# Patient Record
Sex: MALE | Race: BLACK OR AFRICAN AMERICAN | Employment: OTHER | URBAN - METROPOLITAN AREA
[De-identification: names, ages, dates, MRNs, and addresses within clinical notes are randomized per-mention and may not be internally consistent; named-entity substitution may affect disease eponyms.]

---

## 2017-03-28 ENCOUNTER — HOSPITAL ENCOUNTER (EMERGENCY)
Age: 77
Discharge: HOME | End: 2017-03-28
Attending: EMERGENCY MEDICINE
Payer: MEDICARE

## 2017-03-28 VITALS
BODY MASS INDEX: 25.33 KG/M2 | RESPIRATION RATE: 16 BRPM | TEMPERATURE: 98.6 F | DIASTOLIC BLOOD PRESSURE: 90 MMHG | HEIGHT: 72 IN | WEIGHT: 187 LBS | HEART RATE: 60 BPM | OXYGEN SATURATION: 100 % | SYSTOLIC BLOOD PRESSURE: 186 MMHG

## 2017-03-28 DIAGNOSIS — S81.802A WOUND OF LEFT LEG, INITIAL ENCOUNTER: Primary | ICD-10-CM

## 2017-03-28 LAB
ALBUMIN SERPL BCP-MCNC: 3.3 G/DL (ref 3.2–4.6)
ALBUMIN/GLOB SERPL: 0.8 {RATIO} (ref 1.2–3.5)
ALP SERPL-CCNC: 78 U/L (ref 50–136)
ALT SERPL-CCNC: 30 U/L (ref 12–65)
ANION GAP BLD CALC-SCNC: 4 MMOL/L (ref 7–16)
AST SERPL W P-5'-P-CCNC: 29 U/L (ref 15–37)
BASOPHILS # BLD AUTO: 0 K/UL (ref 0–0.2)
BASOPHILS # BLD: 0 % (ref 0–2)
BILIRUB DIRECT SERPL-MCNC: 0.1 MG/DL
BILIRUB SERPL-MCNC: 0.4 MG/DL (ref 0.2–1.1)
BUN SERPL-MCNC: 30 MG/DL (ref 8–23)
CALCIUM SERPL-MCNC: 8.3 MG/DL (ref 8.3–10.4)
CHLORIDE SERPL-SCNC: 116 MMOL/L (ref 98–107)
CO2 SERPL-SCNC: 28 MMOL/L (ref 21–32)
CREAT SERPL-MCNC: 3.46 MG/DL (ref 0.8–1.5)
DIFFERENTIAL METHOD BLD: ABNORMAL
EOSINOPHIL # BLD: 0.1 K/UL (ref 0–0.8)
EOSINOPHIL NFR BLD: 3 % (ref 0.5–7.8)
ERYTHROCYTE [DISTWIDTH] IN BLOOD BY AUTOMATED COUNT: 15.8 % (ref 11.9–14.6)
GLOBULIN SER CALC-MCNC: 3.9 G/DL (ref 2.3–3.5)
GLUCOSE SERPL-MCNC: 169 MG/DL (ref 65–100)
HCT VFR BLD AUTO: 32.4 % (ref 41.1–50.3)
HGB BLD-MCNC: 10.1 G/DL (ref 13.6–17.2)
IMM GRANULOCYTES # BLD: 0 K/UL (ref 0–0.5)
IMM GRANULOCYTES NFR BLD AUTO: 0.2 % (ref 0–5)
LACTATE BLD-SCNC: 1 MMOL/L (ref 0.5–1.9)
LYMPHOCYTES # BLD AUTO: 44 % (ref 13–44)
LYMPHOCYTES # BLD: 2.1 K/UL (ref 0.5–4.6)
MCH RBC QN AUTO: 26.2 PG (ref 26.1–32.9)
MCHC RBC AUTO-ENTMCNC: 31.2 G/DL (ref 31.4–35)
MCV RBC AUTO: 84.2 FL (ref 79.6–97.8)
MONOCYTES # BLD: 0.4 K/UL (ref 0.1–1.3)
MONOCYTES NFR BLD AUTO: 9 % (ref 4–12)
NEUTS SEG # BLD: 2.1 K/UL (ref 1.7–8.2)
NEUTS SEG NFR BLD AUTO: 44 % (ref 43–78)
PLATELET # BLD AUTO: 141 K/UL (ref 150–450)
PMV BLD AUTO: 11.3 FL (ref 10.8–14.1)
POTASSIUM SERPL-SCNC: 4.2 MMOL/L (ref 3.5–5.1)
PROT SERPL-MCNC: 7.2 G/DL (ref 6.3–8.2)
RBC # BLD AUTO: 3.85 M/UL (ref 4.23–5.67)
SODIUM SERPL-SCNC: 148 MMOL/L (ref 136–145)
WBC # BLD AUTO: 4.7 K/UL (ref 4.3–11.1)

## 2017-03-28 PROCEDURE — 96366 THER/PROPH/DIAG IV INF ADDON: CPT | Performed by: EMERGENCY MEDICINE

## 2017-03-28 PROCEDURE — 80076 HEPATIC FUNCTION PANEL: CPT | Performed by: EMERGENCY MEDICINE

## 2017-03-28 PROCEDURE — 96365 THER/PROPH/DIAG IV INF INIT: CPT | Performed by: EMERGENCY MEDICINE

## 2017-03-28 PROCEDURE — 85025 COMPLETE CBC W/AUTO DIFF WBC: CPT | Performed by: EMERGENCY MEDICINE

## 2017-03-28 PROCEDURE — 99284 EMERGENCY DEPT VISIT MOD MDM: CPT | Performed by: EMERGENCY MEDICINE

## 2017-03-28 PROCEDURE — 80048 BASIC METABOLIC PNL TOTAL CA: CPT | Performed by: EMERGENCY MEDICINE

## 2017-03-28 PROCEDURE — 74011250636 HC RX REV CODE- 250/636: Performed by: EMERGENCY MEDICINE

## 2017-03-28 PROCEDURE — 74011000258 HC RX REV CODE- 258: Performed by: EMERGENCY MEDICINE

## 2017-03-28 PROCEDURE — 83605 ASSAY OF LACTIC ACID: CPT

## 2017-03-28 RX ORDER — SODIUM CHLORIDE 0.9 % (FLUSH) 0.9 %
5-10 SYRINGE (ML) INJECTION EVERY 8 HOURS
Status: DISCONTINUED | OUTPATIENT
Start: 2017-03-28 | End: 2017-03-28 | Stop reason: HOSPADM

## 2017-03-28 RX ORDER — SODIUM CHLORIDE 0.9 % (FLUSH) 0.9 %
5-10 SYRINGE (ML) INJECTION AS NEEDED
Status: DISCONTINUED | OUTPATIENT
Start: 2017-03-28 | End: 2017-03-28 | Stop reason: HOSPADM

## 2017-03-28 RX ORDER — CEPHALEXIN 500 MG/1
500 CAPSULE ORAL 3 TIMES DAILY
Qty: 21 CAP | Refills: 0 | Status: SHIPPED | OUTPATIENT
Start: 2017-03-28 | End: 2017-04-04

## 2017-03-28 RX ORDER — CIPROFLOXACIN 500 MG/1
500 TABLET ORAL 2 TIMES DAILY
Qty: 14 TAB | Refills: 0 | Status: SHIPPED | OUTPATIENT
Start: 2017-03-28 | End: 2017-04-04

## 2017-03-28 RX ADMIN — CEFTRIAXONE SODIUM 1 G: 1 INJECTION, POWDER, FOR SOLUTION INTRAMUSCULAR; INTRAVENOUS at 18:00

## 2017-03-28 NOTE — DISCHARGE INSTRUCTIONS
Skin Tears: Care Instructions  Your Care Instructions  As we get older, our skin gets drier and more fragile. Sometimes this can cause the outer layers of skin to split and tear open. Skin tears are treated in different ways. In some cases, doctors use pieces of tape called Steri-Strips to pull the skin together and help it heal. Other times, it's best to leave the tear open and cover it with a special wound-care bandage. Skin tears are usually not serious. They usually heal in a few weeks. But how long you take to heal depends on your body and the type of tear you have. Sometimes the torn piece of skin is used to protect the wound while it heals. But that piece of skin does not heal. It may fall off on its own. Or the doctor may remove it. As your tear heals, it's important to keep it clean to help prevent infection. The doctor has checked you carefully, but problems can develop later. If you notice any problems or new symptoms, get medical treatment right away. Follow-up care is a key part of your treatment and safety. Be sure to make and go to all appointments, and call your doctor if you are having problems. It's also a good idea to know your test results and keep a list of the medicines you take. How can you care for yourself at home? · If you have pain, ask your doctor if you can take an over-the-counter pain medicine, such as acetaminophen (Tylenol), ibuprofen (Advil, Motrin), or naproxen (Aleve). Be safe with medicines. Read and follow all instructions on the label. · If you have a bandage, follow your doctor's instructions for changing it. · If you have Steri-Strips, leave them on until they fall off. · Follow your doctor's instructions about bathing. · Gently wash the skin tear with plain water 2 times a day. Do not rub the area. · Let the area air dry. Or you can pat it carefully with a soft towel. When should you call for help?   Call your doctor now or seek immediate medical care if:  · You have signs of infection, such as:  ¨ Increased pain, swelling, warmth, or redness around the tear. ¨ Red streaks leading from the tear. ¨ Pus draining from the tear. ¨ A fever. · The tear starts to bleed a lot. Small amounts of blood are normal.  Watch closely for changes in your health, and be sure to contact your doctor if:  · You do not get better as expected. Where can you learn more? Go to http://svetlana-glenis.info/. Enter K421 in the search box to learn more about \"Skin Tears: Care Instructions. \"  Current as of: May 27, 2016  Content Version: 11.2  © 5326-0600 [employer]. Care instructions adapted under license by [employer] (which disclaims liability or warranty for this information). If you have questions about a medical condition or this instruction, always ask your healthcare professional. [address] any warranty or liability for your use of this information.

## 2017-03-28 NOTE — ED TRIAGE NOTES
Patient has large wound to left lower leg. States had large blister over site. Patient states he is diabetic.

## 2017-03-28 NOTE — ED NOTES
Patient awake, A&O x3. VSS. Patient states he woke up today with a large blister on his left shin. Patient states \"i mashed it with my hands and a whole lot of water came out\". Patient has large, red, weeping open wound on left shin. Patient denies chest pain, SOB, N/V. Patient states he is a diabetic. No pain at site. BLE edema noted, patient states his legs appear more swollen to him than normal. States no difficulty ambulating.

## 2017-03-28 NOTE — ED NOTES
I have reviewed medications, follow up provider options, and discharge instructions with the patient. The patient verbalized understanding. Copy of discharge information given to patient upon discharge. Patient discharged in no distress. Patient ambulatory to waiting room.  No questions at this time

## 2017-03-28 NOTE — ED PROVIDER NOTES
HPI Comments: 81-year-old -American male with chronic lower extremity edema and diabetes sent from primary care for evaluation of healing wound to his left leg. States that he awoke Monday morning with a large blister on his right shin. He reportedly broke open spontaneously. He said no fever. No trauma. Glucose has been normal.  He has no other complaints at this time. He reports that the swelling in his legs is at its baseline. Patient is a 77 y.o. male presenting with skin problem. The history is provided by the patient. Skin Problem           Past Medical History:   Diagnosis Date    Acute renal failure superimposed on stage 3 chronic kidney disease ([address] 9/21/2016    Anemia     Chronic kidney disease     Chronic pancreatitis ([address] 9/22/2016    Dermatophytosis of nail 12/6/2016    Diabetic neuropathy ([address] 9/22/2016    Essential hypertension 9/22/2016    GERD (gastroesophageal reflux disease)     Hypercholesterolemia     Iron (Fe) deficiency anemia 9/22/2016    Kidney disease     Septicemia due to Klebsiella pneumoniae ([address] 3/60/2873    Systolic CHF, chronic ([address] 9/23/2016    Type II diabetes mellitus with nephropathy ([address] 9/22/2016    Venous insufficiency 12/6/2016    Xerosis cutis 12/6/2016       Past Surgical History:   Procedure Laterality Date    HX COLONOSCOPY      HX HERNIA REPAIR      HX PROSTATECTOMY      HX TURP  2012    FOR BPH         Family History:   Problem Relation Age of Onset    Diabetes Mother     No Known Problems Father        Social History     Social History    Marital status:      Spouse name: N/A    Number of children: N/A    Years of education: N/A     Occupational History    Not on file.      Social History Main Topics    Smoking status: Former Smoker     Quit date: 1995    Smokeless tobacco: Current User    Alcohol use No    Drug use: Not on file    Sexual activity: Not on file     Other Topics Concern    Not on file Social History Narrative         ALLERGIES: Review of patient's allergies indicates no known allergies. Review of Systems   Constitutional: Negative for fever. HENT: Negative for congestion. Respiratory: Negative for cough and shortness of breath. Cardiovascular: Positive for leg swelling. Gastrointestinal: Negative for abdominal pain and vomiting. Genitourinary: Negative for dysuria. Neurological: Negative for headaches. Vitals:    03/28/17 1622 03/28/17 1907   BP: 150/72    Pulse: 83    Resp: 18    Temp: 98.6 °F (37 °C)    SpO2: 96% 96%   Weight: 84.8 kg (187 lb)    Height: 5' 11.5\" (1.816 m)             Physical Exam   Constitutional: He appears well-developed and well-nourished. No distress. HENT:   Head: Normocephalic and atraumatic. Neck: Normal range of motion. Cardiovascular: Normal rate and regular rhythm. Pulmonary/Chest: Effort normal and breath sounds normal.   Musculoskeletal:   Symmetric 1+ lower extremity edema with thickening of the skin consistent with chronic edema. The left shin has a superficial area of skin loss approximately 10 cm in diameter. There is no purulent drainage. There is no palpable abscess. He has good distal pulses and sensation and movement. Neurological: He is alert. Skin: Skin is warm and dry. Psychiatric: He has a normal mood and affect. Nursing note and vitals reviewed. MDM  Number of Diagnoses or Management Options  Diagnosis management comments: IV access obtained. Patient treated with IV Rocephin. Lab work is unremarkable except for chronic renal insufficiency and anemia. Lactic acid is normal.  No evidence of DKA. At this time patient is nontoxic and does not appear to need inpatient treatment. For now we will treat with oral antibiotics. Will refer to the wound center.        Amount and/or Complexity of Data Reviewed  Clinical lab tests: ordered and reviewed  Tests in the medicine section of CPT®: ordered and reviewed    Risk of Complications, Morbidity, and/or Mortality  Presenting problems: moderate  Diagnostic procedures: low  Management options: low      ED Course       Procedures

## 2017-04-01 ENCOUNTER — HOSPITAL ENCOUNTER (INPATIENT)
Age: 77
LOS: 3 days | Discharge: HOME HEALTH | DRG: 300 | End: 2017-04-04
Attending: EMERGENCY MEDICINE | Admitting: INTERNAL MEDICINE
Payer: MEDICARE

## 2017-04-01 DIAGNOSIS — I87.2 VENOUS INSUFFICIENCY: ICD-10-CM

## 2017-04-01 DIAGNOSIS — E86.9 VOLUME DEPLETION: ICD-10-CM

## 2017-04-01 DIAGNOSIS — L03.116 CELLULITIS OF LEFT LOWER EXTREMITY: Primary | ICD-10-CM

## 2017-04-01 DIAGNOSIS — N28.9 RENAL INSUFFICIENCY: ICD-10-CM

## 2017-04-01 PROBLEM — L03.90 CELLULITIS: Status: ACTIVE | Noted: 2017-04-01

## 2017-04-01 LAB
ALBUMIN SERPL BCP-MCNC: 3 G/DL (ref 3.2–4.6)
ALBUMIN/GLOB SERPL: 0.8 {RATIO} (ref 1.2–3.5)
ALP SERPL-CCNC: 79 U/L (ref 50–136)
ALT SERPL-CCNC: 27 U/L (ref 12–65)
ANION GAP BLD CALC-SCNC: 7 MMOL/L (ref 7–16)
AST SERPL W P-5'-P-CCNC: 38 U/L (ref 15–37)
BASOPHILS # BLD AUTO: 0 K/UL (ref 0–0.2)
BASOPHILS # BLD: 0 % (ref 0–2)
BILIRUB SERPL-MCNC: 0.6 MG/DL (ref 0.2–1.1)
BUN SERPL-MCNC: 28 MG/DL (ref 8–23)
CALCIUM SERPL-MCNC: 7.6 MG/DL (ref 8.3–10.4)
CHLORIDE SERPL-SCNC: 114 MMOL/L (ref 98–107)
CO2 SERPL-SCNC: 28 MMOL/L (ref 21–32)
CREAT SERPL-MCNC: 4.03 MG/DL (ref 0.8–1.5)
DIFFERENTIAL METHOD BLD: ABNORMAL
EOSINOPHIL # BLD: 0.1 K/UL (ref 0–0.8)
EOSINOPHIL NFR BLD: 2 % (ref 0.5–7.8)
ERYTHROCYTE [DISTWIDTH] IN BLOOD BY AUTOMATED COUNT: 15.7 % (ref 11.9–14.6)
GLOBULIN SER CALC-MCNC: 3.7 G/DL (ref 2.3–3.5)
GLUCOSE BLD STRIP.AUTO-MCNC: 116 MG/DL (ref 65–100)
GLUCOSE SERPL-MCNC: 183 MG/DL (ref 65–100)
HCT VFR BLD AUTO: 32.9 % (ref 41.1–50.3)
HGB BLD-MCNC: 10 G/DL (ref 13.6–17.2)
IMM GRANULOCYTES # BLD: 0 K/UL (ref 0–0.5)
IMM GRANULOCYTES NFR BLD AUTO: 0 % (ref 0–5)
LACTATE BLD-SCNC: 0.8 MMOL/L (ref 0.5–1.9)
LYMPHOCYTES # BLD AUTO: 35 % (ref 13–44)
LYMPHOCYTES # BLD: 1.7 K/UL (ref 0.5–4.6)
MAGNESIUM SERPL-MCNC: 1.3 MG/DL (ref 1.8–2.4)
MCH RBC QN AUTO: 26 PG (ref 26.1–32.9)
MCHC RBC AUTO-ENTMCNC: 30.4 G/DL (ref 31.4–35)
MCV RBC AUTO: 85.7 FL (ref 79.6–97.8)
MONOCYTES # BLD: 0.6 K/UL (ref 0.1–1.3)
MONOCYTES NFR BLD AUTO: 12 % (ref 4–12)
NEUTS SEG # BLD: 2.5 K/UL (ref 1.7–8.2)
NEUTS SEG NFR BLD AUTO: 51 % (ref 43–78)
PLATELET # BLD AUTO: 135 K/UL (ref 150–450)
PMV BLD AUTO: 11.1 FL (ref 10.8–14.1)
POTASSIUM SERPL-SCNC: 5.2 MMOL/L (ref 3.5–5.1)
PROT SERPL-MCNC: 6.7 G/DL (ref 6.3–8.2)
RBC # BLD AUTO: 3.84 M/UL (ref 4.23–5.67)
SODIUM SERPL-SCNC: 149 MMOL/L (ref 136–145)
WBC # BLD AUTO: 4.8 K/UL (ref 4.3–11.1)

## 2017-04-01 PROCEDURE — 65270000029 HC RM PRIVATE

## 2017-04-01 PROCEDURE — 74011000258 HC RX REV CODE- 258: Performed by: INTERNAL MEDICINE

## 2017-04-01 PROCEDURE — 82962 GLUCOSE BLOOD TEST: CPT

## 2017-04-01 PROCEDURE — 87040 BLOOD CULTURE FOR BACTERIA: CPT | Performed by: INTERNAL MEDICINE

## 2017-04-01 PROCEDURE — 85025 COMPLETE CBC W/AUTO DIFF WBC: CPT | Performed by: EMERGENCY MEDICINE

## 2017-04-01 PROCEDURE — 74011250636 HC RX REV CODE- 250/636: Performed by: EMERGENCY MEDICINE

## 2017-04-01 PROCEDURE — 83605 ASSAY OF LACTIC ACID: CPT

## 2017-04-01 PROCEDURE — 99285 EMERGENCY DEPT VISIT HI MDM: CPT | Performed by: EMERGENCY MEDICINE

## 2017-04-01 PROCEDURE — 83735 ASSAY OF MAGNESIUM: CPT | Performed by: INTERNAL MEDICINE

## 2017-04-01 PROCEDURE — 96374 THER/PROPH/DIAG INJ IV PUSH: CPT | Performed by: EMERGENCY MEDICINE

## 2017-04-01 PROCEDURE — 36415 COLL VENOUS BLD VENIPUNCTURE: CPT | Performed by: INTERNAL MEDICINE

## 2017-04-01 PROCEDURE — 74011250636 HC RX REV CODE- 250/636: Performed by: INTERNAL MEDICINE

## 2017-04-01 PROCEDURE — 80053 COMPREHEN METABOLIC PANEL: CPT | Performed by: EMERGENCY MEDICINE

## 2017-04-01 RX ORDER — HYDROMORPHONE HYDROCHLORIDE 1 MG/ML
0.5 INJECTION, SOLUTION INTRAMUSCULAR; INTRAVENOUS; SUBCUTANEOUS
Status: DISCONTINUED | OUTPATIENT
Start: 2017-04-01 | End: 2017-04-04 | Stop reason: HOSPADM

## 2017-04-01 RX ORDER — DIPHENHYDRAMINE HYDROCHLORIDE 50 MG/ML
12.5 INJECTION, SOLUTION INTRAMUSCULAR; INTRAVENOUS
Status: DISCONTINUED | OUTPATIENT
Start: 2017-04-01 | End: 2017-04-04 | Stop reason: HOSPADM

## 2017-04-01 RX ORDER — AMOXICILLIN 250 MG
1 CAPSULE ORAL DAILY
Status: DISCONTINUED | OUTPATIENT
Start: 2017-04-02 | End: 2017-04-04 | Stop reason: HOSPADM

## 2017-04-01 RX ORDER — ONDANSETRON 2 MG/ML
4 INJECTION INTRAMUSCULAR; INTRAVENOUS
Status: DISCONTINUED | OUTPATIENT
Start: 2017-04-01 | End: 2017-04-04 | Stop reason: HOSPADM

## 2017-04-01 RX ORDER — VANCOMYCIN/0.9 % SOD CHLORIDE 1.5G/250ML
1500 PLASTIC BAG, INJECTION (ML) INTRAVENOUS ONCE
Status: COMPLETED | OUTPATIENT
Start: 2017-04-01 | End: 2017-04-01

## 2017-04-01 RX ORDER — SODIUM CHLORIDE 0.9 % (FLUSH) 0.9 %
5-10 SYRINGE (ML) INJECTION AS NEEDED
Status: DISCONTINUED | OUTPATIENT
Start: 2017-04-01 | End: 2017-04-04 | Stop reason: HOSPADM

## 2017-04-01 RX ORDER — MORPHINE SULFATE 4 MG/ML
4 INJECTION, SOLUTION INTRAMUSCULAR; INTRAVENOUS
Status: COMPLETED | OUTPATIENT
Start: 2017-04-01 | End: 2017-04-01

## 2017-04-01 RX ORDER — SODIUM CHLORIDE 0.9 % (FLUSH) 0.9 %
5-10 SYRINGE (ML) INJECTION EVERY 8 HOURS
Status: DISCONTINUED | OUTPATIENT
Start: 2017-04-01 | End: 2017-04-04 | Stop reason: HOSPADM

## 2017-04-01 RX ORDER — NALOXONE HYDROCHLORIDE 0.4 MG/ML
0.4 INJECTION, SOLUTION INTRAMUSCULAR; INTRAVENOUS; SUBCUTANEOUS AS NEEDED
Status: DISCONTINUED | OUTPATIENT
Start: 2017-04-01 | End: 2017-04-04 | Stop reason: HOSPADM

## 2017-04-01 RX ORDER — ACETAMINOPHEN 325 MG/1
650 TABLET ORAL
Status: DISCONTINUED | OUTPATIENT
Start: 2017-04-01 | End: 2017-04-03

## 2017-04-01 RX ORDER — HEPARIN SODIUM 5000 [USP'U]/ML
5000 INJECTION, SOLUTION INTRAVENOUS; SUBCUTANEOUS EVERY 8 HOURS
Status: DISCONTINUED | OUTPATIENT
Start: 2017-04-01 | End: 2017-04-04 | Stop reason: HOSPADM

## 2017-04-01 RX ORDER — HYDROCODONE BITARTRATE AND ACETAMINOPHEN 5; 325 MG/1; MG/1
1 TABLET ORAL
Status: DISCONTINUED | OUTPATIENT
Start: 2017-04-01 | End: 2017-04-04 | Stop reason: HOSPADM

## 2017-04-01 RX ORDER — VANCOMYCIN/0.9 % SOD CHLORIDE 1.5G/250ML
1500 PLASTIC BAG, INJECTION (ML) INTRAVENOUS SEE ADMIN INSTRUCTIONS
Status: DISCONTINUED | OUTPATIENT
Start: 2017-04-01 | End: 2017-04-02

## 2017-04-01 RX ADMIN — MORPHINE SULFATE 4 MG: 4 INJECTION, SOLUTION INTRAMUSCULAR; INTRAVENOUS at 17:52

## 2017-04-01 RX ADMIN — PIPERACILLIN SODIUM,TAZOBACTAM SODIUM 3.38 G: 3; .375 INJECTION, POWDER, FOR SOLUTION INTRAVENOUS at 20:33

## 2017-04-01 RX ADMIN — HEPARIN SODIUM 5000 UNITS: 5000 INJECTION, SOLUTION INTRAVENOUS; SUBCUTANEOUS at 20:38

## 2017-04-01 RX ADMIN — VANCOMYCIN HYDROCHLORIDE 1500 MG: 10 INJECTION, POWDER, LYOPHILIZED, FOR SOLUTION INTRAVENOUS at 20:26

## 2017-04-01 RX ADMIN — SODIUM CHLORIDE 1000 ML: 900 INJECTION, SOLUTION INTRAVENOUS at 17:52

## 2017-04-01 RX ADMIN — Medication 10 ML: at 20:38

## 2017-04-01 RX ADMIN — ONDANSETRON HYDROCHLORIDE 4 MG: 2 INJECTION, SOLUTION INTRAMUSCULAR; INTRAVENOUS at 20:46

## 2017-04-01 RX ADMIN — HYDROMORPHONE HYDROCHLORIDE 0.5 MG: 1 INJECTION, SOLUTION INTRAMUSCULAR; INTRAVENOUS; SUBCUTANEOUS at 20:50

## 2017-04-01 NOTE — IP AVS SNAPSHOT
Current Discharge Medication List  
  
START taking these medications Dose & Instructions Dispensing Information Comments Morning Noon Evening Bedtime  
 doxycycline 100 mg tablet Commonly known as:  ADOXA Your next dose is: Today, evening Dose:  100 mg Take 1 Tab by mouth two (2) times a day for 6 days. Quantity:  12 Tab Refills:  0 CONTINUE these medications which have CHANGED Dose & Instructions Dispensing Information Comments Morning Noon Evening Bedtime  
 insulin glargine 100 unit/mL (3 mL) pen Commonly known as:  LANROSE SOLOSTAR What changed:   
- how much to take - when to take this Your last dose was: Today, in the morning Your next dose is:  Tomorrow Dose:  25 Units 25 Units by SubCUTAneous route daily. Quantity:  1 Each Refills:  0  
     
  
   
   
   
  
 pregabalin 50 mg capsule Commonly known as:  Will López What changed:  how much to take Your last dose was: Today, morning Your next dose is:  Tomorrow Dose:  50 mg Take 1 Cap by mouth daily. Max Daily Amount: 50 mg.  
 Quantity:  30 Cap Refills:  0 CONTINUE these medications which have NOT CHANGED Dose & Instructions Dispensing Information Comments Morning Noon Evening Bedtime  
 amLODIPine 10 mg tablet Commonly known as:  Vi Rafter Your last dose was: Today, morning Your next dose is:  Tomorrow Dose:  10 mg Take 10 mg by mouth daily. Refills:  0  
     
  
   
   
   
  
 aspirin delayed-release 81 mg tablet Your last dose was: Today, morning Your next dose is:  Tomorrow Take  by mouth daily. Refills:  0  
     
  
   
   
   
  
 calcitRIOL 0.25 mcg capsule Commonly known as:  ROCALTROL Your last dose was: Today, morning Your next dose is:  Tomorrow Dose:  0.25 mcg Take 0.25 mcg by mouth daily. Refills:  0 carvedilol 3.125 mg tablet Commonly known as:  Tyra Bosworth Your last dose was: Today, morning Your next dose is: Today, EVENING Dose:  3.125 mg Take 1 Tab by mouth two (2) times daily (with meals). Quantity:  60 Tab Refills:  0  
     
  
   
   
  
   
  
 chlorthalidone 50 mg tablet Commonly known as:  Vishnu Siddiqi Your last dose was: Today, morning Your next dose is:  Tomorrow Dose:  50 mg Take 50 mg by mouth daily. Refills:  0  
     
  
   
   
   
  
 DAIRY AID PO Your last dose was:  Before admission Your next dose is:  Tomorrow Dose:  9000 Units Take 9,000 Units by mouth daily. Refills:  0  
     
  
   
   
   
  
 DRISDOL 50,000 unit capsule Generic drug:  ergocalciferol Your next dose is:  Next Tuesday 4/11/2017 Dose:  58343 Units Take 50,000 Units by mouth every Tuesday. Refills:  0  
     
   
   
   
  
 ferrous sulfate 325 mg (65 mg iron) tablet Your last dose was: Today, morning Your next dose is: Today, EVENING Dose:  325 mg Take 325 mg by mouth two (2) times a day. Refills:  0  
     
  
   
   
  
   
  
 insulin lispro 100 unit/mL kwikpen Commonly known as:  HUMALOG Your last dose was: Today with breakfast and lunch Your next dose is: Today with dinner Dose:  8 Units 8 Units by SubCUTAneous route Before breakfast, lunch, and dinner. Quantity:  1 mL Refills:  pen Insulin Needles (Disposable) 31 gauge x 5/16\" Ndle Use with insulin QID Quantity:  1 Package Refills:  0  
     
   
   
   
  
 lipase-protease-amylase capsule Commonly known as:  ZENPEP 20,000 Your last dose was: Today with breakfast and lunch Your next dose is: Today with dinner Dose:  2 Cap Take 2 Caps by mouth three (3) times daily (with meals). Quantity:  180 Cap Refills:  0 LOMOTIL 2.5-0.025 mg per tablet Generic drug:  diphenoxylate-atropine Your next dose is: If needed for diarrhea Dose:  1 Tab Take 1 Tab by mouth four (4) times daily as needed for Diarrhea. Refills:  0  
     
   
   
   
  
 metoprolol succinate 50 mg XL tablet Commonly known as:  TOPROL-XL Your last dose was: Today, morning Your next dose is:  Tomorrow Dose:  50 mg Take 50 mg by mouth daily. Refills:  0  
     
  
   
   
   
  
 omeprazole 20 mg capsule Commonly known as:  PRILOSEC Your last dose was: Today, morning Your next dose is:  Tomorrow Dose:  20 mg Take 20 mg by mouth daily. Refills:  0  
     
  
   
   
   
  
 oxyCODONE-acetaminophen 5-325 mg per tablet Commonly known as:  PERCOCET Your last dose was: Today at 10:51 a.m. Your next dose is:  2:51 p.m. Dose:  2 Tab Take 2 Tabs by mouth every four (4) hours as needed for Pain. Max Daily Amount: 12 Tabs. Quantity:  20 Tab Refills:  0  
     
   
   
   
  
 sodium bicarbonate 650 mg tablet Your last dose was: Today, morning Your next dose is: Today, evening Dose:  650 mg Take 650 mg by mouth two (2) times a day. Refills:  0  
     
  
   
   
  
   
  
 torsemide 20 mg tablet Commonly known as:  DEMADEX Your last dose was: Today, morning Your next dose is:  Tomorrow Dose:  20 mg Take 20 mg by mouth daily. Refills:  0  
     
  
   
   
   
  
 traMADol 200 mg tablet Commonly known as:  ULTRAM-ER Your last dose was: Today, morning Your next dose is:  Tomorrow Dose:  200 mg Take 1 Tab by mouth daily. Max Daily Amount: 200 mg. Quantity:  30 Tab Refills:  0 STOP taking these medications   
 cephALEXin 500 mg capsule Commonly known as:  KEFLEX  
   
  
 ciprofloxacin HCl 500 mg tablet Commonly known as:  CIPRO potassium chloride 20 mEq tablet Commonly known as:  K-DUR, KLOR-CON  
   
  
  
  
 Where to Get Your Medications These medications were sent to [facility], [address] [place] [address], [facility] [place] [address] Phone:  [phone]  
  doxycycline 100 mg tablet Information on where to get these meds will be given to you by the nurse or doctor. ! Ask your nurse or doctor about these medications  
  traMADol 200 mg tablet

## 2017-04-01 NOTE — IP AVS SNAPSHOT
[address] [id number] [facility] [address] [facility] 
[phone] Patient: Judy Ryder. MRN: [id number] Walker Everts You are allergic to the following No active allergies Recent Documentation Height Weight BMI Smoking Status 1.816 m 85.5 kg 25.94 kg/m2 Former Smoker Unresulted Labs Order Current Status CULTURE, BLOOD Preliminary result CULTURE, BLOOD Preliminary result Emergency Contacts Name Discharge Info Relation Home Work Mobile Homar Kelley  Other Relative [6] [phone] Nishant Mantilla  Son [22] [phone] About your hospitalization You were admitted on:  April 1, 2017 You last received care in the:  [facility] 7 MED SURG You were discharged on:  April 4, 2017 Unit phone number:  [phone] Why you were hospitalized Your primary diagnosis was:  Venous Stasis Ulcer Of Left Lower Extremity (Hcc) Your diagnoses also included:  Type Ii Diabetes Mellitus With Nephropathy (Hcc), Systolic Chf, Chronic (Hcc), Stage 4 Chronic Kidney Disease (Hcc), Stasis Edema Of Both Lower Extremities, Cellulitis Of Left Lower Leg Providers Seen During Your Hospitalizations Provider Role Specialty Primary office phone Elaine Booker MD Attending Provider Emergency Medicine [phone] Tracy Higgins MD Attending Provider Internal Medicine [phone] Your Primary Care Physician (PCP) Primary Care Physician Office Phone Office Fax MOLLY MCCARTHY ** None ** ** None ** Follow-up Information Follow up With Details Comments Contact Info Dong Dalton NP On 4/6/2017 follow up cellulitis and repeat labwork @ 1:30 PM   
 [address] [place] [address] representative will call you and set up an appointment [address] [address] 
[phone] Current Discharge Medication List  
 START taking these medications Dose & Instructions Dispensing Information Comments Morning Noon Evening Bedtime  
 doxycycline 100 mg tablet Commonly known as:  ADOXA Your next dose is: Today, evening Dose:  100 mg Take 1 Tab by mouth two (2) times a day for 6 days. Quantity:  12 Tab Refills:  0 CONTINUE these medications which have CHANGED Dose & Instructions Dispensing Information Comments Morning Noon Evening Bedtime  
 insulin glargine 100 unit/mL (3 mL) pen Commonly known as:  LANROSE SOLOSTAR What changed:   
- how much to take - when to take this Your last dose was: Today, in the morning Your next dose is:  Tomorrow Dose:  25 Units 25 Units by SubCUTAneous route daily. Quantity:  1 Each Refills:  0  
     
  
   
   
   
  
 pregabalin 50 mg capsule Commonly known as:  Kristofer Sanchez What changed:  how much to take Your last dose was: Today, morning Your next dose is:  Tomorrow Dose:  50 mg Take 1 Cap by mouth daily. Max Daily Amount: 50 mg.  
 Quantity:  30 Cap Refills:  0 CONTINUE these medications which have NOT CHANGED Dose & Instructions Dispensing Information Comments Morning Noon Evening Bedtime  
 amLODIPine 10 mg tablet Commonly known as:  Wendy Salter Your last dose was: Today, morning Your next dose is:  Tomorrow Dose:  10 mg Take 10 mg by mouth daily. Refills:  0  
     
  
   
   
   
  
 aspirin delayed-release 81 mg tablet Your last dose was: Today, morning Your next dose is:  Tomorrow Take  by mouth daily. Refills:  0  
     
  
   
   
   
  
 calcitRIOL 0.25 mcg capsule Commonly known as:  ROCALTROL Your last dose was: Today, morning Your next dose is:  Tomorrow Dose:  0.25 mcg Take 0.25 mcg by mouth daily. Refills:  0  
     
  
   
   
   
  
 carvedilol 3.125 mg tablet Commonly known as:  Willis Tracy Your last dose was: Today, morning Your next dose is: Today, EVENING Dose:  3.125 mg Take 1 Tab by mouth two (2) times daily (with meals). Quantity:  60 Tab Refills:  0  
     
  
   
   
  
   
  
 chlorthalidone 50 mg tablet Commonly known as:  Ruddy Enrique Your last dose was: Today, morning Your next dose is:  Tomorrow Dose:  50 mg Take 50 mg by mouth daily. Refills:  0  
     
  
   
   
   
  
 DAIRY AID PO Your last dose was:  Before admission Your next dose is:  Tomorrow Dose:  9000 Units Take 9,000 Units by mouth daily. Refills:  0  
     
  
   
   
   
  
 DRISDOL 50,000 unit capsule Generic drug:  ergocalciferol Your next dose is:  Next Tuesday 4/11/2017 Dose:  52439 Units Take 50,000 Units by mouth every Tuesday. Refills:  0  
     
   
   
   
  
 ferrous sulfate 325 mg (65 mg iron) tablet Your last dose was: Today, morning Your next dose is: Today, EVENING Dose:  325 mg Take 325 mg by mouth two (2) times a day. Refills:  0  
     
  
   
   
  
   
  
 insulin lispro 100 unit/mL kwikpen Commonly known as:  HUMALOG Your last dose was: Today with breakfast and lunch Your next dose is: Today with dinner Dose:  8 Units 8 Units by SubCUTAneous route Before breakfast, lunch, and dinner. Quantity:  1 mL Refills:  pen Insulin Needles (Disposable) 31 gauge x 5/16\" Ndle Use with insulin QID Quantity:  1 Package Refills:  0  
     
   
   
   
  
 lipase-protease-amylase capsule Commonly known as:  ZENPEP 20,000 Your last dose was: Today with breakfast and lunch Your next dose is: Today with dinner Dose:  2 Cap Take 2 Caps by mouth three (3) times daily (with meals). Quantity:  180 Cap Refills:  0 LOMOTIL 2.5-0.025 mg per tablet Generic drug:  diphenoxylate-atropine Your next dose is: If needed for diarrhea Dose:  1 Tab Take 1 Tab by mouth four (4) times daily as needed for Diarrhea. Refills:  0  
     
   
   
   
  
 metoprolol succinate 50 mg XL tablet Commonly known as:  TOPROL-XL Your last dose was: Today, morning Your next dose is:  Tomorrow Dose:  50 mg Take 50 mg by mouth daily. Refills:  0  
     
  
   
   
   
  
 omeprazole 20 mg capsule Commonly known as:  PRILOSEC Your last dose was: Today, morning Your next dose is:  Tomorrow Dose:  20 mg Take 20 mg by mouth daily. Refills:  0  
     
  
   
   
   
  
 oxyCODONE-acetaminophen 5-325 mg per tablet Commonly known as:  PERCOCET Your last dose was: Today at 10:51 a.m. Your next dose is:  2:51 p.m. Dose:  2 Tab Take 2 Tabs by mouth every four (4) hours as needed for Pain. Max Daily Amount: 12 Tabs. Quantity:  20 Tab Refills:  0  
     
   
   
   
  
 sodium bicarbonate 650 mg tablet Your last dose was: Today, morning Your next dose is: Today, evening Dose:  650 mg Take 650 mg by mouth two (2) times a day. Refills:  0  
     
  
   
   
  
   
  
 torsemide 20 mg tablet Commonly known as:  DEMADEX Your last dose was: Today, morning Your next dose is:  Tomorrow Dose:  20 mg Take 20 mg by mouth daily. Refills:  0  
     
  
   
   
   
  
 traMADol 200 mg tablet Commonly known as:  ULTRAM-ER Your last dose was: Today, morning Your next dose is:  Tomorrow Dose:  200 mg Take 1 Tab by mouth daily. Max Daily Amount: 200 mg. Quantity:  30 Tab Refills:  0 STOP taking these medications   
 cephALEXin 500 mg capsule Commonly known as:  KEFLEX  
   
  
 ciprofloxacin HCl 500 mg tablet Commonly known as:  CIPRO potassium chloride 20 mEq tablet Commonly known as:  K-DUR, KLOR-CON Where to Get Your Medications These medications were sent to [facility], [address] [place] [address], [address] [address] Phone:  [phone]  
  doxycycline 100 mg tablet Information on where to get these meds will be given to you by the nurse or doctor. ! Ask your nurse or doctor about these medications  
  traMADol 200 mg tablet Discharge Instructions Wound Care: After Your Visit Your Care Instructions Taking good care of your wound at home will help it heal quickly and reduce your chance of infection. The doctor has checked you carefully, but problems can develop later. If you notice any problems or new symptoms, get medical treatment right away. Follow-up care is a key part of your treatment and safety. Be sure to make and go to all appointments, and call your doctor if you are having problems. It's also a good idea to know your test results and keep a list of the medicines you take. How can you care for yourself at home? · Clean the area with soap and water 2 times a day unless your doctor gives you different instructions. Don't use hydrogen peroxide or alcohol, which can slow healing. ¨ You may cover the wound with a thin layer of antibiotic ointment, such as bacitracin, and a nonstick bandage. ¨ Apply more ointment and replace the bandage as needed. · Take pain medicines exactly as directed. Some pain is normal with a wound, but do not ignore pain that is getting worse instead of better. You could have an infection. ¨ If the doctor gave you a prescription medicine for pain, take it as prescribed. ¨ If you are not taking a prescription pain medicine, ask your doctor if you can take an over-the-counter medicine. · Your doctor may have closed your wound with stitches (sutures), staples, or skin glue.  
¨ If you have stitches, your doctor may remove them after several days to 2 weeks. Or you may have stitches that dissolve on their own. ¨ If you have staples, your doctor may remove them after 7 to 10 days. ¨ If your wound was closed with skin glue, the glue will wear off in a few days to 2 weeks. When should you call for help? Call your doctor now or seek immediate medical care if: 
· You have signs of infection, such as: 
¨ Increased pain, swelling, warmth, or redness near the wound. ¨ Red streaks leading from the wound. ¨ Pus draining from the wound. ¨ A fever. · You bleed so much from your incision that you soak one or more bandages over 2 to 4 hours. Watch closely for changes in your health, and be sure to contact your doctor if: · The wound is not getting better each day. Where can you learn more? Go to TuneUp[employer]be Enter L292 in the search box to learn more about \"Wound Care: After Your Visit. \"  
© 7638-1346 Healthwise, Incorporated. Care instructions adapted under license by Lorena Varma (which disclaims liability or warranty for this information). This care instruction is for use with your licensed healthcare professional. If you have questions about a medical condition or this instruction, always ask your healthcare professional. [address] [address] any warranty or liability for your use of this information. Content Version: 34.1.137158; Last Revised: April 23, 2012 DISCHARGE SUMMARY from Nurse The following personal items are in your possession at time of discharge: 
 
Dental Appliances: Lowers, Uppers Home Medications: None Jewelry: Watch Clothing: At bedside Other Valuables: Cell Phone PATIENT INSTRUCTIONS: 
 
 
F-face looks uneven A-arms unable to move or move unevenly S-speech slurred or non-existent T-time-call 911 as soon as signs and symptoms begin-DO NOT go Back to bed or wait to see if you get better-TIME IS BRAIN. Warning Signs of HEART ATTACK Call 911 if you have these symptoms: 
? Chest discomfort. Most heart attacks involve discomfort in the center of the chest that lasts more than a few minutes, or that goes away and comes back. It can feel like uncomfortable pressure, squeezing, fullness, or pain. ? Discomfort in other areas of the upper body. Symptoms can include pain or discomfort in one or both arms, the back, neck, jaw, or stomach. ? Shortness of breath with or without chest discomfort. ? Other signs may include breaking out in a cold sweat, nausea, or lightheadedness. Don't wait more than five minutes to call [address]! Fast action can save your life. Calling 911 is almost always the fastest way to get lifesaving treatment. Emergency Medical Services staff can begin treatment when they arrive  up to an hour sooner than if someone gets to the hospital by car. The discharge information has been reviewed with the patient. The patient verbalized understanding. Discharge medications reviewed with the patient and appropriate educational materials and side effects teaching were provided. Discharge Orders None [employer] Announcement We are excited to announce that we are making your provider's discharge notes available to you in [employer]. You will see these notes when they are completed and signed by the physician that discharged you from your recent hospital stay. If you have any questions or concerns about any information you see in [employer], please call the Health Information Department where you were seen or reach out to your Primary Care Provider for more information about your plan of care. Introducing [facility]!    
 [employer] introduces [employer] patient portal. Now you can access parts of your medical record, email your doctor's office, and request medication refills online. 1. In your internet browser, go to [email or web address]. [employer] [email or web address] 2. Click on the First Time User? Click Here link in the Sign In box. You will see the New Member Sign Up page. 3. Enter your [employer] Access Code exactly as it appears below. You will not need to use this code after youve completed the sign-up process. If you do not sign up before the expiration date, you must request a new code. · [employer] Access Code: [email or web address] Expires: 6/26/2017  4:10 PM 
 
4. Enter the last four digits of your Social Security Number (xxxx) and Date of Birth (mm/dd/yyyy) as indicated and click Submit. You will be taken to the next sign-up page. 5. Create a Medstro ID. This will be your [employer] login ID and cannot be changed, so think of one that is secure and easy to remember. 6. Create a [employer] password. You can change your password at any time. 7. Enter your Password Reset Question and Answer. This can be used at a later time if you forget your password. 8. Enter your e-mail address. You will receive e-mail notification when new information is available in [address] 9. Click Sign Up. You can now view and download portions of your medical record. 10. Click the Download Summary menu link to download a portable copy of your medical information. If you have questions, please visit the Frequently Asked Questions section of the [employer] website. Remember, [employer] is NOT to be used for urgent needs. For medical emergencies, dial 911. Now available from your iPhone and Android! General Information Please provide this summary of care documentation to your next provider. Patient Signature:  ____________________________________________________________ Date:  ____________________________________________________________  
  
Sanborn Abelson  Provider Signature: ____________________________________________________________ Date:  ____________________________________________________________

## 2017-04-01 NOTE — ED PROVIDER NOTES
HPI Comments: I'm under the patient awoke with a large blister to his anterior left lower leg. He was seen in our department for this, placed on antibiotics, returns today with increasing discomfort. States it also might be somewhat larger. Does not recall any initial provoking injury. Denies any definite known fever. He has less interest in eating and drinking since onset. Patient is a 77 y.o. male presenting with leg pain and wound check. The history is provided by the patient. Leg Pain    Episode onset: monday. The problem has been gradually worsening. The pain is present in the left lower leg. The pain is moderate. He has tried nothing for the symptoms. Wound Check           Past Medical History:   Diagnosis Date    Acute renal failure superimposed on stage 3 chronic kidney disease ([address] 9/21/2016    Anemia     Chronic kidney disease     Chronic pancreatitis ([address] 9/22/2016    Dermatophytosis of nail 12/6/2016    Diabetic neuropathy ([address] 9/22/2016    Essential hypertension 9/22/2016    GERD (gastroesophageal reflux disease)     Hypercholesterolemia     Iron (Fe) deficiency anemia 9/22/2016    Kidney disease     Septicemia due to Klebsiella pneumoniae ([address] 8/97/7155    Systolic CHF, chronic ([address] 9/23/2016    Type II diabetes mellitus with nephropathy ([address] 9/22/2016    Venous insufficiency 12/6/2016    Xerosis cutis 12/6/2016       Past Surgical History:   Procedure Laterality Date    HX COLONOSCOPY      HX HERNIA REPAIR      HX PROSTATECTOMY      HX TURP  2012    FOR BPH         Family History:   Problem Relation Age of Onset    Diabetes Mother     No Known Problems Father        Social History     Social History    Marital status:      Spouse name: N/A    Number of children: N/A    Years of education: N/A     Occupational History    Not on file.      Social History Main Topics    Smoking status: Former Smoker     Quit date: 1995    Smokeless tobacco: Current User    Alcohol use No    Drug use: Not on file    Sexual activity: Not on file     Other Topics Concern    Not on file     Social History Narrative         ALLERGIES: Review of patient's allergies indicates no known allergies. Review of Systems   Constitutional: Negative for chills and fever. Respiratory: Negative. Genitourinary: Negative. Neurological: Negative. All other systems reviewed and are negative. Vitals:    04/01/17 1341 04/01/17 1528 04/01/17 1531 04/01/17 1601   BP: 145/77 194/88 192/88 161/79   Pulse: 61      Resp: 16      Temp: 98.4 °F (36.9 °C)      SpO2: 98%      Weight: 84.8 kg (187 lb)      Height: 5' 11.5\" (1.816 m)               Physical Exam   Constitutional: He appears well-developed and well-nourished. No distress. HENT:   Head: Atraumatic. Eyes: No scleral icterus. Neck: Neck supple. Cardiovascular: Normal rate and intact distal pulses. Pulmonary/Chest: Effort normal. No respiratory distress. Abdominal: Soft. There is no tenderness. There is no rebound. Musculoskeletal: Normal range of motion. He exhibits tenderness. Neurological: He is alert. Skin: Skin is warm and dry. Psychiatric: Thought content normal.   Nursing note and vitals reviewed. MDM  Number of Diagnoses or Management Options  Cellulitis of left lower extremity:   Renal insufficiency:   Venous insufficiency:   Volume depletion:   Diagnosis management comments: Significant swelling to the left lower extremity that initially he had blistering that sloughed and now with large open area. Removal of dressing shows some purulent coverage of the area. He has normal distal neurovascular.     Renal function is worse and hopefully improves with IV hydration       Amount and/or Complexity of Data Reviewed  Clinical lab tests: ordered and reviewed  Decide to obtain previous medical records or to obtain history from someone other than the patient: yes  Obtain history from someone other than the patient: yes  Discuss the patient with other providers: yes    Risk of Complications, Morbidity, and/or Mortality  Presenting problems: moderate  Diagnostic procedures: low  Management options: moderate      ED Course       Procedures    Recent Results (from the past 12 hour(s))   CBC WITH AUTOMATED DIFF    Collection Time: 04/01/17  1:49 PM   Result Value Ref Range    WBC 4.8 4.3 - 11.1 K/uL    RBC 3.84 (L) 4.23 - 5.67 M/uL    HGB 10.0 (L) 13.6 - 17.2 g/dL    HCT 32.9 (L) 41.1 - 50.3 %    MCV 85.7 79.6 - 97.8 FL    MCH 26.0 (L) 26.1 - 32.9 PG    MCHC 30.4 (L) 31.4 - 35.0 g/dL    RDW 15.7 (H) 11.9 - 14.6 %    PLATELET 942 (L) 707 - 450 K/uL    MPV 11.1 10.8 - 14.1 FL    DF AUTOMATED      NEUTROPHILS 51 43 - 78 %    LYMPHOCYTES 35 13 - 44 %    MONOCYTES 12 4.0 - 12.0 %    EOSINOPHILS 2 0.5 - 7.8 %    BASOPHILS 0 0.0 - 2.0 %    IMMATURE GRANULOCYTES 0.0 0.0 - 5.0 %    ABS. NEUTROPHILS 2.5 1.7 - 8.2 K/UL    ABS. LYMPHOCYTES 1.7 0.5 - 4.6 K/UL    ABS. MONOCYTES 0.6 0.1 - 1.3 K/UL    ABS. EOSINOPHILS 0.1 0.0 - 0.8 K/UL    ABS. BASOPHILS 0.0 0.0 - 0.2 K/UL    ABS. IMM. GRANS. 0.0 0.0 - 0.5 K/UL   METABOLIC PANEL, COMPREHENSIVE    Collection Time: 04/01/17  1:49 PM   Result Value Ref Range    Sodium 149 (H) 136 - 145 mmol/L    Potassium 5.2 (H) 3.5 - 5.1 mmol/L    Chloride 114 (H) 98 - 107 mmol/L    CO2 28 21 - 32 mmol/L    Anion gap 7 7 - 16 mmol/L    Glucose 183 (H) 65 - 100 mg/dL    BUN 28 (H) 8 - 23 MG/DL    Creatinine 4.03 (H) 0.8 - 1.5 MG/DL    GFR est AA 19 (L) >60 ml/min/1.73m2    GFR est non-AA 16 (L) >60 ml/min/1.73m2    Calcium 7.6 (L) 8.3 - 10.4 MG/DL    Bilirubin, total 0.6 0.2 - 1.1 MG/DL    ALT (SGPT) 27 12 - 65 U/L    AST (SGOT) 38 (H) 15 - 37 U/L    Alk.  phosphatase 79 50 - 136 U/L    Protein, total 6.7 6.3 - 8.2 g/dL    Albumin 3.0 (L) 3.2 - 4.6 g/dL    Globulin 3.7 (H) 2.3 - 3.5 g/dL    A-G Ratio 0.8 (L) 1.2 - 3.5     POC LACTIC ACID    Collection Time: 04/01/17  1:54 PM   Result Value Ref Range Lactic Acid (POC) 0.8 0.5 - 1.9 mmol/L

## 2017-04-01 NOTE — ED TRIAGE NOTES
Pt. States he has a diabetic ulcer on his left lower leg, states he was seen here Thursday for this. Pt. States he has been on abx. Pt. States the wound continues to get worse and more painful. Pt. States that the dressings continue to soak through with drainage.

## 2017-04-01 NOTE — PROGRESS NOTES
TRANSFER - IN REPORT:    Verbal report received from Rich RN(name) on A SHERRY Butcher.  being received from ED(unit) for routine progression of care      Report consisted of patients Situation, Background, Assessment and   Recommendations(SBAR). Information from the following report(s) Kardex, ED Summary, MAR and Recent Results was reviewed with the receiving nurse. Opportunity for questions and clarification was provided. Assessment completed upon patients arrival to unit and care assumed. Skin assessment completed by this RN and Deepa Mchugh. Large open blister on LLE. Cellulitis present BLE. No other skin issues noted at this time.

## 2017-04-01 NOTE — ED NOTES
TRANSFER - OUT REPORT:    Verbal report given to Raheel Morton (name) on A SHERRY [place] Deis.  being transferred to 7th Floor(unit) for routine progression of care       Report consisted of patients Situation, Background, Assessment and   Recommendations(SBAR). Information from the following report(s) SBAR was reviewed with the receiving nurse. Lines:       Opportunity for questions and clarification was provided.       Patient transported with:   [employer]

## 2017-04-01 NOTE — H&P
HOSPITALIST HISTORY AND PHYSICAL  NAME:  SILVIO Almanzar. Age:  77 y.o.  :   1940   MRN:   [id number]  PCP: Warden Yarely NP  Consulting MD:  Treatment Team: Attending Provider: Tati Mendez MD; Primary Nurse: Goyo Briscoe; Primary Nurse: Nathen Badillo    REASON FOR ADMISSION: cellulitis left lower leg with failed outpt therapy, acute on chronic renal failure    HPI:   76yr old male with pmhx sig for dm, htn, & CKD who had a blister develop on the left lower leg about 2 weeks ago that burst. Seen by pcp and and in our er on 2017 and started on abx and referred ro wound care. Pt states that he thought er was wound care center and didn't realize it was a diferent location. Leg pain continued so he came back to  ER today- found to have worsening infection and acute on chronic renal failure. Hospitalist asked to admit for failed outpt therapy. Complete ROS done and is as stated in HPI or otherwise negative  Past Medical History:   Diagnosis Date    Acute renal failure superimposed on stage 3 chronic kidney disease ([address] 2016    Anemia     Chronic kidney disease     Chronic pancreatitis ([address] 2016    Dermatophytosis of nail 2016    Diabetic neuropathy ([address] 2016    Essential hypertension 2016    GERD (gastroesophageal reflux disease)     Hypercholesterolemia     Iron (Fe) deficiency anemia 2016    Kidney disease     Septicemia due to Klebsiella pneumoniae ([address]     Systolic CHF, chronic ([address] 2016    Type II diabetes mellitus with nephropathy ([address] 2016    Venous insufficiency 2016    Xerosis cutis 2016      Past Surgical History:   Procedure Laterality Date    HX COLONOSCOPY      HX HERNIA REPAIR      HX PROSTATECTOMY      HX TURP      FOR BPH      Prior to Admission Medications   Prescriptions Last Dose Informant Patient Reported? Taking?    Insulin Needles, Disposable, 31 gauge x \" ndle   No No Sig: Use with insulin QID   LACTASE (DAIRY AID PO)   Yes No   Sig: Take 9,000 Units by mouth daily. amLODIPine (NORVASC) 10 mg tablet   Yes No   Sig: Take 10 mg by mouth daily. aspirin delayed-release 81 mg tablet   Yes No   Sig: Take  by mouth daily. calcitRIOL (ROCALTROL) 0.25 mcg capsule   Yes No   Sig: Take 0.25 mcg by mouth daily. carvedilol (COREG) 3.125 mg tablet   No No   Sig: Take 1 Tab by mouth two (2) times daily (with meals). cephALEXin (KEFLEX) 500 mg capsule   No No   Sig: Take 1 Cap by mouth three (3) times daily for 7 days. chlorthalidone (HYGROTEN) 50 mg tablet   Yes No   Sig: Take 50 mg by mouth daily. ciprofloxacin HCl (CIPRO) 500 mg tablet   No No   Sig: Take 1 Tab by mouth two (2) times a day for 7 days. diphenoxylate-atropine (LOMOTIL) 2.5-0.025 mg per tablet   Yes No   Sig: Take 1 Tab by mouth four (4) times daily as needed for Diarrhea.   ergocalciferol (DRISDOL) 50,000 unit capsule   Yes No   Sig: Take 50,000 Units by mouth every Tuesday. ferrous sulfate 325 mg (65 mg iron) tablet   Yes No   Sig: Take 325 mg by mouth two (2) times a day. insulin glargine (LANTUS SOLOSTAR) 100 unit/mL (3 mL) pen  Self No No   Si Units by SubCUTAneous route daily. Patient taking differently: 10 Units by SubCUTAneous route two (2) times a day. insulin lispro (HUMALOG) 100 unit/mL kwikpen   No No   Si Units by SubCUTAneous route Before breakfast, lunch, and dinner. lipase-protease-amylase (ZENPEP 20,000) capsule   No No   Sig: Take 2 Caps by mouth three (3) times daily (with meals). metoprolol succinate (TOPROL-XL) 50 mg XL tablet   Yes No   Sig: Take 50 mg by mouth daily. omeprazole (PRILOSEC) 20 mg capsule   Yes No   Sig: Take 20 mg by mouth daily. oxyCODONE-acetaminophen (PERCOCET) 5-325 mg per tablet   No No   Sig: Take 2 Tabs by mouth every four (4) hours as needed for Pain. Max Daily Amount: 12 Tabs.    potassium chloride (K-DUR, KLOR-CON) 20 mEq tablet  Self No No Sig: Take 2 Tabs by mouth two (2) times a day. Patient taking differently: Take 1 Tab by mouth daily. pregabalin (LYRICA) 50 mg capsule   No No   Sig: Take 1 Cap by mouth daily. Max Daily Amount: 50 mg. Patient taking differently: Take 150 mg by mouth daily. sodium bicarbonate 650 mg tablet   Yes No   Sig: Take 650 mg by mouth two (2) times a day. torsemide (DEMADEX) 20 mg tablet   Yes No   Sig: Take 20 mg by mouth daily. traMADol (ULTRAM-ER) 200 mg tablet   Yes No   Sig: Take 200 mg by mouth daily. Facility-Administered Medications: None     No Known Allergies   Social History   Substance Use Topics    Smoking status: Former Smoker     Quit date:     Smokeless tobacco: Current User    Alcohol use No      Family History   Problem Relation Age of Onset    Diabetes Mother     No Known Problems Father       Objective:     Visit Vitals    BP (!) 174/105 (BP 1 Location: Left arm, BP Patient Position: At rest)    Pulse 70    Temp 97.9 °F (36.6 °C)    Resp 16    Ht 5' 11.5\" (1.816 m)    Wt 84.8 kg (187 lb)    SpO2 100%    BMI 25.72 kg/m2      Temp (24hrs), Av.2 °F (36.8 °C), Min:97.9 °F (36.6 °C), Max:98.4 °F (36.9 °C)    Oxygen Therapy  O2 Sat (%): 100 % (17 1755)  O2 Device: Room air (17 175)  Physical Exam:  General:    Alert, cooperative, no distress, appears stated age. Head:   Normocephalic, without obvious abnormality, atraumatic. Nose:  Nares normal. No drainage or sinus tenderness. Lungs:   Clear to auscultation bilaterally. No Wheezing or Rhonchi. No rales. Heart:   Regular rate and rhythm,  no murmur, rub or gallop. Abdomen:   Soft, non-tender. Not distended. Bowel sounds normal.   Extremities: No cyanosis. No edema. No clubbing  Skin:     Texture, turgor normal. No rashes or lesions.   Not Jaundiced  Neurologic: Alert and oriented x 3, no focal deficits   Data Review: personally by me   Recent Results (from the past 24 hour(s))   CBC WITH AUTOMATED DIFF    Collection Time: 04/01/17  1:49 PM   Result Value Ref Range    WBC 4.8 4.3 - 11.1 K/uL    RBC 3.84 (L) 4.23 - 5.67 M/uL    HGB 10.0 (L) 13.6 - 17.2 g/dL    HCT 32.9 (L) 41.1 - 50.3 %    MCV 85.7 79.6 - 97.8 FL    MCH 26.0 (L) 26.1 - 32.9 PG    MCHC 30.4 (L) 31.4 - 35.0 g/dL    RDW 15.7 (H) 11.9 - 14.6 %    PLATELET 226 (L) 725 - 450 K/uL    MPV 11.1 10.8 - 14.1 FL    DF AUTOMATED      NEUTROPHILS 51 43 - 78 %    LYMPHOCYTES 35 13 - 44 %    MONOCYTES 12 4.0 - 12.0 %    EOSINOPHILS 2 0.5 - 7.8 %    BASOPHILS 0 0.0 - 2.0 %    IMMATURE GRANULOCYTES 0.0 0.0 - 5.0 %    ABS. NEUTROPHILS 2.5 1.7 - 8.2 K/UL    ABS. LYMPHOCYTES 1.7 0.5 - 4.6 K/UL    ABS. MONOCYTES 0.6 0.1 - 1.3 K/UL    ABS. EOSINOPHILS 0.1 0.0 - 0.8 K/UL    ABS. BASOPHILS 0.0 0.0 - 0.2 K/UL    ABS. IMM. GRANS. 0.0 0.0 - 0.5 K/UL   METABOLIC PANEL, COMPREHENSIVE    Collection Time: 04/01/17  1:49 PM   Result Value Ref Range    Sodium 149 (H) 136 - 145 mmol/L    Potassium 5.2 (H) 3.5 - 5.1 mmol/L    Chloride 114 (H) 98 - 107 mmol/L    CO2 28 21 - 32 mmol/L    Anion gap 7 7 - 16 mmol/L    Glucose 183 (H) 65 - 100 mg/dL    BUN 28 (H) 8 - 23 MG/DL    Creatinine 4.03 (H) 0.8 - 1.5 MG/DL    GFR est AA 19 (L) >60 ml/min/1.73m2    GFR est non-AA 16 (L) >60 ml/min/1.73m2    Calcium 7.6 (L) 8.3 - 10.4 MG/DL    Bilirubin, total 0.6 0.2 - 1.1 MG/DL    ALT (SGPT) 27 12 - 65 U/L    AST (SGOT) 38 (H) 15 - 37 U/L    Alk. phosphatase 79 50 - 136 U/L    Protein, total 6.7 6.3 - 8.2 g/dL    Albumin 3.0 (L) 3.2 - 4.6 g/dL    Globulin 3.7 (H) 2.3 - 3.5 g/dL    A-G Ratio 0.8 (L) 1.2 - 3.5     POC LACTIC ACID    Collection Time: 04/01/17  1:54 PM   Result Value Ref Range    Lactic Acid (POC) 0.8 0.5 - 1.9 mmol/L     Imaging /Procedures /Studies reviewed personally by me  XR Results (most recent):    Results from Hospital Encounter encounter on 09/21/16   XR CHEST PORT   Narrative PORTABLE CHEST, September 21, 2016 at 1726 hours    CLINICAL HISTORY:  Sepsis.     COMPARISON: None.    FINDINGS:  AP erect image demonstrates no confluent infiltrate or significant  pleural fluid. There is mild bibasilar atelectasis. The heart size is within  normal limits without evidence of congestive heart failure or pneumothorax. The  bony thorax appears intact on this view. There are overlying radiopaque support  devices. Impression IMPRESSION:  MILD BIBASILAR ATELECTASIS WITHOUT CONSOLIDATIVE PNEUMONIA. CT Scan    Results from Hospital Encounter encounter on 09/19/16   CT ABD PELV W CONT   Narrative CT OF THE ABDOMEN AND PELVIS with contrast     9/19/2016 4:27 PM      CLINICAL INFORMATION: Upper abdominal pain radiating to the back, onset  yesterday. History of pancreatitis. TECHNIQUE: Emergent CT of the abdomen and pelvis was performed following  uneventful administration of 100 cc Isovue 370. Oral contrast not administered  per ER physician request.    Radiation dose reduction techniques were used for this study. Our CT scanners  used one or all of the following: Automated exposure control, adjustment of the  MA and/or kV according to patient size, iterative reconstruction. Comparison: None available. Abdomen: Lung bases are unremarkable. No pleural or pericardial fluid. Incidental coronary artery calcifications. The pancreas is somewhat atrophic, and there are numerous calcifications within  and along the pancreatic duct, to the level of the ampulla. The duct in the  pancreatic neck is dilated, measuring up to 2 cm in caliber. The SMV, extra  hepatic portal vein and splenic veins remain patent. No focal peripancreatic  fluid collection. The remaining solid abdominal viscera are unremarkable. The gallbladder is  hydropic. The extra hepatic common duct is grossly normal in caliber. No bulky  upper abdominal no retroperitoneal lymph node enlargement. The stomach is markedly distended with fluid and/or particulate material  containing an air-fluid level. Nonopacified small bowel loops are normal in  caliber. Pelvis: The bladder is moderately distended and otherwise unremarkable. There  may be a TUR defect in the prostate. The sigmoid colon is redundant, and  incidental colonic diverticular changes are noted without active inflammation. Scattered gas and stool are otherwise present throughout the colon. No active  inflammation. No pelvic lymph node enlargement and/or dependent free pelvic fluid. No  aggressive osseous lesions. Impression IMPRESSION:    1. Pancreatic atrophy, ductal dilatation and calcifications are in keeping with  chronic pancreatitis. No acute complication. 2. Hydropic gallbladder. VAS/US Results (most recent):    Results from Office Visit encounter on 02/02/15   DUPLEX LOW EXT ARTERIES WITH TONY   Narrative SEE SCANNED FINAL VASCULAR ULTRASOUND REPORT        Assessment and Plan: Active Hospital Problems    Diagnosis Date Noted    Cellulitis 04/01/2017       PLAN  ·  Cellulitis- pt does not appear to be septic. Will send bc x2  And start bs abx- consult wound care  · Leg pain - prn pain meds  ·  Acute renal failure- judicious ivfls and dose meds for renal fxn  · Dm- monitor bg and cover with insulin  · HTN- uncontrolled with goal to be determined. Pt states compliant with meds- may be pain induced - will control pain- prn bp meds- re-instate home meds.    · Hypernatremia- mild- should hopefully correct with ivfls  · Hyperkalemia- mild will give ivfls and repeat  · Check mag  · dvt ppx heparin    Code Status:   full  Anticipated discharge:   3-4 days  Signed By: Modesto Guillory MD     April 1, 2017

## 2017-04-02 LAB
ALBUMIN SERPL BCP-MCNC: 2.7 G/DL (ref 3.2–4.6)
ALBUMIN/GLOB SERPL: 0.8 {RATIO} (ref 1.2–3.5)
ALP SERPL-CCNC: 69 U/L (ref 50–136)
ALT SERPL-CCNC: 21 U/L (ref 12–65)
ANION GAP BLD CALC-SCNC: 8 MMOL/L (ref 7–16)
ANION GAP BLD CALC-SCNC: 9 MMOL/L (ref 7–16)
AST SERPL W P-5'-P-CCNC: 21 U/L (ref 15–37)
BASOPHILS # BLD AUTO: 0 K/UL (ref 0–0.2)
BASOPHILS # BLD: 1 % (ref 0–2)
BILIRUB SERPL-MCNC: 0.7 MG/DL (ref 0.2–1.1)
BUN SERPL-MCNC: 21 MG/DL (ref 8–23)
BUN SERPL-MCNC: 22 MG/DL (ref 8–23)
CALCIUM SERPL-MCNC: 7.6 MG/DL (ref 8.3–10.4)
CALCIUM SERPL-MCNC: 7.8 MG/DL (ref 8.3–10.4)
CHLORIDE SERPL-SCNC: 115 MMOL/L (ref 98–107)
CHLORIDE SERPL-SCNC: 116 MMOL/L (ref 98–107)
CO2 SERPL-SCNC: 25 MMOL/L (ref 21–32)
CO2 SERPL-SCNC: 26 MMOL/L (ref 21–32)
CREAT SERPL-MCNC: 3.23 MG/DL (ref 0.8–1.5)
CREAT SERPL-MCNC: 3.35 MG/DL (ref 0.8–1.5)
DIFFERENTIAL METHOD BLD: ABNORMAL
EOSINOPHIL # BLD: 0.1 K/UL (ref 0–0.8)
EOSINOPHIL NFR BLD: 3 % (ref 0.5–7.8)
ERYTHROCYTE [DISTWIDTH] IN BLOOD BY AUTOMATED COUNT: 15.8 % (ref 11.9–14.6)
GLOBULIN SER CALC-MCNC: 3.4 G/DL (ref 2.3–3.5)
GLUCOSE BLD STRIP.AUTO-MCNC: 103 MG/DL (ref 65–100)
GLUCOSE BLD STRIP.AUTO-MCNC: 293 MG/DL (ref 65–100)
GLUCOSE BLD STRIP.AUTO-MCNC: 332 MG/DL (ref 65–100)
GLUCOSE BLD STRIP.AUTO-MCNC: 52 MG/DL (ref 65–100)
GLUCOSE BLD STRIP.AUTO-MCNC: 62 MG/DL (ref 65–100)
GLUCOSE SERPL-MCNC: 73 MG/DL (ref 65–100)
GLUCOSE SERPL-MCNC: 84 MG/DL (ref 65–100)
HCT VFR BLD AUTO: 31.5 % (ref 41.1–50.3)
HGB BLD-MCNC: 9.7 G/DL (ref 13.6–17.2)
IMM GRANULOCYTES # BLD: 0 K/UL (ref 0–0.5)
IMM GRANULOCYTES NFR BLD AUTO: 0.3 % (ref 0–5)
LYMPHOCYTES # BLD AUTO: 43 % (ref 13–44)
LYMPHOCYTES # BLD: 1.7 K/UL (ref 0.5–4.6)
MAGNESIUM SERPL-MCNC: 1.2 MG/DL (ref 1.8–2.4)
MCH RBC QN AUTO: 26 PG (ref 26.1–32.9)
MCHC RBC AUTO-ENTMCNC: 30.8 G/DL (ref 31.4–35)
MCV RBC AUTO: 84.5 FL (ref 79.6–97.8)
MONOCYTES # BLD: 0.4 K/UL (ref 0.1–1.3)
MONOCYTES NFR BLD AUTO: 11 % (ref 4–12)
NEUTS SEG # BLD: 1.7 K/UL (ref 1.7–8.2)
NEUTS SEG NFR BLD AUTO: 42 % (ref 43–78)
PLATELET # BLD AUTO: 141 K/UL (ref 150–450)
PMV BLD AUTO: 12 FL (ref 10.8–14.1)
POTASSIUM SERPL-SCNC: 4.3 MMOL/L (ref 3.5–5.1)
POTASSIUM SERPL-SCNC: 4.5 MMOL/L (ref 3.5–5.1)
PROT SERPL-MCNC: 6.1 G/DL (ref 6.3–8.2)
RBC # BLD AUTO: 3.73 M/UL (ref 4.23–5.67)
SODIUM SERPL-SCNC: 149 MMOL/L (ref 136–145)
SODIUM SERPL-SCNC: 150 MMOL/L (ref 136–145)
WBC # BLD AUTO: 3.9 K/UL (ref 4.3–11.1)

## 2017-04-02 PROCEDURE — 85025 COMPLETE CBC W/AUTO DIFF WBC: CPT | Performed by: INTERNAL MEDICINE

## 2017-04-02 PROCEDURE — 36415 COLL VENOUS BLD VENIPUNCTURE: CPT | Performed by: INTERNAL MEDICINE

## 2017-04-02 PROCEDURE — 80053 COMPREHEN METABOLIC PANEL: CPT | Performed by: INTERNAL MEDICINE

## 2017-04-02 PROCEDURE — 74011250636 HC RX REV CODE- 250/636: Performed by: FAMILY MEDICINE

## 2017-04-02 PROCEDURE — 82962 GLUCOSE BLOOD TEST: CPT

## 2017-04-02 PROCEDURE — 83735 ASSAY OF MAGNESIUM: CPT | Performed by: INTERNAL MEDICINE

## 2017-04-02 PROCEDURE — 74011250636 HC RX REV CODE- 250/636: Performed by: INTERNAL MEDICINE

## 2017-04-02 PROCEDURE — 74011636637 HC RX REV CODE- 636/637: Performed by: INTERNAL MEDICINE

## 2017-04-02 PROCEDURE — 74011000258 HC RX REV CODE- 258: Performed by: INTERNAL MEDICINE

## 2017-04-02 PROCEDURE — 80048 BASIC METABOLIC PNL TOTAL CA: CPT | Performed by: INTERNAL MEDICINE

## 2017-04-02 PROCEDURE — 74011250637 HC RX REV CODE- 250/637: Performed by: INTERNAL MEDICINE

## 2017-04-02 PROCEDURE — 65270000029 HC RM PRIVATE

## 2017-04-02 RX ORDER — DEXTROSE MONOHYDRATE 50 MG/ML
125 INJECTION, SOLUTION INTRAVENOUS CONTINUOUS
Status: DISCONTINUED | OUTPATIENT
Start: 2017-04-02 | End: 2017-04-04

## 2017-04-02 RX ORDER — VANCOMYCIN HYDROCHLORIDE
1250 EVERY 24 HOURS
Status: DISCONTINUED | OUTPATIENT
Start: 2017-04-02 | End: 2017-04-03

## 2017-04-02 RX ORDER — INSULIN LISPRO 100 [IU]/ML
INJECTION, SOLUTION INTRAVENOUS; SUBCUTANEOUS
Status: DISCONTINUED | OUTPATIENT
Start: 2017-04-02 | End: 2017-04-04 | Stop reason: HOSPADM

## 2017-04-02 RX ORDER — MAGNESIUM SULFATE HEPTAHYDRATE 40 MG/ML
4 INJECTION, SOLUTION INTRAVENOUS ONCE
Status: COMPLETED | OUTPATIENT
Start: 2017-04-02 | End: 2017-04-02

## 2017-04-02 RX ORDER — VANCOMYCIN/0.9 % SOD CHLORIDE 1.5G/250ML
1500 PLASTIC BAG, INJECTION (ML) INTRAVENOUS EVERY 24 HOURS
Status: DISCONTINUED | OUTPATIENT
Start: 2017-04-02 | End: 2017-04-02

## 2017-04-02 RX ADMIN — Medication 10 ML: at 21:54

## 2017-04-02 RX ADMIN — Medication 10 ML: at 05:49

## 2017-04-02 RX ADMIN — HYDROCODONE BITARTRATE AND ACETAMINOPHEN 1 TABLET: 5; 325 TABLET ORAL at 21:54

## 2017-04-02 RX ADMIN — HEPARIN SODIUM 5000 UNITS: 5000 INJECTION, SOLUTION INTRAVENOUS; SUBCUTANEOUS at 03:30

## 2017-04-02 RX ADMIN — PIPERACILLIN SODIUM,TAZOBACTAM SODIUM 3.38 G: 3; .375 INJECTION, POWDER, FOR SOLUTION INTRAVENOUS at 09:01

## 2017-04-02 RX ADMIN — Medication 10 ML: at 13:44

## 2017-04-02 RX ADMIN — HEPARIN SODIUM 5000 UNITS: 5000 INJECTION, SOLUTION INTRAVENOUS; SUBCUTANEOUS at 19:01

## 2017-04-02 RX ADMIN — VANCOMYCIN HYDROCHLORIDE 1250 MG: 10 INJECTION, POWDER, LYOPHILIZED, FOR SOLUTION INTRAVENOUS at 22:40

## 2017-04-02 RX ADMIN — HYDROMORPHONE HYDROCHLORIDE 0.5 MG: 1 INJECTION, SOLUTION INTRAMUSCULAR; INTRAVENOUS; SUBCUTANEOUS at 15:48

## 2017-04-02 RX ADMIN — DEXTROSE MONOHYDRATE 125 ML/HR: 5 INJECTION, SOLUTION INTRAVENOUS at 16:57

## 2017-04-02 RX ADMIN — HYDROCODONE BITARTRATE AND ACETAMINOPHEN 1 TABLET: 5; 325 TABLET ORAL at 08:59

## 2017-04-02 RX ADMIN — HEPARIN SODIUM 5000 UNITS: 5000 INJECTION, SOLUTION INTRAVENOUS; SUBCUTANEOUS at 11:35

## 2017-04-02 RX ADMIN — INSULIN LISPRO 9 UNITS: 100 INJECTION, SOLUTION INTRAVENOUS; SUBCUTANEOUS at 16:55

## 2017-04-02 RX ADMIN — PIPERACILLIN SODIUM,TAZOBACTAM SODIUM 3.38 G: 3; .375 INJECTION, POWDER, FOR SOLUTION INTRAVENOUS at 16:54

## 2017-04-02 RX ADMIN — MAGNESIUM SULFATE HEPTAHYDRATE 4 G: 40 INJECTION, SOLUTION INTRAVENOUS at 21:53

## 2017-04-02 RX ADMIN — HYDROCODONE BITARTRATE AND ACETAMINOPHEN 1 TABLET: 5; 325 TABLET ORAL at 13:47

## 2017-04-02 NOTE — PROGRESS NOTES
Verbal orders received to remove patients dressing due to patients discomfort, until Wound care sees him tomorrow. Repeated and verified with Dr. Romi Nur.

## 2017-04-02 NOTE — PROGRESS NOTES
Pharmacokinetic Consult to Pharmacist    Serena Whipple. is a 77 y.o. male being treated for Sepsis, Cellulitis with Vancomycin  And Zosyn. Diabetic who had a blister develop on left lower leg 2 weeks ago that ruptured,  Was started on antibiotic and referred to wound care center. Now condition has worsened  With increased leg pain. Height: 5' 11.5\" (181.6 cm)  Weight: 84.8 kg (187 lb)  Lab Results   Component Value Date/Time    BUN 28 04/01/2017 01:49 PM    Creatinine 4.03 04/01/2017 01:49 PM    WBC 4.8 04/01/2017 01:49 PM    Procalcitonin 33.6 09/21/2016 01:20 PM      Estimated Creatinine Clearance: 17.1 mL/min (based on Cr of 4.03). CULTURES:  Blood - in process    Day 1 of vancomycin. Goal trough is 15 -20. Vancomycin dose initiated at 1500 mg IVPB  X one dose. Further dosing will depend upon random levels. Clinical staff will continue to follow  Patient .     Thank you,  Verenice Edwards, PHARMD

## 2017-04-02 NOTE — PROGRESS NOTES
Hospitalist Progress Note    2017  Admit Date: 2017  3:23 PM   NAME: SILVIO Dennison. :  1940   MRN:  [id number]   Attending: Gracia López MD  PCP:  Alyx Saenz NP      Admitted for: cellulitis- failed outpt therapy- acute renal failure    SUBJECTIVE:   Patient seen - complains that leg pain is still bad. But feeling better. Hospital Course:  76yr old male with pmhx sig for dm, htn, & CKD who had a blister develop on the left lower leg about 2 weeks ago that burst. Seen by pcp and and in our er on 2017 and started on abx and referred to wound care. Pt states that he thought er was wound care center and didn't realize it was a diferent location. Leg pain continued so he came back to  ER today- found to have worsening infection and acute on chronic renal failure. Hospitalist asked to admit for failed outpt therapy. Pt admitted and started on abx. & IVFLS.     2017- Pt seen complains of persistent leg pain      Review of Systems negative with exception of pertinent positives noted above  Past medical history unchanged from H&P    PHYSICAL EXAM     Visit Vitals    /72 (BP 1 Location: Left arm, BP Patient Position: At rest)    Pulse (!) 50    Temp 98.6 °F (37 °C)    Resp 16    Ht 5' 11.5\" (1.816 m)    Wt 84.8 kg (187 lb)    SpO2 98%    BMI 25.72 kg/m2      Temp (24hrs), Av.1 °F (36.7 °C), Min:97.4 °F (36.3 °C), Max:98.8 °F (37.1 °C)    Oxygen Therapy  O2 Sat (%): 98 % (17 1252)  Pulse via Oximetry: 64 beats per minute (17 1901)  O2 Device: Room air (17 1755)    Intake/Output Summary (Last 24 hours) at 17 1627  Last data filed at 17 0357   Gross per 24 hour   Intake                0 ml   Output             1600 ml   Net            -1600 ml        General: No acute distress  mucous membranes pink and moist acyanotic anicteric  Neck:  Supple full range of motion  Lungs:  Air entry equal bilaterally, no crepitations rales rhonchi   Heart:  Regular rate and rhythm,  No murmur, rub, or gallop  Abdomen: Soft, Non distended, Non tender, no rebound guarding Positive bowel sounds  Extremities: No cyanosis, clubbing or edema  Neurologic:  No focal deficits  Musculoskeletal: no   Joint swelling tenderness erythema  Skin:  No erythema, rashes noted          LAB  Recent Labs      04/02/17   1606  04/02/17   1139  04/02/17   0713  04/01/17   2136   GLUCPOC  293*  332*  103*  116*      Recent Labs      04/02/17   0607   WBC  3.9*   HGB  9.7*   HCT  31.5*   PLT  141*     Recent Labs      04/02/17   0607  04/01/17   1349   NA  150*  149*   K  4.3  5.2*   CL  115*  114*   CO2  26  28   GLU  84  183*   BUN  22  28*   CREA  3.23*  4.03*   MG   --   1.3*   CA  7.8*  7.6*   ALB  2.7*  3.0*   TBILI  0.7  0.6   ALT  21  27   SGOT  21  38*       EKG and imaging reviewed personally by me  No results found. Results for orders placed or performed during the hospital encounter of 09/21/16   EKG, 12 LEAD, INITIAL   Result Value Ref Range    Systolic BP  mmHg    Diastolic BP  mmHg    Ventricular Rate 92 BPM    Atrial Rate 92 BPM    P-R Interval 210 ms    QRS Duration 92 ms    Q-T Interval 362 ms    QTC Calculation (Bezet) 447 ms    Calculated P Axis 100 degrees    Calculated R Axis 1 degrees    Calculated T Axis 28 degrees    Diagnosis       Sinus rhythm with 1st degree A-V block  Otherwise normal ECG  Confirmed by Robyn Garcia ([id number]) on 9/21/2016 9:54:52 PM       XR Results (most recent):    Results from [place] encounter on 09/21/16   XR CHEST PORT   Narrative PORTABLE CHEST, September 21, 2016 at 1726 hours    CLINICAL HISTORY:  Sepsis. COMPARISON:  None. FINDINGS:  AP erect image demonstrates no confluent infiltrate or significant  pleural fluid. There is mild bibasilar atelectasis. The heart size is within  normal limits without evidence of congestive heart failure or pneumothorax. The  bony thorax appears intact on this view. There are overlying radiopaque support  devices. Impression IMPRESSION:  MILD BIBASILAR ATELECTASIS WITHOUT CONSOLIDATIVE PNEUMONIA. Active problems  Active Hospital Problems    Diagnosis Date Noted    Cellulitis 04/01/2017       ASSESSMENT  AND PLAN      · Cellulitis- pt does not appear to be septic. Follow up bc x2. continue bs abx until stu- if negative can de-escalate- follow up wound care consult  · Leg pain - prn pain meds  · Acute renal failure- judicious ivfls and dose meds for renal fxn  · Dm- monitor bg and cover with insulin  · HTN- uncontrolled with goal to be determined. Pt states compliant with meds- may be pain induced - will control pain- prn bp meds- re-instate home meds.    · Hypernatremia- worsening - will start d5w  · Hyperkalemia- resolved  · F/up replete mag  · dvt ppx heparin  ·     Signed By: Mary Saucedo MD     April 2, 2017

## 2017-04-02 NOTE — PROGRESS NOTES
Pharmacokinetic Consult to Pharmacist    Vaishali Corral. is a 77 y.o. male being treated for SSTI with vancomycin and zosyn. Height: 5' 11.5\" (181.6 cm)  Weight: 84.8 kg (187 lb)  Lab Results   Component Value Date/Time    BUN 22 04/02/2017 06:07 AM    Creatinine 3.23 04/02/2017 06:07 AM    WBC 3.9 04/02/2017 06:07 AM    Procalcitonin 33.6 09/21/2016 01:20 PM      Estimated Creatinine Clearance: 21.4 mL/min (based on Cr of 3.23). Day 2 of vancomycin. Goal trough is 12-18. Dosing started with 1.5g last night. Will continue with 1.25g q24h with Scr trending down. Will continue to follow patient. Thank you,  Salma Blanchard, Pharm. D.   Clinical Pharmacist  [phone]

## 2017-04-03 LAB
ANION GAP BLD CALC-SCNC: 9 MMOL/L (ref 7–16)
ATRIAL RATE: 63 BPM
BUN SERPL-MCNC: 19 MG/DL (ref 8–23)
CALCIUM SERPL-MCNC: 7.8 MG/DL (ref 8.3–10.4)
CALCULATED P AXIS, ECG09: 32 DEGREES
CALCULATED R AXIS, ECG10: 20 DEGREES
CALCULATED T AXIS, ECG11: 7 DEGREES
CHLORIDE SERPL-SCNC: 111 MMOL/L (ref 98–107)
CO2 SERPL-SCNC: 24 MMOL/L (ref 21–32)
CREAT SERPL-MCNC: 3.22 MG/DL (ref 0.8–1.5)
DIAGNOSIS, 93000: NORMAL
GLUCOSE BLD STRIP.AUTO-MCNC: 132 MG/DL (ref 65–100)
GLUCOSE BLD STRIP.AUTO-MCNC: 177 MG/DL (ref 65–100)
GLUCOSE BLD STRIP.AUTO-MCNC: 225 MG/DL (ref 65–100)
GLUCOSE BLD STRIP.AUTO-MCNC: 313 MG/DL (ref 65–100)
GLUCOSE BLD STRIP.AUTO-MCNC: 317 MG/DL (ref 65–100)
GLUCOSE BLD STRIP.AUTO-MCNC: 327 MG/DL (ref 65–100)
GLUCOSE SERPL-MCNC: 322 MG/DL (ref 65–100)
MAGNESIUM SERPL-MCNC: 2.6 MG/DL (ref 1.8–2.4)
P-R INTERVAL, ECG05: 238 MS
POTASSIUM SERPL-SCNC: 5.4 MMOL/L (ref 3.5–5.1)
Q-T INTERVAL, ECG07: 400 MS
QRS DURATION, ECG06: 86 MS
QTC CALCULATION (BEZET), ECG08: 409 MS
SODIUM SERPL-SCNC: 144 MMOL/L (ref 136–145)
VANCOMYCIN TROUGH SERPL-MCNC: 21.7 UG/ML (ref 5–20)
VENTRICULAR RATE, ECG03: 63 BPM

## 2017-04-03 PROCEDURE — 74011250637 HC RX REV CODE- 250/637: Performed by: INTERNAL MEDICINE

## 2017-04-03 PROCEDURE — 74011636637 HC RX REV CODE- 636/637: Performed by: INTERNAL MEDICINE

## 2017-04-03 PROCEDURE — 65270000029 HC RM PRIVATE

## 2017-04-03 PROCEDURE — 74011250636 HC RX REV CODE- 250/636: Performed by: INTERNAL MEDICINE

## 2017-04-03 PROCEDURE — 93005 ELECTROCARDIOGRAM TRACING: CPT | Performed by: INTERNAL MEDICINE

## 2017-04-03 PROCEDURE — 2W1RX6Z COMPRESSION OF LEFT LOWER LEG USING PRESSURE DRESSING: ICD-10-PCS

## 2017-04-03 PROCEDURE — 80048 BASIC METABOLIC PNL TOTAL CA: CPT | Performed by: INTERNAL MEDICINE

## 2017-04-03 PROCEDURE — 82962 GLUCOSE BLOOD TEST: CPT

## 2017-04-03 PROCEDURE — 74011000258 HC RX REV CODE- 258: Performed by: INTERNAL MEDICINE

## 2017-04-03 PROCEDURE — 83735 ASSAY OF MAGNESIUM: CPT | Performed by: INTERNAL MEDICINE

## 2017-04-03 PROCEDURE — 80202 ASSAY OF VANCOMYCIN: CPT | Performed by: INTERNAL MEDICINE

## 2017-04-03 PROCEDURE — 36415 COLL VENOUS BLD VENIPUNCTURE: CPT | Performed by: INTERNAL MEDICINE

## 2017-04-03 RX ORDER — INSULIN GLARGINE 100 [IU]/ML
10 INJECTION, SOLUTION SUBCUTANEOUS EVERY 12 HOURS
Status: DISCONTINUED | OUTPATIENT
Start: 2017-04-03 | End: 2017-04-04 | Stop reason: HOSPADM

## 2017-04-03 RX ORDER — PETROLATUM 42 G/100G
OINTMENT TOPICAL DAILY
Status: DISCONTINUED | OUTPATIENT
Start: 2017-04-04 | End: 2017-04-04 | Stop reason: HOSPADM

## 2017-04-03 RX ORDER — PANTOPRAZOLE SODIUM 40 MG/1
40 TABLET, DELAYED RELEASE ORAL
Status: DISCONTINUED | OUTPATIENT
Start: 2017-04-04 | End: 2017-04-04 | Stop reason: HOSPADM

## 2017-04-03 RX ORDER — CARVEDILOL 3.12 MG/1
3.12 TABLET ORAL 2 TIMES DAILY WITH MEALS
Status: DISCONTINUED | OUTPATIENT
Start: 2017-04-03 | End: 2017-04-04 | Stop reason: HOSPADM

## 2017-04-03 RX ORDER — TRAMADOL HYDROCHLORIDE 50 MG/1
50 TABLET ORAL
Status: DISCONTINUED | OUTPATIENT
Start: 2017-04-03 | End: 2017-04-04 | Stop reason: HOSPADM

## 2017-04-03 RX ORDER — PREGABALIN 150 MG/1
150 CAPSULE ORAL DAILY
Status: DISCONTINUED | OUTPATIENT
Start: 2017-04-03 | End: 2017-04-04 | Stop reason: HOSPADM

## 2017-04-03 RX ORDER — VANCOMYCIN HYDROCHLORIDE
1250 SEE ADMIN INSTRUCTIONS
Status: DISCONTINUED | OUTPATIENT
Start: 2017-04-03 | End: 2017-04-04

## 2017-04-03 RX ORDER — AMLODIPINE BESYLATE 10 MG/1
10 TABLET ORAL DAILY
Status: DISCONTINUED | OUTPATIENT
Start: 2017-04-03 | End: 2017-04-04 | Stop reason: HOSPADM

## 2017-04-03 RX ORDER — HYDROCODONE BITARTRATE AND ACETAMINOPHEN 10; 325 MG/1; MG/1
1 TABLET ORAL
Status: DISCONTINUED | OUTPATIENT
Start: 2017-04-03 | End: 2017-04-04 | Stop reason: HOSPADM

## 2017-04-03 RX ORDER — SODIUM POLYSTYRENE SULFONATE 15 G/60ML
30 SUSPENSION ORAL; RECTAL
Status: COMPLETED | OUTPATIENT
Start: 2017-04-03 | End: 2017-04-03

## 2017-04-03 RX ADMIN — CARVEDILOL 3.12 MG: 3.12 TABLET, FILM COATED ORAL at 16:35

## 2017-04-03 RX ADMIN — INSULIN GLARGINE 10 UNITS: 100 INJECTION, SOLUTION SUBCUTANEOUS at 11:57

## 2017-04-03 RX ADMIN — HYDROMORPHONE HYDROCHLORIDE 0.5 MG: 1 INJECTION, SOLUTION INTRAMUSCULAR; INTRAVENOUS; SUBCUTANEOUS at 01:38

## 2017-04-03 RX ADMIN — HYDROCODONE BITARTRATE AND ACETAMINOPHEN 1 TABLET: 10; 325 TABLET ORAL at 16:34

## 2017-04-03 RX ADMIN — HYDROCODONE BITARTRATE AND ACETAMINOPHEN 1 TABLET: 10; 325 TABLET ORAL at 23:20

## 2017-04-03 RX ADMIN — DEXTROSE MONOHYDRATE 125 ML/HR: 5 INJECTION, SOLUTION INTRAVENOUS at 22:17

## 2017-04-03 RX ADMIN — HEPARIN SODIUM 5000 UNITS: 5000 INJECTION, SOLUTION INTRAVENOUS; SUBCUTANEOUS at 03:12

## 2017-04-03 RX ADMIN — HYDROCODONE BITARTRATE AND ACETAMINOPHEN 1 TABLET: 5; 325 TABLET ORAL at 08:41

## 2017-04-03 RX ADMIN — HYDROMORPHONE HYDROCHLORIDE 0.5 MG: 1 INJECTION, SOLUTION INTRAMUSCULAR; INTRAVENOUS; SUBCUTANEOUS at 22:16

## 2017-04-03 RX ADMIN — SODIUM POLYSTYRENE SULFONATE 30 G: 15 SUSPENSION ORAL; RECTAL at 10:47

## 2017-04-03 RX ADMIN — INSULIN LISPRO 12 UNITS: 100 INJECTION, SOLUTION INTRAVENOUS; SUBCUTANEOUS at 08:42

## 2017-04-03 RX ADMIN — TRAMADOL HYDROCHLORIDE 50 MG: 50 TABLET, FILM COATED ORAL at 18:36

## 2017-04-03 RX ADMIN — PIPERACILLIN SODIUM,TAZOBACTAM SODIUM 3.38 G: 3; .375 INJECTION, POWDER, FOR SOLUTION INTRAVENOUS at 08:43

## 2017-04-03 RX ADMIN — PIPERACILLIN SODIUM,TAZOBACTAM SODIUM 3.38 G: 3; .375 INJECTION, POWDER, FOR SOLUTION INTRAVENOUS at 01:34

## 2017-04-03 RX ADMIN — PIPERACILLIN SODIUM,TAZOBACTAM SODIUM 3.38 G: 3; .375 INJECTION, POWDER, FOR SOLUTION INTRAVENOUS at 16:35

## 2017-04-03 RX ADMIN — INSULIN GLARGINE 10 UNITS: 100 INJECTION, SOLUTION SUBCUTANEOUS at 22:18

## 2017-04-03 RX ADMIN — Medication 5 ML: at 22:18

## 2017-04-03 RX ADMIN — PREGABALIN 150 MG: 150 CAPSULE ORAL at 11:58

## 2017-04-03 RX ADMIN — HEPARIN SODIUM 5000 UNITS: 5000 INJECTION, SOLUTION INTRAVENOUS; SUBCUTANEOUS at 18:23

## 2017-04-03 RX ADMIN — CALCIUM GLUCONATE 1 G: 94 INJECTION, SOLUTION INTRAVENOUS at 10:54

## 2017-04-03 RX ADMIN — HYDROMORPHONE HYDROCHLORIDE 0.5 MG: 1 INJECTION, SOLUTION INTRAMUSCULAR; INTRAVENOUS; SUBCUTANEOUS at 10:42

## 2017-04-03 RX ADMIN — Medication 5 ML: at 22:17

## 2017-04-03 RX ADMIN — Medication 10 ML: at 06:08

## 2017-04-03 RX ADMIN — AMLODIPINE BESYLATE 10 MG: 10 TABLET ORAL at 11:56

## 2017-04-03 RX ADMIN — INSULIN LISPRO 3 UNITS: 100 INJECTION, SOLUTION INTRAVENOUS; SUBCUTANEOUS at 16:48

## 2017-04-03 RX ADMIN — TRAMADOL HYDROCHLORIDE 50 MG: 50 TABLET, FILM COATED ORAL at 12:04

## 2017-04-03 RX ADMIN — HEPARIN SODIUM 5000 UNITS: 5000 INJECTION, SOLUTION INTRAVENOUS; SUBCUTANEOUS at 10:44

## 2017-04-03 RX ADMIN — INSULIN LISPRO 12 UNITS: 100 INJECTION, SOLUTION INTRAVENOUS; SUBCUTANEOUS at 22:18

## 2017-04-03 NOTE — WOUND CARE
Bilateral lower legs equally warm, bilateral DP pulses weak, palpable, bilateral PT pulses obtained per doppler. Bilateral lower legs with venous stasis staining (hemosiderosis). Varicose veins are present bilateral and right leg has scales of dry skin over the venous stasis, will add aquaphor. Left lower leg with venous stasis ulcer, surrounding skin is reddened, blanchable, 7x9x0.2cm open area with thick yellowbiofilm over wound, cleansed with dermal wound , wound base after cleaning is pink granulation xeroform, ABD and ACE bandage applied. Patient does have low grade compression stockings at home, however states he does not always wear them. Patient also has tried full compression stockings and was unable to wear secondary to \"pain and feeling too tight\". Patient does have type 2 diabetes diabetes and chronic kidney disease, managed medically, no recent A1 C available. Will use light compression with ace over xeroform, abd and Kerlex, daily. Will need wound care follow up/ home health to continue compression wraps for this wound to heal fully.

## 2017-04-03 NOTE — PROGRESS NOTES
Hospitalist Progress Note    4/3/2017  Admit Date: 2017  3:23 PM   NAME: SILVIO Faulkner. :  1940   MRN:  [id number]   Attending: Kristen Yang MD  PCP:  Srinivas Garcia NP      Admitted for: cellulitis- failed outpt therapy- acute renal failure    SUBJECTIVE:   Patient seen - complains that leg pain is still bad. But feeling better. Hospital Course:  76yr old male with pmhx sig for dm, htn, & CKD who had a blister develop on the left lower leg about 2 weeks ago that burst. Seen by pcp and and in our er on 2017 and started on abx and referred to wound care. Pt states that he thought er was wound care center and didn't realize it was a diferent location. Leg pain continued so he came back to  ER today- found to have worsening infection and acute on chronic renal failure. Hospitalist asked to admit for failed outpt therapy. Pt admitted and started on abx. & IVFLS. BC NGTD so will de-escalate abx. Baseline cr- appears to be around 2.9 currently 3.22. Potassium elevated with no ekg changes. Will follow up with wound care - give kayexalate. Possibly dc tomorrow or Wednesday if pain controlled and labs stable with outpt follow up at the wound care clinic.     2017- Pt seen complains of persistent leg pain.  States he is worried about getting gangrene in his leg- the pain is specifically in the sore itself and not the rest of the leg- pain meds work but wear off      Review of Systems negative with exception of pertinent positives noted above  Past medical history unchanged from H&P    PHYSICAL EXAM     Visit Vitals    /75 (BP 1 Location: Left arm, BP Patient Position: At rest)    Pulse (!) 56    Temp 98.1 °F (36.7 °C)    Resp 17    Ht 5' 11.5\" (1.816 m)    Wt 86.7 kg (191 lb 1.6 oz)    SpO2 96%    BMI 26.28 kg/m2      Temp (24hrs), Av.1 °F (36.7 °C), Min:97.8 °F (36.6 °C), Max:98.6 °F (37 °C)    Oxygen Therapy  O2 Sat (%): 96 % (17 8420)  Pulse via Oximetry: 64 beats per minute (04/01/17 1901)  O2 Device: Room air (04/01/17 5512)  No intake or output data in the 24 hours ending 04/03/17 1029     General: No acute distress  mucous membranes pink and moist acyanotic anicteric  Neck:  Supple full range of motion  Lungs:  Air entry equal bilaterally, no crepitations rales rhonchi   Heart:  Regular rate and rhythm,  No murmur, rub, or gallop  Abdomen: Soft, Non distended, Non tender, no rebound guarding Positive bowel sounds  Extremities: No cyanosis, clubbing or edema  Neurologic:  No focal deficits  Musculoskeletal: no   Joint swelling tenderness erythema  Skin:  No erythema, rashes noted          LAB  Recent Labs      04/03/17   0656  04/03/17   0530  04/03/17   0013  04/02/17   2228  04/02/17   2157   GLUCPOC  317*  313*  225*  62*  52*      Recent Labs      04/02/17   0607   WBC  3.9*   HGB  9.7*   HCT  31.5*   PLT  141*     Recent Labs      04/03/17   0613  04/02/17   1912  04/02/17   0607   NA  144  149*  150*   K  5.4*  4.5  4.3   CL  111*  116*  115*   CO2  24  25  26   GLU  322*  73  84   BUN  19  21  22   CREA  3.22*  3.35*  3.23*   MG   --   1.2*   --    CA  7.8*  7.6*  7.8*   ALB   --    --   2.7*   TBILI   --    --   0.7   ALT   --    --   21   SGOT   --    --   21       EKG and imaging reviewed personally by me  No results found.   Results for orders placed or performed during the hospital encounter of 09/21/16   EKG, 12 LEAD, INITIAL   Result Value Ref Range    Systolic BP  mmHg    Diastolic BP  mmHg    Ventricular Rate 92 BPM    Atrial Rate 92 BPM    P-R Interval 210 ms    QRS Duration 92 ms    Q-T Interval 362 ms    QTC Calculation (Bezet) 447 ms    Calculated P Axis 100 degrees    Calculated R Axis 1 degrees    Calculated T Axis 28 degrees    Diagnosis       Sinus rhythm with 1st degree A-V block  Otherwise normal ECG  Confirmed by Pete Aguiar ([id number]) on 9/21/2016 9:54:52 PM       XR Results (most recent):    Results from [facility] encounter on 09/21/16   XR CHEST PORT   Narrative PORTABLE CHEST, September 21, 2016 at 1726 hours    CLINICAL HISTORY:  Sepsis. COMPARISON:  None. FINDINGS:  AP erect image demonstrates no confluent infiltrate or significant  pleural fluid. There is mild bibasilar atelectasis. The heart size is within  normal limits without evidence of congestive heart failure or pneumothorax. The  bony thorax appears intact on this view. There are overlying radiopaque support  devices. Impression IMPRESSION:  MILD BIBASILAR ATELECTASIS WITHOUT CONSOLIDATIVE PNEUMONIA. Active problems  Active Hospital Problems    Diagnosis Date Noted    Cellulitis 04/01/2017       ASSESSMENT  AND PLAN      · Cellulitis- pt does not appear to be septic. BC x 2 NGTD. will de-escalate abx- follow up wound care consult  · Leg pain - prn pain meds- re-start his gabapentin and tramadol  · Acute renal failure- judicious ivfls and dose meds for renal fxn  · Dm- monitor bg and cover with insulin  · HTN- uncontrolled with goal to be determined. Pt states compliant with meds- may be pain induced - will control pain-  Reinstate his norvasc and coreg- pt on toprol and coreg as outp should only be on 1- will dc the toprol  · Hypernatremia- resolved  · Hyperkalemia- give kayexalate- pt states he is on a medication to keep this low but does not know the name - not on med rec- pharmacy not able to pull up. Pt has no familyto bring it in  · Possible dc Tues/ Wednesday once pain controlled wound care plan arranged.    · dvt ppx heparin    Signed By: Clyde Thompson MD     April 3, 2017

## 2017-04-04 ENCOUNTER — HOME HEALTH ADMISSION (OUTPATIENT)
Dept: HOME HEALTH SERVICES | Facility: HOME HEALTH | Age: 77
End: 2017-04-04
Payer: MEDICARE

## 2017-04-04 VITALS
OXYGEN SATURATION: 99 % | SYSTOLIC BLOOD PRESSURE: 144 MMHG | BODY MASS INDEX: 25.55 KG/M2 | DIASTOLIC BLOOD PRESSURE: 79 MMHG | WEIGHT: 188.6 LBS | RESPIRATION RATE: 12 BRPM | TEMPERATURE: 97.3 F | HEART RATE: 57 BPM | HEIGHT: 72 IN

## 2017-04-04 PROBLEM — L97.929 VENOUS STASIS ULCER OF LEFT LOWER EXTREMITY (HCC): Status: ACTIVE | Noted: 2017-04-01

## 2017-04-04 PROBLEM — N18.4 STAGE 4 CHRONIC KIDNEY DISEASE (HCC): Chronic | Status: ACTIVE | Noted: 2017-04-04

## 2017-04-04 PROBLEM — I83.029 VENOUS STASIS ULCER OF LEFT LOWER EXTREMITY (HCC): Status: ACTIVE | Noted: 2017-04-01

## 2017-04-04 PROBLEM — L03.116 CELLULITIS OF LEFT LOWER LEG: Status: ACTIVE | Noted: 2017-04-04

## 2017-04-04 PROBLEM — N18.4 STAGE 4 CHRONIC KIDNEY DISEASE (HCC): Status: ACTIVE | Noted: 2017-04-04

## 2017-04-04 PROBLEM — I87.303 STASIS EDEMA OF BOTH LOWER EXTREMITIES: Chronic | Status: ACTIVE | Noted: 2017-04-04

## 2017-04-04 LAB
ANION GAP BLD CALC-SCNC: 7 MMOL/L (ref 7–16)
BUN SERPL-MCNC: 19 MG/DL (ref 8–23)
CALCIUM SERPL-MCNC: 7.7 MG/DL (ref 8.3–10.4)
CHLORIDE SERPL-SCNC: 112 MMOL/L (ref 98–107)
CO2 SERPL-SCNC: 26 MMOL/L (ref 21–32)
CREAT SERPL-MCNC: 3.45 MG/DL (ref 0.8–1.5)
EST. AVERAGE GLUCOSE BLD GHB EST-MCNC: 157 MG/DL
GLUCOSE BLD STRIP.AUTO-MCNC: 139 MG/DL (ref 65–100)
GLUCOSE BLD STRIP.AUTO-MCNC: 171 MG/DL (ref 65–100)
GLUCOSE SERPL-MCNC: 165 MG/DL (ref 65–100)
HBA1C MFR BLD: 7.1 % (ref 4.8–6)
POTASSIUM SERPL-SCNC: 4.3 MMOL/L (ref 3.5–5.1)
SODIUM SERPL-SCNC: 145 MMOL/L (ref 136–145)

## 2017-04-04 PROCEDURE — 74011250636 HC RX REV CODE- 250/636: Performed by: INTERNAL MEDICINE

## 2017-04-04 PROCEDURE — 80048 BASIC METABOLIC PNL TOTAL CA: CPT | Performed by: INTERNAL MEDICINE

## 2017-04-04 PROCEDURE — 36415 COLL VENOUS BLD VENIPUNCTURE: CPT | Performed by: INTERNAL MEDICINE

## 2017-04-04 PROCEDURE — 74011000258 HC RX REV CODE- 258: Performed by: INTERNAL MEDICINE

## 2017-04-04 PROCEDURE — 74011636637 HC RX REV CODE- 636/637: Performed by: INTERNAL MEDICINE

## 2017-04-04 PROCEDURE — 74011250637 HC RX REV CODE- 250/637: Performed by: INTERNAL MEDICINE

## 2017-04-04 PROCEDURE — 82962 GLUCOSE BLOOD TEST: CPT

## 2017-04-04 PROCEDURE — 83036 HEMOGLOBIN GLYCOSYLATED A1C: CPT | Performed by: INTERNAL MEDICINE

## 2017-04-04 RX ORDER — TRAMADOL HYDROCHLORIDE 200 MG/1
200 TABLET, EXTENDED RELEASE ORAL DAILY
Qty: 30 TAB | Refills: 0 | Status: SHIPPED | OUTPATIENT
Start: 2017-04-04 | End: 2018-01-04 | Stop reason: CLARIF

## 2017-04-04 RX ORDER — DOXYCYCLINE 100 MG/1
100 TABLET ORAL 2 TIMES DAILY
Qty: 12 TAB | Refills: 0 | Status: SHIPPED | OUTPATIENT
Start: 2017-04-04 | End: 2017-04-10

## 2017-04-04 RX ADMIN — PANTOPRAZOLE SODIUM 40 MG: 40 TABLET, DELAYED RELEASE ORAL at 05:30

## 2017-04-04 RX ADMIN — HYDROCODONE BITARTRATE AND ACETAMINOPHEN 1 TABLET: 10; 325 TABLET ORAL at 10:51

## 2017-04-04 RX ADMIN — INSULIN LISPRO 3 UNITS: 100 INJECTION, SOLUTION INTRAVENOUS; SUBCUTANEOUS at 08:04

## 2017-04-04 RX ADMIN — INSULIN GLARGINE 10 UNITS: 100 INJECTION, SOLUTION SUBCUTANEOUS at 09:05

## 2017-04-04 RX ADMIN — PREGABALIN 150 MG: 150 CAPSULE ORAL at 09:04

## 2017-04-04 RX ADMIN — CARVEDILOL 3.12 MG: 3.12 TABLET, FILM COATED ORAL at 09:03

## 2017-04-04 RX ADMIN — HYDROCODONE BITARTRATE AND ACETAMINOPHEN 1 TABLET: 10; 325 TABLET ORAL at 05:30

## 2017-04-04 RX ADMIN — PIPERACILLIN SODIUM,TAZOBACTAM SODIUM 3.38 G: 3; .375 INJECTION, POWDER, FOR SOLUTION INTRAVENOUS at 01:09

## 2017-04-04 RX ADMIN — PETROLATUM: 42 OINTMENT TOPICAL at 09:08

## 2017-04-04 RX ADMIN — AMLODIPINE BESYLATE 10 MG: 10 TABLET ORAL at 09:03

## 2017-04-04 RX ADMIN — PIPERACILLIN SODIUM,TAZOBACTAM SODIUM 3.38 G: 3; .375 INJECTION, POWDER, FOR SOLUTION INTRAVENOUS at 09:04

## 2017-04-04 RX ADMIN — Medication 10 ML: at 12:41

## 2017-04-04 RX ADMIN — DEXTROSE MONOHYDRATE 125 ML/HR: 5 INJECTION, SOLUTION INTRAVENOUS at 01:10

## 2017-04-04 RX ADMIN — HEPARIN SODIUM 5000 UNITS: 5000 INJECTION, SOLUTION INTRAVENOUS; SUBCUTANEOUS at 02:24

## 2017-04-04 RX ADMIN — TRAMADOL HYDROCHLORIDE 50 MG: 50 TABLET, FILM COATED ORAL at 02:24

## 2017-04-04 RX ADMIN — HEPARIN SODIUM 5000 UNITS: 5000 INJECTION, SOLUTION INTRAVENOUS; SUBCUTANEOUS at 10:51

## 2017-04-04 RX ADMIN — ONDANSETRON HYDROCHLORIDE 4 MG: 2 INJECTION, SOLUTION INTRAMUSCULAR; INTRAVENOUS at 12:41

## 2017-04-04 RX ADMIN — STANDARDIZED SENNA CONCENTRATE AND DOCUSATE SODIUM 1 TABLET: 8.6; 5 TABLET, FILM COATED ORAL at 09:03

## 2017-04-04 NOTE — WOUND CARE
Right shin wound much improved, patient states he cannot tolerate any tighter compression refused to try coban at this time. Recommend daily dressings for now of xeroform, ABD, Kerlex and ace. Could consider Tubigrip instead of ace if available outpatient, not on formulary in this facility. If tubigrip could be used could be 3 time per week dressing change.  Will monitor while in acute care

## 2017-04-04 NOTE — DISCHARGE INSTRUCTIONS
Wound Care: After Your Visit  Your Care Instructions  Taking good care of your wound at home will help it heal quickly and reduce your chance of infection. The doctor has checked you carefully, but problems can develop later. If you notice any problems or new symptoms, get medical treatment right away. Follow-up care is a key part of your treatment and safety. Be sure to make and go to all appointments, and call your doctor if you are having problems. It's also a good idea to know your test results and keep a list of the medicines you take. How can you care for yourself at home? · Clean the area with soap and water 2 times a day unless your doctor gives you different instructions. Don't use hydrogen peroxide or alcohol, which can slow healing. ¨ You may cover the wound with a thin layer of antibiotic ointment, such as bacitracin, and a nonstick bandage. ¨ Apply more ointment and replace the bandage as needed. · Take pain medicines exactly as directed. Some pain is normal with a wound, but do not ignore pain that is getting worse instead of better. You could have an infection. ¨ If the doctor gave you a prescription medicine for pain, take it as prescribed. ¨ If you are not taking a prescription pain medicine, ask your doctor if you can take an over-the-counter medicine. · Your doctor may have closed your wound with stitches (sutures), staples, or skin glue. ¨ If you have stitches, your doctor may remove them after several days to 2 weeks. Or you may have stitches that dissolve on their own. ¨ If you have staples, your doctor may remove them after 7 to 10 days. ¨ If your wound was closed with skin glue, the glue will wear off in a few days to 2 weeks. When should you call for help? Call your doctor now or seek immediate medical care if:  · You have signs of infection, such as:  ¨ Increased pain, swelling, warmth, or redness near the wound. ¨ Red streaks leading from the wound.   ¨ Pus draining from the wound. ¨ A fever. · You bleed so much from your incision that you soak one or more bandages over 2 to 4 hours. Watch closely for changes in your health, and be sure to contact your doctor if:  · The wound is not getting better each day. Where can you learn more? Go to [employer]  Enter M973 in the search box to learn more about \"Wound Care: After Your Visit. \"   © 8201-7367 [employer]. Care instructions adapted under license by [employer] (which disclaims liability or warranty for this information). This care instruction is for use with your licensed healthcare professional. If you have questions about a medical condition or this instruction, always ask your healthcare professional. [place] [address] any warranty or liability for your use of this information. Content Version: 39.6.335589; Last Revised: April 23, 2012    DISCHARGE SUMMARY from Nurse    The following personal items are in your possession at time of discharge:    Dental Appliances: Lowers, Uppers        Home Medications: None  Jewelry: Watch  Clothing: At bedside  Other Valuables: Cell Phone             PATIENT INSTRUCTIONS:    After general anesthesia or intravenous sedation, for 24 hours or while taking prescription Narcotics:  · Limit your activities  · Do not drive and operate hazardous machinery  · Do not make important personal or business decisions  · Do  not drink alcoholic beverages  · If you have not urinated within 8 hours after discharge, please contact your surgeon on call.     Report the following to your surgeon:  · Excessive pain, swelling, redness or odor of or around the surgical area  · Temperature over 100.5  · Nausea and vomiting lasting longer than 4 hours or if unable to take medications  · Any signs of decreased circulation or nerve impairment to extremity: change in color, persistent  numbness, tingling, coldness or increase pain  · Any questions        What to do at Home:  Recommended activity: Activity as tolerated, Diabetic diet as tolerated. Home health will change dressings. *  Please give a list of your current medications to your Primary Care Provider. *  Please update this list whenever your medications are discontinued, doses are      changed, or new medications (including over-the-counter products) are added. *  Please carry medication information at all times in case of emergency situations. These are general instructions for a healthy lifestyle:    No smoking/ No tobacco products/ Avoid exposure to second hand smoke    Surgeon General's Warning:  Quitting smoking now greatly reduces serious risk to your health. Obesity, smoking, and sedentary lifestyle greatly increases your risk for illness    A healthy diet, regular physical exercise & weight monitoring are important for maintaining a healthy lifestyle    You may be retaining fluid if you have a history of heart failure or if you experience any of the following symptoms:  Weight gain of 3 pounds or more overnight or 5 pounds in a week, increased swelling in our hands or feet or shortness of breath while lying flat in bed. Please call your doctor as soon as you notice any of these symptoms; do not wait until your next office visit. Recognize signs and symptoms of STROKE:    F-face looks uneven    A-arms unable to move or move unevenly    S-speech slurred or non-existent    T-time-call 911 as soon as signs and symptoms begin-DO NOT go       Back to bed or wait to see if you get better-TIME IS BRAIN. Warning Signs of HEART ATTACK     Call 911 if you have these symptoms:   Chest discomfort. Most heart attacks involve discomfort in the center of the chest that lasts more than a few minutes, or that goes away and comes back. It can feel like uncomfortable pressure, squeezing, fullness, or pain.  Discomfort in other areas of the upper body.  Symptoms can include pain or discomfort in one or both arms, the back, neck, jaw, or stomach.  Shortness of breath with or without chest discomfort.  Other signs may include breaking out in a cold sweat, nausea, or lightheadedness. Don't wait more than five minutes to call 911 - MINUTES MATTER! Fast action can save your life. Calling 911 is almost always the fastest way to get lifesaving treatment. Emergency Medical Services staff can begin treatment when they arrive -- up to an hour sooner than if someone gets to the hospital by car. The discharge information has been reviewed with the patient. The patient verbalized understanding. Discharge medications reviewed with the patient and appropriate educational materials and side effects teaching were provided.

## 2017-04-04 NOTE — PROGRESS NOTES
Discharge instructions and prescriptions given and explained to pt. Pt verbalized understanding. Medication side effects sheet reviewed with pt. Pt to be discharged home, waiting on a ride.

## 2017-04-04 NOTE — PROGRESS NOTES
Pt to be DC home with [facility] with NSG for wound care and PT to evaluate. Pt agreeable to services.  Referral complete

## 2017-04-04 NOTE — DISCHARGE SUMMARY
Hospitalist Discharge Summary     Admit Date:  2017  3:23 PM   Name:  Michelle Colon. Age:  77 y.o.  :  1940   MRN:  [id number]   PCP:  Crystal Hodgson NP  Treatment Team: Attending Provider: Britney Saez MD; Utilization Review: Hitesh Walters; Charge Nurse: Sebastian Cruz RN; Care Manager: Kulwinder Aburto RN    Problem List for this Hospitalization:  Hospital Problems as of 2017  Never Reviewed          Codes Class Noted - Resolved POA    Stage 4 chronic kidney disease ([facility] (Chronic) ICD-10-CM: N18.4  ICD-9-CM: 585.4  2017 - Present Yes        Stasis edema of both lower extremities (Chronic) ICD-10-CM: I87.303  ICD-9-CM: 459.30  2017 - Present Yes        Cellulitis of left lower leg ICD-10-CM: L03.116  ICD-9-CM: 682.6  2017 - Present Yes        * (Principal)Venous stasis ulcer of left lower extremity ([facility] ICD-10-CM: I83.029  ICD-9-CM: 454.0  2017 - Present Yes        Systolic CHF, chronic (HCC) (Chronic) ICD-10-CM: I50.22  ICD-9-CM: 428.22, 428.0  2016 - Present Yes        Type II diabetes mellitus with nephropathy ([facility] (Chronic) ICD-10-CM: E11.21  ICD-9-CM: 250.40, 583.81  2016 - Present Yes                Admission HPI from 2017:    \" 76yr old male with pmhx sig for dm, htn, & CKD who had a blister develop on the left lower leg about 2 weeks ago that burst. Seen by pcp and and in our er on 2017 and started on abx and referred ro wound care. Pt states that he thought er was wound care center and didn't realize it was a diferent location. Leg pain continued so he came back to  ER today- found to have worsening infection and acute on chronic renal failure. Hospitalist asked to admit for failed outpt therapy. \"    Hospital Course:  Pt was initially started on broad spectrum abx and had some improvement. He was de-escalated to doxycyline after blood cultures had no growth. AoCKD improved with IVF.    His K supplement will be stopped due to him having hyperK on several lab checks; labwork can be repeated at PCP. Wound care saw and recommended [facility] wound care or wound care clinic follow up; see her full recs at bottom of summary. He also had some Mg replacement but got too much (4g) given his degree of kidney dysfunction; this is why his Mg went from 1.2 to 2.6 and should normalize with time. Can be repeated at PCP at follow up. HyperNa got better with D5w. He is stable enough for follow up with PCP at this point. Wound Care Instructions per wound care nurse:  \"Bilateral lower legs equally warm, bilateral DP pulses weak, palpable, bilateral PT pulses obtained per doppler. Bilateral lower legs with venous stasis staining (hemosiderosis). Varicose veins are present bilateral and right leg has scales of dry skin over the venous stasis, will add aquaphor. Left lower leg with venous stasis ulcer, surrounding skin is reddened, blanchable, 7x9x0.2cm open area with thick yellowbiofilm over wound, cleansed with dermal wound , wound base after cleaning is pink granulation xeroform, ABD and ACE bandage applied. Patient does have low grade compression stockings at home, however states he does not always wear them. Patient also has tried full compression stockings and was unable to wear secondary to \"pain and feeling too tight\". Patient does have type 2 diabetes diabetes and chronic kidney disease, managed medically, no recent A1 C available. Will use light compression with ace over xeroform, abd and Kerlex, daily. Will need wound care follow up/ home health to continue compression wraps for this wound to heal fully. \"    Follow up instructions below. Plan was discussed with pt. All questions answered. Patient was stable at time of discharge and was instructed to call or return if there are any concerns or recurrence of symptoms. Diagnostic Imaging/Tests:        All Micro Results     Procedure Component Value Units Date/Time    CULTURE, BLOOD [[id number]] Collected:  04/01/17 2038    Order Status:  Completed Specimen:  Blood from Blood Updated:  04/03/17 0631     Special Requests: LEFT HAND        Culture result: NO GROWTH 2 DAYS       CULTURE, BLOOD [[id number]] Collected:  04/01/17 2030    Order Status:  Completed Specimen:  Blood from Blood Updated:  04/03/17 0631     Special Requests: LEFT ANTECUBITAL        Culture result: NO GROWTH 2 DAYS             Labs: Results:       BMP, Mg, Phos Recent Labs      04/04/17   0554  04/03/17   0613  04/02/17   1912 04/01/17   1349   NA  145  144  149*   < >  149*   K  4.3  5.4*  4.5   < >  5.2*   CL  112*  111*  116*   < >  114*   CO2  26  24  25   < >  28   AGAP  7  9  8   < >  7   BUN  19  19  21   < >  28*   CREA  3.45*  3.22*  3.35*   < >  4.03*   CA  7.7*  7.8*  7.6*   < >  7.6*   GLU  165*  322*  73   < >  183*   MG   --   2.6*  1.2*   --   1.3*    < > = values in this interval not displayed.       CBC Recent Labs      04/02/17   0607  04/01/17   1349   WBC  3.9*  4.8   RBC  3.73*  3.84*   HGB  9.7*  10.0*   HCT  31.5*  32.9*   PLT  141*  135*   GRANS  42*  51   LYMPH  43  35   EOS  3  2   MONOS  11  12   BASOS  1  0   IG  0.3  0.0   ANEU  1.7  2.5   ABL  1.7  1.7   SERAFIN  0.1  0.1   ABM  0.4  0.6   ABB  0.0  0.0   AIG  0.0  0.0      LFT Recent Labs      04/02/17   0607  04/01/17   1349   SGOT  21  38*   ALT  21  27   AP  69  79   TP  6.1*  6.7   ALB  2.7*  3.0*   GLOB  3.4  3.7*   AGRAT  0.8*  0.8*      Cardiac Testing Lab Results   Component Value Date/Time    CK 2964 09/26/2016 06:08 AM    CK 1151 09/25/2016 05:51 AM     09/24/2016 08:52 AM    CK - MB 10.1 09/23/2016 03:05 AM    CK - MB 11.8 09/22/2016 09:37 PM    CK - MB 9.9 09/22/2016 03:10 PM    CK-MB Index 1.0 09/23/2016 03:05 AM    CK-MB Index CANNOT BE CALCULATED 09/22/2016 09:37 PM    CK-MB Index 0.9 09/22/2016 03:10 PM    Troponin-I, Qt. 1.30 09/24/2016 08:52 AM    Troponin-I, Qt. 1.45 09/24/2016 05:40 AM    Troponin-I, Qt. 2.03 09/23/2016 09:53 PM      Coagulation Tests Lab Results   Component Value Date/Time    Prothrombin time 12.6 09/22/2016 03:10 PM    INR 1.2 09/22/2016 03:10 PM      A1c Lab Results   Component Value Date/Time    Hemoglobin A1c 8.1 09/22/2016 02:31 AM      Lipid Panel No results found for: CHOL, CHOLPOCT, CHOLX, CHLST, CHOLV, 211135, HDL, LDL, NLDLCT, DLDL, LDLC, DLDLP, [id number], VLDLC, VLDL, TGL, TGLX, TRIGL, [id number], TRIGP, TGLPOCT, 246659, CHHD, CHHDX   Thyroid Panel No results found for: T4, T3U, TSH, TSHEXT     Most Recent UA Lab Results   Component Value Date/Time    Color ORANGE 09/22/2016 12:00 PM    Appearance TURBID 09/22/2016 12:00 PM    Specific gravity 1.022 09/22/2016 12:00 PM    pH (UA) 5.0 09/22/2016 12:00 PM    Protein 100 09/22/2016 12:00 PM    Glucose NEGATIVE  09/22/2016 12:00 PM    Ketone TRACE 09/22/2016 12:00 PM    Bilirubin MODERATE 09/22/2016 12:00 PM    Blood LARGE 09/22/2016 12:00 PM    Urobilinogen 0.2 09/22/2016 12:00 PM    Nitrites NEGATIVE  09/22/2016 12:00 PM    Leukocyte Esterase TRACE 09/22/2016 12:00 PM        No Known Allergies    There is no immunization history on file for this patient.     All Labs from Last 24 Hrs:  Recent Results (from the past 24 hour(s))   GLUCOSE, POC    Collection Time: 04/03/17  4:41 PM   Result Value Ref Range    Glucose (POC) 177 (H) 65 - 100 mg/dL   GLUCOSE, POC    Collection Time: 04/03/17  9:13 PM   Result Value Ref Range    Glucose (POC) 327 (H) 65 - 100 mg/dL   Vanessa Moss    Collection Time: 04/03/17 11:15 PM   Result Value Ref Range    Vancomycin,trough 21.7 (HH) 5 - 20 ug/mL   METABOLIC PANEL, BASIC    Collection Time: 04/04/17  5:54 AM   Result Value Ref Range    Sodium 145 136 - 145 mmol/L    Potassium 4.3 3.5 - 5.1 mmol/L    Chloride 112 (H) 98 - 107 mmol/L    CO2 26 21 - 32 mmol/L    Anion gap 7 7 - 16 mmol/L    Glucose 165 (H) 65 - 100 mg/dL    BUN 19 8 - 23 MG/DL    Creatinine 3.45 (H) 0.8 - 1.5 MG/DL    GFR est AA 22 (L) >60 ml/min/1.73m2 GFR est non-AA 19 (L) >60 ml/min/1.73m2    Calcium 7.7 (L) 8.3 - 10.4 MG/DL   GLUCOSE, POC    Collection Time: 04/04/17  7:03 AM   Result Value Ref Range    Glucose (POC) 171 (H) 65 - 100 mg/dL   GLUCOSE, POC    Collection Time: 04/04/17 11:05 AM   Result Value Ref Range    Glucose (POC) 139 (H) 65 - 100 mg/dL       Discharge Exam:  Patient Vitals for the past 24 hrs:   Temp Pulse Resp BP SpO2   04/04/17 1126 97.3 °F (36.3 °C) (!) 57 12 144/79 99 %   04/04/17 0812 97.5 °F (36.4 °C) 60 14 159/81 97 %   04/04/17 0801 97.8 °F (36.6 °C) 64 20 154/78 99 %   04/04/17 0449 97.5 °F (36.4 °C) (!) 59 18 152/76 100 %   04/03/17 2329 98.1 °F (36.7 °C) 82 18 144/78 97 %   04/03/17 2023 98.4 °F (36.9 °C) 74 18 172/75 96 %   04/03/17 1645 98.7 °F (37.1 °C) 74 18 162/81 99 %     Oxygen Therapy  O2 Sat (%): 99 % (04/04/17 1126)  Pulse via Oximetry: 64 beats per minute (04/01/17 1901)  O2 Device: Room air (04/01/17 1755)  No intake or output data in the 24 hours ending 04/04/17 1213    General:    Well nourished. Alert. No distress. Eyes:   Normal sclera. Extraocular movements intact. ENT:  Normocephalic, atraumatic. Moist mucous membranes  CV:   Regular rate and rhythm. No murmur, rub, or gallop. Lungs:  Clear to auscultation bilaterally. No wheezing, rhonchi, or rales. Abdomen: Soft, nontender, nondistended. Bowel sounds normal.   Extremities: Warm and dry. No cyanosis. Venous stasis edema with stasis derm changes bilaterally. Bandage c/d/i. Neurologic: CN II-XII grossly intact. Sensation intact. Skin:     No rashes or jaundice. No wounds. Psych:  Normal mood and affect. Discharge Info:   Current Discharge Medication List      START taking these medications    Details   doxycycline (ADOXA) 100 mg tablet Take 1 Tab by mouth two (2) times a day for 6 days.   Qty: 12 Tab, Refills: 0         CONTINUE these medications which have NOT CHANGED    Details   sodium bicarbonate 650 mg tablet Take 650 mg by mouth two (2) times a day. ferrous sulfate 325 mg (65 mg iron) tablet Take 325 mg by mouth two (2) times a day. traMADol (ULTRAM-ER) 200 mg tablet Take 200 mg by mouth daily. metoprolol succinate (TOPROL-XL) 50 mg XL tablet Take 50 mg by mouth daily. chlorthalidone (HYGROTEN) 50 mg tablet Take 50 mg by mouth daily. torsemide (DEMADEX) 20 mg tablet Take 20 mg by mouth daily. diphenoxylate-atropine (LOMOTIL) 2.5-0.025 mg per tablet Take 1 Tab by mouth four (4) times daily as needed for Diarrhea. LACTASE (DAIRY AID PO) Take 9,000 Units by mouth daily. lipase-protease-amylase (ZENPEP 20,000) capsule Take 2 Caps by mouth three (3) times daily (with meals). Qty: 180 Cap, Refills: 0      pregabalin (LYRICA) 50 mg capsule Take 1 Cap by mouth daily. Max Daily Amount: 50 mg.  Qty: 30 Cap, Refills: 0      carvedilol (COREG) 3.125 mg tablet Take 1 Tab by mouth two (2) times daily (with meals). Qty: 60 Tab, Refills: 0      insulin glargine (LANTUS SOLOSTAR) 100 unit/mL (3 mL) pen 25 Units by SubCUTAneous route daily. Qty: 1 Each, Refills: 0      insulin lispro (HUMALOG) 100 unit/mL kwikpen 8 Units by SubCUTAneous route Before breakfast, lunch, and dinner. Qty: 1 mL, Refills: pen      Insulin Needles, Disposable, 31 gauge x 5/16\" ndle Use with insulin QID  Qty: 1 Package, Refills: 0      omeprazole (PRILOSEC) 20 mg capsule Take 20 mg by mouth daily. aspirin delayed-release 81 mg tablet Take  by mouth daily. calcitRIOL (ROCALTROL) 0.25 mcg capsule Take 0.25 mcg by mouth daily. amLODIPine (NORVASC) 10 mg tablet Take 10 mg by mouth daily. ergocalciferol (DRISDOL) 50,000 unit capsule Take 50,000 Units by mouth every Tuesday. oxyCODONE-acetaminophen (PERCOCET) 5-325 mg per tablet Take 2 Tabs by mouth every four (4) hours as needed for Pain. Max Daily Amount: 12 Tabs.   Qty: 20 Tab, Refills: 0         STOP taking these medications       ciprofloxacin HCl (CIPRO) 500 mg tablet Comments:   Reason for Stopping:         cephALEXin (KEFLEX) 500 mg capsule Comments:   Reason for Stopping:         potassium chloride (K-DUR, KLOR-CON) 20 mEq tablet Comments:   Reason for Stopping:                 Disposition: home  Activity: Activity as tolerated  Diet: Diabetic Diet      Follow-up Information     Follow up With Details Comments [address] Cary Harman NP In 1 week follow up cellulitis and repeat labwork             Time spent in patient discharge planning and coordination 35 minutes.     Signed:  Anjali Lira MD

## 2017-04-04 NOTE — PROGRESS NOTES
Problem: Interdisciplinary Rounds  Goal: Interdisciplinary Rounds  Outcome: Resolved/Met Date Met:  04/04/17  Interdisciplinary team rounds were held 4/4/2017 with the following team members:Care Management, Occupational Therapy, Pastoral Care, Patient Relations, Physician and . Anticipate discharge home today. Plan of care discussed. See clinical pathway and/or care plan for interventions and desired outcomes.

## 2017-04-04 NOTE — PROGRESS NOTES
Met with pt at bedside pt is alert and oriented x3, states lives alone in mobile home with 4 steps to enter home, has cane for use at home, states is independent with ADLs at home request to go home when DC, agreeable to [facility] with [facility] if needed at DC.  has [facility] aid that comes to his home on Tuesday, Wednesday and Thursday for 3 hours a day, states they assist with shopping, cleaning and transport for appointments. Insurance confrmed CIP and Medicaid. PCP has been changed from NP to Dr Mathew Posadas with [facility] in [place] CM will cont to follow for DC needs, [facility] to [facility]. Care Management Interventions  PCP Verified by CM: Yes (Dr. Mathew Posadas)  Mode of Transport at Discharge:  (family)  Current Support Network: Own Home, Lives Alone  Confirm Follow Up Transport: Family  Plan discussed with Pt/Family/Caregiver: Yes  Freedom of Choice Offered:  Yes

## 2017-04-04 NOTE — PROGRESS NOTES
[facility] - INPATIENT  Face to Face Encounter    Patients Name: Aroldo Corado. YOB: 1940    Primary Diagnosis: Cellulitis of left lower leg    ORDERING MD:  Dr. Charisse Carrel    Date of Face to Face:   4/4/2017                                  Face to Face Encounter findings are related to primary reason for home care:   yes. 1. I certify that the patient needs intermittent care as follows: skilled nursing care:  skilled observation/assessment, patient education and wound care  physical therapy: strengthening, stretching/ROM, transfer training, gait/stair training, balance training and pt/caregiver education    2. I certify that this patient is homebound, that is: 1) patient requires the use of a cane device, special transportation, or assistance of another to leave the home; or 2) patient's condition makes leaving the home medically contraindicated; and 3) patient has a normal inability to leave the home and leaving the home requires considerable and taxing effort. Patient may leave the home for infrequent and short duration for medical reasons, and occasional absences for non-medical reasons. Homebound status is due to the following functional limitations: Patient with strength deficits limiting the performance of all ADL's without caregiver assistance or the use of an assistive device. Patient with poor safety awareness and is at risk for falls without assistance of another person and the use of an assistive device. Patient with poor ambulation endurance limiting their safe ability to ascend/descend the required number of steps to leave the home. 3. I certify that this patient is under my care and that I, or a nurse practitioner or  [id number], or clinical nurse specialist, or certified nurse midwife, working with me, had a Face-to-Face Encounter that meets the physician Face-to-Face Encounter requirements.   The following are the clinical findings from the [address] [place] [address] encounter that support the need for skilled services and is a summary of the encounter:       See hospital chart      Dartha Mortimer, RN  4/4/2017      THE FOLLOWING TO BE COMPLETED BY THE COMMUNITY PHYSICIAN:    I concur with the findings described above from the F2F encounter that this patient is homebound and in need of a skilled service.     Certifying Physician: _____________________________________      Printed Certifying Physician Name: _____________________________________    Date: _________________

## 2017-04-05 ENCOUNTER — HOME CARE VISIT (OUTPATIENT)
Dept: SCHEDULING | Facility: HOME HEALTH | Age: 77
End: 2017-04-05
Payer: MEDICARE

## 2017-04-05 VITALS
OXYGEN SATURATION: 98 % | HEART RATE: 64 BPM | SYSTOLIC BLOOD PRESSURE: 160 MMHG | TEMPERATURE: 97.3 F | DIASTOLIC BLOOD PRESSURE: 80 MMHG | RESPIRATION RATE: 16 BRPM

## 2017-04-05 PROCEDURE — G0299 HHS/HOSPICE OF RN EA 15 MIN: HCPCS

## 2017-04-05 PROCEDURE — 400013 HH SOC

## 2017-04-06 ENCOUNTER — HOME CARE VISIT (OUTPATIENT)
Dept: SCHEDULING | Facility: HOME HEALTH | Age: 77
End: 2017-04-06
Payer: MEDICARE

## 2017-04-06 LAB
BACTERIA SPEC CULT: NORMAL
BACTERIA SPEC CULT: NORMAL
SERVICE CMNT-IMP: NORMAL
SERVICE CMNT-IMP: NORMAL

## 2017-04-06 PROCEDURE — G0299 HHS/HOSPICE OF RN EA 15 MIN: HCPCS

## 2017-04-07 ENCOUNTER — HOME CARE VISIT (OUTPATIENT)
Dept: SCHEDULING | Facility: HOME HEALTH | Age: 77
End: 2017-04-07
Payer: MEDICARE

## 2017-04-07 PROCEDURE — G0299 HHS/HOSPICE OF RN EA 15 MIN: HCPCS

## 2017-04-08 ENCOUNTER — HOME CARE VISIT (OUTPATIENT)
Dept: SCHEDULING | Facility: HOME HEALTH | Age: 77
End: 2017-04-08
Payer: MEDICARE

## 2017-04-08 VITALS
HEART RATE: 58 BPM | TEMPERATURE: 96.2 F | SYSTOLIC BLOOD PRESSURE: 138 MMHG | OXYGEN SATURATION: 99 % | DIASTOLIC BLOOD PRESSURE: 72 MMHG | RESPIRATION RATE: 18 BRPM

## 2017-04-08 PROCEDURE — G0299 HHS/HOSPICE OF RN EA 15 MIN: HCPCS

## 2017-04-09 ENCOUNTER — HOME CARE VISIT (OUTPATIENT)
Dept: SCHEDULING | Facility: HOME HEALTH | Age: 77
End: 2017-04-09
Payer: MEDICARE

## 2017-04-09 VITALS
HEART RATE: 62 BPM | OXYGEN SATURATION: 99 % | DIASTOLIC BLOOD PRESSURE: 70 MMHG | TEMPERATURE: 96.8 F | SYSTOLIC BLOOD PRESSURE: 122 MMHG

## 2017-04-09 PROCEDURE — G0299 HHS/HOSPICE OF RN EA 15 MIN: HCPCS

## 2017-04-10 ENCOUNTER — HOME CARE VISIT (OUTPATIENT)
Dept: SCHEDULING | Facility: HOME HEALTH | Age: 77
End: 2017-04-10
Payer: MEDICARE

## 2017-04-10 VITALS
OXYGEN SATURATION: 99 % | RESPIRATION RATE: 16 BRPM | SYSTOLIC BLOOD PRESSURE: 138 MMHG | HEART RATE: 65 BPM | TEMPERATURE: 96.9 F | DIASTOLIC BLOOD PRESSURE: 70 MMHG

## 2017-04-10 PROCEDURE — A6266 IMPREG GAUZE NO H20/SAL/YARD: HCPCS

## 2017-04-10 PROCEDURE — G0299 HHS/HOSPICE OF RN EA 15 MIN: HCPCS

## 2017-04-10 PROCEDURE — A4452 WATERPROOF TAPE: HCPCS

## 2017-04-10 PROCEDURE — A6252 ABSORPT DRG >16 <=48 W/O BDR: HCPCS

## 2017-04-11 ENCOUNTER — HOME CARE VISIT (OUTPATIENT)
Dept: SCHEDULING | Facility: HOME HEALTH | Age: 77
End: 2017-04-11
Payer: MEDICARE

## 2017-04-11 VITALS
SYSTOLIC BLOOD PRESSURE: 120 MMHG | HEART RATE: 50 BPM | RESPIRATION RATE: 20 BRPM | OXYGEN SATURATION: 99 % | DIASTOLIC BLOOD PRESSURE: 64 MMHG | TEMPERATURE: 97.6 F

## 2017-04-11 VITALS
SYSTOLIC BLOOD PRESSURE: 148 MMHG | OXYGEN SATURATION: 98 % | HEART RATE: 58 BPM | RESPIRATION RATE: 18 BRPM | TEMPERATURE: 96.1 F | DIASTOLIC BLOOD PRESSURE: 62 MMHG

## 2017-04-11 PROCEDURE — G0151 HHCP-SERV OF PT,EA 15 MIN: HCPCS

## 2017-04-12 ENCOUNTER — HOME CARE VISIT (OUTPATIENT)
Dept: SCHEDULING | Facility: HOME HEALTH | Age: 77
End: 2017-04-12
Payer: MEDICARE

## 2017-04-12 VITALS
RESPIRATION RATE: 16 BRPM | DIASTOLIC BLOOD PRESSURE: 68 MMHG | HEART RATE: 64 BPM | SYSTOLIC BLOOD PRESSURE: 140 MMHG | OXYGEN SATURATION: 94 % | TEMPERATURE: 97 F

## 2017-04-12 PROCEDURE — G0299 HHS/HOSPICE OF RN EA 15 MIN: HCPCS

## 2017-04-13 VITALS
DIASTOLIC BLOOD PRESSURE: 72 MMHG | HEART RATE: 52 BPM | OXYGEN SATURATION: 99 % | SYSTOLIC BLOOD PRESSURE: 122 MMHG | TEMPERATURE: 98.3 F

## 2017-04-14 ENCOUNTER — HOME CARE VISIT (OUTPATIENT)
Dept: SCHEDULING | Facility: HOME HEALTH | Age: 77
End: 2017-04-14
Payer: MEDICARE

## 2017-04-14 PROCEDURE — G0299 HHS/HOSPICE OF RN EA 15 MIN: HCPCS

## 2017-04-17 ENCOUNTER — HOME CARE VISIT (OUTPATIENT)
Dept: SCHEDULING | Facility: HOME HEALTH | Age: 77
End: 2017-04-17
Payer: MEDICARE

## 2017-04-17 VITALS
RESPIRATION RATE: 14 BRPM | TEMPERATURE: 97.6 F | HEART RATE: 52 BPM | OXYGEN SATURATION: 98 % | DIASTOLIC BLOOD PRESSURE: 64 MMHG | SYSTOLIC BLOOD PRESSURE: 130 MMHG

## 2017-04-17 PROCEDURE — G0299 HHS/HOSPICE OF RN EA 15 MIN: HCPCS

## 2017-04-19 ENCOUNTER — HOME CARE VISIT (OUTPATIENT)
Dept: SCHEDULING | Facility: HOME HEALTH | Age: 77
End: 2017-04-19
Payer: MEDICARE

## 2017-04-19 VITALS
RESPIRATION RATE: 18 BRPM | TEMPERATURE: 97.1 F | DIASTOLIC BLOOD PRESSURE: 58 MMHG | SYSTOLIC BLOOD PRESSURE: 136 MMHG | OXYGEN SATURATION: 99 % | HEART RATE: 52 BPM

## 2017-04-19 PROCEDURE — G0299 HHS/HOSPICE OF RN EA 15 MIN: HCPCS

## 2017-04-21 ENCOUNTER — HOME CARE VISIT (OUTPATIENT)
Dept: SCHEDULING | Facility: HOME HEALTH | Age: 77
End: 2017-04-21
Payer: MEDICARE

## 2017-04-21 VITALS
TEMPERATURE: 97 F | RESPIRATION RATE: 14 BRPM | HEART RATE: 50 BPM | OXYGEN SATURATION: 99 % | SYSTOLIC BLOOD PRESSURE: 130 MMHG | DIASTOLIC BLOOD PRESSURE: 58 MMHG

## 2017-04-21 PROCEDURE — G0299 HHS/HOSPICE OF RN EA 15 MIN: HCPCS

## 2017-04-22 VITALS
SYSTOLIC BLOOD PRESSURE: 122 MMHG | OXYGEN SATURATION: 99 % | DIASTOLIC BLOOD PRESSURE: 58 MMHG | HEART RATE: 61 BPM | RESPIRATION RATE: 18 BRPM | TEMPERATURE: 98.4 F

## 2017-04-24 ENCOUNTER — HOME CARE VISIT (OUTPATIENT)
Dept: SCHEDULING | Facility: HOME HEALTH | Age: 77
End: 2017-04-24
Payer: MEDICARE

## 2017-04-24 PROCEDURE — G0299 HHS/HOSPICE OF RN EA 15 MIN: HCPCS

## 2017-04-25 VITALS
SYSTOLIC BLOOD PRESSURE: 128 MMHG | RESPIRATION RATE: 18 BRPM | TEMPERATURE: 98 F | HEART RATE: 78 BPM | OXYGEN SATURATION: 93 % | DIASTOLIC BLOOD PRESSURE: 78 MMHG

## 2017-04-27 ENCOUNTER — HOME CARE VISIT (OUTPATIENT)
Dept: SCHEDULING | Facility: HOME HEALTH | Age: 77
End: 2017-04-27
Payer: MEDICARE

## 2017-04-27 PROCEDURE — G0299 HHS/HOSPICE OF RN EA 15 MIN: HCPCS

## 2017-05-02 VITALS
OXYGEN SATURATION: 92 % | TEMPERATURE: 97.6 F | SYSTOLIC BLOOD PRESSURE: 122 MMHG | DIASTOLIC BLOOD PRESSURE: 68 MMHG | RESPIRATION RATE: 16 BRPM | HEART RATE: 64 BPM

## 2017-05-08 ENCOUNTER — HOME HEALTH ADMISSION (OUTPATIENT)
Dept: HOME HEALTH SERVICES | Facility: HOME HEALTH | Age: 77
End: 2017-05-08

## 2017-09-13 PROBLEM — Z79.4 CONTROLLED TYPE 2 DIABETES MELLITUS WITH COMPLICATION, WITH LONG-TERM CURRENT USE OF INSULIN (HCC): Status: ACTIVE | Noted: 2017-09-13

## 2017-09-13 PROBLEM — E11.8 CONTROLLED TYPE 2 DIABETES MELLITUS WITH COMPLICATION, WITH LONG-TERM CURRENT USE OF INSULIN (HCC): Status: ACTIVE | Noted: 2017-09-13

## 2017-09-13 PROBLEM — B35.1 ONYCHOMYCOSIS: Status: ACTIVE | Noted: 2017-09-13

## 2017-10-12 ENCOUNTER — HOSPITAL ENCOUNTER (OUTPATIENT)
Dept: SURGERY | Age: 77
Discharge: HOME | End: 2017-10-12
Payer: MEDICARE

## 2017-10-12 VITALS
RESPIRATION RATE: 16 BRPM | OXYGEN SATURATION: 99 % | HEIGHT: 71 IN | DIASTOLIC BLOOD PRESSURE: 78 MMHG | TEMPERATURE: 98.3 F | WEIGHT: 178 LBS | HEART RATE: 62 BPM | BODY MASS INDEX: 24.92 KG/M2 | SYSTOLIC BLOOD PRESSURE: 162 MMHG

## 2017-10-12 LAB
ALBUMIN SERPL-MCNC: 3.4 G/DL (ref 3.2–4.6)
ALBUMIN/GLOB SERPL: 1 {RATIO} (ref 1.2–3.5)
ALP SERPL-CCNC: 60 U/L (ref 50–136)
ALT SERPL-CCNC: 33 U/L (ref 12–65)
ANION GAP SERPL CALC-SCNC: 9 MMOL/L (ref 7–16)
AST SERPL-CCNC: 35 U/L (ref 15–37)
BASOPHILS # BLD: 0 K/UL (ref 0–0.2)
BASOPHILS NFR BLD: 1 % (ref 0–2)
BILIRUB SERPL-MCNC: 0.5 MG/DL (ref 0.2–1.1)
BUN SERPL-MCNC: 41 MG/DL (ref 8–23)
CALCIUM SERPL-MCNC: 8.4 MG/DL (ref 8.3–10.4)
CHLORIDE SERPL-SCNC: 113 MMOL/L (ref 98–107)
CO2 SERPL-SCNC: 25 MMOL/L (ref 21–32)
CREAT SERPL-MCNC: 3.62 MG/DL (ref 0.8–1.5)
DIFFERENTIAL METHOD BLD: ABNORMAL
EOSINOPHIL # BLD: 0.1 K/UL (ref 0–0.8)
EOSINOPHIL NFR BLD: 2 % (ref 0.5–7.8)
ERYTHROCYTE [DISTWIDTH] IN BLOOD BY AUTOMATED COUNT: 16 % (ref 11.9–14.6)
GLOBULIN SER CALC-MCNC: 3.3 G/DL (ref 2.3–3.5)
GLUCOSE BLD STRIP.AUTO-MCNC: 134 MG/DL (ref 65–100)
GLUCOSE SERPL-MCNC: 134 MG/DL (ref 65–100)
HCT VFR BLD AUTO: 33.7 % (ref 41.1–50.3)
HGB BLD-MCNC: 10.9 G/DL (ref 13.6–17.2)
IMM GRANULOCYTES # BLD: 0 K/UL (ref 0–0.5)
IMM GRANULOCYTES NFR BLD: 0.2 % (ref 0–5)
LYMPHOCYTES # BLD: 2.1 K/UL (ref 0.5–4.6)
LYMPHOCYTES NFR BLD: 48 % (ref 13–44)
MCH RBC QN AUTO: 26.7 PG (ref 26.1–32.9)
MCHC RBC AUTO-ENTMCNC: 32.3 G/DL (ref 31.4–35)
MCV RBC AUTO: 82.4 FL (ref 79.6–97.8)
MONOCYTES # BLD: 0.4 K/UL (ref 0.1–1.3)
MONOCYTES NFR BLD: 9 % (ref 4–12)
NEUTS SEG # BLD: 1.7 K/UL (ref 1.7–8.2)
NEUTS SEG NFR BLD: 40 % (ref 43–78)
PLATELET # BLD AUTO: 115 K/UL (ref 150–450)
PMV BLD AUTO: 12.2 FL (ref 10.8–14.1)
POTASSIUM SERPL-SCNC: 4.2 MMOL/L (ref 3.5–5.1)
PROT SERPL-MCNC: 6.7 G/DL (ref 6.3–8.2)
RBC # BLD AUTO: 4.09 M/UL (ref 4.23–5.67)
SODIUM SERPL-SCNC: 147 MMOL/L (ref 136–145)
WBC # BLD AUTO: 4.2 K/UL (ref 4.3–11.1)

## 2017-10-12 PROCEDURE — 85025 COMPLETE CBC W/AUTO DIFF WBC: CPT | Performed by: SURGERY

## 2017-10-12 PROCEDURE — 82962 GLUCOSE BLOOD TEST: CPT

## 2017-10-12 PROCEDURE — 80053 COMPREHEN METABOLIC PANEL: CPT | Performed by: ANESTHESIOLOGY

## 2017-10-12 RX ORDER — MELATONIN
DAILY
COMMUNITY
End: 2018-01-04 | Stop reason: CLARIF

## 2017-10-12 NOTE — PERIOP NOTES
Message left for meera on lab order clarification- labs ordered in  [facility] care, not on order sheet.

## 2017-10-12 NOTE — PERIOP NOTES
Patient verified name, , and surgery as listed in [facility]. Patient provided medical/health information and PTA medications to the best of their ability. TYPE  CASE:2  Orders per surgeon: yes Received  Labs per surgeon:cbc,bmp. Results: -  Labs per anesthesia protocol: poc glucose. Results -  EKG  :  yes    Patient provided with and instructed on education handouts including Guide to Surgery, blood transfusions, pain management, and hand hygiene for the family and community, and [facility] brochure. Lay mist and instructions given per hospital policy. Instructed patient to continue previous medications as prescribed prior to surgery unless otherwise directed and to take the following medications the day of surgery according to anesthesia guidelines : amlodipine,metoprolol,omeprazole,lyrica,tramadol as needed . Instructed patient to hold  the following medications: vit d,calcitrol. Original medication prescription bottles none visualized during patient appointment. Patient teach back successful and patient demonstrates knowledge of instruction.

## 2017-10-26 ENCOUNTER — APPOINTMENT (OUTPATIENT)
Dept: ULTRASOUND IMAGING | Age: 77
End: 2017-10-26
Attending: SURGERY
Payer: MEDICARE

## 2017-10-26 ENCOUNTER — ANESTHESIA EVENT (OUTPATIENT)
Dept: SURGERY | Age: 77
End: 2017-10-26
Payer: MEDICARE

## 2017-10-26 ENCOUNTER — HOSPITAL ENCOUNTER (OUTPATIENT)
Age: 77
Setting detail: OUTPATIENT SURGERY
Discharge: HOME | End: 2017-10-26
Attending: SURGERY | Admitting: SURGERY
Payer: MEDICARE

## 2017-10-26 ENCOUNTER — ANESTHESIA (OUTPATIENT)
Dept: SURGERY | Age: 77
End: 2017-10-26
Payer: MEDICARE

## 2017-10-26 VITALS
SYSTOLIC BLOOD PRESSURE: 156 MMHG | DIASTOLIC BLOOD PRESSURE: 72 MMHG | HEART RATE: 55 BPM | TEMPERATURE: 97.5 F | OXYGEN SATURATION: 100 % | RESPIRATION RATE: 14 BRPM | WEIGHT: 181.5 LBS | HEIGHT: 72 IN | BODY MASS INDEX: 24.58 KG/M2

## 2017-10-26 DIAGNOSIS — I77.0 AVF (ARTERIOVENOUS FISTULA) (HCC): ICD-10-CM

## 2017-10-26 LAB
GLUCOSE BLD STRIP.AUTO-MCNC: 105 MG/DL (ref 65–100)
GLUCOSE BLD STRIP.AUTO-MCNC: 133 MG/DL (ref 65–100)
GLUCOSE BLD STRIP.AUTO-MCNC: 133 MG/DL (ref 65–100)
GLUCOSE BLD STRIP.AUTO-MCNC: 71 MG/DL (ref 65–100)
GLUCOSE BLD STRIP.AUTO-MCNC: 82 MG/DL (ref 65–100)
GLUCOSE BLD STRIP.AUTO-MCNC: 99 MG/DL (ref 65–100)
POTASSIUM BLD-SCNC: 3.9 MMOL/L (ref 3.5–5.1)

## 2017-10-26 PROCEDURE — 77030016570 HC BLNKT BAIR HGGR 3M -B: Performed by: REGISTERED NURSE

## 2017-10-26 PROCEDURE — 76010010054 HC POST OP PAIN BLOCK: Performed by: SURGERY

## 2017-10-26 PROCEDURE — 76010000162 HC OR TIME 1.5 TO 2 HR INTENSV-TIER 1: Performed by: SURGERY

## 2017-10-26 PROCEDURE — 74011250636 HC RX REV CODE- 250/636: Performed by: SURGERY

## 2017-10-26 PROCEDURE — 77030010507 HC ADH SKN DERMBND J&J -B: Performed by: SURGERY

## 2017-10-26 PROCEDURE — 77030010514 HC APPL CLP LIG COVD -B: Performed by: SURGERY

## 2017-10-26 PROCEDURE — 76060000034 HC ANESTHESIA 1.5 TO 2 HR: Performed by: SURGERY

## 2017-10-26 PROCEDURE — 74011000250 HC RX REV CODE- 250: Performed by: SURGERY

## 2017-10-26 PROCEDURE — 77030020782 HC GWN BAIR PAWS FLX 3M -B: Performed by: REGISTERED NURSE

## 2017-10-26 PROCEDURE — 74011250636 HC RX REV CODE- 250/636: Performed by: ANESTHESIOLOGY

## 2017-10-26 PROCEDURE — 74011250636 HC RX REV CODE- 250/636

## 2017-10-26 PROCEDURE — 77030031139 HC SUT VCRL2 J&J -A: Performed by: SURGERY

## 2017-10-26 PROCEDURE — 76210000020 HC REC RM PH II FIRST 0.5 HR: Performed by: SURGERY

## 2017-10-26 PROCEDURE — 77030011640 HC PAD GRND REM COVD -A: Performed by: SURGERY

## 2017-10-26 PROCEDURE — 76942 ECHO GUIDE FOR BIOPSY: CPT | Performed by: SURGERY

## 2017-10-26 PROCEDURE — 74011000250 HC RX REV CODE- 250: Performed by: ANESTHESIOLOGY

## 2017-10-26 PROCEDURE — 74011000250 HC RX REV CODE- 250

## 2017-10-26 PROCEDURE — 77030034888 HC SUT PROL 2 J&J -B: Performed by: SURGERY

## 2017-10-26 PROCEDURE — 82962 GLUCOSE BLOOD TEST: CPT

## 2017-10-26 PROCEDURE — 74011250637 HC RX REV CODE- 250/637: Performed by: SURGERY

## 2017-10-26 PROCEDURE — 77030002933 HC SUT MCRYL J&J -A: Performed by: SURGERY

## 2017-10-26 PROCEDURE — 84132 ASSAY OF SERUM POTASSIUM: CPT

## 2017-10-26 PROCEDURE — 77030014008 HC SPNG HEMSTAT J&J -C: Performed by: SURGERY

## 2017-10-26 PROCEDURE — 76210000006 HC OR PH I REC 0.5 TO 1 HR: Performed by: SURGERY

## 2017-10-26 PROCEDURE — 77030011283 HC ELECTRD NDL COVD -A: Performed by: SURGERY

## 2017-10-26 PROCEDURE — 77030002996 HC SUT SLK J&J -A: Performed by: SURGERY

## 2017-10-26 PROCEDURE — 77030003602 HC NDL NRV BLK BBMI -B: Performed by: REGISTERED NURSE

## 2017-10-26 RX ORDER — MIDAZOLAM HYDROCHLORIDE 1 MG/ML
2 INJECTION, SOLUTION INTRAMUSCULAR; INTRAVENOUS
Status: DISCONTINUED | OUTPATIENT
Start: 2017-10-26 | End: 2017-10-26 | Stop reason: HOSPADM

## 2017-10-26 RX ORDER — HYDROMORPHONE HYDROCHLORIDE 2 MG/ML
0.5 INJECTION, SOLUTION INTRAMUSCULAR; INTRAVENOUS; SUBCUTANEOUS
Status: DISCONTINUED | OUTPATIENT
Start: 2017-10-26 | End: 2017-10-26 | Stop reason: HOSPADM

## 2017-10-26 RX ORDER — PROPOFOL 10 MG/ML
INJECTION, EMULSION INTRAVENOUS
Status: DISCONTINUED | OUTPATIENT
Start: 2017-10-26 | End: 2017-10-26 | Stop reason: HOSPADM

## 2017-10-26 RX ORDER — MIDAZOLAM HYDROCHLORIDE 1 MG/ML
2 INJECTION, SOLUTION INTRAMUSCULAR; INTRAVENOUS ONCE
Status: COMPLETED | OUTPATIENT
Start: 2017-10-26 | End: 2017-10-26

## 2017-10-26 RX ORDER — SODIUM CHLORIDE, SODIUM LACTATE, POTASSIUM CHLORIDE, CALCIUM CHLORIDE 600; 310; 30; 20 MG/100ML; MG/100ML; MG/100ML; MG/100ML
75 INJECTION, SOLUTION INTRAVENOUS CONTINUOUS
Status: DISCONTINUED | OUTPATIENT
Start: 2017-10-26 | End: 2017-10-26 | Stop reason: HOSPADM

## 2017-10-26 RX ORDER — PROTAMINE SULFATE 10 MG/ML
INJECTION, SOLUTION INTRAVENOUS AS NEEDED
Status: DISCONTINUED | OUTPATIENT
Start: 2017-10-26 | End: 2017-10-26 | Stop reason: HOSPADM

## 2017-10-26 RX ORDER — OXYCODONE HYDROCHLORIDE 5 MG/1
5 TABLET ORAL
Status: DISCONTINUED | OUTPATIENT
Start: 2017-10-26 | End: 2017-10-26 | Stop reason: HOSPADM

## 2017-10-26 RX ORDER — FENTANYL CITRATE 50 UG/ML
100 INJECTION, SOLUTION INTRAMUSCULAR; INTRAVENOUS ONCE
Status: COMPLETED | OUTPATIENT
Start: 2017-10-26 | End: 2017-10-26

## 2017-10-26 RX ORDER — DEXTROSE 50 % IN WATER (D50W) INTRAVENOUS SYRINGE
25
Status: COMPLETED | OUTPATIENT
Start: 2017-10-26 | End: 2017-10-26

## 2017-10-26 RX ORDER — LIDOCAINE HYDROCHLORIDE 10 MG/ML
0.1 INJECTION INFILTRATION; PERINEURAL AS NEEDED
Status: DISCONTINUED | OUTPATIENT
Start: 2017-10-26 | End: 2017-10-26 | Stop reason: HOSPADM

## 2017-10-26 RX ORDER — HEPARIN SODIUM 5000 [USP'U]/ML
INJECTION, SOLUTION INTRAVENOUS; SUBCUTANEOUS AS NEEDED
Status: DISCONTINUED | OUTPATIENT
Start: 2017-10-26 | End: 2017-10-26 | Stop reason: HOSPADM

## 2017-10-26 RX ORDER — NALOXONE HYDROCHLORIDE 0.4 MG/ML
0.2 INJECTION, SOLUTION INTRAMUSCULAR; INTRAVENOUS; SUBCUTANEOUS AS NEEDED
Status: DISCONTINUED | OUTPATIENT
Start: 2017-10-26 | End: 2017-10-26 | Stop reason: HOSPADM

## 2017-10-26 RX ORDER — ROPIVACAINE HYDROCHLORIDE 5 MG/ML
INJECTION, SOLUTION EPIDURAL; INFILTRATION; PERINEURAL AS NEEDED
Status: DISCONTINUED | OUTPATIENT
Start: 2017-10-26 | End: 2017-10-26 | Stop reason: HOSPADM

## 2017-10-26 RX ORDER — HEPARIN SODIUM 1000 [USP'U]/ML
INJECTION, SOLUTION INTRAVENOUS; SUBCUTANEOUS AS NEEDED
Status: DISCONTINUED | OUTPATIENT
Start: 2017-10-26 | End: 2017-10-26 | Stop reason: HOSPADM

## 2017-10-26 RX ORDER — CEFAZOLIN SODIUM IN 0.9 % NACL 2 G/50 ML
2 INTRAVENOUS SOLUTION, PIGGYBACK (ML) INTRAVENOUS ONCE
Status: COMPLETED | OUTPATIENT
Start: 2017-10-26 | End: 2017-10-26

## 2017-10-26 RX ORDER — PAPAVERINE HYDROCHLORIDE 30 MG/ML
INJECTION INTRAMUSCULAR; INTRAVENOUS AS NEEDED
Status: DISCONTINUED | OUTPATIENT
Start: 2017-10-26 | End: 2017-10-26 | Stop reason: HOSPADM

## 2017-10-26 RX ADMIN — DEXTROSE MONOHYDRATE 12.5 G: 25 INJECTION, SOLUTION INTRAVENOUS at 10:51

## 2017-10-26 RX ADMIN — MIDAZOLAM HYDROCHLORIDE 1 MG: 1 INJECTION, SOLUTION INTRAMUSCULAR; INTRAVENOUS at 11:52

## 2017-10-26 RX ADMIN — FENTANYL CITRATE 50 MCG: 50 INJECTION, SOLUTION INTRAMUSCULAR; INTRAVENOUS at 11:52

## 2017-10-26 RX ADMIN — PROPOFOL 25 MCG/KG/MIN: 10 INJECTION, EMULSION INTRAVENOUS at 12:59

## 2017-10-26 RX ADMIN — HEPARIN SODIUM 5000 UNITS: 1000 INJECTION, SOLUTION INTRAVENOUS; SUBCUTANEOUS at 13:33

## 2017-10-26 RX ADMIN — PROTAMINE SULFATE 30 MG: 10 INJECTION, SOLUTION INTRAVENOUS at 14:05

## 2017-10-26 RX ADMIN — DEXTROSE MONOHYDRATE 12.5 G: 25 INJECTION, SOLUTION INTRAVENOUS at 12:15

## 2017-10-26 RX ADMIN — CEFAZOLIN 2 G: 1 INJECTION, POWDER, FOR SOLUTION INTRAMUSCULAR; INTRAVENOUS; PARENTERAL at 12:54

## 2017-10-26 RX ADMIN — ROPIVACAINE HYDROCHLORIDE 15 ML: 5 INJECTION, SOLUTION EPIDURAL; INFILTRATION; PERINEURAL at 11:56

## 2017-10-26 RX ADMIN — SODIUM CHLORIDE, SODIUM LACTATE, POTASSIUM CHLORIDE, AND CALCIUM CHLORIDE 75 ML/HR: 600; 310; 30; 20 INJECTION, SOLUTION INTRAVENOUS at 10:35

## 2017-10-26 NOTE — BRIEF OP NOTE
[address]  [phone] FAX: [phone]    Brief Op Note Template Note    Pre-Op Diagnosis: End-stage renal disease ([facility] [N18.6]    Post-Op Diagnosis:  End-stage renal disease ([facility] [N18.6]    Procedures: Procedure(s):  LEFT UPPER EXTREMITY ARTERIO VENOUS FISTULA    Surgeon: Hazel Justice MD    Assistants: Surgeon(s):  Hazel Justice MD      Anesthesia:  Regional     Findings: Basilic vein normal brachial artery without disease    Tourniquet Time:  * No tourniquets in log *    Estimated Blood Loss:               Specimens:           Implants:  * No implants in log *    Complications: None               Signed: Hazel Justice MD      Elements of this note have been dictated using speech recognition software. As a result, errors of speech recognition may have occurred.

## 2017-10-26 NOTE — ANESTHESIA PROCEDURE NOTES
Peripheral Block    Start time: 10/26/2017 11:52 AM  End time: 10/26/2017 11:57 AM  Performed by: Andres Rubi  Authorized by: Andres Rubi       Pre-procedure: Indications: at surgeon's request and post-op pain management    Preanesthetic Checklist: patient identified, risks and benefits discussed, site marked, timeout performed, anesthesia consent given and patient being monitored    Timeout Time: 11:52          Block Type:   Block Type:  Supraclavicular  Laterality:  Left  Monitoring:  Standard ASA monitoring, responsive to questions, continuous pulse ox, oxygen, frequent vital sign checks and heart rate  Injection Technique:  Single shot  Procedures: ultrasound guided and nerve stimulator    Patient Position: supine  Prep: chlorhexidine    Location:  Supraclavicular  Needle Type:  Stimuplex  Needle Gauge:  22 G  Needle Localization:  Nerve stimulator and ultrasound guidance  Motor Response: minimal motor response >0.4 mA    Medication Injected:  0.5%  ropivacaine  Volume (mL):  15  Add'l Medication Injected:  2.0%  mepivacaine  Volume (mL):  10    Assessment:  Number of attempts:  1  Injection Assessment:  Incremental injection every 5 mL, negative aspiration for CSF, ultrasound image on chart, no paresthesia, local visualized surrounding nerve on ultrasound, negative aspiration for blood and no intravascular symptoms  Patient tolerance:  Patient tolerated the procedure well with no immediate complications  Intracostalbrachial field block done in area of axilla.

## 2017-10-26 NOTE — ANESTHESIA POSTPROCEDURE EVALUATION
Post-Anesthesia Evaluation and Assessment    Patient: SILVIO Hernandez Sportsman. MRN: [id number]  SSN: [id number]    YOB: 1940  Age: 69 y.o. Sex: male       Cardiovascular Function/Vital Signs  Visit Vitals    /59    Pulse (!) 51    Temp 36.4 °C (97.5 °F)    Resp 14    Ht 5' 11.5\" (1.816 m)    Wt 82.3 kg (181 lb 8 oz)    SpO2 100%    BMI 24.96 kg/m2       Patient is status post total IV anesthesia anesthesia for Procedure(s):  LEFT UPPER EXTREMITY ARTERIO VENOUS FISTULA. Nausea/Vomiting: None    Postoperative hydration reviewed and adequate. Pain:  Pain Scale 1: Numeric (0 - 10) (10/26/17 1440)  Pain Intensity 1: 0 (10/26/17 1440)   Managed    Neurological Status:   Neuro (WDL): Within Defined Limits (10/26/17 1440)   L arm weak secondary to PNB    Mental Status and Level of Consciousness: Arousable    Pulmonary Status:   Room air  Adequate oxygenation and airway patent    Complications related to anesthesia: None    Post-anesthesia assessment completed.  No concerns    Signed By: Demario Delatorre MD     October 26, 2017

## 2017-10-26 NOTE — IP AVS SNAPSHOT
Trinh Hicks 
 
 
 [address] [facility] [address] 
[phone] Patient: Dmitry Chun. MRN: [id number] Dez Greenberg About your hospitalization You were admitted on:  October 26, 2017 You last received care in the:  [facility] PACU You were discharged on:  October 26, 2017 Why you were hospitalized Your primary diagnosis was:  Not on File Things You Need To Do (next 8 weeks) Follow up with La Pate MD  
  
Phone:  [phone] Where:  [address], [facility], [place] [address] Tuesday Nov 14, 2017 Global Post Op with Claudene Distad, MD at  1:00 PM  
Where:  [facility] Wednesday Dec 13, 2017 SHORT with Quang Hester DPM at  2:40 PM  
Where:  [address] Discharge Orders None A check shahid indicates which time of day the medication should be taken. My Medications TAKE these medications as instructed Instructions Each Dose to Equal  
 Morning Noon Evening Bedtime  
 amLODIPine 10 mg tablet Commonly known as:  Allen Presser Your last dose was: Your next dose is: Take 10 mg by mouth every morning. 10 mg  
    
   
   
   
  
 aspirin delayed-release 81 mg tablet Your last dose was: Your next dose is: Take 81 mg by mouth nightly. 81 mg  
    
   
   
   
  
 calcitRIOL 0.25 mcg capsule Commonly known as:  ROCALTROL Your last dose was: Your next dose is: Take 0.25 mcg by mouth daily. 0.25 mcg  
    
   
   
   
  
 chlorthalidone 50 mg tablet Commonly known as:  Desire Alcazark Your last dose was: Your next dose is: Take 50 mg by mouth every morning. 50 mg  
    
   
   
   
  
 ferrous sulfate 325 mg (65 mg iron) tablet Your last dose was: Your next dose is: Take 325 mg by mouth two (2) times a day. 325 mg  
    
   
   
   
  
 glucose tablet Your last dose was: Your next dose is: Take 1 Tab by mouth as needed (low bs). 1 Tab  
    
   
   
   
  
 insulin glargine 100 unit/mL (3 mL) Inpn Commonly known as:  [facility] Your last dose was: Your next dose is:    
   
   
 25 Units by SubCUTAneous route daily. 25 Units  
    
   
   
   
  
 insulin lispro 100 unit/mL kwikpen Commonly known as:  HUMALOG Your last dose was: Your next dose is:    
   
   
 8 Units by SubCUTAneous route Before breakfast, lunch, and dinner. 8 Units Insulin Needles (Disposable) 31 gauge x 5/16\" Ndle Your last dose was: Your next dose is:    
   
   
 Use with insulin QID  
     
   
   
   
  
 metoprolol succinate 50 mg XL tablet Commonly known as:  TOPROL-XL Your last dose was: Your next dose is: Take 50 mg by mouth every morning. 50 mg NORCO 5-325 mg per tablet Generic drug:  HYDROcodone-acetaminophen Your last dose was: Your next dose is: Take  by mouth every eight (8) hours as needed for Pain. NovoLOG Flexpen 100 unit/mL Inpn Generic drug:  insulin aspart Your last dose was: Your next dose is:    
   
   
 by SubCUTAneous route. omeprazole 20 mg capsule Commonly known as:  PRILOSEC Your last dose was: Your next dose is: Take 20 mg by mouth every morning. 20 mg  
    
   
   
   
  
 pregabalin 50 mg capsule Commonly known as:  [place] Schlatter Your last dose was: Your next dose is: Take 1 Cap by mouth daily. Max Daily Amount: 50 mg.  
 50 mg  
    
   
   
   
  
 sodium bicarbonate 650 mg tablet Your last dose was: Your next dose is: Take 650 mg by mouth two (2) times a day. 650 mg  
    
   
   
   
  
 torsemide 20 mg tablet Commonly known as:  DEMADEX Your last dose was: Your next dose is: Take 20 mg by mouth every morning. 20 mg  
    
   
   
   
  
 traMADol 200 mg tablet Commonly known as:  ULTRAM-ER Your last dose was: Your next dose is: Take 1 Tab by mouth daily. Max Daily Amount: 200 mg.  
 200 mg  
    
   
   
   
  
 VITAMIN D3 1,000 unit tablet Generic drug:  cholecalciferol Your last dose was: Your next dose is: Take  by mouth daily. Discharge Instructions INSTRUCTIONS FOR A-V FISTULA, GRAFT ACCESS, REVISION OR DECLOT 
 
ACTIVITY · As tolerated and as directed by your doctor. · Keep arm straight and raised above heart level for the next 24 hours. DIET · Clear liquids until no nausea or vomiting; then light diet for the first day. · Advance to regular diet on second day, unless your doctor orders otherwise. · If nausea and vomiting continues, call your doctor. PAIN 
· Take pain medication as directed by your doctor. · Call your doctor if pain is NOT relieved by the medication. · DO NOT take aspirin or blood thinners until directed by your doctor. DRESSING CARE 
· Keep your dressing clean and dry. · Leave the dressing on until the dialysis nurse or your doctor takes it off. CARE OF YOUR ACCESS 
DO: 
· Keep access area clean with soap and water daily after dressing has been removed. · Feel for the thrill daily. (by placing your fingers over the graft you will be able to feel a vibration (thrill) which means blood is flowing and the graft is working.) DO NOT: 
· Wear tight sleeves, watches, belts or bracelets over graft. · Carry heavy bags across the graft. · Sleep on graft side. · Let your blood pressure be taken on graft side. · Let blood be drawn from your graft side. CALL YOUR DOCTOR IF 
· Excessive bleeding that does not stop after holding mild pressure over area. · Temperature of 101 degrees F or above. · Redness, excessive swelling or bruising, and/or green or yellow, smelly discharge from incision · Loss of sensation-cold, white, or blue fingers or toes. AFTER ANESTHESIA · For the first 24 hours: DO NOT Drive, Drink alcoholic beverages, or Make important decisions. · Be aware of dizziness following anesthesia and while taking pain medication. DISCHARGE SUMMARY from Nurse PATIENT INSTRUCTIONS: 
 
After general anesthesia or intravenous sedation, for 24 hours or while taking prescription Narcotics: · Limit your activities · Do not drive and operate hazardous machinery · Do not make important personal or business decisions · Do  not drink alcoholic beverages · If you have not urinated within 8 hours after discharge, please contact your surgeon on call. *  Please give a list of your current medications to your Primary Care Provider. *  Please update this list whenever your medications are discontinued, doses are 
    changed, or new medications (including over-the-counter products) are added. *  Please carry medication information at all times in case of emergency situations. These are general instructions for a healthy lifestyle: No smoking/ No tobacco products/ Avoid exposure to second hand smoke Surgeon General's Warning:  Quitting smoking now greatly reduces serious risk to your health. Obesity, smoking, and sedentary lifestyle greatly increases your risk for illness A healthy diet, regular physical exercise & weight monitoring are important for maintaining a healthy lifestyle You may be retaining fluid if you have a history of heart failure or if you experience any of the following symptoms:  Weight gain of 3 pounds or more overnight or 5 pounds in a week, increased swelling in our hands or feet or shortness of breath while lying flat in bed. Please call your doctor as soon as you notice any of these symptoms; do not wait until your next office visit. Recognize signs and symptoms of STROKE: 
 
F-face looks uneven A-arms unable to move or move unevenly S-speech slurred or non-existent T-time-call 911 as soon as signs and symptoms begin-DO NOT go Back to bed or wait to see if you get better-TIME IS BRAIN. Introducing [facility]! Lesley Jovel introduces [employer] patient portal. Now you can access parts of your medical record, email your doctor's office, and request medication refills online. 1. In your internet browser, go to [email or web address] 2. Click on the First Time User? Click Here link in the Sign In box. You will see the New Member Sign Up page. 3. Enter your [employer] Access Code exactly as it appears below. You will not need to use this code after youve completed the sign-up process. If you do not sign up before the expiration date, you must request a new code. · [employer] Access Code: [email or web address] Expires: 11/9/2017  8:52 AM 
 
4. Enter the last four digits of your Social Security Number (xxxx) and Date of Birth (mm/dd/yyyy) as indicated and click Submit. You will be taken to the next sign-up page. 5. Create a [employer] ID. This will be your [employer] login ID and cannot be changed, so think of one that is secure and easy to remember. 6. Create a [employer] password. You can change your password at any time. 7. Enter your Password Reset Question and Answer. This can be used at a later time if you forget your password. 8. Enter your e-mail address. You will receive e-mail notification when new information is available in [address] 9. Click Sign Up. You can now view and download portions of your medical record. 10. Click the Download Summary menu link to download a portable copy of your medical information. If you have questions, please visit the Frequently Asked Questions section of the [employer] website. Remember, [employer] is NOT to be used for urgent needs. For medical emergencies, dial 911. Now available from your iPhone and Android! Providers Seen During Your Hospitalization Provider Specialty Primary office phone Gelacio Escobar MD Vascular Surgery [phone] Your Primary Care Physician (PCP) Primary Care Physician Office Phone Office Fax Savanahla December [phone] You are allergic to the following No active allergies Recent Documentation Height Weight BMI Smoking Status 1.816 m 82.3 kg 24.96 kg/m2 Former Smoker Emergency Contacts Name Discharge Info Relation Home Work Mobile Brii Kelley  Sister [23] [phone] Nishant Mantilla  Son [22] [phone] Patient Belongings The following personal items are in your possession at time of discharge: 
  Dental Appliances: None  Visual Aid: At home, Glasses      Home Medications: None   Jewelry: None  Clothing: Footwear, Pants, Shirt, Undergarments    Other Valuables: None Please provide this summary of care documentation to your next provider. Signatures-by signing, you are acknowledging that this After Visit Summary has been reviewed with you and you have received a copy. Patient Signature:  ____________________________________________________________ Date:  ____________________________________________________________  
  
Love Mckinley Provider Signature:  ____________________________________________________________ Date:  ____________________________________________________________

## 2017-10-26 NOTE — PERIOP NOTES
Patient reports that his blood sugar at home at 0600 was 200. Denies any hypoglycemic symptoms now however, \"I usually start feeling in around 75. \"  Dr. Jessica Castillo notified of above. New orders rcvd.   See [facility]

## 2017-10-26 NOTE — PERIOP NOTES
Repeat POC blood sugar = 71. Dr. Christiana Milton notified by Germaine Garcia RN. New orders rcvd.   See Mar

## 2017-10-26 NOTE — PERIOP NOTES
BGL was 99 mg/dl pt was give cup of apple juice to drink . He drank it successfully. Pt also requested some candy. Pt ate and tolerated 2 fun size kit guerita bars.

## 2017-10-31 PROBLEM — I77.0 ARTERIOVENOUS FISTULA (HCC): Status: ACTIVE | Noted: 2017-10-31

## 2018-01-04 ENCOUNTER — HOSPITAL ENCOUNTER (OUTPATIENT)
Dept: SURGERY | Age: 78
Discharge: HOME | End: 2018-01-04
Payer: MEDICARE

## 2018-01-04 VITALS
DIASTOLIC BLOOD PRESSURE: 45 MMHG | SYSTOLIC BLOOD PRESSURE: 112 MMHG | OXYGEN SATURATION: 100 % | WEIGHT: 186 LBS | TEMPERATURE: 98 F | HEIGHT: 71 IN | BODY MASS INDEX: 26.04 KG/M2 | RESPIRATION RATE: 16 BRPM | HEART RATE: 70 BPM

## 2018-01-04 LAB
ANION GAP SERPL CALC-SCNC: 7 MMOL/L (ref 7–16)
BUN SERPL-MCNC: 27 MG/DL (ref 8–23)
CALCIUM SERPL-MCNC: 7.7 MG/DL (ref 8.3–10.4)
CHLORIDE SERPL-SCNC: 113 MMOL/L (ref 98–107)
CO2 SERPL-SCNC: 28 MMOL/L (ref 21–32)
CREAT SERPL-MCNC: 3.75 MG/DL (ref 0.8–1.5)
ERYTHROCYTE [DISTWIDTH] IN BLOOD BY AUTOMATED COUNT: 14.9 % (ref 11.9–14.6)
GLUCOSE BLD STRIP.AUTO-MCNC: 134 MG/DL (ref 65–100)
GLUCOSE SERPL-MCNC: 128 MG/DL (ref 65–100)
HCT VFR BLD AUTO: 30.3 % (ref 41.1–50.3)
HGB BLD-MCNC: 9.4 G/DL (ref 13.6–17.2)
MCH RBC QN AUTO: 26.1 PG (ref 26.1–32.9)
MCHC RBC AUTO-ENTMCNC: 31 G/DL (ref 31.4–35)
MCV RBC AUTO: 84.2 FL (ref 79.6–97.8)
PLATELET # BLD AUTO: 193 K/UL (ref 150–450)
PMV BLD AUTO: 11.7 FL (ref 10.8–14.1)
POTASSIUM SERPL-SCNC: 4.6 MMOL/L (ref 3.5–5.1)
RBC # BLD AUTO: 3.6 M/UL (ref 4.23–5.67)
SODIUM SERPL-SCNC: 148 MMOL/L (ref 136–145)
WBC # BLD AUTO: 5.6 K/UL (ref 4.3–11.1)

## 2018-01-04 PROCEDURE — 82962 GLUCOSE BLOOD TEST: CPT

## 2018-01-04 PROCEDURE — 80048 BASIC METABOLIC PNL TOTAL CA: CPT | Performed by: SURGERY

## 2018-01-04 PROCEDURE — 85027 COMPLETE CBC AUTOMATED: CPT | Performed by: SURGERY

## 2018-01-04 RX ORDER — TRAMADOL HYDROCHLORIDE 50 MG/1
50 TABLET ORAL 4 TIMES DAILY
COMMUNITY
End: 2018-04-19

## 2018-01-04 RX ORDER — DIPHENOXYLATE HYDROCHLORIDE AND ATROPINE SULFATE 2.5; .025 MG/1; MG/1
1 TABLET ORAL
COMMUNITY
End: 2019-09-11

## 2018-01-04 RX ORDER — CEPHALEXIN 500 MG/1
500 CAPSULE ORAL 2 TIMES DAILY
COMMUNITY
End: 2018-02-28 | Stop reason: ALTCHOICE

## 2018-01-04 NOTE — PERIOP NOTES
Patient verified name, , and surgery as listed in [facility]. Patient provided medical/health information and PTA medications to the best of their ability. TYPE  CASE:II  Orders per surgeon: Received and dated . Labs per surgeon: CBC, BMP  Labs per anesthesia protocol: POC glucose- 134 today, however, pt was counseled on high readings on day of surgery as he reports avg fastings 200 or greater. EKG  : In chart- 2017    Patient provided with and instructed on education handouts including Guide to Surgery, blood transfusions, pain management, and hand hygiene for the family and community, and [facility] brochure. Preop and instructions given per hospital policy. Instructed patient to continue previous medications as prescribed prior to surgery unless otherwise directed and to take the following medications the day of surgery according to anesthesia guidelines : keflex, norvasc, metoprolol, omeprazole, lyrica, tramadol prn. Instructed patient to hold  the following medications: Humalog insulin on the morning of surgery and to decrease his lantus insulin to 16 units the night prior to surgery. Original medication prescription bottles visualized during patient appointment. Patient teach back successful and patient demonstrates knowledge of instruction.

## 2018-01-05 NOTE — PERIOP NOTES
Recent Results (from the past 24 hour(s))   GLUCOSE, POC    Collection Time: 01/04/18  4:31 PM   Result Value Ref Range    Glucose (POC) 134 (H) 65 - 100 mg/dL   CBC W/O DIFF    Collection Time: 01/04/18  5:02 PM   Result Value Ref Range    WBC 5.6 4.3 - 11.1 K/uL    RBC 3.60 (L) 4.23 - 5.67 M/uL    HGB 9.4 (L) 13.6 - 17.2 g/dL    HCT 30.3 (L) 41.1 - 50.3 %    MCV 84.2 79.6 - 97.8 FL    MCH 26.1 26.1 - 32.9 PG    MCHC 31.0 (L) 31.4 - 35.0 g/dL    RDW 14.9 (H) 11.9 - 14.6 %    PLATELET 015 034 - 197 K/uL    MPV 11.7 10.8 - 82.4 FL   METABOLIC PANEL, BASIC    Collection Time: 01/04/18  5:02 PM   Result Value Ref Range    Sodium 148 (H) 136 - 145 mmol/L    Potassium 4.6 3.5 - 5.1 mmol/L    Chloride 113 (H) 98 - 107 mmol/L    CO2 28 21 - 32 mmol/L    Anion gap 7 7 - 16 mmol/L    Glucose 128 (H) 65 - 100 mg/dL    BUN 27 (H) 8 - 23 MG/DL    Creatinine 3.75 (H) 0.8 - 1.5 MG/DL    GFR est AA 20 (L) >60 ml/min/1.73m2    GFR est non-AA 17 (L) >60 ml/min/1.73m2    Calcium 7.7 (L) 8.3 - 10.4 MG/DL

## 2018-02-28 PROBLEM — R09.89 CAROTID BRUIT: Status: ACTIVE | Noted: 2018-02-28

## 2018-03-10 ENCOUNTER — HOSPITAL ENCOUNTER (EMERGENCY)
Age: 78
Discharge: HOME | End: 2018-03-10
Attending: EMERGENCY MEDICINE
Payer: MEDICARE

## 2018-03-10 VITALS
HEIGHT: 71 IN | BODY MASS INDEX: 25.2 KG/M2 | DIASTOLIC BLOOD PRESSURE: 77 MMHG | HEART RATE: 68 BPM | TEMPERATURE: 99.2 F | RESPIRATION RATE: 18 BRPM | OXYGEN SATURATION: 98 % | SYSTOLIC BLOOD PRESSURE: 167 MMHG | WEIGHT: 180 LBS

## 2018-03-10 DIAGNOSIS — N18.9 CHRONIC KIDNEY DISEASE, UNSPECIFIED CKD STAGE: ICD-10-CM

## 2018-03-10 DIAGNOSIS — M25.461 KNEE EFFUSION, RIGHT: ICD-10-CM

## 2018-03-10 DIAGNOSIS — M25.561 ACUTE PAIN OF RIGHT KNEE: Primary | ICD-10-CM

## 2018-03-10 DIAGNOSIS — M54.50 ACUTE RIGHT-SIDED LOW BACK PAIN WITHOUT SCIATICA: ICD-10-CM

## 2018-03-10 LAB
ANION GAP SERPL CALC-SCNC: 11 MMOL/L (ref 7–16)
APPEARANCE FLD: NORMAL
BUN SERPL-MCNC: 41 MG/DL (ref 8–23)
CALCIUM SERPL-MCNC: 6.7 MG/DL (ref 8.3–10.4)
CHLORIDE SERPL-SCNC: 113 MMOL/L (ref 98–107)
CO2 SERPL-SCNC: 24 MMOL/L (ref 21–32)
COLOR FLD: YELLOW
CREAT SERPL-MCNC: 4.23 MG/DL (ref 0.8–1.5)
GLUCOSE SERPL-MCNC: 151 MG/DL (ref 65–100)
NEUTROPHILS NFR FLD: 100 %
NUC CELL # FLD: 5571 /CU MM
POTASSIUM SERPL-SCNC: 3.8 MMOL/L (ref 3.5–5.1)
RBC # FLD: NORMAL /CU MM
SODIUM SERPL-SCNC: 148 MMOL/L (ref 136–145)
SPECIMEN SOURCE FLD: NORMAL

## 2018-03-10 PROCEDURE — 82945 GLUCOSE OTHER FLUID: CPT | Performed by: EMERGENCY MEDICINE

## 2018-03-10 PROCEDURE — 96360 HYDRATION IV INFUSION INIT: CPT | Performed by: EMERGENCY MEDICINE

## 2018-03-10 PROCEDURE — 80048 BASIC METABOLIC PNL TOTAL CA: CPT | Performed by: EMERGENCY MEDICINE

## 2018-03-10 PROCEDURE — 74011250637 HC RX REV CODE- 250/637: Performed by: EMERGENCY MEDICINE

## 2018-03-10 PROCEDURE — 87205 SMEAR GRAM STAIN: CPT | Performed by: EMERGENCY MEDICINE

## 2018-03-10 PROCEDURE — 89050 BODY FLUID CELL COUNT: CPT | Performed by: EMERGENCY MEDICINE

## 2018-03-10 PROCEDURE — 84157 ASSAY OF PROTEIN OTHER: CPT | Performed by: EMERGENCY MEDICINE

## 2018-03-10 PROCEDURE — 81003 URINALYSIS AUTO W/O SCOPE: CPT | Performed by: EMERGENCY MEDICINE

## 2018-03-10 PROCEDURE — 74011250636 HC RX REV CODE- 250/636: Performed by: EMERGENCY MEDICINE

## 2018-03-10 PROCEDURE — 75810000054 HC ARTHOCENTISIS JOINT: Performed by: EMERGENCY MEDICINE

## 2018-03-10 PROCEDURE — 99284 EMERGENCY DEPT VISIT MOD MDM: CPT | Performed by: EMERGENCY MEDICINE

## 2018-03-10 PROCEDURE — 89060 EXAM SYNOVIAL FLUID CRYSTALS: CPT | Performed by: EMERGENCY MEDICINE

## 2018-03-10 PROCEDURE — 74011000250 HC RX REV CODE- 250: Performed by: EMERGENCY MEDICINE

## 2018-03-10 RX ORDER — DEXAMETHASONE SODIUM PHOSPHATE 100 MG/10ML
10 INJECTION INTRAMUSCULAR; INTRAVENOUS ONCE
Status: COMPLETED | OUTPATIENT
Start: 2018-03-10 | End: 2018-03-10

## 2018-03-10 RX ORDER — HYDROCODONE BITARTRATE AND ACETAMINOPHEN 10; 325 MG/1; MG/1
1 TABLET ORAL
Status: COMPLETED | OUTPATIENT
Start: 2018-03-10 | End: 2018-03-10

## 2018-03-10 RX ORDER — CYCLOBENZAPRINE HCL 10 MG
10 TABLET ORAL
Qty: 30 TAB | Refills: 0 | Status: SHIPPED | OUTPATIENT
Start: 2018-03-10 | End: 2018-04-19

## 2018-03-10 RX ORDER — CYCLOBENZAPRINE HCL 10 MG
10 TABLET ORAL
Status: COMPLETED | OUTPATIENT
Start: 2018-03-10 | End: 2018-03-10

## 2018-03-10 RX ORDER — BUPIVACAINE HYDROCHLORIDE 5 MG/ML
2 INJECTION, SOLUTION EPIDURAL; INTRACAUDAL ONCE
Status: COMPLETED | OUTPATIENT
Start: 2018-03-10 | End: 2018-03-10

## 2018-03-10 RX ORDER — HYDROCODONE BITARTRATE AND ACETAMINOPHEN 5; 325 MG/1; MG/1
1-2 TABLET ORAL
Qty: 20 TAB | Refills: 0 | Status: SHIPPED | OUTPATIENT
Start: 2018-03-10 | End: 2018-04-19

## 2018-03-10 RX ADMIN — DEXAMETHASONE SODIUM PHOSPHATE 10 MG: 10 INJECTION INTRAMUSCULAR; INTRAVENOUS at 19:43

## 2018-03-10 RX ADMIN — HYDROCODONE BITARTRATE AND ACETAMINOPHEN 1 TABLET: 10; 325 TABLET ORAL at 20:47

## 2018-03-10 RX ADMIN — SODIUM CHLORIDE 1000 ML: 900 INJECTION, SOLUTION INTRAVENOUS at 21:38

## 2018-03-10 RX ADMIN — CYCLOBENZAPRINE HYDROCHLORIDE 10 MG: 10 TABLET, FILM COATED ORAL at 20:47

## 2018-03-10 RX ADMIN — BUPIVACAINE HYDROCHLORIDE 10 MG: 5 INJECTION, SOLUTION EPIDURAL; INTRACAUDAL; PERINEURAL at 19:43

## 2018-03-10 NOTE — ED TRIAGE NOTES
Pt arrived via ems for right knee and hip pain. Pt states having pain to the right knee that is going up his leg to his back. Pt states onset was last night. Pt reports having chronic numbness to the right foot.

## 2018-03-11 LAB
GLUCOSE FLD-MCNC: NORMAL MG/DL
PROT FLD-MCNC: NORMAL G/DL
SPECIMEN SOURCE FLD: NORMAL
SPECIMEN SOURCE FLD: NORMAL

## 2018-03-11 NOTE — ED PROVIDER NOTES
Patient is a 69 y.o. male presenting with back pain and knee pain. The history is provided by the patient. Back Pain    This is a new problem. The current episode started 2 days ago. The problem has not changed since onset. The problem occurs constantly. Patient reports not work related injury. The pain is associated with no known injury. The pain is present in the lumbar spine. The pain radiates to the right knee. The pain is moderate. The pain is the same all the time. Pertinent negatives include no chest pain, no fever, no headaches and no abdominal pain. He has tried nothing for the symptoms. Knee Pain    This is a new problem. The current episode started 2 days ago. The problem occurs constantly. The problem has been gradually worsening. The pain is present in the right knee. The pain is moderate. Associated symptoms include limited range of motion, stiffness and back pain. The symptoms are aggravated by activity. He has tried nothing for the symptoms.         Past Medical History:   Diagnosis Date    Acute renal failure superimposed on stage 3 chronic kidney disease ([address] 9/21/2016    Anemia     Chronic kidney disease     not on HD yet- surgery done for access and here for elevation of fistula- FU with [address] [facility]ph[facility]- creat on 12/7/17=3.15    Chronic pancreatitis ([address] 9/22/2016    Dermatophytosis of nail 12/6/2016    Diabetic neuropathy ([address] 9/22/2016    avg fastings 200; last A1C-? ; hypo @ [de-identified]    Essential hypertension 9/22/2016    GERD (gastroesophageal reflux disease)     controlled with med    Hypercholesterolemia     Iron (Fe) deficiency anemia 9/22/2016    Kidney disease     Septicemia due to Klebsiella pneumoniae ([address] 2/36/5031    Systolic CHF, chronic ([address] 9/23/2016    Type II diabetes mellitus with nephropathy ([address] 09/22/2016    Venous insufficiency 12/6/2016    Xerosis cutis 12/6/2016       Past Surgical History:   Procedure Laterality Date    HX COLONOSCOPY      HX HERNIA REPAIR Left 2010    HX PROSTATECTOMY  2012    HX TURP  2012    FOR BPH    VASCULAR SURGERY PROCEDURE UNLIST Left 10/26/2017    AVF         Family History:   Problem Relation Age of Onset    Diabetes Mother     Stroke Mother     Hypertension Mother     No Known Problems Father     Diabetes Sister     Diabetes Brother     Diabetes Sister     Diabetes Sister     Other Sister      fibromyalgia       Social History     Social History    Marital status:      Spouse name: N/A    Number of children: N/A    Years of education: N/A     Occupational History    Not on file. Social History Main Topics    Smoking status: Former Smoker     Packs/day: 2.00     Years: 20.00     Quit date: 1995    Smokeless tobacco: Current User    Alcohol use No    Drug use: Not on file    Sexual activity: Not on file     Other Topics Concern    Not on file     Social History Narrative         ALLERGIES: Review of patient's allergies indicates no known allergies. Review of Systems   Constitutional: Negative for chills and fever. HENT: Negative for rhinorrhea and sore throat. Eyes: Negative for discharge and redness. Respiratory: Negative for cough and shortness of breath. Cardiovascular: Negative for chest pain and palpitations. Gastrointestinal: Negative for abdominal pain, diarrhea, nausea and vomiting. Musculoskeletal: Positive for arthralgias, back pain, gait problem, joint swelling and stiffness. Skin: Negative for rash. Neurological: Negative for dizziness and headaches. All other systems reviewed and are negative. Vitals:    03/10/18 1807 03/10/18 2144   BP: 140/75 167/77   Pulse: 60 68   Resp: 20 18   Temp: 99.2 °F (37.3 °C)    SpO2: 99% 98%   Weight: 81.6 kg (180 lb)    Height: 5' 11\" (1.803 m)             Physical Exam   Constitutional: He is oriented to person, place, and time. He appears well-developed and well-nourished. No distress.    HENT:   Head: Normocephalic and atraumatic. Eyes: Conjunctivae and EOM are normal. Right eye exhibits no discharge. Left eye exhibits no discharge. Neck: Normal range of motion. Neck supple. Pulmonary/Chest: Effort normal. No respiratory distress. Musculoskeletal: He exhibits tenderness. Right knee: He exhibits decreased range of motion, swelling and effusion. Tenderness found. Back:         Legs:  Neurological: He is alert and oriented to person, place, and time. He has normal strength. He exhibits normal muscle tone. cni 2-12 grossly  Nl gait,  Nl speech     Skin: Skin is warm and dry. No rash noted. He is not diaphoretic. Psychiatric: He has a normal mood and affect. His behavior is normal.   Nursing note and vitals reviewed. MDM  Number of Diagnoses or Management Options  Acute pain of right knee:   Acute right-sided low back pain without sciatica:   Chronic kidney disease, unspecified CKD stage:   Knee effusion, right:   Diagnosis management comments: Medical decision making note:  Knee pain with effusion, 30cc on tap, decadron and bupivicaine instilled. Back pain, thinks its his kidneys, ua clear, gfr/cr worse  Sees a nephrologist  Pain meds  f/u  This concludes the \"medical decision making note\" part of this emergency department visit note. ED Course       Arthrocentesis  Performed by: Kathy Elmore  Authorized by: Maximus CISNEROS     Consent:     Consent obtained:  Verbal    Consent given by:  Patient    Risks discussed:  Bleeding and infection    Alternatives discussed:  No treatment  Location:     Location:  Knee    Knee:  R knee  Anesthesia (see MAR for exact dosages):      Anesthesia method:  Local infiltration    Local anesthetic:  Lidocaine 1% w/o epi  Procedure details:     Preparation: Patient was prepped and draped in usual sterile fashion      Needle gauge:  18 G    Ultrasound guidance: no      Approach:  Medial    Aspirate characteristics:  Cloudy    Steroid injected: yes      Specimen collected: yes    Post-procedure details:     Dressing:  Adhesive bandage    Patient tolerance of procedure:   Tolerated well, no immediate complications

## 2018-03-11 NOTE — ED NOTES
I have reviewed discharge instructions with the patient and caregiver. The patient and caregiver verbalized understanding. Patient left ED via Discharge Method: wheelchair to Home with transport from son. The patient has been wheeled to care via nurse and appears in no acute distress. The patient has been provided discharge instructions, follow up information, and prescriptions x2. The patient and son do not have any questions at this time. Opportunity for questions and clarification provided. Patient given 2 scripts. To continue your aftercare when you leave the hospital, you may receive an automated call from our care team to check in on how you are doing. This is a free service and part of our promise to provide the best care and service to meet your aftercare needs.  If you have questions, or wish to unsubscribe from this service please call [phone]. Thank you for Choosing our [facility] Emergency Department.

## 2018-03-11 NOTE — ED NOTES
Patient's wound has been bandaged with a pressure dressing at this time. Patient tolerated procedure well. Will continue to monitor.

## 2018-03-11 NOTE — DISCHARGE INSTRUCTIONS
Avoid any anti-inflammatories such as : Aleve, Naprosyn, Motrin, Advil, ibuprofen. Follow-up with Dr. Carlos Medrano or his office regarding the worsening kidney function  Follow-up family doctor regarding the knee pain and back pain   Back Pain: Care Instructions  Your Care Instructions    Back pain has many possible causes. It is often related to problems with muscles and ligaments of the back. It may also be related to problems with the nerves, discs, or bones of the back. Moving, lifting, standing, sitting, or sleeping in an awkward way can strain the back. Sometimes you don't notice the injury until later. Arthritis is another common cause of back pain. Although it may hurt a lot, back pain usually improves on its own within several weeks. Most people recover in 12 weeks or less. Using good home treatment and being careful not to stress your back can help you feel better sooner. Follow-up care is a key part of your treatment and safety. Be sure to make and go to all appointments, and call your doctor if you are having problems. It's also a good idea to know your test results and keep a list of the medicines you take. How can you care for yourself at home? · Sit or lie in positions that are most comfortable and reduce your pain. Try one of these positions when you lie down:  ¨ Lie on your back with your knees bent and supported by large pillows. ¨ Lie on the floor with your legs on the seat of a sofa or chair. Merrianne Schaumann on your side with your knees and hips bent and a pillow between your legs. ¨ Lie on your stomach if it does not make pain worse. · Do not sit up in bed, and avoid soft couches and twisted positions. Bed rest can help relieve pain at first, but it delays healing. Avoid bed rest after the first day of back pain. · Change positions every 30 minutes. If you must sit for long periods of time, take breaks from sitting. Get up and walk around, or lie in a comfortable position.   · Try using a heating pad on a low or medium setting for 15 to 20 minutes every 2 or 3 hours. Try a warm shower in place of one session with the heating pad. · You can also try an ice pack for 10 to 15 minutes every 2 to 3 hours. Put a thin cloth between the ice pack and your skin. · Take pain medicines exactly as directed. ¨ If the doctor gave you a prescription medicine for pain, take it as prescribed. ¨ If you are not taking a prescription pain medicine, ask your doctor if you can take an over-the-counter medicine. · Take short walks several times a day. You can start with 5 to 10 minutes, 3 or 4 times a day, and work up to longer walks. Walk on level surfaces and avoid hills and stairs until your back is better. · Return to work and other activities as soon as you can. Continued rest without activity is usually not good for your back. · To prevent future back pain, do exercises to stretch and strengthen your back and stomach. Learn how to use good posture, safe lifting techniques, and proper body mechanics. When should you call for help? Call your doctor now or seek immediate medical care if:  ? · You have new or worsening numbness in your legs. ? · You have new or worsening weakness in your legs. (This could make it hard to stand up.)   ? · You lose control of your bladder or bowels. ? Watch closely for changes in your health, and be sure to contact your doctor if:  ? · Your pain gets worse. ? · You are not getting better after 2 weeks. Where can you learn more? Go to http://svetlana-glenis.info/. Enter C542 in the search box to learn more about \"Back Pain: Care Instructions. \"  Current as of: March 21, 2017  Content Version: 11.4  © 0154-1851 [employer]. Care instructions adapted under license by [employer] (which disclaims liability or warranty for this information).  If you have questions about a medical condition or this instruction, always ask your healthcare professional. [employer], [facility] disclaims any warranty or liability for your use of this information. Joint Pain: Care Instructions  Your Care Instructions    Many people have small aches and pains from overuse or injury to muscles and joints. Joint injuries often happen during sports or recreation, work tasks, or projects around the home. An overuse injury can happen when you put too much stress on a joint or when you do an activity that stresses the joint over and over, such as using the computer or rowing a boat. You can take action at home to help your muscles and joints get better. You should feel better in 1 to 2 weeks, but it can take 3 months or more to heal completely. Follow-up care is a key part of your treatment and safety. Be sure to make and go to all appointments, and call your doctor if you are having problems. It's also a good idea to know your test results and keep a list of the medicines you take. How can you care for yourself at home? · Do not put weight on the injured joint for at least a day or two. · For the first day or two after an injury, do not take hot showers or baths, and do not use hot packs. The heat could make swelling worse. · Put ice or a cold pack on the sore joint for 10 to 20 minutes at a time. Try to do this every 1 to 2 hours for the next 3 days (when you are awake) or until the swelling goes down. Put a thin cloth between the ice and your skin. · Wrap the injury in an elastic bandage. Do not wrap it too tightly because this can cause more swelling. · Prop up the sore joint on a pillow when you ice it or anytime you sit or lie down during the next 3 days. Try to keep it above the level of your heart. This will help reduce swelling. · Take an over-the-counter pain medicine, such as acetaminophen (Tylenol), ibuprofen (Advil, Motrin), or naproxen (Aleve). Read and follow all instructions on the label. · After 1 or 2 days of rest, begin moving the joint gently.  While the joint is still healing, you can begin to exercise using activities that do not strain or hurt the painful joint. When should you call for help? Call your doctor now or seek immediate medical care if:  ? · You have signs of infection, such as:  ¨ Increased pain, swelling, warmth, and redness. ¨ Red streaks leading from the joint. ¨ A fever. ? Watch closely for changes in your health, and be sure to contact your doctor if:  ? · Your movement or symptoms are not getting better after 1 to 2 weeks of home treatment. Where can you learn more? Go to http://svetlana-glenis.info/. Enter P205 in the search box to learn more about \"Joint Pain: Care Instructions. \"  Current as of: March 21, 2017  Content Version: 11.4  © 1079-0253 [employer]. Care instructions adapted under license by [employer] (which disclaims liability or warranty for this information). If you have questions about a medical condition or this instruction, always ask your healthcare professional. [place] [address] any warranty or liability for your use of this information. Knee Pain or Injury: Care Instructions  Your Care Instructions    Injuries are a common cause of knee problems. Sudden (acute) injuries may be caused by a direct blow to the knee. They can also be caused by abnormal twisting, bending, or falling on the knee. Pain, bruising, or swelling may be severe, and may start within minutes of the injury. Overuse is another cause of knee pain. Other causes are climbing stairs, kneeling, and other activities that use the knee. Everyday wear and tear, especially as you get older, also can cause knee pain. Rest, along with home treatment, often relieves pain and allows your knee to heal. If you have a serious knee injury, you may need tests and treatment. Follow-up care is a key part of your treatment and safety.  Be sure to make and go to all appointments, and call your doctor if you are having problems. It's also a good idea to know your test results and keep a list of the medicines you take. How can you care for yourself at home? · Be safe with medicines. Read and follow all instructions on the label. ¨ If the doctor gave you a prescription medicine for pain, take it as prescribed. ¨ If you are not taking a prescription pain medicine, ask your doctor if you can take an over-the-counter medicine. · Rest and protect your knee. Take a break from any activity that may cause pain. · Put ice or a cold pack on your knee for 10 to 20 minutes at a time. Put a thin cloth between the ice and your skin. · Prop up a sore knee on a pillow when you ice it or anytime you sit or lie down for the next 3 days. Try to keep it above the level of your heart. This will help reduce swelling. · If your knee is not swollen, you can put moist heat, a heating pad, or a warm cloth on your knee. · If your doctor recommends an elastic bandage, sleeve, or other type of support for your knee, wear it as directed. · Follow your doctor's instructions about how much weight you can put on your leg. Use a cane, crutches, or a walker as instructed. · Follow your doctor's instructions about activity during your healing process. If you can do mild exercise, slowly increase your activity. · Reach and stay at a healthy weight. Extra weight can strain the joints, especially the knees and hips, and make the pain worse. Losing even a few pounds may help. When should you call for help? Call 911 anytime you think you may need emergency care. For example, call if:  ? · You have symptoms of a blood clot in your lung (called a pulmonary embolism). These may include:  ¨ Sudden chest pain. ¨ Trouble breathing. ¨ Coughing up blood. ?Call your doctor now or seek immediate medical care if:  ? · You have severe or increasing pain. ? · Your leg or foot turns cold or changes color.    ? · You cannot stand or put weight on your knee. ? · Your knee looks twisted or bent out of shape. ? · You cannot move your knee. ? · You have signs of infection, such as:  ¨ Increased pain, swelling, warmth, or redness. ¨ Red streaks leading from the knee. ¨ Pus draining from a place on your knee. ¨ A fever. ? · You have signs of a blood clot in your leg (called a deep vein thrombosis), such as:  ¨ Pain in your calf, back of the knee, thigh, or groin. ¨ Redness and swelling in your leg or groin. ? Watch closely for changes in your health, and be sure to contact your doctor if:  ? · You have tingling, weakness, or numbness in your knee. ? · You have any new symptoms, such as swelling. ? · You have bruises from a knee injury that last longer than 2 weeks. ? · You do not get better as expected. Where can you learn more? Go to http://svetlana-glenis.info/. Enter K195 in the search box to learn more about \"Knee Pain or Injury: Care Instructions. \"  Current as of: March 20, 2017  Content Version: 11.4  © 4418-1030 [employer]. Care instructions adapted under license by [employer] (which disclaims liability or warranty for this information). If you have questions about a medical condition or this instruction, always ask your healthcare professional. [address] any warranty or liability for your use of this information. Learning About RICE (Rest, Ice, Compression, and Elevation)  What is RICE? RICE is a way to care for an injury. RICE helps relieve pain and swelling. It may also help with healing and flexibility. RICE stands for:  · Rest and protect the injured or sore area. · Ice or a cold pack used as soon as possible. · Compression, or wrapping the injured or sore area with an elastic bandage. · Elevation (propping up) the injured or sore area. How do you do RICE?   You can use RICE for home treatment when you have general aches and pains or after an injury or surgery. Rest  · Do not put weight on the injury for at least 24 to 48 hours. · Use crutches for a badly sprained knee or ankle. · Support a sprained wrist, elbow, or shoulder with a sling. Ice  · Put ice or a cold pack on the injury right away to reduce pain and swelling. Frozen vegetables will also work as an ice pack. Put a thin cloth between the ice or cold pack and your skin. The cloth protects the injured area from getting too cold. · Use ice for 10 to 15 minutes at a time for the first 48 to 72 hours. Compression  · Use compression for sprains, strains, and surgeries of the arms and legs. · Wrap the injured area with an elastic bandage or compression sleeve to reduce swelling. · Don't wrap it too tightly. If the area below it feels numb, tingles, or feels cool, loosen the wrap. Elevation  · Use elevation for areas of the body that can be propped up, such as arms and legs. · Prop up the injured area on pillows whenever you use ice. Keep it propped up anytime you sit or lie down. · Try to keep the injured area at or above the level of your heart. This will help reduce swelling and bruising. Where can you learn more? Go to http://svetlana-glenis.info/. Enter T985 in the search box to learn more about \"Learning About RICE (Rest, Ice, Compression, and Elevation). \"  Current as of: March 21, 2017  Content Version: 11.4  © 2086-3701 [employer]. Care instructions adapted under license by [employer] (which disclaims liability or warranty for this information). If you have questions about a medical condition or this instruction, always ask your healthcare professional. [place] [address] any warranty or liability for your use of this information.

## 2018-03-12 LAB
BACTERIA SPEC CULT: NORMAL
GRAM STN SPEC: NORMAL
GRAM STN SPEC: NORMAL
SERVICE CMNT-IMP: NORMAL

## 2018-03-13 LAB
BODY FLD TYPE: NORMAL
CRYSTALS FLD MICRO: NORMAL

## 2018-04-02 ENCOUNTER — APPOINTMENT (OUTPATIENT)
Dept: GENERAL RADIOLOGY | Age: 78
DRG: 074 | End: 2018-04-02
Attending: EMERGENCY MEDICINE
Payer: MEDICARE

## 2018-04-02 ENCOUNTER — APPOINTMENT (OUTPATIENT)
Dept: MRI IMAGING | Age: 78
DRG: 074 | End: 2018-04-02
Attending: INTERNAL MEDICINE
Payer: MEDICARE

## 2018-04-02 ENCOUNTER — APPOINTMENT (OUTPATIENT)
Dept: CT IMAGING | Age: 78
DRG: 074 | End: 2018-04-02
Attending: EMERGENCY MEDICINE
Payer: MEDICARE

## 2018-04-02 ENCOUNTER — HOSPITAL ENCOUNTER (INPATIENT)
Age: 78
LOS: 2 days | Discharge: HOME HEALTH | DRG: 074 | End: 2018-04-04
Attending: EMERGENCY MEDICINE | Admitting: INTERNAL MEDICINE
Payer: MEDICARE

## 2018-04-02 DIAGNOSIS — R07.9 CHEST PAIN, UNSPECIFIED TYPE: ICD-10-CM

## 2018-04-02 DIAGNOSIS — R79.89 ELEVATED D-DIMER: ICD-10-CM

## 2018-04-02 DIAGNOSIS — R29.898 WEAKNESS OF BOTH LOWER EXTREMITIES: Primary | ICD-10-CM

## 2018-04-02 PROBLEM — I63.9 CVA (CEREBRAL VASCULAR ACCIDENT) (HCC): Status: ACTIVE | Noted: 2018-04-02

## 2018-04-02 PROBLEM — G45.9 TIA (TRANSIENT ISCHEMIC ATTACK): Status: ACTIVE | Noted: 2018-04-02

## 2018-04-02 PROBLEM — G83.4 CAUDA EQUINA COMPRESSION (HCC): Status: ACTIVE | Noted: 2018-04-02

## 2018-04-02 PROBLEM — I50.9 ACUTE EXACERBATION OF CHF (CONGESTIVE HEART FAILURE) (HCC): Status: ACTIVE | Noted: 2018-04-02

## 2018-04-02 LAB
ALBUMIN SERPL-MCNC: 3.5 G/DL (ref 3.2–4.6)
ALBUMIN/GLOB SERPL: 1 {RATIO} (ref 1.2–3.5)
ALP SERPL-CCNC: 76 U/L (ref 50–136)
ALT SERPL-CCNC: 44 U/L (ref 12–65)
ANION GAP SERPL CALC-SCNC: 8 MMOL/L (ref 7–16)
AST SERPL-CCNC: 42 U/L (ref 15–37)
ATRIAL RATE: 67 BPM
BASOPHILS # BLD: 0 K/UL (ref 0–0.2)
BASOPHILS NFR BLD: 0 % (ref 0–2)
BILIRUB SERPL-MCNC: 0.5 MG/DL (ref 0.2–1.1)
BNP SERPL-MCNC: 111 PG/ML
BUN SERPL-MCNC: 57 MG/DL (ref 8–23)
CALCIUM SERPL-MCNC: 8.6 MG/DL (ref 8.3–10.4)
CALCULATED P AXIS, ECG09: 34 DEGREES
CALCULATED R AXIS, ECG10: -14 DEGREES
CALCULATED T AXIS, ECG11: 49 DEGREES
CHLORIDE SERPL-SCNC: 112 MMOL/L (ref 98–107)
CK SERPL-CCNC: 1282 U/L (ref 21–215)
CO2 SERPL-SCNC: 25 MMOL/L (ref 21–32)
CREAT SERPL-MCNC: 4.16 MG/DL (ref 0.8–1.5)
D DIMER PPP FEU-MCNC: 1.82 UG/ML(FEU)
DIAGNOSIS, 93000: NORMAL
DIFFERENTIAL METHOD BLD: ABNORMAL
EOSINOPHIL # BLD: 0.1 K/UL (ref 0–0.8)
EOSINOPHIL NFR BLD: 3 % (ref 0.5–7.8)
ERYTHROCYTE [DISTWIDTH] IN BLOOD BY AUTOMATED COUNT: 16.2 % (ref 11.9–14.6)
GLOBULIN SER CALC-MCNC: 3.5 G/DL (ref 2.3–3.5)
GLUCOSE BLD STRIP.AUTO-MCNC: 264 MG/DL (ref 65–100)
GLUCOSE SERPL-MCNC: 257 MG/DL (ref 65–100)
HCT VFR BLD AUTO: 34.1 % (ref 41.1–50.3)
HGB BLD-MCNC: 10.8 G/DL (ref 13.6–17.2)
IMM GRANULOCYTES # BLD: 0 K/UL (ref 0–0.5)
IMM GRANULOCYTES NFR BLD AUTO: 0 % (ref 0–5)
LACTATE BLD-SCNC: 0.9 MMOL/L (ref 0.5–1.9)
LYMPHOCYTES # BLD: 1.9 K/UL (ref 0.5–4.6)
LYMPHOCYTES NFR BLD: 41 % (ref 13–44)
MCH RBC QN AUTO: 26.1 PG (ref 26.1–32.9)
MCHC RBC AUTO-ENTMCNC: 31.7 G/DL (ref 31.4–35)
MCV RBC AUTO: 82.4 FL (ref 79.6–97.8)
MONOCYTES # BLD: 0.4 K/UL (ref 0.1–1.3)
MONOCYTES NFR BLD: 9 % (ref 4–12)
NEUTS SEG # BLD: 2.1 K/UL (ref 1.7–8.2)
NEUTS SEG NFR BLD: 47 % (ref 43–78)
P-R INTERVAL, ECG05: 242 MS
PLATELET # BLD AUTO: 117 K/UL (ref 150–450)
PMV BLD AUTO: ABNORMAL FL (ref 10.8–14.1)
POTASSIUM SERPL-SCNC: 4.3 MMOL/L (ref 3.5–5.1)
PROT SERPL-MCNC: 7 G/DL (ref 6.3–8.2)
Q-T INTERVAL, ECG07: 394 MS
QRS DURATION, ECG06: 88 MS
QTC CALCULATION (BEZET), ECG08: 416 MS
RBC # BLD AUTO: 4.14 M/UL (ref 4.23–5.67)
SODIUM SERPL-SCNC: 145 MMOL/L (ref 136–145)
TROPONIN I BLD-MCNC: 0.01 NG/ML (ref 0.02–0.05)
TROPONIN I SERPL-MCNC: <0.02 NG/ML (ref 0.02–0.05)
TROPONIN I SERPL-MCNC: <0.02 NG/ML (ref 0.02–0.05)
VENTRICULAR RATE, ECG03: 67 BPM
WBC # BLD AUTO: 4.6 K/UL (ref 4.3–11.1)

## 2018-04-02 PROCEDURE — 77030027138 HC INCENT SPIROMETER -A

## 2018-04-02 PROCEDURE — 84484 ASSAY OF TROPONIN QUANT: CPT

## 2018-04-02 PROCEDURE — 72148 MRI LUMBAR SPINE W/O DYE: CPT

## 2018-04-02 PROCEDURE — 84484 ASSAY OF TROPONIN QUANT: CPT | Performed by: INTERNAL MEDICINE

## 2018-04-02 PROCEDURE — 93005 ELECTROCARDIOGRAM TRACING: CPT | Performed by: EMERGENCY MEDICINE

## 2018-04-02 PROCEDURE — 74011250636 HC RX REV CODE- 250/636: Performed by: INTERNAL MEDICINE

## 2018-04-02 PROCEDURE — 82550 ASSAY OF CK (CPK): CPT | Performed by: INTERNAL MEDICINE

## 2018-04-02 PROCEDURE — 85025 COMPLETE CBC W/AUTO DIFF WBC: CPT | Performed by: EMERGENCY MEDICINE

## 2018-04-02 PROCEDURE — 74011250637 HC RX REV CODE- 250/637: Performed by: INTERNAL MEDICINE

## 2018-04-02 PROCEDURE — 80053 COMPREHEN METABOLIC PANEL: CPT | Performed by: EMERGENCY MEDICINE

## 2018-04-02 PROCEDURE — 92610 EVALUATE SWALLOWING FUNCTION: CPT

## 2018-04-02 PROCEDURE — 93306 TTE W/DOPPLER COMPLETE: CPT

## 2018-04-02 PROCEDURE — 82962 GLUCOSE BLOOD TEST: CPT

## 2018-04-02 PROCEDURE — 83880 ASSAY OF NATRIURETIC PEPTIDE: CPT | Performed by: INTERNAL MEDICINE

## 2018-04-02 PROCEDURE — 71045 X-RAY EXAM CHEST 1 VIEW: CPT

## 2018-04-02 PROCEDURE — 65660000000 HC RM CCU STEPDOWN

## 2018-04-02 PROCEDURE — 99285 EMERGENCY DEPT VISIT HI MDM: CPT | Performed by: EMERGENCY MEDICINE

## 2018-04-02 PROCEDURE — 83605 ASSAY OF LACTIC ACID: CPT

## 2018-04-02 PROCEDURE — 74011636637 HC RX REV CODE- 636/637: Performed by: INTERNAL MEDICINE

## 2018-04-02 PROCEDURE — 85379 FIBRIN DEGRADATION QUANT: CPT | Performed by: EMERGENCY MEDICINE

## 2018-04-02 PROCEDURE — 86580 TB INTRADERMAL TEST: CPT | Performed by: INTERNAL MEDICINE

## 2018-04-02 PROCEDURE — 81003 URINALYSIS AUTO W/O SCOPE: CPT | Performed by: EMERGENCY MEDICINE

## 2018-04-02 PROCEDURE — 74011000302 HC RX REV CODE- 302: Performed by: INTERNAL MEDICINE

## 2018-04-02 PROCEDURE — 77030032490 HC SLV COMPR SCD KNE COVD -B

## 2018-04-02 PROCEDURE — 70450 CT HEAD/BRAIN W/O DYE: CPT

## 2018-04-02 PROCEDURE — 36415 COLL VENOUS BLD VENIPUNCTURE: CPT | Performed by: INTERNAL MEDICINE

## 2018-04-02 RX ORDER — INSULIN GLARGINE 100 [IU]/ML
12 INJECTION, SOLUTION SUBCUTANEOUS DAILY
Status: DISCONTINUED | OUTPATIENT
Start: 2018-04-03 | End: 2018-04-03

## 2018-04-02 RX ORDER — MORPHINE SULFATE 2 MG/ML
2 INJECTION, SOLUTION INTRAMUSCULAR; INTRAVENOUS
Status: DISCONTINUED | OUTPATIENT
Start: 2018-04-02 | End: 2018-04-04 | Stop reason: HOSPADM

## 2018-04-02 RX ORDER — NITROGLYCERIN 0.4 MG/1
0.4 TABLET SUBLINGUAL
Status: DISCONTINUED | OUTPATIENT
Start: 2018-04-02 | End: 2018-04-04 | Stop reason: HOSPADM

## 2018-04-02 RX ORDER — HYDROCODONE BITARTRATE AND ACETAMINOPHEN 5; 325 MG/1; MG/1
1-2 TABLET ORAL
Status: DISCONTINUED | OUTPATIENT
Start: 2018-04-02 | End: 2018-04-04 | Stop reason: HOSPADM

## 2018-04-02 RX ORDER — DEXAMETHASONE SODIUM PHOSPHATE 4 MG/ML
4 INJECTION, SOLUTION INTRA-ARTICULAR; INTRALESIONAL; INTRAMUSCULAR; INTRAVENOUS; SOFT TISSUE EVERY 8 HOURS
Status: DISCONTINUED | OUTPATIENT
Start: 2018-04-02 | End: 2018-04-04 | Stop reason: HOSPADM

## 2018-04-02 RX ORDER — HEPARIN SODIUM 5000 [USP'U]/ML
5000 INJECTION, SOLUTION INTRAVENOUS; SUBCUTANEOUS EVERY 8 HOURS
Status: DISCONTINUED | OUTPATIENT
Start: 2018-04-02 | End: 2018-04-03

## 2018-04-02 RX ORDER — SODIUM CHLORIDE 0.9 % (FLUSH) 0.9 %
5-10 SYRINGE (ML) INJECTION EVERY 8 HOURS
Status: DISCONTINUED | OUTPATIENT
Start: 2018-04-02 | End: 2018-04-04 | Stop reason: HOSPADM

## 2018-04-02 RX ORDER — METOPROLOL SUCCINATE 50 MG/1
50 TABLET, EXTENDED RELEASE ORAL
Status: DISCONTINUED | OUTPATIENT
Start: 2018-04-03 | End: 2018-04-04 | Stop reason: HOSPADM

## 2018-04-02 RX ORDER — NALOXONE HYDROCHLORIDE 0.4 MG/ML
0.4 INJECTION, SOLUTION INTRAMUSCULAR; INTRAVENOUS; SUBCUTANEOUS AS NEEDED
Status: DISCONTINUED | OUTPATIENT
Start: 2018-04-02 | End: 2018-04-04 | Stop reason: HOSPADM

## 2018-04-02 RX ORDER — ASPIRIN 325 MG
325 TABLET ORAL DAILY
Status: DISCONTINUED | OUTPATIENT
Start: 2018-04-03 | End: 2018-04-03

## 2018-04-02 RX ORDER — LISINOPRIL 5 MG/1
2.5 TABLET ORAL DAILY
Status: DISCONTINUED | OUTPATIENT
Start: 2018-04-03 | End: 2018-04-03

## 2018-04-02 RX ORDER — HEPARIN SODIUM 5000 [USP'U]/ML
5000 INJECTION, SOLUTION INTRAVENOUS; SUBCUTANEOUS EVERY 8 HOURS
Status: CANCELLED | OUTPATIENT
Start: 2018-04-02

## 2018-04-02 RX ORDER — SODIUM CHLORIDE 0.9 % (FLUSH) 0.9 %
5-10 SYRINGE (ML) INJECTION AS NEEDED
Status: DISCONTINUED | OUTPATIENT
Start: 2018-04-02 | End: 2018-04-04 | Stop reason: HOSPADM

## 2018-04-02 RX ORDER — ACETAMINOPHEN 325 MG/1
650 TABLET ORAL
Status: DISCONTINUED | OUTPATIENT
Start: 2018-04-02 | End: 2018-04-04 | Stop reason: HOSPADM

## 2018-04-02 RX ORDER — ONDANSETRON 2 MG/ML
4 INJECTION INTRAMUSCULAR; INTRAVENOUS
Status: DISCONTINUED | OUTPATIENT
Start: 2018-04-02 | End: 2018-04-04 | Stop reason: HOSPADM

## 2018-04-02 RX ORDER — ONDANSETRON 2 MG/ML
4 INJECTION INTRAMUSCULAR; INTRAVENOUS
Status: DISCONTINUED | OUTPATIENT
Start: 2018-04-02 | End: 2018-04-02 | Stop reason: SDUPTHER

## 2018-04-02 RX ORDER — PRAVASTATIN SODIUM 80 MG/1
80 TABLET ORAL
Status: DISCONTINUED | OUTPATIENT
Start: 2018-04-02 | End: 2018-04-04 | Stop reason: HOSPADM

## 2018-04-02 RX ORDER — TRAMADOL HYDROCHLORIDE 50 MG/1
50 TABLET ORAL
Status: DISCONTINUED | OUTPATIENT
Start: 2018-04-02 | End: 2018-04-02 | Stop reason: SDUPTHER

## 2018-04-02 RX ORDER — ENALAPRILAT 1.25 MG/ML
0.62 INJECTION INTRAVENOUS
Status: DISCONTINUED | OUTPATIENT
Start: 2018-04-02 | End: 2018-04-04 | Stop reason: HOSPADM

## 2018-04-02 RX ORDER — METOPROLOL TARTRATE 5 MG/5ML
5 INJECTION INTRAVENOUS
Status: DISCONTINUED | OUTPATIENT
Start: 2018-04-02 | End: 2018-04-04 | Stop reason: HOSPADM

## 2018-04-02 RX ORDER — FUROSEMIDE 10 MG/ML
100 INJECTION INTRAMUSCULAR; INTRAVENOUS EVERY 12 HOURS
Status: DISCONTINUED | OUTPATIENT
Start: 2018-04-02 | End: 2018-04-03

## 2018-04-02 RX ORDER — AMLODIPINE BESYLATE 10 MG/1
10 TABLET ORAL
Status: DISCONTINUED | OUTPATIENT
Start: 2018-04-03 | End: 2018-04-04 | Stop reason: HOSPADM

## 2018-04-02 RX ORDER — CYCLOBENZAPRINE HCL 10 MG
10 TABLET ORAL
Status: DISCONTINUED | OUTPATIENT
Start: 2018-04-02 | End: 2018-04-04 | Stop reason: HOSPADM

## 2018-04-02 RX ORDER — CARVEDILOL 3.12 MG/1
3.12 TABLET ORAL 2 TIMES DAILY WITH MEALS
Status: DISCONTINUED | OUTPATIENT
Start: 2018-04-02 | End: 2018-04-02

## 2018-04-02 RX ORDER — PANTOPRAZOLE SODIUM 40 MG/1
40 TABLET, DELAYED RELEASE ORAL
Status: DISCONTINUED | OUTPATIENT
Start: 2018-04-03 | End: 2018-04-04 | Stop reason: HOSPADM

## 2018-04-02 RX ORDER — GUAIFENESIN 100 MG/5ML
81 LIQUID (ML) ORAL DAILY
Status: CANCELLED | OUTPATIENT
Start: 2018-04-03

## 2018-04-02 RX ORDER — INSULIN LISPRO 100 [IU]/ML
INJECTION, SOLUTION INTRAVENOUS; SUBCUTANEOUS
Status: DISCONTINUED | OUTPATIENT
Start: 2018-04-02 | End: 2018-04-04 | Stop reason: HOSPADM

## 2018-04-02 RX ADMIN — TUBERCULIN PURIFIED PROTEIN DERIVATIVE 5 UNITS: 5 INJECTION, SOLUTION INTRADERMAL at 15:35

## 2018-04-02 RX ADMIN — Medication 10 ML: at 15:33

## 2018-04-02 RX ADMIN — HEPARIN SODIUM 5000 UNITS: 5000 INJECTION, SOLUTION INTRAVENOUS; SUBCUTANEOUS at 22:34

## 2018-04-02 RX ADMIN — INSULIN LISPRO 6 UNITS: 100 INJECTION, SOLUTION INTRAVENOUS; SUBCUTANEOUS at 22:00

## 2018-04-02 RX ADMIN — HYDROCODONE BITARTRATE AND ACETAMINOPHEN 1 TABLET: 5; 325 TABLET ORAL at 22:34

## 2018-04-02 RX ADMIN — FUROSEMIDE 100 MG: 10 INJECTION, SOLUTION INTRAMUSCULAR; INTRAVENOUS at 15:24

## 2018-04-02 RX ADMIN — Medication 10 ML: at 22:34

## 2018-04-02 RX ADMIN — CARVEDILOL 3.12 MG: 3.12 TABLET, FILM COATED ORAL at 17:00

## 2018-04-02 RX ADMIN — PRAVASTATIN SODIUM 80 MG: 80 TABLET ORAL at 22:34

## 2018-04-02 RX ADMIN — HEPARIN SODIUM 5000 UNITS: 5000 INJECTION, SOLUTION INTRAVENOUS; SUBCUTANEOUS at 15:25

## 2018-04-02 RX ADMIN — DEXAMETHASONE SODIUM PHOSPHATE 4 MG: 4 INJECTION, SOLUTION INTRAMUSCULAR; INTRAVENOUS at 15:25

## 2018-04-02 RX ADMIN — DEXAMETHASONE SODIUM PHOSPHATE 4 MG: 4 INJECTION, SOLUTION INTRAMUSCULAR; INTRAVENOUS at 22:33

## 2018-04-02 RX ADMIN — INSULIN LISPRO 6 UNITS: 100 INJECTION, SOLUTION INTRAVENOUS; SUBCUTANEOUS at 17:04

## 2018-04-02 RX ADMIN — FUROSEMIDE 100 MG: 10 INJECTION, SOLUTION INTRAMUSCULAR; INTRAVENOUS at 22:33

## 2018-04-02 NOTE — ED TRIAGE NOTES
Intermittent CP since yesterday. Pain alleviated with asa and NTG x 1 by EMS. Also c/o raquel foot pain. Was reported to be up walked around at home by EMS.

## 2018-04-02 NOTE — PROGRESS NOTES
Attempted to reach pt's nurse @ [phone] answer-left message with  on floor to have RN fill out MRI Safety and Screening form in computer with signatures.

## 2018-04-02 NOTE — Clinical Note
Patient Class[de-identified] Observation [756] Type of Bed: Remote Telemetry [29] Reason for Observation: weakness Admitting Diagnosis: CVA (cerebral vascular accident) [facility]) [[id number]] Admitting Diagnosis: TIA (transient ischemic attack) [[id number]] Admitting Physician: Kenny Foster [id number] [facility]. Attending Physician: Kenny Foster [[id number]]

## 2018-04-02 NOTE — CONSULTS
[facility], [address] [place] [address], [address] [place] [address], [address] [place] [address]  PHONE: [phone]    Blaine Wright  1940  PCP:  Aminata Hong MD    SUBJECTIVE:   Blaine Wright. is a 69 y.o. male seen for a consultation visit regarding the following:     Chief Complaint   Patient presents with    Chest Pain        HPI:  Asked to consult by hospitalists with history of chronic systolic CHF, chronic renal failure, and chest pain prior to admission. Troponin in ER negative, and reports recurrent intermittent left sided non-radiating chest pain, worse with movement, deep inspiration, deep palpation. Current CP free, lying nearly flat without orthopnea or PND. Known chronic systolic CHF with EF 19-51% in 2016, no recent echo. Tolerating current meds well (see MAR) and without angina or CHF during my exam.        Past Medical History, Past Surgical History, Family history, Social History, and Medications were all reviewed with the patient today and updated as necessary. No Known Allergies    Patient Active Problem List    Diagnosis    TIA (transient ischemic attack)    CVA (cerebral vascular accident) ([address]    Acute exacerbation of CHF (congestive heart failure) ([address]    Cauda equina compression (HCC)    Uncontrolled diabetes mellitus, with long-term current use of insulin ([address]    Carotid bruit    Hypercholesterolemia    Arteriovenous fistula ([address]     10/26/17 (Dr. Waylon High) Creation of left brachiobasilic fistula.       Onychomycosis    Controlled type 2 diabetes mellitus with complication, with long-term current use of insulin (HCC)    Stage 4 chronic kidney disease (HCC)    Stasis edema of both lower extremities    Cellulitis of left lower leg    Venous stasis ulcer of left lower extremity (HCC)    Venous insufficiency    Acute on chronic systolic congestive heart failure (HCC)    Septicemia due to Klebsiella pneumoniae ([address]    Type II diabetes mellitus with nephropathy [facility]    Essential hypertension    GERD (gastroesophageal reflux disease)    Diabetic neuropathy (HCC)    Chronic pancreatitis (HCC)    Iron (Fe) deficiency anemia    Acute renal failure superimposed on stage 3 chronic kidney disease ([facility]       Past Surgical History:   Procedure Laterality Date    HX COLONOSCOPY      HX HERNIA REPAIR Left 2010    HX PROSTATECTOMY  2012    HX TURP  2012    FOR BPH    VASCULAR SURGERY PROCEDURE UNLIST Left 10/26/2017    AVF       Family History   Problem Relation Age of Onset    Diabetes Mother     Stroke Mother     Hypertension Mother     No Known Problems Father     Diabetes Sister     Diabetes Brother     Diabetes Sister     Diabetes Sister     Other Sister      fibromyalgia       Social History   Substance Use Topics    Smoking status: Former Smoker     Packs/day: 2.00     Years: 20.00     Quit date: 1995    Smokeless tobacco: Current User    Alcohol use No       ROS:    Review of Systems   Constitution: Positive for malaise/fatigue. Negative for fever and weight loss. HENT: Negative for congestion. Eyes: Negative for visual disturbance. Cardiovascular: Positive for chest pain, dyspnea on exertion, leg swelling and orthopnea. Negative for palpitations and syncope. Respiratory: Negative for sputum production and wheezing. Hematologic/Lymphatic: Does not bruise/bleed easily. Skin: Negative for poor wound healing and rash. Musculoskeletal: Negative for muscle weakness and myalgias. Gastrointestinal: Negative for hematemesis, hematochezia and melena. Genitourinary: Negative for dysuria and hematuria. Neurological: Negative for numbness and seizures. Psychiatric/Behavioral: Negative for depression and memory loss.         PHYSICAL EXAM:     Visit Vitals    /63 (BP 1 Location: Right arm, BP Patient Position: At rest)    Pulse 64    Temp 97.7 °F (36.5 °C)    Resp 20    Ht 6' (1.829 m)    Wt 83.7 kg (184 lb 9.6 oz)    SpO2 100%    BMI 25.04 kg/m2        Physical Exam   Constitutional: He is oriented to person, place, and time. He appears well-developed and well-nourished. HENT:   Head: Normocephalic and atraumatic. Mouth/Throat: Oropharynx is clear and moist.   Eyes: No scleral icterus. Neck: Normal range of motion. Neck supple. No JVD present. Carotid bruit is not present. No thyromegaly present. Cardiovascular: Normal rate and regular rhythm. Exam reveals no gallop and no friction rub. Murmur (soft TR murmur) heard. Pulmonary/Chest: Breath sounds normal. He has no wheezes. He has no rales. Abdominal: Soft. He exhibits no distension. There is no tenderness. Musculoskeletal: Normal range of motion. He exhibits edema (1+ woody chronic LE below knees). Neurological: He is alert and oriented to person, place, and time. Skin: Skin is warm and dry. Psychiatric: He has a normal mood and affect. His behavior is normal.       Medical problems and test results were reviewed with the patient today. Recent Results (from the past 672 hour(s))   CELL COUNT, BODY FLUID    Collection Time: 03/10/18  8:18 PM   Result Value Ref Range    BODY FLUID TYPE SYNOVIAL FLUID      FLUID COLOR YELLOW      FLUID APPEARANCE CLOUDY      FLUID RBC CT. <10136 /cu mm    FLUID WBC COUNT 5571 /cu mm    FLD NEUTROPHILS 282 %   METABOLIC PANEL, BASIC    Collection Time: 03/10/18  8:18 PM   Result Value Ref Range    Sodium 148 (H) 136 - 145 mmol/L    Potassium 3.8 3.5 - 5.1 mmol/L    Chloride 113 (H) 98 - 107 mmol/L    CO2 24 21 - 32 mmol/L    Anion gap 11 7 - 16 mmol/L    Glucose 151 (H) 65 - 100 mg/dL    BUN 41 (H) 8 - 23 MG/DL    Creatinine 4.23 (H) 0.8 - 1.5 MG/DL    GFR est AA 18 (L) >60 ml/min/1.73m2    GFR est non-AA 15 (L) >60 ml/min/1.73m2    Calcium 6.7 (L) 8.3 - 10.4 MG/DL   PROTEIN TOTAL, FLUID    Collection Time: 03/10/18  9:18 PM   Result Value Ref Range    Fluid Type: SYNOVIAL FLUID      Protein total, body fld.  2.9 g/dL g/dL   GLUCOSE, FLUID    Collection Time: 03/10/18  9:18 PM   Result Value Ref Range    Fluid Type: SYNOVIAL FLUID      Glucose, body fld. 129 mg/dL MG/DL   CRYSTALS, SYNOVIAL FLUID    Collection Time: 03/10/18  9:18 PM   Result Value Ref Range    FLUID TYPE(7) SYNOVIAL FLUID      Crystals, body fluid (NOTE)    CULTURE, BODY FLUID W GRAM STAIN    Collection Time: 03/10/18  9:18 PM   Result Value Ref Range    Special Requests: NO SPECIAL REQUESTS      GRAM STAIN 10 TO 30 WBC'S PER OIF     GRAM STAIN NO DEFINITE ORGANISM SEEN      Culture result: NO GROWTH 2 DAYS     AMB POC HEMOGLOBIN A1C    Collection Time: 03/14/18 11:20 AM   Result Value Ref Range    Hemoglobin A1c (POC) 6.5 %   METABOLIC PANEL, COMPREHENSIVE    Collection Time: 03/14/18 11:49 AM   Result Value Ref Range    Glucose 210 (H) 65 - 99 mg/dL    BUN 55 (H) 8 - 27 mg/dL    Creatinine 4.16 (H) 0.76 - 1.27 mg/dL    GFR est non-AA 13 (L) >59 mL/min/1.73    GFR est AA 15 (L) >59 mL/min/1.73    BUN/Creatinine ratio 13 10 - 24    Sodium 148 (H) 134 - 144 mmol/L    Potassium 3.5 3.5 - 5.2 mmol/L    Chloride 110 (H) 96 - 106 mmol/L    CO2 19 18 - 29 mmol/L    Calcium 8.1 (L) 8.6 - 10.2 mg/dL    Protein, total 6.5 6.0 - 8.5 g/dL    Albumin 4.0 3.5 - 4.8 g/dL    GLOBULIN, TOTAL 2.5 1.5 - 4.5 g/dL    A-G Ratio 1.6 1.2 - 2.2    Bilirubin, total 0.2 0.0 - 1.2 mg/dL    Alk.  phosphatase 54 39 - 117 IU/L    AST (SGOT) 21 0 - 40 IU/L    ALT (SGPT) 16 0 - 44 IU/L   TSH RFX ON ABNORMAL TO FREE T4    Collection Time: 03/14/18 11:49 AM   Result Value Ref Range    TSH 4.140 0.450 - 4.500 uIU/mL   LIPID PANEL    Collection Time: 03/14/18 11:49 AM   Result Value Ref Range    Cholesterol, total 105 100 - 199 mg/dL    Triglyceride 112 0 - 149 mg/dL    HDL Cholesterol 52 >39 mg/dL    VLDL, calculated 22 5 - 40 mg/dL    LDL, calculated 31 0 - 99 mg/dL   EKG, 12 LEAD, INITIAL    Collection Time: 04/02/18  7:48 AM   Result Value Ref Range    Ventricular Rate 67 BPM    Atrial Rate 67 BPM    P-R Interval 242 ms    QRS Duration 88 ms    Q-T Interval 394 ms    QTC Calculation (Bezet) 416 ms    Calculated P Axis 34 degrees    Calculated R Axis -14 degrees    Calculated T Axis 49 degrees    Diagnosis       !! AGE AND GENDER SPECIFIC ECG ANALYSIS !! Sinus rhythm with 1st degree A-V block  Otherwise normal ECG  When compared with ECG of 03-APR-2017 10:15,  T wave inversion no longer evident in Inferior leads  Nonspecific T wave abnormality, improved in Lateral leads  Confirmed by EDIE PISANO (), Graciela Tejada ([id number]) on 4/2/2018 1:00:01 PM     POC TROPONIN-I    Collection Time: 04/02/18  8:04 AM   Result Value Ref Range    Troponin-I (POC) 0.01 (L) 0.02 - 0.05 ng/ml   POC LACTIC ACID    Collection Time: 04/02/18  8:07 AM   Result Value Ref Range    Lactic Acid (POC) 0.9 0.5 - 1.9 mmol/L   METABOLIC PANEL, COMPREHENSIVE    Collection Time: 04/02/18  8:07 AM   Result Value Ref Range    Sodium 145 136 - 145 mmol/L    Potassium 4.3 3.5 - 5.1 mmol/L    Chloride 112 (H) 98 - 107 mmol/L    CO2 25 21 - 32 mmol/L    Anion gap 8 7 - 16 mmol/L    Glucose 257 (H) 65 - 100 mg/dL    BUN 57 (H) 8 - 23 MG/DL    Creatinine 4.16 (H) 0.8 - 1.5 MG/DL    GFR est AA 18 (L) >60 ml/min/1.73m2    GFR est non-AA 15 (L) >60 ml/min/1.73m2    Calcium 8.6 8.3 - 10.4 MG/DL    Bilirubin, total 0.5 0.2 - 1.1 MG/DL    ALT (SGPT) 44 12 - 65 U/L    AST (SGOT) 42 (H) 15 - 37 U/L    Alk.  phosphatase 76 50 - 136 U/L    Protein, total 7.0 6.3 - 8.2 g/dL    Albumin 3.5 3.2 - 4.6 g/dL    Globulin 3.5 2.3 - 3.5 g/dL    A-G Ratio 1.0 (L) 1.2 - 3.5     CBC WITH AUTOMATED DIFF    Collection Time: 04/02/18  8:07 AM   Result Value Ref Range    WBC 4.6 4.3 - 11.1 K/uL    RBC 4.14 (L) 4.23 - 5.67 M/uL    HGB 10.8 (L) 13.6 - 17.2 g/dL    HCT 34.1 (L) 41.1 - 50.3 %    MCV 82.4 79.6 - 97.8 FL    MCH 26.1 26.1 - 32.9 PG    MCHC 31.7 31.4 - 35.0 g/dL    RDW 16.2 (H) 11.9 - 14.6 %    PLATELET 496 (L) 400 - 450 K/uL    MPV Cannot be calculated 10.8 - 14.1 FL    DF AUTOMATED      NEUTROPHILS 47 43 - 78 %    LYMPHOCYTES 41 13 - 44 %    MONOCYTES 9 4.0 - 12.0 %    EOSINOPHILS 3 0.5 - 7.8 %    BASOPHILS 0 0.0 - 2.0 %    IMMATURE GRANULOCYTES 0 0.0 - 5.0 %    ABS. NEUTROPHILS 2.1 1.7 - 8.2 K/UL    ABS. LYMPHOCYTES 1.9 0.5 - 4.6 K/UL    ABS. MONOCYTES 0.4 0.1 - 1.3 K/UL    ABS. EOSINOPHILS 0.1 0.0 - 0.8 K/UL    ABS. BASOPHILS 0.0 0.0 - 0.2 K/UL    ABS. IMM. GRANS. 0.0 0.0 - 0.5 K/UL   D DIMER    Collection Time: 04/02/18  8:07 AM   Result Value Ref Range    D DIMER 1.82 (HH) <0.56 ug/ml(FEU)   CK    Collection Time: 04/02/18  3:37 PM   Result Value Ref Range    CK 1282 (H) 21 - 215 U/L   TROPONIN I    Collection Time: 04/02/18  3:37 PM   Result Value Ref Range    Troponin-I, Qt. <0.02 (L) 0.02 - 0.05 NG/ML   GLUCOSE, POC    Collection Time: 04/02/18  4:33 PM   Result Value Ref Range    Glucose (POC) 264 (H) 65 - 100 mg/dL     Lab Results   Component Value Date/Time    Cholesterol, total 105 03/14/2018 11:49 AM    HDL Cholesterol 52 03/14/2018 11:49 AM    LDL, calculated 31 03/14/2018 11:49 AM    VLDL, calculated 22 03/14/2018 11:49 AM    Triglyceride 112 03/14/2018 11:49 AM       Results for orders placed or performed during the hospital encounter of 04/02/18   XR CHEST PORT    Narrative    Portable chest x-ray April 2, 2018: CHF    Heart is enlarged. Lung fields are clear and soft tissues and bony structures  are unremarkable. Impression    IMPRESSION: Cardiomegaly, clear lung fields   CT HEAD WO CONT    Narrative    CT scan of the brain 4/2/2018  Scanning was performed within 24 hours of arrival to the facility  Comparison: None    Dose reduction techniques used: Automated exposure control, adjustment of the  mAs and/or kVp according to patient size, standardized low-dose protocol, and/or  iterative reconstruction technique. Indication: Weakness  Findings: There is some streak artifact from metallic BBs within the right  frontal and right temporal scalp.  The brain parenchyma and ventricular  structures are unremarkable. There is normal white-grey matter  differentiation. There are no mass lesions, hemorrhage, or evidence of an acute  stroke. The calvarium and the sinuses are unremarkable. Impression    Impression: Negative CT scan of the brain. Note: If a subtle CVA is suspected, MRI would be more definitive if clinically  warranted. METABOLIC PANEL, COMPREHENSIVE   Result Value Ref Range    Sodium 145 136 - 145 mmol/L    Potassium 4.3 3.5 - 5.1 mmol/L    Chloride 112 (H) 98 - 107 mmol/L    CO2 25 21 - 32 mmol/L    Anion gap 8 7 - 16 mmol/L    Glucose 257 (H) 65 - 100 mg/dL    BUN 57 (H) 8 - 23 MG/DL    Creatinine 4.16 (H) 0.8 - 1.5 MG/DL    GFR est AA 18 (L) >60 ml/min/1.73m2    GFR est non-AA 15 (L) >60 ml/min/1.73m2    Calcium 8.6 8.3 - 10.4 MG/DL    Bilirubin, total 0.5 0.2 - 1.1 MG/DL    ALT (SGPT) 44 12 - 65 U/L    AST (SGOT) 42 (H) 15 - 37 U/L    Alk. phosphatase 76 50 - 136 U/L    Protein, total 7.0 6.3 - 8.2 g/dL    Albumin 3.5 3.2 - 4.6 g/dL    Globulin 3.5 2.3 - 3.5 g/dL    A-G Ratio 1.0 (L) 1.2 - 3.5     CBC WITH AUTOMATED DIFF   Result Value Ref Range    WBC 4.6 4.3 - 11.1 K/uL    RBC 4.14 (L) 4.23 - 5.67 M/uL    HGB 10.8 (L) 13.6 - 17.2 g/dL    HCT 34.1 (L) 41.1 - 50.3 %    MCV 82.4 79.6 - 97.8 FL    MCH 26.1 26.1 - 32.9 PG    MCHC 31.7 31.4 - 35.0 g/dL    RDW 16.2 (H) 11.9 - 14.6 %    PLATELET 962 (L) 679 - 450 K/uL    MPV Cannot be calculated 10.8 - 14.1 FL    DF AUTOMATED      NEUTROPHILS 47 43 - 78 %    LYMPHOCYTES 41 13 - 44 %    MONOCYTES 9 4.0 - 12.0 %    EOSINOPHILS 3 0.5 - 7.8 %    BASOPHILS 0 0.0 - 2.0 %    IMMATURE GRANULOCYTES 0 0.0 - 5.0 %    ABS. NEUTROPHILS 2.1 1.7 - 8.2 K/UL    ABS. LYMPHOCYTES 1.9 0.5 - 4.6 K/UL    ABS. MONOCYTES 0.4 0.1 - 1.3 K/UL    ABS. EOSINOPHILS 0.1 0.0 - 0.8 K/UL    ABS. BASOPHILS 0.0 0.0 - 0.2 K/UL    ABS. IMM.  GRANS. 0.0 0.0 - 0.5 K/UL   D DIMER   Result Value Ref Range    D DIMER 1.82 (HH) <0.56 ug/ml(FEU)   CK Result Value Ref Range    CK 1282 (H) 21 - 215 U/L   TROPONIN I   Result Value Ref Range    Troponin-I, Qt. <0.02 (L) 0.02 - 0.05 NG/ML   POC LACTIC ACID   Result Value Ref Range    Lactic Acid (POC) 0.9 0.5 - 1.9 mmol/L   POC TROPONIN-I   Result Value Ref Range    Troponin-I (POC) 0.01 (L) 0.02 - 0.05 ng/ml   GLUCOSE, POC   Result Value Ref Range    Glucose (POC) 264 (H) 65 - 100 mg/dL   EKG, 12 LEAD, INITIAL   Result Value Ref Range    Ventricular Rate 67 BPM    Atrial Rate 67 BPM    P-R Interval 242 ms    QRS Duration 88 ms    Q-T Interval 394 ms    QTC Calculation (Bezet) 416 ms    Calculated P Axis 34 degrees    Calculated R Axis -14 degrees    Calculated T Axis 49 degrees    Diagnosis       !! AGE AND GENDER SPECIFIC ECG ANALYSIS !! Sinus rhythm with 1st degree A-V block  Otherwise normal ECG  When compared with ECG of 03-APR-2017 10:15,  T wave inversion no longer evident in Inferior leads  Nonspecific T wave abnormality, improved in Lateral leads  Confirmed by EDIE PISANO (), Estela Dupree ([id number]) on 4/2/2018 1:00:01 PM          ASSESSMENT and PLAN    Chronic systolic CHF- does not clinically appear to be in CHF- EF 30-35% chronically by prior echo's- recheck echo tomorrow, continue meds and augment CHF regimen as tolerated/needed during hospitalization- we will follow with you. No ACE/ARB with renal failure. Chest discomfort- resolved, negative troponin on admit- sounds musculoskeletal, reproducible with deep breath, palpation. ECG without ischemic changes and trop negative. Follow clinically for now. LE weakness, acute- MRI pending, workup proceeding. Possible CVA-  See above. Diabetes Mellitus Type II- stable, meds per hospitalists    Stage 4 chronic kidney disease- recheck in AM- no ACE/ARB- avoid hypotension    Hypertension-  See above, continue CHF regimen, titrate as needed.          Kenneth Vargas MD  04/02/18  5:30 PM

## 2018-04-02 NOTE — PROGRESS NOTES
Problem: Falls - Risk of  Goal: *Absence of Falls  Document Huong Fall Risk and appropriate interventions in the flowsheet. Outcome: Progressing Towards Goal  Fall Risk Interventions:  Mobility Interventions: Bed/chair exit alarm, Communicate number of staff needed for ambulation/transfer, Patient to call before getting OOB         Medication Interventions: Assess postural VS orthostatic hypotension, Patient to call before getting OOB, Teach patient to arise slowly, Evaluate medications/consider consulting pharmacy, Bed/chair exit alarm    Elimination Interventions:  Toilet paper/wipes in reach, Toileting schedule/hourly rounds, Patient to call for help with toileting needs, Call light in reach, Bed/chair exit alarm, Urinal in reach    History of Falls Interventions: Door open when patient unattended, Consult care management for discharge planning, Bed/chair exit alarm, Evaluate medications/consider consulting pharmacy, Investigate reason for fall

## 2018-04-02 NOTE — PROGRESS NOTES
Dual Skin Assessment    Skin assessment completed with Yash Sandoval RN. Skin is dry warm. Edema to both lower legs, with 1x1 cm blister at dorsal left leg,  2x2 cm blister at right dorsal lower leg, two blisters notes at right lateral thigh also. Skin at both feet is dry and flaky with pedal non-pitting edema.        Almas Whitman RN    4/2/2018 6:08 PM

## 2018-04-02 NOTE — IP AVS SNAPSHOT
[facility] 
 
 
 [address] [facility] [address] [facility] 
[phone] Patient: Shayla Najjar. MRN: [id number] Alia Godoy About your hospitalization You were admitted on:  April 2, 2018 You last received care in the:  [facility] 7 MED SURG You were discharged on:  April 4, 2018 Why you were hospitalized Your primary diagnosis was: Volume Overload Your diagnoses also included:  Tia (Transient Ischemic Attack), Cva (Cerebral Vascular Accident) (Hcc), Acute On Chronic Systolic Congestive Heart Failure (Hcc), Stasis Edema Of Both Lower Extremities, Type Ii Diabetes Mellitus With Nephropathy (Hcc), Venous Insufficiency, Acute Exacerbation Of Chf (Congestive Heart Failure) (Hcc), Cauda Equina Compression (Hcc), Chest Pain Follow-up Information Follow up With Details Comments Contact Info Dorota Gan MD On 4/11/2018 at [address] [facility] Internal Medicine an 
[place] [address] 
[phone] Mary Beverly NP On 4/26/2018 at 3:30 800 [facility],4Th Floor [facility] [address] 
[phone] Jeremiah Mulligan MD On 4/26/2018  at 2pm with Dr. Kennis Jeans [address] [facility] [address] 
[phone] Mary Beverly NP On 4/5/2018  at 11:30  800 [facility],[address]Th [facility] [address] 
[phone] Your Scheduled Appointments Thursday April 26, 2018  2:00 PM EDT HOSPITAL FOLLOW-UP with Jeremiah Mulligan MD  
[facility] ([address]) [address] [address] [place], [place] [address]  
[phone] Thursday May 10, 2018 11:20 AM EDT SHORT with [address] [facility]) [address] [place] [address]  
[phone] Discharge Orders None A check shahid indicates which time of day the medication should be taken. My Medications START taking these medications Instructions Each Dose to Equal  
 Morning Noon Evening Bedtime  
 aspirin 325 mg tablet Commonly known as:  ASPIRIN Replaces:  aspirin delayed-release 81 mg tablet Your next dose is:  Tomorrow Morning Take 1 Tab by mouth daily. 325 mg  
    
  
   
   
   
  
 hydrALAZINE 25 mg tablet Commonly known as:  APRESOLINE Your next dose is:  TODAY, evening and bedtime Take 1 Tab by mouth three (3) times daily. 25 mg  
    
  
   
   
  
   
  
  
 insulin glargine 100 unit/mL injection Commonly known as:  LANTUS Replaces:  insulin glargine 100 unit/mL (3 mL) Inpn Your next dose is:  Tomorrow Morning 15 Units by SubCUTAneous route daily. 15 Units  
    
  
   
   
   
  
 isosorbide dinitrate 20 mg tablet Commonly known as:  ISORDIL Your next dose is:  TODAY, evening and bedside Take 1 Tab by mouth three (3) times daily. 20 mg  
    
  
   
   
  
   
  
  
 pravastatin 40 mg tablet Commonly known as:  PRAVACHOL Your next dose is: Take tonight Take 1 Tab by mouth nightly. 40 mg  
    
   
   
   
  
  
 predniSONE 20 mg tablet Commonly known as:  Jacobs Aver Your next dose is:  Tomorrow Morning 3 tabs po daily x 3 days then 2 tabs po daily x 3 days then 1/2 tab po daily x 4 days CHANGE how you take these medications Instructions Each Dose to Equal  
 Morning Noon Evening Bedtime  
 pregabalin 50 mg capsule Commonly known as:  Kareem Velasquez What changed:   
- how much to take - when to take this Your next dose is:  Tomorrow Morning Take 1 Cap by mouth daily. Max Daily Amount: 50 mg.  
 50 mg  
    
  
   
   
   
  
 torsemide 10 mg tablet Commonly known as:  DEMADEX Start taking on:  4/5/2018 What changed:   
- medication strength 
- how much to take - when to take this Your next dose is:  Tomorrow Morning Take 5 Tabs by mouth daily. 50 mg CONTINUE taking these medications Instructions Each Dose to Equal  
 Morning Noon Evening Bedtime  
 amLODIPine 10 mg tablet Commonly known as:  Jovita Morse Your next dose is:  Tomorrow Morning Take 10 mg by mouth every morning. 10 mg  
    
  
   
   
   
  
 cyclobenzaprine 10 mg tablet Commonly known as:  FLEXERIL Your next dose is: Take on as needed schedule Take 1 Tab by mouth three (3) times daily as needed for Muscle Spasm(s). 10 mg  
    
   
   
   
  
 diphenoxylate-atropine 2.5-0.025 mg per tablet Commonly known as:  LOMOTIL Your next dose is: Take on as needed schedule Take 1 Tab by mouth two (2) times daily as needed for Diarrhea. Indications: Diarrhea 1 Tab  
    
   
   
   
  
 ferrous sulfate 325 mg (65 mg iron) tablet Your next dose is: This evening Take 325 mg by mouth two (2) times a day. 325 mg HumaLOG KwikPen Insulin 100 unit/mL kwikpen Generic drug:  insulin lispro Your next dose is:  TODAY with meals 5 units three times a day with meals plus correction scale, 2 units/50 > 150, max daily dose 25 units HYDROcodone-acetaminophen 5-325 mg per tablet Commonly known as:  Bertin Forman Your next dose is: Take on as needed schedule Take 1-2 Tabs by mouth every six (6) hours as needed for Pain. Max Daily Amount: 8 Tabs. 1-2 Tab  
    
   
   
   
  
 metoprolol succinate 50 mg XL tablet Commonly known as:  TOPROL-XL Your next dose is:  Tomorrow Morning Take 50 mg by mouth every morning. 50 mg  
    
  
   
   
   
  
 omeprazole 20 mg capsule Commonly known as:  PRILOSEC Your next dose is:  Tomorrow Morning Take 20 mg by mouth every morning. 20 mg  
    
  
   
   
   
  
 RAYALDEE 30 mcg Cs24 Generic drug:  calcifediol Your next dose is: Take tonight Take  by mouth nightly. traMADol 50 mg tablet Commonly known as:  ULTRAM  
Your next dose is: Take on as needed schedule Take 50 mg by mouth four (4) times daily. Pt is taking 3 X/day 50 mg  
    
   
   
   
  
  
STOP taking these medications   
 aspirin delayed-release 81 mg tablet Replaced by:  aspirin 325 mg tablet  
   
  
 insulin glargine 100 unit/mL (3 mL) Inpn Commonly known as:  Mau Aretha Replaced by:  insulin glargine 100 unit/mL injection Where to Get Your Medications These medications were sent to [address] [address] Phone:  [phone]  
  isosorbide dinitrate 20 mg tablet Information on where to get these meds will be given to you by the nurse or doctor. ! Ask your nurse or doctor about these medications  
  aspirin 325 mg tablet  
 hydrALAZINE 25 mg tablet  
 insulin glargine 100 unit/mL injection  
 pravastatin 40 mg tablet  
 predniSONE 20 mg tablet  
 torsemide 10 mg tablet Opioid Education Prescription Opioids: What You Need to Know: 
 
Prescription opioids can be used to help relieve moderate-to-severe pain and are often prescribed following a surgery or injury, or for certain health conditions. These medications can be an important part of treatment but also come with serious risks. Opioids are strong pain medicines. Examples include hydrocodone, oxycodone, fentanyl, and morphine. Heroin is an example of an illegal opioid. It is important to work with your health care provider to make sure you are getting the safest, most effective care. WHAT ARE THE RISKS AND SIDE EFFECTS OF OPIOID USE? Prescription opioids carry serious risks of addiction and overdose, especially with prolonged use.   An opioid overdose, often marked by slow breathing, can cause sudden death. The use of prescription opioids can have a number of side effects as well, even when taken as directed. · Tolerance-meaning you might need to take more of a medication for the same pain relief · Physical dependence-meaning you have symptoms of withdrawal when the medication is stopped. Withdrawal symptoms can include nausea, sweating, chills, diarrhea, stomach cramps, and muscle aches. Withdrawal can last up to several weeks, depending on which drug you took and how long you took it. · Increased sensitivity to pain · Constipation · Nausea, vomiting, and dry mouth · Sleepiness and dizziness · Confusion · Depression · Low levels of testosterone that can result in lower sex drive, energy, and strength · Itching and sweating RISKS ARE GREATER WITH:      
· History of drug misuse, substance use disorder, or overdose · Mental health conditions (such as depression or anxiety) · Sleep apnea · Older age (72 years or older) · Pregnancy Avoid alcohol while taking prescription opioids. Also, unless specifically advised by your health care provider, medications to avoid include: · Benzodiazepines (such as Xanax or Valium) · Muscle relaxants (such as Soma or Flexeril) · Hypnotics (such as Ambien or Lunesta) · Other prescription opioids KNOW YOUR OPTIONS Talk to your health care provider about ways to manage your pain that don't involve prescription opioids. Some of these options may actually work better and have fewer risks and side effects. Options may include: 
· Pain relievers such as acetaminophen, ibuprofen, and naproxen · Some medications that are also used for depression or seizures · Physical therapy and exercise · Counseling to help patients learn how to cope better with triggers of pain and stress. · Application of heat or cold compress · Massage therapy · Relaxation techniques Be Informed Make sure you know the name of your medication, how much and how often to take it, and its potential risks & side effects. IF YOU ARE PRESCRIBED OPIOIDS FOR PAIN: 
· Never take opioids in greater amounts or more often than prescribed. Remember the goal is not to be pain-free but to manage your pain at a tolerable level. · Follow up with your primary care provider to: · Work together to create a plan on how to manage your pain. · Talk about ways to help manage your pain that don't involve prescription opioids. · Talk about any and all concerns and side effects. · Help prevent misuse and abuse. · Never sell or share prescription opioids · Help prevent misuse and abuse. · Store prescription opioids in a secure place and out of reach of others (this may include visitors, children, friends, and family). · Safely dispose of unused/unwanted prescription opioids: Find your community drug take-back program or your pharmacy mail-back program, or flush them down the toilet, following guidance from the Food and Drug Administration (www.fda.gov/Drugs/ResourcesForYou). · Visit www.cdc.gov/drugoverdose to learn about the risks of opioid abuse and overdose. · If you believe you may be struggling with addiction, tell your health care provider and ask for guidance or call [employer] at 1-290-050-BCEQ. Discharge Instructions Learning About Fluid Overload What is fluid overload? Fluid overload means that your body has too much water. The extra fluid in your body can raise your blood pressure and force your heart to work harder. It can also make it hard for you to breathe. Most of your body is made up of water. The body uses minerals like sodium and potassium to help organs such as your heart, kidneys, and liver balance how much water you need.  For example, the heart pumps blood to move water around the body. And the kidneys work to get rid of the water that the body doesn't need. Health conditions like kidney disease, heart failure, and cirrhosis can cause fluid overload. Other things can cause extra fluid to build up. IV fluids, some medicines, too much salt (sodium) from food, and certain medical treatments can sometimes cause this fluid increase. What are the symptoms? Some of the most common symptoms are: 
· Gaining weight over a short period of time. · Swelling in the ankles or legs. · Shortness of breath. How is it treated? The goal of treatment is to remove the extra fluid in your body. Your treatment will depend on the cause. Your doctor may: · Give you medicines, such as diuretics (also called \"water pills\"). They help your body get rid of the extra fluid. · Restrict your fluid or salt intake. Follow-up care is a key part of your treatment and safety. Be sure to make and go to all appointments, and call your doctor if you are having problems. It's also a good idea to know your test results and keep a list of the medicines you take. Where can you learn more? Go to http://svetlana-glenis.info/. Enter O110 in the search box to learn more about \"Learning About Fluid Overload. \" Current as of: September 21, 2016 Content Version: 11.4 © 5712-4901 "[employer]. Care instructions adapted under license by [employer] (which disclaims liability or warranty for this information). If you have questions about a medical condition or this instruction, always ask your healthcare professional. [place] [address] any warranty or liability for your use of this information. DISCHARGE SUMMARY from Nurse PATIENT INSTRUCTIONS: 
 
 
F-face looks uneven A-arms unable to move or move unevenly S-speech slurred or non-existent T-time-call 911 as soon as signs and symptoms begin-DO NOT go Back to bed or wait to see if you get better-TIME IS BRAIN. Warning Signs of HEART ATTACK Call 911 if you have these symptoms: 
? Chest discomfort. Most heart attacks involve discomfort in the center of the chest that lasts more than a few minutes, or that goes away and comes back. It can feel like uncomfortable pressure, squeezing, fullness, or pain. ? Discomfort in other areas of the upper body. Symptoms can include pain or discomfort in one or both arms, the back, neck, jaw, or stomach. ? Shortness of breath with or without chest discomfort. ? Other signs may include breaking out in a cold sweat, nausea, or lightheadedness. Don't wait more than five minutes to call [address]! Fast action can save your life. Calling 911 is almost always the fastest way to get lifesaving treatment. Emergency Medical Services staff can begin treatment when they arrive  up to an hour sooner than if someone gets to the hospital by car. The discharge information has been reviewed with the patient. The patient verbalized understanding. Discharge medications reviewed with the patient and appropriate educational materials and side effects teaching were provided. ___________________________________________________________________________________________________________________________________ Tropic Networkshart Announcement We are excited to announce that we are making your provider's discharge notes available to you in Merlint. You will see these notes when they are completed and signed by the physician that discharged you from your recent hospital stay. If you have any questions or concerns about any information you see in Merlint, please call the Health Information Department where you were seen or reach out to your Primary Care Provider for more information about your plan of care. Introducing [facility]! [employer] introduces NotaryActhart patient portal. Now you can access parts of your medical record, email your doctor's office, and request medication refills online. 1. In your internet browser, go to [email or web address] 2. Click on the First Time User? Click Here link in the Sign In box. You will see the New Member Sign Up page. 3. Enter your Waggl Access Code exactly as it appears below. You will not need to use this code after youve completed the sign-up process. If you do not sign up before the expiration date, you must request a new code. · Waggl Access Code: [email or web address] Expires: 5/28/2018 12:53 PM 
 
4. Enter the last four digits of your Social Security Number (xxxx) and Date of Birth (mm/dd/yyyy) as indicated and click Submit. You will be taken to the next sign-up page. 5. Create a Waggl ID. This will be your Waggl login ID and cannot be changed, so think of one that is secure and easy to remember. 6. Create a Waggl password. You can change your password at any time. 7. Enter your Password Reset Question and Answer. This can be used at a later time if you forget your password. 8. Enter your e-mail address. You will receive e-mail notification when new information is available in [address] 9. Click Sign Up. You can now view and download portions of your medical record. 10. Click the Download Summary menu link to download a portable copy of your medical information. If you have questions, please visit the Frequently Asked Questions section of the Waggl website. Remember, Waggl is NOT to be used for urgent needs. For medical emergencies, dial 911. Now available from your iPhone and Android! Introducing Jose Vizcaino As a [employer] patient, I wanted to make you aware of our electronic visit tool called Jose Vizcaino. [employer] 24/7 allows you to connect within minutes with a medical provider 24 hours a day, seven days a week via a mobile device or tablet or logging into a secure website from your computer. You can access Socruise from anywhere in the [place]. A virtual visit might be right for you when you have a simple condition and feel like you just dont want to get out of bed, or cant get away from work for an appointment, when your regular [address] [place] [address] provider is not available (evenings, weekends or holidays), or when youre out of town and need minor care. Electronic visits cost only $49 and if the [address] [place] [address] 24/7 provider determines a prescription is needed to treat your condition, one can be electronically transmitted to a nearby pharmacy*. Please take a moment to enroll today if you have not already done so. The enrollment process is free and takes just a few minutes. To enroll, please download the FileTrek [facility] 24/7 helder to your tablet or phone, or visit [email or web address] to enroll on your computer. And, as an [address] [place] [address] patient with a Mir[employer] account, the results of your visits will be scanned into your electronic medical record and your primary care provider will be able to view the scanned results. We urge you to continue to see your regular [address] [place] [address] provider for your ongoing medical care. And while your primary care provider may not be the one available when you seek a Jose Benjaminterrafin virtual visit, the peace of mind you get from getting a real diagnosis real time can be priceless. For more information on Mass Mosaicterrafin, view our Frequently Asked Questions (FAQs) at [email or web address]. Sincerely, 
 
Alma Cage MD 
Chief Medical Officer Rosita Doty *:  certain medications cannot be prescribed via Jose ACEterrafin Providers Seen During Your Hospitalization Provider Specialty Primary office phone Lai Oseguera MD Emergency Medicine [phone] Rolanda Mackay MD Internal Medicine [phone] Immunizations Administered for This Admission Name Date  
 TB Skin Test (PPD) Intradermal 4/2/2018 Your Primary Care Physician (PCP) Primary Care Physician Office Phone Office Fax Benjamin Maynard [phone] You are allergic to the following No active allergies Recent Documentation Height Weight BMI Smoking Status 1.829 m 80.3 kg 24.02 kg/m2 Former Smoker Emergency Contacts Name Discharge Info Relation Home Work Mobile Brii Kelley  Sister [23] [phone] Nishant Mantilla  Son [22] [phone] Patient Belongings The following personal items are in your possession at time of discharge: 
  Dental Appliances: With patient                Clothing: At bedside Please provide this summary of care documentation to your next provider. Signatures-by signing, you are acknowledging that this After Visit Summary has been reviewed with you and you have received a copy. Patient Signature:  ____________________________________________________________ Date:  ____________________________________________________________  
  
Sawyer Cart Provider Signature:  ____________________________________________________________ Date:  ____________________________________________________________

## 2018-04-02 NOTE — PROGRESS NOTES
TRANSFER - IN REPORT:    Verbal report received from [place], RN  on [address] [facility] [address]  being received from ED for routine progression of care      Report consisted of patients Situation, Background, Assessment and   Recommendations(SBAR). Information from the following report(s) SBAR and ED Summary was reviewed with the receiving nurse. Opportunity for questions and clarification was provided. Assessment completed upon patients arrival to unit and care assumed.

## 2018-04-02 NOTE — PROGRESS NOTES
STG: Pt will tolerate regular textures/thin liquids without overt signs/sx of aspiration with 100% accuracy  STG: Pt will participate with full cognitive assessment x1    LTG: Pt will tolerate the least restrictive diet at discharge without respiratory compromise      Speech language pathology: bedside swallow note: Initial Assessment and Discharge    NAME/AGE/GENDER: Mayra Ling is a 69 y.o. male  DATE: 4/2/2018  PRIMARY DIAGNOSIS: CVA (cerebral vascular accident) ([facility]  TIA (transient ischemic attack)  Acute exacerbation of CHF (congestive heart failure) ([facility]  Cauda equina compression (HCC)       ICD-10: Treatment Diagnosis: dysphagia; oropharyngeal R13.12  INTERDISCIPLINARY COLLABORATION: Registered Nurse  PRECAUTIONS/ALLERGIES: Review of patient's allergies indicates no known allergies. ASSESSMENT:Based on the objective data described below, Mr. Gordy Bose presents with a swallow within functional limits. Reports a remote history of esophageal dilation with occasional sensations of stasis with solids and feeling of need to regurgitate. However, reports eating meats and taking several pills at one time without difficulty at home. Denies coughing when drinking. Reports that his upper dentures do not fit well so he doesn't typically wear them and can eat any items including steak without them but then later in the session reported, \"this would be a lot easier with my teeth\" while masticating the cracker. No overt signs/sx of aspiration with thin liquids, mixed, or regular textures. Mild increased time for mastication only with patient preference to remain on regular textures. Oral motor exam is normal and speech is clear. Reports bilateral LE weakness. Patient is oriented. Reports that he lives alone and handles most ADLs. He does not drive; reports an aide or his sisters take him to his appointments and grocery shopping. Recommend continue cardiac diet/thin liquids. CT negative with MRI pending. Cognitive assessment pending MRI results. Addendum: no acute infarction on brain MRI. MRI lumbar as follows: moderately severe multilevel spondylosis with impingement upon the neuronal elements. No further speech therapy is indicated at this time. ?????? ? ? This section established at most recent assessment??????????  PROBLEM LIST (Impairments causing functional limitations): 1. cognitive  REHABILITATION POTENTIAL FOR STATED GOALS: Good  PLAN OF CARE:   Patient will benefit from skilled intervention to address the following impairments. RECOMMENDATIONS AND PLANNED INTERVENTIONS (Benefits and precautions of therapy have been discussed with the patient.):  · PO:  Regular  · Liquids:  regular thin  MEDICATIONS:  · With liquid  COMPENSATORY STRATEGIES/MODIFICATIONS INCLUDING:  · Small sips and bites  OTHER RECOMMENDATIONS (including follow up treatment recommendations): · Family training/education  · Patient education  · cognitive tx  RECOMMENDED DIET MODIFICATIONS DISCUSSED WITH:  · Nursing  · Patient  FREQUENCY/DURATION: Continue to follow patient 3 times a week for duration of hospital stay to address above goals. RECOMMENDED REHABILITATION/EQUIPMENT: (at time of discharge pending progress): Due to the probability of continued deficits (see above) this patient will not likely need continued skilled speech therapy after discharge. SUBJECTIVE:   Cooperative. History of Present Injury/Illness: Mr. Bhakti Ashley  has a past medical history of Acute renal failure superimposed on stage 3 chronic kidney disease ([address] (9/21/2016); Anemia; Chronic kidney disease; Chronic pancreatitis ([address] (9/22/2016); Dermatophytosis of nail (12/6/2016); Diabetic neuropathy ([address] (9/22/2016); Essential hypertension (9/22/2016); GERD (gastroesophageal reflux disease); Hypercholesterolemia; Iron (Fe) deficiency anemia (9/22/2016); Kidney disease; Septicemia due to Klebsiella pneumoniae ([address] (9/22/2016);  Systolic CHF, chronic ([address] (9/23/2016); Type II diabetes mellitus with nephropathy ([facility] (09/22/2016); Venous insufficiency (12/6/2016); and Xerosis cutis (12/6/2016). He also has no past medical history of Adverse effect of anesthesia; Congestive heart failure, unspecified; Difficult intubation; Malignant hyperthermia due to anesthesia; Nausea & vomiting; or Pseudocholinesterase deficiency. He also  has a past surgical history that includes hx hernia repair (Left, 2010); hx colonoscopy; hx turp (2012); hx prostatectomy (2012); and vascular surgery procedure unlist (Left, 10/26/2017). Present Symptoms: chest pain; LE weakness  Pain Intensity 1: 0  Pain Location 1: Chest  Pain Intervention(s) 1: Medication (see MAR) (ASA and NTG by EMS)  Current Medications:   No current facility-administered medications on file prior to encounter. Current Outpatient Prescriptions on File Prior to Encounter   Medication Sig Dispense Refill    cyclobenzaprine (FLEXERIL) 10 mg tablet Take 1 Tab by mouth three (3) times daily as needed for Muscle Spasm(s). 30 Tab 0    HYDROcodone-acetaminophen (NORCO) 5-325 mg per tablet Take 1-2 Tabs by mouth every six (6) hours as needed for Pain. Max Daily Amount: 8 Tabs. 20 Tab 0    insulin glargine (LANTUS,BASAGLAR) 100 unit/mL (3 mL) inpn 12 Units by SubCUTAneous route daily. 2 Pen 11    HUMALOG KWIKPEN INSULIN 100 unit/mL kwikpen 5 units three times a day with meals plus correction scale, 2 units/50 > 150, max daily dose 25 units 5 Pen 11    traMADol (ULTRAM) 50 mg tablet Take 50 mg by mouth four (4) times daily. Pt is taking 3 X/day      calcifediol (RAYALDEE) 30 mcg Cs24 Take  by mouth nightly.  diphenoxylate-atropine (LOMOTIL) 2.5-0.025 mg per tablet Take 1 Tab by mouth two (2) times daily as needed for Diarrhea. Indications: Diarrhea      ferrous sulfate 325 mg (65 mg iron) tablet Take 325 mg by mouth two (2) times a day.  amLODIPine (NORVASC) 10 mg tablet Take 10 mg by mouth every morning.  metoprolol succinate (TOPROL-XL) 50 mg XL tablet Take 50 mg by mouth every morning.  torsemide (DEMADEX) 20 mg tablet Take 40 mg by mouth every morning.  pregabalin (LYRICA) 50 mg capsule Take 1 Cap by mouth daily. Max Daily Amount: 50 mg. (Patient taking differently: Take 150 mg by mouth two (2) times a day.) 30 Cap 0    omeprazole (PRILOSEC) 20 mg capsule Take 20 mg by mouth every morning.  aspirin delayed-release 81 mg tablet Take 81 mg by mouth nightly. Current Dietary Status:  cardiac      History of reflux:  yes   Reflux medication: prilosec  Social History/Home Situation: home alone     OBJECTIVE:   Respiratory Status:  Room air     CXR Results:Cardiomegaly, clear lung fields  MRI/CT Results:Negative CT scan of the brain.     Note: If a subtle CVA is suspected, MRI would be more definitive if clinically  warranted. Oral Motor Structure/Speech:  Oral-Motor Structure/Motor Speech  Labial: No impairment  Dentition: Edentulous  Lingual: No impairment    Cognitive and Communication Status:  Neurologic State: Alert  Orientation Level: Oriented X4  Cognition: Appropriate decision making  Perception: Appears intact  Perseveration: No perseveration noted  Safety/Judgement: Awareness of environment    BEDSIDE SWALLOW EVALUATION  Oral Assessment:  Oral Assessment  Labial: No impairment  Dentition: Edentulous  Lingual: No impairment  P.O. Trials:  Patient Position: upright in bed    The patient was given tsp to straw amounts of the following:   Consistency Presented: Solid; Thin liquid;Mixed consistency;Puree  How Presented: Self-fed/presented;Cup/sip;Spoon;Straw;Successive swallows    ORAL PHASE:  Bolus Acceptance: No impairment  Bolus Formation/Control: Impaired  Propulsion: No impairment  Type of Impairment: Delayed;Mastication  Oral Residue: None    PHARYNGEAL PHASE:  Initiation of Swallow: No impairment  Laryngeal Elevation: Functional  Aspiration Signs/Symptoms: None  Vocal Quality: No impairment        Pharyngeal Phase Characteristics: No impairment, issues, or problems     OTHER OBSERVATIONS:  Rate/bite size: WNL   Endurance: WNL     Tool Used: Dysphagia Outcome and Severity Scale (SIMONA)    Score Comments   Normal Diet  [] 7 With no strategies or extra time needed   Functional Swallow  [x] 6 May have mild oral or pharyngeal delay       Mild Dysphagia    [] 5 Which may require one diet consistency restricted (those who demonstrate penetration which is entirely cleared on MBS would be included)   Mild-Moderate Dysphagia  [] 4 With 1-2 diet consistencies restricted       Moderate Dysphagia  [] 3 With 2 or more diet consistencies restricted       Moderately Severe Dysphagia  [] 2 With partial PO strategies (trials with ST only)       Severe Dysphagia  [] 1 With inability to tolerate any PO safely          Score:  Initial: 6 Most Recent: X (Date: -- )   Interpretation of Tool: The Dysphagia Outcome and Severity Scale (SIMONA) is a simple, easy-to-use, 7-point scale developed to systematically rate the functional severity of dysphagia based on objective assessment and make recommendations for diet level, independence level, and type of nutrition. Score 7 6 5 4 3 2 1   Modifier CH CI CJ CK CL CM CN   ?  Swallowing:     - CURRENT STATUS: CI - 1%-19% impaired, limited or restricted    - GOAL STATUS:  CH - 0% impaired, limited or restricted    - D/C STATUS:  ---------------To be determined---------------  Payor: CARE IMPROVEMENT PLUS / Plan: [place] CARE IMPROVEMENT [place]US / Product Type: Managed Care Medicare /     TREATMENT:    (In addition to Assessment/Re-Assessment sessions the following treatments were rendered)  Assessment/Reassessment only, no treatment provided today  __________________________________________________________________________________________________  Safety:   After treatment position/precautions:  · RN notified  · Upright in Bed  Progression/Medical Necessity: · Skilled intervention continues to be required due to unable to attend/participate in therapy as expected. Compliance with Program/Exercises: Will assess as treatment progresses. Reason for Continuation of Services/Other Comments:  · Patient continues to require skilled intervention due to patient unable to attend/participate in therapy as expected. Recommendations/Intent for next treatment session: \"Treatment next visit will focus on cognitive assessment\".     Total Treatment Duration:  Time In: 1446  Time Out: [address] MS, CCC-SLP

## 2018-04-02 NOTE — PROGRESS NOTES
Patient being admitted to floor from ER  Reviewed notes    Allie Crowe, staff Lisandra parker 07, [facility]  /   [email or web address]

## 2018-04-02 NOTE — ED PROVIDER NOTES
CDNotes Templates                            Emergency Department     Chief Complaint:  Chest pain and leg weakness  HPI:  15-year-old male lives at home alone. Fell this morning. Was able to get up and call 911. Patient states she's had chest pain since last night. Constant pain. No alleviating. Until he was given some nitroglycerin by EMS. No radiation. Patient complains of lower extremity pain difficulty/inability to ambulate. Patient was able to walk last week and yesterday. This morning states he is unable to ambulate. Symptoms started last night  Severity of symptoms are described as moderate  Associated symptoms include none  Onset of symptoms was gradual    Historian: patient    Review of Systems:  Include pertinent positives and negatives.     CONST:  Denies: fever, chills,   ENT:  Denies: sore throat, earache, nasal congestion  EYES:  Denies: vision changes, double vision, discharge  CARDIO: Denies: chest pain, palpitations   RESP:  Sutures were exertion  GI:  Denies: abdominal pain, nausea, vomiting, diarrhea, melena,   :  Denies: dysuria, hematuria, urinary frequency  MUSC: Denies: muscle aches, joint pain  SKIN:  Denies: hives, rash  PSYCH: Denies: anxiety, depression  ENDO: Denies: polyuria, polydipsia  Heme/Lymph: Denies: easy bruising, bleeding  NEURO: Denies: headache, confusion, change in behavior, extremity weakness,      paresthesias  Past Medical History:  Past Medical History:   Diagnosis Date    Acute renal failure superimposed on stage 3 chronic kidney disease ([address] 9/21/2016    Anemia     Chronic kidney disease     not on HD yet- surgery done for access and here for elevation of fistula- FU with [facility] on 12/7/17=3.15    Chronic pancreatitis ([address] 9/22/2016    Dermatophytosis of nail 12/6/2016    Diabetic neuropathy ([address] 9/22/2016    avg fastings 200; last A1C-? ; hypo @ [de-identified]    Essential hypertension 9/22/2016    GERD (gastroesophageal reflux disease)     controlled with med    Hypercholesterolemia     Iron (Fe) deficiency anemia 9/22/2016    Kidney disease     Septicemia due to Klebsiella pneumoniae ([facility] 3/32/2077    Systolic CHF, chronic ([facility] 9/23/2016    Type II diabetes mellitus with nephropathy ([facility] 09/22/2016    Venous insufficiency 12/6/2016    Xerosis cutis 12/6/2016     Past Surgical History:   Procedure Laterality Date    HX COLONOSCOPY      HX HERNIA REPAIR Left 2010    HX PROSTATECTOMY  2012    HX TURP  2012    FOR BPH    VASCULAR SURGERY PROCEDURE UNLIST Left 10/26/2017    AVF     Social History   Substance Use Topics    Smoking status: Former Smoker     Packs/day: 2.00     Years: 20.00     Quit date: 1995    Smokeless tobacco: Current User    Alcohol use No     Family History   Problem Relation Age of Onset    Diabetes Mother     Stroke Mother     Hypertension Mother     No Known Problems Father     Diabetes Sister     Diabetes Brother     Diabetes Sister     Diabetes Sister     Other Sister      fibromyalgia     Previous Medications    AMLODIPINE (NORVASC) 10 MG TABLET    Take 10 mg by mouth every morning. ASPIRIN DELAYED-RELEASE 81 MG TABLET    Take 81 mg by mouth nightly. CALCIFEDIOL (RAYALDEE) 30 MCG CS24    Take  by mouth nightly. CYCLOBENZAPRINE (FLEXERIL) 10 MG TABLET    Take 1 Tab by mouth three (3) times daily as needed for Muscle Spasm(s). DIPHENOXYLATE-ATROPINE (LOMOTIL) 2.5-0.025 MG PER TABLET    Take 1 Tab by mouth two (2) times daily as needed for Diarrhea. Indications: Diarrhea    FERROUS SULFATE 325 MG (65 MG IRON) TABLET    Take 325 mg by mouth two (2) times a day. HUMALOG KWIKPEN INSULIN 100 UNIT/ML KWIKPEN    5 units three times a day with meals plus correction scale, 2 units/50 > 150, max daily dose 25 units    HYDROCODONE-ACETAMINOPHEN (NORCO) 5-325 MG PER TABLET    Take 1-2 Tabs by mouth every six (6) hours as needed for Pain. Max Daily Amount: 8 Tabs.     INSULIN GLARGINE (LANTUS,BASAGLAR) 100 UNIT/ML (3 ML) INPN    12 Units by SubCUTAneous route daily. METOPROLOL SUCCINATE (TOPROL-XL) 50 MG XL TABLET    Take 50 mg by mouth every morning. OMEPRAZOLE (PRILOSEC) 20 MG CAPSULE    Take 20 mg by mouth every morning. PREGABALIN (LYRICA) 50 MG CAPSULE    Take 1 Cap by mouth daily. Max Daily Amount: 50 mg.    TORSEMIDE (DEMADEX) 20 MG TABLET    Take 40 mg by mouth every morning. TRAMADOL (ULTRAM) 50 MG TABLET    Take 50 mg by mouth four (4) times daily. Pt is taking 3 X/day     Allergies as of 04/02/2018    (No Known Allergies)       Physical Exam:    Vital signs:   Visit Vitals    /70 (BP 1 Location: Right arm, BP Patient Position: At rest)    Pulse 70    Temp 97.6 °F (36.4 °C)    Resp 16    Wt 81.6 kg (180 lb)    SpO2 99%    BMI 25.1 kg/m2       Vital signs were reviewed. Pulse oximetry interpretation: hypoxia    General Appear: Elderly, non-toxic  Head:   atraumatic  Ears/Nose/Throat: throat clear, op moist  Eyes:   PERRL, EOMI, anicteric  Neck:   supple, FROM, no lymphadenopathy, no meningismus  Cardiovascular: regular rate 2 to 3/6 holosystolic murmur, radial pulses are intact bilaterally  Respiratory:  clear to auscultation with no wheezes, rales, ronchi  Abdomen:  soft, non-tender, no guarding/rebound,   Musculoskeletal: Bilateral edema of the lower extremities. Skin:   Venous stasis changes in the bilateral extremities are noted  Neurologic:  alert, oriented, patient is unable to elevate either leg off the stretcher. On exam his legs are extremely stiff. I am unable to flex the knee. 'speech is without slurring.   Left  is slightly weak.   _______________________________________________________________________    LABS/RADIOLOGY/EKG:    Labs:     Results for orders placed or performed during the hospital encounter of 21/62/80   METABOLIC PANEL, COMPREHENSIVE   Result Value Ref Range    Sodium 145 136 - 145 mmol/L    Potassium 4.3 3.5 - 5.1 mmol/L Chloride 112 (H) 98 - 107 mmol/L    CO2 25 21 - 32 mmol/L    Anion gap 8 7 - 16 mmol/L    Glucose 257 (H) 65 - 100 mg/dL    BUN 57 (H) 8 - 23 MG/DL    Creatinine 4.16 (H) 0.8 - 1.5 MG/DL    GFR est AA 18 (L) >60 ml/min/1.73m2    GFR est non-AA 15 (L) >60 ml/min/1.73m2    Calcium 8.6 8.3 - 10.4 MG/DL    Bilirubin, total 0.5 0.2 - 1.1 MG/DL    ALT (SGPT) 44 12 - 65 U/L    AST (SGOT) 42 (H) 15 - 37 U/L    Alk. phosphatase 76 50 - 136 U/L    Protein, total 7.0 6.3 - 8.2 g/dL    Albumin 3.5 3.2 - 4.6 g/dL    Globulin 3.5 2.3 - 3.5 g/dL    A-G Ratio 1.0 (L) 1.2 - 3.5     CBC WITH AUTOMATED DIFF   Result Value Ref Range    WBC 4.6 4.3 - 11.1 K/uL    RBC 4.14 (L) 4.23 - 5.67 M/uL    HGB 10.8 (L) 13.6 - 17.2 g/dL    HCT 34.1 (L) 41.1 - 50.3 %    MCV 82.4 79.6 - 97.8 FL    MCH 26.1 26.1 - 32.9 PG    MCHC 31.7 31.4 - 35.0 g/dL    RDW 16.2 (H) 11.9 - 14.6 %    PLATELET 206 (L) 860 - 450 K/uL    MPV Cannot be calculated 10.8 - 14.1 FL    DF AUTOMATED      NEUTROPHILS 47 43 - 78 %    LYMPHOCYTES 41 13 - 44 %    MONOCYTES 9 4.0 - 12.0 %    EOSINOPHILS 3 0.5 - 7.8 %    BASOPHILS 0 0.0 - 2.0 %    IMMATURE GRANULOCYTES 0 0.0 - 5.0 %    ABS. NEUTROPHILS 2.1 1.7 - 8.2 K/UL    ABS. LYMPHOCYTES 1.9 0.5 - 4.6 K/UL    ABS. MONOCYTES 0.4 0.1 - 1.3 K/UL    ABS. EOSINOPHILS 0.1 0.0 - 0.8 K/UL    ABS. BASOPHILS 0.0 0.0 - 0.2 K/UL    ABS. IMM.  GRANS. 0.0 0.0 - 0.5 K/UL   D DIMER   Result Value Ref Range    D DIMER 1.82 (HH) <0.56 ug/ml(FEU)   POC LACTIC ACID   Result Value Ref Range    Lactic Acid (POC) 0.9 0.5 - 1.9 mmol/L   POC TROPONIN-I   Result Value Ref Range    Troponin-I (POC) 0.01 (L) 0.02 - 0.05 ng/ml   EKG, 12 LEAD, INITIAL   Result Value Ref Range    Ventricular Rate 67 BPM    Atrial Rate 67 BPM    P-R Interval 242 ms    QRS Duration 88 ms    Q-T Interval 394 ms    QTC Calculation (Bezet) 416 ms    Calculated P Axis 34 degrees    Calculated R Axis -14 degrees    Calculated T Axis 49 degrees    Diagnosis       !! AGE AND GENDER SPECIFIC ECG ANALYSIS !! Sinus rhythm with 1st degree A-V block  Otherwise normal ECG  When compared with ECG of 03-APR-2017 10:15,  T wave inversion no longer evident in Inferior leads  Nonspecific T wave abnormality, improved in Lateral leads           Labs were reviewed and interpreted by me. Radiology studies performed:    XR CHEST PORT    (Results Pending)   CT HEAD WO CONT    (Results Pending)         ________________________________________________________________________  Progress:    60-year-old male lives at home alone. Normally ambulatory. Walks around his house without assistance. Unable to get up this morning. Unable to elevate his legs off the stretcher. He has significant rigidity on exam.    He was incontinent of stool. Had a fall yesterday. Was able to get up. He slept in a chair last night was unable to get out of it this morning. Nursing notes were reviewed: yes  Old medical records: obtained and reviewed:  lovely  Discussed patient with another provider: yes  _______________________________________________________________________  Assessment/Plan:    60-year-old male lives alone. Unable to ambulate this morning. Also had some chest pain since last night. Troponin is negative. EKG is unremarkable showing exam stiff and rigid unable to elevate his legs. No slurred speech. No confusion. I'm concerned that he is at significant risk for falls. CT head to evaluate for stroke or bleed or structural abnormality      _______________________________________________________________________  Condition:  guarded  Disposition:  admit  Diagnosis:    1. Weakness of both lower extremities    2. Elevated d-dimer    3.  Chest pain, unspecified type            Samantha Pino M.D.      Deandra Lentz; version 2.0; revised April, 2016

## 2018-04-02 NOTE — H&P
Hospitalist H&P Note     Admit Date:  2018  7:51 AM   Name:  Juanjo Bae. Age:  69 y.o.  :  1940   MRN:  [id number]   PCP:  Seema Weems MD  Treatment Team: Attending Provider: Ulysses Richards, MD; Primary Nurse: Iván Gonzalez    HPI:   73yr old with pmhx sig for dm, htn, ckd- with avf in place on the left arm,  systolic chf per chart with last known ef of 30-35% ( but patient denies). The patient was relatively well until yesterday while at [facility] dinner  He started experiencing cp around 4pm- family noted that patient did not look well and he had some slowness of gait with stumbling. He did not tell them about the chest pain- instead he allowed them to drop him home. At home he fell on the ground and it took him about 30 minutes to get up. When he got he he went to the recliner where he fell asleep. He woke up around 12:30 am needing to go to the bathroom. At that time he again noted the left sided chest pain. He was unable to move his legs to get up sometime around 6 am he reached for the phone with his cane and called ems. CVA workup in er- negative so far. Chest pain resolved- initial troponin negative. The patient has chronic intermittent diarrhea and wears diapers. Hospitalist asked to admit for cp, suspected chf vs lumbar spine pathology  He is poor historian - initially denying all complaints of chest pain with me- suspect due to his age as opposed to any desire to deceive. 10 systems reviewed and negative except as noted in HPI.   Past Medical History:   Diagnosis Date    Acute renal failure superimposed on stage 3 chronic kidney disease ([address] 2016    Anemia     Chronic kidney disease     not on HD yet- surgery done for access and here for elevation of fistula- FU with [facility]- creat on 17=3.15    Chronic pancreatitis ([address] 2016    Dermatophytosis of nail 2016    Diabetic neuropathy ([facility] 2016    avg fastings 200; last A1C-? ; hypo @ [de-identified]    Essential hypertension 2016    GERD (gastroesophageal reflux disease)     controlled with med    Hypercholesterolemia     Iron (Fe) deficiency anemia 2016    Kidney disease     Septicemia due to Klebsiella pneumoniae ([facility] 3/10/6560    Systolic CHF, chronic ([facility] 2016    Type II diabetes mellitus with nephropathy ([facility] 2016    Venous insufficiency 2016    Xerosis cutis 2016      Past Surgical History:   Procedure Laterality Date    HX COLONOSCOPY      HX HERNIA REPAIR Left     HX PROSTATECTOMY      HX TURP      FOR BPH    VASCULAR SURGERY PROCEDURE UNLIST Left 10/26/2017    AVF      No Known Allergies   Social History   Substance Use Topics    Smoking status: Former Smoker     Packs/day: 2.00     Years: 20.00     Quit date:     Smokeless tobacco: Current User    Alcohol use No      Family History   Problem Relation Age of Onset    Diabetes Mother     Stroke Mother     Hypertension Mother     No Known Problems Father     Diabetes Sister     Diabetes Brother     Diabetes Sister     Diabetes Sister     Other Sister      fibromyalgia        There is no immunization history on file for this patient. PTA Medications:  Prior to Admission Medications   Prescriptions Last Dose Informant Patient Reported? Taking? HUMALOG KWIKPEN INSULIN 100 unit/mL kwikpen   No No   Si units three times a day with meals plus correction scale, 2 units/50 > 150, max daily dose 25 units   HYDROcodone-acetaminophen (NORCO) 5-325 mg per tablet   No No   Sig: Take 1-2 Tabs by mouth every six (6) hours as needed for Pain. Max Daily Amount: 8 Tabs. amLODIPine (NORVASC) 10 mg tablet   Yes No   Sig: Take 10 mg by mouth every morning. aspirin delayed-release 81 mg tablet   Yes No   Sig: Take 81 mg by mouth nightly. calcifediol (RAYALDEE) 30 mcg Cs24   Yes No   Sig: Take  by mouth nightly.    cyclobenzaprine (FLEXERIL) 10 mg tablet   No No   Sig: Take 1 Tab by mouth three (3) times daily as needed for Muscle Spasm(s). diphenoxylate-atropine (LOMOTIL) 2.5-0.025 mg per tablet   Yes No   Sig: Take 1 Tab by mouth two (2) times daily as needed for Diarrhea. Indications: Diarrhea   ferrous sulfate 325 mg (65 mg iron) tablet   Yes No   Sig: Take 325 mg by mouth two (2) times a day. insulin glargine (LANTUS,BASAGLAR) 100 unit/mL (3 mL) inpn   No No   Si Units by SubCUTAneous route daily. metoprolol succinate (TOPROL-XL) 50 mg XL tablet   Yes No   Sig: Take 50 mg by mouth every morning. omeprazole (PRILOSEC) 20 mg capsule   Yes No   Sig: Take 20 mg by mouth every morning. pregabalin (LYRICA) 50 mg capsule   No No   Sig: Take 1 Cap by mouth daily. Max Daily Amount: 50 mg. Patient taking differently: Take 150 mg by mouth two (2) times a day. torsemide (DEMADEX) 20 mg tablet   Yes No   Sig: Take 40 mg by mouth every morning. traMADol (ULTRAM) 50 mg tablet   Yes No   Sig: Take 50 mg by mouth four (4) times daily. Pt is taking 3 X/day      Facility-Administered Medications: None       Objective:   Patient Vitals for the past 24 hrs:   Temp Pulse Resp BP SpO2   18 1133 - 62 22 147/63 -   18 1102 - 65 28 170/77 -   18 1031 - 64 16 151/68 -   18 1001 - 65 14 141/65 -   18 0932 - 66 19 153/70 -   18 09 - 67 18 137/59 -   18 0831 - 73 (!) 31 144/71 -   18 0802 - 67 16 147/74 100 %   18 0754 - - - - 99 %   18 0751 - 67 11 161/70 99 %   18 0750 97.6 °F (36.4 °C) 70 16 161/70 99 %     Oxygen Therapy  O2 Sat (%): 100 % (18)  Pulse via Oximetry: 68 beats per minute (04/02/18 0802)  O2 Device: Room air (18 3635)  No intake or output data in the 24 hours ending 18 1354    Physical Exam:  General:    Well nourished. Alert. Eyes:   Normal sclera. Extraocular movements intact. ENT:  Normocephalic, atraumatic. Moist mucous membranes  CV:   RRR. No m/r/g.   Peripheral pulses present. Capillary refill normal  Lungs:  CTAB. No wheezing, rhonchi, or rales. Abdomen: Soft, nontender, nondistended. Bowel sounds normal.   Extremities: Warm and dry. BL lower extremity edema. Venous stasis changes  Neurologic: CN II-XII grossly intact. Sensation intact. Skin:     No rashes or jaundice. discoloration lower legs- blebs to lower legs  Psych:  Normal mood and affect. I reviewed the labs, imaging, EKGs, telemetry, and other studies done this admission. Data Review:   Recent Results (from the past 24 hour(s))   EKG, 12 LEAD, INITIAL    Collection Time: 04/02/18  7:48 AM   Result Value Ref Range    Ventricular Rate 67 BPM    Atrial Rate 67 BPM    P-R Interval 242 ms    QRS Duration 88 ms    Q-T Interval 394 ms    QTC Calculation (Bezet) 416 ms    Calculated P Axis 34 degrees    Calculated R Axis -14 degrees    Calculated T Axis 49 degrees    Diagnosis       !! AGE AND GENDER SPECIFIC ECG ANALYSIS !!   Sinus rhythm with 1st degree A-V block  Otherwise normal ECG  When compared with ECG of 03-APR-2017 10:15,  T wave inversion no longer evident in Inferior leads  Nonspecific T wave abnormality, improved in Lateral leads  Confirmed by EDIE PISANO (), MEETii[employer] Player ([id number]) on 4/2/2018 1:00:01 PM     POC TROPONIN-I    Collection Time: 04/02/18  8:04 AM   Result Value Ref Range    Troponin-I (POC) 0.01 (L) 0.02 - 0.05 ng/ml   POC LACTIC ACID    Collection Time: 04/02/18  8:07 AM   Result Value Ref Range    Lactic Acid (POC) 0.9 0.5 - 1.9 mmol/L   METABOLIC PANEL, COMPREHENSIVE    Collection Time: 04/02/18  8:07 AM   Result Value Ref Range    Sodium 145 136 - 145 mmol/L    Potassium 4.3 3.5 - 5.1 mmol/L    Chloride 112 (H) 98 - 107 mmol/L    CO2 25 21 - 32 mmol/L    Anion gap 8 7 - 16 mmol/L    Glucose 257 (H) 65 - 100 mg/dL    BUN 57 (H) 8 - 23 MG/DL    Creatinine 4.16 (H) 0.8 - 1.5 MG/DL    GFR est AA 18 (L) >60 ml/min/1.73m2    GFR est non-AA 15 (L) >60 ml/min/1.73m2    Calcium 8.6 8.3 - 10.4 MG/DL Bilirubin, total 0.5 0.2 - 1.1 MG/DL    ALT (SGPT) 44 12 - 65 U/L    AST (SGOT) 42 (H) 15 - 37 U/L    Alk. phosphatase 76 50 - 136 U/L    Protein, total 7.0 6.3 - 8.2 g/dL    Albumin 3.5 3.2 - 4.6 g/dL    Globulin 3.5 2.3 - 3.5 g/dL    A-G Ratio 1.0 (L) 1.2 - 3.5     CBC WITH AUTOMATED DIFF    Collection Time: 04/02/18  8:07 AM   Result Value Ref Range    WBC 4.6 4.3 - 11.1 K/uL    RBC 4.14 (L) 4.23 - 5.67 M/uL    HGB 10.8 (L) 13.6 - 17.2 g/dL    HCT 34.1 (L) 41.1 - 50.3 %    MCV 82.4 79.6 - 97.8 FL    MCH 26.1 26.1 - 32.9 PG    MCHC 31.7 31.4 - 35.0 g/dL    RDW 16.2 (H) 11.9 - 14.6 %    PLATELET 681 (L) 916 - 450 K/uL    MPV Cannot be calculated 10.8 - 14.1 FL    DF AUTOMATED      NEUTROPHILS 47 43 - 78 %    LYMPHOCYTES 41 13 - 44 %    MONOCYTES 9 4.0 - 12.0 %    EOSINOPHILS 3 0.5 - 7.8 %    BASOPHILS 0 0.0 - 2.0 %    IMMATURE GRANULOCYTES 0 0.0 - 5.0 %    ABS. NEUTROPHILS 2.1 1.7 - 8.2 K/UL    ABS. LYMPHOCYTES 1.9 0.5 - 4.6 K/UL    ABS. MONOCYTES 0.4 0.1 - 1.3 K/UL    ABS. EOSINOPHILS 0.1 0.0 - 0.8 K/UL    ABS. BASOPHILS 0.0 0.0 - 0.2 K/UL    ABS. IMM. GRANS. 0.0 0.0 - 0.5 K/UL   D DIMER    Collection Time: 04/02/18  8:07 AM   Result Value Ref Range    D DIMER 1.82 (HH) <0.56 ug/ml(FEU)       All Micro Results     None          Other Studies:  Ct Head Wo Cont    Result Date: 4/2/2018  CT scan of the brain 4/2/2018 Scanning was performed within 24 hours of arrival to the facility Comparison: None Dose reduction techniques used: Automated exposure control, adjustment of the mAs and/or kVp according to patient size, standardized low-dose protocol, and/or iterative reconstruction technique. Indication: Weakness Findings: There is some streak artifact from metallic BBs within the right frontal and right temporal scalp. The brain parenchyma and ventricular structures are unremarkable. There is normal white-grey matter differentiation. There are no mass lesions, hemorrhage, or evidence of an acute stroke. The calvarium and the sinuses are unremarkable. Impression: Negative CT scan of the brain. Note: If a subtle CVA is suspected, MRI would be more definitive if clinically warranted. Xr Chest Port    Result Date: 4/2/2018  Portable chest x-ray April 2, 2018: CHF Heart is enlarged. Lung fields are clear and soft tissues and bony structures are unremarkable. IMPRESSION: Cardiomegaly, clear lung fields      Assessment and Plan:     Hospital Problems as of 4/2/2018  Date Reviewed: 2/28/2018          Codes Class Noted - Resolved POA    TIA (transient ischemic attack) ICD-10-CM: G45.9  ICD-9-CM: 435.9  4/2/2018 - Present Unknown        CVA (cerebral vascular accident) [facility]) ICD-10-CM: I63.9  ICD-9-CM: 434.91  4/2/2018 - Present Unknown        Stasis edema of both lower extremities (Chronic) ICD-10-CM: I87.303  ICD-9-CM: 459.30  4/4/2017 - Present Yes        Venous insufficiency ICD-10-CM: I87.2  ICD-9-CM: 459.81  12/6/2016 - Present Yes        Acute on chronic systolic congestive heart failure (HCC) (Chronic) ICD-10-CM: E00.94  ICD-9-CM: 428.23, 428.0  9/23/2016 - Present Yes        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  ICD-9-CM: 250.40, 583.81  9/22/2016 - Present Yes              PLAN:  · Concern for cva- ct head negative- will continue secondary ppx, place on tele, echo- less concerned for this as opposed to neurological issue in the spine- so will hold brain MRI for now as the patient is appropriate  · Chest pain- with concern for acs- trend trops- place on tele  · Concern for chf exacerbation- likely from dietary indiscretions yesterday- check bnp and echo, lasix,  appropriate cardiac meds, cardiology consult  · ?  Cauda equina- vs just debility- pt with weak anal tone, difficulty moving legs- MRI LS spine-- consult neurosurgery if indicated, will give  1 dose of decadron  · CKD - supportive care avoid nephrotoxins; dose meds for renal fxn; consult renal as needed  · DM- monitor bg and cover with insulin  · HTN- continue home meds    DVT ppx:  heparin  Anticipated DC needs:  3-4 days  Code status:  Full  Estimated LOS:  Greater than 2 midnights  Risk:  High given concern for cauda equina, chf, volume overload, ckd- hopefully wont require HD    Signed:  Ayanna Tavares MD

## 2018-04-03 ENCOUNTER — APPOINTMENT (OUTPATIENT)
Dept: MRI IMAGING | Age: 78
DRG: 074 | End: 2018-04-03
Attending: INTERNAL MEDICINE
Payer: MEDICARE

## 2018-04-03 LAB
ANION GAP SERPL CALC-SCNC: 15 MMOL/L (ref 7–16)
BASOPHILS # BLD: 0 K/UL (ref 0–0.2)
BASOPHILS NFR BLD: 0 % (ref 0–2)
BUN SERPL-MCNC: 58 MG/DL (ref 8–23)
CALCIUM SERPL-MCNC: 8.9 MG/DL (ref 8.3–10.4)
CHLORIDE SERPL-SCNC: 111 MMOL/L (ref 98–107)
CHOLEST SERPL-MCNC: 99 MG/DL
CO2 SERPL-SCNC: 20 MMOL/L (ref 21–32)
CREAT SERPL-MCNC: 3.36 MG/DL (ref 0.8–1.5)
DIFFERENTIAL METHOD BLD: ABNORMAL
EOSINOPHIL # BLD: 0 K/UL (ref 0–0.8)
EOSINOPHIL NFR BLD: 0 % (ref 0.5–7.8)
ERYTHROCYTE [DISTWIDTH] IN BLOOD BY AUTOMATED COUNT: 16.2 % (ref 11.9–14.6)
EST. AVERAGE GLUCOSE BLD GHB EST-MCNC: 163 MG/DL
GLUCOSE BLD STRIP.AUTO-MCNC: 188 MG/DL (ref 65–100)
GLUCOSE BLD STRIP.AUTO-MCNC: 244 MG/DL (ref 65–100)
GLUCOSE BLD STRIP.AUTO-MCNC: 287 MG/DL (ref 65–100)
GLUCOSE BLD STRIP.AUTO-MCNC: 351 MG/DL (ref 65–100)
GLUCOSE BLD STRIP.AUTO-MCNC: 398 MG/DL (ref 65–100)
GLUCOSE BLD STRIP.AUTO-MCNC: 436 MG/DL (ref 65–100)
GLUCOSE BLD STRIP.AUTO-MCNC: 459 MG/DL (ref 65–100)
GLUCOSE SERPL-MCNC: 287 MG/DL (ref 65–100)
HBA1C MFR BLD: 7.3 % (ref 4.8–6)
HCT VFR BLD AUTO: 34.8 % (ref 41.1–50.3)
HDLC SERPL-MCNC: 65 MG/DL (ref 40–60)
HDLC SERPL: 1.5 {RATIO}
HGB BLD-MCNC: 11 G/DL (ref 13.6–17.2)
IMM GRANULOCYTES # BLD: 0 K/UL (ref 0–0.5)
IMM GRANULOCYTES NFR BLD AUTO: 0 % (ref 0–5)
LDLC SERPL CALC-MCNC: 23 MG/DL
LIPID PROFILE,FLP: ABNORMAL
LYMPHOCYTES # BLD: 0.7 K/UL (ref 0.5–4.6)
LYMPHOCYTES NFR BLD: 15 % (ref 13–44)
MAGNESIUM SERPL-MCNC: 1.5 MG/DL (ref 1.8–2.4)
MCH RBC QN AUTO: 25.7 PG (ref 26.1–32.9)
MCHC RBC AUTO-ENTMCNC: 31.6 G/DL (ref 31.4–35)
MCV RBC AUTO: 81.3 FL (ref 79.6–97.8)
MM INDURATION POC: 0 MM (ref 0–5)
MONOCYTES # BLD: 0.1 K/UL (ref 0.1–1.3)
MONOCYTES NFR BLD: 1 % (ref 4–12)
NEUTS SEG # BLD: 4.1 K/UL (ref 1.7–8.2)
NEUTS SEG NFR BLD: 84 % (ref 43–78)
PLATELET # BLD AUTO: 126 K/UL (ref 150–450)
PMV BLD AUTO: 12.4 FL (ref 10.8–14.1)
POTASSIUM SERPL-SCNC: 3.8 MMOL/L (ref 3.5–5.1)
PPD POC: NEGATIVE NEGATIVE
RBC # BLD AUTO: 4.28 M/UL (ref 4.23–5.67)
SODIUM SERPL-SCNC: 146 MMOL/L (ref 136–145)
TRIGL SERPL-MCNC: 55 MG/DL (ref 35–150)
TROPONIN I SERPL-MCNC: <0.02 NG/ML (ref 0.02–0.05)
TROPONIN I SERPL-MCNC: <0.02 NG/ML (ref 0.02–0.05)
VLDLC SERPL CALC-MCNC: 11 MG/DL (ref 6–23)
WBC # BLD AUTO: 4.9 K/UL (ref 4.3–11.1)

## 2018-04-03 PROCEDURE — 65660000000 HC RM CCU STEPDOWN

## 2018-04-03 PROCEDURE — 83735 ASSAY OF MAGNESIUM: CPT | Performed by: INTERNAL MEDICINE

## 2018-04-03 PROCEDURE — 74011636637 HC RX REV CODE- 636/637: Performed by: INTERNAL MEDICINE

## 2018-04-03 PROCEDURE — 84484 ASSAY OF TROPONIN QUANT: CPT | Performed by: INTERNAL MEDICINE

## 2018-04-03 PROCEDURE — 74011250637 HC RX REV CODE- 250/637: Performed by: INTERNAL MEDICINE

## 2018-04-03 PROCEDURE — 83036 HEMOGLOBIN GLYCOSYLATED A1C: CPT | Performed by: INTERNAL MEDICINE

## 2018-04-03 PROCEDURE — 80061 LIPID PANEL: CPT | Performed by: INTERNAL MEDICINE

## 2018-04-03 PROCEDURE — 97162 PT EVAL MOD COMPLEX 30 MIN: CPT

## 2018-04-03 PROCEDURE — 74011250636 HC RX REV CODE- 250/636: Performed by: INTERNAL MEDICINE

## 2018-04-03 PROCEDURE — 36415 COLL VENOUS BLD VENIPUNCTURE: CPT | Performed by: INTERNAL MEDICINE

## 2018-04-03 PROCEDURE — 82962 GLUCOSE BLOOD TEST: CPT

## 2018-04-03 PROCEDURE — 70551 MRI BRAIN STEM W/O DYE: CPT

## 2018-04-03 PROCEDURE — 80048 BASIC METABOLIC PNL TOTAL CA: CPT | Performed by: INTERNAL MEDICINE

## 2018-04-03 PROCEDURE — 85025 COMPLETE CBC W/AUTO DIFF WBC: CPT | Performed by: INTERNAL MEDICINE

## 2018-04-03 RX ORDER — INSULIN LISPRO 100 [IU]/ML
10 INJECTION, SOLUTION INTRAVENOUS; SUBCUTANEOUS ONCE
Status: COMPLETED | OUTPATIENT
Start: 2018-04-03 | End: 2018-04-03

## 2018-04-03 RX ORDER — ISOSORBIDE DINITRATE 20 MG/1
20 TABLET ORAL 3 TIMES DAILY
Status: DISCONTINUED | OUTPATIENT
Start: 2018-04-03 | End: 2018-04-04 | Stop reason: HOSPADM

## 2018-04-03 RX ORDER — INSULIN LISPRO 100 [IU]/ML
4 INJECTION, SOLUTION INTRAVENOUS; SUBCUTANEOUS ONCE
Status: COMPLETED | OUTPATIENT
Start: 2018-04-03 | End: 2018-04-03

## 2018-04-03 RX ORDER — MAGNESIUM SULFATE HEPTAHYDRATE 40 MG/ML
2 INJECTION, SOLUTION INTRAVENOUS ONCE
Status: COMPLETED | OUTPATIENT
Start: 2018-04-03 | End: 2018-04-03

## 2018-04-03 RX ORDER — INSULIN GLARGINE 100 [IU]/ML
15 INJECTION, SOLUTION SUBCUTANEOUS DAILY
Status: DISCONTINUED | OUTPATIENT
Start: 2018-04-04 | End: 2018-04-04 | Stop reason: HOSPADM

## 2018-04-03 RX ORDER — INSULIN LISPRO 100 [IU]/ML
6 INJECTION, SOLUTION INTRAVENOUS; SUBCUTANEOUS ONCE
Status: DISCONTINUED | OUTPATIENT
Start: 2018-04-03 | End: 2018-04-03

## 2018-04-03 RX ORDER — HYDRALAZINE HYDROCHLORIDE 25 MG/1
25 TABLET, FILM COATED ORAL 3 TIMES DAILY
Status: DISCONTINUED | OUTPATIENT
Start: 2018-04-03 | End: 2018-04-04 | Stop reason: HOSPADM

## 2018-04-03 RX ORDER — TORSEMIDE 20 MG/1
50 TABLET ORAL DAILY
Status: DISCONTINUED | OUTPATIENT
Start: 2018-04-03 | End: 2018-04-04 | Stop reason: HOSPADM

## 2018-04-03 RX ADMIN — Medication 10 ML: at 21:55

## 2018-04-03 RX ADMIN — INSULIN LISPRO 10 UNITS: 100 INJECTION, SOLUTION INTRAVENOUS; SUBCUTANEOUS at 15:05

## 2018-04-03 RX ADMIN — HEPARIN SODIUM 5000 UNITS: 5000 INJECTION, SOLUTION INTRAVENOUS; SUBCUTANEOUS at 04:51

## 2018-04-03 RX ADMIN — HYDRALAZINE HYDROCHLORIDE 25 MG: 25 TABLET, FILM COATED ORAL at 17:59

## 2018-04-03 RX ADMIN — INSULIN GLARGINE 12 UNITS: 100 INJECTION, SOLUTION SUBCUTANEOUS at 08:42

## 2018-04-03 RX ADMIN — ISOSORBIDE DINITRATE 20 MG: 20 TABLET ORAL at 21:55

## 2018-04-03 RX ADMIN — PANTOPRAZOLE SODIUM 40 MG: 40 TABLET, DELAYED RELEASE ORAL at 04:51

## 2018-04-03 RX ADMIN — Medication 10 ML: at 04:51

## 2018-04-03 RX ADMIN — DEXAMETHASONE SODIUM PHOSPHATE 4 MG: 4 INJECTION, SOLUTION INTRAMUSCULAR; INTRAVENOUS at 04:51

## 2018-04-03 RX ADMIN — DEXAMETHASONE SODIUM PHOSPHATE 4 MG: 4 INJECTION, SOLUTION INTRAMUSCULAR; INTRAVENOUS at 15:07

## 2018-04-03 RX ADMIN — TORSEMIDE 50 MG: 20 TABLET ORAL at 08:38

## 2018-04-03 RX ADMIN — ISOSORBIDE DINITRATE 20 MG: 20 TABLET ORAL at 09:19

## 2018-04-03 RX ADMIN — AMLODIPINE BESYLATE 10 MG: 10 TABLET ORAL at 08:39

## 2018-04-03 RX ADMIN — ASPIRIN 325 MG ORAL TABLET 325 MG: 325 PILL ORAL at 08:38

## 2018-04-03 RX ADMIN — INSULIN LISPRO 10 UNITS: 100 INJECTION, SOLUTION INTRAVENOUS; SUBCUTANEOUS at 18:02

## 2018-04-03 RX ADMIN — Medication 10 ML: at 15:07

## 2018-04-03 RX ADMIN — HYDRALAZINE HYDROCHLORIDE 25 MG: 25 TABLET, FILM COATED ORAL at 08:39

## 2018-04-03 RX ADMIN — INSULIN LISPRO 10 UNITS: 100 INJECTION, SOLUTION INTRAVENOUS; SUBCUTANEOUS at 11:54

## 2018-04-03 RX ADMIN — HYDRALAZINE HYDROCHLORIDE 25 MG: 25 TABLET, FILM COATED ORAL at 21:55

## 2018-04-03 RX ADMIN — MAGNESIUM SULFATE HEPTAHYDRATE 2 G: 40 INJECTION, SOLUTION INTRAVENOUS at 08:55

## 2018-04-03 RX ADMIN — DEXAMETHASONE SODIUM PHOSPHATE 4 MG: 4 INJECTION, SOLUTION INTRAMUSCULAR; INTRAVENOUS at 21:55

## 2018-04-03 RX ADMIN — ISOSORBIDE DINITRATE 20 MG: 20 TABLET ORAL at 17:59

## 2018-04-03 RX ADMIN — INSULIN LISPRO 6 UNITS: 100 INJECTION, SOLUTION INTRAVENOUS; SUBCUTANEOUS at 08:44

## 2018-04-03 RX ADMIN — INSULIN LISPRO 4 UNITS: 100 INJECTION, SOLUTION INTRAVENOUS; SUBCUTANEOUS at 18:03

## 2018-04-03 RX ADMIN — INSULIN LISPRO 2 UNITS: 100 INJECTION, SOLUTION INTRAVENOUS; SUBCUTANEOUS at 22:16

## 2018-04-03 RX ADMIN — METOPROLOL SUCCINATE 50 MG: 50 TABLET, EXTENDED RELEASE ORAL at 08:39

## 2018-04-03 RX ADMIN — PRAVASTATIN SODIUM 80 MG: 80 TABLET ORAL at 21:55

## 2018-04-03 NOTE — CONSULTS
CC:  S/p Lumbar decompression and fusion at OSH [facility])    HPI:  68 yo with lumbar osteo/abscess originally admitted to [facility], then ultimately transferred to [facility] for a lumbar decompression and fusion for abscess and stenosis. He was transferred back to this hospital for continued PO care. His surgery was 4 days ago and he still has a SURAJ drain. He states he is much improved but still has some back pain but denies significant radicular pain , weakness or numbness in his legs. PMH/PSH:  Per chart. PE:  AAOx3, Strength in his lower extrems is 5/5 B/L and he is ambulating. No complaints of bowel or bladder dysfunction. Xray:  No new films to review. A/P:  S/p Lumbar decompression/fixation at OSH. Here fopr PO care. ID following. Continue drain for now. PT/OT. No acute NSurg intervention indicated at this time. Will follow with you.

## 2018-04-03 NOTE — PROGRESS NOTES
Patient's blood sugar was 351. Hospitalist notified. Will give 10 units of Humalog per sliding scale order and retake 2 hours after insulin administered. Patient is currently in MRI. Will administer insulin upon arrival back to the floor. Update:  Patient's repeat blood sugar was 436 at 1400. Hospitalist contacted. New orders received for 10 units of Humalog (one time) and check blood sugar again in two hours. Update:  Patient's next blood sugar was 398. Hospitalist contacted. New orders received for 10 units of sliding scale (already ordered) and a one-time dose of 4 units of Humalog. Blood sugar is to be rechecked in 3 hours.

## 2018-04-03 NOTE — PROGRESS NOTES
OT NOTE:    Charts reviewed, orders appreciated. Spoke with Hospitalist, Dr. Nohemi Hyde, who is recommending hold on therapy until after Neurosurgery consult.  Will hold and re-attempt at later time/date, once cleared by MD.    Thanks,    Jesus Dalton, OTR/L

## 2018-04-03 NOTE — PROGRESS NOTES
[facility] CARDIOLOGY PROGRESS NOTE    4/3/2018 8:20 AM    Admit Date: 4/2/2018    Admit Diagnosis: CVA (cerebral vascular accident) (HCC);TIA (transient ische*      Subjective:   Stable overnight from CV standpoint without angina, CHF, or palpitations. Vitals stable and controlled. No other complaints overnight. Tolerating meds well.  on admit, BP elevated, renal function a little better today. Objective:      Vitals:    04/02/18 2015 04/03/18 0000 04/03/18 0308 04/03/18 0800   BP: 166/84 187/74 123/62 166/71   Pulse: 66 63 73 64   Resp: 18 18 10 17   Temp: 98.1 °F (36.7 °C) 97.7 °F (36.5 °C) 97.9 °F (36.6 °C) 98.1 °F (36.7 °C)   SpO2: 99% 100% 100% 100%   Weight:       Height:           Physical Exam:  Neck- supple, no JVD at 60 deg  CV- regular rate and rhythm no MRG  Lung- clear bilaterally, mildly decreased bibasilar  Abd- soft, nontender, nondistended  Ext- no edema  Skin- warm and dry    Data Review:   Recent Labs      04/03/18   0616  04/02/18   0807   NA  146*  145   K  3.8  4.3   MG  1.5*   --    BUN  58*  57*   CREA  3.36*  4.16*   GLU  287*  257*   WBC  4.9  4.6   HGB  11.0*  10.8*   HCT  34.8*  34.1*   PLT  126*  117*   CHOL  99   --    TRIGL  55   --    HDL  65*   --        Assessment and Plan:     Chronic systolic CHF- euvolemic at present, does not clinically appear to be in CHF- EF 30-35% chronically by prior echo's- recheck echo, continue meds and augment CHF regimen as tolerated/needed during hospitalization- we will follow with you. No ACE/ARB with renal failure. Stop low dose lisinopril, start hydralazine/isordil for CHF/BP issues. Convert from IV lasix to po torsemide today.      Chest discomfort- resolved, negative troponin on admit- sounds musculoskeletal, reproducible with deep breath, palpation. ECG without ischemic changes and trop negative. Follow clinically for now.    Gone today, no chest pain overnight or this AM.      LE weakness, acute- MRI noted,  workup proceeding.      Possible CVA-  See above. Diabetes Mellitus Type II- stable, meds per hospitalists     Stage 4 chronic kidney disease- recheck in AM- no ACE/ARB- avoid hypotension     Hypertension-  See above, continue CHF regimen, titrate as needed. Preoperative evaluation- at least moderate risk for lumbar spine surgery at present- continue meds, addition/changes as above today. Await echo results. BMP, CBC, Mg in AM       A.  Maikel Pena MD  [facility] Cardiology  Pager [phone]

## 2018-04-03 NOTE — PROGRESS NOTES
Problem: Interdisciplinary Rounds  Goal: Interdisciplinary Rounds  Outcome: Progressing Towards Goal  Interdisciplinary team rounds were held 4/3/2018 with the following team members:Physical Therapy, Physician and  and the patient. Anticipate back surgery tomorrow pending a family decision. Plan of care discussed. See clinical pathway and/or care plan for interventions and desired outcomes.

## 2018-04-03 NOTE — CONSULTS
Previous note was in error. ...wrong patient. CC:  Difficulty with holding stool    HPI:  76yo presents with chronic history of back pain and diarrhea. He was c/o some B/L LE paresthesias as well. Currently, he denies any leg pain or weakness and his numbness is essentially resolved with steroids. He walked from his wheelchair to his bed and denies any saddle paresthesias. PMH/PSH:  In chart    PE:  Strength is 5/5 in LE's B/L. Sensation is grossly intact, but he reportedly has decreased but not absent rectal tone. His reflexes are symmetric. MRI:  Shows severe stenosis L34 along with some diffuse spondylosis. A/P:  76yo with severe L34 stenosis and possibly early cauda equina syndrome. I do think that Mr. Raissa Sanchez is a candidate for a L3 laminectomy due to his severe stenosis and symtoms. I do not think this urgent and he does nor want to commit to anything without talking to his family tonight. I think this is reasonable, but I did explain to him he could worsen acutely and not regain function. He still did not want to pursue any aggressive treatment right now. He is NPO after MN. ASA and heparin have been DC'd.

## 2018-04-03 NOTE — PROGRESS NOTES
Hospitalist Progress Note     Admit Date:  2018  7:51 AM   Name:  Fabien Arguello. Age:  69 y.o.  :  1940   MRN:  [id number]   PCP:  Los White MD  Treatment Team: Attending Provider: Rolanda Mackay MD; Consulting Provider: Abimael Martinez MD; Consulting Provider: Leanna Noland MD; Charge Nurse: Lidia Nix RN; Charge Nurse: Carmela Lawler    Subjective:   Sowmya Schaefer is a 70 yo male who presented with chest pain, stool incontinence, and slow gait secondary to weakness in LL on a background of  dm, htn, ckd- with avf in place on the left arm. He was diagnosed with chronic systolic CHF. There was also concern for possible CVA secondary to LE weakness (left>right). He also has decreased  rectal tone and stool incontinence. This morning, he continues to report left LL weakness. He denies any chest pain or dyspnea.       Objective:   Patient Vitals for the past 24 hrs:   Temp Pulse Resp BP SpO2   18 0800 98.1 °F (36.7 °C) 64 17 166/71 100 %   18 0308 97.9 °F (36.6 °C) 73 10 123/62 100 %   18 0000 97.7 °F (36.5 °C) 63 18 187/74 100 %   18 2015 98.1 °F (36.7 °C) 66 18 166/84 99 %   18 1648 97.7 °F (36.5 °C) 64 20 163/63 100 %   18 1435 97.5 °F (36.4 °C) 61 20 184/74 99 %   18 1133 - 62 22 147/63 -   18 1102 - 65 28 170/77 -   18 1031 - 64 16 151/68 -   18 1001 - 65 14 141/65 -   18 0932 - 66 19 153/70 -   18 0902 - 67 18 137/59 -   18 0831 - 73 (!) 31 144/71 -     Oxygen Therapy  O2 Sat (%): 100 % (18 0800)  Pulse via Oximetry: 68 beats per minute (18 0802)  O2 Device: Room air (18 0754)    Intake/Output Summary (Last 24 hours) at 18 0811  Last data filed at 18 1650   Gross per 24 hour   Intake                0 ml   Output              200 ml   Net             -200 ml           General appearance: NAD, conversant  Eyes: anicteric sclerae, moist conjunctivae; no lid-lag  ENT: Atraumatic; oropharynx clear with moist mucous membranes and no mucosal ulcerations; normal hard and soft palate  Neck: Trachea midline   FROM, supple, no thyromegaly or lymphadenopathy  Lungs: CTA, with normal respiratory effort and no intercostal retractions  CV: S1,S2 present, no added murmurs  Abdomen: Soft, non-tender; no masses   Extremities: No peripheral edema or extremity lymphadenopathy, 5/5 strength right LL, 4/5 strength in left LL  Skin: Normal temperature, turgor and texture; no subcutaneous nodules  Psych: Appropriate affect, alert and oriented to person, place and time    Data Review:  I have reviewed all labs, meds, telemetry events, and studies from the last 24 hours.     Recent Results (from the past 24 hour(s))   CK    Collection Time: 04/02/18  3:37 PM   Result Value Ref Range    CK 1282 (H) 21 - 215 U/L   TROPONIN I    Collection Time: 04/02/18  3:37 PM   Result Value Ref Range    Troponin-I, Qt. <0.02 (L) 0.02 - 0.05 NG/ML   GLUCOSE, POC    Collection Time: 04/02/18  4:33 PM   Result Value Ref Range    Glucose (POC) 264 (H) 65 - 100 mg/dL   TROPONIN I    Collection Time: 04/02/18  8:25 PM   Result Value Ref Range    Troponin-I, Qt. <0.02 (L) 0.02 - 0.05 NG/ML   TROPONIN I    Collection Time: 04/02/18 10:53 PM   Result Value Ref Range    Troponin-I, Qt. <0.02 (L) 0.02 - 0.05 NG/ML   GLUCOSE, POC    Collection Time: 04/03/18 12:03 AM   Result Value Ref Range    Glucose (POC) 244 (H) 65 - 100 mg/dL   LIPID PANEL    Collection Time: 04/03/18  6:16 AM   Result Value Ref Range    LIPID PROFILE          Cholesterol, total 99 <200 MG/DL    Triglyceride 55 35 - 150 MG/DL    HDL Cholesterol 65 (H) 40 - 60 MG/DL    LDL, calculated 23 <100 MG/DL    VLDL, calculated 11 6.0 - 23.0 MG/DL    CHOL/HDL Ratio 1.5     CBC WITH AUTOMATED DIFF    Collection Time: 04/03/18  6:16 AM   Result Value Ref Range    WBC 4.9 4.3 - 11.1 K/uL    RBC 4.28 4.23 - 5.67 M/uL    HGB 11.0 (L) 13.6 - 17.2 g/dL    HCT 34.8 (L) 41.1 - 50.3 %    MCV 81.3 79.6 - 97.8 FL    MCH 25.7 (L) 26.1 - 32.9 PG    MCHC 31.6 31.4 - 35.0 g/dL    RDW 16.2 (H) 11.9 - 14.6 %    PLATELET 345 (L) 581 - 450 K/uL    MPV 12.4 10.8 - 14.1 FL    DF AUTOMATED      NEUTROPHILS 84 (H) 43 - 78 %    LYMPHOCYTES 15 13 - 44 %    MONOCYTES 1 (L) 4.0 - 12.0 %    EOSINOPHILS 0 (L) 0.5 - 7.8 %    BASOPHILS 0 0.0 - 2.0 %    IMMATURE GRANULOCYTES 0 0.0 - 5.0 %    ABS. NEUTROPHILS 4.1 1.7 - 8.2 K/UL    ABS. LYMPHOCYTES 0.7 0.5 - 4.6 K/UL    ABS. MONOCYTES 0.1 0.1 - 1.3 K/UL    ABS. EOSINOPHILS 0.0 0.0 - 0.8 K/UL    ABS. BASOPHILS 0.0 0.0 - 0.2 K/UL    ABS. IMM.  GRANS. 0.0 0.0 - 0.5 K/UL   METABOLIC PANEL, BASIC    Collection Time: 04/03/18  6:16 AM   Result Value Ref Range    Sodium 146 (H) 136 - 145 mmol/L    Potassium 3.8 3.5 - 5.1 mmol/L    Chloride 111 (H) 98 - 107 mmol/L    CO2 20 (L) 21 - 32 mmol/L    Anion gap 15 7 - 16 mmol/L    Glucose 287 (H) 65 - 100 mg/dL    BUN 58 (H) 8 - 23 MG/DL    Creatinine 3.36 (H) 0.8 - 1.5 MG/DL    GFR est AA 23 (L) >60 ml/min/1.73m2    GFR est non-AA 19 (L) >60 ml/min/1.73m2    Calcium 8.9 8.3 - 10.4 MG/DL   MAGNESIUM    Collection Time: 04/03/18  6:16 AM   Result Value Ref Range    Magnesium 1.5 (L) 1.8 - 2.4 mg/dL   GLUCOSE, POC    Collection Time: 04/03/18  7:25 AM   Result Value Ref Range    Glucose (POC) 287 (H) 65 - 100 mg/dL        All Micro Results     None          Current Meds:  Current Facility-Administered Medications   Medication Dose Route Frequency    sodium chloride (NS) flush 5-10 mL  5-10 mL IntraVENous Q8H    sodium chloride (NS) flush 5-10 mL  5-10 mL IntraVENous PRN    nitroglycerin (NITROSTAT) tablet 0.4 mg  0.4 mg SubLINGual Q5MIN PRN    morphine injection 2 mg  2 mg IntraVENous Q4H PRN    naloxone (NARCAN) injection 0.4 mg  0.4 mg IntraVENous PRN    ondansetron (ZOFRAN) injection 4 mg  4 mg IntraVENous Q4H PRN    furosemide (LASIX) injection 100 mg  100 mg IntraVENous Q12H    lisinopril (PRINIVIL, ZESTRIL) tablet 2.5 mg  2.5 mg Oral DAILY    acetaminophen (TYLENOL) tablet 650 mg  650 mg Oral Q6H PRN    sodium chloride (NS) flush 5-10 mL  5-10 mL IntraVENous Q8H    sodium chloride (NS) flush 5-10 mL  5-10 mL IntraVENous PRN    aspirin (ASPIRIN) tablet 325 mg  325 mg Oral DAILY    pravastatin (PRAVACHOL) tablet 80 mg  80 mg Oral QHS    acetaminophen (TYLENOL) tablet 650 mg  650 mg Oral Q4H PRN    heparin (porcine) injection 5,000 Units  5,000 Units SubCUTAneous Q8H    insulin lispro (HUMALOG) injection   SubCUTAneous AC&HS    tuberculin injection 5 Units  5 Units IntraDERMal ONCE    dexamethasone (DECADRON) 4 mg/mL injection 4 mg  4 mg IntraVENous Q8H    metoprolol (LOPRESSOR) injection 5 mg  5 mg IntraVENous Q6H PRN    enalaprilat (VASOTEC) injection 0.625 mg  0.625 mg IntraVENous Q6H PRN    cyclobenzaprine (FLEXERIL) tablet 10 mg  10 mg Oral TID PRN    HYDROcodone-acetaminophen (NORCO) 5-325 mg per tablet 1-2 Tab  1-2 Tab Oral Q6H PRN    insulin glargine (LANTUS) injection 12 Units  12 Units SubCUTAneous DAILY    metoprolol succinate (TOPROL-XL) XL tablet 50 mg  50 mg Oral 7am    amLODIPine (NORVASC) tablet 10 mg  10 mg Oral 7am    pantoprazole (PROTONIX) tablet 40 mg  40 mg Oral ACB       Other Studies (last 24 hours):  Mri Lumb Spine Wo Cont    Result Date: 4/2/2018  MRI LUMBAR SPINE: CLINICAL HISTORY:  Low back pain with decreased anal tone and inability to move either leg. TECHNIQUE:  Sagittal STIR, as well as sagittal and paraxial T1 and T2-weighted images were obtained. COMPARISON:  Abdominopelvic CT of September 19, 2016. FINDINGS:  Signal from the marrow spaces in the lumbar spine is remarkable only for discogenic changes adjacent to the L4-5 at L5-S1 discs. There is moderate levoconvex scoliosis. The conus medullaris lies at the L1 level.  At L3-4, there is moderate central spinal stenosis associated with broad-based posterior disc protrusion as well as bilateral facet and marked ligamenta flava hypertrophy. There is mild bilateral foraminal narrowing as well. At L4-5, there is also moderate central spinal stenosis associated with broad-based posterior disc protrusion/spur complex which is most marked at the left paracentral/foraminal margin as well as bilateral facet and ligamenta flava hypertrophy, more marked on the left. Mild right and severe left foraminal narrowing is also noted. At L5-S1, there is central disc protrusion as well as mild right and moderate left foraminal narrowing. 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 FOLLOW-UP SURGICAL CONSULTATION SHOULD BE CONSIDERED IN LIGHT OF THE LOWER EXTREMITY WEAKNESS AND DECREASED ANAL TONE. Ct Head Wo Cont    Result Date: 4/2/2018  CT scan of the brain 4/2/2018 Scanning was performed within 24 hours of arrival to the facility Comparison: None Dose reduction techniques used: Automated exposure control, adjustment of the mAs and/or kVp according to patient size, standardized low-dose protocol, and/or iterative reconstruction technique. Indication: Weakness Findings: There is some streak artifact from metallic BBs within the right frontal and right temporal scalp. The brain parenchyma and ventricular structures are unremarkable. There is normal white-grey matter differentiation. There are no mass lesions, hemorrhage, or evidence of an acute stroke. The calvarium and the sinuses are unremarkable. Impression: Negative CT scan of the brain. Note: If a subtle CVA is suspected, MRI would be more definitive if clinically warranted. Xr Chest Port    Result Date: 4/2/2018  Portable chest x-ray April 2, 2018: CHF Heart is enlarged. Lung fields are clear and soft tissues and bony structures are unremarkable.      IMPRESSION: Cardiomegaly, clear lung fields        Review of Systems:  Gen: No weight loss  Eyes: no vision changes  ENT: no sore throat  Resp: No dyspnea or cough  CV: No chest pain  Abd:  no abd pain, no vomiting or diarrhea  : No incontinence, no hematuria or dysuria, no flank pain  MSK: no leg swelling  Neuro: No HA or dizziness, +weakness    Assessment and Plan:     Hospital Problems as of 4/3/2018  Date Reviewed: 2/28/2018          Codes Class Noted - Resolved POA    TIA (transient ischemic attack) ICD-10-CM: G45.9  ICD-9-CM: 435.9  4/2/2018 - Present Unknown        CVA (cerebral vascular accident) [facility]) ICD-10-CM: I63.9  ICD-9-CM: 434.91  4/2/2018 - Present Unknown        Acute exacerbation of CHF (congestive heart failure) ([facility] ICD-10-CM: I50.9  ICD-9-CM: 428.0  4/2/2018 - Present Unknown        Cauda equina compression (HCC) ICD-10-CM: G83.4  ICD-9-CM: 344.60  4/2/2018 - Present Unknown        Stasis edema of both lower extremities (Chronic) ICD-10-CM: I87.303  ICD-9-CM: 459.30  4/4/2017 - Present Yes        Venous insufficiency ICD-10-CM: F00.6  ICD-9-CM: 459.81  12/6/2016 - Present Yes        Acute on chronic systolic congestive heart failure (HCC) (Chronic) ICD-10-CM: D29.88  ICD-9-CM: 428.23, 428.0  9/23/2016 - Present Yes        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  ICD-9-CM: 250.40, 583.81  9/22/2016 - Present Yes              PLAN:      Spondylosis with impingement  +MRI Brain: moderately severe multilevel spondylosis with impingement upon the neuronal elements  Plan  Neurosurgery consulted  Continue decadron  CVA rule out: left LL weakness, MRI Brain pending, continue aspirin and statin  DM: Continue Lantus 12 units with sliding sc veronica  HTN: Continue current regimen  Chronic systolic QAO:FR84-38% Not in exacerbation  Plan  Follow up repeat ECHO    DC planning/Dispo:  Home in 24 hours  DVT ppx:  heparin    Signed:  Ryan Hernandez MD

## 2018-04-03 NOTE — PROGRESS NOTES
Problem: Mobility Impaired (Adult and Pediatric)  Goal: *Acute Goals and Plan of Care (Insert Text)  STG:  (1.)Mr. Teresa Acosta will move from supine to sit and sit to supine , scoot up and down and roll side to side with INDEPENDENT within 5 treatment day(s). (2.)Mr. Teresa Acosta will transfer from bed to chair and chair to bed with MODIFIED INDEPENDENCE using the least restrictive device within 5 treatment day(s). (3.)Mr. Teresa Acosta will ambulate with MODIFIED INDEPENDENCE for 150+ feet with the least restrictive device within 5 treatment day(s). (4.)Mr. Teresa Acosta will participate in BLE strength exercises 2 x 10 reps for on-going strengthening in sitting and supine for 5 treatment day(s)        PHYSICAL THERAPY: Initial Assessment, Treatment Day: Day of Assessment, AM 4/3/2018  INPATIENT: Hospital Day: 2  Payor: CARE IMPROVEMENT PLUS / [place] / Product Type: [employer] Medicare /      NAME/AGE/GENDER: Tyson Fenton is a 69 y.o. male   PRIMARY DIAGNOSIS: CVA (cerebral vascular accident) ([facility]  TIA (transient ischemic attack)  Acute exacerbation of CHF (congestive heart failure) ([facility]  Cauda equina compression (HCC) <principal problem not specified> <principal problem not specified>        ICD-10: Treatment Diagnosis:    · Generalized Muscle Weakness (M62.81)  · Difficulty in walking, Not elsewhere classified (R26.2)  · Other abnormalities of gait and mobility (R26.89)  · History of falling (Z91.81)   Precaution/Allergies:  Review of patient's allergies indicates no known allergies. ASSESSMENT:     Mr. Teresa Acosta is a 69year old AAM with an admitting diagnosis of CVA/TIA with acute exacerbation of CHF and Cauda equina compression. He presents today supine in the bed agreeable to have therapy. He reports that he lives alone in a trailer/mobile home with 5 steps with rails to enter.   He states he uses a straight cane at home for his mobility and he says he has a r/walker at home but does not use it since it is difficult to maneuver in the trailer. He says his BLE continue to feel heavy and hard to move. He has BLE AROM in sitting and supine with functional strength grades of 3+/5 across all joints. His is at [facility] with his bed mobility, sit to stand and gait with the r/walker 25' in and around the room. His sitting balance is good and his standing balance with use of the r/walker is fair. His gait was steady and controlled with use of the r/walker but he appears weaker with sit to/from stand. Mr. Kevin Alonzo was pleasant and cooperative and appears to be functioning below his PLOF. He would benefit from continued skilled PT to maximize his functional abilities. This section established at most recent assessment   PROBLEM LIST (Impairments causing functional limitations):  1. Decreased Strength  2. Decreased ADL/Functional Activities  3. Decreased Transfer Abilities  4. Decreased Ambulation Ability/Technique  5. Decreased Balance   INTERVENTIONS PLANNED: (Benefits and precautions of physical therapy have been discussed with the patient.)  1. Bed Mobility  2. Gait Training  3. Therapeutic Activites  4. Therapeutic Exercise/Strengthening  5. Transfer Training     TREATMENT PLAN: Frequency/Duration: 3 times a week for duration of hospital stay  Rehabilitation Potential For Stated Goals: Good     RECOMMENDED REHABILITATION/EQUIPMENT: (at time of discharge pending progress): Due to the probability of continued deficits (see above) this patient will likely need continued skilled physical therapy after discharge. Equipment:    None at this time              HISTORY:   History of Present Injury/Illness (Reason for Referral):  77yr old with pmhx sig for dm, htn, ckd- with avf in place on the left arm,  systolic chf per chart with last known ef of 30-35% ( but patient denies).  The patient was relatively well until yesterday while at [employer] dinner  He started experiencing cp around 4pm- family noted that patient did not look well and he had some slowness of gait with stumbling. He did not tell them about the chest pain- instead he allowed them to drop him home. At home he fell on the ground and it took him about 30 minutes to get up. When he got he he went to the recliner where he fell asleep. He woke up around 12:30 am needing to go to the bathroom. At that time he again noted the left sided chest pain. He was unable to move his legs to get up sometime around 6 am he reached for the phone with his cane and called ems. CVA workup in er- negative so far. Chest pain resolved- initial troponin negative. The patient has chronic intermittent diarrhea and wears diapers. Hospitalist asked to admit for cp, suspected chf vs lumbar spine pathology  He is poor historian - initially denying all complaints of chest pain with me- suspect due to his age as opposed to any desire to deceive.      Past Medical History/Comorbidities:   Mr. Chary Robles  has a past medical history of Acute renal failure superimposed on stage 3 chronic kidney disease ([address] (9/21/2016); Anemia; Chronic kidney disease; Chronic pancreatitis ([address] (9/22/2016); Dermatophytosis of nail (12/6/2016); Diabetic neuropathy ([address] (9/22/2016); Essential hypertension (9/22/2016); GERD (gastroesophageal reflux disease); Hypercholesterolemia; Iron (Fe) deficiency anemia (9/22/2016); Kidney disease; Septicemia due to Klebsiella pneumoniae ([address] (9/22/2016); Systolic CHF, chronic ([address] (9/23/2016); Type II diabetes mellitus with nephropathy ([address] (09/22/2016); Venous insufficiency (12/6/2016); and Xerosis cutis (12/6/2016). He also has no past medical history of Adverse effect of anesthesia; Congestive heart failure, unspecified; Difficult intubation; Malignant hyperthermia due to anesthesia; Nausea & vomiting; or Pseudocholinesterase deficiency.   Mr. Chary Robles  has a past surgical history that includes hx hernia repair (Left, 2010); hx colonoscopy; hx turp (2012); hx prostatectomy (2012); and vascular surgery procedure unlist (Left, 10/26/2017). Social History/Living Environment:   Home Environment: Trailer/mobile home  # Steps to Enter: 5  Rails to Enter: Yes  One/Two Story Residence: One story  Living Alone: Yes  Support Systems: Family member(s)  Patient Expects to be Discharged to[de-identified] Trailer/mobile home  Current DME Used/Available at Home: Cane, straight, Walker, rolling  Prior Level of Function/Work/Activity:  Patient reports he has been using a straight cane and has a r/walker at home for us with his mobility prior to this admission. He states he primarily uses furniture in his home to provide stability and steadiness with his gait. Number of Personal Factors/Comorbidities that affect the Plan of Care: 1-2: MODERATE COMPLEXITY   EXAMINATION:   Most Recent Physical Functioning:   Gross Assessment:  AROM: Generally decreased, functional  PROM: Within functional limits  Strength: Generally decreased, functional  Coordination: Generally decreased, functional  Tone: Normal               Posture:  Posture (WDL): Exceptions to WDL  Posture Assessment:  Forward head, Rounded shoulders, Trunk flexion  Balance:  Sitting: Intact  Standing: Impaired  Standing - Static: Fair;Constant support  Standing - Dynamic : Fair Bed Mobility:  Rolling: Modified independent  Supine to Sit: Contact guard assistance  Sit to Supine: Contact guard assistance  Scooting: Contact guard assistance  Wheelchair Mobility:     Transfers:  Sit to Stand: Contact guard assistance (bed slighty elevated to assist)  Stand to Sit: Contact guard assistance  Bed to Chair: Contact guard assistance  Gait:     Base of Support: Narrowed  Speed/Melva: Pace decreased (<100 feet/min)  Step Length: Left shortened;Right shortened  Distance (ft): 25 Feet (ft)  Assistive Device: Walker, rolling  Ambulation - Level of Assistance: Contact guard assistance  Interventions: Safety awareness training;Verbal cues      Body Structures Involved:  1. Nerves  2. Heart  3. Muscles Body Functions Affected:  1. Cardio  2. Neuromusculoskeletal  3. Movement Related Activities and Participation Affected:  1. General Tasks and Demands  2. Mobility   Number of elements that affect the Plan of Care: 3: MODERATE COMPLEXITY   CLINICAL PRESENTATION:   Presentation: Evolving clinical presentation with changing clinical characteristics: MODERATE COMPLEXITY   CLINICAL DECISION MAKIN [facility] Mobility Inpatient Short Form  How much difficulty does the patient currently have. .. Unable A Lot A Little None   1. Turning over in bed (including adjusting bedclothes, sheets and blankets)? [] 1   [] 2   [] 3   [x] 4   2. Sitting down on and standing up from a chair with arms ( e.g., wheelchair, bedside commode, etc.)   [] 1   [] 2   [x] 3   [] 4   3. Moving from lying on back to sitting on the side of the bed? [] 1   [] 2   [x] 3   [] 4   How much help from another person does the patient currently need. .. Total A Lot A Little None   4. Moving to and from a bed to a chair (including a wheelchair)? [] 1   [] 2   [x] 3   [] 4   5. Need to walk in hospital room? [] 1   [] 2   [x] 3   [] 4   6. Climbing 3-5 steps with a railing? [] 1   [] 2   [x] 3   [] 4   © , Trustees of [address] [place] [address], under license to [employer]. All rights reserved      Score:  Initial: 19 Most Recent: X (Date: -- )    Interpretation of Tool:  Represents activities that are increasingly more difficult (i.e. Bed mobility, Transfers, Gait). Score 24 23 22-20 19-15 14-10 9-7 6     Modifier CH CI CJ CK CL CM CN      ?  Mobility - Walking and Moving Around:     - CURRENT STATUS: CK - 40%-59% impaired, limited or restricted    - GOAL STATUS: CJ - 20%-39% impaired, limited or restricted    - D/C STATUS:  ---------------To be determined---------------  Payor: CARE IMPROVEMENT PLUS [place] / Product Type: Managed Care Medicare /      Medical Necessity:     · Patient is expected to demonstrate progress in strength, balance and functional technique to increase independence with BLE strength, sit to stand, SPT and gait with an assistive device. Reason for Services/Other Comments:  · Patient continues to require skilled intervention due to medical complications. Use of outcome tool(s) and clinical judgement create a POC that gives a: Questionable prediction of patient's progress: MODERATE COMPLEXITY            TREATMENT:   (In addition to Assessment/Re-Assessment sessions the following treatments were rendered)   Pre-treatment Symptoms/Complaints:  Patient had no complaints of pain during therapy today. Pain: Initial:   Pain Intensity 1: 0  Post Session:  0/10     Assessment/Reassessment only, no treatment provided today    Braces/Orthotics/Lines/Etc:   · O2 Device: Room air  Treatment/Session Assessment:    · Response to Treatment:  Patient participated and tolerated therapy well today stating that he felt he walked better today than the last time he was up on his feet. · Interdisciplinary Collaboration:   o Physical Therapist  o Registered Nurse  o Certified Nursing Assistant/Patient Care Technician  · After treatment position/precautions:   o Up in chair  o Bed/Chair-wheels locked  o Call light within reach  o RN notified   · Compliance with Program/Exercises: Will assess as treatment progresses. · Recommendations/Intent for next treatment session: \"Next visit will focus on advancements to more challenging activities and reduction in assistance provided\".   Total Treatment Duration:  PT Patient Time In/Time Out  Time In: 0943  Time Out: Merlin Franklin

## 2018-04-04 ENCOUNTER — HOME HEALTH ADMISSION (OUTPATIENT)
Dept: HOME HEALTH SERVICES | Facility: HOME HEALTH | Age: 78
End: 2018-04-04
Payer: MEDICARE

## 2018-04-04 VITALS
HEIGHT: 72 IN | OXYGEN SATURATION: 100 % | SYSTOLIC BLOOD PRESSURE: 136 MMHG | DIASTOLIC BLOOD PRESSURE: 53 MMHG | WEIGHT: 177.1 LBS | HEART RATE: 71 BPM | BODY MASS INDEX: 23.99 KG/M2 | TEMPERATURE: 98.3 F | RESPIRATION RATE: 17 BRPM

## 2018-04-04 PROBLEM — R07.9 CHEST PAIN: Status: ACTIVE | Noted: 2018-04-04

## 2018-04-04 PROBLEM — I50.9 ACUTE EXACERBATION OF CHF (CONGESTIVE HEART FAILURE) (HCC): Status: RESOLVED | Noted: 2018-04-02 | Resolved: 2018-04-04

## 2018-04-04 PROBLEM — G45.9 TIA (TRANSIENT ISCHEMIC ATTACK): Status: RESOLVED | Noted: 2018-04-02 | Resolved: 2018-04-04

## 2018-04-04 PROBLEM — E87.70 VOLUME OVERLOAD: Status: ACTIVE | Noted: 2018-04-04

## 2018-04-04 PROBLEM — I63.9 CVA (CEREBRAL VASCULAR ACCIDENT) (HCC): Status: RESOLVED | Noted: 2018-04-02 | Resolved: 2018-04-04

## 2018-04-04 LAB
ANION GAP SERPL CALC-SCNC: 12 MMOL/L (ref 7–16)
BASOPHILS # BLD: 0 K/UL (ref 0–0.2)
BASOPHILS NFR BLD: 0 % (ref 0–2)
BUN SERPL-MCNC: 72 MG/DL (ref 8–23)
CALCIUM SERPL-MCNC: 8.5 MG/DL (ref 8.3–10.4)
CHLORIDE SERPL-SCNC: 112 MMOL/L (ref 98–107)
CO2 SERPL-SCNC: 22 MMOL/L (ref 21–32)
CREAT SERPL-MCNC: 4.35 MG/DL (ref 0.8–1.5)
DIFFERENTIAL METHOD BLD: ABNORMAL
EOSINOPHIL # BLD: 0 K/UL (ref 0–0.8)
EOSINOPHIL NFR BLD: 0 % (ref 0.5–7.8)
ERYTHROCYTE [DISTWIDTH] IN BLOOD BY AUTOMATED COUNT: 15.9 % (ref 11.9–14.6)
GLUCOSE BLD STRIP.AUTO-MCNC: 310 MG/DL (ref 65–100)
GLUCOSE BLD STRIP.AUTO-MCNC: 328 MG/DL (ref 65–100)
GLUCOSE SERPL-MCNC: 291 MG/DL (ref 65–100)
HCT VFR BLD AUTO: 31.8 % (ref 41.1–50.3)
HGB BLD-MCNC: 10.1 G/DL (ref 13.6–17.2)
IMM GRANULOCYTES # BLD: 0 K/UL (ref 0–0.5)
IMM GRANULOCYTES NFR BLD AUTO: 0 % (ref 0–5)
LYMPHOCYTES # BLD: 1.1 K/UL (ref 0.5–4.6)
LYMPHOCYTES NFR BLD: 17 % (ref 13–44)
MAGNESIUM SERPL-MCNC: 1.9 MG/DL (ref 1.8–2.4)
MCH RBC QN AUTO: 25.2 PG (ref 26.1–32.9)
MCHC RBC AUTO-ENTMCNC: 31.8 G/DL (ref 31.4–35)
MCV RBC AUTO: 79.3 FL (ref 79.6–97.8)
MONOCYTES # BLD: 0.2 K/UL (ref 0.1–1.3)
MONOCYTES NFR BLD: 3 % (ref 4–12)
NEUTS SEG # BLD: 5.1 K/UL (ref 1.7–8.2)
NEUTS SEG NFR BLD: 80 % (ref 43–78)
PLATELET # BLD AUTO: 124 K/UL (ref 150–450)
PMV BLD AUTO: ABNORMAL FL (ref 10.8–14.1)
POTASSIUM SERPL-SCNC: 3.8 MMOL/L (ref 3.5–5.1)
RBC # BLD AUTO: 4.01 M/UL (ref 4.23–5.67)
SODIUM SERPL-SCNC: 146 MMOL/L (ref 136–145)
WBC # BLD AUTO: 6.4 K/UL (ref 4.3–11.1)

## 2018-04-04 PROCEDURE — 74011250637 HC RX REV CODE- 250/637: Performed by: INTERNAL MEDICINE

## 2018-04-04 PROCEDURE — 74011636637 HC RX REV CODE- 636/637: Performed by: INTERNAL MEDICINE

## 2018-04-04 PROCEDURE — 82962 GLUCOSE BLOOD TEST: CPT

## 2018-04-04 PROCEDURE — 80048 BASIC METABOLIC PNL TOTAL CA: CPT | Performed by: INTERNAL MEDICINE

## 2018-04-04 PROCEDURE — 85025 COMPLETE CBC W/AUTO DIFF WBC: CPT | Performed by: INTERNAL MEDICINE

## 2018-04-04 PROCEDURE — 36415 COLL VENOUS BLD VENIPUNCTURE: CPT | Performed by: INTERNAL MEDICINE

## 2018-04-04 PROCEDURE — 83735 ASSAY OF MAGNESIUM: CPT | Performed by: INTERNAL MEDICINE

## 2018-04-04 PROCEDURE — 74011250636 HC RX REV CODE- 250/636: Performed by: INTERNAL MEDICINE

## 2018-04-04 RX ORDER — INSULIN GLARGINE 100 [IU]/ML
15 INJECTION, SOLUTION SUBCUTANEOUS DAILY
Qty: 1 VIAL | Refills: 0 | Status: SHIPPED | OUTPATIENT
Start: 2018-04-04 | End: 2019-01-01

## 2018-04-04 RX ORDER — PREDNISONE 20 MG/1
TABLET ORAL
Qty: 17 TAB | Refills: 0 | Status: SHIPPED | OUTPATIENT
Start: 2018-04-04 | End: 2018-04-19

## 2018-04-04 RX ORDER — PRAVASTATIN SODIUM 40 MG/1
40 TABLET ORAL
Qty: 30 TAB | Refills: 0 | Status: SHIPPED | OUTPATIENT
Start: 2018-04-04

## 2018-04-04 RX ORDER — ISOSORBIDE DINITRATE 20 MG/1
20 TABLET ORAL 3 TIMES DAILY
Qty: 90 TAB | Refills: 0 | Status: SHIPPED | OUTPATIENT
Start: 2018-04-04 | End: 2018-09-12

## 2018-04-04 RX ORDER — TORSEMIDE 10 MG/1
50 TABLET ORAL DAILY
Qty: 30 TAB | Refills: 0 | Status: SHIPPED | OUTPATIENT
Start: 2018-04-05 | End: 2018-04-19

## 2018-04-04 RX ORDER — ASPIRIN 325 MG
325 TABLET ORAL DAILY
Qty: 30 TAB | Refills: 0 | Status: ON HOLD | OUTPATIENT
Start: 2018-04-04 | End: 2019-01-01

## 2018-04-04 RX ORDER — HYDRALAZINE HYDROCHLORIDE 25 MG/1
25 TABLET, FILM COATED ORAL 3 TIMES DAILY
Qty: 90 TAB | Refills: 0 | Status: ON HOLD | OUTPATIENT
Start: 2018-04-04 | End: 2018-04-19

## 2018-04-04 RX ADMIN — HYDRALAZINE HYDROCHLORIDE 25 MG: 25 TABLET, FILM COATED ORAL at 08:18

## 2018-04-04 RX ADMIN — ISOSORBIDE DINITRATE 20 MG: 20 TABLET ORAL at 08:15

## 2018-04-04 RX ADMIN — DEXAMETHASONE SODIUM PHOSPHATE 4 MG: 4 INJECTION, SOLUTION INTRAMUSCULAR; INTRAVENOUS at 04:43

## 2018-04-04 RX ADMIN — INSULIN LISPRO 8 UNITS: 100 INJECTION, SOLUTION INTRAVENOUS; SUBCUTANEOUS at 11:35

## 2018-04-04 RX ADMIN — INSULIN LISPRO 8 UNITS: 100 INJECTION, SOLUTION INTRAVENOUS; SUBCUTANEOUS at 08:18

## 2018-04-04 RX ADMIN — TORSEMIDE 50 MG: 20 TABLET ORAL at 08:15

## 2018-04-04 RX ADMIN — PANTOPRAZOLE SODIUM 40 MG: 40 TABLET, DELAYED RELEASE ORAL at 04:43

## 2018-04-04 RX ADMIN — METOPROLOL SUCCINATE 50 MG: 50 TABLET, EXTENDED RELEASE ORAL at 08:15

## 2018-04-04 RX ADMIN — Medication 10 ML: at 04:42

## 2018-04-04 RX ADMIN — INSULIN GLARGINE 15 UNITS: 100 INJECTION, SOLUTION SUBCUTANEOUS at 08:20

## 2018-04-04 RX ADMIN — AMLODIPINE BESYLATE 10 MG: 10 TABLET ORAL at 08:18

## 2018-04-04 NOTE — PROGRESS NOTES
[address]  Face to Face Encounter    Patients Name: Jose E Miner. YOB: 1940    Ordering Physician: Dr. Tristin Laureano    Primary Diagnosis: CVA (cerebral vascular accident) [facility])  TIA (transient ischemic attack)  Acute exacerbation of CHF (congestive heart failure) ([facility] [address]  Cauda equina compression [facility])    Date of Face to Face:   4/4/2018                                  Face to Face Encounter findings are related to primary reason for home care:   yes. 1. I certify that the patient needs intermittent care as follows: physical therapy: strengthening, stretching/ROM, transfer training, gait/stair training, balance training and pt/caregiver education    2. I certify that this patient is homebound, that is: 1) patient requires the use of a walker device, special transportation, or assistance of another to leave the home; or 2) patient's condition makes leaving the home medically contraindicated; and 3) patient has a normal inability to leave the home and leaving the home requires considerable and taxing effort. Patient may leave the home for infrequent and short duration for medical reasons, and occasional absences for non-medical reasons. Homebound status is due to the following functional limitations: Patient with strength deficits limiting the performance of all ADL's without caregiver assistance or the use of an assistive device. Patient with poor safety awareness and is at risk for falls without assistance of another person and the use of an assistive device. Patient with poor ambulation endurance limiting their safe ability to ascend/descend the required number of steps to leave the home. 3. I certify that this patient is under my care and that I, or a nurse practitioner or  [id number], or clinical nurse specialist, or certified nurse midwife, working with me, had a Face-to-Face Encounter that meets the physician Face-to-Face Encounter requirements.   The following are the clinical findings from the [facility] encounter that support the need for skilled services and is a summary of the encounter: See hospital chart    See hospital chart      Tal Power, MARTINE  4/4/2018      THE FOLLOWING TO BE COMPLETED BY THE COMMUNITY PHYSICIAN:    I concur with the findings described above from the F2F encounter that this patient is homebound and in need of a skilled service.     Certifying Physician: _____________________________________      Printed Certifying Physician Name: _____________________________________    Date: _________________

## 2018-04-04 NOTE — PROGRESS NOTES
Problem: Falls - Risk of  Goal: *Absence of Falls  Document Huong Fall Risk and appropriate interventions in the flowsheet.    Outcome: Progressing Towards Goal  Fall Risk Interventions:  Mobility Interventions: Bed/chair exit alarm         Medication Interventions: Bed/chair exit alarm    Elimination Interventions: Call light in reach    History of Falls Interventions: Bed/chair exit alarm

## 2018-04-04 NOTE — PROGRESS NOTES
Discharge instructions and prescriptions given and explained to pt. Pt verbalized understanding. Medication side effects sheet reviewed with pt. Pt to be discharged home, calling his son now.

## 2018-04-04 NOTE — PROGRESS NOTES
IP consult to cardiac rehab for heart failure. IF was 60-65% on echo performed this admission. EF must be 35% or less to qualify for cardiac rehab with dx of heart failure. We will contact the patient if qualifying referral is received.

## 2018-04-04 NOTE — PROGRESS NOTES
Order, referral and face to face completed for [facility] PT discipline upon discharge. Patient aware. Care Management Interventions  PCP Verified by CM: Yes  Mode of Transport at Discharge:  Other (see comment) (family)  Transition of Care Consult (CM Consult): Discharge Planning, [address]: Yes  Physical Therapy Consult: Yes  Occupational Therapy Consult: Yes  Speech Therapy Consult: Yes  Current Support Network: Lives with Spouse, Own Home  Confirm Follow Up Transport: Family  Plan discussed with Pt/Family/Caregiver: Yes  Freedom of Choice Offered: Yes  Discharge Location  Discharge Placement: Home with home health

## 2018-04-04 NOTE — PROGRESS NOTES
Neurosurgery    69 y.o.  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

## 2018-04-04 NOTE — DISCHARGE INSTRUCTIONS
Learning About Fluid Overload  What is fluid overload? Fluid overload means that your body has too much water. The extra fluid in your body can raise your blood pressure and force your heart to work harder. It can also make it hard for you to breathe. Most of your body is made up of water. The body uses minerals like sodium and potassium to help organs such as your heart, kidneys, and liver balance how much water you need. For example, the heart pumps blood to move water around the body. And the kidneys work to get rid of the water that the body doesn't need. Health conditions like kidney disease, heart failure, and cirrhosis can cause fluid overload. Other things can cause extra fluid to build up. IV fluids, some medicines, too much salt (sodium) from food, and certain medical treatments can sometimes cause this fluid increase. What are the symptoms? Some of the most common symptoms are:  · Gaining weight over a short period of time. · Swelling in the ankles or legs. · Shortness of breath. How is it treated? The goal of treatment is to remove the extra fluid in your body. Your treatment will depend on the cause. Your doctor may:  · Give you medicines, such as diuretics (also called \"water pills\"). They help your body get rid of the extra fluid. · Restrict your fluid or salt intake. Follow-up care is a key part of your treatment and safety. Be sure to make and go to all appointments, and call your doctor if you are having problems. It's also a good idea to know your test results and keep a list of the medicines you take. Where can you learn more? Go to http://svetlana-glenis.info/. Enter O110 in the search box to learn more about \"Learning About Fluid Overload. \"  Current as of: September 21, 2016  Content Version: 11.4  © 3368-1158 [employer].  Care instructions adapted under license by [employer] (which disclaims liability or warranty for this information). If you have questions about a medical condition or this instruction, always ask your healthcare professional. [address] any warranty or liability for your use of this information. DISCHARGE SUMMARY from Nurse    PATIENT INSTRUCTIONS:    After general anesthesia or intravenous sedation, for 24 hours or while taking prescription Narcotics:  · Limit your activities  · Do not drive and operate hazardous machinery  · Do not make important personal or business decisions  · Do  not drink alcoholic beverages  · If you have not urinated within 8 hours after discharge, please contact your surgeon on call. Report the following to your surgeon:  · Excessive pain, swelling, redness or odor of or around the surgical area  · Temperature over 100.5  · Nausea and vomiting lasting longer than 4 hours or if unable to take medications  · Any signs of decreased circulation or nerve impairment to extremity: change in color, persistent  numbness, tingling, coldness or increase pain  · Any questions    What to do at Home:  Recommended activity: Activity as tolerated, resume home diet as tolerated. *  Please give a list of your current medications to your Primary Care Provider. *  Please update this list whenever your medications are discontinued, doses are      changed, or new medications (including over-the-counter products) are added. *  Please carry medication information at all times in case of emergency situations. These are general instructions for a healthy lifestyle:    No smoking/ No tobacco products/ Avoid exposure to second hand smoke  Surgeon General's Warning:  Quitting smoking now greatly reduces serious risk to your health.     Obesity, smoking, and sedentary lifestyle greatly increases your risk for illness    A healthy diet, regular physical exercise & weight monitoring are important for maintaining a healthy lifestyle    You may be retaining fluid if you have a history of heart failure or if you experience any of the following symptoms:  Weight gain of 3 pounds or more overnight or 5 pounds in a week, increased swelling in our hands or feet or shortness of breath while lying flat in bed. Please call your doctor as soon as you notice any of these symptoms; do not wait until your next office visit. Recognize signs and symptoms of STROKE:    F-face looks uneven    A-arms unable to move or move unevenly    S-speech slurred or non-existent    T-time-call 911 as soon as signs and symptoms begin-DO NOT go       Back to bed or wait to see if you get better-TIME IS BRAIN. Warning Signs of HEART ATTACK     Call 911 if you have these symptoms:   Chest discomfort. Most heart attacks involve discomfort in the center of the chest that lasts more than a few minutes, or that goes away and comes back. It can feel like uncomfortable pressure, squeezing, fullness, or pain.  Discomfort in other areas of the upper body. Symptoms can include pain or discomfort in one or both arms, the back, neck, jaw, or stomach.  Shortness of breath with or without chest discomfort.  Other signs may include breaking out in a cold sweat, nausea, or lightheadedness. Don't wait more than five minutes to call 911 - MINUTES MATTER! Fast action can save your life. Calling 911 is almost always the fastest way to get lifesaving treatment. Emergency Medical Services staff can begin treatment when they arrive -- up to an hour sooner than if someone gets to the hospital by car. The discharge information has been reviewed with the patient. The patient verbalized understanding. Discharge medications reviewed with the patient and appropriate educational materials and side effects teaching were provided.   ___________________________________________________________________________________________________________________________________

## 2018-04-04 NOTE — PROGRESS NOTES
[facility] CARDIOLOGY PROGRESS NOTE    4/4/2018 7:25 AM    Admit Date: 4/2/2018    Admit Diagnosis: CVA (cerebral vascular accident) (HCC);TIA (transient ische*      Subjective:   Stable overnight without angina, CHF, or palpitations. Vitals stable and controlled. No other complaints overnight. Tolerating meds well. Objective:      Vitals:    04/03/18 2000 04/03/18 2154 04/04/18 0000 04/04/18 0400   BP: 131/50  125/63 150/68   Pulse: 64  (!) 59 63   Resp: 14  18 17   Temp: 97.7 °F (36.5 °C)  98 °F (36.7 °C) 98.7 °F (37.1 °C)   SpO2: 100%  99% 98%   Weight:  80.3 kg (177 lb 1.6 oz)     Height:           Physical Exam:  Neck- supple, no JVD at 60 deg  CV- regular rate and rhythm no MRG  Lung- clear bilaterally  Abd- soft, nontender, nondistended  Ext- chronic LE woody edema  Skin- warm and dry    Data Review:   Recent Labs      04/04/18   0551  04/03/18   0616   NA  146*  146*   K  3.8  3.8   MG   --   1.5*   BUN  72*  58*   CREA  4.35*  3.36*   GLU  291*  287*   WBC  6.4  4.9   HGB  10.1*  11.0*   HCT  31.8*  34.8*   PLT  124*  126*   CHOL   --   99   TRIGL   --   55   HDL   --   65*       Assessment and Plan:       Chronic systolic CHF- euvolemic at present, does not clinically appear to be in CHF- EF 30-35% chronically by prior echo's- recheck echo yesterday with improved EF, back to normal on heart failure meds. ..symptoms on admit probably due to volume overload with renal failure. .. continue meds and augment CHF regimen as tolerated/needed post discharge-   No ACE/ARB with renal failure. Stopped low dose lisinopril, started hydralazine/isordil for CHF/BP issues. Converted from IV lasix to po torsemide yesterday.      Chest discomfort- resolved, negative troponin on admit- sounds musculoskeletal, reproducible with deep breath, palpation.  ECG without ischemic changes and trop negative. Follow clinically for now.    Gone today, no chest pain overnight or this AM.       LE weakness, acute- MRI noted,  workup proceeding. Improved at present, able to ambulate to bathroom per patient report. ..      Possible CVA-  See above.      Diabetes Mellitus Type II- stable, meds per hospitalists      Stage 4 chronic kidney disease- recheck in AM- no ACE/ARB- avoid hypotension      Hypertension-  See above, continue CHF regimen, titrate as needed.      Preoperative evaluation- at least moderate risk for lumbar spine surgery at present- continue meds, addition/changes as noted above. We will be on standby and follow from a distance. Please call if any further assistance is needed or if any new questions arise. Thanks for the consult. We will call him for follow up in a couple of weeks in our office.           ILA Salazar MD  [place] [facility]  Pager [phone]

## 2018-04-04 NOTE — DISCHARGE SUMMARY
Hospitalist Discharge Summary     Admit Date:  2018  7:51 AM   Name:  Ary Farris. Age:  69 y.o.  :  1940   MRN:  [id number]   PCP:  Goldie Cooley MD  Treatment Team: Attending Provider: Gali Tran MD; Consulting Provider: Paul Bhatti MD; Consulting Provider: Mary Kate Alan MD; Utilization Review: Donaldo Lima RN; Consulting Provider: Letty Lawler MD; Charge Nurse: Jaya Villanueva RN    Problem List for this Hospitalization:  Hospital Problems as of 2018  Date Reviewed: 2018          Codes Class Noted - Resolved POA    Cauda equina compression ([facility].) ICD-10-CM: G83.4  ICD-9-CM: 344.60  2018 - Present Unknown        * (Principal)Volume overload ICD-10-CM: E87.70  ICD-9-CM: 276.69  2018 - Present Yes        Chest pain ICD-10-CM: R07.9  ICD-9-CM: 786.50  2018 - Present Yes        Stasis edema of both lower extremities (Chronic) ICD-10-CM: I87.303  ICD-9-CM: 459.30  2017 - Present Yes        Venous insufficiency ICD-10-CM: O78.5  ICD-9-CM: 459.81  2016 - Present Yes        Acute on chronic systolic congestive heart failure (HCC) (Chronic) ICD-10-CM: A22.82  ICD-9-CM: 428.23, 428.0  2016 - Present Yes        Type II diabetes mellitus with nephropathy ([facility] (Chronic) ICD-10-CM: E11.21  ICD-9-CM: 250.40, 583.81  2016 - Present Yes        RESOLVED: TIA (transient ischemic attack) ICD-10-CM: G45.9  ICD-9-CM: 435.9  2018 - 2018 Unknown        RESOLVED: CVA (cerebral vascular accident) ([facility] ICD-10-CM: I63.9  ICD-9-CM: 434.91  2018 - 2018 Unknown        RESOLVED: Acute exacerbation of CHF (congestive heart failure) ([facility] ICD-10-CM: I50.9  ICD-9-CM: 428.0  2018 - 2018 Unknown                Admission HPI from 2018:    \"77yr old with pmhx sig for dm, htn, ckd- with avf in place on the left arm,  systolic chf per chart with last known ef of 30-35% ( but patient denies).  The patient was relatively well until yesterday while at [employer]  He started experiencing cp around 4pm- family noted that patient did not look well and he had some slowness of gait with stumbling. He did not tell them about the chest pain- instead he allowed them to drop him home. At home he fell on the ground and it took him about 30 minutes to get up. When he got he he went to the recliner where he fell asleep. He woke up around 12:30 am needing to go to the bathroom. At that time he again noted the left sided chest pain. He was unable to move his legs to get up sometime around 6 am he reached for the phone with his cane and called ems. CVA workup in er- negative so far. Chest pain resolved- initial troponin negative. The patient has chronic intermittent diarrhea and wears diapers. Hospitalist asked to admit for cp, suspected chf vs lumbar spine pathology  He is poor historian - initially denying all complaints of chest pain with me- suspect due to his age as opposed to any desire to deceive. \"    Hospital Course: The patient was admitted and started on decadron. Cva was excluded. Cardiology was consulted for chf- but determined he had volume overload secondary to his kidney disease. EF has improved to 60- 65% on echo here. MRI was concerning for impingement on the spine and neurosurgery was consulted. The patient was recommended for surgery but he has declined. Leg strength has improved with decadron and diuresis. He does not want any inpatient and is opting for home rehab. He is stable for discharge. He will need to follow up as an outpatient. He is aware he may lose the use of his lower limbs. He will need to follow up with nephrology as he will likely need hd at some point. Follow up instructions below. Plan was discussed with Patient and IDT. All questions answered. Patient was stable at time of discharge and was instructed to call or return if there are any concerns or recurrence of symptoms.     Diagnostic Imaging/Tests: Mri Brain Wo Cont    Result Date: 4/3/2018  Cranial MRI without contrast: 4/3/2018 INDICATION: Bilateral leg weakness for several days COMPARISON: CT scan of the brain 4/2/2018 TECHNIQUE: T1 sagittal, diffusion-weighted images, T2, gradient echo, FLAIR, and T1 axial sequences and coronal T2 sequences were performed. FINDINGS: There are no restricted diffusion abnormalities. The ventricles and the basilar vascular flow voids are unremarkable. The pituitary, 7th and 8th nerves, orbits and sinuses are unremarkable. There is no hemorrhage. There few scattered foci of T2 prolongation within the subcortical, periventricular, and pontine white matter. There are few CSF densities in the right cerebellar white matter. IMPRESSION: Remote right cerebellar lacunar CVAs, mild pontine and supratentorial microischemia. Mri Lumb Spine Wo Cont    Result Date: 4/2/2018  MRI LUMBAR SPINE: CLINICAL HISTORY:  Low back pain with decreased anal tone and inability to move either leg. TECHNIQUE:  Sagittal STIR, as well as sagittal and paraxial T1 and T2-weighted images were obtained. COMPARISON:  Abdominopelvic CT of September 19, 2016. FINDINGS:  Signal from the marrow spaces in the lumbar spine is remarkable only for discogenic changes adjacent to the L4-5 at L5-S1 discs. There is moderate levoconvex scoliosis. The conus medullaris lies at the L1 level. At L3-4, there is moderate central spinal stenosis associated with broad-based posterior disc protrusion as well as bilateral facet and marked ligamenta flava hypertrophy. There is mild bilateral foraminal narrowing as well. At L4-5, there is also moderate central spinal stenosis associated with broad-based posterior disc protrusion/spur complex which is most marked at the left paracentral/foraminal margin as well as bilateral facet and ligamenta flava hypertrophy, more marked on the left. Mild right and severe left foraminal narrowing is also noted.  At L5-S1, there is central disc protrusion as well as mild right and moderate left foraminal narrowing. 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 FOLLOW-UP SURGICAL CONSULTATION SHOULD BE CONSIDERED IN LIGHT OF THE LOWER EXTREMITY WEAKNESS AND DECREASED ANAL TONE. Ct Head Wo Cont    Result Date: 2018  CT scan of the brain 2018 Scanning was performed within 24 hours of arrival to the facility Comparison: None Dose reduction techniques used: Automated exposure control, adjustment of the mAs and/or kVp according to patient size, standardized low-dose protocol, and/or iterative reconstruction technique. Indication: Weakness Findings: There is some streak artifact from metallic BBs within the right frontal and right temporal scalp. The brain parenchyma and ventricular structures are unremarkable. There is normal white-grey matter differentiation. There are no mass lesions, hemorrhage, or evidence of an acute stroke. The calvarium and the sinuses are unremarkable. Impression: Negative CT scan of the brain. Note: If a subtle CVA is suspected, MRI would be more definitive if clinically warranted. Xr Chest Port    Result Date: 2018  Portable chest x-ray 2018: CHF Heart is enlarged. Lung fields are clear and soft tissues and bony structures are unremarkable.      IMPRESSION: Cardiomegaly, clear lung fields      Echocardiogram results:  Results for orders placed or performed during the hospital encounter of 18   2D ECHO COMPLETE ADULT (TTE) W OR [address] [facility]  One [address] [facility], [address] [facility]  [phone]    Transthoracic Echocardiogram  2D, M-mode, Doppler, and Color Doppler    Patient: Radhika Maya  MR #: [id number]  : 1940  Age: 69 years  Gender: Male  Study date: 2018  Account #: [de-identified]  Height: 71 in  Weight: 180 lb  BSA: 2.02 mï¾²  Status:Routine  Location: 704  BP: 147/ 63    Allergies: NO KNOWN ALLERGIES    Sonographer:  Radha Lubin [facility]  Group:  [facility]  Referring Physician:  Cristiane Howard MD  Reading Physician:  ILA Byrd MD [facility]    INDICATIONS: Edema    PROCEDURE: This was a routine study. A transthoracic echocardiogram was  performed. The study included complete 2D imaging, M-mode, complete spectral  Doppler, and color Doppler. Echocardiographic views were limited by   restricted  patient mobility. Patient unable to turn LLD. Image quality was adequate. LEFT VENTRICLE: Size was normal. Systolic function was normal. Ejection  fraction was estimated in the range of 60 % to 65 %. There were no regional  wall motion abnormalities. Wall thickness was normal. Left ventricular  diastolic function was indeterminate. E/e' av.87. RIGHT VENTRICLE: The size was normal. Systolic function was normal. The  tricuspid jet envelope definition was inadequate for estimation of RV   systolic  pressure. LEFT ATRIUM: The atrium was mildly dilated. RIGHT ATRIUM: Size was normal.    SYSTEMIC VEINS: IVC: The inferior vena cava was not well visualized. AORTIC VALVE: The valve was structurally normal, tri-commissural. Leaflets  exhibited mild sclerosis. There was no evidence for stenosis. There was no  insufficiency. MITRAL VALVE: Valve structure was normal. There was no evidence for stenosis. There was trivial regurgitation. TRICUSPID VALVE: The valve structure was normal. There was no evidence for  stenosis. There was trivial regurgitation. PULMONIC VALVE: Not well visualized. There was no evidence for stenosis. There  was trivial regurgitation. PERICARDIUM: There was no pericardial effusion. AORTA: The root exhibited normal size. SUMMARY:    -  Left ventricle: Systolic function was normal. Ejection fraction was  estimated in the range of 60 % to 65 %.  There were no regional wall motion  abnormalities. -  Left atrium: The atrium was mildly dilated. -  Aortic valve: The valve was structurally normal, tri-commissural. Leaflets  exhibited mild sclerosis. -  Mitral valve: There was trivial regurgitation.    -  Tricuspid valve: There was trivial regurgitation. SYSTEM MEASUREMENT TABLES    2D  LA Diam: 3.7 cm  IVSd; Mean chosen (2D): 1.4 cm  LVIDd: 4.8 cm  LVIDs (2D): 2.5 cm  LVPWd (2D): 1 cm    Prepared and signed by    ILA Ring MD [address] [facility]  Signed 03-Apr-2018 16:15:35           All Micro Results     None          Labs: Results:       BMP, Mg, Phos Recent Labs      04/04/18   0551  04/03/18   0616  04/02/18   0807   NA  146*  146*  145   K  3.8  3.8  4.3   CL  112*  111*  112*   CO2  22  20*  25   AGAP  12  15  8   BUN  72*  58*  57*   CREA  4.35*  3.36*  4.16*   CA  8.5  8.9  8.6   GLU  291*  287*  257*   MG   --   1.5*   --       CBC Recent Labs      04/04/18   0551  04/03/18   0616  04/02/18   0807   WBC  6.4  4.9  4.6   RBC  4.01*  4.28  4.14*   HGB  10.1*  11.0*  10.8*   HCT  31.8*  34.8*  34.1*   PLT  124*  126*  117*   GRANS  80*  84*  47   LYMPH  17  15  41   EOS  0*  0*  3   MONOS  3*  1*  9   BASOS  0  0  0   IG  0  0  0   ANEU  5.1  4.1  2.1   ABL  1.1  0.7  1.9   SERAFIN  0.0  0.0  0.1   ABM  0.2  0.1  0.4   ABB  0.0  0.0  0.0   AIG  0.0  0.0  0.0      LFT Recent Labs      04/02/18   0807   SGOT  42*   ALT  44   AP  76   TP  7.0   ALB  3.5   GLOB  3.5   AGRAT  1.0*      Cardiac Testing Lab Results   Component Value Date/Time     04/02/2018 08:07 AM    CK 1282 (H) 04/02/2018 03:37 PM    CK 2964 (H) 09/26/2016 06:08 AM    CK 1151 (H) 09/25/2016 05:51 AM    CK - MB 10.1 (H) 09/23/2016 03:05 AM    CK - MB 11.8 (H) 09/22/2016 09:37 PM    CK - MB 9.9 (H) 09/22/2016 03:10 PM    CK-MB Index 1.0 09/23/2016 03:05 AM    CK-MB Index CANNOT BE CALCULATED 09/22/2016 09:37 PM    CK-MB Index 0.9 09/22/2016 03:10 PM    Troponin-I, Qt. <0.02 (L) 04/03/2018 06:16 AM Troponin-I, Qt. <0.02 (L) 04/02/2018 10:53 PM    Troponin-I, Qt. <0.02 (L) 04/02/2018 08:25 PM      Coagulation Tests Lab Results   Component Value Date/Time    Prothrombin time 12.6 (H) 09/22/2016 03:10 PM    INR 1.2 09/22/2016 03:10 PM      A1c Lab Results   Component Value Date/Time    Hemoglobin A1c 7.3 (H) 04/03/2018 06:16 AM    Hemoglobin A1c 7.1 (H) 04/04/2017 05:54 AM    Hemoglobin A1c 8.1 (H) 09/22/2016 02:31 AM      Lipid Panel Lab Results   Component Value Date/Time    Cholesterol, total 99 04/03/2018 06:16 AM    HDL Cholesterol 65 (H) 04/03/2018 06:16 AM    LDL, calculated 23 04/03/2018 06:16 AM    VLDL, calculated 11 04/03/2018 06:16 AM    Triglyceride 55 04/03/2018 06:16 AM    CHOL/HDL Ratio 1.5 04/03/2018 06:16 AM      Thyroid Panel Lab Results   Component Value Date/Time    TSH 4.140 03/14/2018 11:49 AM        Most Recent UA Lab Results   Component Value Date/Time    Color ORANGE 09/22/2016 12:00 PM    Appearance TURBID 09/22/2016 12:00 PM    Specific gravity 1.022 09/22/2016 12:00 PM    pH (UA) 5.0 09/22/2016 12:00 PM    Protein 100 (A) 09/22/2016 12:00 PM    Glucose NEGATIVE  09/22/2016 12:00 PM    Ketone TRACE (A) 09/22/2016 12:00 PM    Bilirubin MODERATE (A) 09/22/2016 12:00 PM    Blood LARGE (A) 09/22/2016 12:00 PM    Urobilinogen 0.2 09/22/2016 12:00 PM    Nitrites NEGATIVE  09/22/2016 12:00 PM    Leukocyte Esterase TRACE (A) 09/22/2016 12:00 PM        No Known Allergies  Immunization History   Administered Date(s) Administered    TB Skin Test (PPD) Intradermal 04/02/2018       All Labs from Last 24 Hrs:  Recent Results (from the past 24 hour(s))   GLUCOSE, POC    Collection Time: 04/03/18 10:57 AM   Result Value Ref Range    Glucose (POC) 351 (H) 65 - 100 mg/dL   GLUCOSE, POC    Collection Time: 04/03/18  2:11 PM   Result Value Ref Range    Glucose (POC) 436 (H) 65 - 100 mg/dL   PLEASE READ & DOCUMENT PPD TEST IN 24 HRS    Collection Time: 04/03/18  3:08 PM   Result Value Ref Range    PPD Negative Negative    mm Induration 0 mm   GLUCOSE, POC    Collection Time: 04/03/18  3:45 PM   Result Value Ref Range    Glucose (POC) 459 (HH) 65 - 100 mg/dL   GLUCOSE, POC    Collection Time: 04/03/18  4:52 PM   Result Value Ref Range    Glucose (POC) 398 (H) 65 - 100 mg/dL   GLUCOSE, POC    Collection Time: 04/03/18 10:00 PM   Result Value Ref Range    Glucose (POC) 188 (H) 65 - 327 mg/dL   METABOLIC PANEL, BASIC    Collection Time: 04/04/18  5:51 AM   Result Value Ref Range    Sodium 146 (H) 136 - 145 mmol/L    Potassium 3.8 3.5 - 5.1 mmol/L    Chloride 112 (H) 98 - 107 mmol/L    CO2 22 21 - 32 mmol/L    Anion gap 12 7 - 16 mmol/L    Glucose 291 (H) 65 - 100 mg/dL    BUN 72 (H) 8 - 23 MG/DL    Creatinine 4.35 (H) 0.8 - 1.5 MG/DL    GFR est AA 17 (L) >60 ml/min/1.73m2    GFR est non-AA 14 (L) >60 ml/min/1.73m2    Calcium 8.5 8.3 - 10.4 MG/DL   CBC WITH AUTOMATED DIFF    Collection Time: 04/04/18  5:51 AM   Result Value Ref Range    WBC 6.4 4.3 - 11.1 K/uL    RBC 4.01 (L) 4.23 - 5.67 M/uL    HGB 10.1 (L) 13.6 - 17.2 g/dL    HCT 31.8 (L) 41.1 - 50.3 %    MCV 79.3 (L) 79.6 - 97.8 FL    MCH 25.2 (L) 26.1 - 32.9 PG    MCHC 31.8 31.4 - 35.0 g/dL    RDW 15.9 (H) 11.9 - 14.6 %    PLATELET 520 (L) 162 - 450 K/uL    MPV Cannot be calculated 10.8 - 14.1 FL    DF AUTOMATED      NEUTROPHILS 80 (H) 43 - 78 %    LYMPHOCYTES 17 13 - 44 %    MONOCYTES 3 (L) 4.0 - 12.0 %    EOSINOPHILS 0 (L) 0.5 - 7.8 %    BASOPHILS 0 0.0 - 2.0 %    IMMATURE GRANULOCYTES 0 0.0 - 5.0 %    ABS. NEUTROPHILS 5.1 1.7 - 8.2 K/UL    ABS. LYMPHOCYTES 1.1 0.5 - 4.6 K/UL    ABS. MONOCYTES 0.2 0.1 - 1.3 K/UL    ABS. EOSINOPHILS 0.0 0.0 - 0.8 K/UL    ABS. BASOPHILS 0.0 0.0 - 0.2 K/UL    ABS. IMM.  GRANS. 0.0 0.0 - 0.5 K/UL   GLUCOSE, POC    Collection Time: 04/04/18  7:01 AM   Result Value Ref Range    Glucose (POC) 310 (H) 65 - 100 mg/dL       Discharge Exam:  Patient Vitals for the past 24 hrs:   Temp Pulse Resp BP SpO2   04/04/18 0800 98 °F (36.7 °C) 74 17 170/75 99 %   04/04/18 0400 98.7 °F (37.1 °C) 63 17 150/68 98 %   04/04/18 0000 98 °F (36.7 °C) (!) 59 18 125/63 99 %   04/03/18 2000 97.7 °F (36.5 °C) 64 14 131/50 100 %   04/03/18 1605 97.5 °F (36.4 °C) 71 19 112/68 99 %   04/03/18 1200 98 °F (36.7 °C) 71 17 128/60 100 %     Oxygen Therapy  O2 Sat (%): 99 % (04/04/18 0800)  Pulse via Oximetry: 63 beats per minute (04/04/18 0400)  O2 Device: Room air (04/04/18 0400)  No intake or output data in the 24 hours ending 04/04/18 0913    General:    Well nourished. Alert. No distress. Eyes:   Normal sclera. Extraocular movements intact. ENT:  Normocephalic, atraumatic. Moist mucous membranes  CV:   Regular rate and rhythm. No murmur, rub, or gallop. Lungs:  Clear to auscultation bilaterally. No wheezing, rhonchi, or rales. Abdomen: Soft, nontender, nondistended. Bowel sounds normal.   Extremities: Warm and dry. No cyanosis or edema. Neurologic: CN II-XII grossly intact. Sensation intact. Skin:     No rashes or jaundice. Psych:  Normal mood and affect. Discharge Info:   Current Discharge Medication List      START taking these medications    Details   insulin glargine (LANTUS) 100 unit/mL injection 15 Units by SubCUTAneous route daily. Qty: 1 Vial, Refills: 0      hydrALAZINE (APRESOLINE) 25 mg tablet Take 1 Tab by mouth three (3) times daily. Qty: 90 Tab, Refills: 0      isosorbide dinitrate (ISORDIL) 20 mg tablet Take 1 Tab by mouth three (3) times daily. Qty: 90 Tab, Refills: 0      aspirin (ASPIRIN) 325 mg tablet Take 1 Tab by mouth daily. Qty: 30 Tab, Refills: 0      pravastatin (PRAVACHOL) 40 mg tablet Take 1 Tab by mouth nightly. Qty: 30 Tab, Refills: 0      predniSONE (DELTASONE) 20 mg tablet 3 tabs po daily x 3 days then 2 tabs po daily x 3 days then 1/2 tab po daily x 4 days  Qty: 17 Tab, Refills: 0         CONTINUE these medications which have CHANGED    Details   torsemide (DEMADEX) 10 mg tablet Take 5 Tabs by mouth daily.   Qty: 30 Tab, Refills: 0         CONTINUE these medications which have NOT CHANGED    Details   cyclobenzaprine (FLEXERIL) 10 mg tablet Take 1 Tab by mouth three (3) times daily as needed for Muscle Spasm(s). Qty: 30 Tab, Refills: 0      HYDROcodone-acetaminophen (NORCO) 5-325 mg per tablet Take 1-2 Tabs by mouth every six (6) hours as needed for Pain. Max Daily Amount: 8 Tabs. Qty: 20 Tab, Refills: 0    Associated Diagnoses: Acute pain of right knee      HUMALOG KWIKPEN INSULIN 100 unit/mL kwikpen 5 units three times a day with meals plus correction scale, 2 units/50 > 150, max daily dose 25 units  Qty: 5 Pen, Refills: 11    Associated Diagnoses: Uncontrolled type 2 diabetes mellitus with hypoglycemia without coma, with long-term current use of insulin (HCC)      traMADol (ULTRAM) 50 mg tablet Take 50 mg by mouth four (4) times daily. Pt is taking 3 X/day      calcifediol (RAYALDEE) 30 mcg Cs24 Take  by mouth nightly. diphenoxylate-atropine (LOMOTIL) 2.5-0.025 mg per tablet Take 1 Tab by mouth two (2) times daily as needed for Diarrhea. Indications: Diarrhea      ferrous sulfate 325 mg (65 mg iron) tablet Take 325 mg by mouth two (2) times a day. amLODIPine (NORVASC) 10 mg tablet Take 10 mg by mouth every morning. metoprolol succinate (TOPROL-XL) 50 mg XL tablet Take 50 mg by mouth every morning. pregabalin (LYRICA) 50 mg capsule Take 1 Cap by mouth daily. Max Daily Amount: 50 mg.  Qty: 30 Cap, Refills: 0      omeprazole (PRILOSEC) 20 mg capsule Take 20 mg by mouth every morning. STOP taking these medications       insulin glargine (LANTUS,BASAGLAR) 100 unit/mL (3 mL) inpn Comments:   Reason for Stopping:         aspirin delayed-release 81 mg tablet Comments:   Reason for Stopping:                 Disposition: home with home health  Activity: as tolerated  Diet: DIET CARDIAC Regular; Consistent Carb 1800kcal    No orders of the defined types were placed in this encounter.         Follow-up Information     Follow up With Details 225 Elbert Huang MD In 1 week  [address] [facility]  [place] [address]  [phone]      [address] In 2 weeks for follow up of ckd  [facility] [address]  [phone]    [facility]  bilateral lower extremity weakness [place] [id number]  [phone]          Time spent in patient discharge planning and coordination 35 minutes.     Signed:  Himanshu Richmond MD

## 2018-04-05 ENCOUNTER — PATIENT OUTREACH (OUTPATIENT)
Dept: CASE MANAGEMENT | Age: 78
End: 2018-04-05

## 2018-04-05 NOTE — PROGRESS NOTES
Transition of Care Discharge Follow-up Questionnaire   Date/Time of Call:   April 5, 2018 12:24PM   What was the patient hospitalized for? Volume Overload       Does the patient understand his/her diagnosis and/or treatment and what happened during the hospitalization? Patient states understanding of diagnosis and treatment plan during hospitalization     Did the patient receive discharge instructions? Yes   CM Assessed Risk for Readmission:       Patient stated Risk for Readmission:      Yes      Patient states his hyperglycemia and hypo glycemia and keeping blood glucose levels controlled. Review any discharge instructions (see discharge instructions/AVS in [facility]). Ask patient if they understand these. Do they have any questions? Patient states understanding of discharge instructions, patient states no questions. Patient states Jeremiah Nurse([facility]) came out today and reviewed medications and discharge instructions with him. Were home services ordered (nursing, PT, OT, ST, etc.)? Yes, home health services ordered (PT). If so, has the first visit occurred? If not, why? (Assist with coordination of services if necessary.)   No, patient states he was informed that home health agency would contact him within 24 to 48 hours with appointment date/time. Was any DME ordered? No durable medical equipment ordered. If so, has it been received? If not, why?  (Assist patient in obtaining DME orders &/or equipment if necessary. ) NA         Complete a review of all medications (new, continued and discontinued meds per the D/C instructions and medication tab in [facility]). Care Coordinator reviewed all medications with patient per connect [facility]. START taking these medications 04/04/2018     Details   insulin glargine (LANTUS) 100 unit/mL injection 15 Units by SubCUTAneous route daily.   Qty: 1 Vial, Refills: 0       hydrALAZINE (APRESOLINE) 25 mg tablet Take 1 Tab by mouth three (3) times daily. Qty: 90 Tab, Refills: 0       isosorbide dinitrate (ISORDIL) 20 mg tablet Take 1 Tab by mouth three (3) times daily. Qty: 90 Tab, Refills: 0       aspirin (ASPIRIN) 325 mg tablet Take 1 Tab by mouth daily. Qty: 30 Tab, Refills: 0       pravastatin (PRAVACHOL) 40 mg tablet Take 1 Tab by mouth nightly. Qty: 30 Tab, Refills: 0       predniSONE (DELTASONE) 20 mg tablet 3 tabs po daily x 3 days then 2 tabs po daily x 3 days then 1/2 tab po daily x 4 days  Qty: 17 Tab, Refills: 0     CONTINUE these medications which have CHANGED 04/04/2018     Details   torsemide (DEMADEX) 10 mg tablet Take 5 Tabs by mouth daily. Qty: 30 Tab, Refills: 0     STOP taking these medications         insulin glargine (LANTUS,BASAGLAR) 100 unit/mL (3 mL) inpn Comments:   Reason for Stopping:            aspirin delayed-release 81 mg tablet Comments:   Reason for Stopping:               Were all new prescriptions filled? If not, why?  (Assist patient in obtaining medications if necessary  escalate for CCM &/or SW if ongoing issues are verbalized by pt or anticipated)   Patient states he has not filled any of his new prescriptions, patient states he will be taking new medication list to PCP office today for medication review. Patient states he wants to  make sure new medications are okay to take. Does the patient understand the purpose and dosing instructions for all medications? (If patient has questions, provide explanation and education.)   Patient states understanding of current medications and dosing instructions. Care Coordinator educated patient on the importance of medication compliance and reporting medication side effects to PCP/Specialist.      Does the patient have any problems in performing ADLs? (If patient is unable to perform ADLs  what is the limiting factor(s)?   Do they have a support system that can assist? If no support system is present, discuss possible assistance that they may be able to obtain. Escalate for CCM/SW if ongoing issues are verbalized by pt or anticipated)   Patient states he is independent with ADL's and uses a Cane to assist with ambulation. Patient states he is monitoring his blood glucose levels before and after measl and administering insulin per doctor's order. Patient states he has family who lives near who would provide care and assistance if needed. Does the patient have all follow-up appointments scheduled? 7 day f/up with PCP?   (f/up with PCP may be w/in 14 days if patient has a f/up with their specialist w/in 7 days)    7-14 day f/up with specialist?   (or per discharge instructions)    If f/up has not been made  what actions has the care coordinator made to accomplish this? Has transportation been arranged? Yes    · Follow up with Miguel Laird MD (Internal Medicine) on 4/11/2018; at 10am   · Follow up with Criselda Diggs NP (Nurse Practitioner) on 4/26/2018; at 3:30   · Follow up with Robert Harris MD (Neurosurgery) on 4/26/2018;  at 2pm with Dr. Griffin Show   · Follow up with Criselda Diggs NP (Nurse Practitioner) on 4/5/2018;  at 11:30     Yes   Any other questions or concerns expressed by the patient? No further needs identified, patient instructed to call Care Coordinator if further questions or concerns arise. Schedule next appointment with CRISTINA GARCIA Coordinator or refer to RN Case Manager/ per the workflow guidelines. When is care coordinators next follow-up call scheduled? If referred for CCM  what RN care manager was the referral assigned? NA            NA        NA   JACQUELIN Call Completed By: JAZMINE Lopez ACO  Care Coordinator         This note will not be viewable in [address]

## 2018-04-06 ENCOUNTER — HOME CARE VISIT (OUTPATIENT)
Dept: SCHEDULING | Facility: HOME HEALTH | Age: 78
End: 2018-04-06
Payer: MEDICARE

## 2018-04-06 PROCEDURE — G0151 HHCP-SERV OF PT,EA 15 MIN: HCPCS

## 2018-04-06 PROCEDURE — 400013 HH SOC

## 2018-04-08 VITALS
RESPIRATION RATE: 18 BRPM | SYSTOLIC BLOOD PRESSURE: 110 MMHG | TEMPERATURE: 98.5 F | DIASTOLIC BLOOD PRESSURE: 70 MMHG | HEART RATE: 61 BPM

## 2018-04-11 ENCOUNTER — HOME CARE VISIT (OUTPATIENT)
Dept: SCHEDULING | Facility: HOME HEALTH | Age: 78
End: 2018-04-11
Payer: MEDICARE

## 2018-04-11 VITALS
DIASTOLIC BLOOD PRESSURE: 56 MMHG | TEMPERATURE: 98.9 F | RESPIRATION RATE: 18 BRPM | HEART RATE: 70 BPM | SYSTOLIC BLOOD PRESSURE: 112 MMHG | OXYGEN SATURATION: 99 %

## 2018-04-11 PROCEDURE — G0299 HHS/HOSPICE OF RN EA 15 MIN: HCPCS

## 2018-04-11 PROCEDURE — G0157 HHC PT ASSISTANT EA 15: HCPCS

## 2018-04-13 ENCOUNTER — APPOINTMENT (OUTPATIENT)
Dept: CT IMAGING | Age: 78
DRG: 070 | End: 2018-04-13
Attending: EMERGENCY MEDICINE
Payer: MEDICARE

## 2018-04-13 ENCOUNTER — HOME CARE VISIT (OUTPATIENT)
Dept: SCHEDULING | Facility: HOME HEALTH | Age: 78
End: 2018-04-13
Payer: MEDICARE

## 2018-04-13 ENCOUNTER — HOME CARE VISIT (OUTPATIENT)
Dept: HOME HEALTH SERVICES | Facility: HOME HEALTH | Age: 78
End: 2018-04-13
Payer: MEDICARE

## 2018-04-13 ENCOUNTER — HOSPITAL ENCOUNTER (INPATIENT)
Age: 78
LOS: 5 days | Discharge: NURSING, REHAB OR LONG-TERM CARE | DRG: 070 | End: 2018-04-19
Attending: EMERGENCY MEDICINE | Admitting: INTERNAL MEDICINE
Payer: MEDICARE

## 2018-04-13 ENCOUNTER — APPOINTMENT (OUTPATIENT)
Dept: GENERAL RADIOLOGY | Age: 78
DRG: 070 | End: 2018-04-13
Attending: INTERNAL MEDICINE
Payer: MEDICARE

## 2018-04-13 DIAGNOSIS — E83.51 HYPOCALCEMIA: ICD-10-CM

## 2018-04-13 DIAGNOSIS — E11.42 DIABETIC POLYNEUROPATHY ASSOCIATED WITH TYPE 2 DIABETES MELLITUS (HCC): ICD-10-CM

## 2018-04-13 DIAGNOSIS — G93.49 UREMIC ENCEPHALOPATHY: Primary | ICD-10-CM

## 2018-04-13 DIAGNOSIS — R73.9 HYPERGLYCEMIA: ICD-10-CM

## 2018-04-13 DIAGNOSIS — R56.9 SEIZURE (HCC): ICD-10-CM

## 2018-04-13 DIAGNOSIS — R40.0 SOMNOLENCE: ICD-10-CM

## 2018-04-13 DIAGNOSIS — G93.41 ACUTE METABOLIC ENCEPHALOPATHY: ICD-10-CM

## 2018-04-13 DIAGNOSIS — M25.561 ACUTE PAIN OF RIGHT KNEE: ICD-10-CM

## 2018-04-13 DIAGNOSIS — N19 UREMIC ENCEPHALOPATHY: Primary | ICD-10-CM

## 2018-04-13 PROBLEM — R41.82 ALTERED MENTAL STATUS: Status: ACTIVE | Noted: 2018-04-13

## 2018-04-13 LAB
ALBUMIN SERPL-MCNC: 3.6 G/DL (ref 3.2–4.6)
ALBUMIN/GLOB SERPL: 0.9 {RATIO} (ref 1.2–3.5)
ALP SERPL-CCNC: 73 U/L (ref 50–136)
ALT SERPL-CCNC: 45 U/L (ref 12–65)
AMMONIA PLAS-SCNC: 35 UMOL/L (ref 11–32)
ANION GAP SERPL CALC-SCNC: 15 MMOL/L (ref 7–16)
AST SERPL-CCNC: 37 U/L (ref 15–37)
BASOPHILS # BLD: 0 K/UL (ref 0–0.2)
BASOPHILS NFR BLD: 0 % (ref 0–2)
BILIRUB SERPL-MCNC: 0.5 MG/DL (ref 0.2–1.1)
BNP SERPL-MCNC: 103 PG/ML
BUN SERPL-MCNC: 81 MG/DL (ref 8–23)
CALCIUM SERPL-MCNC: 6.8 MG/DL (ref 8.3–10.4)
CHLORIDE SERPL-SCNC: 118 MMOL/L (ref 98–107)
CO2 SERPL-SCNC: 15 MMOL/L (ref 21–32)
CREAT SERPL-MCNC: 6.17 MG/DL (ref 0.8–1.5)
DIFFERENTIAL METHOD BLD: ABNORMAL
EOSINOPHIL # BLD: 0.1 K/UL (ref 0–0.8)
EOSINOPHIL NFR BLD: 3 % (ref 0.5–7.8)
ERYTHROCYTE [DISTWIDTH] IN BLOOD BY AUTOMATED COUNT: 16.4 % (ref 11.9–14.6)
GLOBULIN SER CALC-MCNC: 3.8 G/DL (ref 2.3–3.5)
GLUCOSE SERPL-MCNC: 316 MG/DL (ref 65–100)
HCT VFR BLD AUTO: 33.9 % (ref 41.1–50.3)
HGB BLD-MCNC: 11.1 G/DL (ref 13.6–17.2)
IMM GRANULOCYTES # BLD: 0 K/UL (ref 0–0.5)
IMM GRANULOCYTES NFR BLD AUTO: 0 % (ref 0–5)
LACTATE BLD-SCNC: 1.1 MMOL/L (ref 0.5–1.9)
LYMPHOCYTES # BLD: 1.1 K/UL (ref 0.5–4.6)
LYMPHOCYTES NFR BLD: 30 % (ref 13–44)
MCH RBC QN AUTO: 26 PG (ref 26.1–32.9)
MCHC RBC AUTO-ENTMCNC: 32.7 G/DL (ref 31.4–35)
MCV RBC AUTO: 79.4 FL (ref 79.6–97.8)
MONOCYTES # BLD: 0.3 K/UL (ref 0.1–1.3)
MONOCYTES NFR BLD: 8 % (ref 4–12)
NEUTS SEG # BLD: 2.2 K/UL (ref 1.7–8.2)
NEUTS SEG NFR BLD: 59 % (ref 43–78)
PLATELET # BLD AUTO: 131 K/UL (ref 150–450)
PMV BLD AUTO: 12.2 FL (ref 10.8–14.1)
POTASSIUM SERPL-SCNC: 3.7 MMOL/L (ref 3.5–5.1)
PROT SERPL-MCNC: 7.4 G/DL (ref 6.3–8.2)
RBC # BLD AUTO: 4.27 M/UL (ref 4.23–5.67)
SODIUM SERPL-SCNC: 148 MMOL/L (ref 136–145)
WBC # BLD AUTO: 3.7 K/UL (ref 4.3–11.1)

## 2018-04-13 PROCEDURE — 83735 ASSAY OF MAGNESIUM: CPT | Performed by: INTERNAL MEDICINE

## 2018-04-13 PROCEDURE — 99285 EMERGENCY DEPT VISIT HI MDM: CPT | Performed by: EMERGENCY MEDICINE

## 2018-04-13 PROCEDURE — 82607 VITAMIN B-12: CPT | Performed by: INTERNAL MEDICINE

## 2018-04-13 PROCEDURE — 85025 COMPLETE CBC W/AUTO DIFF WBC: CPT | Performed by: EMERGENCY MEDICINE

## 2018-04-13 PROCEDURE — 74011250636 HC RX REV CODE- 250/636: Performed by: INTERNAL MEDICINE

## 2018-04-13 PROCEDURE — 96360 HYDRATION IV INFUSION INIT: CPT | Performed by: EMERGENCY MEDICINE

## 2018-04-13 PROCEDURE — 99218 HC RM OBSERVATION: CPT

## 2018-04-13 PROCEDURE — 83880 ASSAY OF NATRIURETIC PEPTIDE: CPT | Performed by: EMERGENCY MEDICINE

## 2018-04-13 PROCEDURE — 82746 ASSAY OF FOLIC ACID SERUM: CPT | Performed by: INTERNAL MEDICINE

## 2018-04-13 PROCEDURE — 80053 COMPREHEN METABOLIC PANEL: CPT | Performed by: EMERGENCY MEDICINE

## 2018-04-13 PROCEDURE — 84443 ASSAY THYROID STIM HORMONE: CPT | Performed by: INTERNAL MEDICINE

## 2018-04-13 PROCEDURE — 74011250636 HC RX REV CODE- 250/636: Performed by: EMERGENCY MEDICINE

## 2018-04-13 PROCEDURE — 82140 ASSAY OF AMMONIA: CPT | Performed by: EMERGENCY MEDICINE

## 2018-04-13 PROCEDURE — 71045 X-RAY EXAM CHEST 1 VIEW: CPT

## 2018-04-13 PROCEDURE — 93005 ELECTROCARDIOGRAM TRACING: CPT | Performed by: EMERGENCY MEDICINE

## 2018-04-13 PROCEDURE — 83605 ASSAY OF LACTIC ACID: CPT

## 2018-04-13 PROCEDURE — 80307 DRUG TEST PRSMV CHEM ANLYZR: CPT | Performed by: INTERNAL MEDICINE

## 2018-04-13 PROCEDURE — 70450 CT HEAD/BRAIN W/O DYE: CPT

## 2018-04-13 RX ORDER — LANOLIN ALCOHOL/MO/W.PET/CERES
325 CREAM (GRAM) TOPICAL 2 TIMES DAILY
Status: DISCONTINUED | OUTPATIENT
Start: 2018-04-14 | End: 2018-04-19 | Stop reason: HOSPADM

## 2018-04-13 RX ORDER — METOPROLOL SUCCINATE 50 MG/1
50 TABLET, EXTENDED RELEASE ORAL
Status: DISCONTINUED | OUTPATIENT
Start: 2018-04-14 | End: 2018-04-19 | Stop reason: HOSPADM

## 2018-04-13 RX ORDER — ASPIRIN 325 MG
325 TABLET ORAL DAILY
Status: DISCONTINUED | OUTPATIENT
Start: 2018-04-14 | End: 2018-04-19 | Stop reason: HOSPADM

## 2018-04-13 RX ORDER — ACETAMINOPHEN 325 MG/1
650 TABLET ORAL
Status: DISCONTINUED | OUTPATIENT
Start: 2018-04-13 | End: 2018-04-19 | Stop reason: HOSPADM

## 2018-04-13 RX ORDER — PRAVASTATIN SODIUM 20 MG/1
40 TABLET ORAL
Status: DISCONTINUED | OUTPATIENT
Start: 2018-04-13 | End: 2018-04-19 | Stop reason: HOSPADM

## 2018-04-13 RX ORDER — HEPARIN SODIUM 5000 [USP'U]/ML
5000 INJECTION, SOLUTION INTRAVENOUS; SUBCUTANEOUS EVERY 8 HOURS
Status: DISCONTINUED | OUTPATIENT
Start: 2018-04-13 | End: 2018-04-19 | Stop reason: HOSPADM

## 2018-04-13 RX ORDER — AMLODIPINE BESYLATE 10 MG/1
10 TABLET ORAL
Status: DISCONTINUED | OUTPATIENT
Start: 2018-04-14 | End: 2018-04-19 | Stop reason: HOSPADM

## 2018-04-13 RX ORDER — SODIUM CHLORIDE 0.9 % (FLUSH) 0.9 %
5-10 SYRINGE (ML) INJECTION EVERY 8 HOURS
Status: DISCONTINUED | OUTPATIENT
Start: 2018-04-13 | End: 2018-04-19 | Stop reason: HOSPADM

## 2018-04-13 RX ORDER — HYDRALAZINE HYDROCHLORIDE 25 MG/1
25 TABLET, FILM COATED ORAL 3 TIMES DAILY
Status: CANCELLED | OUTPATIENT
Start: 2018-04-13

## 2018-04-13 RX ORDER — ISOSORBIDE DINITRATE 20 MG/1
20 TABLET ORAL 3 TIMES DAILY
Status: DISCONTINUED | OUTPATIENT
Start: 2018-04-13 | End: 2018-04-19 | Stop reason: HOSPADM

## 2018-04-13 RX ORDER — INSULIN LISPRO 100 [IU]/ML
INJECTION, SOLUTION INTRAVENOUS; SUBCUTANEOUS
Status: DISCONTINUED | OUTPATIENT
Start: 2018-04-14 | End: 2018-04-19 | Stop reason: HOSPADM

## 2018-04-13 RX ORDER — SODIUM CHLORIDE 0.9 % (FLUSH) 0.9 %
5-10 SYRINGE (ML) INJECTION AS NEEDED
Status: DISCONTINUED | OUTPATIENT
Start: 2018-04-13 | End: 2018-04-19 | Stop reason: HOSPADM

## 2018-04-13 RX ORDER — INSULIN GLARGINE 100 [IU]/ML
15 INJECTION, SOLUTION SUBCUTANEOUS DAILY
Status: DISCONTINUED | OUTPATIENT
Start: 2018-04-14 | End: 2018-04-19 | Stop reason: HOSPADM

## 2018-04-13 RX ORDER — TORSEMIDE 100 MG/1
50 TABLET ORAL DAILY
Status: DISCONTINUED | OUTPATIENT
Start: 2018-04-14 | End: 2018-04-14

## 2018-04-13 RX ORDER — HYDRALAZINE HYDROCHLORIDE 25 MG/1
25 TABLET, FILM COATED ORAL 3 TIMES DAILY
Status: DISCONTINUED | OUTPATIENT
Start: 2018-04-13 | End: 2018-04-18

## 2018-04-13 RX ADMIN — SODIUM CHLORIDE 1000 ML: 900 INJECTION, SOLUTION INTRAVENOUS at 20:56

## 2018-04-13 RX ADMIN — Medication 10 ML: at 23:45

## 2018-04-13 RX ADMIN — HEPARIN SODIUM 5000 UNITS: 5000 INJECTION, SOLUTION INTRAVENOUS; SUBCUTANEOUS at 23:46

## 2018-04-13 NOTE — ED TRIAGE NOTES
Pt arrives to the ER via EMS after family called out stating pt was not acting his self. Per EMS pt will be with it one moment, and then out of it the next. Pt is non compliant with his glucose medications () and with dialysis. Pt has been resisting dialysis per family. /70, HR 70s, o2 100%. PT was started on baclofen 2 days ago as well.  When pt asked questions in triage, pt only recites his birthday

## 2018-04-13 NOTE — ED PROVIDER NOTES
HPI Comments: Per nurses notes from home visit 2 days ago: \"SN performed nursing evaluation today per PT's request for medication education and possible wound care. VS stable. Patient ambulates with aid of cane without difficulty, but has c/o multiple falls in the past year. Patient appears to be competent with medications and managing well. No skin breakdown to bottom noted, however patient has fluid-filled blister to right lower extremity. SN instructed patient to keep covered until ruptured, then to keep clean with antimicrobial soap and water to prevent possible infection. SN also instructed patient to keep lower extremities elevated while sitting to reduce edema. Patient v/u. SN to add one additional visit for later this week to see if additional wound care is needed or if any issues arise prior to end of the week. PT to remain in home for muscle strengthening and gait training. Dr. Vianney Douglas notified of evaluation 4.11.18 at 1700. \"    Per EMS report patient was  Brought to return for evaluation due to confusion. Patient is unable to give a history or review of systems the only answers his name to all questions stating \"ILA Mantilla\". Per visiting nurse note above patient appeared to be normal self 2 days ago. Patient is a 69 y.o. male presenting with altered mental status. The history is provided by the EMS personnel. The history is limited by the condition of the patient. Altered mental status    This is a new problem. Episode onset: within the last 48 hours. The problem has not changed since onset. Associated symptoms include confusion and somnolence. Mental status baseline is normal.  Risk factors: Chronic renal failure.         Past Medical History:   Diagnosis Date    Acute renal failure superimposed on stage 3 chronic kidney disease ([facility] [address] 9/21/2016    Anemia     Chronic kidney disease     not on HD yet- surgery done for access and here for elevation of fistula- FU with [facility]- creat on 12/7/17=3.15    Chronic pancreatitis ([facility] [address] 9/22/2016    Dermatophytosis of nail 12/6/2016    Diabetic neuropathy ([facility] 9/22/2016    avg fastings 200; last A1C-? ; hypo @ [de-identified]    Essential hypertension 9/22/2016    GERD (gastroesophageal reflux disease)     controlled with med    Hypercholesterolemia     Iron (Fe) deficiency anemia 9/22/2016    Kidney disease     Septicemia due to Klebsiella pneumoniae ([facility] 2/43/3776    Systolic CHF, chronic ([facility] [address] 9/23/2016    Type II diabetes mellitus with nephropathy ([facility] 09/22/2016    Venous insufficiency 12/6/2016    Xerosis cutis 12/6/2016       Past Surgical History:   Procedure Laterality Date    HX COLONOSCOPY      HX HERNIA REPAIR Left 2010    HX PROSTATECTOMY  2012    HX TURP  2012    FOR BPH    VASCULAR SURGERY PROCEDURE UNLIST Left 10/26/2017    AVF         Family History:   Problem Relation Age of Onset    Diabetes Mother     Stroke Mother     Hypertension Mother     No Known Problems Father     Diabetes Sister     Diabetes Brother     Diabetes Sister     Diabetes Sister     Other Sister      fibromyalgia       Social History     Social History    Marital status:      Spouse name: N/A    Number of children: N/A    Years of education: N/A     Occupational History    Not on file. Social History Main Topics    Smoking status: Former Smoker     Packs/day: 2.00     Years: 20.00     Quit date: 1995    Smokeless tobacco: Current User    Alcohol use No    Drug use: Not on file    Sexual activity: Not on file     Other Topics Concern    Not on file     Social History Narrative         ALLERGIES: Review of patient's allergies indicates no known allergies. Review of Systems   Unable to perform ROS: Mental status change   Psychiatric/Behavioral: Positive for confusion.        Vitals:    04/13/18 1823 04/13/18 1855 04/13/18 1925   BP: 154/71 141/71 116/59   Pulse: 80 77 69   Resp: 18     Temp: 97.5 °F (36.4 °C) SpO2: 100% 100%    Weight: 80.3 kg (177 lb)     Height: 6' 3\" (1.905 m)              Physical Exam   Constitutional: He appears well-developed and well-nourished. No distress. HENT:   Head: Normocephalic and atraumatic. Mouth/Throat: No oropharyngeal exudate. Mucous membranes are dry with thick matted saliva   Eyes: Conjunctivae and EOM are normal. Pupils are equal, round, and reactive to light. Neck: Normal range of motion. Neck supple. Cardiovascular: Normal rate, regular rhythm and normal heart sounds. Pulmonary/Chest: Effort normal and breath sounds normal.   Abdominal: Soft. Bowel sounds are normal.   Musculoskeletal: Normal range of motion. He exhibits edema. He exhibits no tenderness or deformity. Neurological: No cranial nerve deficit. Coordination normal.   Patient is somnolent but arousable,, confused   Skin: Skin is warm and dry. Open wound noted to the anterior right leg. Psychiatric: He has a normal mood and affect. His behavior is normal.   Nursing note and vitals reviewed.        MDM  Number of Diagnoses or Management Options     Amount and/or Complexity of Data Reviewed  Clinical lab tests: ordered and reviewed  Tests in the radiology section of CPT®: ordered and reviewed  Independent visualization of images, tracings, or specimens: yes    Risk of Complications, Morbidity, and/or Mortality  Presenting problems: high  Diagnostic procedures: moderate  Management options: moderate    Patient Progress  Patient progress: stable        ED Course       Procedures

## 2018-04-13 NOTE — ED NOTES
First contact with patient. Patient somnolent- difficult to awaken. Once awoken, patient able to verbalize though mumbling. Patient able to state name/birthday, otherwise confused. Patient with no appreciable unilateral deficits. As per triage note, no defined onset of AMS.  All labs sent out of triage- awaiting physician luciano.

## 2018-04-14 PROBLEM — N19 RENAL FAILURE: Status: ACTIVE | Noted: 2018-04-14

## 2018-04-14 LAB
ANION GAP SERPL CALC-SCNC: 16 MMOL/L (ref 7–16)
APPEARANCE UR: CLEAR
ATRIAL RATE: 69 BPM
BILIRUB UR QL: NEGATIVE
BUN SERPL-MCNC: 77 MG/DL (ref 8–23)
CALCIUM SERPL-MCNC: 6.6 MG/DL (ref 8.3–10.4)
CALCULATED P AXIS, ECG09: 61 DEGREES
CALCULATED R AXIS, ECG10: -15 DEGREES
CALCULATED T AXIS, ECG11: 20 DEGREES
CHLORIDE SERPL-SCNC: 120 MMOL/L (ref 98–107)
CO2 SERPL-SCNC: 14 MMOL/L (ref 21–32)
COLOR UR: YELLOW
CREAT SERPL-MCNC: 5.58 MG/DL (ref 0.8–1.5)
DIAGNOSIS, 93000: NORMAL
ERYTHROCYTE [DISTWIDTH] IN BLOOD BY AUTOMATED COUNT: 16.2 % (ref 11.9–14.6)
EST. AVERAGE GLUCOSE BLD GHB EST-MCNC: 171 MG/DL
ETHANOL SERPL-MCNC: <3 MG/DL
FOLATE SERPL-MCNC: 35.4 NG/ML (ref 3.1–17.5)
GLUCOSE BLD STRIP.AUTO-MCNC: 153 MG/DL (ref 65–100)
GLUCOSE BLD STRIP.AUTO-MCNC: 189 MG/DL (ref 65–100)
GLUCOSE BLD STRIP.AUTO-MCNC: 207 MG/DL (ref 65–100)
GLUCOSE BLD STRIP.AUTO-MCNC: 241 MG/DL (ref 65–100)
GLUCOSE BLD STRIP.AUTO-MCNC: 245 MG/DL (ref 65–100)
GLUCOSE SERPL-MCNC: 230 MG/DL (ref 65–100)
GLUCOSE UR STRIP.AUTO-MCNC: 100 MG/DL
HBA1C MFR BLD: 7.6 % (ref 4.8–6)
HCT VFR BLD AUTO: 32.9 % (ref 41.1–50.3)
HGB BLD-MCNC: 10.6 G/DL (ref 13.6–17.2)
HGB UR QL STRIP: NEGATIVE
KETONES UR QL STRIP.AUTO: NEGATIVE MG/DL
LEUKOCYTE ESTERASE UR QL STRIP.AUTO: NEGATIVE
MAGNESIUM SERPL-MCNC: 1 MG/DL (ref 1.8–2.4)
MAGNESIUM SERPL-MCNC: 1.6 MG/DL (ref 1.8–2.4)
MCH RBC QN AUTO: 25.5 PG (ref 26.1–32.9)
MCHC RBC AUTO-ENTMCNC: 32.2 G/DL (ref 31.4–35)
MCV RBC AUTO: 79.3 FL (ref 79.6–97.8)
NITRITE UR QL STRIP.AUTO: NEGATIVE
P-R INTERVAL, ECG05: 210 MS
PH UR STRIP: 5 [PH] (ref 5–9)
PLATELET # BLD AUTO: 112 K/UL (ref 150–450)
PMV BLD AUTO: 11.3 FL (ref 10.8–14.1)
POTASSIUM SERPL-SCNC: 3 MMOL/L (ref 3.5–5.1)
PROT UR STRIP-MCNC: NEGATIVE MG/DL
Q-T INTERVAL, ECG07: 388 MS
QRS DURATION, ECG06: 88 MS
QTC CALCULATION (BEZET), ECG08: 415 MS
RBC # BLD AUTO: 4.15 M/UL (ref 4.23–5.67)
SODIUM SERPL-SCNC: 150 MMOL/L (ref 136–145)
SP GR UR REFRACTOMETRY: 1.01 (ref 1–1.02)
TSH SERPL DL<=0.005 MIU/L-ACNC: 2.5 UIU/ML (ref 0.36–3.74)
UROBILINOGEN UR QL STRIP.AUTO: 0.2 EU/DL (ref 0.2–1)
VENTRICULAR RATE, ECG03: 69 BPM
VIT B12 SERPL-MCNC: 472 PG/ML (ref 193–986)
WBC # BLD AUTO: 5.1 K/UL (ref 4.3–11.1)

## 2018-04-14 PROCEDURE — 99218 HC RM OBSERVATION: CPT

## 2018-04-14 PROCEDURE — 36415 COLL VENOUS BLD VENIPUNCTURE: CPT | Performed by: INTERNAL MEDICINE

## 2018-04-14 PROCEDURE — 85027 COMPLETE CBC AUTOMATED: CPT | Performed by: INTERNAL MEDICINE

## 2018-04-14 PROCEDURE — 83036 HEMOGLOBIN GLYCOSYLATED A1C: CPT | Performed by: INTERNAL MEDICINE

## 2018-04-14 PROCEDURE — 83735 ASSAY OF MAGNESIUM: CPT | Performed by: INTERNAL MEDICINE

## 2018-04-14 PROCEDURE — 87040 BLOOD CULTURE FOR BACTERIA: CPT | Performed by: INTERNAL MEDICINE

## 2018-04-14 PROCEDURE — 82962 GLUCOSE BLOOD TEST: CPT

## 2018-04-14 PROCEDURE — 77030011943

## 2018-04-14 PROCEDURE — 65270000029 HC RM PRIVATE

## 2018-04-14 PROCEDURE — 81003 URINALYSIS AUTO W/O SCOPE: CPT | Performed by: INTERNAL MEDICINE

## 2018-04-14 PROCEDURE — 80048 BASIC METABOLIC PNL TOTAL CA: CPT | Performed by: INTERNAL MEDICINE

## 2018-04-14 PROCEDURE — 74011250636 HC RX REV CODE- 250/636

## 2018-04-14 PROCEDURE — 74011250636 HC RX REV CODE- 250/636: Performed by: INTERNAL MEDICINE

## 2018-04-14 PROCEDURE — 74011000258 HC RX REV CODE- 258: Performed by: INTERNAL MEDICINE

## 2018-04-14 PROCEDURE — 74011636637 HC RX REV CODE- 636/637: Performed by: INTERNAL MEDICINE

## 2018-04-14 PROCEDURE — 95816 EEG AWAKE AND DROWSY: CPT | Performed by: INTERNAL MEDICINE

## 2018-04-14 RX ORDER — LORAZEPAM 2 MG/ML
2 INJECTION INTRAMUSCULAR
Status: COMPLETED | OUTPATIENT
Start: 2018-04-14 | End: 2018-04-14

## 2018-04-14 RX ORDER — MAGNESIUM SULFATE HEPTAHYDRATE 40 MG/ML
4 INJECTION, SOLUTION INTRAVENOUS ONCE
Status: COMPLETED | OUTPATIENT
Start: 2018-04-14 | End: 2018-04-14

## 2018-04-14 RX ORDER — LORAZEPAM 2 MG/ML
INJECTION INTRAMUSCULAR
Status: COMPLETED
Start: 2018-04-14 | End: 2018-04-14

## 2018-04-14 RX ORDER — DEXTROSE MONOHYDRATE AND SODIUM CHLORIDE 5; .45 G/100ML; G/100ML
100 INJECTION, SOLUTION INTRAVENOUS CONTINUOUS
Status: DISCONTINUED | OUTPATIENT
Start: 2018-04-14 | End: 2018-04-15

## 2018-04-14 RX ORDER — HYDRALAZINE HYDROCHLORIDE 20 MG/ML
20 INJECTION INTRAMUSCULAR; INTRAVENOUS
Status: DISCONTINUED | OUTPATIENT
Start: 2018-04-13 | End: 2018-04-19 | Stop reason: HOSPADM

## 2018-04-14 RX ORDER — POTASSIUM CHLORIDE 14.9 MG/ML
20 INJECTION INTRAVENOUS
Status: COMPLETED | OUTPATIENT
Start: 2018-04-14 | End: 2018-04-14

## 2018-04-14 RX ORDER — LORAZEPAM 2 MG/ML
1 INJECTION INTRAMUSCULAR
Status: DISCONTINUED | OUTPATIENT
Start: 2018-04-14 | End: 2018-04-19 | Stop reason: HOSPADM

## 2018-04-14 RX ORDER — BACLOFEN 10 MG/1
10 TABLET ORAL 3 TIMES DAILY
COMMUNITY
End: 2018-04-19

## 2018-04-14 RX ORDER — TORSEMIDE 20 MG/1
40 TABLET ORAL DAILY
COMMUNITY
End: 2019-01-01

## 2018-04-14 RX ADMIN — INSULIN LISPRO 2 UNITS: 100 INJECTION, SOLUTION INTRAVENOUS; SUBCUTANEOUS at 17:34

## 2018-04-14 RX ADMIN — HEPARIN SODIUM 5000 UNITS: 5000 INJECTION, SOLUTION INTRAVENOUS; SUBCUTANEOUS at 22:40

## 2018-04-14 RX ADMIN — INSULIN LISPRO 4 UNITS: 100 INJECTION, SOLUTION INTRAVENOUS; SUBCUTANEOUS at 13:04

## 2018-04-14 RX ADMIN — Medication 10 ML: at 05:06

## 2018-04-14 RX ADMIN — HEPARIN SODIUM 5000 UNITS: 5000 INJECTION, SOLUTION INTRAVENOUS; SUBCUTANEOUS at 17:44

## 2018-04-14 RX ADMIN — INSULIN LISPRO 4 UNITS: 100 INJECTION, SOLUTION INTRAVENOUS; SUBCUTANEOUS at 09:24

## 2018-04-14 RX ADMIN — POTASSIUM CHLORIDE 20 MEQ: 200 INJECTION, SOLUTION INTRAVENOUS at 12:58

## 2018-04-14 RX ADMIN — MAGNESIUM SULFATE IN WATER 4 G: 40 INJECTION, SOLUTION INTRAVENOUS at 02:32

## 2018-04-14 RX ADMIN — Medication 10 ML: at 13:05

## 2018-04-14 RX ADMIN — DEXTROSE MONOHYDRATE AND SODIUM CHLORIDE 100 ML/HR: 5; .45 INJECTION, SOLUTION INTRAVENOUS at 10:40

## 2018-04-14 RX ADMIN — INSULIN GLARGINE 15 UNITS: 100 INJECTION, SOLUTION SUBCUTANEOUS at 09:27

## 2018-04-14 RX ADMIN — HEPARIN SODIUM 5000 UNITS: 5000 INJECTION, SOLUTION INTRAVENOUS; SUBCUTANEOUS at 06:01

## 2018-04-14 RX ADMIN — AZITHROMYCIN MONOHYDRATE 500 MG: 500 INJECTION, POWDER, LYOPHILIZED, FOR SOLUTION INTRAVENOUS at 05:03

## 2018-04-14 RX ADMIN — Medication 10 ML: at 22:36

## 2018-04-14 RX ADMIN — DEXTROSE MONOHYDRATE AND SODIUM CHLORIDE 100 ML/HR: 5; .45 INJECTION, SOLUTION INTRAVENOUS at 22:38

## 2018-04-14 RX ADMIN — HYDRALAZINE HYDROCHLORIDE 20 MG: 20 INJECTION INTRAMUSCULAR; INTRAVENOUS at 23:33

## 2018-04-14 RX ADMIN — INSULIN LISPRO 2 UNITS: 100 INJECTION, SOLUTION INTRAVENOUS; SUBCUTANEOUS at 22:36

## 2018-04-14 RX ADMIN — LORAZEPAM 2 MG: 2 INJECTION INTRAMUSCULAR; INTRAVENOUS at 10:08

## 2018-04-14 RX ADMIN — HYDRALAZINE HYDROCHLORIDE 20 MG: 20 INJECTION INTRAMUSCULAR; INTRAVENOUS at 00:22

## 2018-04-14 RX ADMIN — POTASSIUM CHLORIDE 20 MEQ: 200 INJECTION, SOLUTION INTRAVENOUS at 10:35

## 2018-04-14 RX ADMIN — SODIUM CHLORIDE 1000 MG: 900 INJECTION, SOLUTION INTRAVENOUS at 11:14

## 2018-04-14 RX ADMIN — CEFTRIAXONE 1 G: 1 INJECTION, POWDER, FOR SOLUTION INTRAMUSCULAR; INTRAVENOUS at 04:18

## 2018-04-14 RX ADMIN — LORAZEPAM 1 MG: 2 INJECTION INTRAMUSCULAR; INTRAVENOUS at 14:53

## 2018-04-14 RX ADMIN — SODIUM CHLORIDE 1000 MG: 900 INJECTION, SOLUTION INTRAVENOUS at 22:29

## 2018-04-14 NOTE — PROGRESS NOTES
END OF SHIFT NOTE:    INTAKE/OUTPUT     Voiding: YES  Catheter: NO  Drain:              Flatus: Patient does have flatus present. Stool:  1 occurrences. Characteristics:  Stool Assessment  Stool Color: Brown  Stool Appearance: Soft  Stool Amount: Large  Stool Source/Status: Rectum    Emesis: 0 occurrences. Characteristics:        VITAL SIGNS  Patient Vitals for the past 12 hrs:   Temp Pulse Resp BP SpO2   04/14/18 0330 97.5 °F (36.4 °C) 79 20 160/60 96 %   04/13/18 2259 97.4 °F (36.3 °C) (!) 116 20 180/60 91 %   04/13/18 2155 97.4 °F (36.3 °C) 64 20 142/65 99 %   04/13/18 2054 - 68 - 146/68 95 %   04/13/18 2035 - 66 - 135/64 100 %   04/13/18 2014 - (!) 59 - 147/65 100 %   04/13/18 1955 - (!) 58 - 131/60 98 %   04/13/18 1945 - 66 - - 100 %   04/13/18 1934 - 66 - 148/67 -   04/13/18 1925 - 69 - 116/59 -   04/13/18 1855 - 77 - 141/71 100 %       Pain Assessment  Pain Intensity 1: 0 (04/13/18 1823)        Patient Stated Pain Goal: 0    Ambulating  No    Shift report given to oncoming nurse at the bedside.     Payam Villalta RN

## 2018-04-14 NOTE — PROGRESS NOTES
Primary Nurse Becki Chung RN and Eliecer Gill RN performed a dual skin assessment on this patient Impairment noted-open wound on R anterior leg. BLE swelling, discoloration and thickened skin. No family at bedside, pt lethargic. Unable to answer any questions. Call light within reach, bed in low position.

## 2018-04-14 NOTE — PROGRESS NOTES
TRANSFER - IN REPORT:    Verbal report received from Rylan Palm RN(name) on A [employer].  being received from ED(unit) for routine progression of care      Report consisted of patients Situation, Background, Assessment and   Recommendations(SBAR). Information from the following report(s) SBAR, ED Summary, Intake/Output and Recent Results was reviewed with the receiving nurse. Opportunity for questions and clarification was provided. Assessment to be completed upon patients arrival to unit and care assumed.

## 2018-04-14 NOTE — ED NOTES
TRANSFER - OUT REPORT:    Verbal report given to 2nd floor RN on A B Rashida Torrestz.  being transferred to [facility] for routine progression of care       Report consisted of patients Situation, Background, Assessment and   Recommendations(SBAR). Information from the following report(s) SBAR, Kardex, ED Summary, Procedure Summary, Intake/Output, MAR, Recent Results and Cardiac Rhythm NSR. was reviewed with the receiving nurse. Lines:   Peripheral IV 04/13/18 Right Hand (Active)   Site Assessment Clean, dry, & intact 4/13/2018  6:39 PM   Phlebitis Assessment 0 4/13/2018  6:39 PM   Infiltration Assessment 0 4/13/2018  6:39 PM   Dressing Status Clean, dry, & intact 4/13/2018  6:39 PM        Opportunity for questions and clarification was provided.       Patient transported with:   [employer]
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
## 2018-04-14 NOTE — PROGRESS NOTES
Hospitalist Progress Note    Subjective:   Daily Progress Note: 4/14/2018 10:43 AM    Mr. aRmirez is a 68 yo AAM, with progressing stage 5 CKD, who presented 4/13 for worsening mental status. Concerned that it was due to progression of the decline in his renal disease as BUN is 81. Was started on rocephin and zithromax by admitting physician for bilateral infiltrates on cxr. Nephrology saw patient and has started hypotonic fluids as sodium increasing. Called by Dr. Martha Aviles that he felt patient may be seizing. I went to assess patient who is new to me and his eyes are flittering, slight tremor of extremities, not answering questions nor following commands. No family at bedside.     Current Facility-Administered Medications   Medication Dose Route Frequency    hydrALAZINE (APRESOLINE) 20 mg/mL injection 20 mg  20 mg IntraVENous Q6H PRN    cefTRIAXone (ROCEPHIN) 1 g in 0.9% sodium chloride (MBP/ADV) 50 mL  1 g IntraVENous Q24H    azithromycin (ZITHROMAX) 500 mg in 0.9% sodium chloride (MBP/ADV) 250 mL  500 mg IntraVENous Q24H    dextrose 5 % - 0.45% NaCl infusion  100 mL/hr IntraVENous CONTINUOUS    potassium chloride 20 mEq in 100 ml IVPB  20 mEq IntraVENous Q2H    levETIRAcetam (KEPPRA) 1,000 mg in 0.9% sodium chloride 100 mL IVPB  1,000 mg IntraVENous Q12H    LORazepam (ATIVAN) injection 1 mg  1 mg IntraVENous Q2H PRN    sodium chloride (NS) flush 5-10 mL  5-10 mL IntraVENous Q8H    sodium chloride (NS) flush 5-10 mL  5-10 mL IntraVENous PRN    acetaminophen (TYLENOL) tablet 650 mg  650 mg Oral Q4H PRN    heparin (porcine) injection 5,000 Units  5,000 Units SubCUTAneous Q8H    amLODIPine (NORVASC) tablet 10 mg  10 mg Oral 7am    aspirin (ASPIRIN) tablet 325 mg  325 mg Oral DAILY    insulin glargine (LANTUS) injection 15 Units  15 Units SubCUTAneous DAILY    pravastatin (PRAVACHOL) tablet 40 mg  40 mg Oral QHS    ferrous sulfate tablet 325 mg  325 mg Oral BID    hydrALAZINE (APRESOLINE) tablet 25 mg  25 mg Oral TID    metoprolol succinate (TOPROL-XL) XL tablet 50 mg  50 mg Oral 7am    isosorbide dinitrate (ISORDIL) tablet 20 mg  20 mg Oral TID    insulin lispro (HUMALOG) injection   SubCUTAneous AC&HS        Review of Systems  Review of systems not obtained due to patient factors.     Objective:     Visit Vitals    /60    Pulse 88    Temp 98.5 °F (36.9 °C)    Resp 20    Ht 6' 3\" (1.905 m)    Wt 80.3 kg (177 lb)    SpO2 98%    BMI 22.12 kg/m2      O2 Device: Room air    Temp (24hrs), Av.8 °F (36.6 °C), Min:97.4 °F (36.3 °C), Max:98.5 °F (36.9 °C)          1901 -  0700  In: -   Out: 500 [Urine:500]    General: not following commands nor answering questions  Eyes; flittering, not tracking  Neck; supple  CV: rRR  Pulm; diminished in bases, non labored, no wheezing  Abd; soft, non tender  Neuro: withdrawals to pain, diffuse tremulousness of extremities    Additional comments:I reviewed the patient's new clinical lab test results. j    Data Review    Recent Results (from the past 24 hour(s))   EKG, 12 LEAD, INITIAL    Collection Time: 18  6:35 PM   Result Value Ref Range    Ventricular Rate 69 BPM    Atrial Rate 69 BPM    P-R Interval 210 ms    QRS Duration 88 ms    Q-T Interval 388 ms    QTC Calculation (Bezet) 415 ms    Calculated P Axis 61 degrees    Calculated R Axis -15 degrees    Calculated T Axis 20 degrees    Diagnosis       !!! Poor data quality, interpretation may be adversely affected  Sinus rhythm with 1st degree A-V block  Moderate voltage criteria for LVH, may be normal variant  Borderline ECG  When compared with ECG of 2018 07:48,  No significant change was found  Confirmed by Genny Lino ([id number]) on 2018 6:54:05 AM     TSH 3RD GENERATION    Collection Time: 18  6:37 PM   Result Value Ref Range    TSH 2.500 0.358 - 3.740 uIU/mL   MAGNESIUM    Collection Time: 18  6:37 PM   Result Value Ref Range    Magnesium 1.0 (LL) 1.8 - 2.4 mg/dL VITAMIN B12    Collection Time: 04/13/18  6:37 PM   Result Value Ref Range    Vitamin B12 472 193 - 986 pg/mL   FOLATE    Collection Time: 04/13/18  6:37 PM   Result Value Ref Range    Folate 35.4 (H) 3.1 - 17.5 ng/mL   ETHYL ALCOHOL    Collection Time: 04/13/18  6:37 PM   Result Value Ref Range    ALCOHOL(ETHYL),SERUM <3 MG/DL   CBC WITH AUTOMATED DIFF    Collection Time: 04/13/18  6:38 PM   Result Value Ref Range    WBC 3.7 (L) 4.3 - 11.1 K/uL    RBC 4.27 4.23 - 5.67 M/uL    HGB 11.1 (L) 13.6 - 17.2 g/dL    HCT 33.9 (L) 41.1 - 50.3 %    MCV 79.4 (L) 79.6 - 97.8 FL    MCH 26.0 (L) 26.1 - 32.9 PG    MCHC 32.7 31.4 - 35.0 g/dL    RDW 16.4 (H) 11.9 - 14.6 %    PLATELET 176 (L) 357 - 450 K/uL    MPV 12.2 10.8 - 14.1 FL    DF AUTOMATED      NEUTROPHILS 59 43 - 78 %    LYMPHOCYTES 30 13 - 44 %    MONOCYTES 8 4.0 - 12.0 %    EOSINOPHILS 3 0.5 - 7.8 %    BASOPHILS 0 0.0 - 2.0 %    IMMATURE GRANULOCYTES 0 0.0 - 5.0 %    ABS. NEUTROPHILS 2.2 1.7 - 8.2 K/UL    ABS. LYMPHOCYTES 1.1 0.5 - 4.6 K/UL    ABS. MONOCYTES 0.3 0.1 - 1.3 K/UL    ABS. EOSINOPHILS 0.1 0.0 - 0.8 K/UL    ABS. BASOPHILS 0.0 0.0 - 0.2 K/UL    ABS. IMM. GRANS. 0.0 0.0 - 0.5 K/UL   METABOLIC PANEL, COMPREHENSIVE    Collection Time: 04/13/18  6:38 PM   Result Value Ref Range    Sodium 148 (H) 136 - 145 mmol/L    Potassium 3.7 3.5 - 5.1 mmol/L    Chloride 118 (H) 98 - 107 mmol/L    CO2 15 (L) 21 - 32 mmol/L    Anion gap 15 7 - 16 mmol/L    Glucose 316 (H) 65 - 100 mg/dL    BUN 81 (H) 8 - 23 MG/DL    Creatinine 6.17 (H) 0.8 - 1.5 MG/DL    GFR est AA 11 (L) >60 ml/min/1.73m2    GFR est non-AA 9 (L) >60 ml/min/1.73m2    Calcium 6.8 (L) 8.3 - 10.4 MG/DL    Bilirubin, total 0.5 0.2 - 1.1 MG/DL    ALT (SGPT) 45 12 - 65 U/L    AST (SGOT) 37 15 - 37 U/L    Alk.  phosphatase 73 50 - 136 U/L    Protein, total 7.4 6.3 - 8.2 g/dL    Albumin 3.6 3.2 - 4.6 g/dL    Globulin 3.8 (H) 2.3 - 3.5 g/dL    A-G Ratio 0.9 (L) 1.2 - 3.5     BNP    Collection Time: 04/13/18  6:38 PM Result Value Ref Range     pg/mL   POC LACTIC ACID    Collection Time: 04/13/18  6:39 PM   Result Value Ref Range    Lactic Acid (POC) 1.1 0.5 - 1.9 mmol/L   AMMONIA    Collection Time: 04/13/18  7:36 PM   Result Value Ref Range    Ammonia 35 (H) 11 - 32 UMOL/L   HEMOGLOBIN A1C WITH EAG    Collection Time: 04/14/18  4:37 AM   Result Value Ref Range    Hemoglobin A1c 7.6 (H) 4.8 - 6.0 %    Est. average glucose 579 mg/dL   METABOLIC PANEL, BASIC    Collection Time: 04/14/18  4:37 AM   Result Value Ref Range    Sodium 150 (H) 136 - 145 mmol/L    Potassium 3.0 (L) 3.5 - 5.1 mmol/L    Chloride 120 (H) 98 - 107 mmol/L    CO2 14 (L) 21 - 32 mmol/L    Anion gap 16 7 - 16 mmol/L    Glucose 230 (H) 65 - 100 mg/dL    BUN 77 (H) 8 - 23 MG/DL    Creatinine 5.58 (H) 0.8 - 1.5 MG/DL    GFR est AA 13 (L) >60 ml/min/1.73m2    GFR est non-AA 11 (L) >60 ml/min/1.73m2    Calcium 6.6 (L) 8.3 - 10.4 MG/DL   CBC W/O DIFF    Collection Time: 04/14/18  4:37 AM   Result Value Ref Range    WBC 5.1 4.3 - 11.1 K/uL    RBC 4.15 (L) 4.23 - 5.67 M/uL    HGB 10.6 (L) 13.6 - 17.2 g/dL    HCT 32.9 (L) 41.1 - 50.3 %    MCV 79.3 (L) 79.6 - 97.8 FL    MCH 25.5 (L) 26.1 - 32.9 PG    MCHC 32.2 31.4 - 35.0 g/dL    RDW 16.2 (H) 11.9 - 14.6 %    PLATELET 993 (L) 897 - 450 K/uL    MPV 11.3 10.8 - 14.1 FL   MAGNESIUM    Collection Time: 04/14/18  4:37 AM   Result Value Ref Range    Magnesium 1.6 (L) 1.8 - 2.4 mg/dL   GLUCOSE, POC    Collection Time: 04/14/18  6:13 AM   Result Value Ref Range    Glucose (POC) 241 (H) 65 - 100 mg/dL   URINALYSIS W/ RFLX MICROSCOPIC    Collection Time: 04/14/18  6:44 AM   Result Value Ref Range    Color YELLOW      Appearance CLEAR      Specific gravity 1.011 1.001 - 1.023      pH (UA) 5.0 5.0 - 9.0      Protein NEGATIVE  NEG mg/dL    Glucose 100 mg/dL    Ketone NEGATIVE  NEG mg/dL    Bilirubin NEGATIVE  NEG      Blood NEGATIVE  NEG      Urobilinogen 0.2 0.2 - 1.0 EU/dL    Nitrites NEGATIVE  NEG      Leukocyte Esterase NEGATIVE  NEG     GLUCOSE, POC    Collection Time: 04/14/18  9:24 AM   Result Value Ref Range    Glucose (POC) 245 (H) 65 - 100 mg/dL         Assessment/Plan:     Principal Problem:    Acute metabolic encephalopathy (2/88/3861)    Active Problems:    Chronic pancreatitis ([facility] (9/22/2016)      Venous insufficiency (12/6/2016)      Stage 4 chronic kidney disease ([facility] (4/4/2017)      Controlled type 2 diabetes mellitus with complication, with long-term current use of insulin ([facility] (9/13/2017)      Altered mental status (4/13/2018)      Given ativan 2 mg iv x one now then prn as needed for seizure activity. Check EEG, neurology consult. Start keppra 1000 mg q12, first dose now. Check MRI brain WO. Continue rocephin and azithromycin for now. Nephrology greatly appreciated for assistance with management of renal issues.      Care Plan discussed with: Nurse and Consultant Dr. Hardy Arellano of Nephrology    Signed By: Jason Arevalo MD     April 14, 2018

## 2018-04-14 NOTE — PROGRESS NOTES
Updated granddaughter and DIL at bedside. Patient had another seizure event earlier today and is currently sleeping from ativan IV given. Withdrawing extremities to pain, NITIN. Recommendations from tele neurology reviewed- MRI brain, EEG recommended, these were already ordered earlier today by me.      Lucina Dillon MD

## 2018-04-14 NOTE — CONSULTS
[facility] Consult    Subjective:     Mario Birmingham is a 69 y.o.  male who is being seen for ALICJA and encephalopathy. This is an unfortunate gentleman with stage 5 CKD, resistent to dialysis in the past who was admitted with AMS. He has a mature AVF in his LUE. Creatinine is up from his baseline in the high 3's to 4's. He has become progressively more hypernatremic and I suspect his PO intake has been fairly limited though he does not contribute to his history. He is hypokalemic and hypomagnesemic. He is chronically on Demadex.        Past Medical History:   Diagnosis Date    Acute renal failure superimposed on stage 3 chronic kidney disease ([address] 9/21/2016    Anemia     Chronic kidney disease     not on HD yet- surgery done for access and here for elevation of fistula- FU with [facility]- creat on 12/7/17=3.15    Chronic pancreatitis ([address] 9/22/2016    Dermatophytosis of nail 12/6/2016    Diabetic neuropathy ([address] 9/22/2016    avg fastings 200; last A1C-? ; hypo @ [de-identified]    Essential hypertension 9/22/2016    GERD (gastroesophageal reflux disease)     controlled with med    Hypercholesterolemia     Iron (Fe) deficiency anemia 9/22/2016    Kidney disease     Septicemia due to Klebsiella pneumoniae ([address] 2/02/0813    Systolic CHF, chronic ([address] 9/23/2016    Type II diabetes mellitus with nephropathy ([address] 09/22/2016    Venous insufficiency 12/6/2016    Xerosis cutis 12/6/2016      Past Surgical History:   Procedure Laterality Date    HX COLONOSCOPY      HX HERNIA REPAIR Left 2010    HX PROSTATECTOMY  2012    HX TURP  2012    FOR BPH    VASCULAR SURGERY PROCEDURE UNLIST Left 10/26/2017    AVF     Family History   Problem Relation Age of Onset    Diabetes Mother     Stroke Mother     Hypertension Mother     No Known Problems Father     Diabetes Sister     Diabetes Brother     Diabetes Sister     Diabetes Sister     Other Sister      fibromyalgia      Social History   Substance Use Topics    Smoking status: Former Smoker     Packs/day: 2.00     Years: 20.00     Quit date: 1995    Smokeless tobacco: Current User    Alcohol use No       Current Facility-Administered Medications   Medication Dose Route Frequency Provider Last Rate Last Dose    hydrALAZINE (APRESOLINE) 20 mg/mL injection 20 mg  20 mg IntraVENous Q6H PRN Dilcia Burt MD   20 mg at 04/14/18 0022    cefTRIAXone (ROCEPHIN) 1 g in 0.9% sodium chloride (MBP/ADV) 50 mL  1 g IntraVENous Q24H Dilcia Burt  mL/hr at 04/14/18 0418 1 g at 04/14/18 0418    azithromycin (ZITHROMAX) 500 mg in 0.9% sodium chloride (MBP/ADV) 250 mL  500 mg IntraVENous Q24H Dilcia Burt  mL/hr at 04/14/18 0503 500 mg at 04/14/18 0503    dextrose 5 % - 0.45% NaCl infusion  100 mL/hr IntraVENous CONTINUOUS Minus MD Eric        potassium chloride 20 mEq in 100 ml IVPB  20 mEq IntraVENous Q2H Minus MD Eric        sodium chloride (NS) flush 5-10 mL  5-10 mL IntraVENous Q8H Preston Sharma MD   10 mL at 04/14/18 0506    sodium chloride (NS) flush 5-10 mL  5-10 mL IntraVENous PRN Dilcia Burt MD        acetaminophen (TYLENOL) tablet 650 mg  650 mg Oral Q4H PRN Preston Sharma MD        heparin (porcine) injection 5,000 Units  5,000 Units SubCUTAneous Q8H Preston Sharma MD   5,000 Units at 04/14/18 0601    amLODIPine (NORVASC) tablet 10 mg  10 mg Oral 7am Preston Sharma MD   Stopped at 04/14/18 0700    aspirin (ASPIRIN) tablet 325 mg  325 mg Oral DAILY Preston Sharma MD   Stopped at 04/14/18 0900    insulin glargine (LANTUS) injection 15 Units  15 Units SubCUTAneous DAILY Dilcia Burt MD   15 Units at 04/14/18 4305    pravastatin (PRAVACHOL) tablet 40 mg  40 mg Oral QHS Dilcia Burt MD   Stopped at 04/13/18 2300    ferrous sulfate tablet 325 mg  325 mg Oral BID Dilcia Burt MD   Stopped at 04/14/18 0900    hydrALAZINE (APRESOLINE) tablet 25 mg  25 mg Oral TID Jessica Mora MD   Stopped at 04/13/18 2300    metoprolol succinate (TOPROL-XL) XL tablet 50 mg  50 mg Oral 7am Preston Nelson MD   Stopped at 04/14/18 0700    isosorbide dinitrate (ISORDIL) tablet 20 mg  20 mg Oral TID Jessica Mora MD   Stopped at 04/13/18 2300    insulin lispro (HUMALOG) injection   SubCUTAneous AC&HS Jessica Mora MD   4 Units at 04/14/18 8886        No Known Allergies     Review of Systems:  Pertinent items are noted in the History of Present Illness. Objective: Intake and Output:       04/12 1901 - 04/14 0700  In: -   Out: 500 [Urine:500]    Physical Exam:   General appearance: unresponsive, fasciculations and some myoclonic movements to stimulation  Neck: supple, symmetrical, trachea midline, no adenopathy, thyroid: not enlarged, symmetric, no tenderness/mass/nodules, no carotid bruit and no JVD  Lungs: clear to auscultation bilaterally  Heart: regular rate and rhythm, S1, S2 normal, no murmur, click, rub or gallop  Abdomen: soft, non-tender.  Bowel sounds normal. No masses,  no organomegaly  Extremities:1+ edema, chronic induration, AVF patent and mature LUE           Data Review:   Recent Results (from the past 24 hour(s))   EKG, 12 LEAD, INITIAL    Collection Time: 04/13/18  6:35 PM   Result Value Ref Range    Ventricular Rate 69 BPM    Atrial Rate 69 BPM    P-R Interval 210 ms    QRS Duration 88 ms    Q-T Interval 388 ms    QTC Calculation (Bezet) 415 ms    Calculated P Axis 61 degrees    Calculated R Axis -15 degrees    Calculated T Axis 20 degrees    Diagnosis       !!! Poor data quality, interpretation may be adversely affected  Sinus rhythm with 1st degree A-V block  Moderate voltage criteria for LVH, may be normal variant  Borderline ECG  When compared with ECG of 02-APR-2018 07:48,  No significant change was found  Confirmed by Umang Shrestha ([id number]) on 4/14/2018 6:54:05 AM     TSH 3RD GENERATION    Collection Time: 04/13/18  6:37 PM   Result Value Ref Range    TSH 2.500 0.358 - 3.740 uIU/mL   MAGNESIUM    Collection Time: 04/13/18  6:37 PM   Result Value Ref Range    Magnesium 1.0 (LL) 1.8 - 2.4 mg/dL   VITAMIN B12    Collection Time: 04/13/18  6:37 PM   Result Value Ref Range    Vitamin B12 472 193 - 986 pg/mL   FOLATE    Collection Time: 04/13/18  6:37 PM   Result Value Ref Range    Folate 35.4 (H) 3.1 - 17.5 ng/mL   ETHYL ALCOHOL    Collection Time: 04/13/18  6:37 PM   Result Value Ref Range    ALCOHOL(ETHYL),SERUM <3 MG/DL   CBC WITH AUTOMATED DIFF    Collection Time: 04/13/18  6:38 PM   Result Value Ref Range    WBC 3.7 (L) 4.3 - 11.1 K/uL    RBC 4.27 4.23 - 5.67 M/uL    HGB 11.1 (L) 13.6 - 17.2 g/dL    HCT 33.9 (L) 41.1 - 50.3 %    MCV 79.4 (L) 79.6 - 97.8 FL    MCH 26.0 (L) 26.1 - 32.9 PG    MCHC 32.7 31.4 - 35.0 g/dL    RDW 16.4 (H) 11.9 - 14.6 %    PLATELET 519 (L) 108 - 450 K/uL    MPV 12.2 10.8 - 14.1 FL    DF AUTOMATED      NEUTROPHILS 59 43 - 78 %    LYMPHOCYTES 30 13 - 44 %    MONOCYTES 8 4.0 - 12.0 %    EOSINOPHILS 3 0.5 - 7.8 %    BASOPHILS 0 0.0 - 2.0 %    IMMATURE GRANULOCYTES 0 0.0 - 5.0 %    ABS. NEUTROPHILS 2.2 1.7 - 8.2 K/UL    ABS. LYMPHOCYTES 1.1 0.5 - 4.6 K/UL    ABS. MONOCYTES 0.3 0.1 - 1.3 K/UL    ABS. EOSINOPHILS 0.1 0.0 - 0.8 K/UL    ABS. BASOPHILS 0.0 0.0 - 0.2 K/UL    ABS. IMM.  GRANS. 0.0 0.0 - 0.5 K/UL   METABOLIC PANEL, COMPREHENSIVE    Collection Time: 04/13/18  6:38 PM   Result Value Ref Range    Sodium 148 (H) 136 - 145 mmol/L    Potassium 3.7 3.5 - 5.1 mmol/L    Chloride 118 (H) 98 - 107 mmol/L    CO2 15 (L) 21 - 32 mmol/L    Anion gap 15 7 - 16 mmol/L    Glucose 316 (H) 65 - 100 mg/dL    BUN 81 (H) 8 - 23 MG/DL    Creatinine 6.17 (H) 0.8 - 1.5 MG/DL    GFR est AA 11 (L) >60 ml/min/1.73m2    GFR est non-AA 9 (L) >60 ml/min/1.73m2    Calcium 6.8 (L) 8.3 - 10.4 MG/DL    Bilirubin, total 0.5 0.2 - 1.1 MG/DL    ALT (SGPT) 45 12 - 65 U/L    AST (SGOT) 37 15 - 37 U/L    Alk.  phosphatase 73 50 - 136 U/L    Protein, total 7.4 6.3 - 8.2 g/dL    Albumin 3.6 3.2 - 4.6 g/dL    Globulin 3.8 (H) 2.3 - 3.5 g/dL    A-G Ratio 0.9 (L) 1.2 - 3.5     BNP    Collection Time: 04/13/18  6:38 PM   Result Value Ref Range     pg/mL   POC LACTIC ACID    Collection Time: 04/13/18  6:39 PM   Result Value Ref Range    Lactic Acid (POC) 1.1 0.5 - 1.9 mmol/L   AMMONIA    Collection Time: 04/13/18  7:36 PM   Result Value Ref Range    Ammonia 35 (H) 11 - 32 UMOL/L   HEMOGLOBIN A1C WITH EAG    Collection Time: 04/14/18  4:37 AM   Result Value Ref Range    Hemoglobin A1c 7.6 (H) 4.8 - 6.0 %    Est. average glucose 622 mg/dL   METABOLIC PANEL, BASIC    Collection Time: 04/14/18  4:37 AM   Result Value Ref Range    Sodium 150 (H) 136 - 145 mmol/L    Potassium 3.0 (L) 3.5 - 5.1 mmol/L    Chloride 120 (H) 98 - 107 mmol/L    CO2 14 (L) 21 - 32 mmol/L    Anion gap 16 7 - 16 mmol/L    Glucose 230 (H) 65 - 100 mg/dL    BUN 77 (H) 8 - 23 MG/DL    Creatinine 5.58 (H) 0.8 - 1.5 MG/DL    GFR est AA 13 (L) >60 ml/min/1.73m2    GFR est non-AA 11 (L) >60 ml/min/1.73m2    Calcium 6.6 (L) 8.3 - 10.4 MG/DL   CBC W/O DIFF    Collection Time: 04/14/18  4:37 AM   Result Value Ref Range    WBC 5.1 4.3 - 11.1 K/uL    RBC 4.15 (L) 4.23 - 5.67 M/uL    HGB 10.6 (L) 13.6 - 17.2 g/dL    HCT 32.9 (L) 41.1 - 50.3 %    MCV 79.3 (L) 79.6 - 97.8 FL    MCH 25.5 (L) 26.1 - 32.9 PG    MCHC 32.2 31.4 - 35.0 g/dL    RDW 16.2 (H) 11.9 - 14.6 %    PLATELET 290 (L) 136 - 450 K/uL    MPV 11.3 10.8 - 14.1 FL   GLUCOSE, POC    Collection Time: 04/14/18  6:13 AM   Result Value Ref Range    Glucose (POC) 241 (H) 65 - 100 mg/dL   URINALYSIS W/ RFLX MICROSCOPIC    Collection Time: 04/14/18  6:44 AM   Result Value Ref Range    Color YELLOW      Appearance CLEAR      Specific gravity 1.011 1.001 - 1.023      pH (UA) 5.0 5.0 - 9.0      Protein NEGATIVE  NEG mg/dL    Glucose 100 mg/dL    Ketone NEGATIVE  NEG mg/dL    Bilirubin NEGATIVE  NEG Blood NEGATIVE  NEG      Urobilinogen 0.2 0.2 - 1.0 EU/dL    Nitrites NEGATIVE  NEG      Leukocyte Esterase NEGATIVE  NEG     GLUCOSE, POC    Collection Time: 04/14/18  9:24 AM   Result Value Ref Range    Glucose (POC) 245 (H) 65 - 100 mg/dL           Assessment:     Principal Problem:    Acute metabolic encephalopathy (5/94/9703)    Active Problems:    Chronic pancreatitis ([facility] (9/22/2016)      Venous insufficiency (12/6/2016)      Stage 4 chronic kidney disease ([facility] (4/4/2017)      Controlled type 2 diabetes mellitus with complication, with long-term current use of insulin ([facility] (9/13/2017)      Altered mental status (4/13/2018)        Plan: This is an unfortunate gentleman with stage 5 CKD admitted with AMS. He appears volume depleted on exam, is hypernatremic, hypokalemic and hypomagnesemic. His mental status is unlikely secondary to uremia and in fact he may be seizing. I have discussed with Dr Leighton Guevara. In the interim, I agree with aggressive Magnesium repletion, Potassium repletion. Will start hypotonic IV fluids. Hold Demadex. Follow renal function and I/O's. No current indication for dialysis. Thank you for the kind courtesy of this consultation. Will make further adjustments to the medical regimen as the clinical course dictates and follow very closely with you.       Signed By: Lanie Tyler MD     April 14, 2018

## 2018-04-15 ENCOUNTER — APPOINTMENT (OUTPATIENT)
Dept: MRI IMAGING | Age: 78
DRG: 070 | End: 2018-04-15
Attending: INTERNAL MEDICINE
Payer: MEDICARE

## 2018-04-15 LAB
ANION GAP SERPL CALC-SCNC: 14 MMOL/L (ref 7–16)
BASOPHILS # BLD: 0 K/UL (ref 0–0.2)
BASOPHILS NFR BLD: 0 % (ref 0–2)
BUN SERPL-MCNC: 57 MG/DL (ref 8–23)
CALCIUM SERPL-MCNC: 7.2 MG/DL (ref 8.3–10.4)
CHLORIDE SERPL-SCNC: 126 MMOL/L (ref 98–107)
CO2 SERPL-SCNC: 14 MMOL/L (ref 21–32)
CREAT SERPL-MCNC: 4.5 MG/DL (ref 0.8–1.5)
DIFFERENTIAL METHOD BLD: ABNORMAL
EOSINOPHIL # BLD: 0 K/UL (ref 0–0.8)
EOSINOPHIL NFR BLD: 0 % (ref 0.5–7.8)
ERYTHROCYTE [DISTWIDTH] IN BLOOD BY AUTOMATED COUNT: 16 % (ref 11.9–14.6)
GLUCOSE BLD STRIP.AUTO-MCNC: 137 MG/DL (ref 65–100)
GLUCOSE BLD STRIP.AUTO-MCNC: 156 MG/DL (ref 65–100)
GLUCOSE BLD STRIP.AUTO-MCNC: 201 MG/DL (ref 65–100)
GLUCOSE BLD STRIP.AUTO-MCNC: 237 MG/DL (ref 65–100)
GLUCOSE SERPL-MCNC: 219 MG/DL (ref 65–100)
HCT VFR BLD AUTO: 34.4 % (ref 41.1–50.3)
HGB BLD-MCNC: 11.2 G/DL (ref 13.6–17.2)
IMM GRANULOCYTES # BLD: 0 K/UL (ref 0–0.5)
IMM GRANULOCYTES NFR BLD AUTO: 1 % (ref 0–5)
LYMPHOCYTES # BLD: 1.1 K/UL (ref 0.5–4.6)
LYMPHOCYTES NFR BLD: 16 % (ref 13–44)
MAGNESIUM SERPL-MCNC: 1.8 MG/DL (ref 1.8–2.4)
MCH RBC QN AUTO: 25.5 PG (ref 26.1–32.9)
MCHC RBC AUTO-ENTMCNC: 32.6 G/DL (ref 31.4–35)
MCV RBC AUTO: 78.4 FL (ref 79.6–97.8)
MONOCYTES # BLD: 0.5 K/UL (ref 0.1–1.3)
MONOCYTES NFR BLD: 7 % (ref 4–12)
NEUTS SEG # BLD: 5 K/UL (ref 1.7–8.2)
NEUTS SEG NFR BLD: 76 % (ref 43–78)
PHOSPHATE SERPL-MCNC: 4.2 MG/DL (ref 2.3–3.7)
PLATELET # BLD AUTO: 114 K/UL (ref 150–450)
PMV BLD AUTO: ABNORMAL FL (ref 10.8–14.1)
POTASSIUM SERPL-SCNC: 3.1 MMOL/L (ref 3.5–5.1)
RBC # BLD AUTO: 4.39 M/UL (ref 4.23–5.67)
SODIUM SERPL-SCNC: 154 MMOL/L (ref 136–145)
WBC # BLD AUTO: 6.7 K/UL (ref 4.3–11.1)

## 2018-04-15 PROCEDURE — 74011250636 HC RX REV CODE- 250/636: Performed by: INTERNAL MEDICINE

## 2018-04-15 PROCEDURE — 83735 ASSAY OF MAGNESIUM: CPT | Performed by: INTERNAL MEDICINE

## 2018-04-15 PROCEDURE — 82962 GLUCOSE BLOOD TEST: CPT

## 2018-04-15 PROCEDURE — 80048 BASIC METABOLIC PNL TOTAL CA: CPT | Performed by: INTERNAL MEDICINE

## 2018-04-15 PROCEDURE — 74011636637 HC RX REV CODE- 636/637: Performed by: INTERNAL MEDICINE

## 2018-04-15 PROCEDURE — 74011000258 HC RX REV CODE- 258: Performed by: INTERNAL MEDICINE

## 2018-04-15 PROCEDURE — 65270000029 HC RM PRIVATE

## 2018-04-15 PROCEDURE — 84100 ASSAY OF PHOSPHORUS: CPT | Performed by: INTERNAL MEDICINE

## 2018-04-15 PROCEDURE — 70551 MRI BRAIN STEM W/O DYE: CPT

## 2018-04-15 PROCEDURE — 74011000250 HC RX REV CODE- 250: Performed by: INTERNAL MEDICINE

## 2018-04-15 PROCEDURE — 36415 COLL VENOUS BLD VENIPUNCTURE: CPT | Performed by: INTERNAL MEDICINE

## 2018-04-15 PROCEDURE — 85025 COMPLETE CBC W/AUTO DIFF WBC: CPT | Performed by: INTERNAL MEDICINE

## 2018-04-15 RX ORDER — METOPROLOL TARTRATE 5 MG/5ML
5 INJECTION INTRAVENOUS EVERY 6 HOURS
Status: DISCONTINUED | OUTPATIENT
Start: 2018-04-15 | End: 2018-04-18

## 2018-04-15 RX ORDER — MORPHINE SULFATE 4 MG/ML
2 INJECTION, SOLUTION INTRAMUSCULAR; INTRAVENOUS ONCE
Status: COMPLETED | OUTPATIENT
Start: 2018-04-16 | End: 2018-04-15

## 2018-04-15 RX ORDER — POTASSIUM CHLORIDE 14.9 MG/ML
20 INJECTION INTRAVENOUS
Status: COMPLETED | OUTPATIENT
Start: 2018-04-15 | End: 2018-04-16

## 2018-04-15 RX ADMIN — SODIUM BICARBONATE: 84 INJECTION, SOLUTION INTRAVENOUS at 14:14

## 2018-04-15 RX ADMIN — HEPARIN SODIUM 5000 UNITS: 5000 INJECTION, SOLUTION INTRAVENOUS; SUBCUTANEOUS at 06:02

## 2018-04-15 RX ADMIN — HEPARIN SODIUM 5000 UNITS: 5000 INJECTION, SOLUTION INTRAVENOUS; SUBCUTANEOUS at 14:15

## 2018-04-15 RX ADMIN — AZITHROMYCIN MONOHYDRATE 500 MG: 500 INJECTION, POWDER, LYOPHILIZED, FOR SOLUTION INTRAVENOUS at 04:26

## 2018-04-15 RX ADMIN — INSULIN LISPRO 4 UNITS: 100 INJECTION, SOLUTION INTRAVENOUS; SUBCUTANEOUS at 08:32

## 2018-04-15 RX ADMIN — HEPARIN SODIUM 5000 UNITS: 5000 INJECTION, SOLUTION INTRAVENOUS; SUBCUTANEOUS at 22:11

## 2018-04-15 RX ADMIN — METOROPROLOL TARTRATE 5 MG: 5 INJECTION, SOLUTION INTRAVENOUS at 23:23

## 2018-04-15 RX ADMIN — SODIUM CHLORIDE 1000 MG: 900 INJECTION, SOLUTION INTRAVENOUS at 22:08

## 2018-04-15 RX ADMIN — POTASSIUM CHLORIDE 20 MEQ: 14.9 INJECTION, SOLUTION INTRAVENOUS at 19:29

## 2018-04-15 RX ADMIN — Medication 10 ML: at 14:16

## 2018-04-15 RX ADMIN — CEFTRIAXONE 1 G: 1 INJECTION, POWDER, FOR SOLUTION INTRAMUSCULAR; INTRAVENOUS at 03:36

## 2018-04-15 RX ADMIN — Medication 10 ML: at 05:46

## 2018-04-15 RX ADMIN — MORPHINE SULFATE 2 MG: 4 INJECTION, SOLUTION INTRAMUSCULAR; INTRAVENOUS at 23:45

## 2018-04-15 RX ADMIN — METOROPROLOL TARTRATE 5 MG: 5 INJECTION, SOLUTION INTRAVENOUS at 17:18

## 2018-04-15 RX ADMIN — METOROPROLOL TARTRATE 5 MG: 5 INJECTION, SOLUTION INTRAVENOUS at 11:39

## 2018-04-15 RX ADMIN — POTASSIUM CHLORIDE 20 MEQ: 14.9 INJECTION, SOLUTION INTRAVENOUS at 22:26

## 2018-04-15 RX ADMIN — HYDRALAZINE HYDROCHLORIDE 20 MG: 20 INJECTION INTRAMUSCULAR; INTRAVENOUS at 14:15

## 2018-04-15 RX ADMIN — INSULIN LISPRO 2 UNITS: 100 INJECTION, SOLUTION INTRAVENOUS; SUBCUTANEOUS at 17:51

## 2018-04-15 RX ADMIN — POTASSIUM CHLORIDE 20 MEQ: 14.9 INJECTION, SOLUTION INTRAVENOUS at 14:15

## 2018-04-15 RX ADMIN — INSULIN LISPRO 4 UNITS: 100 INJECTION, SOLUTION INTRAVENOUS; SUBCUTANEOUS at 12:17

## 2018-04-15 RX ADMIN — INSULIN GLARGINE 15 UNITS: 100 INJECTION, SOLUTION SUBCUTANEOUS at 08:38

## 2018-04-15 RX ADMIN — SODIUM CHLORIDE 1000 MG: 900 INJECTION, SOLUTION INTRAVENOUS at 10:50

## 2018-04-15 RX ADMIN — Medication 10 ML: at 21:31

## 2018-04-15 NOTE — PROGRESS NOTES
Reviewed notes  Full code with no directives on file  [id number], staff Lisandra parker 02, [address]  /   [email or web address]

## 2018-04-15 NOTE — PROGRESS NOTES
END OF SHIFT NOTE:    INTAKE/OUTPUT  04/13 0701 - 04/14 0700  In: -   Out: 500 [Urine:500]  Voiding: YES  Catheter: NO  Drain:              Flatus: Patient does have flatus present. Stool:  2 occurrences. Characteristics:  Stool Assessment  Stool Color: Brown  Stool Appearance: Soft  Stool Amount: Large  Stool Source/Status: Rectum    Emesis: 0 occurrences. Characteristics:        VITAL SIGNS  Patient Vitals for the past 12 hrs:   Temp Pulse Resp BP SpO2   04/14/18 1530 98.4 °F (36.9 °C) 81 18 165/68 100 %   04/14/18 1042 97.9 °F (36.6 °C) 88 18 139/63 99 %       Pain Assessment  Pain Intensity 1: 0 (04/14/18 0710)        Patient Stated Pain Goal: 0    Ambulating  No    Shift report given to oncoming nurse at the bedside.     Larry Kumar RN

## 2018-04-15 NOTE — PROGRESS NOTES
Subjective:   Daily Progress Note: 4/15/2018 1:40 PM    Arousable but incoherent    Current Facility-Administered Medications   Medication Dose Route Frequency    metoprolol (LOPRESSOR) injection 5 mg  5 mg IntraVENous Q6H    sodium bicarbonate (8.4%) 50 mEq in dextrose 5% 1,000 mL infusion   IntraVENous CONTINUOUS    potassium chloride 20 mEq in 100 ml IVPB  20 mEq IntraVENous Q2H    hydrALAZINE (APRESOLINE) 20 mg/mL injection 20 mg  20 mg IntraVENous Q6H PRN    cefTRIAXone (ROCEPHIN) 1 g in 0.9% sodium chloride (MBP/ADV) 50 mL  1 g IntraVENous Q24H    azithromycin (ZITHROMAX) 500 mg in 0.9% sodium chloride (MBP/ADV) 250 mL  500 mg IntraVENous Q24H    levETIRAcetam (KEPPRA) 1,000 mg in 0.9% sodium chloride 100 mL IVPB  1,000 mg IntraVENous Q12H    LORazepam (ATIVAN) injection 1 mg  1 mg IntraVENous Q2H PRN    sodium chloride (NS) flush 5-10 mL  5-10 mL IntraVENous Q8H    sodium chloride (NS) flush 5-10 mL  5-10 mL IntraVENous PRN    acetaminophen (TYLENOL) tablet 650 mg  650 mg Oral Q4H PRN    heparin (porcine) injection 5,000 Units  5,000 Units SubCUTAneous Q8H    amLODIPine (NORVASC) tablet 10 mg  10 mg Oral 7am    aspirin (ASPIRIN) tablet 325 mg  325 mg Oral DAILY    insulin glargine (LANTUS) injection 15 Units  15 Units SubCUTAneous DAILY    pravastatin (PRAVACHOL) tablet 40 mg  40 mg Oral QHS    ferrous sulfate tablet 325 mg  325 mg Oral BID    hydrALAZINE (APRESOLINE) tablet 25 mg  25 mg Oral TID    metoprolol succinate (TOPROL-XL) XL tablet 50 mg  50 mg Oral 7am    isosorbide dinitrate (ISORDIL) tablet 20 mg  20 mg Oral TID    insulin lispro (HUMALOG) injection   SubCUTAneous AC&HS               Objective:     Visit Vitals    /75 (BP 1 Location: Left arm, BP Patient Position: At rest)    Pulse 91    Temp 98.6 °F (37 °C)    Resp 17    Ht 6' 3\" (1.905 m)    Wt 82.9 kg (182 lb 11.2 oz)    SpO2 98%    BMI 22.84 kg/m2      O2 Device: Room air    Temp (24hrs), Av.7 °F (37.1 °C), Min:98.4 °F (36.9 °C), Max:99.2 °F (37.3 °C)         04/13 1901 - 04/15 0700  In: 2174 [I.V.:2174]  Out: 500 [Urine:500]      Visit Vitals    /75 (BP 1 Location: Left arm, BP Patient Position: At rest)    Pulse 91    Temp 98.6 °F (37 °C)    Resp 17    Ht 6' 3\" (1.905 m)    Wt 82.9 kg (182 lb 11.2 oz)    SpO2 98%    BMI 22.84 kg/m2        Lungs: clear to auscultation bilaterally  Heart: regular rate and rhythm  Abdomen: soft, non-tender.   Extremities: no edema          Data Review    Recent Results (from the past 48 hour(s))   EKG, 12 LEAD, INITIAL    Collection Time: 04/13/18  6:35 PM   Result Value Ref Range    Ventricular Rate 69 BPM    Atrial Rate 69 BPM    P-R Interval 210 ms    QRS Duration 88 ms    Q-T Interval 388 ms    QTC Calculation (Bezet) 415 ms    Calculated P Axis 61 degrees    Calculated R Axis -15 degrees    Calculated T Axis 20 degrees    Diagnosis       !!! Poor data quality, interpretation may be adversely affected  Sinus rhythm with 1st degree A-V block  Moderate voltage criteria for LVH, may be normal variant  Borderline ECG  When compared with ECG of 02-APR-2018 07:48,  No significant change was found  Confirmed by Umang Shrestha ([id number]) on 4/14/2018 6:54:05 AM     TSH 3RD GENERATION    Collection Time: 04/13/18  6:37 PM   Result Value Ref Range    TSH 2.500 0.358 - 3.740 uIU/mL   MAGNESIUM    Collection Time: 04/13/18  6:37 PM   Result Value Ref Range    Magnesium 1.0 (LL) 1.8 - 2.4 mg/dL   VITAMIN B12    Collection Time: 04/13/18  6:37 PM   Result Value Ref Range    Vitamin B12 472 193 - 986 pg/mL   FOLATE    Collection Time: 04/13/18  6:37 PM   Result Value Ref Range    Folate 35.4 (H) 3.1 - 17.5 ng/mL   ETHYL ALCOHOL    Collection Time: 04/13/18  6:37 PM   Result Value Ref Range    ALCOHOL(ETHYL),SERUM <3 MG/DL   CBC WITH AUTOMATED DIFF    Collection Time: 04/13/18  6:38 PM   Result Value Ref Range    WBC 3.7 (L) 4.3 - 11.1 K/uL    RBC 4.27 4.23 - 5.67 M/uL    HGB 11.1 (L) 13.6 - 17.2 g/dL    HCT 33.9 (L) 41.1 - 50.3 %    MCV 79.4 (L) 79.6 - 97.8 FL    MCH 26.0 (L) 26.1 - 32.9 PG    MCHC 32.7 31.4 - 35.0 g/dL    RDW 16.4 (H) 11.9 - 14.6 %    PLATELET 398 (L) 689 - 450 K/uL    MPV 12.2 10.8 - 14.1 FL    DF AUTOMATED      NEUTROPHILS 59 43 - 78 %    LYMPHOCYTES 30 13 - 44 %    MONOCYTES 8 4.0 - 12.0 %    EOSINOPHILS 3 0.5 - 7.8 %    BASOPHILS 0 0.0 - 2.0 %    IMMATURE GRANULOCYTES 0 0.0 - 5.0 %    ABS. NEUTROPHILS 2.2 1.7 - 8.2 K/UL    ABS. LYMPHOCYTES 1.1 0.5 - 4.6 K/UL    ABS. MONOCYTES 0.3 0.1 - 1.3 K/UL    ABS. EOSINOPHILS 0.1 0.0 - 0.8 K/UL    ABS. BASOPHILS 0.0 0.0 - 0.2 K/UL    ABS. IMM. GRANS. 0.0 0.0 - 0.5 K/UL   METABOLIC PANEL, COMPREHENSIVE    Collection Time: 04/13/18  6:38 PM   Result Value Ref Range    Sodium 148 (H) 136 - 145 mmol/L    Potassium 3.7 3.5 - 5.1 mmol/L    Chloride 118 (H) 98 - 107 mmol/L    CO2 15 (L) 21 - 32 mmol/L    Anion gap 15 7 - 16 mmol/L    Glucose 316 (H) 65 - 100 mg/dL    BUN 81 (H) 8 - 23 MG/DL    Creatinine 6.17 (H) 0.8 - 1.5 MG/DL    GFR est AA 11 (L) >60 ml/min/1.73m2    GFR est non-AA 9 (L) >60 ml/min/1.73m2    Calcium 6.8 (L) 8.3 - 10.4 MG/DL    Bilirubin, total 0.5 0.2 - 1.1 MG/DL    ALT (SGPT) 45 12 - 65 U/L    AST (SGOT) 37 15 - 37 U/L    Alk.  phosphatase 73 50 - 136 U/L    Protein, total 7.4 6.3 - 8.2 g/dL    Albumin 3.6 3.2 - 4.6 g/dL    Globulin 3.8 (H) 2.3 - 3.5 g/dL    A-G Ratio 0.9 (L) 1.2 - 3.5     BNP    Collection Time: 04/13/18  6:38 PM   Result Value Ref Range     pg/mL   POC LACTIC ACID    Collection Time: 04/13/18  6:39 PM   Result Value Ref Range    Lactic Acid (POC) 1.1 0.5 - 1.9 mmol/L   AMMONIA    Collection Time: 04/13/18  7:36 PM   Result Value Ref Range    Ammonia 35 (H) 11 - 32 UMOL/L   HEMOGLOBIN A1C WITH EAG    Collection Time: 04/14/18  4:37 AM   Result Value Ref Range    Hemoglobin A1c 7.6 (H) 4.8 - 6.0 %    Est. average glucose 628 mg/dL   METABOLIC PANEL, BASIC    Collection Time: 04/14/18  4:37 AM Result Value Ref Range    Sodium 150 (H) 136 - 145 mmol/L    Potassium 3.0 (L) 3.5 - 5.1 mmol/L    Chloride 120 (H) 98 - 107 mmol/L    CO2 14 (L) 21 - 32 mmol/L    Anion gap 16 7 - 16 mmol/L    Glucose 230 (H) 65 - 100 mg/dL    BUN 77 (H) 8 - 23 MG/DL    Creatinine 5.58 (H) 0.8 - 1.5 MG/DL    GFR est AA 13 (L) >60 ml/min/1.73m2    GFR est non-AA 11 (L) >60 ml/min/1.73m2    Calcium 6.6 (L) 8.3 - 10.4 MG/DL   CBC W/O DIFF    Collection Time: 04/14/18  4:37 AM   Result Value Ref Range    WBC 5.1 4.3 - 11.1 K/uL    RBC 4.15 (L) 4.23 - 5.67 M/uL    HGB 10.6 (L) 13.6 - 17.2 g/dL    HCT 32.9 (L) 41.1 - 50.3 %    MCV 79.3 (L) 79.6 - 97.8 FL    MCH 25.5 (L) 26.1 - 32.9 PG    MCHC 32.2 31.4 - 35.0 g/dL    RDW 16.2 (H) 11.9 - 14.6 %    PLATELET 083 (L) 134 - 450 K/uL    MPV 11.3 10.8 - 14.1 FL   CULTURE, BLOOD    Collection Time: 04/14/18  4:37 AM   Result Value Ref Range    Special Requests: RIGHT  FOREARM        Culture result: NO GROWTH 1 DAY     CULTURE, BLOOD    Collection Time: 04/14/18  4:37 AM   Result Value Ref Range    Special Requests: RIGHT  Antecubital        Culture result: NO GROWTH 1 DAY     MAGNESIUM    Collection Time: 04/14/18  4:37 AM   Result Value Ref Range    Magnesium 1.6 (L) 1.8 - 2.4 mg/dL   GLUCOSE, POC    Collection Time: 04/14/18  6:13 AM   Result Value Ref Range    Glucose (POC) 241 (H) 65 - 100 mg/dL   URINALYSIS W/ RFLX MICROSCOPIC    Collection Time: 04/14/18  6:44 AM   Result Value Ref Range    Color YELLOW      Appearance CLEAR      Specific gravity 1.011 1.001 - 1.023      pH (UA) 5.0 5.0 - 9.0      Protein NEGATIVE  NEG mg/dL    Glucose 100 mg/dL    Ketone NEGATIVE  NEG mg/dL    Bilirubin NEGATIVE  NEG      Blood NEGATIVE  NEG      Urobilinogen 0.2 0.2 - 1.0 EU/dL    Nitrites NEGATIVE  NEG      Leukocyte Esterase NEGATIVE  NEG     GLUCOSE, POC    Collection Time: 04/14/18  9:24 AM   Result Value Ref Range    Glucose (POC) 245 (H) 65 - 100 mg/dL   GLUCOSE, POC    Collection Time: 04/14/18 1:00 PM   Result Value Ref Range    Glucose (POC) 207 (H) 65 - 100 mg/dL   GLUCOSE, POC    Collection Time: 04/14/18  4:41 PM   Result Value Ref Range    Glucose (POC) 189 (H) 65 - 100 mg/dL   GLUCOSE, POC    Collection Time: 04/14/18  9:55 PM   Result Value Ref Range    Glucose (POC) 153 (H) 65 - 100 mg/dL   GLUCOSE, POC    Collection Time: 04/15/18  5:43 AM   Result Value Ref Range    Glucose (POC) 201 (H) 65 - 100 mg/dL   CBC WITH AUTOMATED DIFF    Collection Time: 04/15/18  6:29 AM   Result Value Ref Range    WBC 6.7 4.3 - 11.1 K/uL    RBC 4.39 4.23 - 5.67 M/uL    HGB 11.2 (L) 13.6 - 17.2 g/dL    HCT 34.4 (L) 41.1 - 50.3 %    MCV 78.4 (L) 79.6 - 97.8 FL    MCH 25.5 (L) 26.1 - 32.9 PG    MCHC 32.6 31.4 - 35.0 g/dL    RDW 16.0 (H) 11.9 - 14.6 %    PLATELET 164 (L) 985 - 450 K/uL    MPV Cannot be calculated 10.8 - 14.1 FL    DF AUTOMATED      NEUTROPHILS 76 43 - 78 %    LYMPHOCYTES 16 13 - 44 %    MONOCYTES 7 4.0 - 12.0 %    EOSINOPHILS 0 (L) 0.5 - 7.8 %    BASOPHILS 0 0.0 - 2.0 %    IMMATURE GRANULOCYTES 1 0.0 - 5.0 %    ABS. NEUTROPHILS 5.0 1.7 - 8.2 K/UL    ABS. LYMPHOCYTES 1.1 0.5 - 4.6 K/UL    ABS. MONOCYTES 0.5 0.1 - 1.3 K/UL    ABS. EOSINOPHILS 0.0 0.0 - 0.8 K/UL    ABS. BASOPHILS 0.0 0.0 - 0.2 K/UL    ABS. IMM.  GRANS. 0.0 0.0 - 0.5 K/UL   METABOLIC PANEL, BASIC    Collection Time: 04/15/18  6:29 AM   Result Value Ref Range    Sodium 154 (H) 136 - 145 mmol/L    Potassium 3.1 (L) 3.5 - 5.1 mmol/L    Chloride 126 (H) 98 - 107 mmol/L    CO2 14 (L) 21 - 32 mmol/L    Anion gap 14 7 - 16 mmol/L    Glucose 219 (H) 65 - 100 mg/dL    BUN 57 (H) 8 - 23 MG/DL    Creatinine 4.50 (H) 0.8 - 1.5 MG/DL    GFR est AA 16 (L) >60 ml/min/1.73m2    GFR est non-AA 14 (L) >60 ml/min/1.73m2    Calcium 7.2 (L) 8.3 - 10.4 MG/DL   MAGNESIUM    Collection Time: 04/15/18  6:29 AM   Result Value Ref Range    Magnesium 1.8 1.8 - 2.4 mg/dL   PHOSPHORUS    Collection Time: 04/15/18  6:29 AM   Result Value Ref Range    Phosphorus 4.2 (H) 2.3 - 3.7 MG/DL   GLUCOSE, POC    Collection Time: 04/15/18 11:37 AM   Result Value Ref Range    Glucose (POC) 237 (H) 65 - 100 mg/dL       Assessment     Patient Active Problem List    Diagnosis Date Noted    Renal failure 04/14/2018    Altered mental status 04/13/2018    Acute metabolic encephalopathy 80/06/1011    Volume overload 04/04/2018    Chest pain 04/04/2018    Cauda equina compression ([address] 04/02/2018    Uncontrolled diabetes mellitus, with long-term current use of insulin ([address] 02/28/2018    Carotid bruit 02/28/2018    Hypercholesterolemia     Arteriovenous fistula ([address] 10/31/2017    Onychomycosis 09/13/2017    Controlled type 2 diabetes mellitus with complication, with long-term current use of insulin ([address] 09/13/2017    Stage 4 chronic kidney disease ([address] 04/04/2017    Stasis edema of both lower extremities 04/04/2017    Cellulitis of left lower leg 04/04/2017    Venous stasis ulcer of left lower extremity ([address] 04/01/2017    Venous insufficiency 12/06/2016    Acute on chronic systolic congestive heart failure ([address] 09/23/2016    Septicemia due to Klebsiella pneumoniae ([address] 09/22/2016    Type II diabetes mellitus with nephropathy ([address] 09/22/2016    Essential hypertension 09/22/2016    GERD (gastroesophageal reflux disease) 09/22/2016    Diabetic neuropathy (HCC) 09/22/2016    Chronic pancreatitis ([address] 09/22/2016    Iron (Fe) deficiency anemia 09/22/2016    Acute renal failure superimposed on stage 3 chronic kidney disease ([address] 09/21/2016           Problems Addressed by Nephrology     ALICJA - prerenal  Acidosis  Hypokalemia  Low Mg - repleted    Plan     Renal function improved. Will adjust IV fluids to increase free water and add bicarb. Replete K - 60mEq KCl ordered.

## 2018-04-15 NOTE — PROGRESS NOTES
Hospitalist Progress Note    Subjective:   Daily Progress Note: 4/15/2018 9:25 AM    Mr. Zahra Zeng is a 70 yo AAM, with progressing stage 5 CKD, who presented 4/13 for worsening mental status. Concerned that it was due to progression of the decline in his renal disease as BUN is 81. Was started on rocephin and zithromax by admitting physician for bilateral infiltrates on cxr. Nephrology saw patient and has started hypotonic fluids as sodium was increasing. Called by Dr. Horn Shows on morning of 4/14 that he felt patient may be seizing. I went to assess patient who was new to me and his eyes are flittering, slight tremor of extremities, not answering questions nor following commands. given ativan and started on keppra 1 g iv q12. MRI brain and EEG pending. teleneurology agreed with plan. Had no seizures over night and is now responding to stimuli and starting to groan, yell out. DIL at bedside, all questions answered.      Current Facility-Administered Medications   Medication Dose Route Frequency    metoprolol (LOPRESSOR) injection 5 mg  5 mg IntraVENous Q6H    hydrALAZINE (APRESOLINE) 20 mg/mL injection 20 mg  20 mg IntraVENous Q6H PRN    cefTRIAXone (ROCEPHIN) 1 g in 0.9% sodium chloride (MBP/ADV) 50 mL  1 g IntraVENous Q24H    azithromycin (ZITHROMAX) 500 mg in 0.9% sodium chloride (MBP/ADV) 250 mL  500 mg IntraVENous Q24H    dextrose 5 % - 0.45% NaCl infusion  100 mL/hr IntraVENous CONTINUOUS    levETIRAcetam (KEPPRA) 1,000 mg in 0.9% sodium chloride 100 mL IVPB  1,000 mg IntraVENous Q12H    LORazepam (ATIVAN) injection 1 mg  1 mg IntraVENous Q2H PRN    sodium chloride (NS) flush 5-10 mL  5-10 mL IntraVENous Q8H    sodium chloride (NS) flush 5-10 mL  5-10 mL IntraVENous PRN    acetaminophen (TYLENOL) tablet 650 mg  650 mg Oral Q4H PRN    heparin (porcine) injection 5,000 Units  5,000 Units SubCUTAneous Q8H    amLODIPine (NORVASC) tablet 10 mg  10 mg Oral 7am    aspirin (ASPIRIN) tablet 325 mg 325 mg Oral DAILY    insulin glargine (LANTUS) injection 15 Units  15 Units SubCUTAneous DAILY    pravastatin (PRAVACHOL) tablet 40 mg  40 mg Oral QHS    ferrous sulfate tablet 325 mg  325 mg Oral BID    hydrALAZINE (APRESOLINE) tablet 25 mg  25 mg Oral TID    metoprolol succinate (TOPROL-XL) XL tablet 50 mg  50 mg Oral 7am    isosorbide dinitrate (ISORDIL) tablet 20 mg  20 mg Oral TID    insulin lispro (HUMALOG) injection   SubCUTAneous AC&HS        Review of Systems  Review of systems not obtained due to patient factors.     Objective:     Visit Vitals    /69 (BP 1 Location: Left arm, BP Patient Position: At rest)    Pulse 88    Temp 98.7 °F (37.1 °C)    Resp 18    Ht 6' 3\" (1.905 m)    Wt 82.9 kg (182 lb 11.2 oz)    SpO2 98%    BMI 22.84 kg/m2      O2 Device: Room air    Temp (24hrs), Av.6 °F (37 °C), Min:97.9 °F (36.6 °C), Max:99.2 °F (37.3 °C)          1901 - 04/15 0700  In: 2174 [I.V.:2174]  Out: 500 [Urine:500]    General: arousable, responding to stimuli  Eyes: pupils sluggish but equal and reactive  Neck; supple, no rigidity  CV: RRR  Pulm; diminished in bases, non labored, no wheezing  Abd; soft, active BS  Neuro: responding to stimuli but still lethargic/sleepy    Additional comments:I reviewed the patient's new clinical lab test results. j    Data Review    Recent Results (from the past 24 hour(s))   GLUCOSE, POC    Collection Time: 18  1:00 PM   Result Value Ref Range    Glucose (POC) 207 (H) 65 - 100 mg/dL   GLUCOSE, POC    Collection Time: 18  4:41 PM   Result Value Ref Range    Glucose (POC) 189 (H) 65 - 100 mg/dL   GLUCOSE, POC    Collection Time: 18  9:55 PM   Result Value Ref Range    Glucose (POC) 153 (H) 65 - 100 mg/dL   GLUCOSE, POC    Collection Time: 04/15/18  5:43 AM   Result Value Ref Range    Glucose (POC) 201 (H) 65 - 100 mg/dL   CBC WITH AUTOMATED DIFF    Collection Time: 04/15/18  6:29 AM   Result Value Ref Range    WBC 6.7 4.3 - 11.1 K/uL RBC 4.39 4.23 - 5.67 M/uL    HGB 11.2 (L) 13.6 - 17.2 g/dL    HCT 34.4 (L) 41.1 - 50.3 %    MCV 78.4 (L) 79.6 - 97.8 FL    MCH 25.5 (L) 26.1 - 32.9 PG    MCHC 32.6 31.4 - 35.0 g/dL    RDW 16.0 (H) 11.9 - 14.6 %    PLATELET 135 (L) 641 - 450 K/uL    MPV Cannot be calculated 10.8 - 14.1 FL    DF AUTOMATED      NEUTROPHILS 76 43 - 78 %    LYMPHOCYTES 16 13 - 44 %    MONOCYTES 7 4.0 - 12.0 %    EOSINOPHILS 0 (L) 0.5 - 7.8 %    BASOPHILS 0 0.0 - 2.0 %    IMMATURE GRANULOCYTES 1 0.0 - 5.0 %    ABS. NEUTROPHILS 5.0 1.7 - 8.2 K/UL    ABS. LYMPHOCYTES 1.1 0.5 - 4.6 K/UL    ABS. MONOCYTES 0.5 0.1 - 1.3 K/UL    ABS. EOSINOPHILS 0.0 0.0 - 0.8 K/UL    ABS. BASOPHILS 0.0 0.0 - 0.2 K/UL    ABS. IMM. GRANS. 0.0 0.0 - 0.5 K/UL   METABOLIC PANEL, BASIC    Collection Time: 04/15/18  6:29 AM   Result Value Ref Range    Sodium 154 (H) 136 - 145 mmol/L    Potassium 3.1 (L) 3.5 - 5.1 mmol/L    Chloride 126 (H) 98 - 107 mmol/L    CO2 14 (L) 21 - 32 mmol/L    Anion gap 14 7 - 16 mmol/L    Glucose 219 (H) 65 - 100 mg/dL    BUN 57 (H) 8 - 23 MG/DL    Creatinine 4.50 (H) 0.8 - 1.5 MG/DL    GFR est AA 16 (L) >60 ml/min/1.73m2    GFR est non-AA 14 (L) >60 ml/min/1.73m2    Calcium 7.2 (L) 8.3 - 10.4 MG/DL   MAGNESIUM    Collection Time: 04/15/18  6:29 AM   Result Value Ref Range    Magnesium 1.8 1.8 - 2.4 mg/dL   PHOSPHORUS    Collection Time: 04/15/18  6:29 AM   Result Value Ref Range    Phosphorus 4.2 (H) 2.3 - 3.7 MG/DL         Assessment/Plan:     Principal Problem:    Acute metabolic encephalopathy (2/91/5250)    Active Problems:    Chronic pancreatitis (HCC) (9/22/2016)      Venous insufficiency (12/6/2016)      Stage 4 chronic kidney disease ([facility] [address] (4/4/2017)      Controlled type 2 diabetes mellitus with complication, with long-term current use of insulin ([facility] (9/13/2017)      Altered mental status (4/13/2018)      Renal failure (4/14/2018)    continue D5 1/2 NS per nephrology. Continue keppra 1 gram iv q12.   MRI brain WO and EEG pending. teleneurology appreciated. Prn ativan. Hopefully keppra is preventing seizures and he can now start to wake up- was very lethargic and sleeping yesterday 2/2 ativan and post ictal state. Asked DIL to have lights/tv on during the day today and to frequently talk with paitent. bp not controlled, patient has been too sleepy to swallow oral bp meds. Will start lopressor 5 mg iv q6 which can be stopped once he can resume swallowing.      Care Plan discussed with: Patient/Family and Nurse    Signed By: Niles Lee MD     April 15, 2018

## 2018-04-16 LAB
ANION GAP SERPL CALC-SCNC: 12 MMOL/L (ref 7–16)
BASOPHILS # BLD: 0 K/UL (ref 0–0.2)
BASOPHILS NFR BLD: 0 % (ref 0–2)
BUN SERPL-MCNC: 41 MG/DL (ref 8–23)
CALCIUM SERPL-MCNC: 7.5 MG/DL (ref 8.3–10.4)
CHLORIDE SERPL-SCNC: 123 MMOL/L (ref 98–107)
CO2 SERPL-SCNC: 19 MMOL/L (ref 21–32)
CREAT SERPL-MCNC: 3.7 MG/DL (ref 0.8–1.5)
DIFFERENTIAL METHOD BLD: ABNORMAL
EOSINOPHIL # BLD: 0 K/UL (ref 0–0.8)
EOSINOPHIL NFR BLD: 0 % (ref 0.5–7.8)
ERYTHROCYTE [DISTWIDTH] IN BLOOD BY AUTOMATED COUNT: 15.8 % (ref 11.9–14.6)
GLUCOSE BLD STRIP.AUTO-MCNC: 163 MG/DL (ref 65–100)
GLUCOSE BLD STRIP.AUTO-MCNC: 190 MG/DL (ref 65–100)
GLUCOSE BLD STRIP.AUTO-MCNC: 232 MG/DL (ref 65–100)
GLUCOSE BLD STRIP.AUTO-MCNC: 238 MG/DL (ref 65–100)
GLUCOSE BLD STRIP.AUTO-MCNC: 328 MG/DL (ref 65–100)
GLUCOSE SERPL-MCNC: 181 MG/DL (ref 65–100)
HCT VFR BLD AUTO: 34.9 % (ref 41.1–50.3)
HGB BLD-MCNC: 11.4 G/DL (ref 13.6–17.2)
IMM GRANULOCYTES # BLD: 0 K/UL (ref 0–0.5)
IMM GRANULOCYTES NFR BLD AUTO: 0 % (ref 0–5)
LYMPHOCYTES # BLD: 1.5 K/UL (ref 0.5–4.6)
LYMPHOCYTES NFR BLD: 17 % (ref 13–44)
MAGNESIUM SERPL-MCNC: 1.6 MG/DL (ref 1.8–2.4)
MCH RBC QN AUTO: 25.5 PG (ref 26.1–32.9)
MCHC RBC AUTO-ENTMCNC: 32.7 G/DL (ref 31.4–35)
MCV RBC AUTO: 78.1 FL (ref 79.6–97.8)
MONOCYTES # BLD: 0.7 K/UL (ref 0.1–1.3)
MONOCYTES NFR BLD: 8 % (ref 4–12)
NEUTS SEG # BLD: 6.7 K/UL (ref 1.7–8.2)
NEUTS SEG NFR BLD: 75 % (ref 43–78)
PHOSPHATE SERPL-MCNC: 2.9 MG/DL (ref 2.3–3.7)
PLATELET # BLD AUTO: 99 K/UL (ref 150–450)
PMV BLD AUTO: ABNORMAL FL (ref 10.8–14.1)
POTASSIUM SERPL-SCNC: 3.1 MMOL/L (ref 3.5–5.1)
RBC # BLD AUTO: 4.47 M/UL (ref 4.23–5.67)
SODIUM SERPL-SCNC: 154 MMOL/L (ref 136–145)
WBC # BLD AUTO: 8.9 K/UL (ref 4.3–11.1)

## 2018-04-16 PROCEDURE — 80048 BASIC METABOLIC PNL TOTAL CA: CPT | Performed by: INTERNAL MEDICINE

## 2018-04-16 PROCEDURE — 74011250636 HC RX REV CODE- 250/636: Performed by: INTERNAL MEDICINE

## 2018-04-16 PROCEDURE — 84100 ASSAY OF PHOSPHORUS: CPT | Performed by: INTERNAL MEDICINE

## 2018-04-16 PROCEDURE — 36415 COLL VENOUS BLD VENIPUNCTURE: CPT | Performed by: INTERNAL MEDICINE

## 2018-04-16 PROCEDURE — 95816 EEG AWAKE AND DROWSY: CPT | Performed by: PSYCHIATRY & NEUROLOGY

## 2018-04-16 PROCEDURE — 74011250637 HC RX REV CODE- 250/637: Performed by: INTERNAL MEDICINE

## 2018-04-16 PROCEDURE — 74011000258 HC RX REV CODE- 258: Performed by: INTERNAL MEDICINE

## 2018-04-16 PROCEDURE — 86580 TB INTRADERMAL TEST: CPT | Performed by: INTERNAL MEDICINE

## 2018-04-16 PROCEDURE — 65270000029 HC RM PRIVATE

## 2018-04-16 PROCEDURE — 85025 COMPLETE CBC W/AUTO DIFF WBC: CPT | Performed by: INTERNAL MEDICINE

## 2018-04-16 PROCEDURE — 74011000302 HC RX REV CODE- 302: Performed by: INTERNAL MEDICINE

## 2018-04-16 PROCEDURE — 82962 GLUCOSE BLOOD TEST: CPT

## 2018-04-16 PROCEDURE — 74011000250 HC RX REV CODE- 250: Performed by: INTERNAL MEDICINE

## 2018-04-16 PROCEDURE — 83735 ASSAY OF MAGNESIUM: CPT | Performed by: INTERNAL MEDICINE

## 2018-04-16 PROCEDURE — 74011636637 HC RX REV CODE- 636/637: Performed by: INTERNAL MEDICINE

## 2018-04-16 RX ORDER — PETROLATUM 42 G/100G
OINTMENT TOPICAL DAILY
Status: DISCONTINUED | OUTPATIENT
Start: 2018-04-17 | End: 2018-04-19 | Stop reason: HOSPADM

## 2018-04-16 RX ORDER — POTASSIUM CHLORIDE 20 MEQ/1
40 TABLET, EXTENDED RELEASE ORAL
Status: COMPLETED | OUTPATIENT
Start: 2018-04-16 | End: 2018-04-16

## 2018-04-16 RX ORDER — DEXTROSE MONOHYDRATE 50 MG/ML
125 INJECTION, SOLUTION INTRAVENOUS CONTINUOUS
Status: DISCONTINUED | OUTPATIENT
Start: 2018-04-16 | End: 2018-04-19

## 2018-04-16 RX ADMIN — HEPARIN SODIUM 5000 UNITS: 5000 INJECTION, SOLUTION INTRAVENOUS; SUBCUTANEOUS at 06:00

## 2018-04-16 RX ADMIN — Medication 10 ML: at 05:50

## 2018-04-16 RX ADMIN — INSULIN LISPRO 8 UNITS: 100 INJECTION, SOLUTION INTRAVENOUS; SUBCUTANEOUS at 21:48

## 2018-04-16 RX ADMIN — SODIUM BICARBONATE: 84 INJECTION, SOLUTION INTRAVENOUS at 01:09

## 2018-04-16 RX ADMIN — INSULIN GLARGINE 15 UNITS: 100 INJECTION, SOLUTION SUBCUTANEOUS at 08:59

## 2018-04-16 RX ADMIN — FERROUS SULFATE TAB 325 MG (65 MG ELEMENTAL FE) 325 MG: 325 (65 FE) TAB at 16:55

## 2018-04-16 RX ADMIN — AZITHROMYCIN MONOHYDRATE 500 MG: 500 INJECTION, POWDER, LYOPHILIZED, FOR SOLUTION INTRAVENOUS at 04:14

## 2018-04-16 RX ADMIN — SODIUM CHLORIDE 1000 MG: 900 INJECTION, SOLUTION INTRAVENOUS at 22:45

## 2018-04-16 RX ADMIN — SODIUM CHLORIDE 1000 MG: 900 INJECTION, SOLUTION INTRAVENOUS at 12:12

## 2018-04-16 RX ADMIN — DEXTROSE MONOHYDRATE 125 ML/HR: 5 INJECTION, SOLUTION INTRAVENOUS at 13:12

## 2018-04-16 RX ADMIN — ISOSORBIDE DINITRATE 20 MG: 20 TABLET ORAL at 21:34

## 2018-04-16 RX ADMIN — METOROPROLOL TARTRATE 5 MG: 5 INJECTION, SOLUTION INTRAVENOUS at 19:28

## 2018-04-16 RX ADMIN — INSULIN LISPRO 4 UNITS: 100 INJECTION, SOLUTION INTRAVENOUS; SUBCUTANEOUS at 16:55

## 2018-04-16 RX ADMIN — POTASSIUM CHLORIDE 40 MEQ: 20 TABLET, EXTENDED RELEASE ORAL at 14:58

## 2018-04-16 RX ADMIN — ACETAMINOPHEN 650 MG: 325 TABLET ORAL at 13:21

## 2018-04-16 RX ADMIN — CEFTRIAXONE 1 G: 1 INJECTION, POWDER, FOR SOLUTION INTRAMUSCULAR; INTRAVENOUS at 03:41

## 2018-04-16 RX ADMIN — TUBERCULIN PURIFIED PROTEIN DERIVATIVE 5 UNITS: 5 INJECTION, SOLUTION INTRADERMAL at 14:58

## 2018-04-16 RX ADMIN — PRAVASTATIN SODIUM 40 MG: 20 TABLET ORAL at 21:34

## 2018-04-16 RX ADMIN — INSULIN LISPRO 6 UNITS: 100 INJECTION, SOLUTION INTRAVENOUS; SUBCUTANEOUS at 12:12

## 2018-04-16 RX ADMIN — ISOSORBIDE DINITRATE 20 MG: 20 TABLET ORAL at 16:54

## 2018-04-16 RX ADMIN — INSULIN LISPRO 2 UNITS: 100 INJECTION, SOLUTION INTRAVENOUS; SUBCUTANEOUS at 08:58

## 2018-04-16 RX ADMIN — DEXTROSE MONOHYDRATE 125 ML/HR: 5 INJECTION, SOLUTION INTRAVENOUS at 22:45

## 2018-04-16 RX ADMIN — METOROPROLOL TARTRATE 5 MG: 5 INJECTION, SOLUTION INTRAVENOUS at 12:13

## 2018-04-16 RX ADMIN — HEPARIN SODIUM 5000 UNITS: 5000 INJECTION, SOLUTION INTRAVENOUS; SUBCUTANEOUS at 14:58

## 2018-04-16 RX ADMIN — HYDRALAZINE HYDROCHLORIDE 25 MG: 25 TABLET, FILM COATED ORAL at 21:34

## 2018-04-16 RX ADMIN — METOROPROLOL TARTRATE 5 MG: 5 INJECTION, SOLUTION INTRAVENOUS at 05:49

## 2018-04-16 RX ADMIN — Medication 10 ML: at 21:49

## 2018-04-16 RX ADMIN — HYDRALAZINE HYDROCHLORIDE 25 MG: 25 TABLET, FILM COATED ORAL at 16:55

## 2018-04-16 RX ADMIN — Medication 5 ML: at 13:21

## 2018-04-16 RX ADMIN — HEPARIN SODIUM 5000 UNITS: 5000 INJECTION, SOLUTION INTRAVENOUS; SUBCUTANEOUS at 23:02

## 2018-04-16 NOTE — PROGRESS NOTES
Spoke to Mr. Tessa Foreman and his son Mr. Jacob Marie (his cell is [phone]) in room 210 about discharge planning. Mr. Tessa Foreman lives alone, with family living nearby. Per son, patient has been falling several times per week, and son has found him unresponsive on occasion. Mr. Tessa Foreman (alert, oriented to name only), denies this, and says \"I'm going home. \"  Mr. Tessa Foreman also says \"I'm not doing dialysis. \"      PT evaluation and ppd ordered in case Mr. Tessa Foreman needs STR at a SNF. Will continue to follow. At this point, Mr. Tessa Foreman not able to make his own decisions (confused). Discussed POA with son, as there is no document. Son would like to do a POA, but recommended that he wait and see if his father's confusion resolves enough to have lucid discussions.

## 2018-04-16 NOTE — PROGRESS NOTES
END OF SHIFT NOTE:    INTAKE/OUTPUT  04/15 0701 - 04/16 0700  In: 1998 [I.V.:1998]  Out: -   Voiding: YES  Catheter: NO  Drain:              Flatus: Patient does have flatus present. Stool:  0 occurrences. Characteristics:  Stool Assessment  Stool Color: Brown  Stool Appearance: Soft  Stool Amount: Large  Stool Source/Status: Rectum    Emesis: 0 occurrences. Characteristics:        VITAL SIGNS  Patient Vitals for the past 12 hrs:   Temp Pulse Resp BP SpO2   04/16/18 0430 99.3 °F (37.4 °C) 83 18 165/76 95 %   04/15/18 2323 99.6 °F (37.6 °C) 84 18 187/70 97 %   04/15/18 1933 99.8 °F (37.7 °C) 87 18 182/67 95 %       Pain Assessment  Pain Intensity 1: 0 (04/16/18 0040)     Pain Intervention(s) 1: Medication (see MAR)  Patient Stated Pain Goal: 0    Ambulating  No    Shift report given to oncoming nurse at the bedside.     Maryana Degroot RN

## 2018-04-16 NOTE — PROGRESS NOTES
Pt moaning, restless. Appears to be in pain. Unable to verbalize. Dr Jose Howard notified, orders received.

## 2018-04-16 NOTE — PROGRESS NOTES
[facility]        Subjective: A on CKD   Oriented to person and place only    Review of Systems -   General ROS: negative for - fever, chills  Respiratory ROS: no SOB, cough, GARNICA  Cardiovascular ROS: no CP, palpitations  Gastrointestinal ROS: no N&V, abdominal pain, diarrhea  Genito-Urinary ROS: no difficulty voiding, dysuria  Neurological ROS: no seizures, focal weekness        Objective:    Vitals:    04/16/18 0430 04/16/18 0627 04/16/18 0740 04/16/18 1114   BP: 165/76  158/70 168/80   Pulse: 83  79 85   Resp: 18 18 18   Temp: 99.3 °F (37.4 °C)  98.9 °F (37.2 °C) 99.3 °F (37.4 °C)   SpO2: 95%  100% 97%   Weight:  81.1 kg (178 lb 11.2 oz)     Height:           PE  Gen: in no acute distress  CV:reg rate  Chest:clear  Abd: soft  Ext/Access: no edema       . LAB  Recent Labs      04/16/18   0555  04/15/18   0629  04/14/18   0437   WBC  8.9  6.7  5.1   HGB  11.4*  11.2*  10.6*   HCT  34.9*  34.4*  32.9*   PLT  99*  114*  112*     Recent Labs      04/16/18   0555  04/15/18   0629  04/14/18   0437  04/13/18   1838   NA  154*  154*  150*  148*   K  3.1*  3.1*  3.0*  3.7   CL  123*  126*  120*  118*   CO2  19*  14*  14*  15*   GLU  181*  219*  230*  316*   BUN  41*  57*  77*  81*   CREA  3.70*  4.50*  5.58*  6.17*   MG  1.6*  1.8  1.6*   --    PHOS  2.9  4.2*   --    --    CA  7.5*  7.2*  6.6*  6.8*   ALB   --    --    --   3.6   TBILI   --    --    --   0.5   ALT   --    --    --   45   SGOT   --    --    --   37           Radiology    A/P:   Patient Active Problem List   Diagnosis Code    Acute renal failure superimposed on stage 3 chronic kidney disease (HCC) N17.9, N18.3    Septicemia due to Klebsiella pneumoniae (HCC) A41.4    Type II diabetes mellitus with nephropathy (HCC) E11.21    Essential hypertension I10    GERD (gastroesophageal reflux disease) K21.9    Diabetic neuropathy (HCC) E11.40    Chronic pancreatitis (HCC) K86.1    Iron (Fe) deficiency anemia D50.9    Acute on chronic systolic congestive heart failure (HCC) I50.23    Venous insufficiency I87.2    Venous stasis ulcer of left lower extremity ([facility]) I83.029, L97.929    Stage 4 chronic kidney disease ([facility]) N18.4    Stasis edema of both lower extremities I87.303    Cellulitis of left lower leg L03. 116    Onychomycosis B35.1    Controlled type 2 diabetes mellitus with complication, with long-term current use of insulin ([facility]) E11.8, Z79.4    Arteriovenous fistula ([facility]) I77.0    Hypercholesterolemia E78.00    Uncontrolled diabetes mellitus, with long-term current use of insulin ([facility]) E11.65, Z79.4    Carotid bruit R09.89    Cauda equina compression ([facility]) G83.4    Volume overload E87.70    Chest pain R07.9    Altered mental status R41.82    Acute metabolic encephalopathy W15.14    Renal failure N19     A on CKD - improved  Hypernatremia - no change. Will change Bicarb drip to D5W. Hypokalemia - getting replacement.         Sarah Beth Mccurdy MD

## 2018-04-16 NOTE — WOUND CARE
Bilateral lower legs with scales, flaky dry skin. Popped blister right lower leg 5x5x0.1cm recommend xeroform, 4x4 and emmanuel, daily.

## 2018-04-16 NOTE — PROGRESS NOTES
Hospitalist Progress Note    2018  Admit Date: 2018  6:21 PM   NAME: SILVIO Otoole. :  1940   MRN:  [id number]   Attending: Hectro Mcelroy MD  PCP:  Familia Rousseau MD    SUBJECTIVE:   69 AAM with CKD stage 5 presented for worsening MS. Started on rocephin/azithro on admission for bilateral infiltrates on CXR. Nephrology consulted for ALICJA/CKD and started on hypotonic fluids for hypernatremia. Has matured fistula but has been hesitant to start HD as outpatient. On  hospitalist called by nephrology for ?of patient possibly seizing. Pt was given ativan and Keppra 1g iv q12hr. Pt seen by tele-neurology. Was very sleepy/postictal on 4/15.      - much more awake today. Still disoriented. Has refused HD per nephrology note. Review of Systems negative with exception of pertinent positives noted above  PHYSICAL EXAM     Visit Vitals    /80 (BP 1 Location: Right arm, BP Patient Position: At rest)    Pulse 85    Temp 99.3 °F (37.4 °C)    Resp 18    Ht 6' 3\" (1.905 m)    Wt 81.1 kg (178 lb 11.2 oz)    SpO2 97%    BMI 22.34 kg/m2      Temp (24hrs), Av.4 °F (37.4 °C), Min:98.9 °F (37.2 °C), Max:99.8 °F (37.7 °C)    Oxygen Therapy  O2 Sat (%): 97 % (18 1114)  Pulse via Oximetry: 65 beats per minute (18 2155)  O2 Device: Room air (18 2155)    Intake/Output Summary (Last 24 hours) at 18 1333  Last data filed at 18 0915   Gross per 24 hour   Intake             2118 ml   Output                0 ml   Net             2118 ml      General: No acute distress, disoriented but reportedly more alert   Lungs:  CTA Bilaterally.    Heart:  Regular rate and rhythm,  No murmur, rub, or gallop  Abdomen: Soft, Non distended, Non tender, Positive bowel sounds  Extremities: No cyanosis, clubbing or edema  Neurologic:  No focal deficits    ASSESSMENT      Active Hospital Problems    Diagnosis Date Noted    Renal failure 2018    Altered mental status 04/13/2018    Acute metabolic encephalopathy 72/44/7301    Controlled type 2 diabetes mellitus with complication, with long-term current use of insulin ([facility] 09/13/2017    Stage 4 chronic kidney disease ([facility] 04/04/2017    Venous insufficiency 12/06/2016    Chronic pancreatitis ([facility] 09/22/2016     A/P  - Acute/CKD stage 4 - nephrology following. Cr down-trending.  - Hypernatremia - hypotonic fluids as per nephro  - hypokalemia - replete  - Seizures - likely secondary to electrolyte derrangements. Continue iv keppra and consult neuro.  MRI non acute  - Dm2 - higher on D5, continue basal and SSI for now  - pulm infiltrates - rocephin/azithromax    DVT Prophylaxis: hep sq    Signed By: Macy Mack DO     April 16, 2018

## 2018-04-16 NOTE — PROGRESS NOTES
END OF SHIFT NOTE:    INTAKE/OUTPUT  04/14 0701 - 04/15 0700  In: 2174 [I.V.:2174]  Out: -   Voiding: YES  Catheter: NO  Drain:              Flatus: Patient does have flatus present. Stool:  1 occurrences. Characteristics:  Stool Assessment  Stool Color: Brown  Stool Appearance: Soft  Stool Amount: Large  Stool Source/Status: Rectum    Emesis: 0 occurrences. Characteristics:        VITAL SIGNS  Patient Vitals for the past 12 hrs:   Temp Pulse Resp BP SpO2   04/15/18 1610 99.6 °F (37.6 °C) 92 18 165/68 96 %   04/15/18 1139 98.6 °F (37 °C) 91 17 182/75 98 %       Pain Assessment  Pain Intensity 1: 0 (04/14/18 0710)        Patient Stated Pain Goal: 0    Ambulating  No    Shift report given to oncoming nurse at the bedside.     Yvan Hoff RN

## 2018-04-17 ENCOUNTER — HOME CARE VISIT (OUTPATIENT)
Dept: SCHEDULING | Facility: HOME HEALTH | Age: 78
End: 2018-04-17
Payer: MEDICARE

## 2018-04-17 LAB
ALBUMIN SERPL-MCNC: 2.3 G/DL (ref 3.2–4.6)
ANION GAP SERPL CALC-SCNC: 10 MMOL/L (ref 7–16)
BASOPHILS # BLD: 0 K/UL (ref 0–0.2)
BASOPHILS NFR BLD: 0 % (ref 0–2)
BUN SERPL-MCNC: 33 MG/DL (ref 8–23)
CALCIUM SERPL-MCNC: 7.3 MG/DL (ref 8.3–10.4)
CHLORIDE SERPL-SCNC: 118 MMOL/L (ref 98–107)
CO2 SERPL-SCNC: 21 MMOL/L (ref 21–32)
CREAT SERPL-MCNC: 3.36 MG/DL (ref 0.8–1.5)
DIFFERENTIAL METHOD BLD: ABNORMAL
EOSINOPHIL # BLD: 0.1 K/UL (ref 0–0.8)
EOSINOPHIL NFR BLD: 1 % (ref 0.5–7.8)
ERYTHROCYTE [DISTWIDTH] IN BLOOD BY AUTOMATED COUNT: 15.8 % (ref 11.9–14.6)
GLUCOSE BLD STRIP.AUTO-MCNC: 169 MG/DL (ref 65–100)
GLUCOSE BLD STRIP.AUTO-MCNC: 173 MG/DL (ref 65–100)
GLUCOSE BLD STRIP.AUTO-MCNC: 280 MG/DL (ref 65–100)
GLUCOSE BLD STRIP.AUTO-MCNC: 330 MG/DL (ref 65–100)
GLUCOSE SERPL-MCNC: 158 MG/DL (ref 65–100)
HCT VFR BLD AUTO: 31.7 % (ref 41.1–50.3)
HGB BLD-MCNC: 10.2 G/DL (ref 13.6–17.2)
IMM GRANULOCYTES # BLD: 0 K/UL (ref 0–0.5)
IMM GRANULOCYTES NFR BLD AUTO: 0 % (ref 0–5)
LYMPHOCYTES # BLD: 1.5 K/UL (ref 0.5–4.6)
LYMPHOCYTES NFR BLD: 21 % (ref 13–44)
MCH RBC QN AUTO: 25.2 PG (ref 26.1–32.9)
MCHC RBC AUTO-ENTMCNC: 32.2 G/DL (ref 31.4–35)
MCV RBC AUTO: 78.5 FL (ref 79.6–97.8)
MM INDURATION POC: NORMAL MM (ref 0–5)
MONOCYTES # BLD: 0.7 K/UL (ref 0.1–1.3)
MONOCYTES NFR BLD: 10 % (ref 4–12)
NEUTS SEG # BLD: 4.7 K/UL (ref 1.7–8.2)
NEUTS SEG NFR BLD: 68 % (ref 43–78)
PHOSPHATE SERPL-MCNC: 2.3 MG/DL (ref 2.3–3.7)
PLATELET # BLD AUTO: 113 K/UL (ref 150–450)
PMV BLD AUTO: ABNORMAL FL (ref 10.8–14.1)
POTASSIUM SERPL-SCNC: 3.1 MMOL/L (ref 3.5–5.1)
PPD POC: NORMAL NEGATIVE
RBC # BLD AUTO: 4.04 M/UL (ref 4.23–5.67)
SODIUM SERPL-SCNC: 149 MMOL/L (ref 136–145)
WBC # BLD AUTO: 7.1 K/UL (ref 4.3–11.1)

## 2018-04-17 PROCEDURE — 82962 GLUCOSE BLOOD TEST: CPT

## 2018-04-17 PROCEDURE — 74011000258 HC RX REV CODE- 258: Performed by: INTERNAL MEDICINE

## 2018-04-17 PROCEDURE — 74011250636 HC RX REV CODE- 250/636: Performed by: INTERNAL MEDICINE

## 2018-04-17 PROCEDURE — 85025 COMPLETE CBC W/AUTO DIFF WBC: CPT | Performed by: INTERNAL MEDICINE

## 2018-04-17 PROCEDURE — 65270000029 HC RM PRIVATE

## 2018-04-17 PROCEDURE — 80177 DRUG SCRN QUAN LEVETIRACETAM: CPT | Performed by: PSYCHIATRY & NEUROLOGY

## 2018-04-17 PROCEDURE — 97161 PT EVAL LOW COMPLEX 20 MIN: CPT

## 2018-04-17 PROCEDURE — 74011000250 HC RX REV CODE- 250: Performed by: INTERNAL MEDICINE

## 2018-04-17 PROCEDURE — 36415 COLL VENOUS BLD VENIPUNCTURE: CPT | Performed by: INTERNAL MEDICINE

## 2018-04-17 PROCEDURE — 80069 RENAL FUNCTION PANEL: CPT | Performed by: INTERNAL MEDICINE

## 2018-04-17 PROCEDURE — 74011636637 HC RX REV CODE- 636/637: Performed by: INTERNAL MEDICINE

## 2018-04-17 PROCEDURE — 74011250637 HC RX REV CODE- 250/637: Performed by: INTERNAL MEDICINE

## 2018-04-17 PROCEDURE — 74011250637 HC RX REV CODE- 250/637: Performed by: PSYCHIATRY & NEUROLOGY

## 2018-04-17 RX ORDER — AZITHROMYCIN 250 MG/1
500 TABLET, FILM COATED ORAL EVERY 24 HOURS
Status: COMPLETED | OUTPATIENT
Start: 2018-04-18 | End: 2018-04-18

## 2018-04-17 RX ORDER — SODIUM CHLORIDE 450 MG/100ML
125 INJECTION, SOLUTION INTRAVENOUS CONTINUOUS
Status: DISCONTINUED | OUTPATIENT
Start: 2018-04-17 | End: 2018-04-18

## 2018-04-17 RX ORDER — LEVETIRACETAM 500 MG/1
1000 TABLET ORAL EVERY 12 HOURS
Status: DISCONTINUED | OUTPATIENT
Start: 2018-04-17 | End: 2018-04-17

## 2018-04-17 RX ORDER — LEVETIRACETAM 500 MG/1
500 TABLET ORAL EVERY 12 HOURS
Status: DISCONTINUED | OUTPATIENT
Start: 2018-04-17 | End: 2018-04-19 | Stop reason: HOSPADM

## 2018-04-17 RX ORDER — HALOPERIDOL 5 MG/ML
2 INJECTION INTRAMUSCULAR
Status: DISCONTINUED | OUTPATIENT
Start: 2018-04-17 | End: 2018-04-19 | Stop reason: HOSPADM

## 2018-04-17 RX ADMIN — HYDRALAZINE HYDROCHLORIDE 25 MG: 25 TABLET, FILM COATED ORAL at 17:37

## 2018-04-17 RX ADMIN — METOROPROLOL TARTRATE 5 MG: 5 INJECTION, SOLUTION INTRAVENOUS at 23:42

## 2018-04-17 RX ADMIN — Medication 5 ML: at 14:58

## 2018-04-17 RX ADMIN — HEPARIN SODIUM 5000 UNITS: 5000 INJECTION, SOLUTION INTRAVENOUS; SUBCUTANEOUS at 14:58

## 2018-04-17 RX ADMIN — ISOSORBIDE DINITRATE 20 MG: 20 TABLET ORAL at 09:56

## 2018-04-17 RX ADMIN — LEVETIRACETAM 500 MG: 500 TABLET ORAL at 23:02

## 2018-04-17 RX ADMIN — INSULIN LISPRO 8 UNITS: 100 INJECTION, SOLUTION INTRAVENOUS; SUBCUTANEOUS at 12:05

## 2018-04-17 RX ADMIN — METOROPROLOL TARTRATE 5 MG: 5 INJECTION, SOLUTION INTRAVENOUS at 05:44

## 2018-04-17 RX ADMIN — PRAVASTATIN SODIUM 40 MG: 20 TABLET ORAL at 21:48

## 2018-04-17 RX ADMIN — HYDRALAZINE HYDROCHLORIDE 25 MG: 25 TABLET, FILM COATED ORAL at 21:48

## 2018-04-17 RX ADMIN — HEPARIN SODIUM 5000 UNITS: 5000 INJECTION, SOLUTION INTRAVENOUS; SUBCUTANEOUS at 23:02

## 2018-04-17 RX ADMIN — SODIUM CHLORIDE 125 ML/HR: 450 INJECTION, SOLUTION INTRAVENOUS at 17:38

## 2018-04-17 RX ADMIN — AZITHROMYCIN MONOHYDRATE 500 MG: 500 INJECTION, POWDER, LYOPHILIZED, FOR SOLUTION INTRAVENOUS at 04:27

## 2018-04-17 RX ADMIN — CEFTRIAXONE 1 G: 1 INJECTION, POWDER, FOR SOLUTION INTRAMUSCULAR; INTRAVENOUS at 03:47

## 2018-04-17 RX ADMIN — DEXTROSE MONOHYDRATE 125 ML/HR: 5 INJECTION, SOLUTION INTRAVENOUS at 06:45

## 2018-04-17 RX ADMIN — METOROPROLOL TARTRATE 5 MG: 5 INJECTION, SOLUTION INTRAVENOUS at 00:21

## 2018-04-17 RX ADMIN — INSULIN LISPRO 2 UNITS: 100 INJECTION, SOLUTION INTRAVENOUS; SUBCUTANEOUS at 07:47

## 2018-04-17 RX ADMIN — INSULIN LISPRO 6 UNITS: 100 INJECTION, SOLUTION INTRAVENOUS; SUBCUTANEOUS at 16:59

## 2018-04-17 RX ADMIN — ACETAMINOPHEN 650 MG: 325 TABLET ORAL at 00:34

## 2018-04-17 RX ADMIN — PETROLATUM: 42 OINTMENT TOPICAL at 10:19

## 2018-04-17 RX ADMIN — HALOPERIDOL LACTATE 2 MG: 5 INJECTION, SOLUTION INTRAMUSCULAR at 02:25

## 2018-04-17 RX ADMIN — HEPARIN SODIUM 5000 UNITS: 5000 INJECTION, SOLUTION INTRAVENOUS; SUBCUTANEOUS at 06:44

## 2018-04-17 RX ADMIN — ISOSORBIDE DINITRATE 20 MG: 20 TABLET ORAL at 21:48

## 2018-04-17 RX ADMIN — INSULIN LISPRO 2 UNITS: 100 INJECTION, SOLUTION INTRAVENOUS; SUBCUTANEOUS at 22:03

## 2018-04-17 RX ADMIN — FERROUS SULFATE TAB 325 MG (65 MG ELEMENTAL FE) 325 MG: 325 (65 FE) TAB at 09:56

## 2018-04-17 RX ADMIN — FERROUS SULFATE TAB 325 MG (65 MG ELEMENTAL FE) 325 MG: 325 (65 FE) TAB at 16:59

## 2018-04-17 RX ADMIN — HYDRALAZINE HYDROCHLORIDE 25 MG: 25 TABLET, FILM COATED ORAL at 09:56

## 2018-04-17 RX ADMIN — INSULIN GLARGINE 15 UNITS: 100 INJECTION, SOLUTION SUBCUTANEOUS at 09:56

## 2018-04-17 RX ADMIN — AMLODIPINE BESYLATE 10 MG: 10 TABLET ORAL at 06:43

## 2018-04-17 RX ADMIN — METOPROLOL SUCCINATE 50 MG: 50 TABLET, EXTENDED RELEASE ORAL at 06:43

## 2018-04-17 RX ADMIN — ISOSORBIDE DINITRATE 20 MG: 20 TABLET ORAL at 17:37

## 2018-04-17 RX ADMIN — ASPIRIN 325 MG ORAL TABLET 325 MG: 325 PILL ORAL at 09:55

## 2018-04-17 NOTE — PROGRESS NOTES
Hospitalist Progress Note    2018  Admit Date: 2018  6:21 PM   NAME: SILVIO Sommer. :  1940   MRN:  [id number]   Attending: Anna Raman MD  PCP:  Ema Frances MD    SUBJECTIVE:   69 AAM with CKD stage 5 presented for worsening MS. Started on rocephin/azithro on admission for bilateral infiltrates on CXR. Nephrology consulted for ALICJA/CKD and started on hypotonic fluids for hypernatremia. Has matured fistula but has been hesitant to start HD as outpatient. On  hospitalist called by nephrology for ?of patient possibly seizing. Pt was given ativan and Keppra 1g iv q12hr. Pt seen by tele-neurology. Was very sleepy/postictal on 4/15. Seen by bedside neurology  and keppra dose decreased to 500mg bid.  - even more alert today but still much prompting for month/year. Son at bedside. Review of Systems negative with exception of pertinent positives noted above  PHYSICAL EXAM     Visit Vitals    /65    Pulse 66    Temp 98.2 °F (36.8 °C)    Resp 17    Ht 6' 3\" (1.905 m)    Wt 79.8 kg (176 lb)    SpO2 98%    BMI 22 kg/m2      Temp (24hrs), Av.2 °F (36.8 °C), Min:97.4 °F (36.3 °C), Max:99.2 °F (37.3 °C)    Oxygen Therapy  O2 Sat (%): 98 % (18 1603)  Pulse via Oximetry: 65 beats per minute (18 2155)  O2 Device: Room air (18 2155)    Intake/Output Summary (Last 24 hours) at 18 1642  Last data filed at 18 1604   Gross per 24 hour   Intake             5466 ml   Output                0 ml   Net             5466 ml      General: No acute distress, disoriented but reportedly more alert   Lungs:  CTA Bilaterally.    Heart:  Regular rate and rhythm,  No murmur, rub, or gallop  Abdomen: Soft, Non distended, Non tender, Positive bowel sounds  Extremities: No cyanosis, clubbing or edema  Neurologic:  No focal deficits    ASSESSMENT      Active Hospital Problems    Diagnosis Date Noted    Renal failure 2018    Altered mental status 04/13/2018    Acute metabolic encephalopathy 83/24/9840    Controlled type 2 diabetes mellitus with complication, with long-term current use of insulin ([facility] [address] 09/13/2017    Stage 4 chronic kidney disease ([facility] [address] 04/04/2017    Venous insufficiency 12/06/2016    Chronic pancreatitis ([facility] 09/22/2016     A/P  - Acute/CKD stage 4 - nephrology following. Cr down-trending. No urgent need for HD>   - Hypernatremia - hypotonic fluids as per nephro, na down-trending  - hypokalemia - replete   - Seizures - neurology has seen. MRI non acute  Continue oral keppra  - Dm2 -uncontrolled. dc d5 in fluids. continue basal and SSI for now  - pulm infiltrates -D4 rocephin/azithromax started on admission. Likely ok to DC on 4/18 after 5D treatment. DVT Prophylaxis: hep sq    FYI - son at bedside asking to complete HCPOA but patient not oriented enough to appropriately complete paperwork to do so. Dispo - anticipate probable need for STR at discharge. Pt/ot following and PPD ordered. CM consulted.      Signed By: Charmayne Prude,      April 17, 2018

## 2018-04-17 NOTE — ROUTINE PROCESS
Haldol 2 mg given slow IV push per Andrew Kilpatrick RN as per orders. Will continue to monitor. Continues to attempt to climb out of bed.

## 2018-04-17 NOTE — ROUTINE PROCESS
Called & spoke with Dr Domingo Manriquez, notified of multiple attempts to climb out of bed. States he is being held hostage & refusing to allow his family to visit. Oriented to name only. Combative on approach. New orders entered per MD. Will continue to monitor.

## 2018-04-17 NOTE — PROGRESS NOTES
[facility]    Follow-Up on: ALICJA on CKD    HPI: Confused overnight. ROS:  Denies CP, SOB.     Current Facility-Administered Medications   Medication Dose Route Frequency    haloperidol lactate (HALDOL) injection 2 mg  2 mg IntraVENous Q6H PRN    [START ON 4/18/2018] azithromycin (ZITHROMAX) tablet 500 mg  500 mg Oral Q24H    levETIRAcetam (KEPPRA) tablet 1,000 mg  1,000 mg Oral Q12H    dextrose 5% infusion  125 mL/hr IntraVENous CONTINUOUS    tuberculin injection 5 Units  5 Units IntraDERMal ONCE    mineral oil-hydrophil petrolat (AQUAPHOR) ointment   Topical DAILY    metoprolol (LOPRESSOR) injection 5 mg  5 mg IntraVENous Q6H    hydrALAZINE (APRESOLINE) 20 mg/mL injection 20 mg  20 mg IntraVENous Q6H PRN    cefTRIAXone (ROCEPHIN) 1 g in 0.9% sodium chloride (MBP/ADV) 50 mL  1 g IntraVENous Q24H    LORazepam (ATIVAN) injection 1 mg  1 mg IntraVENous Q2H PRN    sodium chloride (NS) flush 5-10 mL  5-10 mL IntraVENous Q8H    sodium chloride (NS) flush 5-10 mL  5-10 mL IntraVENous PRN    acetaminophen (TYLENOL) tablet 650 mg  650 mg Oral Q4H PRN    heparin (porcine) injection 5,000 Units  5,000 Units SubCUTAneous Q8H    amLODIPine (NORVASC) tablet 10 mg  10 mg Oral 7am    aspirin (ASPIRIN) tablet 325 mg  325 mg Oral DAILY    insulin glargine (LANTUS) injection 15 Units  15 Units SubCUTAneous DAILY    pravastatin (PRAVACHOL) tablet 40 mg  40 mg Oral QHS    ferrous sulfate tablet 325 mg  325 mg Oral BID    hydrALAZINE (APRESOLINE) tablet 25 mg  25 mg Oral TID    metoprolol succinate (TOPROL-XL) XL tablet 50 mg  50 mg Oral 7am    isosorbide dinitrate (ISORDIL) tablet 20 mg  20 mg Oral TID    insulin lispro (HUMALOG) injection   SubCUTAneous AC&HS       Exam:  Vitals:    04/17/18 0021 04/17/18 0300 04/17/18 0544 04/17/18 0718   BP: 160/68 166/76 168/74 175/75   Pulse: 81 72 72 73   Resp: 19 18 18   Temp: 99.2 °F (37.3 °C) 98.9 °F (37.2 °C)  97.7 °F (36.5 °C)   SpO2: 97% 97%  98%   Weight: 79.8 kg (176 lb)     Height:             Intake/Output Summary (Last 24 hours) at 04/17/18 1008  Last data filed at 04/17/18 0851   Gross per 24 hour   Intake             3852 ml   Output                0 ml   Net             3852 ml     PE:  GEN - in no distress  CV - regular, no murmur, no rub  Lung - clear bilaterally  Abd - soft, nontender  Ext - no edema  LUE AVF with thrill bruit    Labs  Recent Labs      04/17/18   0532  04/16/18   0555  04/15/18   0629   WBC  7.1  8.9  6.7   HGB  10.2*  11.4*  11.2*   HCT  31.7*  34.9*  34.4*   PLT  113*  99*  114*     Recent Labs      04/17/18   0532  04/16/18   0555  04/15/18   0629   NA  149*  154*  154*   K  3.1*  3.1*  3.1*   CL  118*  123*  126*   CO2  21  19*  14*   BUN  33*  41*  57*   CREA  3.36*  3.70*  4.50*   GLU  158*  181*  219*   CA  7.3*  7.5*  7.2*   MG   --   1.6*  1.8   PHOS  2.3  2.9  4.2*     No results for input(s): PH, PCO2, PO2, PCO2 in the last 72 hours.     Problem List:  Patient Active Problem List    Diagnosis Date Noted    Renal failure 04/14/2018    Altered mental status 04/13/2018    Acute metabolic encephalopathy 02/80/8981    Volume overload 04/04/2018    Chest pain 04/04/2018    Cauda equina compression ([address] 04/02/2018    Uncontrolled diabetes mellitus, with long-term current use of insulin ([address] 02/28/2018    Carotid bruit 02/28/2018    Hypercholesterolemia     Arteriovenous fistula ([address] 10/31/2017    Onychomycosis 09/13/2017    Controlled type 2 diabetes mellitus with complication, with long-term current use of insulin ([address] 09/13/2017    Stage 4 chronic kidney disease ([address] 04/04/2017    Stasis edema of both lower extremities 04/04/2017    Cellulitis of left lower leg 04/04/2017    Venous stasis ulcer of left lower extremity ([address] 04/01/2017    Venous insufficiency 12/06/2016    Acute on chronic systolic congestive heart failure ([facility] 09/23/2016    Septicemia due to Klebsiella pneumoniae ([facility] 09/22/2016    Type II diabetes mellitus with nephropathy ([facility] 09/22/2016    Essential hypertension 09/22/2016    GERD (gastroesophageal reflux disease) 09/22/2016    Diabetic neuropathy ([facility] 09/22/2016    Chronic pancreatitis ([place] [address] 09/22/2016    Iron (Fe) deficiency anemia 09/22/2016    Acute renal failure superimposed on stage 3 chronic kidney disease ([facility] 09/21/2016       Issues Addressed By Nephrology:  1. ALICJA on CKD 4-5: Better. 2. Hypernatremia  3. Hypokalemia  4.  Metabolic Acidosis    Plan:  -Continue D5W  -No indication to start dialysis  -Follow labs closely

## 2018-04-17 NOTE — PROGRESS NOTES
Problem: Mobility Impaired (Adult and Pediatric)  Goal: *Acute Goals and Plan of Care (Insert Text)  STG:  (1.)Mr. Liliane Nunez will move from supine to sit and sit to supine , scoot up and down and roll side to side with SUPERVISION within 3 treatment day(s). (2.)Mr. Liliane Nunez will transfer from bed to chair and chair to bed with STAND BY ASSIST using the least restrictive device within 3 treatment day(s). (3.)Mr. Liliane Nunez will ambulate with CONTACT GUARD ASSIST for 150 feet with the least restrictive device within 3 treatment day(s). (4.)Mr. Liliane Nunez will ascend/descend 1 step with one handrail with CONTACT GUARD ASSIST within 3 treatment days. LTG:  (1.)Mr. Liliane Nunez will move from supine to sit and sit to supine , scoot up and down and roll side to side in bed with MODIFIED INDEPENDENCE within 7 treatment day(s). (2.)Mr. Liliane Nunez will transfer from bed to chair and chair to bed with SUPERVISION using the least restrictive device within 7 treatment day(s). (3.)Mr. Liliane Nunez will ambulate with STAND BY ASSIST for 250 feet with the least restrictive device within 7 treatment day(s). (4.)Mr. Liliane Nunez will ascend/descend 3 steps with one handrail with STAND BY ASSIST within 7 treatment days.   ________________________________________________________________________________________________      PHYSICAL THERAPY: Initial Assessment, Treatment Day: Day of Assessment, AM 4/17/2018  INPATIENT: Hospital Day: 5  Payor: CARE IMPROVEMENT PLUS / [place] / Product Type: [employer] Care Medicare /      NAME/AGE/GENDER: Sonia Saini is a 69 y.o. male   PRIMARY DIAGNOSIS: Altered mental status  Renal failure Acute metabolic encephalopathy Acute metabolic encephalopathy        ICD-10: Treatment Diagnosis:    · Generalized Muscle Weakness (M62.81)  · Difficulty in walking, Not elsewhere classified (R26.2)  · Repeated Falls (R29.6)  · History of falling (Z91.81)   Precaution/Allergies:  Review of patient's allergies indicates no known allergies. ASSESSMENT:     Mr. Terence Pagan is a pleasant 69 y.o. Male with primary diagnosis of AMS. Pt is oriented to self and place, but not to time. Pt states he lives by himself in mobile home with 3 steps to enter with a railing, and a bathtub with transfer bench. Pt states he is independent with ADLs, and uses cane for ambulation. Pt states he has a aide that comes 3 days/week to perform household chores and bring groceries. Pt states his son lives \" up the street\" from him, and pt has had multiple falls in the last 6 months due to low blood sugar. Pt reports 0/10 pain currently. Pt transferred supine to sitting EOB with SBA. Pt performed STS with Regina for forward weightshift. Pt ambulated 100ft in hospital [place] with RW and CGA-Regina due to 1 LOB posteriorly before returning to chair. Pt demonstrated decreased step clearance, forward flexed posture, and slow, shuffling gait. Pt left upright in chair with all needs met and within reach with chair alarm on. Pt will benefit from skilled therapy for duration of hospital stay to address decreased functional mobility, activity tolerance, and increased fall risk. This section established at most recent assessment   PROBLEM LIST (Impairments causing functional limitations):  1. Decreased Strength  2. Decreased ADL/Functional Activities  3. Decreased Transfer Abilities  4. Decreased Ambulation Ability/Technique  5. Decreased Balance  6. Decreased Activity Tolerance  7. Decreased Pacing Skills  8. Decreased [place] with Home Exercise Program  9. Decreased Cognition   INTERVENTIONS PLANNED: (Benefits and precautions of physical therapy have been discussed with the patient.)  1. Balance Exercise  2. Bed Mobility  3. Cold  4. Family Education  5. Gait Training  6. Home Exercise Program (HEP)  7. Manual Therapy  8. Neuromuscular Re-education/Strengthening  9. Range of Motion (ROM)  10. Therapeutic Activites  11.  Therapeutic Exercise/Strengthening  12. Transfer Training  13. Group Therapy     TREATMENT PLAN: Frequency/Duration: 3 times a week for duration of hospital stay  Rehabilitation Potential For Stated Goals: Good     RECOMMENDED REHABILITATION/EQUIPMENT: (at time of discharge pending progress): Due to the probability of continued deficits (see above) this patient will likely need continued skilled physical therapy after discharge. Equipment:    to be determined              HISTORY:   History of Present Injury/Illness (Reason for Referral):  History unobtainable. History as per patient's sister, chart review and ED physician.     69years old M with PMH of CKD, DM, systolic chronic HF presented to the ED via EMS to be evaluated for confusion. Sister stated he lives alone, he can cook by himself and take care of his dog at home. Sister stated last time she talked to him was last night and he was not complaining of any symptoms. Sister stated patient has a shunt on L arm \" just in case he needs HD\". Patient can only tell me his name.     In the ED CT brain with artifacts from pellets. Labs revealed BUN 81 and Cr 6.17. Ammonia levels: 35.  Past Medical History/Comorbidities:   Mr. Huy Delatorre  has a past medical history of Acute renal failure superimposed on stage 3 chronic kidney disease ([address] (9/21/2016); Anemia; Chronic kidney disease; Chronic pancreatitis ([address] (9/22/2016); Dermatophytosis of nail (12/6/2016); Diabetic neuropathy ([address] (9/22/2016); Essential hypertension (9/22/2016); GERD (gastroesophageal reflux disease); Hypercholesterolemia; Iron (Fe) deficiency anemia (9/22/2016); Kidney disease; Septicemia due to Klebsiella pneumoniae ([address] (9/22/2016); Systolic CHF, chronic ([address] (9/23/2016); Type II diabetes mellitus with nephropathy ([address] (09/22/2016); Venous insufficiency (12/6/2016); and Xerosis cutis (12/6/2016). He also has no past medical history of Adverse effect of anesthesia;  Congestive heart failure, unspecified; Difficult intubation; Malignant hyperthermia due to anesthesia; Nausea & vomiting; or Pseudocholinesterase deficiency. Mr. Glenroy Stanley  has a past surgical history that includes hx hernia repair (Left, 2010); hx colonoscopy; hx turp (2012); hx prostatectomy (2012); and vascular surgery procedure unlist (Left, 10/26/2017). Social History/Living Environment:   Home Environment: Trailer/mobile home  # Steps to Enter: 3  Rails to Enter: Yes  [employer] : Right  One/Two Story Residence: One story  Living Alone: Yes  Support Systems: Family member(s)  Patient Expects to be Discharged to[de-identified] Trailer/mobile home  Current DME Used/Available at Home: None  Tub or Shower Type: Tub/Shower combination  Prior Level of Function/Work/Activity:  Living alone, independent, not driving, multiple falls, ambulating with cane   Number of Personal Factors/Comorbidities that affect the Plan of Care: 3+: HIGH COMPLEXITY   EXAMINATION:   Most Recent Physical Functioning:   Gross Assessment:  AROM: Generally decreased, functional  Strength: Generally decreased, functional  Coordination: Generally decreased, functional               Posture:     Balance:  Sitting: Intact; Without support  Standing: Impaired  Standing - Static: Good  Standing - Dynamic : Fair Bed Mobility:  Rolling: Stand-by assistance  Supine to Sit: Stand-by assistance  Scooting: Stand-by assistance  Wheelchair Mobility:     Transfers:  Sit to Stand: Minimum assistance  Stand to Sit: Minimum assistance  Gait:     Base of Support: Narrowed  Speed/Melva: Shuffled; Slow  Step Length: Left shortened;Right shortened  Gait Abnormalities: Decreased step clearance  Distance (ft): 100 Feet (ft)  Assistive Device: Gait belt;Walker, rolling  Ambulation - Level of Assistance: Minimal assistance;Contact guard assistance      Body Structures Involved:  1. Heart  2. Lungs  3. Bones  4. Joints  5. Muscles  6.  Ligaments Body Functions Affected:  1. Mental  2. Cardio  3. Respiratory  4. Neuromusculoskeletal  5. Movement Related Activities and Participation Affected:  1. Mobility  2. Self Care  3. Domestic Life  4. Interpersonal Interactions and Relationships  5. Community, Social and [place]   Number of elements that affect the Plan of Care: 4+: HIGH COMPLEXITY   CLINICAL PRESENTATION:   Presentation: Stable and uncomplicated: LOW COMPLEXITY   CLINICAL DECISION MAKIN [facility] Mobility Inpatient Short Form  How much difficulty does the patient currently have. .. Unable A Lot A Little None   1. Turning over in bed (including adjusting bedclothes, sheets and blankets)? [] 1   [] 2   [] 3   [x] 4   2. Sitting down on and standing up from a chair with arms ( e.g., wheelchair, bedside commode, etc.)   [] 1   [] 2   [x] 3   [] 4   3. Moving from lying on back to sitting on the side of the bed? [] 1   [] 2   [x] 3   [] 4   How much help from another person does the patient currently need. .. Total A Lot A Little None   4. Moving to and from a bed to a chair (including a wheelchair)? [] 1   [] 2   [x] 3   [] 4   5. Need to walk in hospital room? [] 1   [] 2   [x] 3   [] 4   6. Climbing 3-5 steps with a railing? [x] 1   [] 2   [] 3   [] 4   © , Trustees of [address] [place] [address], under license to [employer]. All rights reserved      Score:  Initial: 17 Most Recent: X (Date: -- )    Interpretation of Tool:  Represents activities that are increasingly more difficult (i.e. Bed mobility, Transfers, Gait). Score 24 23 22-20 19-15 14-10 9-7 6     Modifier CH CI CJ CK CL CM CN      ?  Mobility - Walking and Moving Around:     - CURRENT STATUS: CK - 40%-59% impaired, limited or restricted    - GOAL STATUS: CJ - 20%-39% impaired, limited or restricted    - D/C STATUS:  ---------------To be determined---------------  Payor: CARE IMPROVEMENT PLUS [place] / Product Type: Managed Care Medicare /      Medical Necessity:     · Patient demonstrates good rehab potential due to higher previous functional level. Reason for Services/Other Comments:  · Patient continues to require skilled intervention due to decreased functional mobility and activity tolerance, and increased fall risk. Use of outcome tool(s) and clinical judgement create a POC that gives a: Clear prediction of patient's progress: LOW COMPLEXITY            TREATMENT:   (In addition to Assessment/Re-Assessment sessions the following treatments were rendered)   Pre-treatment Symptoms/Complaints: \"Feeling ok today\"  Pain: Initial:   Pain Intensity 1: 0  Post Session:  0/10     Assessment/Reassessment only, no treatment provided today    Braces/Orthotics/Lines/Etc:   · IV  · O2 Device: Room air  Treatment/Session Assessment:    · Response to Treatment:  Ambulated 100ft with CGA-Regina and RW  · Interdisciplinary Collaboration:   o Physical Therapist  o Registered Nurse  o SPT  · After treatment position/precautions:   o Up in chair  o Bed alarm/tab alert on  o Bed/Chair-wheels locked  o Bed in low position  o Call light within reach   · Compliance with Program/Exercises: Will assess as treatment progresses. · Recommendations/Intent for next treatment session: \"Next visit will focus on reduction in assistance provided\".   Total Treatment Duration:  PT Patient Time In/Time Out  Time In: 1109  Time Out: Genevieve Garcia

## 2018-04-17 NOTE — CONSULTS
Impression:    Dementia with superimposed Uremic encephalopathy. The patient currently refuses dialysis. I do not believe the patient is competent to make medical decisions. Seizures by history. Patient has recurrent episodes of unwitnessed loss of consciousness and he rates of confusion that may have represented prior unrecognized and unwitnessed seizures. Recommendation    Continue Keppra, adjusting dose to account for GFR of 23 (reduced dose to 500 mg by mouth twice a day)    Check Keppra level    Counseled son regarding the father's lack of confidence to make medical decisions. He may need to speak to an  to obtain medical power of . Chief complaint possible seizure    History of present illness:    Patient is a 50-year-old male with stage V.  Progressive chronic kidney disease by been asked to see regarding possible seizure. According to the patient's nurse and the episode was described on Saturday consistent with a seizure or seizure-like activity. I do not have any direct eyewitness description of the episode. The patient denies any history of seizure. The patient's son states that he has had recurrent episodes of finding his father unconscious or metabolic conscious severely confused bringing him to the hospital for him to clear, without specific diagnosis. This is been going on for a considerable period of time. The patient has a history of diabetes but has not been taking care of his diabetes recently and is apparently refusing his insulin now. Patient had a dialysis fistula placed several years ago but refuses dialysis stating \"I'm not bad enough\".     Past Medical History:   Diagnosis Date    Acute renal failure superimposed on stage 3 chronic kidney disease ([address] 9/21/2016    Anemia     Chronic kidney disease     not on HD yet- surgery done for access and here for elevation of fistula- FU with [facility] with [facility]- creat on 12/7/17=3.15    Chronic pancreatitis ([address] 9/22/2016    Dermatophytosis of nail 12/6/2016    Diabetic neuropathy (HCC) 9/22/2016    avg fastings 200; last A1C-? ; hypo @ [de-identified]    Essential hypertension 9/22/2016    GERD (gastroesophageal reflux disease)     controlled with med    Hypercholesterolemia     Iron (Fe) deficiency anemia 9/22/2016    Kidney disease     Septicemia due to Klebsiella pneumoniae ([facility] 8/89/5108    Systolic CHF, chronic ([facility] 9/23/2016    Type II diabetes mellitus with nephropathy ([facility] 09/22/2016    Venous insufficiency 12/6/2016    Xerosis cutis 12/6/2016     Past Surgical History:   Procedure Laterality Date    HX COLONOSCOPY      HX HERNIA REPAIR Left 2010    HX PROSTATECTOMY  2012    HX TURP  2012    FOR BPH    VASCULAR SURGERY PROCEDURE UNLIST Left 10/26/2017    AVF     Social History     Social History    Marital status:      Spouse name: N/A    Number of children: N/A    Years of education: N/A     Occupational History    Not on file. Social History Main Topics    Smoking status: Former Smoker     Packs/day: 2.00     Years: 20.00     Quit date: 1995    Smokeless tobacco: Current User    Alcohol use No    Drug use: Not on file    Sexual activity: Not on file     Other Topics Concern    Not on file     Social History Narrative     The patient lives alone in his own home. He is increasingly unable to manage activities of daily living. He has family in the area and the oldest son who is concerned but unsure how to care for his father  .     Current Facility-Administered Medications:     haloperidol lactate (HALDOL) injection 2 mg, 2 mg, IntraVENous, Q6H PRN, Hector Mcelroy MD, 2 mg at 04/17/18 0225    [START ON 4/18/2018] azithromycin (ZITHROMAX) tablet 500 mg, 500 mg, Oral, Q24H, Preston Ceja MD    levETIRAcetam (KEPPRA) tablet 500 mg, 500 mg, Oral, Q12H, Leobardo Méndez MD    dextrose 5% infusion, 125 mL/hr, IntraVENous, CONTINUOUS, Flash Cameron MD, Last Rate: 125 mL/hr at 04/17/18 0645, 125 mL/hr at 04/17/18 0645    tuberculin injection 5 Units, 5 Units, IntraDERMal, ONCE, Otis Duff, DO, 5 Units at 04/16/18 1458    mineral oil-hydrophil petrolat (AQUAPHOR) ointment, , Topical, DAILY, Preston Crisostomo MD    metoprolol (LOPRESSOR) injection 5 mg, 5 mg, IntraVENous, Q6H, Chris Soria MD, 5 mg at 04/17/18 0544    hydrALAZINE (APRESOLINE) 20 mg/mL injection 20 mg, 20 mg, IntraVENous, Q6H PRN, Barry Choudhury MD, 20 mg at 04/15/18 1415    cefTRIAXone (ROCEPHIN) 1 g in 0.9% sodium chloride (MBP/ADV) 50 mL, 1 g, IntraVENous, Q24H, Preston Crisostomo MD, Last Rate: 100 mL/hr at 04/17/18 0347, 1 g at 04/17/18 0347    LORazepam (ATIVAN) injection 1 mg, 1 mg, IntraVENous, Q2H PRN, Chris Soria MD, 1 mg at 04/14/18 1453    sodium chloride (NS) flush 5-10 mL, 5-10 mL, IntraVENous, Q8H, Preston Crisostomo MD, Stopped at 04/17/18 0547    sodium chloride (NS) flush 5-10 mL, 5-10 mL, IntraVENous, PRN, Barry Choudhury MD    acetaminophen (TYLENOL) tablet 650 mg, 650 mg, Oral, Q4H PRN, Barry Choudhury MD, 650 mg at 04/17/18 0034    heparin (porcine) injection 5,000 Units, 5,000 Units, SubCUTAneous, Q8H, Preston Crisostomo MD, 5,000 Units at 04/17/18 0644    amLODIPine (NORVASC) tablet 10 mg, 10 mg, Oral, 7am, Preston Crisostomo MD, 10 mg at 04/17/18 8225    aspirin (ASPIRIN) tablet 325 mg, 325 mg, Oral, DAILY, Preston Crisostomo MD, 325 mg at 04/17/18 0955    insulin glargine (LANTUS) injection 15 Units, 15 Units, SubCUTAneous, DAILY, Preston Crisostomo MD, 15 Units at 04/17/18 0956    pravastatin (PRAVACHOL) tablet 40 mg, 40 mg, Oral, QHS, Preston Crisostomo MD, 40 mg at 04/16/18 2134    ferrous sulfate tablet 325 mg, 325 mg, Oral, BID, Barry Choudhury MD, 325 mg at 04/17/18 0956    hydrALAZINE (APRESOLINE) tablet 25 mg, 25 mg, Oral, TID, Barry Choudhury MD, 25 mg at 04/17/18 0956    metoprolol succinate (TOPROL-XL) XL tablet 50 mg, 50 mg, Oral, 7am, Preston Roche MD, 50 mg at 04/17/18 6493    isosorbide dinitrate (ISORDIL) tablet 20 mg, 20 mg, Oral, TID, Shorty Reyes MD, 20 mg at 04/17/18 0956    insulin lispro (HUMALOG) injection, , SubCUTAneous, AC&HS, Shorty Reyes MD, 8 Units at 04/17/18 1205    No Known Allergies    Visit Vitals    /67    Pulse 72    Temp 97.7 °F (36.5 °C)    Resp 18    Ht 6' 3\" (1.905 m)    Wt 176 lb (79.8 kg)    SpO2 99%    BMI 22 kg/m2     In general the patient is a chronically ill-appearing elderly male with obvious venous ulcers on both lower extremities. Examination of the eyes reveals no proptosis or exophthalmus. Conjunctivae and sclerae nonicteric. Funduscopic exam was not performed. Cardiac normal S1, S2, no murmurs, clicks, gallops or rubs. Carotid pulses 2+ symmetric without bruit. Distal pulses in lower extremities are markedly diminished. Mental status patient is alert. He is oriented ×2. Doesn't know who the president is. He remembers 0 out of 3 objects at 5 minutes. Language function, including spontaneous speech. Comprehension appears to be poor    Cranial nerves II through XII are intact    Motor examination reveals normal strength in the upper and lower extremities bilaterally    Cerebellar there is a fine tremor of the outstretched hands. No ataxia. Gait is not assessed    Sensation reveals decreased vibration at the great toes bilaterally    Reflexes are 2+ and symmetric at biceps, brachioradialis, triceps and knees. Ankle jerks are absent bilaterally. Plantar responses are mute bilaterally    MRI of the brain is reviewed. This shows mild diffuse atrophy. Chronic microvascular changes are noted on T2 flair imaging.   No acute changes restricted diffusion, mass effect, etc.    EEG reviewed by me shows mild diffuse medium amplitude slowiing    Recent Results (from the past 12 hour(s))   CBC WITH AUTOMATED DIFF    Collection Time: 04/17/18  5:32 AM   Result Value Ref Range    WBC 7.1 4.3 - 11.1 K/uL    RBC 4.04 (L) 4.23 - 5.67 M/uL    HGB 10.2 (L) 13.6 - 17.2 g/dL    HCT 31.7 (L) 41.1 - 50.3 %    MCV 78.5 (L) 79.6 - 97.8 FL    MCH 25.2 (L) 26.1 - 32.9 PG    MCHC 32.2 31.4 - 35.0 g/dL    RDW 15.8 (H) 11.9 - 14.6 %    PLATELET 698 (L) 593 - 450 K/uL    MPV Cannot be calculated 10.8 - 14.1 FL    DF AUTOMATED      NEUTROPHILS 68 43 - 78 %    LYMPHOCYTES 21 13 - 44 %    MONOCYTES 10 4.0 - 12.0 %    EOSINOPHILS 1 0.5 - 7.8 %    BASOPHILS 0 0.0 - 2.0 %    IMMATURE GRANULOCYTES 0 0.0 - 5.0 %    ABS. NEUTROPHILS 4.7 1.7 - 8.2 K/UL    ABS. LYMPHOCYTES 1.5 0.5 - 4.6 K/UL    ABS. MONOCYTES 0.7 0.1 - 1.3 K/UL    ABS. EOSINOPHILS 0.1 0.0 - 0.8 K/UL    ABS. BASOPHILS 0.0 0.0 - 0.2 K/UL    ABS. IMM.  GRANS. 0.0 0.0 - 0.5 K/UL   RENAL FUNCTION PANEL    Collection Time: 04/17/18  5:32 AM   Result Value Ref Range    Sodium 149 (H) 136 - 145 mmol/L    Potassium 3.1 (L) 3.5 - 5.1 mmol/L    Chloride 118 (H) 98 - 107 mmol/L    CO2 21 21 - 32 mmol/L    Anion gap 10 7 - 16 mmol/L    Glucose 158 (H) 65 - 100 mg/dL    BUN 33 (H) 8 - 23 MG/DL    Creatinine 3.36 (H) 0.8 - 1.5 MG/DL    GFR est AA 23 (L) >60 ml/min/1.73m2    GFR est non-AA 19 (L) >60 ml/min/1.73m2    Calcium 7.3 (L) 8.3 - 10.4 MG/DL    Phosphorus 2.3 2.3 - 3.7 MG/DL    Albumin 2.3 (L) 3.2 - 4.6 g/dL   GLUCOSE, POC    Collection Time: 04/17/18  6:26 AM   Result Value Ref Range    Glucose (POC) 173 (H) 65 - 100 mg/dL   GLUCOSE, POC    Collection Time: 04/17/18 11:30 AM   Result Value Ref Range    Glucose (POC) 330 (H) 65 - 100 mg/dL

## 2018-04-17 NOTE — ROUTINE PROCESS
END OF SHIFT NOTE:    INTAKE/OUTPUT  04/16 0701 - 04/17 0700  In: 3392 [P.O.:480; I.V.:2912]  Out: -   Voiding: YES  Catheter: NO  Drain:              Flatus: Patient does have flatus present. Stool:  2 occurrences. Characteristics:  Stool Assessment  Stool Color: Brown  Stool Appearance: Loose  Stool Amount: Medium  Stool Source/Status: Rectum    Emesis: 0 occurrences. Characteristics:        VITAL SIGNS  Patient Vitals for the past 12 hrs:   Temp Pulse Resp BP SpO2   04/17/18 0718 97.7 °F (36.5 °C) 73 18 175/75 98 %   04/17/18 0544 - 72 - 168/74 -   04/17/18 0300 98.9 °F (37.2 °C) 72 18 166/76 97 %   04/17/18 0021 99.2 °F (37.3 °C) 81 19 160/68 97 %   04/16/18 2035 98.6 °F (37 °C) 81 18 185/90 99 %       Pain Assessment  Pain Intensity 1: 0 (04/17/18 0718)     Pain Intervention(s) 1: Medication (see MAR)  Patient Stated Pain Goal: 0    Ambulating  No    Shift report given to oncoming nurse at the bedside.     Kendall Landin LPN

## 2018-04-17 NOTE — PROCEDURES
[facility] Neurology   Routine Electroencephalogram Report      DATE: April 16, 2018    EEG Number:  [id number]    Indication: New onset of seizure    Medications:   Current Facility-Administered Medications   Medication Dose Route Frequency Provider Last Rate Last Dose    dextrose 5% infusion  125 mL/hr IntraVENous CONTINUOUS Jonathon Dunlap  mL/hr at 04/16/18 1312 125 mL/hr at 04/16/18 1312    tuberculin injection 5 Units  5 Units IntraDERMal ONCE Teryl DO Dereck   5 Units at 04/16/18 1458    [START ON 4/17/2018] mineral oil-hydrophil petrolat (AQUAPHOR) ointment   Topical DAILY Preston Choudhury MD        metoprolol (LOPRESSOR) injection 5 mg  5 mg IntraVENous Q6H Bonny Wright MD   5 mg at 04/16/18 1928    hydrALAZINE (APRESOLINE) 20 mg/mL injection 20 mg  20 mg IntraVENous Q6H PRN Pipe Tse MD   20 mg at 04/15/18 1415    cefTRIAXone (ROCEPHIN) 1 g in 0.9% sodium chloride (MBP/ADV) 50 mL  1 g IntraVENous Q24H Pipe Tse  mL/hr at 04/16/18 0341 1 g at 04/16/18 0341    azithromycin (ZITHROMAX) 500 mg in 0.9% sodium chloride (MBP/ADV) 250 mL  500 mg IntraVENous Q24H Pipe Tse  mL/hr at 04/16/18 0414 500 mg at 04/16/18 0414    levETIRAcetam (KEPPRA) 1,000 mg in 0.9% sodium chloride 100 mL IVPB  1,000 mg IntraVENous Q12H Bonny Wright MD   1,000 mg at 04/16/18 1212    LORazepam (ATIVAN) injection 1 mg  1 mg IntraVENous Q2H PRN Bonny Wright MD   1 mg at 04/14/18 1453    sodium chloride (NS) flush 5-10 mL  5-10 mL IntraVENous Q8H Preston Choudhury MD   5 mL at 04/16/18 1321    sodium chloride (NS) flush 5-10 mL  5-10 mL IntraVENous PRN Pipe Tse MD        acetaminophen (TYLENOL) tablet 650 mg  650 mg Oral Q4H PRN Pipe Tse MD   650 mg at 04/16/18 1321    heparin (porcine) injection 5,000 Units  5,000 Units SubCUTAneous Q8H Preston Choudhury MD   5,000 Units at 04/16/18 1458    amLODIPine (NORVASC) tablet 10 mg  10 mg Oral 7am Preston Rhoades MD   Stopped at 18 0700    aspirin (ASPIRIN) tablet 325 mg  325 mg Oral DAILY Preston Rhoades MD   Stopped at 18 0900    insulin glargine (LANTUS) injection 15 Units  15 Units SubCUTAneous DAILY Reji Quezada MD   15 Units at 18 0859    pravastatin (PRAVACHOL) tablet 40 mg  40 mg Oral QHS Reji Quezada MD   Stopped at 18 2300    ferrous sulfate tablet 325 mg  325 mg Oral BID Reji Quezada MD   325 mg at 18 1655    hydrALAZINE (APRESOLINE) tablet 25 mg  25 mg Oral TID Reji Quezada MD   25 mg at 18 1655    metoprolol succinate (TOPROL-XL) XL tablet 50 mg  50 mg Oral 7am Preston Rhoades MD   Stopped at 18 0700    isosorbide dinitrate (ISORDIL) tablet 20 mg  20 mg Oral TID Reji Quezada MD   20 mg at 18 1654    insulin lispro (HUMALOG) injection   SubCUTAneous AC&HS Reji Quezada MD   4 Units at 18 1655       Technique: This EEG was performed using the [employer] 10/20 System. An EKG was monitored. The length of the recording was 30 minutes. State of Consciousness: awake and drowsy       Description:  Background showed diffuse theta mixed with intermittent delta activities and low voltage of triphasic waves. Posterior rhythm reached 6-7 Hz briefly. Beta 15-20 Hz, very low voltage, often diffuse and symmetrical.  No spike and slow wave complex. No rhythmic activities. Activation Procedures:  Hyperventilation: Not performed  Photic Stimulation: No driving    EK/CFF, regular      Interpretation: This is a mildly abnormal EEG due to the presence of diffuse, nonspecific cerebral dysfunction. No epileptiform discharges or subclinical seizure is identified. No lateralized slowing activities. Findings suggest metabolic/toxic encephalopathy.   EKG monitoring is normal.

## 2018-04-17 NOTE — PROGRESS NOTES
Problem: Falls - Risk of  Goal: *Absence of Falls  Document Huong Fall Risk and appropriate interventions in the flowsheet.    Outcome: Progressing Towards Goal  Fall Risk Interventions:  Mobility Interventions: Bed/chair exit alarm, Communicate number of staff needed for ambulation/transfer, OT consult for ADLs, Patient to call before getting OOB, PT Consult for mobility concerns, PT Consult for assist device competence    Mentation Interventions: Adequate sleep, hydration, pain control, Bed/chair exit alarm, Door open when patient unattended, Reorient patient, Toileting rounds    Medication Interventions: Bed/chair exit alarm, Patient to call before getting OOB, Teach patient to arise slowly    Elimination Interventions: Bed/chair exit alarm, Call light in reach, Patient to call for help with toileting needs, Toilet paper/wipes in reach, Urinal in reach    History of Falls Interventions: Bed/chair exit alarm, Consult care management for discharge planning, Door open when patient unattended

## 2018-04-18 PROBLEM — E11.8 CONTROLLED TYPE 2 DIABETES MELLITUS WITH COMPLICATION, WITH LONG-TERM CURRENT USE OF INSULIN (HCC): Chronic | Status: ACTIVE | Noted: 2017-09-13

## 2018-04-18 PROBLEM — E87.0 ACUTE HYPERNATREMIA: Status: ACTIVE | Noted: 2018-04-18

## 2018-04-18 PROBLEM — J18.9 COMMUNITY ACQUIRED PNEUMONIA OF LEFT LOWER LOBE OF LUNG: Status: RESOLVED | Noted: 2018-04-18 | Resolved: 2018-04-18

## 2018-04-18 PROBLEM — N18.4 ACUTE RENAL FAILURE SUPERIMPOSED ON STAGE 4 CHRONIC KIDNEY DISEASE (HCC): Status: ACTIVE | Noted: 2018-04-14

## 2018-04-18 PROBLEM — N17.9 ACUTE RENAL FAILURE SUPERIMPOSED ON STAGE 4 CHRONIC KIDNEY DISEASE (HCC): Status: RESOLVED | Noted: 2018-04-14 | Resolved: 2018-04-18

## 2018-04-18 PROBLEM — G93.41 ACUTE METABOLIC ENCEPHALOPATHY: Status: RESOLVED | Noted: 2018-04-13 | Resolved: 2018-04-18

## 2018-04-18 PROBLEM — N17.9 ACUTE RENAL FAILURE SUPERIMPOSED ON STAGE 4 CHRONIC KIDNEY DISEASE (HCC): Status: ACTIVE | Noted: 2018-04-14

## 2018-04-18 PROBLEM — J18.9 COMMUNITY ACQUIRED PNEUMONIA OF LEFT LOWER LOBE OF LUNG: Status: ACTIVE | Noted: 2018-04-18

## 2018-04-18 PROBLEM — R41.82 ALTERED MENTAL STATUS: Status: RESOLVED | Noted: 2018-04-13 | Resolved: 2018-04-18

## 2018-04-18 PROBLEM — N18.4 ACUTE RENAL FAILURE SUPERIMPOSED ON STAGE 4 CHRONIC KIDNEY DISEASE (HCC): Status: RESOLVED | Noted: 2018-04-14 | Resolved: 2018-04-18

## 2018-04-18 PROBLEM — R56.9 SEIZURE (HCC): Status: ACTIVE | Noted: 2018-04-18

## 2018-04-18 PROBLEM — Z79.4 CONTROLLED TYPE 2 DIABETES MELLITUS WITH COMPLICATION, WITH LONG-TERM CURRENT USE OF INSULIN (HCC): Chronic | Status: ACTIVE | Noted: 2017-09-13

## 2018-04-18 LAB
ALBUMIN SERPL-MCNC: 2.3 G/DL (ref 3.2–4.6)
ALBUMIN/GLOB SERPL: 0.7 {RATIO} (ref 1.2–3.5)
ALP SERPL-CCNC: 74 U/L (ref 50–136)
ALT SERPL-CCNC: 31 U/L (ref 12–65)
ANION GAP SERPL CALC-SCNC: 13 MMOL/L (ref 7–16)
AST SERPL-CCNC: 33 U/L (ref 15–37)
BILIRUB SERPL-MCNC: 0.4 MG/DL (ref 0.2–1.1)
BUN SERPL-MCNC: 32 MG/DL (ref 8–23)
CALCIUM SERPL-MCNC: 7.6 MG/DL (ref 8.3–10.4)
CHLORIDE SERPL-SCNC: 118 MMOL/L (ref 98–107)
CO2 SERPL-SCNC: 19 MMOL/L (ref 21–32)
CREAT SERPL-MCNC: 3.18 MG/DL (ref 0.8–1.5)
ERYTHROCYTE [DISTWIDTH] IN BLOOD BY AUTOMATED COUNT: 15.7 % (ref 11.9–14.6)
GLOBULIN SER CALC-MCNC: 3.4 G/DL (ref 2.3–3.5)
GLUCOSE BLD STRIP.AUTO-MCNC: 134 MG/DL (ref 65–100)
GLUCOSE BLD STRIP.AUTO-MCNC: 204 MG/DL (ref 65–100)
GLUCOSE BLD STRIP.AUTO-MCNC: 220 MG/DL (ref 65–100)
GLUCOSE BLD STRIP.AUTO-MCNC: 291 MG/DL (ref 65–100)
GLUCOSE SERPL-MCNC: 142 MG/DL (ref 65–100)
HCT VFR BLD AUTO: 29.4 % (ref 41.1–50.3)
HGB BLD-MCNC: 9.5 G/DL (ref 13.6–17.2)
MCH RBC QN AUTO: 25.5 PG (ref 26.1–32.9)
MCHC RBC AUTO-ENTMCNC: 32.3 G/DL (ref 31.4–35)
MCV RBC AUTO: 78.8 FL (ref 79.6–97.8)
MM INDURATION POC: NORMAL MM (ref 0–5)
PLATELET # BLD AUTO: 105 K/UL (ref 150–450)
PMV BLD AUTO: 11.5 FL (ref 10.8–14.1)
POTASSIUM SERPL-SCNC: 3.3 MMOL/L (ref 3.5–5.1)
PPD POC: NORMAL NEGATIVE
PROT SERPL-MCNC: 5.7 G/DL (ref 6.3–8.2)
RBC # BLD AUTO: 3.73 M/UL (ref 4.23–5.67)
SODIUM SERPL-SCNC: 150 MMOL/L (ref 136–145)
WBC # BLD AUTO: 6.1 K/UL (ref 4.3–11.1)

## 2018-04-18 PROCEDURE — 74011000258 HC RX REV CODE- 258: Performed by: INTERNAL MEDICINE

## 2018-04-18 PROCEDURE — 74011250636 HC RX REV CODE- 250/636: Performed by: INTERNAL MEDICINE

## 2018-04-18 PROCEDURE — 74011250637 HC RX REV CODE- 250/637: Performed by: PSYCHIATRY & NEUROLOGY

## 2018-04-18 PROCEDURE — 74011636637 HC RX REV CODE- 636/637: Performed by: INTERNAL MEDICINE

## 2018-04-18 PROCEDURE — 80053 COMPREHEN METABOLIC PANEL: CPT | Performed by: INTERNAL MEDICINE

## 2018-04-18 PROCEDURE — 65270000029 HC RM PRIVATE

## 2018-04-18 PROCEDURE — 74011000250 HC RX REV CODE- 250: Performed by: INTERNAL MEDICINE

## 2018-04-18 PROCEDURE — 74011250637 HC RX REV CODE- 250/637: Performed by: INTERNAL MEDICINE

## 2018-04-18 PROCEDURE — 82962 GLUCOSE BLOOD TEST: CPT

## 2018-04-18 PROCEDURE — 97530 THERAPEUTIC ACTIVITIES: CPT

## 2018-04-18 PROCEDURE — 36415 COLL VENOUS BLD VENIPUNCTURE: CPT | Performed by: INTERNAL MEDICINE

## 2018-04-18 PROCEDURE — 85027 COMPLETE CBC AUTOMATED: CPT | Performed by: INTERNAL MEDICINE

## 2018-04-18 RX ORDER — HYDRALAZINE HYDROCHLORIDE 50 MG/1
50 TABLET, FILM COATED ORAL 3 TIMES DAILY
Status: DISCONTINUED | OUTPATIENT
Start: 2018-04-18 | End: 2018-04-19 | Stop reason: HOSPADM

## 2018-04-18 RX ADMIN — ASPIRIN 325 MG ORAL TABLET 325 MG: 325 PILL ORAL at 08:58

## 2018-04-18 RX ADMIN — Medication 5 ML: at 13:17

## 2018-04-18 RX ADMIN — HEPARIN SODIUM 5000 UNITS: 5000 INJECTION, SOLUTION INTRAVENOUS; SUBCUTANEOUS at 15:01

## 2018-04-18 RX ADMIN — INSULIN LISPRO 4 UNITS: 100 INJECTION, SOLUTION INTRAVENOUS; SUBCUTANEOUS at 12:28

## 2018-04-18 RX ADMIN — HEPARIN SODIUM 5000 UNITS: 5000 INJECTION, SOLUTION INTRAVENOUS; SUBCUTANEOUS at 22:16

## 2018-04-18 RX ADMIN — ISOSORBIDE DINITRATE 20 MG: 20 TABLET ORAL at 21:50

## 2018-04-18 RX ADMIN — DEXTROSE MONOHYDRATE 125 ML/HR: 5 INJECTION, SOLUTION INTRAVENOUS at 12:46

## 2018-04-18 RX ADMIN — AZITHROMYCIN 500 MG: 250 TABLET, FILM COATED ORAL at 05:42

## 2018-04-18 RX ADMIN — CEFTRIAXONE 1 G: 1 INJECTION, POWDER, FOR SOLUTION INTRAMUSCULAR; INTRAVENOUS at 04:00

## 2018-04-18 RX ADMIN — SODIUM CHLORIDE 125 ML/HR: 450 INJECTION, SOLUTION INTRAVENOUS at 02:00

## 2018-04-18 RX ADMIN — INSULIN LISPRO 4 UNITS: 100 INJECTION, SOLUTION INTRAVENOUS; SUBCUTANEOUS at 22:15

## 2018-04-18 RX ADMIN — DEXTROSE MONOHYDRATE 125 ML/HR: 5 INJECTION, SOLUTION INTRAVENOUS at 22:32

## 2018-04-18 RX ADMIN — ISOSORBIDE DINITRATE 20 MG: 20 TABLET ORAL at 08:58

## 2018-04-18 RX ADMIN — HEPARIN SODIUM 5000 UNITS: 5000 INJECTION, SOLUTION INTRAVENOUS; SUBCUTANEOUS at 06:41

## 2018-04-18 RX ADMIN — METOPROLOL SUCCINATE 50 MG: 50 TABLET, EXTENDED RELEASE ORAL at 06:40

## 2018-04-18 RX ADMIN — LEVETIRACETAM 500 MG: 500 TABLET ORAL at 12:27

## 2018-04-18 RX ADMIN — FERROUS SULFATE TAB 325 MG (65 MG ELEMENTAL FE) 325 MG: 325 (65 FE) TAB at 17:35

## 2018-04-18 RX ADMIN — PETROLATUM: 42 OINTMENT TOPICAL at 08:59

## 2018-04-18 RX ADMIN — FERROUS SULFATE TAB 325 MG (65 MG ELEMENTAL FE) 325 MG: 325 (65 FE) TAB at 08:58

## 2018-04-18 RX ADMIN — HYDRALAZINE HYDROCHLORIDE 25 MG: 25 TABLET, FILM COATED ORAL at 08:58

## 2018-04-18 RX ADMIN — HYDRALAZINE HYDROCHLORIDE 50 MG: 50 TABLET, FILM COATED ORAL at 16:23

## 2018-04-18 RX ADMIN — ISOSORBIDE DINITRATE 20 MG: 20 TABLET ORAL at 16:23

## 2018-04-18 RX ADMIN — HYDRALAZINE HYDROCHLORIDE 50 MG: 50 TABLET, FILM COATED ORAL at 21:50

## 2018-04-18 RX ADMIN — AMLODIPINE BESYLATE 10 MG: 10 TABLET ORAL at 06:41

## 2018-04-18 RX ADMIN — LEVETIRACETAM 500 MG: 500 TABLET ORAL at 22:16

## 2018-04-18 RX ADMIN — INSULIN GLARGINE 15 UNITS: 100 INJECTION, SOLUTION SUBCUTANEOUS at 08:57

## 2018-04-18 RX ADMIN — ACETAMINOPHEN 650 MG: 325 TABLET ORAL at 04:28

## 2018-04-18 RX ADMIN — METOROPROLOL TARTRATE 5 MG: 5 INJECTION, SOLUTION INTRAVENOUS at 05:52

## 2018-04-18 RX ADMIN — PRAVASTATIN SODIUM 40 MG: 20 TABLET ORAL at 21:50

## 2018-04-18 RX ADMIN — INSULIN LISPRO 6 UNITS: 100 INJECTION, SOLUTION INTRAVENOUS; SUBCUTANEOUS at 17:09

## 2018-04-18 NOTE — PROGRESS NOTES
ZURI spoke with Dr. Amparo Gotti about pt's POC. He will be ready for discharge tomorrow and a SNF is likely better for him since he's been falling a lot. ZURI spoke with pt about STR vs HH (of which he is current with [facility] [place] for RN/PT). He also has a CLTC aide that comes 3x/week for 3 hours that cleans for him. He said he is open to a SNF, if needed but said he's never had to have it. SW left the SNF list with him. ZURI called and spoke to his son Leni Kumar ([phone]) about the discharge plan. He said his father had STR at a SNF before in [facility] but was unsure of the name of it. He said [address] is closer to him and would like to consider that one. UZRI entered [facility] Link and notified Geralyn Apley with [facility] of the referral.    CM following. Care Management Interventions  PCP Verified by CM:  Yes  Last Visit to PCP: 03/07/18  Mode of Transport at Discharge: BLS  Transition of Care Consult (CM Consult): SNF  Partner SNF: Yes  Physical Therapy Consult: Yes  Occupational Therapy Consult: Yes  Current Support Network: Own Home, Family Lives Nearby  Confirm Follow Up Transport: Family  Plan discussed with Pt/Family/Caregiver: Yes  Freedom of Choice Offered: Yes  Discharge Location  Discharge Placement: Skilled nursing facility

## 2018-04-18 NOTE — PROGRESS NOTES
Problem: Mobility Impaired (Adult and Pediatric)  Goal: *Acute Goals and Plan of Care (Insert Text)  STG:  (1.)Mr. Malaika Vora will move from supine to sit and sit to supine , scoot up and down and roll side to side with SUPERVISION within 3 treatment day(s). (2.)Mr. Malaika Vora will transfer from bed to chair and chair to bed with STAND BY ASSIST using the least restrictive device within 3 treatment day(s). (3.)Mr. Malaika Vora will ambulate with CONTACT GUARD ASSIST for 150 feet with the least restrictive device within 3 treatment day(s). (4.)Mr. Malaika Vora will ascend/descend 1 step with one handrail with CONTACT GUARD ASSIST within 3 treatment days. LTG:  (1.)Mr. Malaika Vora will move from supine to sit and sit to supine , scoot up and down and roll side to side in bed with MODIFIED INDEPENDENCE within 7 treatment day(s). (2.)Mr. Malaika Vora will transfer from bed to chair and chair to bed with SUPERVISION using the least restrictive device within 7 treatment day(s). (3.)Mr. Malaika Vora will ambulate with STAND BY ASSIST for 250 feet with the least restrictive device within 7 treatment day(s). (4.)Mr. Malaika Vora will ascend/descend 3 steps with one handrail with STAND BY ASSIST within 7 treatment days.   ________________________________________________________________________________________________      PHYSICAL THERAPY: Treatment Day: 1st, PM 4/18/2018  INPATIENT: Hospital Day: 6  Payor: CARE IMPROVEMENT PLUS / [place] / Product Type: "[employer] Care Medicare /      NAME/AGE/GENDER: Ary Solomon is a 69 y.o. male   PRIMARY DIAGNOSIS: Altered mental status  Renal failure Acute metabolic encephalopathy Acute metabolic encephalopathy        ICD-10: Treatment Diagnosis:    · Generalized Muscle Weakness (M62.81)  · Difficulty in walking, Not elsewhere classified (R26.2)  · Repeated Falls (R29.6)  · History of falling (Z91.81)   Precaution/Allergies:  Review of patient's allergies indicates no known allergies. ASSESSMENT:     Mr. Cristhian Kearney supine in bed and agreeable to treatment. Patient stated he hopes to be discharged soon and is discussing with family and doctor about going to SNF for rehab before going home. Patient able to answer questions but at times patient somewhat confused. Patient from supine to sit with CGA and patient sat on side of bed for 5 minutes reporting feeling good. Patient from sit to stand with CGA and reported he needed to go to restroom. Patient ambulated with rolling walker to bathroom and transferred to toilet with CGA and minimal assist. Patient had a BM, was cleaned and then stood with minimal assist with rolling walker and ambulated down hallway for 200 feet with 1 standing rest break, reporting he was feeling good. Patient back to room and back to bed with bed alarm in place, nurses bell in reach and patient holding bell. Patient pleased with progress. Continue per plan of care. This section established at most recent assessment   PROBLEM LIST (Impairments causing functional limitations):  1. Decreased Strength  2. Decreased ADL/Functional Activities  3. Decreased Transfer Abilities  4. Decreased Ambulation Ability/Technique  5. Decreased Balance  6. Decreased Activity Tolerance  7. Decreased Pacing Skills  8. Decreased Citrus with Home Exercise Program  9. Decreased Cognition   INTERVENTIONS PLANNED: (Benefits and precautions of physical therapy have been discussed with the patient.)  1. Balance Exercise  2. Bed Mobility  3. Cold  4. Family Education  5. Gait Training  6. Home Exercise Program (HEP)  7. Manual Therapy  8. Neuromuscular Re-education/Strengthening  9. Range of Motion (ROM)  10. Therapeutic Activites  11. Therapeutic Exercise/Strengthening  12. Transfer Training  13.  Group Therapy     TREATMENT PLAN: Frequency/Duration: 3 times a week for duration of hospital stay  Rehabilitation Potential For Stated Goals: Good     RECOMMENDED REHABILITATION/EQUIPMENT: (at time of discharge pending progress): Due to the probability of continued deficits (see above) this patient will likely need continued skilled physical therapy after discharge. Equipment:    to be determined              HISTORY:   History of Present Injury/Illness (Reason for Referral):  History unobtainable. History as per patient's sister, chart review and ED physician.     69years old M with PMH of CKD, DM, systolic chronic HF presented to the ED via EMS to be evaluated for confusion. Sister stated he lives alone, he can cook by himself and take care of his dog at home. Sister stated last time she talked to him was last night and he was not complaining of any symptoms. Sister stated patient has a shunt on L arm \" just in case he needs HD\". Patient can only tell me his name.     In the ED CT brain with artifacts from pellets. Labs revealed BUN 81 and Cr 6.17. Ammonia levels: 35.  Past Medical History/Comorbidities:   Mr. Ricardo Aponte  has a past medical history of Acute renal failure superimposed on stage 3 chronic kidney disease ([address] (9/21/2016); Anemia; Chronic kidney disease; Chronic pancreatitis ([address] (9/22/2016); Dermatophytosis of nail (12/6/2016); Diabetic neuropathy ([address] (9/22/2016); Essential hypertension (9/22/2016); GERD (gastroesophageal reflux disease); Hypercholesterolemia; Iron (Fe) deficiency anemia (9/22/2016); Kidney disease; Septicemia due to Klebsiella pneumoniae ([address] (9/22/2016); Systolic CHF, chronic ([address] (9/23/2016); Type II diabetes mellitus with nephropathy ([address] (09/22/2016); Venous insufficiency (12/6/2016); and Xerosis cutis (12/6/2016). He also has no past medical history of Adverse effect of anesthesia; Congestive heart failure, unspecified; Difficult intubation; Malignant hyperthermia due to anesthesia; Nausea & vomiting; or Pseudocholinesterase deficiency.   Mr. Ricardo Aponte  has a past surgical history that includes hx hernia repair (Left, 2010); hx colonoscopy; hx turp (2012); hx prostatectomy (2012); and vascular surgery procedure unlist (Left, 10/26/2017). Social History/Living Environment:   Home Environment: Trailer/mobile home  # Steps to Enter: 3  Rails to Enter: Yes  [employer] : Right  One/Two Story Residence: One story  Living Alone: Yes  Support Systems: Family member(s)  Patient Expects to be Discharged to[de-identified] Trailer/mobile home  Current DME Used/Available at Home: None  Tub or Shower Type: Tub/Shower combination  Prior Level of Function/Work/Activity:  Living alone, independent, not driving, multiple falls, ambulating with cane   Number of Personal Factors/Comorbidities that affect the Plan of Care: 3+: HIGH COMPLEXITY   EXAMINATION:   Most Recent Physical Functioning:   Gross Assessment:                  Posture:     Balance:  Sitting: Intact; With support  Standing: Impaired  Standing - Static: Good  Standing - Dynamic : Fair Bed Mobility:  Rolling: Stand-by assistance  Supine to Sit: Contact guard assistance  Scooting: Stand-by assistance  Wheelchair Mobility:     Transfers:  Sit to Stand: Minimum assistance  Stand to Sit: Minimum assistance  Gait:     Base of Support: Narrowed  Speed/Melva: Shuffled  Step Length: Left shortened;Right shortened  Gait Abnormalities: Decreased step clearance  Distance (ft): 200 Feet (ft)  Assistive Device: Gait belt;Walker, rolling  Ambulation - Level of Assistance: Contact guard assistance;Minimal assistance      Body Structures Involved:  1. Heart  2. Lungs  3. Bones  4. Joints  5. Muscles  6. Ligaments Body Functions Affected:  1. Mental  2. Cardio  3. Respiratory  4. Neuromusculoskeletal  5. Movement Related Activities and Participation Affected:  1. Mobility  2. Self Care  3. Domestic Life  4. Interpersonal Interactions and Relationships  5.  Community, Social and [place]   Number of elements that affect the Plan of Care: 4+: HIGH COMPLEXITY   CLINICAL PRESENTATION:   Presentation: Stable and uncomplicated: LOW COMPLEXITY   CLINICAL DECISION MAKIN[address] [place] [id number] AM-PAC 6 Clicks   Basic Mobility Inpatient Short Form  How much difficulty does the patient currently have. .. Unable A Lot A Little None   1. Turning over in bed (including adjusting bedclothes, sheets and blankets)? [] 1   [] 2   [] 3   [x] 4   2. Sitting down on and standing up from a chair with arms ( e.g., wheelchair, bedside commode, etc.)   [] 1   [] 2   [x] 3   [] 4   3. Moving from lying on back to sitting on the side of the bed? [] 1   [] 2   [x] 3   [] 4   How much help from another person does the patient currently need. .. Total A Lot A Little None   4. Moving to and from a bed to a chair (including a wheelchair)? [] 1   [] 2   [x] 3   [] 4   5. Need to walk in hospital room? [] 1   [] 2   [x] 3   [] 4   6. Climbing 3-5 steps with a railing? [x] 1   [] 2   [] 3   [] 4   © 2007, Trustees of [address] [place] [id number], under license to [employer]. All rights reserved      Score:  Initial: 17 Most Recent: X (Date: -- )    Interpretation of Tool:  Represents activities that are increasingly more difficult (i.e. Bed mobility, Transfers, Gait). Score 24 23 22-20 19-15 14-10 9-7 6     Modifier CH CI CJ CK CL CM CN      ? Mobility - Walking and Moving Around:     - CURRENT STATUS: CK - 40%-59% impaired, limited or restricted    - GOAL STATUS: CJ - 20%-39% impaired, limited or restricted    - D/C STATUS:  ---------------To be determined---------------  Payor: CARE IMPROVEMENT PLUS [place] / Product Type: [employer] Care Medicare /      Medical Necessity:     · Patient demonstrates good rehab potential due to higher previous functional level. Reason for Services/Other Comments:  · Patient continues to require skilled intervention due to decreased functional mobility and activity tolerance, and increased fall risk.    Use of outcome tool(s) and clinical judgement create a POC that gives a: Clear prediction of patient's progress: LOW COMPLEXITY            TREATMENT:   (In addition to Assessment/Re-Assessment sessions the following treatments were rendered)   Pre-treatment Symptoms/Complaints:  \"feeling good today. I hope to be discharged soon. \"   Pain: Initial: No complaints     Post Session:  0/10         Braces/Orthotics/Lines/Etc:   · IV  · O2 Device: Room air  Treatment/Session Assessment:    · Response to Treatment:  Ambulated 100ft with CGA-Regina and RW  · Interdisciplinary Collaboration:   o Physical Therapy Assistant  o Registered Nurse  · After treatment position/precautions:   o Supine in bed  o Bed alarm/tab alert on  o Bed/Chair-wheels locked  o Bed in low position  o Call light within reach   · Compliance with Program/Exercises: Will assess as treatment progresses. · Recommendations/Intent for next treatment session: \"Next visit will focus on reduction in assistance provided\".   Total Treatment Duration:  PT Patient Time In/Time Out  Time In: 1325  Time Out: [address], [place]

## 2018-04-18 NOTE — ROUTINE PROCESS
END OF SHIFT NOTE:    INTAKE/OUTPUT  04/17 0701 - 04/18 0700  In: 2920 [P.O.:820; I.V.:2100]  Out: -   Voiding: YES  Catheter: NO  Drain:              Flatus: Patient does have flatus present. Stool:  1 occurrences. Characteristics:  Stool Assessment  Stool Color: Black  Stool Appearance: Watery  Stool Amount: Smear  Stool Source/Status: Rectum    Emesis: 0 occurrences. Characteristics:        VITAL SIGNS  Patient Vitals for the past 12 hrs:   Temp Pulse Resp BP SpO2   04/18/18 0552 - 66 - 174/74 -   04/18/18 0415 98.9 °F (37.2 °C) 70 18 172/70 98 %   04/17/18 2342 98.6 °F (37 °C) 72 18 152/63 97 %   04/17/18 2000 98 °F (36.7 °C) 71 18 141/64 97 %       Pain Assessment  Pain Intensity 1: 0 (04/17/18 2028)     Pain Intervention(s) 1: Medication (see MAR)  Patient Stated Pain Goal: 0    Ambulating  Yes    Shift report given to oncoming nurse at the bedside.     Aurelia Ram LPN

## 2018-04-18 NOTE — PROGRESS NOTES
Hospitalist Progress Note     Admit Date:  2018  6:21 PM   Name:  Chiqui Raines. Age:  69 y.o.  :  1940   MRN:  [id number]   PCP:  Harsha Hollingsworth MD  Treatment Team: Attending Provider: Alexis Gomes MD; Consulting Provider: Ferol Blizzard, MD; Consulting Provider: Fredi Humphries MD; Utilization Review: Ninfa Oakes RN; Care Manager: Cruz Krueger RN; Consulting Provider: Galdino Broussard MD; Charge Nurse: Gurwinder Shannon RN    Subjective:   69 AAM with CKD stage 5 presented for worsening MS. Started on rocephin/azithro on admission for bilateral infiltrates on CXR. Nephrology consulted for ALICJA/CKD and started on hypotonic fluids for hypernatremia. Has matured fistula but has been hesitant to start HD as outpatient. On  hospitalist called by nephrology for ?of patient possibly seizing. Pt was given ativan and Keppra 1g iv q12hr. Pt seen by tele-neurology. Was very sleepy/postictal on 4/15. Seen by bedside neurology  and keppra dose decreased to 500mg bid.  - pt reports he feels good today. Says he is mentally at baseline. My first time meeting him. No seizures. Ambulated 100ft with PT yest.  No CP, SOB.       Objective:     Patient Vitals for the past 24 hrs:   Temp Pulse Resp BP SpO2   18 0701 98.5 °F (36.9 °C) 61 16 163/66 97 %   18 0552 - 66 - 174/74 -   18 0415 98.9 °F (37.2 °C) 70 18 172/70 98 %   18 2342 98.6 °F (37 °C) 72 18 152/63 97 %   18 2000 98 °F (36.7 °C) 71 18 141/64 97 %   18 1800 - 73 - 115/60 -   18 1603 98.2 °F (36.8 °C) 66 17 161/65 98 %   18 1153 97.7 °F (36.5 °C) 72 18 125/67 99 %   18 1052 97.4 °F (36.3 °C) 81 18 119/53 98 %     Oxygen Therapy  O2 Sat (%): 97 % (18 0701)  Pulse via Oximetry: 65 beats per minute (18)  O2 Device: Room air (18)    Intake/Output Summary (Last 24 hours) at 18 1002  Last data filed at 18 0047 Gross per 24 hour   Intake             2340 ml   Output                0 ml   Net             2340 ml         General:    Well nourished. Alert. CV:   RRR. No murmur, rub, or gallop. Lungs:   CTAB. No wheezing, rhonchi, or rales. Extremities: Warm and dry. No cyanosis or edema. Skin:     No rashes or jaundice. Neuro:  No gross focal deficits    Data Review:  I have reviewed all labs, meds, telemetry events, and studies from the last 24 hours. Recent Results (from the past 24 hour(s))   GLUCOSE, POC    Collection Time: 04/17/18 11:30 AM   Result Value Ref Range    Glucose (POC) 330 (H) 65 - 100 mg/dL   PLEASE READ & DOCUMENT PPD TEST IN 24 HRS    Collection Time: 04/17/18  3:00 PM   Result Value Ref Range    PPD  Negative    mm Induration  mm   GLUCOSE, POC    Collection Time: 04/17/18  4:58 PM   Result Value Ref Range    Glucose (POC) 280 (H) 65 - 100 mg/dL   GLUCOSE, POC    Collection Time: 04/17/18  9:45 PM   Result Value Ref Range    Glucose (POC) 169 (H) 65 - 100 mg/dL   GLUCOSE, POC    Collection Time: 04/18/18  5:48 AM   Result Value Ref Range    Glucose (POC) 134 (H) 65 - 661 mg/dL   METABOLIC PANEL, COMPREHENSIVE    Collection Time: 04/18/18  6:02 AM   Result Value Ref Range    Sodium 150 (H) 136 - 145 mmol/L    Potassium 3.3 (L) 3.5 - 5.1 mmol/L    Chloride 118 (H) 98 - 107 mmol/L    CO2 19 (L) 21 - 32 mmol/L    Anion gap 13 7 - 16 mmol/L    Glucose 142 (H) 65 - 100 mg/dL    BUN 32 (H) 8 - 23 MG/DL    Creatinine 3.18 (H) 0.8 - 1.5 MG/DL    GFR est AA 25 (L) >60 ml/min/1.73m2    GFR est non-AA 20 (L) >60 ml/min/1.73m2    Calcium 7.6 (L) 8.3 - 10.4 MG/DL    Bilirubin, total 0.4 0.2 - 1.1 MG/DL    ALT (SGPT) 31 12 - 65 U/L    AST (SGOT) 33 15 - 37 U/L    Alk.  phosphatase 74 50 - 136 U/L    Protein, total 5.7 (L) 6.3 - 8.2 g/dL    Albumin 2.3 (L) 3.2 - 4.6 g/dL    Globulin 3.4 2.3 - 3.5 g/dL    A-G Ratio 0.7 (L) 1.2 - 3.5     CBC W/O DIFF    Collection Time: 04/18/18  6:02 AM   Result Value Ref Range    WBC 6.1 4.3 - 11.1 K/uL    RBC 3.73 (L) 4.23 - 5.67 M/uL    HGB 9.5 (L) 13.6 - 17.2 g/dL    HCT 29.4 (L) 41.1 - 50.3 %    MCV 78.8 (L) 79.6 - 97.8 FL    MCH 25.5 (L) 26.1 - 32.9 PG    MCHC 32.3 31.4 - 35.0 g/dL    RDW 15.7 (H) 11.9 - 14.6 %    PLATELET 520 (L) 063 - 450 K/uL    MPV 11.5 10.8 - 14.1 FL        All Micro Results     Procedure Component Value Units Date/Time    CULTURE, BLOOD [[id number]] Collected:  04/14/18 0437    Order Status:  Completed Specimen:  Blood from Blood Updated:  04/18/18 0616     Special Requests: --        RIGHT  FOREARM       Culture result: NO GROWTH 4 DAYS       CULTURE, BLOOD [[id number]] Collected:  04/14/18 0437    Order Status:  Completed Specimen:  Blood from Blood Updated:  04/18/18 0616     Special Requests: --        RIGHT  Antecubital       Culture result: NO GROWTH 4 DAYS             Current Meds:  Current Facility-Administered Medications   Medication Dose Route Frequency    haloperidol lactate (HALDOL) injection 2 mg  2 mg IntraVENous Q6H PRN    levETIRAcetam (KEPPRA) tablet 500 mg  500 mg Oral Q12H    dextrose 5% infusion  125 mL/hr IntraVENous CONTINUOUS    mineral oil-hydrophil petrolat (AQUAPHOR) ointment   Topical DAILY    metoprolol (LOPRESSOR) injection 5 mg  5 mg IntraVENous Q6H    hydrALAZINE (APRESOLINE) 20 mg/mL injection 20 mg  20 mg IntraVENous Q6H PRN    LORazepam (ATIVAN) injection 1 mg  1 mg IntraVENous Q2H PRN    sodium chloride (NS) flush 5-10 mL  5-10 mL IntraVENous Q8H    sodium chloride (NS) flush 5-10 mL  5-10 mL IntraVENous PRN    acetaminophen (TYLENOL) tablet 650 mg  650 mg Oral Q4H PRN    heparin (porcine) injection 5,000 Units  5,000 Units SubCUTAneous Q8H    amLODIPine (NORVASC) tablet 10 mg  10 mg Oral 7am    aspirin (ASPIRIN) tablet 325 mg  325 mg Oral DAILY    insulin glargine (LANTUS) injection 15 Units  15 Units SubCUTAneous DAILY    pravastatin (PRAVACHOL) tablet 40 mg  40 mg Oral QHS    ferrous sulfate tablet 325 mg  325 mg Oral BID    hydrALAZINE (APRESOLINE) tablet 25 mg  25 mg Oral TID    metoprolol succinate (TOPROL-XL) XL tablet 50 mg  50 mg Oral 7am    isosorbide dinitrate (ISORDIL) tablet 20 mg  20 mg Oral TID    insulin lispro (HUMALOG) injection   SubCUTAneous AC&HS       Diet:  DIET DIABETIC WITH OPTIONS    Other Studies (last 24 hours):  No results found. Assessment and Plan:     Hospital Problems as of 4/18/2018  Date Reviewed: 2/28/2018          Codes Class Noted - Resolved POA    Controlled type 2 diabetes mellitus with complication, with long-term current use of insulin (HCC) (Chronic) ICD-10-CM: E11.8, Z79.4  ICD-9-CM: 250.90, V58.67  9/13/2017 - Present Yes        Stage 4 chronic kidney disease (HCC) (Chronic) ICD-10-CM: N18.4  ICD-9-CM: 585.4  4/4/2017 - Present Yes        Venous insufficiency (Chronic) ICD-10-CM: S45.1  ICD-9-CM: 459.81  12/6/2016 - Present Yes        Chronic pancreatitis ([facility] (Chronic) ICD-10-CM: K86.1  ICD-9-CM: 577.1  9/22/2016 - Present Yes        RESOLVED: Community acquired pneumonia of left lower lobe of lung ([facility] ICD-10-CM: J18.1  ICD-9-CM: 253  4/18/2018 - 4/18/2018 Yes        RESOLVED: Acute renal failure superimposed on stage 4 chronic kidney disease ([facility] ICD-10-CM: N17.9, N18.4  ICD-9-CM: 584.9, 585.4  4/14/2018 - 4/18/2018 Yes        RESOLVED: Altered mental status ICD-10-CM: R41.82  ICD-9-CM: 780.97  4/13/2018 - 4/18/2018 Yes        * (Principal)RESOLVED: Acute metabolic encephalopathy XSB-40-DT: G93.41  ICD-9-CM: 348.31  4/13/2018 - 4/18/2018 Yes              PLAN:    - Acute/CKD stage 4 - resolved. seems to be at baseline now. - Hypernatremia - D5W  - hypokalemia - replete   - Seizures - neurology has seen. MRI non acute. Continue oral keppra  - HTN - increase hydralazine. Cont norvasc  - Dm2 -  acceptable in setting of D5W.  continue lantus and SSI   - CAP - mild basilar infiltrate.   s/p 5 days of rocephin/azithro.       DVT Prophylaxis: hep sq    Dispo - ambulated 100ft with PT. Home with HH? Pt/OT following and PPD ordered. CM consulted.        Signed:  Bonny Chavez MD

## 2018-04-18 NOTE — PROGRESS NOTES
[address] Nephrology        Subjective: A on CKD   Oriented to person and place only    Review of Systems -   General ROS: negative for - fever, chills  Respiratory ROS: no SOB, cough, GARNICA  Cardiovascular ROS: no CP, palpitations  Gastrointestinal ROS: no N&V, abdominal pain, diarrhea  Genito-Urinary ROS: no difficulty voiding, dysuria  Neurological ROS: no seizures, focal weekness        Objective:    Vitals:    04/18/18 0415 04/18/18 0440 04/18/18 0552 04/18/18 0701   BP: 172/70  174/74 163/66   Pulse: 70  66 61   Resp: 18   16   Temp: 98.9 °F (37.2 °C)   98.5 °F (36.9 °C)   SpO2: 98%   97%   Weight:  81.3 kg (179 lb 4.8 oz)     Height:           PE  Gen: in no acute distress  CV:reg rate  Chest:clear  Abd: soft  Ext/Access: left upper arm av fistula is ok       . LAB  Recent Labs      04/18/18   0602  04/17/18   0532  04/16/18   0555   WBC  6.1  7.1  8.9   HGB  9.5*  10.2*  11.4*   HCT  29.4*  31.7*  34.9*   PLT  105*  113*  99*     Recent Labs      04/18/18   0602  04/17/18   0532  04/16/18   0555   NA  150*  149*  154*   K  3.3*  3.1*  3.1*   CL  118*  118*  123*   CO2  19*  21  19*   GLU  142*  158*  181*   BUN  32*  33*  41*   CREA  3.18*  3.36*  3.70*   MG   --    --   1.6*   PHOS   --   2.3  2.9   CA  7.6*  7.3*  7.5*   ALB  2.3*  2.3*   --    TBILI  0.4   --    --    ALT  31   --    --    SGOT  33   --    --            Radiology    A/P:   Patient Active Problem List   Diagnosis Code    Acute renal failure superimposed on stage 3 chronic kidney disease (HCC) N17.9, N18.3    Septicemia due to Klebsiella pneumoniae ([facility]) A41.4    Type II diabetes mellitus with nephropathy (HCC) E11.21    Essential hypertension I10    GERD (gastroesophageal reflux disease) K21.9    Diabetic neuropathy (HCC) E11.40    Chronic pancreatitis (HCC) K86.1    Iron (Fe) deficiency anemia D50.9    Acute on chronic systolic congestive heart failure (HCC) I50.23    Venous insufficiency I87.2    Venous stasis ulcer of left lower extremity ([facility]) I83.029, L97.929    Stage 4 chronic kidney disease ([facility]) N18.4    Stasis edema of both lower extremities I87.303    Cellulitis of left lower leg L03. 116    Onychomycosis B35.1    Controlled type 2 diabetes mellitus with complication, with long-term current use of insulin ([facility]) E11.8, Z79.4    Arteriovenous fistula ([facility]) I77.0    Hypercholesterolemia E78.00    Uncontrolled diabetes mellitus, with long-term current use of insulin ([facility]) E11.65, Z79.4    Carotid bruit R09.89    Cauda equina compression ([facility]) G83.4    Volume overload E87.70    Chest pain R07.9    Altered mental status R41.82    Acute metabolic encephalopathy E17.26    Renal failure N19     A on CKD - improved  Hypernatremia - no change. Will continue with hypotonic fluid. Hypokalemia - getting replacement.         Abhishek Campbell MD

## 2018-04-19 VITALS
RESPIRATION RATE: 18 BRPM | OXYGEN SATURATION: 97 % | SYSTOLIC BLOOD PRESSURE: 150 MMHG | HEIGHT: 75 IN | WEIGHT: 183 LBS | DIASTOLIC BLOOD PRESSURE: 73 MMHG | BODY MASS INDEX: 22.75 KG/M2 | HEART RATE: 69 BPM | TEMPERATURE: 98.3 F

## 2018-04-19 PROBLEM — F03.90 DEMENTIA WITHOUT BEHAVIORAL DISTURBANCE (HCC): Chronic | Status: ACTIVE | Noted: 2018-04-19

## 2018-04-19 PROBLEM — R56.9 SEIZURE (HCC): Status: RESOLVED | Noted: 2018-04-18 | Resolved: 2018-04-19

## 2018-04-19 PROBLEM — E87.0 ACUTE HYPERNATREMIA: Status: RESOLVED | Noted: 2018-04-18 | Resolved: 2018-04-19

## 2018-04-19 LAB
ANION GAP SERPL CALC-SCNC: 11 MMOL/L (ref 7–16)
BACTERIA SPEC CULT: NORMAL
BACTERIA SPEC CULT: NORMAL
BUN SERPL-MCNC: 32 MG/DL (ref 8–23)
CALCIUM SERPL-MCNC: 7.5 MG/DL (ref 8.3–10.4)
CHLORIDE SERPL-SCNC: 114 MMOL/L (ref 98–107)
CO2 SERPL-SCNC: 20 MMOL/L (ref 21–32)
CREAT SERPL-MCNC: 3.28 MG/DL (ref 0.8–1.5)
ERYTHROCYTE [DISTWIDTH] IN BLOOD BY AUTOMATED COUNT: 15.5 % (ref 11.9–14.6)
GLUCOSE BLD STRIP.AUTO-MCNC: 243 MG/DL (ref 65–100)
GLUCOSE BLD STRIP.AUTO-MCNC: 282 MG/DL (ref 65–100)
GLUCOSE SERPL-MCNC: 174 MG/DL (ref 65–100)
HCT VFR BLD AUTO: 29.9 % (ref 41.1–50.3)
HGB BLD-MCNC: 9.7 G/DL (ref 13.6–17.2)
LEVETIRACETAM SERPL-MCNC: 47.5 UG/ML (ref 10–40)
MCH RBC QN AUTO: 25.5 PG (ref 26.1–32.9)
MCHC RBC AUTO-ENTMCNC: 32.4 G/DL (ref 31.4–35)
MCV RBC AUTO: 78.5 FL (ref 79.6–97.8)
PLATELET # BLD AUTO: 120 K/UL (ref 150–450)
PMV BLD AUTO: 11.3 FL (ref 10.8–14.1)
POTASSIUM SERPL-SCNC: 3 MMOL/L (ref 3.5–5.1)
RBC # BLD AUTO: 3.81 M/UL (ref 4.23–5.67)
SERVICE CMNT-IMP: NORMAL
SERVICE CMNT-IMP: NORMAL
SODIUM SERPL-SCNC: 145 MMOL/L (ref 136–145)
WBC # BLD AUTO: 4.7 K/UL (ref 4.3–11.1)

## 2018-04-19 PROCEDURE — 74011250636 HC RX REV CODE- 250/636: Performed by: INTERNAL MEDICINE

## 2018-04-19 PROCEDURE — 36415 COLL VENOUS BLD VENIPUNCTURE: CPT | Performed by: INTERNAL MEDICINE

## 2018-04-19 PROCEDURE — 74011250637 HC RX REV CODE- 250/637: Performed by: INTERNAL MEDICINE

## 2018-04-19 PROCEDURE — 80048 BASIC METABOLIC PNL TOTAL CA: CPT | Performed by: INTERNAL MEDICINE

## 2018-04-19 PROCEDURE — 74011250637 HC RX REV CODE- 250/637: Performed by: PSYCHIATRY & NEUROLOGY

## 2018-04-19 PROCEDURE — 74011636637 HC RX REV CODE- 636/637: Performed by: INTERNAL MEDICINE

## 2018-04-19 PROCEDURE — 82962 GLUCOSE BLOOD TEST: CPT

## 2018-04-19 PROCEDURE — 85027 COMPLETE CBC AUTOMATED: CPT | Performed by: INTERNAL MEDICINE

## 2018-04-19 RX ORDER — POTASSIUM CHLORIDE 20 MEQ/1
40 TABLET, EXTENDED RELEASE ORAL
Status: COMPLETED | OUTPATIENT
Start: 2018-04-19 | End: 2018-04-19

## 2018-04-19 RX ORDER — PREGABALIN 50 MG/1
50 CAPSULE ORAL DAILY
Qty: 10 CAP | Refills: 0 | Status: SHIPPED | OUTPATIENT
Start: 2018-04-19 | End: 2020-01-01 | Stop reason: DRUGHIGH

## 2018-04-19 RX ORDER — LEVETIRACETAM 500 MG/1
500 TABLET ORAL EVERY 12 HOURS
Qty: 60 TAB | Refills: 2 | Status: SHIPPED | OUTPATIENT
Start: 2018-04-19 | End: 2018-07-06 | Stop reason: ALTCHOICE

## 2018-04-19 RX ORDER — HYDRALAZINE HYDROCHLORIDE 100 MG/1
100 TABLET, FILM COATED ORAL 3 TIMES DAILY
Qty: 90 TAB | Refills: 2 | Status: SHIPPED | OUTPATIENT
Start: 2018-04-19

## 2018-04-19 RX ADMIN — HEPARIN SODIUM 5000 UNITS: 5000 INJECTION, SOLUTION INTRAVENOUS; SUBCUTANEOUS at 06:11

## 2018-04-19 RX ADMIN — FERROUS SULFATE TAB 325 MG (65 MG ELEMENTAL FE) 325 MG: 325 (65 FE) TAB at 07:50

## 2018-04-19 RX ADMIN — POTASSIUM CHLORIDE 40 MEQ: 20 TABLET, EXTENDED RELEASE ORAL at 11:46

## 2018-04-19 RX ADMIN — DEXTROSE MONOHYDRATE 125 ML/HR: 5 INJECTION, SOLUTION INTRAVENOUS at 06:11

## 2018-04-19 RX ADMIN — ISOSORBIDE DINITRATE 20 MG: 20 TABLET ORAL at 07:52

## 2018-04-19 RX ADMIN — INSULIN LISPRO 4 UNITS: 100 INJECTION, SOLUTION INTRAVENOUS; SUBCUTANEOUS at 11:45

## 2018-04-19 RX ADMIN — INSULIN GLARGINE 15 UNITS: 100 INJECTION, SOLUTION SUBCUTANEOUS at 07:54

## 2018-04-19 RX ADMIN — METOPROLOL SUCCINATE 50 MG: 50 TABLET, EXTENDED RELEASE ORAL at 06:11

## 2018-04-19 RX ADMIN — PETROLATUM: 42 OINTMENT TOPICAL at 07:52

## 2018-04-19 RX ADMIN — AMLODIPINE BESYLATE 10 MG: 10 TABLET ORAL at 06:10

## 2018-04-19 RX ADMIN — LEVETIRACETAM 500 MG: 500 TABLET ORAL at 10:38

## 2018-04-19 RX ADMIN — ASPIRIN 325 MG ORAL TABLET 325 MG: 325 PILL ORAL at 07:50

## 2018-04-19 RX ADMIN — HYDRALAZINE HYDROCHLORIDE 50 MG: 50 TABLET, FILM COATED ORAL at 07:51

## 2018-04-19 RX ADMIN — INSULIN LISPRO 6 UNITS: 100 INJECTION, SOLUTION INTRAVENOUS; SUBCUTANEOUS at 07:54
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
## 2018-04-19 NOTE — CDMP QUERY
Please clarify if this patient is being treated/managed for:    ** Pneumonia (needs to be spelled out, PNA not recognized) in the setting of  Confusion, ALICJA on CKD, encephalopathy, with bibasilar infiltrates on CXR being treated with IV Rocephin, Zithromax . =>Other Explanation of clinical findings  =>Unable to Determine (no explanation of clinical findings)    The medical record reflects the following:    Risk Factors:  Confusion, ALICJA on CKD, encephalopathy    Clinical Indicators: Bibasilar infiltrates on CXR    Treatment: IV Rocephin, Zithromax     Please clarify and document your clinical opinion in the progress notes and discharge summary including the definitive and/or presumptive diagnosis, (suspected or probable), related to the above clinical findings. Please include clinical findings supporting your diagnosis.     Thanks,  Dilan Nava RN, CDS  Compliant Documentation Management Program  [phone]

## 2018-04-19 NOTE — ROUTINE PROCESS
END OF SHIFT NOTE:    INTAKE/OUTPUT  04/18 0701 - 04/19 0700  In: 9413 [P.O.:480; I.V.:8933]  Out: -   Voiding: YES  Catheter: NO  Drain:              Flatus: Patient does have flatus present. Stool:  1 occurrences. Characteristics:  Stool Assessment  Stool Color: Black  Stool Appearance: Loose  Stool Amount: Smear  Stool Source/Status: Rectum    Emesis: 0 occurrences. Characteristics:        VITAL SIGNS  Patient Vitals for the past 12 hrs:   Temp Pulse Resp BP SpO2   04/19/18 0610 - 76 - 156/72 -   04/19/18 0350 98.7 °F (37.1 °C) 72 18 167/70 97 %   04/18/18 2350 98.6 °F (37 °C) 64 18 142/63 98 %   04/18/18 1931 98.1 °F (36.7 °C) 68 16 143/56 98 %       Pain Assessment  Pain Intensity 1: 0 (04/18/18 1929)     Pain Intervention(s) 1: Medication (see MAR)  Patient Stated Pain Goal: 0    Ambulating  Yes    Shift report given to oncoming nurse at the bedside.     Radha Varela LPN

## 2018-04-19 NOTE — PROGRESS NOTES
Problem: Falls - Risk of  Goal: *Absence of Falls  Document Huong Fall Risk and appropriate interventions in the flowsheet.    Outcome: Progressing Towards Goal  Fall Risk Interventions:  Mobility Interventions: Bed/chair exit alarm, Communicate number of staff needed for ambulation/transfer, OT consult for ADLs, Patient to call before getting OOB, PT Consult for mobility concerns, PT Consult for assist device competence    Mentation Interventions: Adequate sleep, hydration, pain control, Bed/chair exit alarm, Door open when patient unattended, Reorient patient    Medication Interventions: Bed/chair exit alarm, Patient to call before getting OOB, Teach patient to arise slowly    Elimination Interventions: Bed/chair exit alarm, Call light in reach, Patient to call for help with toileting needs, Toilet paper/wipes in reach, Urinal in reach    History of Falls Interventions: Bed/chair exit alarm, Consult care management for discharge planning, Door open when patient unattended

## 2018-04-19 NOTE — PROGRESS NOTES
[facility] transport arranged for 1:30 pm to [facility], room 12D, report [phone]. This plan is ok with Mr. Irma Mckeon in room 210 and RN Edward Kevin. Left detailed voice message with his son, Mr. Jimy Fulton, at cell [phone].

## 2018-04-29 ENCOUNTER — HOSPITAL ENCOUNTER (INPATIENT)
Age: 78
LOS: 5 days | Discharge: NURSING, REHAB OR LONG-TERM CARE | DRG: 683 | End: 2018-05-04
Attending: EMERGENCY MEDICINE | Admitting: INTERNAL MEDICINE
Payer: MEDICARE

## 2018-04-29 ENCOUNTER — APPOINTMENT (OUTPATIENT)
Dept: ULTRASOUND IMAGING | Age: 78
DRG: 683 | End: 2018-04-29
Attending: INTERNAL MEDICINE
Payer: MEDICARE

## 2018-04-29 DIAGNOSIS — N28.9 ACUTE ON CHRONIC RENAL INSUFFICIENCY: Primary | ICD-10-CM

## 2018-04-29 DIAGNOSIS — N19 UREMIA: ICD-10-CM

## 2018-04-29 DIAGNOSIS — N18.9 ACUTE ON CHRONIC RENAL INSUFFICIENCY: Primary | ICD-10-CM

## 2018-04-29 PROBLEM — N17.9 RENAL FAILURE (ARF), ACUTE ON CHRONIC (HCC): Status: ACTIVE | Noted: 2018-04-29

## 2018-04-29 LAB
ANION GAP SERPL CALC-SCNC: 17 MMOL/L (ref 7–16)
APPEARANCE UR: CLEAR
BACTERIA URNS QL MICRO: 0 /HPF
BASOPHILS # BLD: 0 K/UL (ref 0–0.2)
BASOPHILS NFR BLD: 0 % (ref 0–2)
BILIRUB UR QL: NEGATIVE
BUN SERPL-MCNC: 94 MG/DL (ref 8–23)
CALCIUM SERPL-MCNC: 7.5 MG/DL (ref 8.3–10.4)
CASTS URNS QL MICRO: 0 /LPF
CHLORIDE SERPL-SCNC: 118 MMOL/L (ref 98–107)
CK MB CFR SERPL CALC: 1 %
CK MB SERPL-MCNC: 15.2 NG/ML (ref 0.5–3.6)
CK SERPL-CCNC: 1485 U/L (ref 21–215)
CO2 SERPL-SCNC: 13 MMOL/L (ref 21–32)
COLOR UR: YELLOW
CREAT SERPL-MCNC: 7.67 MG/DL (ref 0.8–1.5)
DIFFERENTIAL METHOD BLD: ABNORMAL
EOSINOPHIL # BLD: 0 K/UL (ref 0–0.8)
EOSINOPHIL NFR BLD: 0 % (ref 0.5–7.8)
EPI CELLS #/AREA URNS HPF: 0 /HPF
ERYTHROCYTE [DISTWIDTH] IN BLOOD BY AUTOMATED COUNT: 16.6 % (ref 11.9–14.6)
GLUCOSE SERPL-MCNC: 235 MG/DL (ref 65–100)
GLUCOSE UR STRIP.AUTO-MCNC: NEGATIVE MG/DL
HCT VFR BLD AUTO: 31.1 % (ref 41.1–50.3)
HGB BLD-MCNC: 10.1 G/DL (ref 13.6–17.2)
HGB UR QL STRIP: ABNORMAL
IMM GRANULOCYTES # BLD: 0.1 K/UL (ref 0–0.5)
IMM GRANULOCYTES NFR BLD AUTO: 1 % (ref 0–5)
KETONES UR QL STRIP.AUTO: NEGATIVE MG/DL
LEUKOCYTE ESTERASE UR QL STRIP.AUTO: NEGATIVE
LYMPHOCYTES # BLD: 1.2 K/UL (ref 0.5–4.6)
LYMPHOCYTES NFR BLD: 17 % (ref 13–44)
MAGNESIUM SERPL-MCNC: 1.7 MG/DL (ref 1.8–2.4)
MCH RBC QN AUTO: 26.1 PG (ref 26.1–32.9)
MCHC RBC AUTO-ENTMCNC: 32.5 G/DL (ref 31.4–35)
MCV RBC AUTO: 80.4 FL (ref 79.6–97.8)
MONOCYTES # BLD: 0.5 K/UL (ref 0.1–1.3)
MONOCYTES NFR BLD: 7 % (ref 4–12)
NEUTS SEG # BLD: 5 K/UL (ref 1.7–8.2)
NEUTS SEG NFR BLD: 75 % (ref 43–78)
NITRITE UR QL STRIP.AUTO: NEGATIVE
PH UR STRIP: 5 [PH] (ref 5–9)
PLATELET # BLD AUTO: 319 K/UL (ref 150–450)
PMV BLD AUTO: 10.1 FL (ref 10.8–14.1)
POTASSIUM SERPL-SCNC: 4.5 MMOL/L (ref 3.5–5.1)
PROT UR STRIP-MCNC: NEGATIVE MG/DL
RBC # BLD AUTO: 3.87 M/UL (ref 4.23–5.67)
RBC #/AREA URNS HPF: ABNORMAL /HPF
SODIUM SERPL-SCNC: 148 MMOL/L (ref 136–145)
SP GR UR REFRACTOMETRY: 1.01 (ref 1–1.02)
UROBILINOGEN UR QL STRIP.AUTO: 0.2 EU/DL (ref 0.2–1)
WBC # BLD AUTO: 6.7 K/UL (ref 4.3–11.1)
WBC URNS QL MICRO: ABNORMAL /HPF

## 2018-04-29 PROCEDURE — 74011250636 HC RX REV CODE- 250/636: Performed by: EMERGENCY MEDICINE

## 2018-04-29 PROCEDURE — 99285 EMERGENCY DEPT VISIT HI MDM: CPT | Performed by: EMERGENCY MEDICINE

## 2018-04-29 PROCEDURE — 83735 ASSAY OF MAGNESIUM: CPT | Performed by: EMERGENCY MEDICINE

## 2018-04-29 PROCEDURE — 81001 URINALYSIS AUTO W/SCOPE: CPT | Performed by: EMERGENCY MEDICINE

## 2018-04-29 PROCEDURE — 82550 ASSAY OF CK (CPK): CPT | Performed by: EMERGENCY MEDICINE

## 2018-04-29 PROCEDURE — 85025 COMPLETE CBC W/AUTO DIFF WBC: CPT | Performed by: EMERGENCY MEDICINE

## 2018-04-29 PROCEDURE — 65660000000 HC RM CCU STEPDOWN

## 2018-04-29 PROCEDURE — 76770 US EXAM ABDO BACK WALL COMP: CPT

## 2018-04-29 PROCEDURE — 80048 BASIC METABOLIC PNL TOTAL CA: CPT | Performed by: EMERGENCY MEDICINE

## 2018-04-29 RX ORDER — SODIUM CHLORIDE 0.9 % (FLUSH) 0.9 %
5-10 SYRINGE (ML) INJECTION AS NEEDED
Status: DISCONTINUED | OUTPATIENT
Start: 2018-04-29 | End: 2018-04-29 | Stop reason: SDUPTHER

## 2018-04-29 RX ORDER — ACETAMINOPHEN 325 MG/1
650 TABLET ORAL
Status: DISCONTINUED | OUTPATIENT
Start: 2018-04-29 | End: 2018-05-04 | Stop reason: HOSPADM

## 2018-04-29 RX ORDER — SODIUM CHLORIDE 9 MG/ML
100 INJECTION, SOLUTION INTRAVENOUS CONTINUOUS
Status: DISCONTINUED | OUTPATIENT
Start: 2018-04-29 | End: 2018-04-29

## 2018-04-29 RX ORDER — ONDANSETRON 2 MG/ML
4 INJECTION INTRAMUSCULAR; INTRAVENOUS
Status: DISCONTINUED | OUTPATIENT
Start: 2018-04-29 | End: 2018-05-04 | Stop reason: HOSPADM

## 2018-04-29 RX ORDER — SODIUM CHLORIDE 0.9 % (FLUSH) 0.9 %
5-10 SYRINGE (ML) INJECTION EVERY 8 HOURS
Status: DISCONTINUED | OUTPATIENT
Start: 2018-04-29 | End: 2018-05-04 | Stop reason: HOSPADM

## 2018-04-29 RX ORDER — SODIUM CHLORIDE 0.9 % (FLUSH) 0.9 %
5-10 SYRINGE (ML) INJECTION EVERY 8 HOURS
Status: DISCONTINUED | OUTPATIENT
Start: 2018-04-29 | End: 2018-04-29 | Stop reason: SDUPTHER

## 2018-04-29 RX ORDER — BISACODYL 5 MG
5 TABLET, DELAYED RELEASE (ENTERIC COATED) ORAL DAILY PRN
Status: DISCONTINUED | OUTPATIENT
Start: 2018-04-29 | End: 2018-05-04 | Stop reason: HOSPADM

## 2018-04-29 RX ORDER — HEPARIN SODIUM 5000 [USP'U]/ML
5000 INJECTION, SOLUTION INTRAVENOUS; SUBCUTANEOUS EVERY 8 HOURS
Status: DISCONTINUED | OUTPATIENT
Start: 2018-04-29 | End: 2018-05-04 | Stop reason: HOSPADM

## 2018-04-29 RX ORDER — SODIUM CHLORIDE 0.9 % (FLUSH) 0.9 %
5-10 SYRINGE (ML) INJECTION AS NEEDED
Status: DISCONTINUED | OUTPATIENT
Start: 2018-04-29 | End: 2018-05-04 | Stop reason: HOSPADM

## 2018-04-29 RX ADMIN — SODIUM CHLORIDE 500 ML: 900 INJECTION, SOLUTION INTRAVENOUS at 21:56

## 2018-04-29 NOTE — ED PROVIDER NOTES
HPI Comments: Patient presents to ER complaining of a fall. States he \"slid out of the chair last night\" while trying to get up. Reports she did not have a hard impact. However he felt too weak to get up. Apparently laid on the floor last evening until family could get there today to help him. Does report some generalized fatigue. Patient is a 69 y.o. male presenting with fall. The history is provided by the patient. Fall   The accident occurred 6 to 12 hours ago. Fall occurred: while getting out of chair. He fell from a height of ground level. The pain is at a severity of 2/10. The pain is mild. Pertinent negatives include no visual change, no fever, no numbness, no abdominal pain, no headaches and no extremity weakness.         Past Medical History:   Diagnosis Date    Acute renal failure superimposed on stage 3 chronic kidney disease ([address] 9/21/2016    Anemia     Chronic kidney disease     not on HD yet- surgery done for access and here for elevation of fistula- FU with [facility]- creat on 12/7/17=3.15    Chronic pancreatitis ([address] 9/22/2016    Dermatophytosis of nail 12/6/2016    Diabetic neuropathy ([address] 9/22/2016    avg fastings 200; last A1C-? ; hypo @ [de-identified]    Essential hypertension 9/22/2016    GERD (gastroesophageal reflux disease)     controlled with med    Hypercholesterolemia     Iron (Fe) deficiency anemia 9/22/2016    Kidney disease     Septicemia due to Klebsiella pneumoniae ([address] 2/61/4458    Systolic CHF, chronic ([address] 9/23/2016    Type II diabetes mellitus with nephropathy ([address] 09/22/2016    Venous insufficiency 12/6/2016    Xerosis cutis 12/6/2016       Past Surgical History:   Procedure Laterality Date    HX COLONOSCOPY      HX HERNIA REPAIR Left 2010    HX PROSTATECTOMY  2012    HX TURP  2012    FOR BPH    VASCULAR SURGERY PROCEDURE UNLIST Left 10/26/2017    AVF         Family History:   Problem Relation Age of Onset    Diabetes Mother     Stroke Mother  Hypertension Mother     No Known Problems Father     Diabetes Sister     Diabetes Brother     Diabetes Sister     Diabetes Sister     Other Sister      fibromyalgia       Social History     Social History    Marital status:      Spouse name: N/A    Number of children: N/A    Years of education: N/A     Occupational History    Not on file. Social History Main Topics    Smoking status: Former Smoker     Packs/day: 2.00     Years: 20.00     Quit date: 1995    Smokeless tobacco: Current User    Alcohol use No    Drug use: Not on file    Sexual activity: Not on file     Other Topics Concern    Not on file     Social History Narrative         ALLERGIES: Review of patient's allergies indicates no known allergies. Review of Systems   Constitutional: Positive for fatigue. Negative for fever. HENT: Negative for congestion, trouble swallowing and voice change. Eyes: Negative for photophobia and redness. Respiratory: Negative for chest tightness, shortness of breath and stridor. Cardiovascular: Negative for leg swelling. Gastrointestinal: Negative for abdominal pain. Endocrine: Negative for polydipsia, polyphagia and polyuria. Genitourinary: Negative for flank pain, frequency and urgency. Musculoskeletal: Negative for back pain, extremity weakness and gait problem. Skin: Negative for pallor and rash. Allergic/Immunologic: Negative for food allergies and immunocompromised state. Neurological: Negative for seizures, numbness and headaches. Hematological: Negative for adenopathy. Does not bruise/bleed easily. Psychiatric/Behavioral: Negative for behavioral problems and confusion. All other systems reviewed and are negative.       Vitals:    04/29/18 1817 04/29/18 1951   BP: 151/73 174/79   Pulse: 75 75   Resp: 19    Temp: 97.8 °F (36.6 °C)    SpO2: 99% 100%   Weight: 87.1 kg (192 lb)    Height: 6' 3\" (1.905 m)             Physical Exam   Constitutional: He is oriented to person, place, and time. He appears well-developed and well-nourished. He appears lethargic. HENT:   Head: Normocephalic and atraumatic. Mouth/Throat: Oropharynx is clear and moist.   Eyes: Conjunctivae and EOM are normal. Pupils are equal, round, and reactive to light. No scleral icterus. Neck: Normal range of motion. Neck supple. No tracheal deviation present. No thyromegaly present. Cardiovascular: Normal rate, regular rhythm and intact distal pulses. Pulmonary/Chest: Effort normal and breath sounds normal. No respiratory distress. He has no rales. Abdominal: Soft. Bowel sounds are normal. He exhibits no distension. There is no tenderness. Musculoskeletal: He exhibits no edema or deformity. Right hip: He exhibits normal range of motion and normal strength. Left hip: He exhibits normal range of motion and normal strength. Lumbar back: He exhibits no tenderness and no bony tenderness. Neurological: He is oriented to person, place, and time. He has normal reflexes. He appears lethargic. No cranial nerve deficit. Skin: Skin is warm and dry. No erythema. Nursing note and vitals reviewed. MDM  Number of Diagnoses or Management Options  Diagnosis management comments: Given the patient's recent hospitalization for worsening renal sufficiency uremic encephalopathy will check basic labs    No signs of symptoms of any serious traumatic injuries    9:28 PM  Labs are significant for a creatinine of 7.6. The weight is 94. CO2 is 13. Potassium surprisingly is normal at 4.5. Patient has been started on some IV fluids, have discussed case with hospitalist for admission.   We'll place Higuera catheter to monitor strict I's and O's       Amount and/or Complexity of Data Reviewed  Clinical lab tests: ordered and reviewed    Risk of Complications, Morbidity, and/or Mortality  Presenting problems: moderate  Diagnostic procedures: low  Management options: low    Patient Progress  Patient progress: stable        ED Course       Procedures

## 2018-04-29 NOTE — ED TRIAGE NOTES
PAtient here after sliding out of chair last night. Found by sister today. Reports was on floor for 12 hours. Denies any pain.

## 2018-04-30 PROBLEM — M62.82 RHABDOMYOLYSIS: Status: ACTIVE | Noted: 2018-04-30

## 2018-04-30 PROBLEM — E87.20 METABOLIC ACIDOSIS: Status: ACTIVE | Noted: 2018-04-30

## 2018-04-30 LAB
ALBUMIN SERPL-MCNC: 2.9 G/DL (ref 3.2–4.6)
ANION GAP SERPL CALC-SCNC: 17 MMOL/L (ref 7–16)
BUN SERPL-MCNC: 84 MG/DL (ref 8–23)
CALCIUM SERPL-MCNC: 7.5 MG/DL (ref 8.3–10.4)
CHLORIDE SERPL-SCNC: 118 MMOL/L (ref 98–107)
CK SERPL-CCNC: 1153 U/L (ref 21–215)
CO2 SERPL-SCNC: 14 MMOL/L (ref 21–32)
CREAT SERPL-MCNC: 7.11 MG/DL (ref 0.8–1.5)
GLUCOSE BLD STRIP.AUTO-MCNC: 199 MG/DL (ref 65–100)
GLUCOSE BLD STRIP.AUTO-MCNC: 221 MG/DL (ref 65–100)
GLUCOSE BLD STRIP.AUTO-MCNC: 225 MG/DL (ref 65–100)
GLUCOSE BLD STRIP.AUTO-MCNC: 230 MG/DL (ref 65–100)
GLUCOSE BLD STRIP.AUTO-MCNC: 289 MG/DL (ref 65–100)
GLUCOSE SERPL-MCNC: 231 MG/DL (ref 65–100)
PHOSPHATE SERPL-MCNC: 6.5 MG/DL (ref 2.3–3.7)
POTASSIUM SERPL-SCNC: 4.2 MMOL/L (ref 3.5–5.1)
SODIUM SERPL-SCNC: 149 MMOL/L (ref 136–145)

## 2018-04-30 PROCEDURE — 74011000250 HC RX REV CODE- 250: Performed by: INTERNAL MEDICINE

## 2018-04-30 PROCEDURE — 82550 ASSAY OF CK (CPK): CPT | Performed by: INTERNAL MEDICINE

## 2018-04-30 PROCEDURE — 74011250637 HC RX REV CODE- 250/637: Performed by: INTERNAL MEDICINE

## 2018-04-30 PROCEDURE — 74011250636 HC RX REV CODE- 250/636: Performed by: INTERNAL MEDICINE

## 2018-04-30 PROCEDURE — 74011636637 HC RX REV CODE- 636/637: Performed by: INTERNAL MEDICINE

## 2018-04-30 PROCEDURE — 86580 TB INTRADERMAL TEST: CPT | Performed by: INTERNAL MEDICINE

## 2018-04-30 PROCEDURE — 65270000029 HC RM PRIVATE

## 2018-04-30 PROCEDURE — 97161 PT EVAL LOW COMPLEX 20 MIN: CPT

## 2018-04-30 PROCEDURE — 82962 GLUCOSE BLOOD TEST: CPT

## 2018-04-30 PROCEDURE — 80069 RENAL FUNCTION PANEL: CPT | Performed by: INTERNAL MEDICINE

## 2018-04-30 PROCEDURE — 36415 COLL VENOUS BLD VENIPUNCTURE: CPT | Performed by: INTERNAL MEDICINE

## 2018-04-30 PROCEDURE — 74011000302 HC RX REV CODE- 302: Performed by: INTERNAL MEDICINE

## 2018-04-30 RX ORDER — PREGABALIN 50 MG/1
50 CAPSULE ORAL DAILY
Status: DISCONTINUED | OUTPATIENT
Start: 2018-04-30 | End: 2018-05-04 | Stop reason: HOSPADM

## 2018-04-30 RX ORDER — ASPIRIN 325 MG
325 TABLET ORAL DAILY
Status: DISCONTINUED | OUTPATIENT
Start: 2018-04-30 | End: 2018-05-04 | Stop reason: HOSPADM

## 2018-04-30 RX ORDER — AMLODIPINE BESYLATE 10 MG/1
10 TABLET ORAL
Status: DISCONTINUED | OUTPATIENT
Start: 2018-04-30 | End: 2018-05-04 | Stop reason: HOSPADM

## 2018-04-30 RX ORDER — INSULIN GLARGINE 100 [IU]/ML
8 INJECTION, SOLUTION SUBCUTANEOUS DAILY
Status: DISCONTINUED | OUTPATIENT
Start: 2018-04-30 | End: 2018-05-02

## 2018-04-30 RX ORDER — ISOSORBIDE DINITRATE 20 MG/1
20 TABLET ORAL 3 TIMES DAILY
Status: DISCONTINUED | OUTPATIENT
Start: 2018-04-30 | End: 2018-05-04 | Stop reason: HOSPADM

## 2018-04-30 RX ORDER — SODIUM CHLORIDE 9 MG/ML
125 INJECTION, SOLUTION INTRAVENOUS CONTINUOUS
Status: DISCONTINUED | OUTPATIENT
Start: 2018-04-30 | End: 2018-04-30

## 2018-04-30 RX ORDER — LEVETIRACETAM 250 MG/1
500 TABLET ORAL EVERY 12 HOURS
Status: DISCONTINUED | OUTPATIENT
Start: 2018-04-30 | End: 2018-05-04 | Stop reason: HOSPADM

## 2018-04-30 RX ORDER — INSULIN LISPRO 100 [IU]/ML
INJECTION, SOLUTION INTRAVENOUS; SUBCUTANEOUS
Status: DISCONTINUED | OUTPATIENT
Start: 2018-04-30 | End: 2018-05-01 | Stop reason: ALTCHOICE

## 2018-04-30 RX ORDER — LANOLIN ALCOHOL/MO/W.PET/CERES
325 CREAM (GRAM) TOPICAL 2 TIMES DAILY
Status: DISCONTINUED | OUTPATIENT
Start: 2018-04-30 | End: 2018-05-04 | Stop reason: HOSPADM

## 2018-04-30 RX ORDER — PANTOPRAZOLE SODIUM 40 MG/1
40 TABLET, DELAYED RELEASE ORAL
Status: DISCONTINUED | OUTPATIENT
Start: 2018-04-30 | End: 2018-05-04 | Stop reason: HOSPADM

## 2018-04-30 RX ORDER — METOPROLOL SUCCINATE 50 MG/1
50 TABLET, EXTENDED RELEASE ORAL
Status: DISCONTINUED | OUTPATIENT
Start: 2018-04-30 | End: 2018-05-04 | Stop reason: HOSPADM

## 2018-04-30 RX ORDER — HYDRALAZINE HYDROCHLORIDE 25 MG/1
100 TABLET, FILM COATED ORAL 3 TIMES DAILY
Status: DISCONTINUED | OUTPATIENT
Start: 2018-04-30 | End: 2018-05-04 | Stop reason: HOSPADM

## 2018-04-30 RX ADMIN — HEPARIN SODIUM 5000 UNITS: 5000 INJECTION, SOLUTION INTRAVENOUS; SUBCUTANEOUS at 15:13

## 2018-04-30 RX ADMIN — SODIUM BICARBONATE: 84 INJECTION, SOLUTION INTRAVENOUS at 16:19

## 2018-04-30 RX ADMIN — TUBERCULIN PURIFIED PROTEIN DERIVATIVE 5 UNITS: 5 INJECTION, SOLUTION INTRADERMAL at 01:22

## 2018-04-30 RX ADMIN — LEVETIRACETAM 500 MG: 250 TABLET, FILM COATED ORAL at 02:24

## 2018-04-30 RX ADMIN — AMLODIPINE BESYLATE 10 MG: 10 TABLET ORAL at 08:18

## 2018-04-30 RX ADMIN — Medication 10 ML: at 21:10

## 2018-04-30 RX ADMIN — HEPARIN SODIUM 5000 UNITS: 5000 INJECTION, SOLUTION INTRAVENOUS; SUBCUTANEOUS at 21:35

## 2018-04-30 RX ADMIN — ISOSORBIDE DINITRATE 20 MG: 20 TABLET ORAL at 16:21

## 2018-04-30 RX ADMIN — ISOSORBIDE DINITRATE 20 MG: 20 TABLET ORAL at 21:09

## 2018-04-30 RX ADMIN — INSULIN LISPRO 4 UNITS: 100 INJECTION, SOLUTION INTRAVENOUS; SUBCUTANEOUS at 08:29

## 2018-04-30 RX ADMIN — SODIUM BICARBONATE: 84 INJECTION, SOLUTION INTRAVENOUS at 04:26

## 2018-04-30 RX ADMIN — LEVETIRACETAM 500 MG: 250 TABLET, FILM COATED ORAL at 12:38

## 2018-04-30 RX ADMIN — FERROUS SULFATE TAB 325 MG (65 MG ELEMENTAL FE) 325 MG: 325 (65 FE) TAB at 16:21

## 2018-04-30 RX ADMIN — HEPARIN SODIUM 5000 UNITS: 5000 INJECTION, SOLUTION INTRAVENOUS; SUBCUTANEOUS at 08:20

## 2018-04-30 RX ADMIN — INSULIN LISPRO 6 UNITS: 100 INJECTION, SOLUTION INTRAVENOUS; SUBCUTANEOUS at 16:28

## 2018-04-30 RX ADMIN — FERROUS SULFATE TAB 325 MG (65 MG ELEMENTAL FE) 325 MG: 325 (65 FE) TAB at 08:18

## 2018-04-30 RX ADMIN — PANTOPRAZOLE SODIUM 40 MG: 40 TABLET, DELAYED RELEASE ORAL at 08:18

## 2018-04-30 RX ADMIN — INSULIN GLARGINE 8 UNITS: 100 INJECTION, SOLUTION SUBCUTANEOUS at 08:20

## 2018-04-30 RX ADMIN — HYDRALAZINE HYDROCHLORIDE 100 MG: 25 TABLET, FILM COATED ORAL at 08:18

## 2018-04-30 RX ADMIN — Medication 10 ML: at 08:19

## 2018-04-30 RX ADMIN — INSULIN LISPRO 4 UNITS: 100 INJECTION, SOLUTION INTRAVENOUS; SUBCUTANEOUS at 12:44

## 2018-04-30 RX ADMIN — Medication 10 ML: at 15:13

## 2018-04-30 RX ADMIN — Medication 10 ML: at 01:24

## 2018-04-30 RX ADMIN — ISOSORBIDE DINITRATE 20 MG: 20 TABLET ORAL at 08:18

## 2018-04-30 RX ADMIN — PREGABALIN 50 MG: 50 CAPSULE ORAL at 08:18

## 2018-04-30 RX ADMIN — HYDRALAZINE HYDROCHLORIDE 100 MG: 25 TABLET, FILM COATED ORAL at 16:20

## 2018-04-30 RX ADMIN — ASPIRIN 325 MG ORAL TABLET 325 MG: 325 PILL ORAL at 08:18

## 2018-04-30 RX ADMIN — METOPROLOL SUCCINATE 50 MG: 50 TABLET, EXTENDED RELEASE ORAL at 08:18

## 2018-04-30 RX ADMIN — SODIUM CHLORIDE 125 ML/HR: 900 INJECTION, SOLUTION INTRAVENOUS at 12:44

## 2018-04-30 NOTE — PROGRESS NOTES
Primary Nurse Yumi Clark and Dawna Vaughn, RN performed a dual skin assessment on this patient Impairment noted- see wound doc flow sheet. Pt has bilateral extremities excoriations,, swollen  Legs.    Sacrum  Clear and intact with no redness  Santy score is 14

## 2018-04-30 NOTE — H&P
Hospitalist Admission History and Physical     NAME:  SILVIO Aguiar. Age:  69 y.o.  :   1940   MRN:   [id number]  PCP: Violet Clark MD  Consulting MD:  Treatment Team: Attending Provider: Molly Habermann, MD; Primary Nurse: Katy Noel; Primary Nurse: Dennie Engman, RN    Chief Complaint   Patient presents with   Mainor Daft         HPI:     68 y/o male with pmh of CKD, diastolic CHF, type 2 DM with complications, dementia, pancreatitis, essential HTN, GERD, HLD presented to the ER after a fall. Patient slid of the chair yesterday and he could not get up from the floor. Patient lied down on the floor until family helped him today and brought him to the ER. He denies hitting anywhere during the fall. He is able to move all extremities and denies any specific joint pain. He does have some mild generalized discomfort following the fall. He is alert and oriented during my encounter and able to answer my questions. Labs on admission shows ALICJA with creatinine of 7.6 which is worse from his baseline. He is also acidotic with a bicarb of 13. Potassium wnl. He is hypernatremic. CPK elavted at 1400. UA is negative.      Past Medical History:   Diagnosis Date    Acute renal failure superimposed on stage 3 chronic kidney disease ([address] 2016    Anemia     Chronic kidney disease     not on HD yet- surgery done for access and here for elevation of fistula- FU with [facility]- creat on 17=3.15    Chronic pancreatitis ([address] 2016    Dermatophytosis of nail 2016    Diabetic neuropathy ([address] 2016    avg fastings 200; last A1C-? ; hypo @ [de-identified]    Essential hypertension 2016    GERD (gastroesophageal reflux disease)     controlled with med    Hypercholesterolemia     Iron (Fe) deficiency anemia 2016    Kidney disease     Septicemia due to Klebsiella pneumoniae ([address]     Systolic CHF, chronic ([address] 2016    Type II diabetes mellitus with nephropathy ([address] 09/22/2016    Venous insufficiency 12/6/2016    Xerosis cutis 12/6/2016        Past Surgical History:   Procedure Laterality Date    HX COLONOSCOPY      HX HERNIA REPAIR Left 2010    HX PROSTATECTOMY  2012    HX TURP  2012    FOR BPH    VASCULAR SURGERY PROCEDURE UNLIST Left 10/26/2017    AVF        Family History   Problem Relation Age of Onset    Diabetes Mother     Stroke Mother     Hypertension Mother     No Known Problems Father     Diabetes Sister     Diabetes Brother     Diabetes Sister     Diabetes Sister     Other Sister      fibromyalgia       Social History     Social History Narrative        Social History   Substance Use Topics    Smoking status: Former Smoker     Packs/day: 2.00     Years: 20.00     Quit date: 1995    Smokeless tobacco: Current User    Alcohol use No        History   Drug Use Not on file         No Known Allergies    Prior to Admission medications    Medication Sig Start Date End Date Taking? Authorizing Provider   hydrALAZINE (APRESOLINE) 100 mg tablet Take 1 Tab by mouth three (3) times daily. 4/19/18   Ene Osman MD   pregabalin (LYRICA) 50 mg capsule Take 1 Cap by mouth daily. Max Daily Amount: 50 mg. 4/19/18   Ene Osman MD   levETIRAcetam (KEPPRA) 500 mg tablet Take 1 Tab by mouth every twelve (12) hours every twelve (12) hours. 4/19/18   Ene Osman MD   torsemide [facility]) 20 mg tablet Take 20 mg by mouth daily. Historical Provider   insulin glargine (LANTUS) 100 unit/mL injection 15 Units by SubCUTAneous route daily. 4/4/18   Tyrone Kyle MD   isosorbide dinitrate (ISORDIL) 20 mg tablet Take 1 Tab by mouth three (3) times daily. 4/4/18   Tyrone Kyle MD   aspirin (ASPIRIN) 325 mg tablet Take 1 Tab by mouth daily. 4/4/18   Tyrone Kyle MD   pravastatin (PRAVACHOL) 40 mg tablet Take 1 Tab by mouth nightly.  4/4/18   Tyrone Kyle MD   [facility] INSULIN 100 unit/mL kwikpen 5 units three times a day with meals plus correction scale, 2 units/50 > 150, max daily dose 25 units 2/28/18   Rose Fabry Rumbaut, PA-C   calcifediol (RAYALDEE) 30 mcg Cs24 Take  by mouth nightly. Historical Provider   diphenoxylate-atropine (LOMOTIL) 2.5-0.025 mg per tablet Take 1 Tab by mouth two (2) times daily as needed for Diarrhea. Indications: Diarrhea    Historical Provider   ferrous sulfate 325 mg (65 mg iron) tablet Take 325 mg by mouth two (2) times a day. Historical Provider   amLODIPine (NORVASC) 10 mg tablet Take 10 mg by mouth every morning. Historical Provider   metoprolol succinate (TOPROL-XL) 50 mg XL tablet Take 50 mg by mouth every morning. Historical Provider   omeprazole (PRILOSEC) 20 mg capsule Take 20 mg by mouth every morning.     Historical Provider           Review of Systems    Constitutional: well nourished male in NAD  Eyes:  no change in visual acuity, no photophobia  Ears, nose, mouth, throat, and face: no  Odynphagia, dysphagia, no thrush or exudate, negative for chronic sinus congestion, recurrent headaches  Respiratory: negative for SOB, hemoptysis or cough  Cardiovascular: negative for CP, palpitations, or PND  Gastrointestinal: negative for abdominal pain, no hematemesis, hematochezia or BRBPR  Genitourinary: difficulty passing urine   Integument/breast: negative for skin rash or skin lesions  Hematologic/lymphatic: negative for known bleeding disorder  Musculoskeletal:negative for joint pain or joint tenderness  Neurological: negative for lightheadedness, syncope or presyncopal events, no seizure or CVA history  Behavioral/Psych: negative for depression or chronic anxiety,   Endocrine: negative for polydyspia, polyuria or intolerance to heat or cold  Allergic/Immunologic: negative for chronic allergic rhinitis, or known connective tissue disorder      Objective:     Visit Vitals    /63    Pulse 71    Temp 97.8 °F (36.6 °C)    Resp 19    Ht 6' 3\" (1.905 m)    Wt 87.1 kg (192 lb)    SpO2 100%    BMI 24 kg/m2                Data Review:   Recent Results (from the past 24 hour(s))   CBC WITH AUTOMATED DIFF    Collection Time: 04/29/18  6:26 PM   Result Value Ref Range    WBC 6.7 4.3 - 11.1 K/uL    RBC 3.87 (L) 4.23 - 5.67 M/uL    HGB 10.1 (L) 13.6 - 17.2 g/dL    HCT 31.1 (L) 41.1 - 50.3 %    MCV 80.4 79.6 - 97.8 FL    MCH 26.1 26.1 - 32.9 PG    MCHC 32.5 31.4 - 35.0 g/dL    RDW 16.6 (H) 11.9 - 14.6 %    PLATELET 652 523 - 685 K/uL    MPV 10.1 (L) 10.8 - 14.1 FL    DF AUTOMATED      NEUTROPHILS 75 43 - 78 %    LYMPHOCYTES 17 13 - 44 %    MONOCYTES 7 4.0 - 12.0 %    EOSINOPHILS 0 (L) 0.5 - 7.8 %    BASOPHILS 0 0.0 - 2.0 %    IMMATURE GRANULOCYTES 1 0.0 - 5.0 %    ABS. NEUTROPHILS 5.0 1.7 - 8.2 K/UL    ABS. LYMPHOCYTES 1.2 0.5 - 4.6 K/UL    ABS. MONOCYTES 0.5 0.1 - 1.3 K/UL    ABS. EOSINOPHILS 0.0 0.0 - 0.8 K/UL    ABS. BASOPHILS 0.0 0.0 - 0.2 K/UL    ABS. IMM.  GRANS. 0.1 0.0 - 0.5 K/UL   METABOLIC PANEL, BASIC    Collection Time: 04/29/18  6:26 PM   Result Value Ref Range    Sodium 148 (H) 136 - 145 mmol/L    Potassium 4.5 3.5 - 5.1 mmol/L    Chloride 118 (H) 98 - 107 mmol/L    CO2 13 (L) 21 - 32 mmol/L    Anion gap 17 (H) 7 - 16 mmol/L    Glucose 235 (H) 65 - 100 mg/dL    BUN 94 (H) 8 - 23 MG/DL    Creatinine 7.67 (H) 0.8 - 1.5 MG/DL    GFR est AA 9 (L) >60 ml/min/1.73m2    GFR est non-AA 7 (L) >60 ml/min/1.73m2    Calcium 7.5 (L) 8.3 - 10.4 MG/DL   CK WITH MB    Collection Time: 04/29/18  6:26 PM   Result Value Ref Range    CK 1485 (H) 21 - 215 U/L    CK - MB 15.2 (H) 0.5 - 3.6 ng/ml    CK-MB Index 1.0 <2.5 %   MAGNESIUM    Collection Time: 04/29/18  6:26 PM   Result Value Ref Range    Magnesium 1.7 (L) 1.8 - 2.4 mg/dL   URINALYSIS W/ RFLX MICROSCOPIC    Collection Time: 04/29/18 10:06 PM   Result Value Ref Range    Color YELLOW      Appearance CLEAR      Specific gravity 1.011 1.001 - 1.023      pH (UA) 5.0 5.0 - 9.0      Protein NEGATIVE  NEG mg/dL    Glucose NEGATIVE mg/dL    Ketone NEGATIVE  NEG mg/dL    Bilirubin NEGATIVE  NEG      Blood TRACE (A) NEG      Urobilinogen 0.2 0.2 - 1.0 EU/dL    Nitrites NEGATIVE  NEG      Leukocyte Esterase NEGATIVE  NEG      WBC 0-3 0 /hpf    RBC 3-5 0 /hpf    Epithelial cells 0 0 /hpf    Bacteria 0 0 /hpf    Casts 0 0 /lpf       Physical Exam:     General:  Alert, cooperative, no distress, appears stated age. Eyes:  Conjunctivae/corneas clear. PERRL, EOMs intact. Fundi benign   Ears:  Normal TMs and external ear canals both ears. Nose: Nares normal. Septum midline. Mucosa normal. No drainage or sinus tenderness. Mouth/Throat: Lips, mucosa, and tongue normal. Teeth and gums normal.   Neck: Supple, symmetrical, trachea midline, no adenopathy, thyroid: no enlargment/tenderness/nodules, no carotid bruit and no JVD. Back:   Symmetric, no curvature. ROM normal. No CVA tenderness. Lungs:   Clear to auscultation bilaterally. Heart:  Regular rate and rhythm, S1, S2 normal, no murmur, click, rub or gallop. Abdomen:   Soft, non-tender. Bowel sounds normal. No masses,  No organomegaly. Extremities: Extremities normal, atraumatic, venous stasis b/l LE. Skin: Skin color, texture, turgor normal. No rashes or lesions   Lymph nodes: Cervical, supraclavicular, and axillary nodes normal.   Neurologic: CNII-XII intact. Normal strength, sensation and reflexes throughout. Assessment and Plan     Active Problems:    Renal failure (ARF), acute on chronic (HCC) (4/29/2018)      Mild Rhabdomyolysis: related to the fall. On IV hydration with NS at 100 cc/hr. Recheck in am. Hold statin. Torsemide on hold. Fall: No reported trauma. Deconditioning. PT/OT eval. Joints appears wnl on exam.     Metabolic acidosis: In setting of uremia. On IV fluids with bicarb. Urinary retention: Pt had about 1900 cc on palcing chan. Obtain UA. ALICJA on CKD: Creatinine worsened from baseline. On gentle IV hydration. Urinary retention as well.  Could be obstructive component also involved in ALICJA. Monitor. Patient has refused dialysis in the past. Nephrology consulted. renal diet. Renal US     Essential HTN: continue amlodipine and hydralazine PO TID. Monitor     GERD: On PPI     Diabetes mellitus with complications: On Lantis 15 units daily at home. Start 8 units daily in setting of worse renal clearance. Will start on SSI. Monitor and adjust doses accordingly. Hyperlipidemia: Hold statin in setting of mild rhabdo    Dementia: no acute issues     hx of seizure disorder: continue keppra     DVT prophylaxis: Sub Q heparin     Dispo: will place ppd, PT/OT consult.      Signed By: Pushpa Guo MD   April 29, 2018

## 2018-04-30 NOTE — PROGRESS NOTES
Care Management Interventions  PCP Verified by CM: Yes  Transition of Care Consult (CM Consult): Discharge Planning  Physical Therapy Consult: Yes  Occupational Therapy Consult: Yes  Current Support Network: Own Home, Lives Alone  Confirm Follow Up Transport: Family  Plan discussed with Pt/Family/Caregiver: Yes  Freedom of Choice Offered: Yes  Discharge Location  Discharge Placement:  (recommend REHAB)     Met with patient's son outside of the room. His name is Gordo Arenas and his cell is [phone]. Kevin Moe is at a loss because his father has been failing to thrive at home. He has been falling and has been found on the floor a few times. He has some hours with TC and will place a call to see if we can get those hours extended. Currently patient agreeing to go and stay the entire program at short term rehab. Asking for a private room. Referred to the [facility] and Nito davidsonab and Alexandra. Galen Elizalde

## 2018-04-30 NOTE — PROGRESS NOTES
Brief visit with patient. Haylie.     Darwin Coronado, staff [address]andra [address], [address] [facility]  /   [email or web address]

## 2018-04-30 NOTE — PROGRESS NOTES
Hospitalist Progress Note    Patient: SILVIO Mckenzie. MRN: [id number]  SSN: [id number]    YOB: 1940  Age: 69 y.o. Sex: male      Admit Date: 4/29/2018    LOS: 1 day     Subjective:     From H&P: \"72 y/o male with pmh of CKD, diastolic CHF, type 2 DM with complications, dementia, pancreatitis, essential HTN, GERD, HLD presented to the ER after a fall. Patient slid of the chair yesterday and he could not get up from the floor. Patient lied down on the floor until family helped him today and brought him to the ER. He denies hitting anywhere during the fall. He is able to move all extremities and denies any specific joint pain. He does have some mild generalized discomfort following the fall. He is alert and oriented during my encounter and able to answer my questions. \"    4/30 - The patient states that he feels better today. Still feels weak. Denies muscle aches. Denies CP/SOB. States he is still urinating normally. Review of systems negative except stated above. Objective:     Visit Vitals    /62 (BP 1 Location: Left arm, BP Patient Position: At rest)    Pulse 90    Temp 97.8 °F (36.6 °C)    Resp 19    Ht 6' 3\" (1.905 m)    Wt 87.1 kg (192 lb)    SpO2 99%    BMI 24 kg/m2      Oxygen Therapy  O2 Sat (%): 99 % (04/30/18 0742)  Pulse via Oximetry: 71 beats per minute (04/29/18 2201)  O2 Device: Room air (04/30/18 1127)      Intake and Output: No intake or output data in the 24 hours ending 04/30/18 1150      Physical Exam:   GENERAL: alert, cooperative, no distress, appears stated age  EYE: conjunctivae/corneas clear. PERRL. THROAT & NECK: normal and no erythema or exudates noted. LUNG: clear to auscultation bilaterally  HEART: regular rate and rhythm, S1S2, no murmur, no JVD  ABDOMEN: soft, non-tender, non-distended. Bowel sounds normal.   EXTREMITIES:  No edema, multiple bilateral venous stasis wounds  SKIN: multiple LE venous stasis wounds  NEUROLOGIC: Alert.  Cranial nerves 2-12 grossly intact. Lab/Data Review:  Recent Results (from the past 24 hour(s))   CBC WITH AUTOMATED DIFF    Collection Time: 04/29/18  6:26 PM   Result Value Ref Range    WBC 6.7 4.3 - 11.1 K/uL    RBC 3.87 (L) 4.23 - 5.67 M/uL    HGB 10.1 (L) 13.6 - 17.2 g/dL    HCT 31.1 (L) 41.1 - 50.3 %    MCV 80.4 79.6 - 97.8 FL    MCH 26.1 26.1 - 32.9 PG    MCHC 32.5 31.4 - 35.0 g/dL    RDW 16.6 (H) 11.9 - 14.6 %    PLATELET 264 512 - 280 K/uL    MPV 10.1 (L) 10.8 - 14.1 FL    DF AUTOMATED      NEUTROPHILS 75 43 - 78 %    LYMPHOCYTES 17 13 - 44 %    MONOCYTES 7 4.0 - 12.0 %    EOSINOPHILS 0 (L) 0.5 - 7.8 %    BASOPHILS 0 0.0 - 2.0 %    IMMATURE GRANULOCYTES 1 0.0 - 5.0 %    ABS. NEUTROPHILS 5.0 1.7 - 8.2 K/UL    ABS. LYMPHOCYTES 1.2 0.5 - 4.6 K/UL    ABS. MONOCYTES 0.5 0.1 - 1.3 K/UL    ABS. EOSINOPHILS 0.0 0.0 - 0.8 K/UL    ABS. BASOPHILS 0.0 0.0 - 0.2 K/UL    ABS. IMM.  GRANS. 0.1 0.0 - 0.5 K/UL   METABOLIC PANEL, BASIC    Collection Time: 04/29/18  6:26 PM   Result Value Ref Range    Sodium 148 (H) 136 - 145 mmol/L    Potassium 4.5 3.5 - 5.1 mmol/L    Chloride 118 (H) 98 - 107 mmol/L    CO2 13 (L) 21 - 32 mmol/L    Anion gap 17 (H) 7 - 16 mmol/L    Glucose 235 (H) 65 - 100 mg/dL    BUN 94 (H) 8 - 23 MG/DL    Creatinine 7.67 (H) 0.8 - 1.5 MG/DL    GFR est AA 9 (L) >60 ml/min/1.73m2    GFR est non-AA 7 (L) >60 ml/min/1.73m2    Calcium 7.5 (L) 8.3 - 10.4 MG/DL   CK WITH MB    Collection Time: 04/29/18  6:26 PM   Result Value Ref Range    CK 1485 (H) 21 - 215 U/L    CK - MB 15.2 (H) 0.5 - 3.6 ng/ml    CK-MB Index 1.0 <2.5 %   MAGNESIUM    Collection Time: 04/29/18  6:26 PM   Result Value Ref Range    Magnesium 1.7 (L) 1.8 - 2.4 mg/dL   URINALYSIS W/ RFLX MICROSCOPIC    Collection Time: 04/29/18 10:06 PM   Result Value Ref Range    Color YELLOW      Appearance CLEAR      Specific gravity 1.011 1.001 - 1.023      pH (UA) 5.0 5.0 - 9.0      Protein NEGATIVE  NEG mg/dL    Glucose NEGATIVE  mg/dL    Ketone NEGATIVE  NEG mg/dL Bilirubin NEGATIVE  NEG      Blood TRACE (A) NEG      Urobilinogen 0.2 0.2 - 1.0 EU/dL    Nitrites NEGATIVE  NEG      Leukocyte Esterase NEGATIVE  NEG      WBC 0-3 0 /hpf    RBC 3-5 0 /hpf    Epithelial cells 0 0 /hpf    Bacteria 0 0 /hpf    Casts 0 0 /lpf   GLUCOSE, POC    Collection Time: 04/30/18  2:08 AM   Result Value Ref Range    Glucose (POC) 199 (H) 65 - 100 mg/dL   GLUCOSE, POC    Collection Time: 04/30/18  7:38 AM   Result Value Ref Range    Glucose (POC) 225 (H) 65 - 100 mg/dL       Imaging:  Us Retroperitoneum Comp    Result Date: 4/29/2018  IMPRESSION: Bilateral renal atrophy with medical renal disease. No evidence of hydronephrosis. Date Of Dictation: 4/29/2018 11:16 PM      Cultures:   All Micro Results     None          Assessment/Plan:     Principal Problem:    Renal failure (ARF), acute on chronic (HCC) (4/29/2018)  - Likely due to dehydration vs rhabdo  - Repeat BMP pending  - Start normal saline @ 125 ml/hr  - Nephrology consulted  - Renal U/S with no hydronephrosis/obstruction    Active Problems:    Chronic systolic congestive heart failure ([facility] [address] (9/23/2016)  - No acute issues  - Monitor closely on IVFs  - Continue Toprol      Rhabdomyolysis (4/30/2018)  - Repeat CK this AM  - Start normal saline  - No complaints of muscle aches      Metabolic acidosis (9/15/5789)  - Likely due to ALICJA  - BMP pending today      Essential hypertension (9/22/2016)  - Stable  - Continue Hydralazine TID  - Continue Toprol XL  - Continue Amlodipine      Hypernatremia (4/18/2018)  - Likely due to dehydration  - Change Bicarb gtt to normal saline  - Check BMP daily      Dementia without behavioral disturbance (4/19/2018)      Stage 4 chronic kidney disease ([address] (4/4/2017)  - Nephrology consulted  - Has stated in past that he does not want dialysis      Uncontrolled diabetes mellitus, with long-term current use of insulin ([address] (2/28/2018)  - Stable  - Continue Lanuts 8U QHS  - Humalog SSI      GERD (gastroesophageal reflux disease) (9/22/2016)  - Continue Protonix      Diabetic neuropathy ([facility] [address] (9/22/2016)  - No acute issues  - Continue Lyrica      Iron (Fe) deficiency anemia (9/22/2016)  - Stable  - Continue Ferrous Sulfate  - Check CBC daily      Venous stasis ulcer of left lower extremity ([facility] [address] (4/1/2017)  - Stable    Dispo - Repeat labs. PT eval --> will likely need rehab.     DIET RENAL Regular    DVT Prophylaxis: Heparin      Signed By: Candace Ramsey,      April 30, 2018

## 2018-04-30 NOTE — PROGRESS NOTES
TRANSFER - IN REPORT:    Verbal report received from MARTINE Bell(name) on A B Brian Peat.  being received from ER(unit) for       Report consisted of patients Situation, Background, Assessment and   Recommendations(SBAR). Information from the following report(s) SBAR and ED Summary was reviewed with the receiving nurse. Opportunity for questions and clarification was provided. Assessment completed upon patients arrival to unit and care assumed.

## 2018-04-30 NOTE — PROGRESS NOTES
Problem: Mobility Impaired (Adult and Pediatric)  Goal: *Acute Goals and Plan of Care (Insert Text)  LTG:  (1.)Mr. Mohsen Rosado will move from supine to sit and sit to supine, scoot up and down and roll side to side INDEPENDENTLY with bed flat within 7 treatment day(s). (2.)Mr. Mohsen Rosado will transfer from bed to chair and chair to bed with MODIFIED INDEPENDENCE using the least restrictive device within 7 treatment day(s). (3.)Mr. Mohsen Rosado will ambulate with SUPERVISION for 250+ feet with the least restrictive device within 7 treatment day(s). (4.)Mr. Mohsen Rosado will ascend and descend 4 steps with STAND BY ASSIST using handrail within 7 days. ________________________________________________________________________________________________    PHYSICAL THERAPY: Initial Assessment, AM 4/30/2018  INPATIENT: Hospital Day: 2  Payor: CARE IMPROVEMENT PLUS / [place] / Product Type: "[employer] Care Medicare /      NAME/AGE/GENDER: Beth Holden is a 69 y.o. male   PRIMARY DIAGNOSIS: Renal failure (ARF), acute on chronic (HCC) <principal problem not specified> <principal problem not specified>        ICD-10: Treatment Diagnosis:    · Generalized Muscle Weakness (M62.81)  · Difficulty in walking, Not elsewhere classified (R26.2)  · Other abnormalities of gait and mobility (R26.89)  · History of falling (Z91.81)   Precaution/Allergies:  Review of patient's allergies indicates no known allergies. ASSESSMENT:     Mr. Mohsen Rosado is a 69year old male admitted with renal failure and is s/p slide to the floor from his recliner at home. Lives alone and typically ambulates with cane, occasionally walker. He presents in supine today without complaints and is agreeable to therapy assessment. Transfers to sitting with supervision and additional time. LE assessment shows AROM WFL bilaterally with strength grossly 4/5. Sensation intact/equal. AROM is very limited in B UEs, specifically left LE (pt is right handed). Shoulder flex/abd limited to ~90 deg on right and 45 deg on left. Mr. Jeny Grant needs min-mod assist for sit-stand transfer from edge of bed then demonstrates fair to fair+ standing balance with walker. Ambulates total of 100' in room and hallway with RW and min assist for safety due to weakness and balance impairments. Pt seems motivated to continue and increase gait distance. Returned to room and up to recliner afterwards with LEs elevated, needs in reach. Jose E Miner. is functioning below baseline with strength, mobility, balance, and activity tolerance. He will benefit from continued therapy during acute care stay to address deficits and maximize safety/independence. Discharge needs TBD pending progress. Due to living alone, he may benefit from a short rehab stay but will follow along and update recommendations as pt progresses. This section established at most recent assessment   PROBLEM LIST (Impairments causing functional limitations):  1. Decreased Strength  2. Decreased ADL/Functional Activities  3. Decreased Transfer Abilities  4. Decreased Ambulation Ability/Technique  5. Decreased Balance  6. Decreased Activity Tolerance   INTERVENTIONS PLANNED: (Benefits and precautions of physical therapy have been discussed with the patient.)  1. Balance Exercise  2. Bed Mobility  3. Gait Training  4. Therapeutic Activites  5. Therapeutic Exercise/Strengthening  6. Transfer Training  7. Group Therapy     TREATMENT PLAN: Frequency/Duration: 3 times a week for duration of hospital stay  Rehabilitation Potential For Stated Goals: Good     RECOMMENDED REHABILITATION/EQUIPMENT: (at time of discharge pending progress): Due to the probability of continued deficits (see above) this patient will likely need continued skilled physical therapy after discharge.   Equipment:    TBD pending progress              HISTORY:   History of Present Injury/Illness (Reason for Referral):  Per H&P, \"74 y/o male with pmh of CKD, diastolic CHF, type 2 DM with complications, dementia, pancreatitis, essential HTN, GERD, HLD presented to the ER after a fall. Patient slid of the chair yesterday and he could not get up from the floor. Patient lied down on the floor until family helped him today and brought him to the ER. He denies hitting anywhere during the fall. He is able to move all extremities and denies any specific joint pain. He does have some mild generalized discomfort following the fall. He is alert and oriented during my encounter and able to answer my questions.      Labs on admission shows ALICJA with creatinine of 7.6 which is worse from his baseline. He is also acidotic with a bicarb of 13. Potassium wnl. He is hypernatremic. CPK elavted at 1400. UA is negative. \"    Past Medical History/Comorbidities:   Mr. Chary Robles  has a past medical history of Acute renal failure superimposed on stage 3 chronic kidney disease ([address] (9/21/2016); Anemia; Chronic kidney disease; Chronic pancreatitis ([address] (9/22/2016); Dermatophytosis of nail (12/6/2016); Diabetic neuropathy ([address] (9/22/2016); Essential hypertension (9/22/2016); GERD (gastroesophageal reflux disease); Hypercholesterolemia; Iron (Fe) deficiency anemia (9/22/2016); Kidney disease; Septicemia due to Klebsiella pneumoniae ([address] (9/22/2016); Systolic CHF, chronic ([address] (9/23/2016); Type II diabetes mellitus with nephropathy ([address] (09/22/2016); Venous insufficiency (12/6/2016); and Xerosis cutis (12/6/2016). He also has no past medical history of Adverse effect of anesthesia; Congestive heart failure, unspecified; Difficult intubation; Malignant hyperthermia due to anesthesia; Nausea & vomiting; or Pseudocholinesterase deficiency. Mr. Chary Robles  has a past surgical history that includes hx hernia repair (Left, 2010); hx colonoscopy; hx turp (2012); hx prostatectomy (2012); and vascular surgery procedure unlist (Left, 10/26/2017).   Social History/Living Environment:   Home Environment: Private residence  # Steps to Enter: 4  Rails to Enter: Yes  [employer] : Right  Wheelchair Ramp: No  One/Two Story Residence: One story  Living Alone: Yes  Support Systems: Family member(s), Home care staff  Patient Expects to be Discharged to[de-identified] Private residence  Current DME Used/Available at Home: [address], [place], straight, Walker, rolling, Shower chair  Tub or Shower Type: Shower  Prior Level of Function/Work/Activity:  Valentine Rollins. lives alone in single story home with 4 steps. Cane use primarily, has rolling walker. Does not drive. Has assist the home 3 days/wk- helps with housekeeping, etc and not necessarily caring for Mr. Amna Douglas. Number of Personal Factors/Comorbidities that affect the Plan of Care: 1-2: MODERATE COMPLEXITY   EXAMINATION:   Most Recent Physical Functioning:   Gross Assessment:  AROM: Within functional limits (B LES WFL, decreased significantly B UEs)  Strength: Generally decreased, functional (LEs 4/5)  Coordination: Generally decreased, functional  Sensation: Intact (to light touch and equal B LEs)               Posture:  Posture (WDL): Exceptions to WDL  Posture Assessment: Forward head, Rounded shoulders  Balance:  Sitting: Intact  Standing: Impaired  Standing - Static: Fair  Standing - Dynamic : Fair Bed Mobility:  Rolling: Supervision  Supine to Sit: Supervision; Additional time  Scooting: Supervision  Wheelchair Mobility:     Transfers:  Sit to Stand: Minimum assistance  Stand to Sit: Contact guard assistance  Bed to Chair: Minimum assistance  Gait:     Base of Support: Center of gravity altered  Speed/Melva: Delayed; Slow  Step Length: Left shortened;Right shortened  Gait Abnormalities: Shuffling gait; Decreased step clearance  Distance (ft): 100 Feet (ft)  Assistive Device: Walker, rolling;Gait belt  Ambulation - Level of Assistance: Minimal assistance  Interventions: Verbal cues; Safety awareness training; Tactile cues      Body Structures Involved:  1.  Muscles Body Functions Affected:  1. Movement Related Activities and Participation Affected:  1. General Tasks and Demands  2. Mobility  3. Self Care  4. Domestic Life  5. Community, Social and [place]ar Irene   Number of elements that affect the Plan of Care: 4+: HIGH COMPLEXITY   CLINICAL PRESENTATION:   Presentation: Stable and uncomplicated: LOW COMPLEXITY   CLINICAL DECISION MAKIN [facility] Mobility Inpatient Short Form  How much difficulty does the patient currently have. .. Unable A Lot A Little None   1. Turning over in bed (including adjusting bedclothes, sheets and blankets)? [] 1   [] 2   [] 3   [x] 4   2. Sitting down on and standing up from a chair with arms ( e.g., wheelchair, bedside commode, etc.)   [] 1   [] 2   [x] 3   [] 4   3. Moving from lying on back to sitting on the side of the bed? [] 1   [] 2   [x] 3   [] 4   How much help from another person does the patient currently need. .. Total A Lot A Little None   4. Moving to and from a bed to a chair (including a wheelchair)? [] 1   [] 2   [x] 3   [] 4   5. Need to walk in hospital room? [] 1   [] 2   [x] 3   [] 4   6. Climbing 3-5 steps with a railing? [] 1   [x] 2   [] 3   [] 4   © , Trustees of [address] [place] [address] [address] [id number], under license to [employer]. All rights reserved      Score:  Initial: 18 Most Recent: X (Date: -- )    Interpretation of Tool:  Represents activities that are increasingly more difficult (i.e. Bed mobility, Transfers, Gait). Score 24 23 22-20 19-15 14-10 9-7 6     Modifier CH CI CJ CK CL CM CN      ?  Mobility - Walking and Moving Around:     - CURRENT STATUS: CK - 40%-59% impaired, limited or restricted    - GOAL STATUS: CI - 1%-19% impaired, limited or restricted    - D/C STATUS:  ---------------To be determined---------------  Payor: CARE IMPROVEMENT PLUS [place] / Product Type: Managed Care Medicare /      Medical Necessity:     · Patient demonstrates good rehab potential due to higher previous functional level. Reason for Services/Other Comments:  · Patient continues to demonstrate capacity to improve strength, mobility, balance, transfers, activity tolerance which will increase independence, decrease amount of assistance required from caregiver and increase safety. Use of outcome tool(s) and clinical judgement create a POC that gives a: Clear prediction of patient's progress: LOW COMPLEXITY            TREATMENT:   (In addition to Assessment/Re-Assessment sessions the following treatments were rendered)   Pre-treatment Symptoms/Complaints:  \"thank you\"  Pain: Initial:   Pain Intensity 1: 0  Post Session:  0/10     Assessment/Reassessment only, no treatment provided today    Braces/Orthotics/Lines/Etc:   · IV  · chan catheter  · O2 Device: Room air  Treatment/Session Assessment:    · Response to Treatment:  Pt performs mobility with min assist. Weak. · Interdisciplinary Collaboration:   o Physical Therapist  o Registered Nurse  o Physician  · After treatment position/precautions:   o Up in chair  o Bed/Chair-wheels locked  o Bed in low position  o Call light within reach   · Compliance with Program/Exercises: Will assess as treatment progresses. · Recommendations/Intent for next treatment session: \"Next visit will focus on advancements to more challenging activities and reduction in assistance provided\".   Total Treatment Duration:  PT Patient Time In/Time Out  Time In: 1100  Time Out: [address] [facility], [facility]

## 2018-04-30 NOTE — ROUTINE PROCESS
TRANSFER - OUT REPORT:    Verbal report given to [facility] RN(name) on A SHERRY Odell.  being transferred to [address] [place] [address]) for routine progression of care       Report consisted of patients Situation, Background, Assessment and   Recommendations(SBAR). Information from the following report(s) SBAR, ED Summary, [facility] and Recent Results was reviewed with the receiving nurse. Lines:   Peripheral IV 04/29/18 Right Forearm (Active)   Site Assessment Clean, dry, & intact 4/29/2018  7:54 PM   Phlebitis Assessment 0 4/29/2018  7:54 PM   Infiltration Assessment 0 4/29/2018  7:54 PM   Dressing Status Clean, dry, & intact 4/29/2018  7:54 PM   Dressing Type Transparent 4/29/2018  7:54 PM        Opportunity for questions and clarification was provided.       Patient transported with:   IT MOVES IT

## 2018-04-30 NOTE — PROGRESS NOTES
Re-Consult Note [facility]    Follow-Up on: ALICJA on CKD stage IV    HPI: Pt with recurrent hospitalizations for falls and chest pain. He continues to refuse any aggressive intervention. He would probably benefit with home with hospice and comfort care and certainly not a full code. I have been reconsulted for worsening azotemia. He has been seen and examined. ROS:  Denies CP, SOB.     Current Facility-Administered Medications   Medication Dose Route Frequency    amLODIPine (NORVASC) tablet 10 mg  10 mg Oral 7am    aspirin (ASPIRIN) tablet 325 mg  325 mg Oral DAILY    ferrous sulfate tablet 325 mg  325 mg Oral BID    hydrALAZINE (APRESOLINE) tablet 100 mg  100 mg Oral TID    insulin glargine (LANTUS) injection 8 Units  8 Units SubCUTAneous DAILY    isosorbide dinitrate (ISORDIL) tablet 20 mg  20 mg Oral TID    levETIRAcetam (KEPPRA) tablet 500 mg  500 mg Oral Q12H    metoprolol succinate (TOPROL-XL) XL tablet 50 mg  50 mg Oral 7am    pantoprazole (PROTONIX) tablet 40 mg  40 mg Oral ACB    pregabalin (LYRICA) capsule 50 mg  50 mg Oral DAILY    insulin lispro (HUMALOG) injection   SubCUTAneous TIDAC    sodium bicarbonate (8.4%) 100 mEq in dextrose 5% 1,000 mL infusion   IntraVENous CONTINUOUS    sodium chloride (NS) flush 5-10 mL  5-10 mL IntraVENous Q8H    sodium chloride (NS) flush 5-10 mL  5-10 mL IntraVENous PRN    acetaminophen (TYLENOL) tablet 650 mg  650 mg Oral Q4H PRN    ondansetron (ZOFRAN) injection 4 mg  4 mg IntraVENous Q4H PRN    bisacodyl (DULCOLAX) tablet 5 mg  5 mg Oral DAILY PRN    heparin (porcine) injection 5,000 Units  5,000 Units SubCUTAneous Q8H    tuberculin injection 5 Units  5 Units IntraDERMal ONCE       Exam:  Vitals:    04/30/18 0040 04/30/18 0434 04/30/18 0742 04/30/18 1212   BP: 144/60 136/70 139/62 146/72   Pulse: 73 76 90 63   Resp: 19 19 19 19   Temp: 97.9 °F (36.6 °C) 98.2 °F (36.8 °C) 97.8 °F (36.6 °C) 97.4 °F (36.3 °C)   SpO2: 99% 98% 99% 98%   Weight: Height:             Intake/Output Summary (Last 24 hours) at 04/30/18 1453  Last data filed at 04/30/18 1420   Gross per 24 hour   Intake             1388 ml   Output              900 ml   Net              488 ml     PE:  GEN - in no distress  CV - regular, no murmur, no rub  Lung - clear bilaterally  Abd - soft, nontender  Ext - no edema    Labs  Recent Labs      04/29/18   1826   WBC  6.7   HGB  10.1*   HCT  31.1*   PLT  319     Recent Labs      04/30/18   1127  04/29/18   1826   NA  149*  148*   K  4.2  4.5   CL  118*  118*   CO2  14*  13*   BUN  84*  94*   CREA  7.11*  7.67*   GLU  231*  235*   CA  7.5*  7.5*   MG   --   1.7*   PHOS  6.5*   --      No results for input(s): PH, PCO2, PO2, PCO2 in the last 72 hours.     Problem List:  Patient Active Problem List    Diagnosis Date Noted    Rhabdomyolysis 18/55/0375    Metabolic acidosis 75/63/4529    Renal failure (ARF), acute on chronic ([address] 04/29/2018    Dementia without behavioral disturbance 04/19/2018    Hypernatremia 04/18/2018    Seizure ([address] 04/18/2018    Volume overload 04/04/2018    Chest pain 04/04/2018    Cauda equina compression ([address] 04/02/2018    Uncontrolled diabetes mellitus, with long-term current use of insulin ([address] 02/28/2018    Carotid bruit 02/28/2018    Hypercholesterolemia     Arteriovenous fistula ([address] 10/31/2017    Onychomycosis 09/13/2017    Controlled type 2 diabetes mellitus with complication, with long-term current use of insulin ([address] 09/13/2017    Stage 4 chronic kidney disease ([address] 04/04/2017    Stasis edema of both lower extremities 04/04/2017    Cellulitis of left lower leg 04/04/2017    Venous stasis ulcer of left lower extremity ([address] 04/01/2017    Venous insufficiency 12/06/2016    Chronic systolic congestive heart failure ([address] 09/23/2016    Septicemia due to Klebsiella pneumoniae ([address] 09/22/2016    Type II diabetes mellitus with nephropathy [facility] 09/22/2016    Essential hypertension 09/22/2016  GERD (gastroesophageal reflux disease) 09/22/2016    Diabetic neuropathy (HCC) 09/22/2016    Chronic pancreatitis ([facility] 09/22/2016    Iron (Fe) deficiency anemia 09/22/2016    Acute renal failure superimposed on stage 3 chronic kidney disease ([facility] 09/21/2016       Issues Addressed By Nephrology:    Plan:    1. Dementia  2. Falls  3. Chest pain  4. CKD stage IV with ALICJA pre-renal azotemia component; Pt refused dialysis  5. Volume depletion siu fluids to include HCO3  Overall this pt is a poor candidate for aggressive intervention and I recommend multidisciplinary meeting with pt s son and myself and primary team on Wednesday when I return. He needs comfort measures and home with hospice. Thanks Alexi Goins. I have spoken to pt's sister Ms bueno and left a VM for his son Ness Silver to meet me here at 10 am Wednesday to come up with a plan for this pt going forward.

## 2018-05-01 LAB
ALBUMIN SERPL-MCNC: 2.5 G/DL (ref 3.2–4.6)
ANION GAP SERPL CALC-SCNC: 11 MMOL/L (ref 7–16)
BUN SERPL-MCNC: 84 MG/DL (ref 8–23)
CALCIUM SERPL-MCNC: 7 MG/DL (ref 8.3–10.4)
CHLORIDE SERPL-SCNC: 117 MMOL/L (ref 98–107)
CK SERPL-CCNC: 737 U/L (ref 21–215)
CO2 SERPL-SCNC: 22 MMOL/L (ref 21–32)
CREAT SERPL-MCNC: 6.83 MG/DL (ref 0.8–1.5)
GLUCOSE BLD STRIP.AUTO-MCNC: 144 MG/DL (ref 65–100)
GLUCOSE BLD STRIP.AUTO-MCNC: 204 MG/DL (ref 65–100)
GLUCOSE BLD STRIP.AUTO-MCNC: 205 MG/DL (ref 65–100)
GLUCOSE BLD STRIP.AUTO-MCNC: 227 MG/DL (ref 65–100)
GLUCOSE BLD STRIP.AUTO-MCNC: 334 MG/DL (ref 65–100)
GLUCOSE BLD STRIP.AUTO-MCNC: 403 MG/DL (ref 65–100)
GLUCOSE SERPL-MCNC: 138 MG/DL (ref 65–100)
MM INDURATION POC: 0 MM (ref 0–5)
MM INDURATION POC: 0 MM (ref 0–5)
PHOSPHATE SERPL-MCNC: 4.6 MG/DL (ref 2.3–3.7)
POTASSIUM SERPL-SCNC: 3.2 MMOL/L (ref 3.5–5.1)
PPD POC: NEGATIVE NEGATIVE
PPD POC: NEGATIVE NEGATIVE
SODIUM SERPL-SCNC: 150 MMOL/L (ref 136–145)

## 2018-05-01 PROCEDURE — 74011000250 HC RX REV CODE- 250: Performed by: INTERNAL MEDICINE

## 2018-05-01 PROCEDURE — 74011250637 HC RX REV CODE- 250/637: Performed by: INTERNAL MEDICINE

## 2018-05-01 PROCEDURE — 82962 GLUCOSE BLOOD TEST: CPT

## 2018-05-01 PROCEDURE — 97165 OT EVAL LOW COMPLEX 30 MIN: CPT

## 2018-05-01 PROCEDURE — 97530 THERAPEUTIC ACTIVITIES: CPT

## 2018-05-01 PROCEDURE — 74011636637 HC RX REV CODE- 636/637: Performed by: INTERNAL MEDICINE

## 2018-05-01 PROCEDURE — 97110 THERAPEUTIC EXERCISES: CPT

## 2018-05-01 PROCEDURE — 74011636637 HC RX REV CODE- 636/637: Performed by: HOSPITALIST

## 2018-05-01 PROCEDURE — 74011250636 HC RX REV CODE- 250/636: Performed by: INTERNAL MEDICINE

## 2018-05-01 PROCEDURE — 82550 ASSAY OF CK (CPK): CPT | Performed by: INTERNAL MEDICINE

## 2018-05-01 PROCEDURE — 65270000029 HC RM PRIVATE

## 2018-05-01 PROCEDURE — 74011000258 HC RX REV CODE- 258: Performed by: INTERNAL MEDICINE

## 2018-05-01 PROCEDURE — 36415 COLL VENOUS BLD VENIPUNCTURE: CPT | Performed by: INTERNAL MEDICINE

## 2018-05-01 PROCEDURE — 74011636637 HC RX REV CODE- 636/637: Performed by: FAMILY MEDICINE

## 2018-05-01 PROCEDURE — 80069 RENAL FUNCTION PANEL: CPT | Performed by: INTERNAL MEDICINE

## 2018-05-01 RX ORDER — DEXTROSE MONOHYDRATE AND SODIUM CHLORIDE 5; .225 G/100ML; G/100ML
75 INJECTION, SOLUTION INTRAVENOUS CONTINUOUS
Status: DISCONTINUED | OUTPATIENT
Start: 2018-05-01 | End: 2018-05-04 | Stop reason: HOSPADM

## 2018-05-01 RX ORDER — INSULIN LISPRO 100 [IU]/ML
15 INJECTION, SOLUTION INTRAVENOUS; SUBCUTANEOUS ONCE
Status: COMPLETED | OUTPATIENT
Start: 2018-05-01 | End: 2018-05-01

## 2018-05-01 RX ORDER — DEXTROSE 50 % IN WATER (D50W) INTRAVENOUS SYRINGE
25-50 AS NEEDED
Status: DISCONTINUED | OUTPATIENT
Start: 2018-05-01 | End: 2018-05-04 | Stop reason: HOSPADM

## 2018-05-01 RX ORDER — DEXTROSE 40 %
15 GEL (GRAM) ORAL AS NEEDED
Status: DISCONTINUED | OUTPATIENT
Start: 2018-05-01 | End: 2018-05-04 | Stop reason: HOSPADM

## 2018-05-01 RX ORDER — POTASSIUM CHLORIDE 1.5 G/1.77G
20 POWDER, FOR SOLUTION ORAL 2 TIMES DAILY WITH MEALS
Status: COMPLETED | OUTPATIENT
Start: 2018-05-01 | End: 2018-05-02

## 2018-05-01 RX ADMIN — ISOSORBIDE DINITRATE 20 MG: 20 TABLET ORAL at 09:22

## 2018-05-01 RX ADMIN — ISOSORBIDE DINITRATE 20 MG: 20 TABLET ORAL at 21:55

## 2018-05-01 RX ADMIN — Medication 10 ML: at 21:56

## 2018-05-01 RX ADMIN — INSULIN GLARGINE 8 UNITS: 100 INJECTION, SOLUTION SUBCUTANEOUS at 09:00

## 2018-05-01 RX ADMIN — HEPARIN SODIUM 5000 UNITS: 5000 INJECTION, SOLUTION INTRAVENOUS; SUBCUTANEOUS at 13:48

## 2018-05-01 RX ADMIN — HEPARIN SODIUM 5000 UNITS: 5000 INJECTION, SOLUTION INTRAVENOUS; SUBCUTANEOUS at 05:50

## 2018-05-01 RX ADMIN — HYDRALAZINE HYDROCHLORIDE 100 MG: 25 TABLET, FILM COATED ORAL at 17:10

## 2018-05-01 RX ADMIN — Medication 10 ML: at 14:00

## 2018-05-01 RX ADMIN — LEVETIRACETAM 500 MG: 250 TABLET, FILM COATED ORAL at 12:29

## 2018-05-01 RX ADMIN — INSULIN HUMAN 4 UNITS: 100 INJECTION, SOLUTION PARENTERAL at 21:58

## 2018-05-01 RX ADMIN — Medication 10 ML: at 05:15

## 2018-05-01 RX ADMIN — LEVETIRACETAM 500 MG: 250 TABLET, FILM COATED ORAL at 01:53

## 2018-05-01 RX ADMIN — POTASSIUM CHLORIDE 20 MEQ: 1.5 POWDER, FOR SOLUTION ORAL at 17:10

## 2018-05-01 RX ADMIN — PREGABALIN 50 MG: 50 CAPSULE ORAL at 09:23

## 2018-05-01 RX ADMIN — FERROUS SULFATE TAB 325 MG (65 MG ELEMENTAL FE) 325 MG: 325 (65 FE) TAB at 09:23

## 2018-05-01 RX ADMIN — HYDRALAZINE HYDROCHLORIDE 100 MG: 25 TABLET, FILM COATED ORAL at 21:57

## 2018-05-01 RX ADMIN — INSULIN LISPRO 15 UNITS: 100 INJECTION, SOLUTION INTRAVENOUS; SUBCUTANEOUS at 02:00

## 2018-05-01 RX ADMIN — HYDRALAZINE HYDROCHLORIDE 100 MG: 25 TABLET, FILM COATED ORAL at 09:23

## 2018-05-01 RX ADMIN — METOPROLOL SUCCINATE 50 MG: 50 TABLET, EXTENDED RELEASE ORAL at 09:23

## 2018-05-01 RX ADMIN — INSULIN LISPRO 4 UNITS: 100 INJECTION, SOLUTION INTRAVENOUS; SUBCUTANEOUS at 07:30

## 2018-05-01 RX ADMIN — PANTOPRAZOLE SODIUM 40 MG: 40 TABLET, DELAYED RELEASE ORAL at 05:15

## 2018-05-01 RX ADMIN — HEPARIN SODIUM 5000 UNITS: 5000 INJECTION, SOLUTION INTRAVENOUS; SUBCUTANEOUS at 21:56

## 2018-05-01 RX ADMIN — AMLODIPINE BESYLATE 10 MG: 10 TABLET ORAL at 09:23

## 2018-05-01 RX ADMIN — DEXTROSE MONOHYDRATE AND SODIUM CHLORIDE 75 ML/HR: 5; .225 INJECTION, SOLUTION INTRAVENOUS at 11:00

## 2018-05-01 RX ADMIN — INSULIN LISPRO 8 UNITS: 100 INJECTION, SOLUTION INTRAVENOUS; SUBCUTANEOUS at 16:30

## 2018-05-01 RX ADMIN — INSULIN LISPRO 4 UNITS: 100 INJECTION, SOLUTION INTRAVENOUS; SUBCUTANEOUS at 11:30

## 2018-05-01 RX ADMIN — ISOSORBIDE DINITRATE 20 MG: 20 TABLET ORAL at 17:09

## 2018-05-01 RX ADMIN — ASPIRIN 325 MG ORAL TABLET 325 MG: 325 PILL ORAL at 09:22

## 2018-05-01 RX ADMIN — FERROUS SULFATE TAB 325 MG (65 MG ELEMENTAL FE) 325 MG: 325 (65 FE) TAB at 17:09

## 2018-05-01 RX ADMIN — SODIUM BICARBONATE: 84 INJECTION, SOLUTION INTRAVENOUS at 05:53

## 2018-05-01 NOTE — PROGRESS NOTES
This patient has been accepted to the [facility] for short term rehab. Dandy Covington

## 2018-05-01 NOTE — PROGRESS NOTES
[facility]        Subjective: A on CKD  Feeling a little better today. Oriented X3     Review of Systems -   General ROS: negative for - fever, chills  Respiratory ROS: no SOB, cough, GARNICA  Cardiovascular ROS: no CP, palpitations  Gastrointestinal ROS: no N&V, abdominal pain, diarrhea  Genito-Urinary ROS: no difficulty voiding, dysuria  Neurological ROS: no seizures, focal weekness        Objective:    Vitals:    05/01/18 0029 05/01/18 0528 05/01/18 0636 05/01/18 0705   BP: 142/66 141/67  141/69   Pulse: 67 69  65   Resp: 18 18 18   Temp: 98.1 °F (36.7 °C) 98.9 °F (37.2 °C)  98.7 °F (37.1 °C)   SpO2: 95% 96%  95%   Weight:   80.7 kg (177 lb 14.4 oz)    Height:           PE  Gen: in no acute distress  CV:reg rate  Chest:clear  Abd: soft  Ext/Access: no edema       . LAB  Recent Labs      04/29/18   1826   WBC  6.7   HGB  10.1*   HCT  31.1*   PLT  319     Recent Labs      05/01/18   0530  04/30/18   1127  04/29/18   1826   NA  150*  149*  148*   K  3.2*  4.2  4.5   CL  117*  118*  118*   CO2  22  14*  13*   GLU  138*  231*  235*   BUN  84*  84*  94*   CREA  6.83*  7.11*  7.67*   MG   --    --   1.7*   PHOS  4.6*  6.5*   --    CA  7.0*  7.5*  7.5*   ALB  2.5*  2.9*   --            Radiology    A/P:   Patient Active Problem List   Diagnosis Code    Acute renal failure superimposed on stage 3 chronic kidney disease (HCC) N17.9, N18.3    Septicemia due to Klebsiella pneumoniae ([facility]) A41.4    Type II diabetes mellitus with nephropathy ([facility]) E11.21    Essential hypertension I10    GERD (gastroesophageal reflux disease) K21.9    Diabetic neuropathy ([facility]) E11.40    Chronic pancreatitis ([facility]) K86.1    Iron (Fe) deficiency anemia D50.9    Chronic systolic congestive heart failure ([facility]) I50.22    Venous insufficiency I87.2    Venous stasis ulcer of left lower extremity (HCC) I83.029, L97.929    Stage 4 chronic kidney disease (HCC) N18.4    Stasis edema of both lower extremities I87.303    Cellulitis of left lower leg L03. 116    Onychomycosis B35.1    Controlled type 2 diabetes mellitus with complication, with long-term current use of insulin ([facility]) E11.8, Z79.4    Arteriovenous fistula ([facility]) I77.0    Hypercholesterolemia E78.00    Uncontrolled diabetes mellitus, with long-term current use of insulin ([facility]) E11.65, Z79.4    Carotid bruit R09.89    Cauda equina compression ([facility]) G83.4    Volume overload E87.70    Chest pain R07.9    Hypernatremia E87.0    Seizure ([facility]) R56.9    Dementia without behavioral disturbance F03.90    Renal failure (ARF), acute on chronic ([facility]) N17.9, N18.9    Rhabdomyolysis J41.37    Metabolic acidosis C55.9       A on CKD - responding to IV fluids,  Better today  Hypernatremia - needs free H20  Will stop IV bicarb and try D5/.2 NS  Hypokalemia - getting replacement   Anemia       Violet Warren MD

## 2018-05-01 NOTE — PROGRESS NOTES
Hospitalist Progress Note    2018  Admit Date: 2018  7:41 PM   NAME: SILVIO Ayon. :  1940   MRN:  [id number]   Attending: Mich Schulz MD  PCP:  Yash Granados MD    SUBJECTIVE:   Patient seen and examined at bedside this morning. He has no complaints. Review of Systems negative with exception of pertinent positives noted above  PHYSICAL EXAM     Visit Vitals    /67 (BP 1 Location: Right arm, BP Patient Position: At rest)    Pulse 66    Temp 98.1 °F (36.7 °C)    Resp 18    Ht 6' 3\" (1.905 m)    Wt 80.7 kg (177 lb 14.4 oz)    SpO2 99%    BMI 22.24 kg/m2      Temp (24hrs), Av.4 °F (36.9 °C), Min:97.8 °F (36.6 °C), Max:98.9 °F (37.2 °C)    Oxygen Therapy  O2 Sat (%): 99 % (18 1531)  Pulse via Oximetry: 71 beats per minute (18 2201)  O2 Device: Room air (18 1100)    Intake/Output Summary (Last 24 hours) at 18 1550  Last data filed at 18 1533   Gross per 24 hour   Intake                0 ml   Output             1500 ml   Net            -1500 ml      General: No acute distress    Lungs:  CTA Bilaterally. Heart:  Regular rate and rhythm,  No murmur, rub, or gallop  Abdomen: Soft, Non distended, Non tender, Positive bowel sounds  Extremities: No cyanosis, clubbing or edema, LE venous stasis wounds.   Neurologic:  No focal deficits    ASSESSMENT      Active Hospital Problems    Diagnosis Date Noted    Rhabdomyolysis     Metabolic acidosis [id number]    Renal failure (ARF), acute on chronic ([address] 2018    Dementia without behavioral disturbance 2018    Hypernatremia 2018    Uncontrolled diabetes mellitus, with long-term current use of insulin ([address] 2018    Stage 4 chronic kidney disease ([address] 2017    Venous stasis ulcer of left lower extremity ([address] 2017    Chronic systolic congestive heart failure ([address] 2016    Iron (Fe) deficiency anemia 2016    Diabetic neuropathy ([facility] 09/22/2016    Essential hypertension 09/22/2016    GERD (gastroesophageal reflux disease) 09/22/2016     Plan:  · Acute on chronic renal failure- most likely insult is rhabdomyolysis. Switched to D5NS today secondary to hypernatremia. Follow nephrology recommendations. Patient staying he is agreeable to possible HD.     · Rhabdomyolysis- continue with fluids, CK trending down  · Metabolic acidosis- improved and taken off of Bicarb drip  · Uncontrolled DM- continue with insulin  · Chronic Systolic HF- continue with beta blocker, aspirin  · Iron deficiency anemia- continue ferrous sulfate    DVT Prophylaxis: Heparin SQ    Signed By: Russell Parmar MD     May 1, 2018

## 2018-05-01 NOTE — INTERDISCIPLINARY ROUNDS
Interdisciplinary Rounds completed 05/01/18. Nursing, Case Management, Physician and PT present.      Plan of care reviewed and updated.

## 2018-05-01 NOTE — PROGRESS NOTES
Problem: Mobility Impaired (Adult and Pediatric)  Goal: *Acute Goals and Plan of Care (Insert Text)  LTG:  (1.)Mr. Rae Aponte will move from supine to sit and sit to supine, scoot up and down and roll side to side INDEPENDENTLY with bed flat within 7 treatment day(s). (2.)Mr. Rae Aponte will transfer from bed to chair and chair to bed with MODIFIED INDEPENDENCE using the least restrictive device within 7 treatment day(s). (3.)Mr. Rae Aponte will ambulate with SUPERVISION for 250+ feet with the least restrictive device within 7 treatment day(s). (4.)Mr. Rae Aponte will ascend and descend 4 steps with STAND BY ASSIST using handrail within 7 days. ________________________________________________________________________________________________    PHYSICAL THERAPY: Daily Note, Treatment Day: 1st, AM 5/1/2018  INPATIENT: Hospital Day: 3  Payor: CARE IMPROVEMENT PLUS / [place] / Product Type: Managed Care Medicare /      NAME/AGE/GENDER: Lupe Alexis is a 69 y.o. male   PRIMARY DIAGNOSIS: Renal failure (ARF), acute on chronic (HCC) Renal failure (ARF), acute on chronic (HCC) Renal failure (ARF), acute on chronic (HCC)        ICD-10: Treatment Diagnosis:    · Generalized Muscle Weakness (M62.81)  · Difficulty in walking, Not elsewhere classified (R26.2)  · Other abnormalities of gait and mobility (R26.89)  · History of falling (Z91.81)   Precaution/Allergies:  Review of patient's allergies indicates no known allergies. ASSESSMENT:     Mr. Rae Aponte presents sitting up in chair without complaints and is agreeable to therapy treatment. Eager to walk. Continues to have difficulty with L UE (OT to assess this morning) and needs heavy mod assist to stand from low chair + additional cueing for technique, positioning.  Once standing he demonstrates improved balance today compared to evaluation and was able to increase gait distance, ambulating 150' with walker and  CGA-brief min assist. Slow, shuffled gait pace with forward trunk flexion noted. Returned to room for rest break then performs below LE exercises for strengthening with great participation. Overall good progress noted however do still feel pt would greatly benefit from rehab at discharge. This section established at most recent assessment   PROBLEM LIST (Impairments causing functional limitations):  1. Decreased Strength  2. Decreased ADL/Functional Activities  3. Decreased Transfer Abilities  4. Decreased Ambulation Ability/Technique  5. Decreased Balance  6. Decreased Activity Tolerance   INTERVENTIONS PLANNED: (Benefits and precautions of physical therapy have been discussed with the patient.)  1. Balance Exercise  2. Bed Mobility  3. Gait Training  4. Therapeutic Activites  5. Therapeutic Exercise/Strengthening  6. Transfer Training  7. Group Therapy     TREATMENT PLAN: Frequency/Duration: 3 times a week for duration of hospital stay  Rehabilitation Potential For Stated Goals: Good     RECOMMENDED REHABILITATION/EQUIPMENT: (at time of discharge pending progress): Due to the probability of continued deficits (see above) this patient will likely need continued skilled physical therapy after discharge. Equipment:    TBD pending progress              HISTORY:   History of Present Injury/Illness (Reason for Referral):  Per H&P, \"72 y/o male with pmh of CKD, diastolic CHF, type 2 DM with complications, dementia, pancreatitis, essential HTN, GERD, HLD presented to the ER after a fall. Patient slid of the chair yesterday and he could not get up from the floor. Patient lied down on the floor until family helped him today and brought him to the ER. He denies hitting anywhere during the fall. He is able to move all extremities and denies any specific joint pain. He does have some mild generalized discomfort following the fall.  He is alert and oriented during my encounter and able to answer my questions.      Labs on admission shows ALICJA with creatinine of 7.6 which is worse from his baseline. He is also acidotic with a bicarb of 13. Potassium wnl. He is hypernatremic. CPK elavted at 1400. UA is negative. \"    Past Medical History/Comorbidities:   Mr. Marta Malone  has a past medical history of Acute renal failure superimposed on stage 3 chronic kidney disease ([facility] (9/21/2016); Anemia; Chronic kidney disease; Chronic pancreatitis ([facility] (9/22/2016); Dermatophytosis of nail (12/6/2016); Diabetic neuropathy ([facility] (9/22/2016); Essential hypertension (9/22/2016); GERD (gastroesophageal reflux disease); Hypercholesterolemia; Iron (Fe) deficiency anemia (9/22/2016); Kidney disease; Septicemia due to Klebsiella pneumoniae ([facility] (9/22/2016); Systolic CHF, chronic ([facility] (9/23/2016); Type II diabetes mellitus with nephropathy ([facility] (09/22/2016); Venous insufficiency (12/6/2016); and Xerosis cutis (12/6/2016). He also has no past medical history of Adverse effect of anesthesia; Congestive heart failure, unspecified; Difficult intubation; Malignant hyperthermia due to anesthesia; Nausea & vomiting; or Pseudocholinesterase deficiency. Mr. Marta Malone  has a past surgical history that includes hx hernia repair (Left, 2010); hx colonoscopy; hx turp (2012); hx prostatectomy (2012); and vascular surgery procedure unlist (Left, 10/26/2017). Social History/Living Environment:   Home Environment: Private residence  # Steps to Enter: 4  Rails to Enter: Yes  Hand Rails : Right  Wheelchair Ramp: No  One/Two Story Residence: One story  Living Alone: Yes  Support Systems: Family member(s), Home care staff  Patient Expects to be Discharged to[de-identified] Private residence  Current DME Used/Available at Home: [place], straight, Walker, rolling, Shower chair  Tub or Shower Type: Shower  Prior Level of Function/Work/Activity:  [employer]. lives alone in single story home with 4 steps. Cane use primarily, has rolling walker. Does not drive.  Has assist the home 3 days/wk- helps with housekeeping, etc and not necessarily caring for Mr. Irma Mckeon. Number of Personal Factors/Comorbidities that affect the Plan of Care: 1-2: MODERATE COMPLEXITY   EXAMINATION:   Most Recent Physical Functioning:   Gross Assessment:  AROM: Within functional limits (B LES WFL, decreased significantly B UEs)  Strength: Generally decreased, functional (LEs 4/5)  Coordination: Generally decreased, functional  Sensation: Intact (to light touch and equal B LEs)               Posture:  Posture (WDL): Exceptions to WDL  Posture Assessment: Forward head, Rounded shoulders  Balance:  Sitting: Intact  Standing: Impaired  Standing - Static: Fair (+)  Standing - Dynamic : Fair Bed Mobility:     Wheelchair Mobility:     Transfers:  Sit to Stand: Moderate assistance  Stand to Sit: Contact guard assistance  Bed to Chair: Contact guard assistance  Interventions: Verbal cues; Safety awareness training; Tactile cues  Duration: 15 Minutes  Gait:     Base of Support: Center of gravity altered  Speed/Melva: Pace decreased (<100 feet/min); Slow  Step Length: Left shortened;Right shortened  Gait Abnormalities: Decreased step clearance  Distance (ft): 150 Feet (ft)  Assistive Device: Walker, rolling;Gait belt  Ambulation - Level of Assistance: Contact guard assistance;Minimal assistance  Interventions: Verbal cues; Safety awareness training; Tactile cues      Body Structures Involved:  1. Muscles Body Functions Affected:  1. Movement Related Activities and Participation Affected:  1. General Tasks and Demands  2. Mobility  3. Self Care  4. Domestic Life  5. Community, Social and [place]   Number of elements that affect the Plan of Care: 4+: HIGH COMPLEXITY   CLINICAL PRESENTATION:   Presentation: Stable and uncomplicated: LOW COMPLEXITY   CLINICAL DECISION MAKIN [facility] Inpatient Short Form  How much difficulty does the patient currently have. .. Unable A Lot A Little None   1.   Turning over in bed (including adjusting bedclothes, sheets and blankets)? [] 1   [] 2   [] 3   [x] 4   2. Sitting down on and standing up from a chair with arms ( e.g., wheelchair, bedside commode, etc.)   [] 1   [] 2   [x] 3   [] 4   3. Moving from lying on back to sitting on the side of the bed? [] 1   [] 2   [x] 3   [] 4   How much help from another person does the patient currently need. .. Total A Lot A Little None   4. Moving to and from a bed to a chair (including a wheelchair)? [] 1   [] 2   [x] 3   [] 4   5. Need to walk in hospital room? [] 1   [] 2   [x] 3   [] 4   6. Climbing 3-5 steps with a railing? [] 1   [x] 2   [] 3   [] 4   © 2007, Trustees of [address] [place] [address], under license to [employer]. All rights reserved      Score:  Initial: 18 Most Recent: X (Date: -- )    Interpretation of Tool:  Represents activities that are increasingly more difficult (i.e. Bed mobility, Transfers, Gait). Score 24 23 22-20 19-15 14-10 9-7 6     Modifier CH CI CJ CK CL CM CN      ? Mobility - Walking and Moving Around:     - CURRENT STATUS: CK - 40%-59% impaired, limited or restricted    - GOAL STATUS: CI - 1%-19% impaired, limited or restricted    - D/C STATUS:  ---------------To be determined---------------  Payor: CARE IMPROVEMENT PLUS / [place] / Product Type: Managed Care Medicare /      Medical Necessity:     · Patient demonstrates good rehab potential due to higher previous functional level. Reason for Services/Other Comments:  · Patient continues to demonstrate capacity to improve strength, mobility, balance, transfers, activity tolerance which will increase independence, decrease amount of assistance required from caregiver and increase safety.    Use of outcome tool(s) and clinical judgement create a POC that gives a: Clear prediction of patient's progress: LOW COMPLEXITY            TREATMENT:   (In addition to Assessment/Re-Assessment sessions the following treatments were rendered) Pre-treatment Symptoms/Complaints:  \"I would love to walk a little bit\"  Pain: Initial:   Pain Intensity 1: 0  Post Session:  0/10     Therapeutic Activity: (  15 Minutes ):  Therapeutic activities including Chair transfers, Ambulation on level ground. standing balance, and transfer technique/mechanics to improve mobility, strength, balance and activity tolerance. Required minimal Verbal cues; Safety awareness training; Tactile cues to promote static and dynamic balance in standing and promote motor control of bilateral, upper extremity(s), lower extremity(s). Therapeutic Exercise: (8 Minutes):  Exercises per grid below to improve mobility and strength. Required minimal visual and verbal cues to promote proper body mechanics. Progressed range and repetitions as indicated. Date:  5/1/18 Date:   Date:     Activity/Exercise Parameters Parameters Parameters   LAQ 15x AB     Seated marching 15x AB     Ankle pumps 15x AB     Seated hip aBd 15x AB                             Braces/Orthotics/Lines/Etc:   · IV  · chan catheter  · O2 Device: Room air  Treatment/Session Assessment:    · Response to Treatment:  Increased gait distance with less assist, heavy mod to stand from low  · Interdisciplinary Collaboration:   o Physical Therapist  o Registered Nurse  · After treatment position/precautions:   o Up in chair  o Bed/Chair-wheels locked  o Bed in low position  o Call light within reach  o OT at bedside for assessment   · Compliance with Program/Exercises: Will assess as treatment progresses. · Recommendations/Intent for next treatment session: \"Next visit will focus on advancements to more challenging activities and reduction in assistance provided\".   Total Treatment Duration:  PT Patient Time In/Time Out  Time In: 1037  Time Out: [place] 80, DPT

## 2018-05-01 NOTE — PROGRESS NOTES
Problem: Self Care Deficits Care Plan (Adult)  Goal: *Acute Goals and Plan of Care (Insert Text)  1. Patient will complete upper body bathing and dressing with minimal assistance and adaptive equipment as needed. 2. Patient will complete toileting with minimal assistance. 3. Patient will tolerate 30 minutes of OT treatment with 2-3 rest breaks to increase activity tolerance for ADLs. 4. Patient will complete functional transfers with supervision and adaptive equipment as needed. 5. Patient will complete functional mobility for household distances and minimal cues for safety. 6. Patient will participate in gentle AROM exercises to improve UE mobility for improving independence with ADL. Timeframe: 7 visits        OCCUPATIONAL THERAPY: Initial Assessment and AM 5/1/2018  INPATIENT: Hospital Day: 3  Payor: CARE IMPROVEMENT PLUS / [place] / Product Type: Managed Care Medicare /      NAME/AGE/GENDER: Kim Dangelo. is a 69 y.o. male   PRIMARY DIAGNOSIS:  Renal failure (ARF), acute on chronic (HCC) Renal failure (ARF), acute on chronic (HCC) Renal failure (ARF), acute on chronic (HCC)        ICD-10: Treatment Diagnosis:    · Generalized Muscle Weakness (M62.81)  · Other lack of cordination (R27.8)  · Repeated Falls (R29.6)   Precautions/Allergies:     Review of patient's allergies indicates no known allergies. ASSESSMENT:     Mr. Raissa Sanchez presents to the hospital after falling at home (slipped from chair) with acute on chronic renal failure. Pt was sitting up in chair doing exercises with PT, but was still agreeable to OT evaluation. Pt is very pleasant, alert, and oriented to person, place, and time (additional cueing for time). Pt reports pain in his L arm and demonstrates limited ROM in B shoulders (L>R) making ADL difficult. Pt demonstrated difficulty pushing through UEs to stand from low chair needing moderate assistance.  Pt did well and was able to complete functional mobility with rolling walker with CGA. Pt did need minimal to moderate cues to stay inside the walker. Pt returned to chair at end of session for lunch. Pt states that nursing staff fed him breakfast this am. Encouraged pt to attempt to feed himself this afternoon with pt agreeable. Pt put forth good effort with therapy this am. Pt is currently functioning below baseline for ADL/functional mobility and will benefit from OT services to address stated goals and plan of care. This section established at most recent assessment   PROBLEM LIST (Impairments causing functional limitations):  1. Decreased Strength  2. Decreased ADL/Functional Activities  3. Decreased Transfer Abilities  4. Decreased Ambulation Ability/Technique  5. Decreased Balance  6. Increased Pain  7. Decreased Activity Tolerance  8. Decreased Flexibility/Joint Mobility  9. Decreased Skin Integrity/Hygeine  10. Decreased [place] with Home Exercise Program  11. Decreased Cognition   INTERVENTIONS PLANNED: (Benefits and precautions of occupational therapy have been discussed with the patient.)  1. Activities of daily living training  2. Adaptive equipment training  3. Balance training  4. Clothing management  5. Cognitive training  6. Donning&doffing training  7. Group therapy  8. Neuromuscular re-eduation  9. Therapeutic activity  10. Therapeutic exercise     TREATMENT PLAN: Frequency/Duration: Follow patient 3 times per week to address above goals. Rehabilitation Potential For Stated Goals: Good     RECOMMENDED REHABILITATION/EQUIPMENT: (at time of discharge pending progress): Due to the probability of continued deficits (see above) this patient will likely need continued skilled occupational therapy after discharge.   Equipment:    TBD              OCCUPATIONAL PROFILE AND HISTORY:   History of Present Injury/Illness (Reason for Referral):  See H&P  Past Medical History/Comorbidities:   Mr. Cristhian Kearney  has a past medical history of Acute renal failure superimposed on stage 3 chronic kidney disease ([facility] (9/21/2016); Anemia; Chronic kidney disease; Chronic pancreatitis ([facility] (9/22/2016); Dermatophytosis of nail (12/6/2016); Diabetic neuropathy ([facility] (9/22/2016); Essential hypertension (9/22/2016); GERD (gastroesophageal reflux disease); Hypercholesterolemia; Iron (Fe) deficiency anemia (9/22/2016); Kidney disease; Septicemia due to Klebsiella pneumoniae ([facility] (9/22/2016); Systolic CHF, chronic ([facility] (9/23/2016); Type II diabetes mellitus with nephropathy ([facility] (09/22/2016); Venous insufficiency (12/6/2016); and Xerosis cutis (12/6/2016). He also has no past medical history of Adverse effect of anesthesia; Congestive heart failure, unspecified; Difficult intubation; Malignant hyperthermia due to anesthesia; Nausea & vomiting; or Pseudocholinesterase deficiency. Mr. Juliann Luke  has a past surgical history that includes hx hernia repair (Left, 2010); hx colonoscopy; hx turp (2012); hx prostatectomy (2012); and vascular surgery procedure unlist (Left, 10/26/2017). Social History/Living Environment:   Home Environment: Private residence  # Steps to Enter: 3  Rails to Enter: Yes  Hand Rails : Right  Wheelchair Ramp: No  One/Two Story Residence: One story  Living Alone: Yes  Support Systems: Family member(s), Home care staff  Patient Expects to be Discharged to[de-identified] Private residence  Current DME Used/Available at Home: [facility] [place], straight, Shower chair, Walker, rolling  Tub or Shower Type: Tub/Shower combination  Prior Level of Function/Work/Activity:  Pt lives at home alone. Pt has caregiver 3x/week. Pt completes functional mobility with a cane but has a rolling walker. Pt was managing with ADL at home.    Personal Factors:          Social Background:  Lives alone        Past/Current Experience:  Dementia with frequent falls         Other factors that influence how disability is experienced by the patient:  Multiple co-morbidities    Number of Personal Factors/Comorbidities that affect the Plan of Care: Extensive review of physical, cognitive, and psychosocial performance (3+):  HIGH COMPLEXITY   ASSESSMENT OF OCCUPATIONAL PERFORMANCE[de-identified]   Activities of Daily Living:           Basic ADLs (From Assessment) Complex ADLs (From Assessment)   Feeding: Minimum assistance  Oral Facial Hygiene/Grooming: Moderate assistance  Bathing: Maximum assistance  Upper Body Dressing: Maximum assistance  Lower Body Dressing: Maximum assistance  Toileting: Maximum assistance Instrumental ADL  Meal Preparation: Total assistance  Homemaking: Total assistance   Grooming/Bathing/Dressing Activities of Daily Living     Cognitive Retraining  Safety/Judgement: Awareness of environment; Fall prevention;Home safety                 Functional Transfers  Toilet Transfer : Moderate assistance  Tub Transfer: Moderate assistance  Shower Transfer: Minimum assistance     Bed/Mat Mobility  Sit to Stand: Moderate assistance  Bed to Chair: Contact guard assistance       Most Recent Physical Functioning:   Gross Assessment:  AROM: Generally decreased, functional (significant limitations at shoulders)  Strength: Generally decreased, functional (B UE; non-functional shoulder strength)  Coordination: Generally decreased, functional  Sensation: Intact               Posture:  Posture (WDL): Exceptions to WDL  Posture Assessment: Forward head, Rounded shoulders  Balance:  Sitting: Intact  Standing: Impaired  Standing - Static: Fair  Standing - Dynamic : Fair (with rolling walker) Bed Mobility:     Wheelchair Mobility:     Transfers:  Sit to Stand: Moderate assistance  Stand to Sit: Minimum assistance  Bed to Chair: Contact guard assistance  Interventions: Verbal cues; Safety awareness training; Tactile cues  Duration: 15 Minutes                Patient Vitals for the past 6 hrs:   BP BP Patient Position SpO2 Pulse   05/01/18 0705 141/69 At rest 95 % 65   05/01/18 1157 167/84 Sitting 98 % 71       Mental Status  Neurologic State: Alert  Orientation Level: Oriented to person, Oriented to place (additional cues for time)  Cognition: Follows commands  Perception: Appears intact  Perseveration: No perseveration noted  Safety/Judgement: Awareness of environment, Fall prevention, Home safety                          Physical Skills Involved:  1. Range of Motion  2. Balance  3. Strength  4. Activity Tolerance  5. Pain (acute) Cognitive Skills Affected (resulting in the inability to perform in a timely and safe manner):  1. Executive Function  2. Divided Attention Psychosocial Skills Affected:  1. Habits/Routines  2. Environmental Adaptation  3. Self-Awareness   Number of elements that affect the Plan of Care: 5+:  HIGH COMPLEXITY   CLINICAL DECISION MAKIN[address] [place] [address] AM-PAC 6 Clicks   Daily Activity Inpatient Short Form  How much help from another person does the patient currently need. .. Total A Lot A Little None   1. Putting on and taking off regular lower body clothing? [] 1   [x] 2   [] 3   [] 4   2. Bathing (including washing, rinsing, drying)? [] 1   [x] 2   [] 3   [] 4   3. Toileting, which includes using toilet, bedpan or urinal?   [] 1   [x] 2   [] 3   [] 4   4. Putting on and taking off regular upper body clothing? [] 1   [x] 2   [] 3   [] 4   5. Taking care of personal grooming such as brushing teeth? [] 1   [x] 2   [] 3   [] 4   6. Eating meals? [] 1   [] 2   [x] 3   [] 4   © , Trustees of [address] [place] [address], under license to [employer]. All rights reserved      Score:  Initial: 13 Most Recent: X (Date: -- )    Interpretation of Tool:  Represents activities that are increasingly more difficult (i.e. Bed mobility, Transfers, Gait). Score 24 23 22-20 19-15 14-10 9-7 6     Modifier CH CI CJ CK CL CM CN      ?  Self Care:     - CURRENT STATUS: CL - 60%-79% impaired, limited or restricted    - GOAL STATUS: CK - 40%-59% impaired, limited or restricted    - D/C STATUS:  ---------------To be determined---------------  Payor: CARE IMPROVEMENT PLUS / [place] / Product Type: Managed Care Medicare /      Medical Necessity:     · Patient demonstrates excellent rehab potential due to higher previous functional level. Reason for Services/Other Comments:  · Patient continues to require skilled intervention due to decreased independence with ADL/functional transfers. Use of outcome tool(s) and clinical judgement create a POC that gives a: LOW COMPLEXITY         TREATMENT:   (In addition to Assessment/Re-Assessment sessions the following treatments were rendered)     Pre-treatment Symptoms/Complaints:    Pain: Initial:   Pain Intensity 1: 8  Pain Location 1: Arm  Pain Orientation 1: Left  Pain Intervention(s) 1: Position  Post Session:  same     Assessment/Reassessment only, no treatment provided today    Braces/Orthotics/Lines/Etc:   · IV  · chan catheter  · O2 Device: Room air  Treatment/Session Assessment:    · Response to Treatment:  Evaluation only. · Interdisciplinary Collaboration:   o Occupational Therapist  o Registered Nurse  · After treatment position/precautions:   o Up in chair  o Bed/Chair-wheels locked  o Call light within reach  o RN notified   · Compliance with Program/Exercises: Will assess as treatment progresses. · Recommendations/Intent for next treatment session: \"Next visit will focus on advancements to more challenging activities and reduction in assistance provided\".   Total Treatment Duration:  OT Patient Time In/Time Out  Time In: 1101  Time Out: [address] Erika Hernández, OT

## 2018-05-01 NOTE — PROGRESS NOTES
Pt was calm and slept all night. Pt did not have a BM and denies any pain. Hourly rounds were done and pt needs were met. Report will be given to day shift nurse and will continue to monitor.

## 2018-05-02 LAB
ALBUMIN SERPL-MCNC: 2.4 G/DL (ref 3.2–4.6)
ALBUMIN SERPL-MCNC: 2.4 G/DL (ref 3.2–4.6)
ALBUMIN/GLOB SERPL: 0.8 {RATIO} (ref 1.2–3.5)
ALP SERPL-CCNC: 67 U/L (ref 50–136)
ALT SERPL-CCNC: 25 U/L (ref 12–65)
ANION GAP SERPL CALC-SCNC: 12 MMOL/L (ref 7–16)
ANION GAP SERPL CALC-SCNC: 14 MMOL/L (ref 7–16)
AST SERPL-CCNC: 15 U/L (ref 15–37)
BILIRUB SERPL-MCNC: 0.3 MG/DL (ref 0.2–1.1)
BUN SERPL-MCNC: 68 MG/DL (ref 8–23)
BUN SERPL-MCNC: 70 MG/DL (ref 8–23)
CALCIUM SERPL-MCNC: 7 MG/DL (ref 8.3–10.4)
CALCIUM SERPL-MCNC: 7 MG/DL (ref 8.3–10.4)
CHLORIDE SERPL-SCNC: 112 MMOL/L (ref 98–107)
CHLORIDE SERPL-SCNC: 113 MMOL/L (ref 98–107)
CK SERPL-CCNC: 592 U/L (ref 21–215)
CO2 SERPL-SCNC: 20 MMOL/L (ref 21–32)
CO2 SERPL-SCNC: 21 MMOL/L (ref 21–32)
CREAT SERPL-MCNC: 6.27 MG/DL (ref 0.8–1.5)
CREAT SERPL-MCNC: 6.29 MG/DL (ref 0.8–1.5)
ERYTHROCYTE [DISTWIDTH] IN BLOOD BY AUTOMATED COUNT: 16.4 % (ref 11.9–14.6)
EST. AVERAGE GLUCOSE BLD GHB EST-MCNC: 177 MG/DL
GLOBULIN SER CALC-MCNC: 3.1 G/DL (ref 2.3–3.5)
GLUCOSE BLD STRIP.AUTO-MCNC: 183 MG/DL (ref 65–100)
GLUCOSE BLD STRIP.AUTO-MCNC: 210 MG/DL (ref 65–100)
GLUCOSE BLD STRIP.AUTO-MCNC: 288 MG/DL (ref 65–100)
GLUCOSE BLD STRIP.AUTO-MCNC: 338 MG/DL (ref 65–100)
GLUCOSE SERPL-MCNC: 156 MG/DL (ref 65–100)
GLUCOSE SERPL-MCNC: 159 MG/DL (ref 65–100)
HBA1C MFR BLD: 7.8 % (ref 4.8–6)
HCT VFR BLD AUTO: 28.7 % (ref 41.1–50.3)
HGB BLD-MCNC: 9.5 G/DL (ref 13.6–17.2)
MCH RBC QN AUTO: 25.6 PG (ref 26.1–32.9)
MCHC RBC AUTO-ENTMCNC: 33.1 G/DL (ref 31.4–35)
MCV RBC AUTO: 77.4 FL (ref 79.6–97.8)
PHOSPHATE SERPL-MCNC: 4.3 MG/DL (ref 2.3–3.7)
PLATELET # BLD AUTO: 275 K/UL (ref 150–450)
PMV BLD AUTO: 10.4 FL (ref 10.8–14.1)
POTASSIUM SERPL-SCNC: 3.6 MMOL/L (ref 3.5–5.1)
POTASSIUM SERPL-SCNC: 3.6 MMOL/L (ref 3.5–5.1)
PROT SERPL-MCNC: 5.5 G/DL (ref 6.3–8.2)
RBC # BLD AUTO: 3.71 M/UL (ref 4.23–5.67)
SODIUM SERPL-SCNC: 146 MMOL/L (ref 136–145)
SODIUM SERPL-SCNC: 146 MMOL/L (ref 136–145)
WBC # BLD AUTO: 5.9 K/UL (ref 4.3–11.1)

## 2018-05-02 PROCEDURE — 85027 COMPLETE CBC AUTOMATED: CPT | Performed by: INTERNAL MEDICINE

## 2018-05-02 PROCEDURE — 80053 COMPREHEN METABOLIC PANEL: CPT | Performed by: INTERNAL MEDICINE

## 2018-05-02 PROCEDURE — 74011250636 HC RX REV CODE- 250/636: Performed by: INTERNAL MEDICINE

## 2018-05-02 PROCEDURE — 65270000029 HC RM PRIVATE

## 2018-05-02 PROCEDURE — 74011636637 HC RX REV CODE- 636/637: Performed by: FAMILY MEDICINE

## 2018-05-02 PROCEDURE — 74011000258 HC RX REV CODE- 258: Performed by: INTERNAL MEDICINE

## 2018-05-02 PROCEDURE — 83036 HEMOGLOBIN GLYCOSYLATED A1C: CPT | Performed by: FAMILY MEDICINE

## 2018-05-02 PROCEDURE — 74011250637 HC RX REV CODE- 250/637: Performed by: INTERNAL MEDICINE

## 2018-05-02 PROCEDURE — 77030020252 HC SOL INJ D5 SOD CL 0.225%1000ML

## 2018-05-02 PROCEDURE — 80069 RENAL FUNCTION PANEL: CPT | Performed by: INTERNAL MEDICINE

## 2018-05-02 PROCEDURE — 97530 THERAPEUTIC ACTIVITIES: CPT

## 2018-05-02 PROCEDURE — 82550 ASSAY OF CK (CPK): CPT | Performed by: INTERNAL MEDICINE

## 2018-05-02 PROCEDURE — 82962 GLUCOSE BLOOD TEST: CPT

## 2018-05-02 PROCEDURE — 36415 COLL VENOUS BLD VENIPUNCTURE: CPT | Performed by: INTERNAL MEDICINE

## 2018-05-02 PROCEDURE — 74011636637 HC RX REV CODE- 636/637: Performed by: INTERNAL MEDICINE

## 2018-05-02 RX ORDER — INSULIN GLARGINE 100 [IU]/ML
10 INJECTION, SOLUTION SUBCUTANEOUS DAILY
Status: DISCONTINUED | OUTPATIENT
Start: 2018-05-02 | End: 2018-05-03

## 2018-05-02 RX ADMIN — METOPROLOL SUCCINATE 50 MG: 50 TABLET, EXTENDED RELEASE ORAL at 08:54

## 2018-05-02 RX ADMIN — INSULIN HUMAN 2 UNITS: 100 INJECTION, SOLUTION PARENTERAL at 08:54

## 2018-05-02 RX ADMIN — HEPARIN SODIUM 5000 UNITS: 5000 INJECTION, SOLUTION INTRAVENOUS; SUBCUTANEOUS at 21:34

## 2018-05-02 RX ADMIN — Medication 10 ML: at 21:05

## 2018-05-02 RX ADMIN — INSULIN HUMAN 6 UNITS: 100 INJECTION, SOLUTION PARENTERAL at 17:11

## 2018-05-02 RX ADMIN — INSULIN GLARGINE 10 UNITS: 100 INJECTION, SOLUTION SUBCUTANEOUS at 08:54

## 2018-05-02 RX ADMIN — INSULIN HUMAN 8 UNITS: 100 INJECTION, SOLUTION PARENTERAL at 21:05

## 2018-05-02 RX ADMIN — ASPIRIN 325 MG ORAL TABLET 325 MG: 325 PILL ORAL at 08:53

## 2018-05-02 RX ADMIN — Medication 10 ML: at 05:19

## 2018-05-02 RX ADMIN — LEVETIRACETAM 500 MG: 250 TABLET, FILM COATED ORAL at 00:46

## 2018-05-02 RX ADMIN — HYDRALAZINE HYDROCHLORIDE 100 MG: 25 TABLET, FILM COATED ORAL at 08:54

## 2018-05-02 RX ADMIN — HYDRALAZINE HYDROCHLORIDE 100 MG: 25 TABLET, FILM COATED ORAL at 21:05

## 2018-05-02 RX ADMIN — HEPARIN SODIUM 5000 UNITS: 5000 INJECTION, SOLUTION INTRAVENOUS; SUBCUTANEOUS at 06:00

## 2018-05-02 RX ADMIN — HEPARIN SODIUM 5000 UNITS: 5000 INJECTION, SOLUTION INTRAVENOUS; SUBCUTANEOUS at 14:17

## 2018-05-02 RX ADMIN — PANTOPRAZOLE SODIUM 40 MG: 40 TABLET, DELAYED RELEASE ORAL at 05:19

## 2018-05-02 RX ADMIN — ISOSORBIDE DINITRATE 20 MG: 20 TABLET ORAL at 17:11

## 2018-05-02 RX ADMIN — INSULIN HUMAN 4 UNITS: 100 INJECTION, SOLUTION PARENTERAL at 12:14

## 2018-05-02 RX ADMIN — POTASSIUM CHLORIDE 20 MEQ: 1.5 POWDER, FOR SOLUTION ORAL at 08:53

## 2018-05-02 RX ADMIN — HYDRALAZINE HYDROCHLORIDE 100 MG: 25 TABLET, FILM COATED ORAL at 17:11

## 2018-05-02 RX ADMIN — ISOSORBIDE DINITRATE 20 MG: 20 TABLET ORAL at 21:05

## 2018-05-02 RX ADMIN — DEXTROSE MONOHYDRATE AND SODIUM CHLORIDE 75 ML/HR: 5; .225 INJECTION, SOLUTION INTRAVENOUS at 12:15

## 2018-05-02 RX ADMIN — FERROUS SULFATE TAB 325 MG (65 MG ELEMENTAL FE) 325 MG: 325 (65 FE) TAB at 17:11

## 2018-05-02 RX ADMIN — PREGABALIN 50 MG: 50 CAPSULE ORAL at 08:53

## 2018-05-02 RX ADMIN — LEVETIRACETAM 500 MG: 250 TABLET, FILM COATED ORAL at 12:25

## 2018-05-02 RX ADMIN — AMLODIPINE BESYLATE 10 MG: 10 TABLET ORAL at 08:53

## 2018-05-02 RX ADMIN — ISOSORBIDE DINITRATE 20 MG: 20 TABLET ORAL at 08:53

## 2018-05-02 RX ADMIN — FERROUS SULFATE TAB 325 MG (65 MG ELEMENTAL FE) 325 MG: 325 (65 FE) TAB at 08:53

## 2018-05-02 NOTE — PROGRESS NOTES
Pt was calm and slept all night. Pt had a BM and denies any pain. Hourly rounds were done and pt needs were met. Report will be given to day shift and will continue to monitor.

## 2018-05-02 NOTE — PROGRESS NOTES
Problem: Mobility Impaired (Adult and Pediatric)  Goal: *Acute Goals and Plan of Care (Insert Text)  LTG:  (1.)Mr. Bhakti Ashley will move from supine to sit and sit to supine, scoot up and down and roll side to side INDEPENDENTLY with bed flat within 7 treatment day(s). (2.)Mr. Bhakti Ashley will transfer from bed to chair and chair to bed with MODIFIED INDEPENDENCE using the least restrictive device within 7 treatment day(s). (3.)Mr. Bhakti Ashley will ambulate with SUPERVISION for 250+ feet with the least restrictive device within 7 treatment day(s). (4.)Mr. Bhakti Ashley will ascend and descend 4 steps with STAND BY ASSIST using handrail within 7 days. ________________________________________________________________________________________________    PHYSICAL THERAPY: Daily Note, Treatment Day: 2nd, PM 5/2/2018  INPATIENT: Hospital Day: 4  Payor: CARE IMPROVEMENT PLUS [place] / Product Type: Managed Care Medicare /      NAME/AGE/GENDER: Maral Brewer is a 69 y.o. male   PRIMARY DIAGNOSIS: Renal failure (ARF), acute on chronic (HCC) Renal failure (ARF), acute on chronic (HCC) Renal failure (ARF), acute on chronic (HCC)        ICD-10: Treatment Diagnosis:    · Generalized Muscle Weakness (M62.81)  · Difficulty in walking, Not elsewhere classified (R26.2)  · Other abnormalities of gait and mobility (R26.89)  · History of falling (Z91.81)   Precaution/Allergies:  Review of patient's allergies indicates no known allergies. ASSESSMENT:     Mr. Bhakti Ashley continues to demonstrate progress with therapy and requires slightly less assist today with bed mobility, transfers, and ambulation. Does still have some difficulty with standing from low surfaces and has limitations with L UE. Verbal cues for posture, gait safety, and walker management. Pt up to recliner after activity with needs in reach. Awaiting rehab.      This section established at most recent assessment   PROBLEM LIST (Impairments causing functional limitations):  1. Decreased Strength  2. Decreased ADL/Functional Activities  3. Decreased Transfer Abilities  4. Decreased Ambulation Ability/Technique  5. Decreased Balance  6. Decreased Activity Tolerance   INTERVENTIONS PLANNED: (Benefits and precautions of physical therapy have been discussed with the patient.)  1. Balance Exercise  2. Bed Mobility  3. Gait Training  4. Therapeutic Activites  5. Therapeutic Exercise/Strengthening  6. Transfer Training  7. Group Therapy     TREATMENT PLAN: Frequency/Duration: 3 times a week for duration of hospital stay  Rehabilitation Potential For Stated Goals: Good     RECOMMENDED REHABILITATION/EQUIPMENT: (at time of discharge pending progress): Due to the probability of continued deficits (see above) this patient will likely need continued skilled physical therapy after discharge. Equipment:    TBD pending progress              HISTORY:   History of Present Injury/Illness (Reason for Referral):  Per H&P, \"74 y/o male with pmh of CKD, diastolic CHF, type 2 DM with complications, dementia, pancreatitis, essential HTN, GERD, HLD presented to the ER after a fall. Patient slid of the chair yesterday and he could not get up from the floor. Patient lied down on the floor until family helped him today and brought him to the ER. He denies hitting anywhere during the fall. He is able to move all extremities and denies any specific joint pain. He does have some mild generalized discomfort following the fall. He is alert and oriented during my encounter and able to answer my questions.      Labs on admission shows ALICJA with creatinine of 7.6 which is worse from his baseline. He is also acidotic with a bicarb of 13. Potassium wnl. He is hypernatremic. CPK elavted at 1400. UA is negative. \"    Past Medical History/Comorbidities:   Mr. Amna Douglas  has a past medical history of Acute renal failure superimposed on stage 3 chronic kidney disease ([facility] (9/21/2016); Anemia;  Chronic kidney disease; Chronic pancreatitis ([facility] (9/22/2016); Dermatophytosis of nail (12/6/2016); Di[facility] (9/22/2016); Essential hypertension (9/22/2016); GERD (gastroesophageal reflux disease); Hypercholesterolemia; Iron (Fe) deficiency anemia (9/22/2016); Kidney disease; Septicemia due to Klebsiella pneumoniae ([facility] (9/22/2016); Systolic CHF, chronic ([facility] (9/23/2016); Type II diabetes mellitus with nephropathy ([facility] (09/22/2016); Venous insufficiency (12/6/2016); and Xerosis cutis (12/6/2016). He also has no past medical history of Adverse effect of anesthesia; Congestive heart failure, unspecified; Difficult intubation; Malignant hyperthermia due to anesthesia; Nausea & vomiting; or Pseudocholinesterase deficiency. Mr. Marta Malone  has a past surgical history that includes hx hernia repair (Left, 2010); hx colonoscopy; hx turp (2012); hx prostatectomy (2012); and vascular surgery procedure unlist (Left, 10/26/2017). Social History/Living Environment:   Home Environment: Private residence  # Steps to Enter: 3  Rails to Enter: Yes  Hand Rails : Right  Wheelchair Ramp: No  One/Two Story Residence: One story  Living Alone: Yes  Support Systems: Family member(s), Home care staff  Patient Expects to be Discharged to[de-identified] Private residence  Current DME Used/Available at Home: [employer], straight, Shower chair, Walker, rolling  Tub or Shower Type: Tub/Shower combination  Prior Level of Function/Work/Activity:  [employer]. lives alone in single story home with 4 steps. Cane use primarily, has rolling walker. Does not drive. Has assist the home 3 days/wk- helps with housekeeping, etc and not necessarily caring for Mr. Marta Malone.       Number of Personal Factors/Comorbidities that affect the Plan of Care: 1-2: MODERATE COMPLEXITY   EXAMINATION:   Most Recent Physical Functioning:   Gross Assessment:  AROM: Within functional limits (B LES WFL, decreased significantly B UEs)  Strength: Generally decreased, functional (LEs 4/5)  Coordination: Generally decreased, functional  Sensation: Intact (to light touch and equal B LEs)               Posture:  Posture (WDL): Exceptions to WDL  Posture Assessment: Forward head, Rounded shoulders  Balance:  Sitting: Intact  Standing: Impaired  Standing - Static: Fair  Standing - Dynamic : Fair Bed Mobility:  Rolling: Supervision  Supine to Sit: Supervision  Scooting: Supervision  Wheelchair Mobility:     Transfers:  Sit to Stand: Moderate assistance  Stand to Sit: Minimum assistance  Bed to Chair: Contact guard assistance  Interventions: Verbal cues; Safety awareness training; Tactile cues  Duration: 16 Minutes  Gait:     Base of Support: Center of gravity altered  Speed/Melva: Pace decreased (<100 feet/min); Slow  Step Length: Left shortened;Right shortened  Gait Abnormalities: Trunk sway increased;Decreased step clearance; Path deviations  Distance (ft): 150 Feet (ft)  Assistive Device: Walker, rolling  Ambulation - Level of Assistance: Stand-by assistance  Interventions: Verbal cues; Safety awareness training      Body Structures Involved:  1. Muscles Body Functions Affected:  1. Movement Related Activities and Participation Affected:  1. General Tasks and Demands  2. Mobility  3. Self Care  4. Domestic Life  5. Community, Social and Tillar Ann[place]   Number of elements that affect the Plan of Care: 4+: HIGH COMPLEXITY   CLINICAL PRESENTATION:   Presentation: Stable and uncomplicated: LOW COMPLEXITY   CLINICAL DECISION MAKIN [facility] Inpatient Short Form  How much difficulty does the patient currently have. .. Unable A Lot A Little None   1. Turning over in bed (including adjusting bedclothes, sheets and blankets)? [] 1   [] 2   [] 3   [x] 4   2. Sitting down on and standing up from a chair with arms ( e.g., wheelchair, bedside commode, etc.)   [] 1   [] 2   [x] 3   [] 4   3. Moving from lying on back to sitting on the side of the bed?    [] 1 [] 2   [x] 3   [] 4   How much help from another person does the patient currently need. .. Total A Lot A Little None   4. Moving to and from a bed to a chair (including a wheelchair)? [] 1   [] 2   [x] 3   [] 4   5. Need to walk in hospital room? [] 1   [] 2   [x] 3   [] 4   6. Climbing 3-5 steps with a railing? [] 1   [x] 2   [] 3   [] 4   © 2007, Trustees of [address] [place] [address] [address] [address], under license to [employer]. All rights reserved      Score:  Initial: 18 Most Recent: X (Date: -- )    Interpretation of Tool:  Represents activities that are increasingly more difficult (i.e. Bed mobility, Transfers, Gait). Score 24 23 22-20 19-15 14-10 9-7 6     Modifier CH CI CJ CK CL CM CN      ? Mobility - Walking and Moving Around:     - CURRENT STATUS: CK - 40%-59% impaired, limited or restricted    - GOAL STATUS: CI - 1%-19% impaired, limited or restricted    - D/C STATUS:  ---------------To be determined---------------  Payor: CARE IMPROVEMENT PLUS / [place] / Product Type: Managed Care Medicare /      Medical Necessity:     · Patient demonstrates good rehab potential due to higher previous functional level. Reason for Services/Other Comments:  · Patient continues to demonstrate capacity to improve strength, mobility, balance, transfers, activity tolerance which will increase independence, decrease amount of assistance required from caregiver and increase safety. Use of outcome tool(s) and clinical judgement create a POC that gives a: Clear prediction of patient's progress: LOW COMPLEXITY            TREATMENT:   (In addition to Assessment/Re-Assessment sessions the following treatments were rendered)   Pre-treatment Symptoms/Complaints:  \"this bed makes you weak! \"  Pain: Initial:   Pain Intensity 1: 0  Post Session:  0/10     Therapeutic Activity: (  16 Minutes ):  Therapeutic activities including bed mobility, Chair transfers, Ambulation on level ground, standing balance, and transfer technique/mechanics to improve mobility, strength, balance and activity tolerance. Required minimal Verbal cues; Safety awareness training to promote static and dynamic balance in standing and promote motor control of bilateral, upper extremity(s), lower extremity(s). Date:  5/1/18 Date:   Date:     Activity/Exercise Parameters Parameters Parameters   LAQ 15x AB     Seated marching 15x AB     Ankle pumps 15x AB     Seated hip aBd 15x AB                             Braces/Orthotics/Lines/Etc:   · IV  · chan catheter  · O2 Device: Room air  Treatment/Session Assessment:    · Response to Treatment:  Less assist today  · Interdisciplinary Collaboration:   o Physical Therapist  o Registered Nurse  · After treatment position/precautions:   o Up in chair  o Bed/Chair-wheels locked  o Bed in low position  o Call light within reach  o RN notified   · Compliance with Program/Exercises: Will assess as treatment progresses. · Recommendations/Intent for next treatment session: \"Next visit will focus on advancements to more challenging activities and reduction in assistance provided\".   Total Treatment Duration:  PT Patient Time In/Time Out  Time In: 1545  Time Out: [address], T

## 2018-05-02 NOTE — PROGRESS NOTES
This patient does not have a bed at [facility] for today. It was inadvertently given away but they will let me know when the next one opens up. Creat 6.29. Family meeting today.    Girma Lind

## 2018-05-02 NOTE — PROGRESS NOTES
Hospitalist Progress Note    2018  Admit Date: 2018  7:41 PM   NAME: SILVIO Valencia. :  1940   MRN:  [id number]   Attending: Lisa Colindres MD  PCP:  Manohar Alvares MD    SUBJECTIVE:   Patient seen and examined at bedside this morning. He has no complaints. Review of Systems negative with exception of pertinent positives noted above  PHYSICAL EXAM     Visit Vitals    /51 (BP 1 Location: Right arm, BP Patient Position: At rest)    Pulse 70    Temp 98.4 °F (36.9 °C)    Resp 18    Ht 6' 3\" (1.905 m)    Wt 81.9 kg (180 lb 9.6 oz)    SpO2 98%    BMI 22.57 kg/m2      Temp (24hrs), Av.1 °F (36.7 °C), Min:97.6 °F (36.4 °C), Max:98.4 °F (36.9 °C)    Oxygen Therapy  O2 Sat (%): 98 % (18 1217)  Pulse via Oximetry: 71 beats per minute (18 2201)  O2 Device: Room air (18 1217)    Intake/Output Summary (Last 24 hours) at 18 1349  Last data filed at 18 0527   Gross per 24 hour   Intake                0 ml   Output             1600 ml   Net            -1600 ml      General: No acute distress    Lungs:  CTA Bilaterally. Heart:  Regular rate and rhythm,  No murmur, rub, or gallop  Abdomen: Soft, Non distended, Non tender, Positive bowel sounds  Extremities: No cyanosis, clubbing or edema, LE venous stasis wounds.   Neurologic:  No focal deficits    ASSESSMENT      Active Hospital Problems    Diagnosis Date Noted    Rhabdomyolysis     Metabolic acidosis     Renal failure (ARF), acute on chronic ([facility] 2018    Dementia without behavioral disturbance 2018    Hypernatremia 2018    Uncontrolled diabetes mellitus, with long-term current use of insulin ([facility] 2018    Stage 4 chronic kidney disease ([facility] 2017    Venous stasis ulcer of left lower extremity ([facility] 2017    [facility] systolic [facility] 2016    Iron (Fe) deficiency anemia 2016    Diabetic neuropathy (HCC) 09/22/2016    Essential hypertension 09/22/2016    GERD (gastroesophageal reflux disease) 09/22/2016     Plan:  · Acute on chronic renal failure- most likely insult is rhabdomyolysis. Switched to D5NS today secondary to hypernatremia which is improving. Follow nephrology recommendations. Patient saying he is agreeable to possible HD. · Rhabdomyolysis- continue with fluids, CK trending down  · Metabolic acidosis- improved and taken off of Bicarb drip  · Uncontrolled DM- continue with insulin. · Chronic Systolic HF- continue with beta blocker, aspirin. · Iron deficiency anemia- continue ferrous sulfate.     DVT Prophylaxis: Heparin SQ    Signed By: Jovan Vides MD     May 2, 2018

## 2018-05-02 NOTE — PROGRESS NOTES
Re-Consult Note [facility]    Follow-Up on: ALICJA on CKD stage IV    HPI: Pt  seen and examined  ROS:  Tiff CP, SOB.     Current Facility-Administered Medications   Medication Dose Route Frequency    insulin glargine (LANTUS) injection 10 Units  10 Units SubCUTAneous DAILY    dextrose 5 % - 0.2% NaCl infusion  75 mL/hr IntraVENous CONTINUOUS    insulin regular (NOVOLIN R, HUMULIN R) injection   SubCUTAneous AC&HS    dextrose 40% (GLUTOSE) oral gel 1 Tube  15 g Oral PRN    glucagon (GLUCAGEN) injection 1 mg  1 mg IntraMUSCular PRN    dextrose (D50W) injection syrg 12.5-25 g  25-50 mL IntraVENous PRN    amLODIPine (NORVASC) tablet 10 mg  10 mg Oral 7am    aspirin (ASPIRIN) tablet 325 mg  325 mg Oral DAILY    ferrous sulfate tablet 325 mg  325 mg Oral BID    hydrALAZINE (APRESOLINE) tablet 100 mg  100 mg Oral TID    isosorbide dinitrate (ISORDIL) tablet 20 mg  20 mg Oral TID    levETIRAcetam (KEPPRA) tablet 500 mg  500 mg Oral Q12H    metoprolol succinate (TOPROL-XL) XL tablet 50 mg  50 mg Oral 7am    pantoprazole (PROTONIX) tablet 40 mg  40 mg Oral ACB    pregabalin (LYRICA) capsule 50 mg  50 mg Oral DAILY    sodium chloride (NS) flush 5-10 mL  5-10 mL IntraVENous Q8H    sodium chloride (NS) flush 5-10 mL  5-10 mL IntraVENous PRN    acetaminophen (TYLENOL) tablet 650 mg  650 mg Oral Q4H PRN    ondansetron (ZOFRAN) injection 4 mg  4 mg IntraVENous Q4H PRN    bisacodyl (DULCOLAX) tablet 5 mg  5 mg Oral DAILY PRN    heparin (porcine) injection 5,000 Units  5,000 Units SubCUTAneous Q8H       Exam:  Vitals:    05/01/18 2316 05/02/18 0446 05/02/18 0728 05/02/18 1217   BP: 123/53 131/62 137/50 136/51   Pulse: 73 70 68 70   Resp: 18 18 18 18   Temp: 98.3 °F (36.8 °C) 98.4 °F (36.9 °C) 97.7 °F (36.5 °C) 98.4 °F (36.9 °C)   SpO2: 98% 97% 97% 98%   Weight:  81.9 kg (180 lb 9.6 oz)     Height:             Intake/Output Summary (Last 24 hours) at 05/02/18 1322  Last data filed at 05/02/18 6283   Gross per 24 hour   Intake                0 ml   Output             1600 ml   Net            -1600 ml     PE:  GEN - in no distress  CV - regular, no murmur, no rub  Lung - clear bilaterally  Abd - soft, nontender  Ext - no edema    Labs  Recent Labs      05/02/18   0606  04/29/18   1826   WBC  5.9  6.7   HGB  9.5*  10.1*   HCT  28.7*  31.1*   PLT  275  319     Recent Labs      05/02/18   0606  05/01/18   0530  04/30/18   1127  04/29/18   1826   NA  146*  146*  150*  149*  148*   K  3.6  3.6  3.2*  4.2  4.5   CL  113*  112*  117*  118*  118*   CO2  21  20*  22  14*  13*   BUN  70*  68*  84*  84*  94*   CREA  6.29*  6.27*  6.83*  7.11*  7.67*   GLU  159*  156*  138*  231*  235*   CA  7.0*  7.0*  7.0*  7.5*  7.5*   MG   --    --    --   1.7*   PHOS  4.3*  4.6*  6.5*   --      No results for input(s): PH, PCO2, PO2, PCO2 in the last 72 hours.     Problem List:  Patient Active Problem List    Diagnosis Date Noted    Rhabdomyolysis 23/59/6293    Metabolic acidosis 55/46/7981    Renal failure (ARF), acute on chronic ([address] 04/29/2018    Dementia without behavioral disturbance 04/19/2018    Hypernatremia 04/18/2018    Seizure ([address] 04/18/2018    Volume overload 04/04/2018    Chest pain 04/04/2018    Cauda equina compression ([address] 04/02/2018    Uncontrolled diabetes mellitus, with long-term current use of insulin ([address] 02/28/2018    Carotid bruit 02/28/2018    Hypercholesterolemia     Arteriovenous fistula ([address] 10/31/2017    Onychomycosis 09/13/2017    Controlled type 2 diabetes mellitus with complication, with long-term current use of insulin ([address] 09/13/2017    Stage 4 chronic kidney disease ([address] 04/04/2017    Stasis edema of both lower extremities 04/04/2017    Cellulitis of left lower leg 04/04/2017    Venous stasis ulcer of left lower extremity ([facility] 04/01/2017    Venous insufficiency 12/06/2016    Chronic systolic congestive heart failure ([facility] 09/23/2016    Septicemia due to Klebsiella pneumoniae ([facility] 09/22/2016    Type II diabetes mellitus with nephropathy ([facility] 09/22/2016    Essential hypertension 09/22/2016    GERD (gastroesophageal reflux disease) 09/22/2016    Diabetic neuropathy (HCC) 09/22/2016    Chronic pancreatitis ([facility] 09/22/2016    Iron (Fe) deficiency anemia 09/22/2016    Acute renal failure superimposed on stage 3 chronic kidney disease ([facility] 09/21/2016       Issues Addressed By Nephrology:    Plan:    1. Dementia  2. Falls  3. Chest pain  4. CKD stage IV with ALICJA pre-renal azotemia component; Pt refused dialysis; ALICJA slowly resolving  5. Volume depletion siu fluids to include HCO3  6. Lower extremity weakness and anal sphincter dysfunction 2/2 spinal cord compression from stenosis and disc compression and pt refused surgery for decompression in the past.    I have met with pt, son Jose E Young, sister Ms. Aguilar/ her   And  Chris Baxter. There are no family members to assist in pot daily care and he feels his \"mind is strong enough to manage himself at home\". Pt in denial to his physical limitations and it was reiterated he is full care. Family members understand and stated the decision is his.  to place pt in a [facility] with long term capabilities.

## 2018-05-03 LAB
ALBUMIN SERPL-MCNC: 2.2 G/DL (ref 3.2–4.6)
ANION GAP SERPL CALC-SCNC: 13 MMOL/L (ref 7–16)
BUN SERPL-MCNC: 67 MG/DL (ref 8–23)
CALCIUM SERPL-MCNC: 7 MG/DL (ref 8.3–10.4)
CHLORIDE SERPL-SCNC: 115 MMOL/L (ref 98–107)
CK SERPL-CCNC: 431 U/L (ref 21–215)
CO2 SERPL-SCNC: 19 MMOL/L (ref 21–32)
CREAT SERPL-MCNC: 5.94 MG/DL (ref 0.8–1.5)
ERYTHROCYTE [DISTWIDTH] IN BLOOD BY AUTOMATED COUNT: 16.3 % (ref 11.9–14.6)
GLUCOSE BLD STRIP.AUTO-MCNC: 168 MG/DL (ref 65–100)
GLUCOSE BLD STRIP.AUTO-MCNC: 206 MG/DL (ref 65–100)
GLUCOSE BLD STRIP.AUTO-MCNC: 346 MG/DL (ref 65–100)
GLUCOSE BLD STRIP.AUTO-MCNC: 451 MG/DL (ref 65–100)
GLUCOSE SERPL-MCNC: 162 MG/DL (ref 65–100)
HCT VFR BLD AUTO: 28.5 % (ref 41.1–50.3)
HGB BLD-MCNC: 9.3 G/DL (ref 13.6–17.2)
MCH RBC QN AUTO: 25.4 PG (ref 26.1–32.9)
MCHC RBC AUTO-ENTMCNC: 32.6 G/DL (ref 31.4–35)
MCV RBC AUTO: 77.9 FL (ref 79.6–97.8)
PHOSPHATE SERPL-MCNC: 4.6 MG/DL (ref 2.3–3.7)
PLATELET # BLD AUTO: 238 K/UL (ref 150–450)
PMV BLD AUTO: 10.4 FL (ref 10.8–14.1)
POTASSIUM SERPL-SCNC: 3.5 MMOL/L (ref 3.5–5.1)
RBC # BLD AUTO: 3.66 M/UL (ref 4.23–5.67)
SODIUM SERPL-SCNC: 147 MMOL/L (ref 136–145)
WBC # BLD AUTO: 5.1 K/UL (ref 4.3–11.1)

## 2018-05-03 PROCEDURE — 36415 COLL VENOUS BLD VENIPUNCTURE: CPT | Performed by: INTERNAL MEDICINE

## 2018-05-03 PROCEDURE — 82962 GLUCOSE BLOOD TEST: CPT

## 2018-05-03 PROCEDURE — 85027 COMPLETE CBC AUTOMATED: CPT | Performed by: INTERNAL MEDICINE

## 2018-05-03 PROCEDURE — 74011250637 HC RX REV CODE- 250/637: Performed by: INTERNAL MEDICINE

## 2018-05-03 PROCEDURE — 74011000258 HC RX REV CODE- 258: Performed by: INTERNAL MEDICINE

## 2018-05-03 PROCEDURE — 80069 RENAL FUNCTION PANEL: CPT | Performed by: INTERNAL MEDICINE

## 2018-05-03 PROCEDURE — 65270000029 HC RM PRIVATE

## 2018-05-03 PROCEDURE — 74011636637 HC RX REV CODE- 636/637: Performed by: FAMILY MEDICINE

## 2018-05-03 PROCEDURE — 74011250636 HC RX REV CODE- 250/636: Performed by: INTERNAL MEDICINE

## 2018-05-03 PROCEDURE — 74011636637 HC RX REV CODE- 636/637: Performed by: INTERNAL MEDICINE

## 2018-05-03 PROCEDURE — 82550 ASSAY OF CK (CPK): CPT | Performed by: INTERNAL MEDICINE

## 2018-05-03 RX ORDER — INSULIN GLARGINE 100 [IU]/ML
15 INJECTION, SOLUTION SUBCUTANEOUS DAILY
Status: DISCONTINUED | OUTPATIENT
Start: 2018-05-03 | End: 2018-05-04 | Stop reason: HOSPADM

## 2018-05-03 RX ADMIN — ISOSORBIDE DINITRATE 20 MG: 20 TABLET ORAL at 10:30

## 2018-05-03 RX ADMIN — HEPARIN SODIUM 5000 UNITS: 5000 INJECTION, SOLUTION INTRAVENOUS; SUBCUTANEOUS at 22:07

## 2018-05-03 RX ADMIN — PANTOPRAZOLE SODIUM 40 MG: 40 TABLET, DELAYED RELEASE ORAL at 06:02

## 2018-05-03 RX ADMIN — ISOSORBIDE DINITRATE 20 MG: 20 TABLET ORAL at 22:07

## 2018-05-03 RX ADMIN — DEXTROSE MONOHYDRATE AND SODIUM CHLORIDE 75 ML/HR: 5; .225 INJECTION, SOLUTION INTRAVENOUS at 01:19

## 2018-05-03 RX ADMIN — FERROUS SULFATE TAB 325 MG (65 MG ELEMENTAL FE) 325 MG: 325 (65 FE) TAB at 17:53

## 2018-05-03 RX ADMIN — Medication 10 ML: at 17:53

## 2018-05-03 RX ADMIN — INSULIN HUMAN 4 UNITS: 100 INJECTION, SOLUTION PARENTERAL at 13:24

## 2018-05-03 RX ADMIN — METOPROLOL SUCCINATE 50 MG: 50 TABLET, EXTENDED RELEASE ORAL at 10:29

## 2018-05-03 RX ADMIN — HEPARIN SODIUM 5000 UNITS: 5000 INJECTION, SOLUTION INTRAVENOUS; SUBCUTANEOUS at 06:02

## 2018-05-03 RX ADMIN — DEXTROSE MONOHYDRATE AND SODIUM CHLORIDE 75 ML/HR: 5; .225 INJECTION, SOLUTION INTRAVENOUS at 13:28

## 2018-05-03 RX ADMIN — HYDRALAZINE HYDROCHLORIDE 100 MG: 25 TABLET, FILM COATED ORAL at 10:29

## 2018-05-03 RX ADMIN — INSULIN HUMAN 20 UNITS: 100 INJECTION, SOLUTION PARENTERAL at 22:07

## 2018-05-03 RX ADMIN — ASPIRIN 325 MG ORAL TABLET 325 MG: 325 PILL ORAL at 10:29

## 2018-05-03 RX ADMIN — FERROUS SULFATE TAB 325 MG (65 MG ELEMENTAL FE) 325 MG: 325 (65 FE) TAB at 10:30

## 2018-05-03 RX ADMIN — HEPARIN SODIUM 5000 UNITS: 5000 INJECTION, SOLUTION INTRAVENOUS; SUBCUTANEOUS at 13:25

## 2018-05-03 RX ADMIN — INSULIN GLARGINE 15 UNITS: 100 INJECTION, SOLUTION SUBCUTANEOUS at 10:31

## 2018-05-03 RX ADMIN — AMLODIPINE BESYLATE 10 MG: 10 TABLET ORAL at 10:30

## 2018-05-03 RX ADMIN — INSULIN HUMAN 2 UNITS: 100 INJECTION, SOLUTION PARENTERAL at 10:31

## 2018-05-03 RX ADMIN — Medication 10 ML: at 06:02

## 2018-05-03 RX ADMIN — INSULIN HUMAN 8 UNITS: 100 INJECTION, SOLUTION PARENTERAL at 17:53

## 2018-05-03 RX ADMIN — ISOSORBIDE DINITRATE 20 MG: 20 TABLET ORAL at 17:53

## 2018-05-03 RX ADMIN — Medication 10 ML: at 22:12

## 2018-05-03 RX ADMIN — LEVETIRACETAM 500 MG: 250 TABLET, FILM COATED ORAL at 01:16

## 2018-05-03 RX ADMIN — LEVETIRACETAM 500 MG: 250 TABLET, FILM COATED ORAL at 13:24

## 2018-05-03 RX ADMIN — HYDRALAZINE HYDROCHLORIDE 100 MG: 25 TABLET, FILM COATED ORAL at 17:53

## 2018-05-03 RX ADMIN — PREGABALIN 50 MG: 50 CAPSULE ORAL at 10:30

## 2018-05-03 NOTE — PROGRESS NOTES
Hospitalist Progress Note    5/3/2018  Admit Date: 2018  7:41 PM   NAME: SILVIO Yap. :  1940   MRN:  [id number]   Attending: Shira Oneil MD  PCP:  Los White MD    SUBJECTIVE:   Patient seen and examined at bedside this morning. He complained of right knee pain. Review of Systems negative with exception of pertinent positives noted above  PHYSICAL EXAM     Visit Vitals    /66 (BP 1 Location: Right arm, BP Patient Position: At rest)    Pulse 72    Temp 98.2 °F (36.8 °C)    Resp 19    Ht 6' 3\" (1.905 m)    Wt 83.6 kg (184 lb 6.4 oz)    SpO2 97%    BMI 23.05 kg/m2      Temp (24hrs), Av.6 °F (37 °C), Min:98.1 °F (36.7 °C), Max:99.4 °F (37.4 °C)    Oxygen Therapy  O2 Sat (%): 97 % (18 1453)  Pulse via Oximetry: 71 beats per minute (18 2201)  O2 Device: Room air (18 1453)    Intake/Output Summary (Last 24 hours) at 18 1552  Last data filed at 18 1409   Gross per 24 hour   Intake             1808 ml   Output             2500 ml   Net             -692 ml      General: No acute distress    Lungs:  CTA Bilaterally. Heart:  Regular rate and rhythm,  No murmur, rub, or gallop  Abdomen: Soft, Non distended, Non tender, Positive bowel sounds  Extremities: No cyanosis, clubbing or edema, LE venous stasis wounds.   Neurologic:  No focal deficits    ASSESSMENT      Active Hospital Problems    Diagnosis Date Noted    Rhabdomyolysis     Metabolic acidosis     Renal failure (ARF), acute on chronic ([facility] 2018    Dementia without behavioral disturbance 2018    Hypernatremia 2018    Uncontrolled diabetes mellitus, with long-term current use of insulin ([facility] 2018    Stage 4 chronic kidney disease ([facility] 2017    Venous stasis ulcer of left lower extremity ([facility] 2017    [facility] systolic congest[facility] failure [facility] 2016    Iron (Fe) deficiency anemia 2016    Diabetic neuropathy ([facility] 09/22/2016    Essential hypertension 09/22/2016    GERD (gastroesophageal reflux disease) 09/22/2016     Plan:  · Acute on chronic renal failure- most likely insult is rhabdomyolysis. Switched to D5NS today secondary to hypernatremia which is improving. Follow nephrology recommendations. Patient does not want dialysis. · Rhabdomyolysis- continue with fluids, CK trending down  · Metabolic acidosis- improved and taken off of Bicarb drip  · Uncontrolled DM- continue with insulin. · Chronic Systolic HF- continue with beta blocker, aspirin. · Iron deficiency anemia- continue ferrous sulfate. DVT Prophylaxis: Heparin SQ  Dispo- awaiting rehab placement.     Signed By: Karuna Mendiola MD     May 3, 2018

## 2018-05-03 NOTE — PROGRESS NOTES
Problem: Nutrition Deficit  Goal: *Optimize nutritional status  Nutrition  Reason for assessment: LOS  Malnutrition Screening Tool identified eating poorly d/t decreased appetite  Assessment:   Diet order(s): Renal  Food/Nutrition Patient History:  Hx of dementia,CKD, DM, CHF, venous insuffiencey, chronic pancreatitis. Presented after being found down after a fall for 12 hrs. Pt reports he is \"eating all I can\" and that is usually \"close to everything\". He says he has a good appetite all the times and denies any decreased po intake PTA. He says he cooks for himself at home and cooks enough on Saturday to last until Tuesday. He denies any weight loss and states UBW of 190#. He is receptive to drinking a nutrtional supplement. Pt denies difficulty chewing but is edentulous. He says he has dentures but doesn't wear them when eating at home. K range 3.2 to 4.5 and has received K supplementation. Therefore does not need low K feature of renal diet at this point  Anthropometrics:Height: 6' 3\" (190.5 cm),  Weight: 83.6 kg (184 lb 6.4 oz), Weight Source: Bed, Body mass index is 23.05 kg/(m^2). BMI class of normal weight. Weight hx per EMR ( Based on connect care functionality, RD cannot know if these weight are actual versus stated):  WT / BMI WEIGHT   4/19/2018 183 lb   4/3/2018 177 lb 1.6 oz   3/14/2018 186 lb   3/10/2018 180 lb   2/28/2018 186 lb   1/4/2018 186 lb   12/27/2017 200 lb   12/13/2017 200 lb   11/28/2017 188 lb   11/8/2017 181 lb   10/31/2017 181 lb   10/26/2017 181 lb 8 oz   10/12/2017 178 lb   10/10/2017 187 lb   9/13/2017 187 lb   4/4/2017 188 lb 9.6 oz   Current weight within UBW range. Expect weight variances d/t fluid given hx of CKD and CHF.      Macronutrient needs:  EER:  8307-7844 kcal /day (25-30 kcal/kg ABW)  EPR:  80 grams protein/day (0.9 grams/kg IBW)  Intake/Comparative Standards: No intake data availlable    Nutrition Diagnosis: Predicted suboptimal oral intake r/t increased metabolic needs and dementia as evidenced by increased kcal needs as above, pt history of good po intake is suspect given hx of dementia    Intervention:  Meals and snacks: Change to [facility] diet. .  Nutrition Supplement Therapy: Glucerna shake tid   Discharge nutrition plan: Too soon to determine.     Antonia Mitchell, [address] [place] [address], [address] [place] [address]

## 2018-05-03 NOTE — PROGRESS NOTES
[facility]        Subjective: A on CKD  Feeling a little better today. Review of Systems -   General ROS: negative for - fever, chills  Respiratory ROS: no SOB, cough, GARNICA  Cardiovascular ROS: no CP, palpitations  Gastrointestinal ROS: no N&V, abdominal pain, diarrhea  Genito-Urinary ROS: no difficulty voiding, dysuria  Neurological ROS: no seizures, focal weekness        Objective:    Vitals:    05/03/18 0433 05/03/18 0445 05/03/18 0710 05/03/18 1120   BP: 130/56  158/58 108/54   Pulse: 74  73 83   Resp: 19 19 19   Temp: 98.8 °F (37.1 °C)  99.4 °F (37.4 °C) 98.1 °F (36.7 °C)   SpO2: 98%  95% 96%   Weight:  83.6 kg (184 lb 6.4 oz)     Height:           PE  Gen: in no acute distress  CV:reg rate  Chest:clear  Abd: soft  Ext/Access: no edema       . LAB  Recent Labs      05/03/18   0529  05/02/18   0606   WBC  5.1  5.9   HGB  9.3*  9.5*   HCT  28.5*  28.7*   PLT  238  275     Recent Labs      05/03/18   0529  05/02/18   0606  05/01/18   0530   NA  147*  146*  146*  150*   K  3.5  3.6  3.6  3.2*   CL  115*  113*  112*  117*   CO2  19*  21  20*  22   GLU  162*  159*  156*  138*   BUN  67*  70*  68*  84*   CREA  5.94*  6.29*  6.27*  6.83*   PHOS  4.6*  4.3*  4.6*   CA  7.0*  7.0*  7.0*  7.0*   ALB  2.2*  2.4*  2.4*  2.5*   TBILI   --   0.3   --    ALT   --   25   --    SGOT   --   15   --            Radiology    A/P:   Patient Active Problem List   Diagnosis Code    Acute renal failure superimposed on stage 3 chronic kidney disease (HCC) N17.9, N18.3    Septicemia due to Klebsiella pneumoniae ([facility]) A41.4    Type II diabetes mellitus with nephropathy (HCC) E11.21    Essential hypertension I10    GERD (gastroesophageal reflux disease) K21.9    Diabetic neuropathy (HCC) E11.40    Chronic pancreatitis (HCC) K86.1    Iron (Fe) deficiency anemia D50.9    Chronic systolic congestive heart failure (HCC) I50.22    Venous insufficiency I87.2    Venous stasis ulcer of left lower extremity (HCC) I83.029, L97.929    Stage 4 chronic kidney disease (HCC) N18.4    Stasis edema of both lower extremities I87.303    Cellulitis of left lower leg L03. 116    Onychomycosis B35.1    Controlled type 2 diabetes mellitus with complication, with long-term current use of insulin ([facility] E11.8, Z79.4    Arteriovenous fistula ([facility]) I77.0    Hypercholesterolemia E78.00    Uncontrolled diabetes mellitus, with long-term current use of insulin ([facility]) E11.65, Z79.4    Carotid bruit R09.89    Cauda equina compression ([facility]) G83.4    Volume overload E87.70    Chest pain R07.9    Hypernatremia E87.0    Seizure ([facility]) R56.9    Dementia without behavioral disturbance F03.90    Renal failure (ARF), acute on chronic ([facility]) N17.9, N18.9    Rhabdomyolysis Y99.66    Metabolic acidosis D94.0       A on CKD - responding to IV fluids,  Better today  Hypernatremia - coming down with D5W  Hypokalemia - stable  Anemia       Elva Bryan MD

## 2018-05-03 NOTE — PROGRESS NOTES
Pt was calm and slept all nigh. Pt had a BM on this shift. Hourly rounds were done and pt needs were met. Report will be given to day shift nurse and will continue.

## 2018-05-03 NOTE — PROGRESS NOTES
Interdisciplinary Rounds completed 05/03/18. Nursing, Case Management, Physician and PT present. Plan of care reviewed and updated. Pt to go to rehab and [facility] with chan catheter. Pt had severe retention on admission to hospital.  1900 cc's in bladder.

## 2018-05-04 VITALS
RESPIRATION RATE: 18 BRPM | HEIGHT: 75 IN | SYSTOLIC BLOOD PRESSURE: 147 MMHG | DIASTOLIC BLOOD PRESSURE: 58 MMHG | WEIGHT: 182.1 LBS | OXYGEN SATURATION: 96 % | TEMPERATURE: 98.7 F | BODY MASS INDEX: 22.64 KG/M2 | HEART RATE: 73 BPM

## 2018-05-04 LAB
ALBUMIN SERPL-MCNC: 2.2 G/DL (ref 3.2–4.6)
ALBUMIN/GLOB SERPL: 0.7 {RATIO} (ref 1.2–3.5)
ALP SERPL-CCNC: 63 U/L (ref 50–136)
ALT SERPL-CCNC: 19 U/L (ref 12–65)
ANION GAP SERPL CALC-SCNC: 12 MMOL/L (ref 7–16)
AST SERPL-CCNC: 11 U/L (ref 15–37)
BILIRUB SERPL-MCNC: 0.4 MG/DL (ref 0.2–1.1)
BUN SERPL-MCNC: 63 MG/DL (ref 8–23)
CALCIUM SERPL-MCNC: 7.1 MG/DL (ref 8.3–10.4)
CHLORIDE SERPL-SCNC: 114 MMOL/L (ref 98–107)
CK SERPL-CCNC: 326 U/L (ref 21–215)
CO2 SERPL-SCNC: 20 MMOL/L (ref 21–32)
CREAT SERPL-MCNC: 5.89 MG/DL (ref 0.8–1.5)
ERYTHROCYTE [DISTWIDTH] IN BLOOD BY AUTOMATED COUNT: 16.2 % (ref 11.9–14.6)
GLOBULIN SER CALC-MCNC: 3.3 G/DL (ref 2.3–3.5)
GLUCOSE BLD STRIP.AUTO-MCNC: 198 MG/DL (ref 65–100)
GLUCOSE BLD STRIP.AUTO-MCNC: 203 MG/DL (ref 65–100)
GLUCOSE BLD STRIP.AUTO-MCNC: 293 MG/DL (ref 65–100)
GLUCOSE BLD STRIP.AUTO-MCNC: 305 MG/DL (ref 65–100)
GLUCOSE SERPL-MCNC: 189 MG/DL (ref 65–100)
HCT VFR BLD AUTO: 27.6 % (ref 41.1–50.3)
HGB BLD-MCNC: 9 G/DL (ref 13.6–17.2)
MCH RBC QN AUTO: 25.5 PG (ref 26.1–32.9)
MCHC RBC AUTO-ENTMCNC: 32.6 G/DL (ref 31.4–35)
MCV RBC AUTO: 78.2 FL (ref 79.6–97.8)
PHOSPHATE SERPL-MCNC: 4.7 MG/DL (ref 2.3–3.7)
PLATELET # BLD AUTO: 209 K/UL (ref 150–450)
PMV BLD AUTO: 10.7 FL (ref 10.8–14.1)
POTASSIUM SERPL-SCNC: 3.5 MMOL/L (ref 3.5–5.1)
PROT SERPL-MCNC: 5.5 G/DL (ref 6.3–8.2)
RBC # BLD AUTO: 3.53 M/UL (ref 4.23–5.67)
SODIUM SERPL-SCNC: 146 MMOL/L (ref 136–145)
WBC # BLD AUTO: 5.7 K/UL (ref 4.3–11.1)

## 2018-05-04 PROCEDURE — 74011636637 HC RX REV CODE- 636/637: Performed by: INTERNAL MEDICINE

## 2018-05-04 PROCEDURE — 74011636637 HC RX REV CODE- 636/637: Performed by: FAMILY MEDICINE

## 2018-05-04 PROCEDURE — 82550 ASSAY OF CK (CPK): CPT | Performed by: INTERNAL MEDICINE

## 2018-05-04 PROCEDURE — 84100 ASSAY OF PHOSPHORUS: CPT | Performed by: INTERNAL MEDICINE

## 2018-05-04 PROCEDURE — 74011250637 HC RX REV CODE- 250/637: Performed by: INTERNAL MEDICINE

## 2018-05-04 PROCEDURE — 97530 THERAPEUTIC ACTIVITIES: CPT

## 2018-05-04 PROCEDURE — 36415 COLL VENOUS BLD VENIPUNCTURE: CPT | Performed by: INTERNAL MEDICINE

## 2018-05-04 PROCEDURE — 74011000258 HC RX REV CODE- 258: Performed by: INTERNAL MEDICINE

## 2018-05-04 PROCEDURE — 80053 COMPREHEN METABOLIC PANEL: CPT | Performed by: INTERNAL MEDICINE

## 2018-05-04 PROCEDURE — 82962 GLUCOSE BLOOD TEST: CPT

## 2018-05-04 PROCEDURE — 74011250636 HC RX REV CODE- 250/636: Performed by: INTERNAL MEDICINE

## 2018-05-04 PROCEDURE — 97110 THERAPEUTIC EXERCISES: CPT

## 2018-05-04 PROCEDURE — 85027 COMPLETE CBC AUTOMATED: CPT | Performed by: INTERNAL MEDICINE

## 2018-05-04 RX ORDER — TRAMADOL HYDROCHLORIDE 50 MG/1
50 TABLET ORAL
Status: COMPLETED | OUTPATIENT
Start: 2018-05-04 | End: 2018-05-04

## 2018-05-04 RX ADMIN — ACETAMINOPHEN 650 MG: 325 TABLET ORAL at 10:54

## 2018-05-04 RX ADMIN — LEVETIRACETAM 500 MG: 250 TABLET, FILM COATED ORAL at 01:46

## 2018-05-04 RX ADMIN — DEXTROSE MONOHYDRATE AND SODIUM CHLORIDE 75 ML/HR: 5; .225 INJECTION, SOLUTION INTRAVENOUS at 03:44

## 2018-05-04 RX ADMIN — HYDRALAZINE HYDROCHLORIDE 100 MG: 25 TABLET, FILM COATED ORAL at 10:54

## 2018-05-04 RX ADMIN — METOPROLOL SUCCINATE 50 MG: 50 TABLET, EXTENDED RELEASE ORAL at 10:54

## 2018-05-04 RX ADMIN — ISOSORBIDE DINITRATE 20 MG: 20 TABLET ORAL at 10:54

## 2018-05-04 RX ADMIN — PANTOPRAZOLE SODIUM 40 MG: 40 TABLET, DELAYED RELEASE ORAL at 06:16

## 2018-05-04 RX ADMIN — INSULIN GLARGINE 15 UNITS: 100 INJECTION, SOLUTION SUBCUTANEOUS at 11:05

## 2018-05-04 RX ADMIN — AMLODIPINE BESYLATE 10 MG: 10 TABLET ORAL at 10:54

## 2018-05-04 RX ADMIN — HEPARIN SODIUM 5000 UNITS: 5000 INJECTION, SOLUTION INTRAVENOUS; SUBCUTANEOUS at 06:15

## 2018-05-04 RX ADMIN — Medication 10 ML: at 06:16

## 2018-05-04 RX ADMIN — PREGABALIN 50 MG: 50 CAPSULE ORAL at 10:54

## 2018-05-04 RX ADMIN — ASPIRIN 325 MG ORAL TABLET 325 MG: 325 PILL ORAL at 10:54

## 2018-05-04 RX ADMIN — FERROUS SULFATE TAB 325 MG (65 MG ELEMENTAL FE) 325 MG: 325 (65 FE) TAB at 10:54

## 2018-05-04 RX ADMIN — INSULIN HUMAN 4 UNITS: 100 INJECTION, SOLUTION PARENTERAL at 10:55

## 2018-05-04 RX ADMIN — TRAMADOL HYDROCHLORIDE 50 MG: 50 TABLET, FILM COATED ORAL at 12:10

## 2018-05-04 NOTE — PROGRESS NOTES
Problem: Mobility Impaired (Adult and Pediatric)  Goal: *Acute Goals and Plan of Care (Insert Text)  LTG:  (1.)Mr. Rae Aponte will move from supine to sit and sit to supine, scoot up and down and roll side to side INDEPENDENTLY with bed flat within 7 treatment day(s). (2.)Mr. Rae Aponte will transfer from bed to chair and chair to bed with MODIFIED INDEPENDENCE using the least restrictive device within 7 treatment day(s). (3.)Mr. Rae Aponte will ambulate with SUPERVISION for 250+ feet with the least restrictive device within 7 treatment day(s). (4.)Mr. Rae Aponte will ascend and descend 4 steps with STAND BY ASSIST using handrail within 7 days. ________________________________________________________________________________________________    PHYSICAL THERAPY: Daily Note, Treatment Day: 3rd, AM 5/4/2018  INPATIENT: Hospital Day: 6  Payor: CARE IMPROVEMENT PLUS / [place] / Product Type: Managed Care Medicare /      NAME/AGE/GENDER: Lupe Alexis is a 69 y.o. male   PRIMARY DIAGNOSIS: Renal failure (ARF), acute on chronic (HCC) Renal failure (ARF), acute on chronic (HCC) Renal failure (ARF), acute on chronic (HCC)        ICD-10: Treatment Diagnosis:    · Generalized Muscle Weakness (M62.81)  · Difficulty in walking, Not elsewhere classified (R26.2)  · Other abnormalities of gait and mobility (R26.89)  · History of falling (Z91.81)   Precaution/Allergies:  Review of patient's allergies indicates no known allergies. ASSESSMENT:     Mr. Rae Aponte is sitting in the recliner and states that he needs to be cleaned up. PCT present. Patient does c/o right knee pain. Sit to stand with min assist.  Patient has a slight lean with standing posture. Patient is able to stand for clean up and brief change. Once completed the patient states \"It's coming again\"  This time we walked the patient to the bathroom. Patient did quite well with this activity and has noted improvement.   Patient is returned to the recliner with needs within reach and positioned for comfort. Good session. Patient is very cooperative and pleasant to work with. Patient to be discharged to SNF today for further care. This section established at most recent assessment   PROBLEM LIST (Impairments causing functional limitations):  1. Decreased Strength  2. Decreased ADL/Functional Activities  3. Decreased Transfer Abilities  4. Decreased Ambulation Ability/Technique  5. Decreased Balance  6. Decreased Activity Tolerance   INTERVENTIONS PLANNED: (Benefits and precautions of physical therapy have been discussed with the patient.)  1. Balance Exercise  2. Bed Mobility  3. Gait Training  4. Therapeutic Activites  5. Therapeutic Exercise/Strengthening  6. Transfer Training  7. Group Therapy     TREATMENT PLAN: Frequency/Duration: 3 times a week for duration of hospital stay  Rehabilitation Potential For Stated Goals: Good     RECOMMENDED REHABILITATION/EQUIPMENT: (at time of discharge pending progress): Due to the probability of continued deficits (see above) this patient will likely need continued skilled physical therapy after discharge. Equipment:    TBD pending progress              HISTORY:   History of Present Injury/Illness (Reason for Referral):  Per H&P, \"74 y/o male with pmh of CKD, diastolic CHF, type 2 DM with complications, dementia, pancreatitis, essential HTN, GERD, HLD presented to the ER after a fall. Patient slid of the chair yesterday and he could not get up from the floor. Patient lied down on the floor until family helped him today and brought him to the ER. He denies hitting anywhere during the fall. He is able to move all extremities and denies any specific joint pain. He does have some mild generalized discomfort following the fall. He is alert and oriented during my encounter and able to answer my questions.      Labs on admission shows ALICJA with creatinine of 7.6 which is worse from his baseline.  He is also acidotic with a bicarb of 13. Potassium wnl. He is hypernatremic. CPK elavted at 1400. UA is negative. \"    Past Medical History/Comorbidities:   Mr. Chary Robles  has a past medical history of Acute renal failure superimposed on stage 3 chronic kidney disease ([facility] (9/21/2016); Anemia; Chronic kidney disease; Chronic pancreatitis ([facility] (9/22/2016); Dermatophytosis of nail (12/6/2016); Diabetic neuropathy ([facility] (9/22/2016); Essential hypertension (9/22/2016); GERD (gastroesophageal reflux disease); Hypercholesterolemia; Iron (Fe) deficiency anemia (9/22/2016); Kidney disease; Septicemia due to Klebsiella pneumoniae ([facility] (9/22/2016); Systolic CHF, chronic ([facility] (9/23/2016); Type II diabetes mellitus with nephropathy ([facility] (09/22/2016); Venous insufficiency (12/6/2016); and Xerosis cutis (12/6/2016). He also has no past medical history of Adverse effect of anesthesia; Congestive heart failure, unspecified; Difficult intubation; Malignant hyperthermia due to anesthesia; Nausea & vomiting; or Pseudocholinesterase deficiency. Mr. Chary Robles  has a past surgical history that includes hx hernia repair (Left, 2010); hx colonoscopy; hx turp (2012); hx prostatectomy (2012); and vascular surgery procedure unlist (Left, 10/26/2017). Social History/Living Environment:   Home Environment: Private residence  # Steps to Enter: 3  Rails to Enter: Yes  Hand Rails : Right  Wheelchair Ramp: No  One/Two Story Residence: One story  Living Alone: Yes  Support Systems: Family member(s), Home care staff  Patient Expects to be Discharged to[de-identified] Private residence  Current DME Used/Available at Home: [address], [place], straight, Shower chair, Walker, rolling  Tub or Shower Type: Tub/Shower combination  Prior Level of Function/Work/Activity:  [place]. lives alone in single story home with 4 steps. Cane use primarily, has rolling walker. Does not drive. Has assist the home 3 days/wk- helps with housekeeping, etc and not necessarily caring for Mr. Chary Robles.       Number of Personal Factors/Comorbidities that affect the Plan of Care: 1-2: MODERATE COMPLEXITY   EXAMINATION:   Most Recent Physical Functioning:   Gross Assessment:                  Posture:     Balance:  Sitting: Intact  Standing: Impaired  Standing - Static: Fair  Standing - Dynamic : Fair Bed Mobility:     Wheelchair Mobility:     Transfers:  Sit to Stand: Minimum assistance  Stand to Sit: Contact guard assistance  Gait:     Base of Support: Center of gravity altered  Speed/Melva: Shuffled; Slow  Step Length: Left shortened;Right shortened  Distance (ft): 10 Feet (ft)  Assistive Device: Walker, rolling  Ambulation - Level of Assistance: Minimal assistance  Interventions: Safety awareness training      Body Structures Involved:  1. Muscles Body Functions Affected:  1. Movement Related Activities and Participation Affected:  1. General Tasks and Demands  2. Mobility  3. Self Care  4. Domestic Life  5. Community, Social and [place]   Number of elements that affect the Plan of Care: 4+: HIGH COMPLEXITY   CLINICAL PRESENTATION:   Presentation: Stable and uncomplicated: LOW COMPLEXITY   CLINICAL DECISION MAKIN [facility] Inpatient Short Form  How much difficulty does the patient currently have. .. Unable A Lot A Little None   1. Turning over in bed (including adjusting bedclothes, sheets and blankets)? [] 1   [] 2   [] 3   [x] 4   2. Sitting down on and standing up from a chair with arms ( e.g., wheelchair, bedside commode, etc.)   [] 1   [] 2   [x] 3   [] 4   3. Moving from lying on back to sitting on the side of the bed? [] 1   [] 2   [x] 3   [] 4   How much help from another person does the patient currently need. .. Total A Lot A Little None   4. Moving to and from a bed to a chair (including a wheelchair)? [] 1   [] 2   [x] 3   [] 4   5. Need to walk in hospital room? [] 1   [] 2   [x] 3   [] 4   6. Climbing 3-5 steps with a railing?    [] 1   [x] 2   [] 3 [] 4   © 2007, Trustees of [address] [place] [address] [address] [id number], under license to [employer]. All rights reserved      Score:  Initial: 18 Most Recent: X (Date: -- )    Interpretation of Tool:  Represents activities that are increasingly more difficult (i.e. Bed mobility, Transfers, Gait). Score 24 23 22-20 19-15 14-10 9-7 6     Modifier CH CI CJ CK CL CM CN      ? Mobility - Walking and Moving Around:     - CURRENT STATUS: CK - 40%-59% impaired, limited or restricted    - GOAL STATUS: CI - 1%-19% impaired, limited or restricted    - D/C STATUS:  ---------------To be determined---------------  Payor: CARE IMPROVEMENT PLUS [place] / Product Type: [employer] Medicare /      Medical Necessity:     · Patient demonstrates good rehab potential due to higher previous functional level. Reason for Services/Other Comments:  · Patient continues to demonstrate capacity to improve strength, mobility, balance, transfers, activity tolerance which will increase independence, decrease amount of assistance required from caregiver and increase safety. Use of outcome tool(s) and clinical judgement create a POC that gives a: Clear prediction of patient's progress: LOW COMPLEXITY            TREATMENT:   (In addition to Assessment/Re-Assessment sessions the following treatments were rendered)   Pre-treatment Symptoms/Complaints:  \" I need to be cleaned up\"  Pain: Initial:   Pain Location 1: Knee  Pain Orientation 1: Right  Post Session:  0/10     Therapeutic Activity: (    27 minutes)  Therapeutic activities including bed mobility, Chair transfers, Ambulation on level ground, standing balance, and transfer technique/mechanics to improve mobility, strength, balance and activity tolerance. Required minimal assistance with  Safety awareness training to promote static and dynamic balance in standing and promote motor control of bilateral, upper extremity(s), lower extremity(s).         Date:  5/1/18 Date: Date:     Activity/Exercise Parameters Parameters Parameters   LAQ 15x AB     Seated marching 15x AB     Ankle pumps 15x AB     Seated hip aBd 15x AB                             Braces/Orthotics/Lines/Etc:   · IV  · chan catheter  · O2 Device: Room air  Treatment/Session Assessment:    · Response to Treatment:  Tolerated well  · Interdisciplinary Collaboration:   o Physical Therapy Assistant  o Registered Nurse  · After treatment position/precautions:   o Up in chair  o Bed/Chair-wheels locked  o Bed in low position  o Call light within reach  o RN notified   · Compliance with Program/Exercises: compliant but with some right knee pain  · Recommendations/Intent for next treatment session: \"Next visit will focus on advancements to more challenging activities and reduction in assistance provided\".   Total Treatment Duration:  PT Patient Time In/Time Out  Time In: 0901  Time Out: 0928    Ángel Merlos, PTA

## 2018-05-04 NOTE — DISCHARGE SUMMARY
Hospitalist Discharge Summary     Patient ID:  Tiesha Piedra  [id number]  69 y.o.  1940  Admit date: 4/29/2018  7:41 PM  Discharge date and time: 5/4/2018  Attending: Salina Vicente MD  PCP:  Daren Sims MD  Treatment Team: Attending Provider: Salina Vicente MD; Consulting Provider: Gus Gama MD; Care Manager: ULYSSES Preciado; Utilization Review: Kathia Parmar RN    Principal Diagnosis Renal failure (ARF), acute on chronic [facility])   Principal Problem:    Renal failure (ARF), acute on chronic ([address] (4/29/2018)    Active Problems:    Essential hypertension (9/22/2016)      GERD (gastroesophageal reflux disease) (9/22/2016)      Diabetic neuropathy ([address] (9/22/2016)      Iron (Fe) deficiency anemia (9/22/2016)      Chronic systolic congestive heart failure ([address] (9/23/2016)      Venous stasis ulcer of left lower extremity ([address] (4/1/2017)      Stage 4 chronic kidney disease ([address] (4/4/2017)      Uncontrolled diabetes mellitus, with long-term current use of insulin ([address] (2/28/2018)      Hypernatremia (4/18/2018)      Dementia without behavioral disturbance (4/19/2018)      Rhabdomyolysis (0/55/4567)      Metabolic acidosis (5/71/9958)    Hospital Course:  Please refer to the admission H&P for details of presentation. In summary, the patient is a 77/M with PMH CKD, Diastolic HF, DM2, dementia, pancreatitis, HTN, GERD, HLD who was admitted after a fall at home with rhabdomyolysis. He was noted to have worsening kidney function superimposed on his chronic kidney failure. Nephrology was consulted. Patient was started on IVF and his diuretics were held. He was placed on bicarb drip as well for metabolic acidosis. Patient was refusing dialysis as there was not much improvement in kidney function. Family meeting was held with nephrologist and they decided patient could make own decisions and he did not want dialysis and referral was made for rehab placement.     Significant Diagnostic Studies:     RENAL / AORTA ULTRASOUND      HISTORY: Acute renal failure     COMPARISON: None     TECHNIQUE: Sonographic imaging of both kidneys and the aorta was performed.     FINDINGS:  *  RIGHT KIDNEY: 8.4 cm  in size. No mass, hydronephrosis or calculi. Normal  cortical thickness with increased echogenicity. *  LEFT KIDNEY: 8.8 cm  in size. No mass, hydronephrosis or calculi. Number  cortical thickness with increased echogenicity. *  AORTA: Normal in caliber. *  IVC: Normal in caliber. *  PROXIMAL COMMON ILIAC ARTERIES: Not identified. *  OTHER FINDINGS: None     IMPRESSION  IMPRESSION:      Bilateral renal atrophy with medical renal disease. No evidence of  hydronephrosis.     Date Of Dictation: 4/29/2018 11:16 PM  Labs: Results:       Chemistry Recent Labs      05/04/18 0529 05/03/18 0529 05/02/18   0606   GLU  189*  162*  159*  156*   NA  146*  147*  146*  146*   K  3.5  3.5  3.6  3.6   CL  114*  115*  113*  112*   CO2  20*  19*  21  20*   BUN  63*  67*  70*  68*   CREA  5.89*  5.94*  6.29*  6.27*   CA  7.1*  7.0*  7.0*  7.0*   AGAP  12  13  12  14   AP  63   --   67   TP  5.5*   --   5.5*   ALB  2.2*  2.2*  2.4*  2.4*   GLOB  3.3   --   3.1   AGRAT  0.7*   --   0.8*      CBC w/Diff Recent Labs      05/04/18 0529 05/03/18 0529 05/02/18   0606   WBC  5.7  5.1  5.9   RBC  3.53*  3.66*  3.71*   HGB  9.0*  9.3*  9.5*   HCT  27.6*  28.5*  28.7*   PLT  209  238  275      Cardiac Enzymes Recent Labs      05/04/18 0529 05/03/18   0529   CPK  326*  431*      Coagulation No results for input(s): PTP, INR, APTT in the last 72 hours.     No lab exists for component: INREXT    Lipid Panel Lab Results   Component Value Date/Time    Cholesterol, total 99 04/03/2018 06:16 AM    HDL Cholesterol 65 (H) 04/03/2018 06:16 AM    LDL, calculated 23 04/03/2018 06:16 AM    VLDL, calculated 11 04/03/2018 06:16 AM    Triglyceride 55 04/03/2018 06:16 AM    CHOL/HDL Ratio 1.5 04/03/2018 06:16 AM BNP No results for input(s): BNPP in the last 72 hours. Liver Enzymes Recent Labs      05/04/18   0529   TP  5.5*   ALB  2.2*   AP  63   SGOT  11*      Thyroid Studies Lab Results   Component Value Date/Time    TSH 2.500 04/13/2018 06:37 PM            Discharge Exam:  Visit Vitals    /62 (BP 1 Location: Right arm, BP Patient Position: At rest)    Pulse 73    Temp 98.8 °F (37.1 °C)    Resp 18    Ht 6' 3\" (1.905 m)    Wt 82.6 kg (182 lb 1.6 oz)    SpO2 97%    BMI 22.76 kg/m2     General appearance: alert, cooperative, no distress, appears stated age  Lungs: clear to auscultation bilaterally  Heart: regular rate and rhythm, S1, S2 normal, no murmur, click, rub or gallop  Abdomen: soft, non-tender. Bowel sounds normal. No masses,  no organomegaly  Extremities: no cyanosis or edema  Neurologic: Grossly normal    Disposition: Rehab  Discharge Condition: stable  Patient Instructions:   Current Discharge Medication List      CONTINUE these medications which have NOT CHANGED    Details   hydrALAZINE (APRESOLINE) 100 mg tablet Take 1 Tab by mouth three (3) times daily. Qty: 90 Tab, Refills: 2      pregabalin (LYRICA) 50 mg capsule Take 1 Cap by mouth daily. Max Daily Amount: 50 mg.  Qty: 10 Cap, Refills: 0    Associated Diagnoses: Diabetic polyneuropathy associated with type 2 diabetes mellitus (HCC)      levETIRAcetam (KEPPRA) 500 mg tablet Take 1 Tab by mouth every twelve (12) hours every twelve (12) hours. Qty: 60 Tab, Refills: 2    Associated Diagnoses: Seizure ([address]      torsemide (DEMADEX) 20 mg tablet Take 20 mg by mouth daily. insulin glargine (LANTUS) 100 unit/mL injection 15 Units by SubCUTAneous route daily. Qty: 1 Vial, Refills: 0      isosorbide dinitrate (ISORDIL) 20 mg tablet Take 1 Tab by mouth three (3) times daily. Qty: 90 Tab, Refills: 0      aspirin (ASPIRIN) 325 mg tablet Take 1 Tab by mouth daily.   Qty: 30 Tab, Refills: 0      pravastatin (PRAVACHOL) 40 mg tablet Take 1 Tab by mouth nightly. Qty: 30 Tab, Refills: 0      HUMALOG KWIKPEN INSULIN 100 unit/mL kwikpen 5 units three times a day with meals plus correction scale, 2 units/50 > 150, max daily dose 25 units  Qty: 5 Pen, Refills: 11    Associated Diagnoses: Uncontrolled type 2 diabetes mellitus with hypoglycemia without coma, with long-term current use of insulin (HCC)      calcifediol (RAYALDEE) 30 mcg Cs24 Take  by mouth nightly. diphenoxylate-atropine (LOMOTIL) 2.5-0.025 mg per tablet Take 1 Tab by mouth two (2) times daily as needed for Diarrhea. Indications: Diarrhea      ferrous sulfate 325 mg (65 mg iron) tablet Take 325 mg by mouth two (2) times a day. amLODIPine (NORVASC) 10 mg tablet Take 10 mg by mouth every morning. metoprolol succinate (TOPROL-XL) 50 mg XL tablet Take 50 mg by mouth every morning. omeprazole (PRILOSEC) 20 mg capsule Take 20 mg by mouth every morning. Activity: PT/OT Eval and Treat  Diet: Renal Diet  Wound Care: None needed    Follow-up  ·   With PCP within one week.   Time spent to discharge patient 35 minutes  Signed:  Bhakti Pedraza MD  5/4/2018  10:40 AM

## 2018-05-04 NOTE — PROGRESS NOTES
[facility] will be transporting patient. Arrangements have been made for 1:00PM to [facility] [place] [address], room# [place] 7 and provided RN with report #. Plan was ok with patient in room 615 and RN. Patient notified son and informed son Ad Estrella) that he was being d/c to [place] [address] and provided son with room # and facility number. Care Management Interventions  PCP Verified by CM:  Yes  Mode of Transport at Discharge: S  Transition of Care Consult (CM Consult): Discharge Planning  MyChart Signup: No  Discharge Durable Medical Equipment: No  Physical Therapy Consult: Yes  Occupational Therapy Consult: Yes  Speech Therapy Consult: No  Current Support Network: Own Home, Lives Alone  Confirm Follow Up Transport: Family  Plan discussed with Pt/Family/Caregiver: Yes  Freedom of Choice Offered: Yes  [employer] Provided?: No  Discharge Location  Discharge Placement: Skilled nursing facility ([facility] is being [facility].  )

## 2018-05-04 NOTE — PROGRESS NOTES
Problem: Self Care Deficits Care Plan (Adult)  Goal: *Acute Goals and Plan of Care (Insert Text)  1. Patient will complete upper body bathing and dressing with minimal assistance and adaptive equipment as needed. 2. Patient will complete toileting with minimal assistance. 3. Patient will tolerate 30 minutes of OT treatment with 2-3 rest breaks to increase activity tolerance for ADLs. 4. Patient will complete functional transfers with supervision and adaptive equipment as needed. 5. Patient will complete functional mobility for household distances and minimal cues for safety. 6. Patient will participate in gentle AROM exercises to improve UE mobility for improving independence with ADL. Timeframe: 7 visits        OCCUPATIONAL THERAPY: Initial Assessment, Treatment Day: 1st and AM    5/4/2018  INPATIENT: Hospital Day: 6  Payor: CARE IMPROVEMENT PLUS / [place] / Product Type: Managed Care Medicare /      NAME/AGE/GENDER: Risa Nava is a 69 y.o. male   PRIMARY DIAGNOSIS:  Renal failure (ARF), acute on chronic (HCC) Renal failure (ARF), acute on chronic (HCC) Renal failure (ARF), acute on chronic (HCC)        ICD-10: Treatment Diagnosis:    · Generalized Muscle Weakness (M62.81)  · Other lack of cordination (R27.8)  · Repeated Falls (R29.6)   Precautions/Allergies:     Review of patient's allergies indicates no known allergies. ASSESSMENT:     Mr. Messer presents to the hospital after falling at home (slipped from chair) with acute on chronic renal failure. 5/4/2018 Pt presents up in the chair upon arrival. Pt had already been assisted with ADL's and walked into the bathroom. Pt stated that he did not feel like he could get back up again due to knee pain. Pt agreeable to UE exercises, but required active assist due to ROM limitations. Pt was left up in the chair with belongings in reach and posey in place. Pt will continue to benefit from OT. Continue OT POC.       This section established at most recent assessment   PROBLEM LIST (Impairments causing functional limitations):  1. Decreased Strength  2. Decreased ADL/Functional Activities  3. Decreased Transfer Abilities  4. Decreased Ambulation Ability/Technique  5. Decreased Balance  6. Increased Pain  7. Decreased Activity Tolerance  8. Decreased Flexibility/Joint Mobility  9. Decreased Skin Integrity/Hygeine  10. Decreased [place] with Home Exercise Program  11. Decreased Cognition   INTERVENTIONS PLANNED: (Benefits and precautions of occupational therapy have been discussed with the patient.)  1. Activities of daily living training  2. Adaptive equipment training  3. Balance training  4. Clothing management  5. Cognitive training  6. Donning&doffing training  7. Group therapy  8. Neuromuscular re-eduation  9. Therapeutic activity  10. Therapeutic exercise     TREATMENT PLAN: Frequency/Duration: Follow patient 3 times per week to address above goals. Rehabilitation Potential For Stated Goals: Good     RECOMMENDED REHABILITATION/EQUIPMENT: (at time of discharge pending progress): Due to the probability of continued deficits (see above) this patient will likely need continued skilled occupational therapy after discharge. Equipment:    TBD              OCCUPATIONAL PROFILE AND HISTORY:   History of Present Injury/Illness (Reason for Referral):  See H&P  Past Medical History/Comorbidities:   Mr. Jeny Grant  has a past medical history of Acute renal failure superimposed on stage 3 chronic kidney disease ([facility] (9/21/2016); Anemia; Chronic kidney disease; Chronic pancreatitis ([address] (9/22/2016); Dermatophytosis of nail (12/6/2016); Diabetic neuropathy ([address] (9/22/2016); Essential hypertension (9/22/2016); GERD (gastroesophageal reflux disease); Hypercholesterolemia; Iron (Fe) deficiency anemia (9/22/2016); Kidney disease; Septicemia due to Klebsiella pneumoniae ([address] (9/22/2016); Systolic CHF, chronic ([address] (9/23/2016);  Type II diabetes mellitus with nephropathy ([facility] [address] (09/22/2016); Venous insufficiency (12/6/2016); and Xerosis cutis (12/6/2016). He also has no past medical history of Adverse effect of anesthesia; Congestive heart failure, unspecified; Difficult intubation; Malignant hyperthermia due to anesthesia; Nausea & vomiting; or Pseudocholinesterase deficiency. Mr. Gordy Bose  has a past surgical history that includes hx hernia repair (Left, 2010); hx colonoscopy; hx turp (2012); hx prostatectomy (2012); and vascular surgery procedure unlist (Left, 10/26/2017). Social History/Living Environment:   Home Environment: Private residence  # Steps to Enter: 3  Rails to Enter: Yes  Hand Rails : Right  Wheelchair Ramp: No  One/Two Story Residence: One story  Living Alone: Yes  Support Systems: Family member(s), Home care staff  Patient Expects to be Discharged to[de-identified] Private residence  Current DME Used/Available at Home: Aldona Rita, straight, Shower chair, Walker, rolling  Tub or Shower Type: Tub/Shower combination  Prior Level of Function/Work/Activity:  Pt lives at home alone. Pt has caregiver 3x/week. Pt completes functional mobility with a cane but has a rolling walker. Pt was managing with ADL at home. Personal Factors:          Social Background:  Lives alone        Past/Current Experience:  Dementia with frequent falls         Other factors that influence how disability is experienced by the patient:  Multiple co-morbidities    Number of Personal Factors/Comorbidities that affect the Plan of Care: Extensive review of physical, cognitive, and psychosocial performance (3+):  HIGH COMPLEXITY   ASSESSMENT OF OCCUPATIONAL PERFORMANCE[de-identified]   Activities of Daily Living:           Basic ADLs (From Assessment) Complex ADLs (From Assessment)   Feeding: Minimum assistance  Oral Facial Hygiene/Grooming:  Moderate assistance  Bathing: Maximum assistance  Upper Body Dressing: Maximum assistance  Lower Body Dressing: Maximum assistance  Toileting: Maximum assistance Instrumental ADL  Meal Preparation: Total assistance  Homemaking: Total assistance   Grooming/Bathing/Dressing Activities of Daily Living                                     Most Recent Physical Functioning:   Gross Assessment:                  Posture:  Posture (WDL): Exceptions to WDL  Posture Assessment: Forward head, Rounded shoulders  Balance:    Bed Mobility:     Wheelchair Mobility:     Transfers:                   Patient Vitals for the past 6 hrs:   BP BP Patient Position SpO2 Pulse   18 0653 157/62 At rest 97 % 73   18 1058 147/58 - 96 % 73       Mental Status  Neurologic State: Alert  Orientation Level: Oriented X4  Cognition: Follows commands  Perception: Appears intact  Perseveration: No perseveration noted  Safety/Judgement: Awareness of environment, Fall prevention, Home safety                          Physical Skills Involved:  1. Range of Motion  2. Balance  3. Strength  4. Activity Tolerance  5. Pain (acute) Cognitive Skills Affected (resulting in the inability to perform in a timely and safe manner):  1. Executive Function  2. Divided Attention Psychosocial Skills Affected:  1. Habits/Routines  2. Environmental Adaptation  3. Self-Awareness   Number of elements that affect the Plan of Care: 5+:  HIGH COMPLEXITY   CLINICAL DECISION MAKIN [facility] [address] [address] AM-PAC 6 Clicks   Daily Activity Inpatient Short Form  How much help from another person does the patient currently need. .. Total A Lot A Little None   1. Putting on and taking off regular lower body clothing? [] 1   [x] 2   [] 3   [] 4   2. Bathing (including washing, rinsing, drying)? [] 1   [x] 2   [] 3   [] 4   3. Toileting, which includes using toilet, bedpan or urinal?   [] 1   [x] 2   [] 3   [] 4   4. Putting on and taking off regular upper body clothing? [] 1   [x] 2   [] 3   [] 4   5. Taking care of personal grooming such as brushing teeth? [] 1   [x] 2   [] 3   [] 4   6. Eating meals?    [] 1 [] 2   [x] 3   [] 4   © 2007, Trustees of [address] [place] [address] [address] [address], under license to [employer]. All rights reserved      Score:  Initial: 13 Most Recent: X (Date: -- )    Interpretation of Tool:  Represents activities that are increasingly more difficult (i.e. Bed mobility, Transfers, Gait). Score 24 23 22-20 19-15 14-10 9-7 6     Modifier CH CI CJ CK CL CM CN      ? Self Care:     - CURRENT STATUS: CL - 60%-79% impaired, limited or restricted    - GOAL STATUS: CK - 40%-59% impaired, limited or restricted    - D/C STATUS:  ---------------To be determined---------------  Payor: CARE IMPROVEMENT PLUS [place] / Product Type: [employer] Medicare /      Medical Necessity:     · Patient demonstrates excellent rehab potential due to higher previous functional level. Reason for Services/Other Comments:  · Patient continues to require skilled intervention due to decreased independence with ADL/functional transfers. Use of outcome tool(s) and clinical judgement create a POC that gives a: LOW COMPLEXITY         TREATMENT:   (In addition to Assessment/Re-Assessment sessions the following treatments were rendered)     Pre-treatment Symptoms/Complaints:    Pain: Initial:   Pain Location 1: Knee  Pain Orientation 1: Right  Pain Intervention(s) 1: Nurse notified  Post Session:  same     Therapeutic Exercise: (15 minutes):  Exercises per grid below to improve mobility and strength. Required moderate manual cues to promote proper body mechanics. Progressed range as indicated.    Date:  5/4/18 Date:   Date:     Activity/Exercise Parameters Parameters Parameters   Shoulder flexion; active assist 2 sets of 15 reps     Elbow flexion/extension 2 sets of 15 reps     Shoulder presses; active assist 2 sets of 15 reps     Shoulder horizontal add/abd 2 sets of 15 reps                               Braces/Orthotics/Lines/Etc:   · IV  · chan catheter  · O2 Device: Room air  Treatment/Session Assessment:    · Response to Treatment:  Limited by pain  · Interdisciplinary Collaboration:   o Certified Occupational Therapy Assistant  o Registered Nurse  · After treatment position/precautions:   o Up in chair  o Bed alarm/tab alert on  o Bed/Chair-wheels locked  o Call light within reach  o RN notified   · Compliance with Program/Exercises: Will assess as treatment progresses. · Recommendations/Intent for next treatment session: \"Next visit will focus on advancements to more challenging activities and reduction in assistance provided\".   Total Treatment Duration:  OT Patient Time In/Time Out  Time In: 1020  Time Out: [address] Rosa Maria Reich

## 2018-05-04 NOTE — PROGRESS NOTES
Hourly rounds complete this shift, no new complaints at this time,  bed in low, locked position, call light and bedside table within reach,  all needs met. Will continue to monitor Report to day shift nurse. DISCHARGE

## 2018-05-07 ENCOUNTER — PATIENT OUTREACH (OUTPATIENT)
Dept: CASE MANAGEMENT | Age: 78
End: 2018-05-07

## 2018-05-07 NOTE — PROGRESS NOTES
Per [facility] patient discharged to [facility] [address] [facility] [employer] on 05/04/2018. Patient will be included in weekly care coordination calls, Care Coordinator will send patient discharge disposition to Galdino Chacon RN [facility]. This note will not be viewable in [address]

## 2018-05-17 ENCOUNTER — HOME CARE VISIT (OUTPATIENT)
Dept: HOME HEALTH SERVICES | Facility: HOME HEALTH | Age: 78
End: 2018-05-17
Payer: MEDICARE

## 2018-06-04 ENCOUNTER — HOME CARE VISIT (OUTPATIENT)
Dept: HOME HEALTH SERVICES | Facility: HOME HEALTH | Age: 78
End: 2018-06-04
Payer: MEDICARE

## 2018-07-06 ENCOUNTER — HOSPITAL ENCOUNTER (OUTPATIENT)
Dept: LAB | Age: 78
Discharge: HOME | End: 2018-07-06
Payer: MEDICARE

## 2018-07-06 DIAGNOSIS — D50.8 OTHER IRON DEFICIENCY ANEMIA: ICD-10-CM

## 2018-07-06 LAB
ALBUMIN SERPL-MCNC: 3.5 G/DL (ref 3.2–4.6)
ALBUMIN/GLOB SERPL: 0.9 {RATIO} (ref 1.2–3.5)
ALP SERPL-CCNC: 67 U/L (ref 50–136)
ALT SERPL-CCNC: 17 U/L (ref 12–65)
ANION GAP SERPL CALC-SCNC: 8 MMOL/L (ref 7–16)
AST SERPL-CCNC: 17 U/L (ref 15–37)
BASOPHILS # BLD: 0 K/UL (ref 0–0.2)
BASOPHILS NFR BLD: 0 % (ref 0–2)
BILIRUB SERPL-MCNC: 0.7 MG/DL (ref 0.2–1.1)
BUN SERPL-MCNC: 51 MG/DL (ref 8–23)
CALCIUM SERPL-MCNC: 7.5 MG/DL (ref 8.3–10.4)
CHLORIDE SERPL-SCNC: 112 MMOL/L (ref 98–107)
CO2 SERPL-SCNC: 23 MMOL/L (ref 21–32)
CREAT SERPL-MCNC: 4.27 MG/DL (ref 0.8–1.5)
DIFFERENTIAL METHOD BLD: ABNORMAL
EOSINOPHIL # BLD: 0 K/UL (ref 0–0.8)
EOSINOPHIL NFR BLD: 0 % (ref 0.5–7.8)
ERYTHROCYTE [DISTWIDTH] IN BLOOD BY AUTOMATED COUNT: 16.2 % (ref 11.9–14.6)
FERRITIN SERPL-MCNC: 452 NG/ML (ref 8–388)
GLOBULIN SER CALC-MCNC: 3.9 G/DL (ref 2.3–3.5)
GLUCOSE SERPL-MCNC: 450 MG/DL (ref 65–100)
HCT VFR BLD AUTO: 29.7 % (ref 41.1–50.3)
HGB BLD-MCNC: 9 G/DL (ref 13.6–17.2)
HGB RETIC QN AUTO: 24 PG (ref 29–35)
IMM RETICS NFR: 18 % (ref 2.3–13.4)
IRON SATN MFR SERPL: 9 %
IRON SERPL-MCNC: 19 UG/DL (ref 35–150)
LYMPHOCYTES # BLD: 1 K/UL (ref 0.5–4.6)
LYMPHOCYTES NFR BLD: 18 % (ref 13–44)
MCH RBC QN AUTO: 25.7 PG (ref 26.1–32.9)
MCHC RBC AUTO-ENTMCNC: 30.3 G/DL (ref 31.4–35)
MCV RBC AUTO: 84.9 FL (ref 79.6–97.8)
MONOCYTES # BLD: 0.6 K/UL (ref 0.1–1.3)
MONOCYTES NFR BLD: 10 % (ref 4–12)
NEUTS SEG # BLD: 3.8 K/UL (ref 1.7–8.2)
NEUTS SEG NFR BLD: 71 % (ref 43–78)
NRBC # BLD: 0 K/UL (ref 0–0.2)
PLATELET # BLD AUTO: 109 K/UL (ref 150–450)
PMV BLD AUTO: 13 FL (ref 10.8–14.1)
POTASSIUM SERPL-SCNC: 5.6 MMOL/L (ref 3.5–5.1)
PROT SERPL-MCNC: 7.4 G/DL (ref 6.3–8.2)
RBC # BLD AUTO: 3.5 M/UL (ref 4.23–5.67)
RETICS # AUTO: 0.05 M/UL (ref 0.03–0.1)
RETICS/RBC NFR AUTO: 1.3 % (ref 0.3–2)
SODIUM SERPL-SCNC: 143 MMOL/L (ref 136–145)
TIBC SERPL-MCNC: 210 UG/DL (ref 250–450)
WBC # BLD AUTO: 5.4 K/UL (ref 4.3–11.1)

## 2018-07-06 PROCEDURE — 83540 ASSAY OF IRON: CPT | Performed by: INTERNAL MEDICINE

## 2018-07-06 PROCEDURE — 85046 RETICYTE/HGB CONCENTRATE: CPT | Performed by: INTERNAL MEDICINE

## 2018-07-06 PROCEDURE — 86334 IMMUNOFIX E-PHORESIS SERUM: CPT | Performed by: INTERNAL MEDICINE

## 2018-07-06 PROCEDURE — 82728 ASSAY OF FERRITIN: CPT | Performed by: INTERNAL MEDICINE

## 2018-07-06 PROCEDURE — 82668 ASSAY OF ERYTHROPOIETIN: CPT | Performed by: INTERNAL MEDICINE

## 2018-07-06 PROCEDURE — 82784 ASSAY IGA/IGD/IGG/IGM EACH: CPT | Performed by: INTERNAL MEDICINE

## 2018-07-06 PROCEDURE — 80053 COMPREHEN METABOLIC PANEL: CPT | Performed by: INTERNAL MEDICINE

## 2018-07-06 PROCEDURE — 85025 COMPLETE CBC W/AUTO DIFF WBC: CPT | Performed by: INTERNAL MEDICINE

## 2018-07-06 PROCEDURE — 36415 COLL VENOUS BLD VENIPUNCTURE: CPT | Performed by: INTERNAL MEDICINE

## 2018-07-07 LAB — EPO SERPL-ACNC: 22.7 MIU/ML (ref 2.6–18.5)

## 2018-07-09 LAB
ALBUMIN SERPL ELPH-MCNC: 3.8 G/DL (ref 3.2–5.6)
ALBUMIN/GLOB SERPL: 1.2 {RATIO}
ALPHA1 GLOB SERPL ELPH-MCNC: 0.25 G/DL (ref 0.1–0.4)
ALPHA2 GLOB SERPL ELPH-MCNC: 0.91 G/DL (ref 0.4–1.2)
B-GLOBULIN SERPL QL ELPH: 0.87 G/DL (ref 0.6–1.3)
GAMMA GLOB MFR SERPL ELPH: 1.17 G/DL (ref 0.5–1.6)
IGA SERPL-MCNC: 219 MG/DL (ref 85–499)
IGG SERPL-MCNC: 1215 MG/DL (ref 610–1616)
IGM SERPL-MCNC: 70 MG/DL (ref 35–242)
M PROTEIN SERPL ELPH-MCNC: NORMAL G/DL
PROT PATTERN SERPL ELPH-IMP: NORMAL
PROT PATTERN SPEC IFE-IMP: NORMAL
PROT SERPL-MCNC: 7 G/DL (ref 6.3–8.2)

## 2018-07-11 ENCOUNTER — HOSPITAL ENCOUNTER (EMERGENCY)
Age: 78
Discharge: HOME | End: 2018-07-11
Attending: EMERGENCY MEDICINE
Payer: MEDICARE

## 2018-07-11 ENCOUNTER — HOME HEALTH ADMISSION (OUTPATIENT)
Dept: HOME HEALTH SERVICES | Facility: HOME HEALTH | Age: 78
End: 2018-07-11

## 2018-07-11 VITALS
HEART RATE: 68 BPM | OXYGEN SATURATION: 100 % | WEIGHT: 180 LBS | TEMPERATURE: 97.9 F | HEIGHT: 71 IN | SYSTOLIC BLOOD PRESSURE: 154 MMHG | DIASTOLIC BLOOD PRESSURE: 80 MMHG | BODY MASS INDEX: 25.2 KG/M2 | RESPIRATION RATE: 18 BRPM

## 2018-07-11 DIAGNOSIS — S81.809A MULTIPLE OPEN WOUNDS OF LOWER LEG, UNSPECIFIED LATERALITY, INITIAL ENCOUNTER: Primary | ICD-10-CM

## 2018-07-11 PROCEDURE — 99282 EMERGENCY DEPT VISIT SF MDM: CPT | Performed by: EMERGENCY MEDICINE

## 2018-07-11 RX ORDER — CEPHALEXIN 500 MG/1
500 CAPSULE ORAL 2 TIMES DAILY
Qty: 14 CAP | Refills: 0 | Status: SHIPPED | OUTPATIENT
Start: 2018-07-11 | End: 2018-07-18

## 2018-07-11 NOTE — ED TRIAGE NOTES
Pt states blisters on legs. Pt states he has had them before and he has been wrapping them and they are weeping through. Pt states he has been putting ointment on his legs but it has not helped. Not painful just bleeding and weeping through dressings.

## 2018-07-11 NOTE — PROGRESS NOTES
Referral to [facility] orders for nursing and wound care. MD and Charge nurse aware and reviewed orders.

## 2018-07-11 NOTE — ED NOTES
I have reviewed discharge instructions with the patient. The patient verbalized understanding. Patient left ED via Discharge Method: ambulatory to Home with (self). Opportunity for questions and clarification provided. Patient given 1 scripts. To continue your aftercare when you leave the hospital, you may receive an automated call from our care team to check in on how you are doing. This is a free service and part of our promise to provide the best care and service to meet your aftercare needs.  If you have questions, or wish to unsubscribe from this service please call [phone]. Thank you for Choosing our [employer] [facility] Emergency Department.

## 2018-07-11 NOTE — ED PROVIDER NOTES
HPI Comments: 70 yo M w/ history of chronic bilateral lower extremity venous stasis changes/ulcers presents with complaint of blisters present to bilateral lower extremities over the past several weeks. States he's had these blisters numerous months. States severity has not changed. States he has been wrapping them and clean them daily. States he has been putting ointment on his legs but it has not helped. [facility] previously came out to see him frequently but no longer makes visits. Denies any recent trauma or injury, fever, chills, drainage, shortness of breath, chest pain, numbness, tingling, weakness, hemoptysis. Patient is a 69 y.o. male presenting with leg pain. The history is provided by the patient. No  was used. Leg Pain    This is a chronic problem. The current episode started more than 1 week ago. The problem occurs constantly. The problem has not changed since onset. The pain is present in the right lower leg and left lower leg. The pain is at a severity of 0/10. The patient is experiencing no pain. Pertinent negatives include no numbness, full range of motion, no stiffness, no tingling, no itching, no back pain and no neck pain. The treatment provided no relief. There has been no history of extremity trauma.         Past Medical History:   Diagnosis Date    Acute renal failure superimposed on stage 3 chronic kidney disease ([address] 9/21/2016    Anemia     Chronic kidney disease     not on HD yet- surgery done for access and here for elevation of fistula- FU with [facility]- creat on 12/7/17=3.15    Chronic pancreatitis ([address] 9/22/2016    Dermatophytosis of nail 12/6/2016    Diabetic neuropathy ([address] 9/22/2016    avg fastings 200; last A1C-? ; hypo @ [de-identified]    Essential hypertension 9/22/2016    GERD (gastroesophageal reflux disease)     controlled with med    Hypercholesterolemia     Iron (Fe) deficiency anemia 9/22/2016    Kidney disease     Septicemia due to Klebsiella pneumoniae ([facility] 7/43/1452    Systolic CHF, chronic ([facility] 9/23/2016    Type II diabetes mellitus with nephropathy ([facility] 09/22/2016    Venous insufficiency 12/6/2016    Xerosis cutis 12/6/2016       Past Surgical History:   Procedure Laterality Date    HX COLONOSCOPY      HX HERNIA REPAIR Left 2010    HX PROSTATECTOMY  2012    HX TURP  2012    FOR BPH    VASCULAR SURGERY PROCEDURE UNLIST Left 10/26/2017    AVF         Family History:   Problem Relation Age of Onset    Diabetes Mother     Stroke Mother     Hypertension Mother     No Known Problems Father     Diabetes Sister     Diabetes Brother     Diabetes Sister     Diabetes Sister     Other Sister      fibromyalgia       Social History     Social History    Marital status:      Spouse name: N/A    Number of children: N/A    Years of education: N/A     Occupational History    Not on file. Social History Main Topics    Smoking status: Former Smoker     Packs/day: 2.00     Years: 20.00     Quit date: 1995    Smokeless tobacco: Current User    Alcohol use No    Drug use: Not on file    Sexual activity: Not on file     Other Topics Concern    Not on file     Social History Narrative         ALLERGIES: Review of patient's allergies indicates no known allergies. Review of Systems   Constitutional: Negative for chills and fever. HENT: Negative for congestion, facial swelling and sore throat. Respiratory: Negative for cough and shortness of breath. Cardiovascular: Negative for chest pain, palpitations and leg swelling. Gastrointestinal: Negative for abdominal pain, constipation, nausea and vomiting. Genitourinary: Negative for dysuria and hematuria. Musculoskeletal: Negative for back pain, joint swelling, myalgias, neck pain and stiffness. Skin: Positive for wound. Negative for itching and pallor. Neurological: Negative for dizziness, tingling, weakness, numbness and headaches. Psychiatric/Behavioral: Negative for agitation and confusion. Vitals:    07/11/18 0950   BP: 154/80   Pulse: 68   Resp: 18   Temp: 97.9 °F (36.6 °C)   SpO2: 100%   Weight: 81.6 kg (180 lb)   Height: 5' 11\" (1.803 m)            Physical Exam   Constitutional: He is oriented to person, place, and time. Patient well appearing in no acute distress. HENT:   Head: Normocephalic. Mouth/Throat: Oropharynx is clear and moist.   Eyes: Conjunctivae and EOM are normal. Pupils are equal, round, and reactive to light. Neck: Normal range of motion. No JVD present. No tracheal deviation present. Cardiovascular: Normal rate, regular rhythm, normal heart sounds and intact distal pulses. Pulses 2+ throughout. Pulmonary/Chest: Effort normal. He has no wheezes. He has no rales. CTAB. Abdominal: Soft. There is no tenderness. There is no rebound and no guarding. Soft, NTND. Musculoskeletal: Normal range of motion. He exhibits no edema, tenderness or deformity. Chronic bilateral lower extremity venous stasis changes. DP pulses 2+ and equal bilaterally. Normal sensory exam. No calf tenderness. No purulent drainage. No surrounding streaking erythema noted. No cellulitis. Neurological: He is alert and oriented to person, place, and time. No cranial nerve deficit. Coordination normal.   Skin: Skin is warm and dry. Nursing note and vitals reviewed. MDM  Number of Diagnoses or Management Options  Multiple open wounds of lower leg, unspecified laterality, initial encounter: minor  Diagnosis management comments: Bandages changed. Case management spoke with directly. State they will attempt to [id number] to clean his wounds and placed bandages on regular basis.        Amount and/or Complexity of Data Reviewed  Review and summarize past medical records: yes    Risk of Complications, Morbidity, and/or Mortality  Presenting problems: minimal  Diagnostic procedures: minimal  Management options: minimal    Patient Progress  Patient progress: improved        ED Course       Procedures    Miguel Vincent MD; 7/11/2018 @10:33 AM Voice dictation software was used during the making of this note. This software is not perfect and grammatical and other typographical errors may be present.   This note has not been proofread for errors.  ===================================================================

## 2018-07-11 NOTE — PROGRESS NOTES
[address]  Face to Face Encounter    Patients Name: Marva Sánchez. YOB: 1940    Ordering Physician: Radha Salinas MD    Primary Diagnosis:  Multiple open wounds of lower leg, unspecified laterality, initial encounter [S81.809A]    Date of Face to Face:   7/11/2018                                  Face to Face Encounter findings are related to primary reason for home care:   yes. 1. I certify that the patient needs intermittent care as follows: skilled nursing care:  wound care    2. I certify that this patient is homebound, that is: 1) patient requires the use of a walker and wheelchair device, special transportation, or assistance of another to leave the home; or 2) patient's condition makes leaving the home medically contraindicated; and 3) patient has a normal inability to leave the home and leaving the home requires considerable and taxing effort. Patient may leave the home for infrequent and short duration for medical reasons, and occasional absences for non-medical reasons. Homebound status is due to the following functional limitations: Patient with strength deficits limiting the performance of all ADL's without caregiver assistance or the use of an assistive device. Patient with poor safety awareness and is at risk for falls without assistance of another person and the use of an assistive device. Patient with poor ambulation endurance limiting their safe ability to ascend/descend the required number of steps to leave the home. 3. I certify that this patient is under my care and that I, or a nurse practitioner or [facility], or clinical nurse specialist, or certified nurse midwife, working with me, had a Face-to-Face Encounter that meets the physician Face-to-Face Encounter requirements.   The following are the clinical findings from the [address] [place] [address] encounter that support the need for skilled services and is a summary of the encounter:     See attached progess note      Shanita Burch  7/11/2018      THE FOLLOWING TO BE COMPLETED BY THE COMMUNITY PHYSICIAN:    I concur with the findings described above from the F2F encounter that this patient is homebound and in need of a skilled service.     Certifying Physician: _____________________________________      Printed Certifying Physician Name: _____________________________________    Date: _________________

## 2018-07-23 ENCOUNTER — HOSPITAL ENCOUNTER (OUTPATIENT)
Dept: INFUSION THERAPY | Age: 78
Discharge: HOME | End: 2018-07-23
Payer: MEDICARE

## 2018-07-23 VITALS
OXYGEN SATURATION: 95 % | TEMPERATURE: 98.1 F | WEIGHT: 191.6 LBS | SYSTOLIC BLOOD PRESSURE: 131 MMHG | RESPIRATION RATE: 16 BRPM | DIASTOLIC BLOOD PRESSURE: 55 MMHG | BODY MASS INDEX: 26.72 KG/M2 | HEART RATE: 59 BPM

## 2018-07-23 DIAGNOSIS — D50.8 OTHER IRON DEFICIENCY ANEMIA: ICD-10-CM

## 2018-07-23 PROCEDURE — 74011250636 HC RX REV CODE- 250/636: Performed by: INTERNAL MEDICINE

## 2018-07-23 PROCEDURE — 96365 THER/PROPH/DIAG IV INF INIT: CPT

## 2018-07-23 RX ADMIN — FERRIC CARBOXYMALTOSE INJECTION 750 MG: 50 INJECTION, SOLUTION INTRAVENOUS at 10:50

## 2018-07-23 RX ADMIN — SODIUM CHLORIDE 500 ML: 900 INJECTION, SOLUTION INTRAVENOUS at 10:16

## 2018-07-23 NOTE — PROGRESS NOTES
Arrived to the [facility]. Injectafer infusion completed. Patient tolerated well. Any issues or concerns during appointment: none. Patient aware of next infusion appointment on 07.30.2018 at 1000. Discharged ambulatory with cane to home.

## 2018-07-30 ENCOUNTER — HOSPITAL ENCOUNTER (OUTPATIENT)
Dept: INFUSION THERAPY | Age: 78
End: 2018-07-30
Payer: MEDICARE

## 2018-08-01 ENCOUNTER — HOSPITAL ENCOUNTER (OUTPATIENT)
Dept: INFUSION THERAPY | Age: 78
Discharge: HOME | End: 2018-08-01
Payer: MEDICARE

## 2018-08-01 VITALS
SYSTOLIC BLOOD PRESSURE: 146 MMHG | DIASTOLIC BLOOD PRESSURE: 70 MMHG | OXYGEN SATURATION: 96 % | BODY MASS INDEX: 26.78 KG/M2 | RESPIRATION RATE: 16 BRPM | TEMPERATURE: 98 F | HEART RATE: 57 BPM | WEIGHT: 192 LBS

## 2018-08-01 DIAGNOSIS — D50.8 OTHER IRON DEFICIENCY ANEMIA: ICD-10-CM

## 2018-08-01 PROCEDURE — 96365 THER/PROPH/DIAG IV INF INIT: CPT

## 2018-08-01 PROCEDURE — 74011250636 HC RX REV CODE- 250/636: Performed by: INTERNAL MEDICINE

## 2018-08-01 RX ORDER — SODIUM CHLORIDE 0.9 % (FLUSH) 0.9 %
10 SYRINGE (ML) INJECTION AS NEEDED
Status: ACTIVE | OUTPATIENT
Start: 2018-08-01 | End: 2018-08-01

## 2018-08-01 RX ADMIN — FERRIC CARBOXYMALTOSE INJECTION 750 MG: 50 INJECTION, SOLUTION INTRAVENOUS at 09:40

## 2018-08-01 RX ADMIN — Medication 10 ML: at 10:30

## 2018-08-01 RX ADMIN — SODIUM CHLORIDE 500 ML: 900 INJECTION, SOLUTION INTRAVENOUS at 09:28

## 2018-08-01 NOTE — PROGRESS NOTES
Arrived to the [facility]. injectafer  completed. Patient tolerated well Any issues or concerns during appointment: no 
No future infusion appointments, directed to Dr Chrsitian Mcdonald  to schedule F/U Discharged ambulatory with family

## 2018-08-14 ENCOUNTER — HOSPITAL ENCOUNTER (OUTPATIENT)
Dept: LAB | Age: 78
Discharge: HOME | End: 2018-08-14
Payer: MEDICARE

## 2018-08-14 DIAGNOSIS — D50.8 OTHER IRON DEFICIENCY ANEMIA: ICD-10-CM

## 2018-08-14 LAB
ALBUMIN SERPL-MCNC: 3.6 G/DL (ref 3.2–4.6)
ALBUMIN/GLOB SERPL: 0.9 {RATIO} (ref 1.2–3.5)
ALP SERPL-CCNC: 136 U/L (ref 50–136)
ALT SERPL-CCNC: 29 U/L (ref 12–65)
ANION GAP SERPL CALC-SCNC: 8 MMOL/L (ref 7–16)
AST SERPL-CCNC: 36 U/L (ref 15–37)
BASOPHILS # BLD: 0 K/UL (ref 0–0.2)
BASOPHILS NFR BLD: 0 % (ref 0–2)
BILIRUB SERPL-MCNC: 0.4 MG/DL (ref 0.2–1.1)
BUN SERPL-MCNC: 28 MG/DL (ref 8–23)
CALCIUM SERPL-MCNC: 7.7 MG/DL (ref 8.3–10.4)
CHLORIDE SERPL-SCNC: 110 MMOL/L (ref 98–107)
CO2 SERPL-SCNC: 26 MMOL/L (ref 21–32)
CREAT SERPL-MCNC: 4.74 MG/DL (ref 0.8–1.5)
DIFFERENTIAL METHOD BLD: ABNORMAL
EOSINOPHIL # BLD: 0.2 K/UL (ref 0–0.8)
EOSINOPHIL NFR BLD: 3 % (ref 0.5–7.8)
ERYTHROCYTE [DISTWIDTH] IN BLOOD BY AUTOMATED COUNT: 17.6 % (ref 11.9–14.6)
GLOBULIN SER CALC-MCNC: 4 G/DL (ref 2.3–3.5)
GLUCOSE SERPL-MCNC: 128 MG/DL (ref 65–100)
HCT VFR BLD AUTO: 29.9 % (ref 41.1–50.3)
HGB BLD-MCNC: 8.9 G/DL (ref 13.6–17.2)
IMM GRANULOCYTES # BLD: 0 K/UL (ref 0–0.5)
IMM GRANULOCYTES NFR BLD AUTO: 0 % (ref 0–5)
LYMPHOCYTES # BLD: 1.4 K/UL (ref 0.5–4.6)
LYMPHOCYTES NFR BLD: 25 % (ref 13–44)
MCH RBC QN AUTO: 25.4 PG (ref 26.1–32.9)
MCHC RBC AUTO-ENTMCNC: 29.8 G/DL (ref 31.4–35)
MCV RBC AUTO: 85.4 FL (ref 79.6–97.8)
MONOCYTES # BLD: 0.8 K/UL (ref 0.1–1.3)
MONOCYTES NFR BLD: 14 % (ref 4–12)
NEUTS SEG # BLD: 3.1 K/UL (ref 1.7–8.2)
NEUTS SEG NFR BLD: 57 % (ref 43–78)
NRBC # BLD: 0 K/UL (ref 0–0.2)
PLATELET # BLD AUTO: 100 K/UL (ref 150–450)
PMV BLD AUTO: 12.1 FL (ref 9.4–12.3)
POTASSIUM SERPL-SCNC: 4.3 MMOL/L (ref 3.5–5.1)
PROT SERPL-MCNC: 7.6 G/DL (ref 6.3–8.2)
RBC # BLD AUTO: 3.5 M/UL (ref 4.23–5.67)
SODIUM SERPL-SCNC: 144 MMOL/L (ref 136–145)
WBC # BLD AUTO: 5.4 K/UL (ref 4.3–11.1)

## 2018-08-14 PROCEDURE — 80053 COMPREHEN METABOLIC PANEL: CPT

## 2018-08-14 PROCEDURE — 85025 COMPLETE CBC W/AUTO DIFF WBC: CPT

## 2018-08-14 PROCEDURE — 36415 COLL VENOUS BLD VENIPUNCTURE: CPT

## 2018-09-12 ENCOUNTER — HOSPITAL ENCOUNTER (OUTPATIENT)
Dept: LAB | Age: 78
Discharge: HOME | End: 2018-09-12
Payer: MEDICARE

## 2018-09-12 ENCOUNTER — HOSPITAL ENCOUNTER (OUTPATIENT)
Dept: INFUSION THERAPY | Age: 78
Discharge: HOME | End: 2018-09-12

## 2018-09-12 DIAGNOSIS — D50.8 OTHER IRON DEFICIENCY ANEMIA: ICD-10-CM

## 2018-09-12 LAB
ALBUMIN SERPL-MCNC: 3.3 G/DL (ref 3.2–4.6)
ALBUMIN/GLOB SERPL: 0.8 {RATIO} (ref 1.2–3.5)
ALP SERPL-CCNC: 124 U/L (ref 50–136)
ALT SERPL-CCNC: 84 U/L (ref 12–65)
ANION GAP SERPL CALC-SCNC: 8 MMOL/L (ref 7–16)
AST SERPL-CCNC: 120 U/L (ref 15–37)
BASOPHILS # BLD: 0 K/UL (ref 0–0.2)
BASOPHILS NFR BLD: 0 % (ref 0–2)
BILIRUB SERPL-MCNC: 0.4 MG/DL (ref 0.2–1.1)
BUN SERPL-MCNC: 37 MG/DL (ref 8–23)
CALCIUM SERPL-MCNC: 7.9 MG/DL (ref 8.3–10.4)
CHLORIDE SERPL-SCNC: 115 MMOL/L (ref 98–107)
CO2 SERPL-SCNC: 24 MMOL/L (ref 21–32)
CREAT SERPL-MCNC: 4.5 MG/DL (ref 0.8–1.5)
DIFFERENTIAL METHOD BLD: ABNORMAL
EOSINOPHIL # BLD: 0.2 K/UL (ref 0–0.8)
EOSINOPHIL NFR BLD: 4 % (ref 0.5–7.8)
ERYTHROCYTE [DISTWIDTH] IN BLOOD BY AUTOMATED COUNT: 17.4 % (ref 11.9–14.6)
FERRITIN SERPL-MCNC: 1153 NG/ML (ref 8–388)
GLOBULIN SER CALC-MCNC: 3.9 G/DL (ref 2.3–3.5)
GLUCOSE SERPL-MCNC: 166 MG/DL (ref 65–100)
HCT VFR BLD AUTO: 29.6 % (ref 41.1–50.3)
HGB BLD-MCNC: 8.9 G/DL (ref 13.6–17.2)
IMM GRANULOCYTES # BLD: 0 K/UL (ref 0–0.5)
IMM GRANULOCYTES NFR BLD AUTO: 0 % (ref 0–5)
IRON SATN MFR SERPL: 48 %
IRON SERPL-MCNC: 95 UG/DL (ref 35–150)
LYMPHOCYTES # BLD: 1.4 K/UL (ref 0.5–4.6)
LYMPHOCYTES NFR BLD: 30 % (ref 13–44)
MCH RBC QN AUTO: 25.6 PG (ref 26.1–32.9)
MCHC RBC AUTO-ENTMCNC: 30.1 G/DL (ref 31.4–35)
MCV RBC AUTO: 85.1 FL (ref 79.6–97.8)
MONOCYTES # BLD: 0.5 K/UL (ref 0.1–1.3)
MONOCYTES NFR BLD: 11 % (ref 4–12)
NEUTS SEG # BLD: 2.5 K/UL (ref 1.7–8.2)
NEUTS SEG NFR BLD: 55 % (ref 43–78)
NRBC # BLD: 0 K/UL (ref 0–0.2)
PLATELET # BLD AUTO: 84 K/UL (ref 150–450)
PMV BLD AUTO: 12.8 FL (ref 9.4–12.3)
POTASSIUM SERPL-SCNC: 4.4 MMOL/L (ref 3.5–5.1)
PROT SERPL-MCNC: 7.2 G/DL (ref 6.3–8.2)
RBC # BLD AUTO: 3.48 M/UL (ref 4.23–5.67)
SODIUM SERPL-SCNC: 147 MMOL/L (ref 136–145)
TIBC SERPL-MCNC: 197 UG/DL (ref 250–450)
WBC # BLD AUTO: 4.7 K/UL (ref 4.3–11.1)

## 2018-09-12 PROCEDURE — 85025 COMPLETE CBC W/AUTO DIFF WBC: CPT

## 2018-09-12 PROCEDURE — 83540 ASSAY OF IRON: CPT

## 2018-09-12 PROCEDURE — 80053 COMPREHEN METABOLIC PANEL: CPT

## 2018-09-12 PROCEDURE — 36415 COLL VENOUS BLD VENIPUNCTURE: CPT

## 2018-09-12 PROCEDURE — 82728 ASSAY OF FERRITIN: CPT

## 2018-11-14 ENCOUNTER — HOSPITAL ENCOUNTER (OUTPATIENT)
Dept: LAB | Age: 78
Discharge: HOME | End: 2018-11-14
Payer: MEDICARE

## 2018-11-14 DIAGNOSIS — D50.8 OTHER IRON DEFICIENCY ANEMIA: ICD-10-CM

## 2018-11-14 LAB
ALBUMIN SERPL-MCNC: 3.2 G/DL (ref 3.2–4.6)
ALBUMIN/GLOB SERPL: 0.9 {RATIO} (ref 1.2–3.5)
ALP SERPL-CCNC: 107 U/L (ref 50–136)
ALT SERPL-CCNC: 46 U/L (ref 12–65)
ANION GAP SERPL CALC-SCNC: 6 MMOL/L (ref 7–16)
AST SERPL-CCNC: 48 U/L (ref 15–37)
BASOPHILS # BLD: 0 K/UL (ref 0–0.2)
BASOPHILS NFR BLD: 0 % (ref 0–2)
BILIRUB SERPL-MCNC: 0.4 MG/DL (ref 0.2–1.1)
BUN SERPL-MCNC: 66 MG/DL (ref 8–23)
CALCIUM SERPL-MCNC: 7.9 MG/DL (ref 8.3–10.4)
CHLORIDE SERPL-SCNC: 115 MMOL/L (ref 98–107)
CO2 SERPL-SCNC: 27 MMOL/L (ref 21–32)
CREAT SERPL-MCNC: 4.37 MG/DL (ref 0.8–1.5)
DIFFERENTIAL METHOD BLD: ABNORMAL
EOSINOPHIL # BLD: 0.3 K/UL (ref 0–0.8)
EOSINOPHIL NFR BLD: 5 % (ref 0.5–7.8)
ERYTHROCYTE [DISTWIDTH] IN BLOOD BY AUTOMATED COUNT: 15.5 % (ref 11.9–14.6)
FERRITIN SERPL-MCNC: 836 NG/ML (ref 8–388)
GLOBULIN SER CALC-MCNC: 3.5 G/DL (ref 2.3–3.5)
GLUCOSE SERPL-MCNC: 60 MG/DL (ref 65–100)
HCT VFR BLD AUTO: 28.4 % (ref 41.1–50.3)
HGB BLD-MCNC: 8.6 G/DL (ref 13.6–17.2)
IMM GRANULOCYTES # BLD: 0 K/UL (ref 0–0.5)
IMM GRANULOCYTES NFR BLD AUTO: 0 % (ref 0–5)
IRON SATN MFR SERPL: 30 %
IRON SERPL-MCNC: 65 UG/DL (ref 35–150)
LYMPHOCYTES # BLD: 1.9 K/UL (ref 0.5–4.6)
LYMPHOCYTES NFR BLD: 40 % (ref 13–44)
MCH RBC QN AUTO: 25.8 PG (ref 26.1–32.9)
MCHC RBC AUTO-ENTMCNC: 30.3 G/DL (ref 31.4–35)
MCV RBC AUTO: 85.3 FL (ref 79.6–97.8)
MONOCYTES # BLD: 0.6 K/UL (ref 0.1–1.3)
MONOCYTES NFR BLD: 13 % (ref 4–12)
NEUTS SEG # BLD: 1.9 K/UL (ref 1.7–8.2)
NEUTS SEG NFR BLD: 41 % (ref 43–78)
NRBC # BLD: 0 K/UL (ref 0–0.2)
PLATELET # BLD AUTO: 99 K/UL (ref 150–450)
PMV BLD AUTO: 12.9 FL (ref 9.4–12.3)
POTASSIUM SERPL-SCNC: 4.1 MMOL/L (ref 3.5–5.1)
PROT SERPL-MCNC: 6.7 G/DL (ref 6.3–8.2)
RBC # BLD AUTO: 3.33 M/UL (ref 4.23–5.67)
SODIUM SERPL-SCNC: 148 MMOL/L (ref 136–145)
TIBC SERPL-MCNC: 217 UG/DL (ref 250–450)
WBC # BLD AUTO: 4.7 K/UL (ref 4.3–11.1)

## 2018-11-14 PROCEDURE — 85025 COMPLETE CBC W/AUTO DIFF WBC: CPT

## 2018-11-14 PROCEDURE — 36415 COLL VENOUS BLD VENIPUNCTURE: CPT

## 2018-11-14 PROCEDURE — 82728 ASSAY OF FERRITIN: CPT

## 2018-11-14 PROCEDURE — 80053 COMPREHEN METABOLIC PANEL: CPT

## 2018-11-14 PROCEDURE — 83540 ASSAY OF IRON: CPT

## 2018-12-03 ENCOUNTER — APPOINTMENT (OUTPATIENT)
Dept: ULTRASOUND IMAGING | Age: 78
End: 2018-12-03
Attending: EMERGENCY MEDICINE
Payer: MEDICARE

## 2018-12-03 ENCOUNTER — HOSPITAL ENCOUNTER (EMERGENCY)
Age: 78
Discharge: HOME | End: 2018-12-03
Attending: EMERGENCY MEDICINE
Payer: MEDICARE

## 2018-12-03 VITALS
RESPIRATION RATE: 16 BRPM | OXYGEN SATURATION: 99 % | BODY MASS INDEX: 26.81 KG/M2 | HEART RATE: 65 BPM | WEIGHT: 181 LBS | SYSTOLIC BLOOD PRESSURE: 161 MMHG | HEIGHT: 69 IN | TEMPERATURE: 98 F | DIASTOLIC BLOOD PRESSURE: 65 MMHG

## 2018-12-03 DIAGNOSIS — M79.604 ACUTE PAIN OF RIGHT LOWER EXTREMITY: Primary | ICD-10-CM

## 2018-12-03 PROCEDURE — 93971 EXTREMITY STUDY: CPT

## 2018-12-03 PROCEDURE — 99283 EMERGENCY DEPT VISIT LOW MDM: CPT | Performed by: EMERGENCY MEDICINE

## 2018-12-03 PROCEDURE — 74011250637 HC RX REV CODE- 250/637: Performed by: EMERGENCY MEDICINE

## 2018-12-03 RX ORDER — TRAMADOL HYDROCHLORIDE 50 MG/1
50 TABLET ORAL
Qty: 11 TAB | Refills: 0 | Status: SHIPPED | OUTPATIENT
Start: 2018-12-03 | End: 2019-05-06

## 2018-12-03 RX ORDER — NAPROXEN 250 MG/1
500 TABLET ORAL
Status: DISCONTINUED | OUTPATIENT
Start: 2018-12-03 | End: 2018-12-03 | Stop reason: HOSPADM

## 2018-12-03 RX ORDER — OXYCODONE HYDROCHLORIDE 5 MG/1
5 TABLET ORAL
Status: DISCONTINUED | OUTPATIENT
Start: 2018-12-03 | End: 2018-12-03 | Stop reason: HOSPADM

## 2018-12-03 RX ORDER — ACETAMINOPHEN 500 MG
1000 TABLET ORAL
Status: COMPLETED | OUTPATIENT
Start: 2018-12-03 | End: 2018-12-03

## 2018-12-03 RX ADMIN — ACETAMINOPHEN 1000 MG: 500 TABLET, FILM COATED ORAL at 16:58

## 2018-12-03 NOTE — DISCHARGE INSTRUCTIONS
Rest  Take pain medications as directed     If you get worse or are not getting better then come back    I am working again tomorrow at [facility]

## 2018-12-03 NOTE — ED PROVIDER NOTES
[facility] Emergency Department Saul Smallwood is a 69 y.o. male seen on 12/3/2018 at 4:36 PM in the [facility] EMERGENCY DEPT in room ER14/14. Chief Complaint Patient presents with  Generalized Body Aches HPI: 20-year-old male presents to emergency department with pain in the left arm right arm and left leg and right leg. Also started acutely earlier today On further investigation and review the patient's pain is in his right leg and right posterior thigh and hip. No falls. No trauma. No car accidents or other injury Review of Systems: Review of Systems Constitutional: Negative for chills and fever. Respiratory: Negative. Genitourinary: Negative. Skin: Negative. Neurological: Negative. Psychiatric/Behavioral: Negative. Past Medical History: Primary Care Doctor: Olaf Montiel MD 
 
Past Medical History:  
Diagnosis Date  Acute renal failure superimposed on stage 3 chronic kidney disease ([address] 9/21/2016  Anemia  Chronic kidney disease   
 not on HD yet- surgery done for access and here for elevation of fistula- FU with [facility]- creat on 12/7/17=3.15  Chronic pancreatitis ([address] 9/22/2016  Dermatophytosis of nail 12/6/2016  Diabetic neuropathy ([address] 9/22/2016  
 avg fastings 200; last A1C-? ; hypo @ 80  Essential hypertension 9/22/2016  GERD (gastroesophageal reflux disease)   
 controlled with med  Hypercholesterolemia  Iron (Fe) deficiency anemia 9/22/2016  Kidney disease  Septicemia due to Klebsiella pneumoniae ([address] 9/22/2016  Systolic CHF, chronic ([address] 9/23/2016  Type II diabetes mellitus with nephropathy ([address] 09/22/2016  Venous insufficiency 12/6/2016  Xerosis cutis 12/6/2016 Past Surgical History:  
Procedure Laterality Date  HX COLONOSCOPY    
 HX HERNIA REPAIR Left 2010  HX PROSTATECTOMY  2012  HX TURP  2012  FOR BPH  
  VASCULAR SURGERY PROCEDURE UNLIST Left 10/26/2017 [facility] Social History Socioeconomic History  Marital status:  Spouse name: Not on file  Number of children: Not on file  Years of education: Not on file  Highest education level: Not on file Tobacco Use  Smoking status: Former Smoker Packs/day: 2.00 Years: 20.00 Pack years: 40.00 Last attempt to quit:  Years since quittin.9  Smokeless tobacco: Current User Substance and Sexual Activity  Alcohol use: No  
 
Prior to Admission Medications Prescriptions Last Dose Informant Patient Reported? Taking? CALCITRIOL, BULK,   Yes No  
Sig: by Does Not Apply route. HUMALOG KWIKPEN INSULIN 100 unit/mL kwikpen   No No  
Si units three times a day with meals plus correction scale, 2 units/50 > 150, max daily dose 25 units  
amLODIPine (NORVASC) 10 mg tablet   Yes No  
Sig: Take 10 mg by mouth every morning. aspirin (ASPIRIN) 325 mg tablet   No No  
Sig: Take 1 Tab by mouth daily. calcifediol (RAYALDEE) 30 mcg Cs24   Yes No  
Sig: Take  by mouth nightly. diphenoxylate-atropine (LOMOTIL) 2.5-0.025 mg per tablet   Yes No  
Sig: Take 1 Tab by mouth two (2) times daily as needed for Diarrhea. Indications: Diarrhea  
ferrous sulfate 325 mg (65 mg iron) tablet   Yes No  
Sig: Take 325 mg by mouth two (2) times a day. hydrALAZINE (APRESOLINE) 100 mg tablet   No No  
Sig: Take 1 Tab by mouth three (3) times daily. insulin glargine (LANTUS) 100 unit/mL injection   No No  
Sig: 15 Units by SubCUTAneous route daily. metoprolol succinate (TOPROL-XL) 50 mg XL tablet   Yes No  
Sig: Take 50 mg by mouth every morning. omeprazole (PRILOSEC) 20 mg capsule   Yes No  
Sig: Take 20 mg by mouth every morning. pravastatin (PRAVACHOL) 40 mg tablet   No No  
Sig: Take 1 Tab by mouth nightly. pregabalin (LYRICA) 50 mg capsule   No No  
Sig: Take 1 Cap by mouth daily. Max Daily Amount: 50 mg. torsemide (DEMADEX) 20 mg tablet   Yes No  
Sig: Take 20 mg by mouth daily. Facility-Administered Medications: None No Known Allergies Physical Exam:  Nursing documentation reviewed. Vitals:  
 12/03/18 1535 12/03/18 1734 BP: 163/67 158/61 Pulse: 60 66 Resp: 16 Temp: 98 °F (36.7 °C) SpO2: 100% 99% Vital signs were reviewed. Physical Exam  
Constitutional: He is oriented to person, place, and time. He appears well-developed and well-nourished. HENT:  
Head: Normocephalic and atraumatic. Cardiovascular: Normal rate and regular rhythm. Pulmonary/Chest: No stridor. No respiratory distress. Musculoskeletal: He exhibits no tenderness or deformity. Neurological: He is oriented to person, place, and time. Skin: Skin is warm and dry. Nursing note and vitals reviewed. Medical Decision Making: MDM Number of Diagnoses or Management Options Diagnosis management comments: 67-year-old male presents emergency Department with pain in the right posterior knee and thigh. I don't feel any cords. Ultrasound of the leg was performed and is negative. Treatment his pain and discharge him home Amount and/or Complexity of Data Reviewed Tests in the radiology section of CPT®: ordered and reviewed Risk of Complications, Morbidity, and/or Mortality Presenting problems: moderate Diagnostic procedures: minimal 
Management options: moderate 
 
  
______________________________________________________________________ 
ED Evaluation Radiology studies performed:  
DUPLEX LOWER EXT VENOUS RIGHT Final Result IMPRESSION: Negative right lower extremity venous duplex exam. No evidence for  
DVT. reviewed Orders Placed This Encounter  DUPLEX LOWER EXT VENOUS RIGHT  acetaminophen (TYLENOL) tablet 1,000 mg Medications  
acetaminophen (TYLENOL) tablet 1,000 mg (1,000 mg Oral Given 12/3/18 7526) ______________________________________________________________________ Procedures 
 
================================================================== 
ASSESSMENT: 58-year-old male with pain in the right leg. No deformity. No swelling. No crepitance. Full range of motion of the left upper extremity without deformity. Full range of motion of the right upper extremity no deformity swelling and crepitance noted. PLAN: discharge home. Anti-inflammatories. Follow up with primary care physician. 
_____________________________________________________________________ Condition:  fair Disposition:  home Diagnosis:  Right leg pain Jill Flores MD; 12/3/2018 @4:36 PM===========================================

## 2018-12-04 NOTE — ED NOTES
I have reviewed discharge instructions with the patient. The patient verbalized understanding. Patient left ED via Discharge Method: wheelchair to Home with (family). Opportunity for questions and clarification provided. Patient given 2 scripts. To continue your aftercare when you leave the hospital, you may receive an automated call from our care team to check in on how you are doing. This is a free service and part of our promise to provide the best care and service to meet your aftercare needs.  If you have questions, or wish to unsubscribe from this service please call [phone]. Thank you for Choosing our [facility] Emergency Department.

## 2019-01-01 ENCOUNTER — HOME CARE VISIT (OUTPATIENT)
Dept: SCHEDULING | Facility: HOME HEALTH | Age: 79
End: 2019-01-01
Payer: MEDICARE

## 2019-01-01 ENCOUNTER — HOSPITAL ENCOUNTER (OUTPATIENT)
Dept: INFUSION THERAPY | Age: 79
End: 2019-01-01
Payer: MEDICARE

## 2019-01-01 ENCOUNTER — HOSPITAL ENCOUNTER (OUTPATIENT)
Dept: INFUSION THERAPY | Age: 79
Discharge: HOME | End: 2019-12-30
Payer: MEDICARE

## 2019-01-01 ENCOUNTER — HOSPITAL ENCOUNTER (OUTPATIENT)
Dept: WOUND CARE | Age: 79
Discharge: HOME | End: 2019-12-17
Attending: SURGERY
Payer: MEDICARE

## 2019-01-01 ENCOUNTER — HOME CARE VISIT (OUTPATIENT)
Dept: HOME HEALTH SERVICES | Facility: HOME HEALTH | Age: 79
End: 2019-01-01
Payer: MEDICARE

## 2019-01-01 ENCOUNTER — APPOINTMENT (OUTPATIENT)
Dept: INFUSION THERAPY | Age: 79
End: 2019-01-01

## 2019-01-01 ENCOUNTER — HOSPITAL ENCOUNTER (EMERGENCY)
Age: 79
Discharge: HOME | End: 2019-10-24
Attending: EMERGENCY MEDICINE
Payer: MEDICARE

## 2019-01-01 ENCOUNTER — HOSPITAL ENCOUNTER (OUTPATIENT)
Dept: LAB | Age: 79
Discharge: HOME | End: 2019-11-20
Payer: MEDICARE

## 2019-01-01 ENCOUNTER — APPOINTMENT (OUTPATIENT)
Dept: MRI IMAGING | Age: 79
DRG: 602 | End: 2019-01-01
Attending: INTERNAL MEDICINE
Payer: MEDICARE

## 2019-01-01 ENCOUNTER — APPOINTMENT (OUTPATIENT)
Dept: ULTRASOUND IMAGING | Age: 79
End: 2019-01-01
Attending: EMERGENCY MEDICINE
Payer: MEDICARE

## 2019-01-01 ENCOUNTER — HOSPITAL ENCOUNTER (OUTPATIENT)
Dept: WOUND CARE | Age: 79
Discharge: HOME | End: 2019-12-03
Attending: SURGERY
Payer: MEDICARE

## 2019-01-01 ENCOUNTER — HOME HEALTH ADMISSION (OUTPATIENT)
Dept: HOME HEALTH SERVICES | Facility: HOME HEALTH | Age: 79
End: 2019-01-01
Payer: MEDICARE

## 2019-01-01 ENCOUNTER — PATIENT OUTREACH (OUTPATIENT)
Dept: CASE MANAGEMENT | Age: 79
End: 2019-01-01

## 2019-01-01 ENCOUNTER — HOSPITAL ENCOUNTER (OUTPATIENT)
Dept: WOUND CARE | Age: 79
Discharge: HOME | End: 2019-12-30
Attending: SURGERY
Payer: MEDICARE

## 2019-01-01 ENCOUNTER — APPOINTMENT (OUTPATIENT)
Dept: GENERAL RADIOLOGY | Age: 79
End: 2019-01-01
Attending: EMERGENCY MEDICINE
Payer: MEDICARE

## 2019-01-01 ENCOUNTER — HOSPITAL ENCOUNTER (EMERGENCY)
Age: 79
Discharge: HOME | End: 2019-12-25
Attending: EMERGENCY MEDICINE
Payer: MEDICARE

## 2019-01-01 ENCOUNTER — APPOINTMENT (OUTPATIENT)
Dept: CT IMAGING | Age: 79
End: 2019-01-01
Attending: EMERGENCY MEDICINE
Payer: MEDICARE

## 2019-01-01 ENCOUNTER — APPOINTMENT (OUTPATIENT)
Dept: ULTRASOUND IMAGING | Age: 79
DRG: 602 | End: 2019-01-01
Attending: FAMILY MEDICINE
Payer: MEDICARE

## 2019-01-01 ENCOUNTER — APPOINTMENT (OUTPATIENT)
Dept: INFUSION THERAPY | Age: 79
End: 2019-01-01
Payer: MEDICARE

## 2019-01-01 ENCOUNTER — HOSPITAL ENCOUNTER (OUTPATIENT)
Dept: WOUND CARE | Age: 79
Discharge: HOME | End: 2019-12-10
Attending: SURGERY
Payer: MEDICARE

## 2019-01-01 ENCOUNTER — HOSPITAL ENCOUNTER (OUTPATIENT)
Dept: INFUSION THERAPY | Age: 79
End: 2019-01-01

## 2019-01-01 ENCOUNTER — APPOINTMENT (OUTPATIENT)
Dept: ULTRASOUND IMAGING | Age: 79
DRG: 602 | End: 2019-01-01
Attending: INTERNAL MEDICINE
Payer: MEDICARE

## 2019-01-01 ENCOUNTER — HOSPITAL ENCOUNTER (INPATIENT)
Age: 79
LOS: 3 days | Discharge: HOME HEALTH | DRG: 602 | End: 2019-11-09
Attending: EMERGENCY MEDICINE | Admitting: INTERNAL MEDICINE
Payer: MEDICARE

## 2019-01-01 VITALS
DIASTOLIC BLOOD PRESSURE: 60 MMHG | OXYGEN SATURATION: 99 % | TEMPERATURE: 98.2 F | HEART RATE: 76 BPM | SYSTOLIC BLOOD PRESSURE: 110 MMHG

## 2019-01-01 VITALS
TEMPERATURE: 98.9 F | OXYGEN SATURATION: 98 % | SYSTOLIC BLOOD PRESSURE: 128 MMHG | RESPIRATION RATE: 16 BRPM | HEART RATE: 80 BPM | DIASTOLIC BLOOD PRESSURE: 67 MMHG

## 2019-01-01 VITALS
SYSTOLIC BLOOD PRESSURE: 180 MMHG | DIASTOLIC BLOOD PRESSURE: 76 MMHG | RESPIRATION RATE: 18 BRPM | HEART RATE: 53 BPM | BODY MASS INDEX: 25.06 KG/M2 | OXYGEN SATURATION: 95 % | TEMPERATURE: 97.6 F | WEIGHT: 185 LBS | HEIGHT: 72 IN

## 2019-01-01 VITALS
TEMPERATURE: 97.6 F | OXYGEN SATURATION: 98 % | SYSTOLIC BLOOD PRESSURE: 120 MMHG | RESPIRATION RATE: 20 BRPM | DIASTOLIC BLOOD PRESSURE: 75 MMHG | HEART RATE: 67 BPM

## 2019-01-01 VITALS
SYSTOLIC BLOOD PRESSURE: 166 MMHG | RESPIRATION RATE: 16 BRPM | DIASTOLIC BLOOD PRESSURE: 80 MMHG | HEART RATE: 64 BPM | TEMPERATURE: 99 F

## 2019-01-01 VITALS
TEMPERATURE: 98.1 F | RESPIRATION RATE: 20 BRPM | OXYGEN SATURATION: 95 % | DIASTOLIC BLOOD PRESSURE: 75 MMHG | HEIGHT: 72 IN | HEART RATE: 60 BPM | BODY MASS INDEX: 24.11 KG/M2 | WEIGHT: 178 LBS | SYSTOLIC BLOOD PRESSURE: 151 MMHG

## 2019-01-01 VITALS
HEART RATE: 82 BPM | TEMPERATURE: 98.1 F | DIASTOLIC BLOOD PRESSURE: 82 MMHG | RESPIRATION RATE: 18 BRPM | SYSTOLIC BLOOD PRESSURE: 154 MMHG | OXYGEN SATURATION: 99 %

## 2019-01-01 VITALS
SYSTOLIC BLOOD PRESSURE: 191 MMHG | HEART RATE: 63 BPM | RESPIRATION RATE: 18 BRPM | HEIGHT: 72 IN | BODY MASS INDEX: 24.14 KG/M2 | TEMPERATURE: 97.4 F | DIASTOLIC BLOOD PRESSURE: 74 MMHG

## 2019-01-01 VITALS
HEART RATE: 60 BPM | SYSTOLIC BLOOD PRESSURE: 160 MMHG | TEMPERATURE: 99.9 F | DIASTOLIC BLOOD PRESSURE: 90 MMHG | RESPIRATION RATE: 16 BRPM

## 2019-01-01 VITALS
SYSTOLIC BLOOD PRESSURE: 146 MMHG | OXYGEN SATURATION: 98 % | TEMPERATURE: 98.9 F | DIASTOLIC BLOOD PRESSURE: 70 MMHG | RESPIRATION RATE: 18 BRPM | HEART RATE: 61 BPM

## 2019-01-01 VITALS
SYSTOLIC BLOOD PRESSURE: 170 MMHG | DIASTOLIC BLOOD PRESSURE: 72 MMHG | RESPIRATION RATE: 20 BRPM | OXYGEN SATURATION: 99 % | TEMPERATURE: 98 F | HEART RATE: 58 BPM

## 2019-01-01 VITALS
HEART RATE: 68 BPM | TEMPERATURE: 98.3 F | SYSTOLIC BLOOD PRESSURE: 158 MMHG | DIASTOLIC BLOOD PRESSURE: 78 MMHG | RESPIRATION RATE: 14 BRPM

## 2019-01-01 VITALS
TEMPERATURE: 98.2 F | SYSTOLIC BLOOD PRESSURE: 155 MMHG | HEART RATE: 70 BPM | DIASTOLIC BLOOD PRESSURE: 80 MMHG | RESPIRATION RATE: 16 BRPM | OXYGEN SATURATION: 98 %

## 2019-01-01 VITALS
HEART RATE: 66 BPM | OXYGEN SATURATION: 95 % | RESPIRATION RATE: 18 BRPM | SYSTOLIC BLOOD PRESSURE: 150 MMHG | HEIGHT: 72 IN | BODY MASS INDEX: 24.52 KG/M2 | DIASTOLIC BLOOD PRESSURE: 70 MMHG | WEIGHT: 181 LBS | TEMPERATURE: 98.1 F

## 2019-01-01 VITALS
TEMPERATURE: 98.4 F | DIASTOLIC BLOOD PRESSURE: 76 MMHG | BODY MASS INDEX: 22.92 KG/M2 | HEART RATE: 68 BPM | OXYGEN SATURATION: 99 % | WEIGHT: 169 LBS | SYSTOLIC BLOOD PRESSURE: 140 MMHG | RESPIRATION RATE: 20 BRPM

## 2019-01-01 VITALS
OXYGEN SATURATION: 100 % | BODY MASS INDEX: 21.78 KG/M2 | DIASTOLIC BLOOD PRESSURE: 42 MMHG | SYSTOLIC BLOOD PRESSURE: 159 MMHG | TEMPERATURE: 97.4 F | HEIGHT: 72 IN | HEART RATE: 50 BPM | WEIGHT: 160.8 LBS | RESPIRATION RATE: 17 BRPM

## 2019-01-01 VITALS
HEART RATE: 60 BPM | TEMPERATURE: 98.3 F | SYSTOLIC BLOOD PRESSURE: 138 MMHG | DIASTOLIC BLOOD PRESSURE: 60 MMHG | RESPIRATION RATE: 17 BRPM

## 2019-01-01 VITALS
RESPIRATION RATE: 18 BRPM | TEMPERATURE: 97.5 F | WEIGHT: 184 LBS | DIASTOLIC BLOOD PRESSURE: 81 MMHG | SYSTOLIC BLOOD PRESSURE: 174 MMHG | HEIGHT: 72 IN | HEART RATE: 61 BPM | OXYGEN SATURATION: 99 % | BODY MASS INDEX: 24.92 KG/M2

## 2019-01-01 VITALS
DIASTOLIC BLOOD PRESSURE: 82 MMHG | RESPIRATION RATE: 18 BRPM | TEMPERATURE: 98.2 F | HEART RATE: 80 BPM | SYSTOLIC BLOOD PRESSURE: 140 MMHG | OXYGEN SATURATION: 98 %

## 2019-01-01 VITALS
OXYGEN SATURATION: 99 % | SYSTOLIC BLOOD PRESSURE: 140 MMHG | HEART RATE: 83 BPM | TEMPERATURE: 99.3 F | DIASTOLIC BLOOD PRESSURE: 68 MMHG | RESPIRATION RATE: 16 BRPM

## 2019-01-01 VITALS
SYSTOLIC BLOOD PRESSURE: 130 MMHG | DIASTOLIC BLOOD PRESSURE: 50 MMHG | HEART RATE: 60 BPM | RESPIRATION RATE: 16 BRPM | TEMPERATURE: 97.6 F

## 2019-01-01 VITALS
HEART RATE: 60 BPM | DIASTOLIC BLOOD PRESSURE: 60 MMHG | SYSTOLIC BLOOD PRESSURE: 140 MMHG | TEMPERATURE: 97.8 F | RESPIRATION RATE: 16 BRPM | BODY MASS INDEX: 21.84 KG/M2 | WEIGHT: 161 LBS

## 2019-01-01 VITALS — RESPIRATION RATE: 18 BRPM | HEART RATE: 64 BPM | TEMPERATURE: 98.7 F | OXYGEN SATURATION: 99 %

## 2019-01-01 VITALS
HEART RATE: 53 BPM | RESPIRATION RATE: 18 BRPM | OXYGEN SATURATION: 99 % | TEMPERATURE: 98 F | SYSTOLIC BLOOD PRESSURE: 149 MMHG | DIASTOLIC BLOOD PRESSURE: 59 MMHG

## 2019-01-01 VITALS
SYSTOLIC BLOOD PRESSURE: 179 MMHG | WEIGHT: 178 LBS | TEMPERATURE: 97.6 F | BODY MASS INDEX: 24.11 KG/M2 | HEIGHT: 72 IN | RESPIRATION RATE: 20 BRPM | OXYGEN SATURATION: 92 % | DIASTOLIC BLOOD PRESSURE: 92 MMHG | HEART RATE: 58 BPM

## 2019-01-01 VITALS
TEMPERATURE: 100.2 F | SYSTOLIC BLOOD PRESSURE: 130 MMHG | RESPIRATION RATE: 16 BRPM | DIASTOLIC BLOOD PRESSURE: 80 MMHG | HEART RATE: 80 BPM | OXYGEN SATURATION: 99 %

## 2019-01-01 DIAGNOSIS — R10.9 ACUTE RIGHT FLANK PAIN: Primary | ICD-10-CM

## 2019-01-01 DIAGNOSIS — D63.1 ANEMIA DUE TO STAGE 4 CHRONIC KIDNEY DISEASE (HCC): ICD-10-CM

## 2019-01-01 DIAGNOSIS — I82.432 ACUTE DEEP VEIN THROMBOSIS (DVT) OF POPLITEAL VEIN OF LEFT LOWER EXTREMITY (HCC): Primary | ICD-10-CM

## 2019-01-01 DIAGNOSIS — S80.821A BLISTER OF RIGHT LOWER EXTREMITY, INITIAL ENCOUNTER: ICD-10-CM

## 2019-01-01 DIAGNOSIS — N18.6 ESRD (END STAGE RENAL DISEASE) (HCC): ICD-10-CM

## 2019-01-01 DIAGNOSIS — L03.115 CELLULITIS OF RIGHT LEG: Primary | ICD-10-CM

## 2019-01-01 DIAGNOSIS — N18.4 ANEMIA DUE TO STAGE 4 CHRONIC KIDNEY DISEASE (HCC): ICD-10-CM

## 2019-01-01 LAB
ALBUMIN SERPL-MCNC: 2.2 G/DL (ref 3.2–4.6)
ALBUMIN SERPL-MCNC: 2.4 G/DL (ref 3.2–4.6)
ALBUMIN SERPL-MCNC: 2.4 G/DL (ref 3.2–4.6)
ALBUMIN/GLOB SERPL: 0.4 {RATIO} (ref 1.2–3.5)
ALBUMIN/GLOB SERPL: 0.5 {RATIO} (ref 1.2–3.5)
ALBUMIN/GLOB SERPL: 0.7 {RATIO} (ref 1.2–3.5)
ALP SERPL-CCNC: 66 U/L (ref 50–136)
ALP SERPL-CCNC: 78 U/L (ref 50–136)
ALP SERPL-CCNC: 80 U/L (ref 50–136)
ALT SERPL-CCNC: 10 U/L (ref 12–65)
ALT SERPL-CCNC: 13 U/L (ref 12–65)
ALT SERPL-CCNC: 15 U/L (ref 12–65)
ANION GAP SERPL CALC-SCNC: 10 MMOL/L (ref 7–16)
ANION GAP SERPL CALC-SCNC: 12 MMOL/L (ref 7–16)
ANION GAP SERPL CALC-SCNC: 5 MMOL/L (ref 7–16)
ANION GAP SERPL CALC-SCNC: 6 MMOL/L (ref 7–16)
ANION GAP SERPL CALC-SCNC: 7 MMOL/L (ref 7–16)
ANION GAP SERPL CALC-SCNC: 7 MMOL/L (ref 7–16)
ANION GAP SERPL CALC-SCNC: 8 MMOL/L (ref 7–16)
ANION GAP SERPL CALC-SCNC: 9 MMOL/L (ref 7–16)
ANION GAP SERPL CALC-SCNC: 9 MMOL/L (ref 7–16)
APTT PPP: 105.8 SEC (ref 24.7–39.8)
APTT PPP: 112.8 SEC (ref 24.7–39.8)
APTT PPP: 119 SEC (ref 24.7–39.8)
APTT PPP: 124.9 SEC (ref 24.7–39.8)
APTT PPP: 150.7 SEC (ref 24.7–39.8)
APTT PPP: 151.2 SEC (ref 24.7–39.8)
APTT PPP: 176.6 SEC (ref 24.7–39.8)
APTT PPP: 41.7 SEC (ref 24.7–39.8)
APTT PPP: 49.1 SEC (ref 24.7–39.8)
APTT PPP: 64.6 SEC (ref 24.7–39.8)
APTT PPP: 97.4 SEC (ref 24.7–39.8)
APTT PPP: >200 SEC (ref 24.7–39.8)
APTT PPP: >200 SEC (ref 24.7–39.8)
AST SERPL-CCNC: 20 U/L (ref 15–37)
AST SERPL-CCNC: 22 U/L (ref 15–37)
AST SERPL-CCNC: 23 U/L (ref 15–37)
BACTERIA SPEC CULT: ABNORMAL
BACTERIA SPEC CULT: NORMAL
BACTERIA SPEC CULT: NORMAL
BACTERIA URNS QL MICRO: 0 /HPF
BASOPHILS # BLD: 0 K/UL (ref 0–0.2)
BASOPHILS NFR BLD: 0 % (ref 0–2)
BASOPHILS NFR BLD: 0 % (ref 0–2)
BASOPHILS NFR BLD: 1 % (ref 0–2)
BILIRUB SERPL-MCNC: 0.3 MG/DL (ref 0.2–1.1)
BILIRUB SERPL-MCNC: 0.3 MG/DL (ref 0.2–1.1)
BILIRUB SERPL-MCNC: 0.4 MG/DL (ref 0.2–1.1)
BNP SERPL-MCNC: 299 PG/ML
BUN SERPL-MCNC: 31 MG/DL (ref 8–23)
BUN SERPL-MCNC: 37 MG/DL (ref 8–23)
BUN SERPL-MCNC: 41 MG/DL (ref 8–23)
BUN SERPL-MCNC: 49 MG/DL (ref 8–23)
BUN SERPL-MCNC: 53 MG/DL (ref 8–23)
BUN SERPL-MCNC: 58 MG/DL (ref 8–23)
BUN SERPL-MCNC: 60 MG/DL (ref 8–23)
BUN SERPL-MCNC: 61 MG/DL (ref 8–23)
BUN SERPL-MCNC: 67 MG/DL (ref 8–23)
CALCIUM SERPL-MCNC: 6.9 MG/DL (ref 8.3–10.4)
CALCIUM SERPL-MCNC: 7.1 MG/DL (ref 8.3–10.4)
CALCIUM SERPL-MCNC: 7.2 MG/DL (ref 8.3–10.4)
CALCIUM SERPL-MCNC: 7.2 MG/DL (ref 8.3–10.4)
CALCIUM SERPL-MCNC: 7.3 MG/DL (ref 8.3–10.4)
CALCIUM SERPL-MCNC: 8 MG/DL (ref 8.3–10.4)
CALCIUM SERPL-MCNC: 8 MG/DL (ref 8.3–10.4)
CASTS URNS QL MICRO: NORMAL /LPF
CHLORIDE SERPL-SCNC: 107 MMOL/L (ref 98–107)
CHLORIDE SERPL-SCNC: 109 MMOL/L (ref 98–107)
CHLORIDE SERPL-SCNC: 109 MMOL/L (ref 98–107)
CHLORIDE SERPL-SCNC: 110 MMOL/L (ref 98–107)
CHLORIDE SERPL-SCNC: 111 MMOL/L (ref 98–107)
CHLORIDE SERPL-SCNC: 111 MMOL/L (ref 98–107)
CHLORIDE SERPL-SCNC: 112 MMOL/L (ref 98–107)
CHLORIDE SERPL-SCNC: 112 MMOL/L (ref 98–107)
CHLORIDE SERPL-SCNC: 115 MMOL/L (ref 98–107)
CO2 SERPL-SCNC: 23 MMOL/L (ref 21–32)
CO2 SERPL-SCNC: 24 MMOL/L (ref 21–32)
CO2 SERPL-SCNC: 25 MMOL/L (ref 21–32)
CO2 SERPL-SCNC: 25 MMOL/L (ref 21–32)
CO2 SERPL-SCNC: 26 MMOL/L (ref 21–32)
CO2 SERPL-SCNC: 27 MMOL/L (ref 21–32)
CO2 SERPL-SCNC: 27 MMOL/L (ref 21–32)
CO2 SERPL-SCNC: 28 MMOL/L (ref 21–32)
CO2 SERPL-SCNC: 29 MMOL/L (ref 21–32)
CREAT SERPL-MCNC: 4.65 MG/DL (ref 0.8–1.5)
CREAT SERPL-MCNC: 4.7 MG/DL (ref 0.8–1.5)
CREAT SERPL-MCNC: 4.72 MG/DL (ref 0.8–1.5)
CREAT SERPL-MCNC: 4.79 MG/DL (ref 0.8–1.5)
CREAT SERPL-MCNC: 4.8 MG/DL (ref 0.8–1.5)
CREAT SERPL-MCNC: 4.87 MG/DL (ref 0.8–1.5)
CREAT SERPL-MCNC: 4.88 MG/DL (ref 0.8–1.5)
CREAT SERPL-MCNC: 5 MG/DL (ref 0.8–1.5)
CREAT SERPL-MCNC: 5.16 MG/DL (ref 0.8–1.5)
CRP SERPL-MCNC: <0.3 MG/DL (ref 0–0.9)
DIFFERENTIAL METHOD BLD: ABNORMAL
EOSINOPHIL # BLD: 0 K/UL (ref 0–0.8)
EOSINOPHIL # BLD: 0 K/UL (ref 0–0.8)
EOSINOPHIL # BLD: 0.1 K/UL (ref 0–0.8)
EOSINOPHIL NFR BLD: 0 % (ref 0.5–7.8)
EOSINOPHIL NFR BLD: 1 % (ref 0.5–7.8)
EOSINOPHIL NFR BLD: 2 % (ref 0.5–7.8)
EOSINOPHIL NFR BLD: 3 % (ref 0.5–7.8)
EOSINOPHIL NFR BLD: 3 % (ref 0.5–7.8)
EPI CELLS #/AREA URNS HPF: NORMAL /HPF
ERYTHROCYTE [DISTWIDTH] IN BLOOD BY AUTOMATED COUNT: 16.1 % (ref 11.9–14.6)
ERYTHROCYTE [DISTWIDTH] IN BLOOD BY AUTOMATED COUNT: 16.1 % (ref 11.9–14.6)
ERYTHROCYTE [DISTWIDTH] IN BLOOD BY AUTOMATED COUNT: 16.2 % (ref 11.9–14.6)
ERYTHROCYTE [DISTWIDTH] IN BLOOD BY AUTOMATED COUNT: 17.1 % (ref 11.9–14.6)
ERYTHROCYTE [DISTWIDTH] IN BLOOD BY AUTOMATED COUNT: 17.2 % (ref 11.9–14.6)
ERYTHROCYTE [SEDIMENTATION RATE] IN BLOOD: 6 MM/HR (ref 0–20)
EST. AVERAGE GLUCOSE BLD GHB EST-MCNC: 194 MG/DL
GLOBULIN SER CALC-MCNC: 3.5 G/DL (ref 2.3–3.5)
GLOBULIN SER CALC-MCNC: 4.4 G/DL (ref 2.3–3.5)
GLOBULIN SER CALC-MCNC: 5.2 G/DL (ref 2.3–3.5)
GLUCOSE BLD STRIP.AUTO-MCNC: 109 MG/DL (ref 65–100)
GLUCOSE BLD STRIP.AUTO-MCNC: 124 MG/DL (ref 65–100)
GLUCOSE BLD STRIP.AUTO-MCNC: 134 MG/DL (ref 65–100)
GLUCOSE BLD STRIP.AUTO-MCNC: 135 MG/DL (ref 65–100)
GLUCOSE BLD STRIP.AUTO-MCNC: 161 MG/DL (ref 65–100)
GLUCOSE BLD STRIP.AUTO-MCNC: 171 MG/DL (ref 65–100)
GLUCOSE BLD STRIP.AUTO-MCNC: 171 MG/DL (ref 65–100)
GLUCOSE BLD STRIP.AUTO-MCNC: 177 MG/DL (ref 65–100)
GLUCOSE BLD STRIP.AUTO-MCNC: 180 MG/DL (ref 65–100)
GLUCOSE BLD STRIP.AUTO-MCNC: 193 MG/DL (ref 65–100)
GLUCOSE BLD STRIP.AUTO-MCNC: 193 MG/DL (ref 65–100)
GLUCOSE BLD STRIP.AUTO-MCNC: 203 MG/DL (ref 65–100)
GLUCOSE BLD STRIP.AUTO-MCNC: 214 MG/DL (ref 65–100)
GLUCOSE BLD STRIP.AUTO-MCNC: 228 MG/DL (ref 65–100)
GLUCOSE BLD STRIP.AUTO-MCNC: 229 MG/DL (ref 65–100)
GLUCOSE BLD STRIP.AUTO-MCNC: 263 MG/DL (ref 65–100)
GLUCOSE BLD STRIP.AUTO-MCNC: 271 MG/DL (ref 65–100)
GLUCOSE BLD STRIP.AUTO-MCNC: 286 MG/DL (ref 65–100)
GLUCOSE BLD STRIP.AUTO-MCNC: 289 MG/DL (ref 65–100)
GLUCOSE BLD STRIP.AUTO-MCNC: 292 MG/DL (ref 65–100)
GLUCOSE BLD STRIP.AUTO-MCNC: 305 MG/DL (ref 65–100)
GLUCOSE BLD STRIP.AUTO-MCNC: 311 MG/DL (ref 65–100)
GLUCOSE BLD STRIP.AUTO-MCNC: 323 MG/DL (ref 65–100)
GLUCOSE BLD STRIP.AUTO-MCNC: 386 MG/DL (ref 65–100)
GLUCOSE BLD STRIP.AUTO-MCNC: 395 MG/DL (ref 65–100)
GLUCOSE BLD STRIP.AUTO-MCNC: 414 MG/DL (ref 65–100)
GLUCOSE BLD STRIP.AUTO-MCNC: 45 MG/DL (ref 65–100)
GLUCOSE BLD STRIP.AUTO-MCNC: 504 MG/DL (ref 65–100)
GLUCOSE BLD STRIP.AUTO-MCNC: 93 MG/DL (ref 65–100)
GLUCOSE BLD STRIP.AUTO-MCNC: 93 MG/DL (ref 65–100)
GLUCOSE SERPL-MCNC: 191 MG/DL (ref 65–100)
GLUCOSE SERPL-MCNC: 241 MG/DL (ref 65–100)
GLUCOSE SERPL-MCNC: 276 MG/DL (ref 65–100)
GLUCOSE SERPL-MCNC: 338 MG/DL (ref 65–100)
GLUCOSE SERPL-MCNC: 363 MG/DL (ref 65–100)
GLUCOSE SERPL-MCNC: 423 MG/DL (ref 65–100)
GLUCOSE SERPL-MCNC: 429 MG/DL (ref 65–100)
GLUCOSE SERPL-MCNC: 77 MG/DL (ref 65–100)
GLUCOSE SERPL-MCNC: 89 MG/DL (ref 65–100)
GRAM STN SPEC: ABNORMAL
HBA1C MFR BLD: 8.4 % (ref 4.8–6)
HCT VFR BLD AUTO: 26.6 % (ref 41.1–50.3)
HCT VFR BLD AUTO: 27.1 % (ref 41.1–50.3)
HCT VFR BLD AUTO: 29.8 % (ref 41.1–50.3)
HCT VFR BLD AUTO: 29.9 % (ref 41.1–50.3)
HCT VFR BLD AUTO: 32.8 % (ref 41.1–50.3)
HCT VFR BLD AUTO: 33.4 % (ref 41.1–50.3)
HCT VFR BLD AUTO: 33.5 % (ref 41.1–50.3)
HCT VFR BLD AUTO: 41.1 % (ref 41.1–50.3)
HCT VFR BLD AUTO: 41.3 % (ref 41.1–50.3)
HGB BLD-MCNC: 10.2 G/DL (ref 13.6–17.2)
HGB BLD-MCNC: 10.5 G/DL (ref 13.6–17.2)
HGB BLD-MCNC: 10.8 G/DL (ref 13.6–17.2)
HGB BLD-MCNC: 12.8 G/DL (ref 13.6–17.2)
HGB BLD-MCNC: 13.3 G/DL (ref 13.6–17.2)
HGB BLD-MCNC: 8.1 G/DL (ref 13.6–17.2)
HGB BLD-MCNC: 8.3 G/DL (ref 13.6–17.2)
HGB BLD-MCNC: 9.5 G/DL (ref 13.6–17.2)
HGB BLD-MCNC: 9.6 G/DL (ref 13.6–17.2)
IMM GRANULOCYTES # BLD AUTO: 0 K/UL (ref 0–0.5)
IMM GRANULOCYTES NFR BLD AUTO: 0 % (ref 0–5)
IMM GRANULOCYTES NFR BLD AUTO: 1 % (ref 0–5)
INR BLD: 16.7 (ref 0.9–1.1)
INR BLD: 3.4 (ref 0.9–1.1)
INR PPP: 1
INR PPP: 1.1
INR PPP: 1.1
INR PPP: 1.2
INR PPP: 1.3
INR PPP: 1.5
INR PPP: 4.5
LACTATE BLD-SCNC: 1.06 MMOL/L (ref 0.5–1.9)
LIPASE SERPL-CCNC: 15 U/L (ref 73–393)
LYMPHOCYTES # BLD: 0.8 K/UL (ref 0.5–4.6)
LYMPHOCYTES # BLD: 1.2 K/UL (ref 0.5–4.6)
LYMPHOCYTES # BLD: 1.4 K/UL (ref 0.5–4.6)
LYMPHOCYTES # BLD: 1.4 K/UL (ref 0.5–4.6)
LYMPHOCYTES # BLD: 1.6 K/UL (ref 0.5–4.6)
LYMPHOCYTES # BLD: 1.9 K/UL (ref 0.5–4.6)
LYMPHOCYTES # BLD: 2 K/UL (ref 0.5–4.6)
LYMPHOCYTES NFR BLD: 16 % (ref 13–44)
LYMPHOCYTES NFR BLD: 33 % (ref 13–44)
LYMPHOCYTES NFR BLD: 33 % (ref 13–44)
LYMPHOCYTES NFR BLD: 35 % (ref 13–44)
LYMPHOCYTES NFR BLD: 36 % (ref 13–44)
LYMPHOCYTES NFR BLD: 37 % (ref 13–44)
LYMPHOCYTES NFR BLD: 45 % (ref 13–44)
MAGNESIUM SERPL-MCNC: 1.5 MG/DL (ref 1.8–2.4)
MCH RBC QN AUTO: 24.8 PG (ref 26.1–32.9)
MCH RBC QN AUTO: 24.9 PG (ref 26.1–32.9)
MCH RBC QN AUTO: 25.1 PG (ref 26.1–32.9)
MCH RBC QN AUTO: 25.1 PG (ref 26.1–32.9)
MCH RBC QN AUTO: 25.2 PG (ref 26.1–32.9)
MCH RBC QN AUTO: 25.2 PG (ref 26.1–32.9)
MCH RBC QN AUTO: 25.3 PG (ref 26.1–32.9)
MCHC RBC AUTO-ENTMCNC: 30.6 G/DL (ref 31.4–35)
MCHC RBC AUTO-ENTMCNC: 31.1 G/DL (ref 31.4–35)
MCHC RBC AUTO-ENTMCNC: 31.3 G/DL (ref 31.4–35)
MCHC RBC AUTO-ENTMCNC: 31.9 G/DL (ref 31.4–35)
MCHC RBC AUTO-ENTMCNC: 32.1 G/DL (ref 31.4–35)
MCHC RBC AUTO-ENTMCNC: 32.2 G/DL (ref 31.4–35)
MCHC RBC AUTO-ENTMCNC: 32.3 G/DL (ref 31.4–35)
MCV RBC AUTO: 77.7 FL (ref 79.6–97.8)
MCV RBC AUTO: 78.1 FL (ref 79.6–97.8)
MCV RBC AUTO: 78.7 FL (ref 79.6–97.8)
MCV RBC AUTO: 79 FL (ref 79.6–97.8)
MCV RBC AUTO: 79.4 FL (ref 79.6–97.8)
MCV RBC AUTO: 80.9 FL (ref 79.6–97.8)
MCV RBC AUTO: 81.1 FL (ref 79.6–97.8)
MM INDURATION POC: 0 MM (ref 0–5)
MONOCYTES # BLD: 0.3 K/UL (ref 0.1–1.3)
MONOCYTES # BLD: 0.4 K/UL (ref 0.1–1.3)
MONOCYTES # BLD: 0.5 K/UL (ref 0.1–1.3)
MONOCYTES # BLD: 0.6 K/UL (ref 0.1–1.3)
MONOCYTES NFR BLD: 11 % (ref 4–12)
MONOCYTES NFR BLD: 12 % (ref 4–12)
MONOCYTES NFR BLD: 12 % (ref 4–12)
MONOCYTES NFR BLD: 9 % (ref 4–12)
MONOCYTES NFR BLD: 9 % (ref 4–12)
NEUTS SEG # BLD: 1.7 K/UL (ref 1.7–8.2)
NEUTS SEG # BLD: 1.9 K/UL (ref 1.7–8.2)
NEUTS SEG # BLD: 1.9 K/UL (ref 1.7–8.2)
NEUTS SEG # BLD: 2 K/UL (ref 1.7–8.2)
NEUTS SEG # BLD: 2.6 K/UL (ref 1.7–8.2)
NEUTS SEG # BLD: 2.7 K/UL (ref 1.7–8.2)
NEUTS SEG # BLD: 3.7 K/UL (ref 1.7–8.2)
NEUTS SEG NFR BLD: 40 % (ref 43–78)
NEUTS SEG NFR BLD: 49 % (ref 43–78)
NEUTS SEG NFR BLD: 49 % (ref 43–78)
NEUTS SEG NFR BLD: 50 % (ref 43–78)
NEUTS SEG NFR BLD: 54 % (ref 43–78)
NEUTS SEG NFR BLD: 55 % (ref 43–78)
NEUTS SEG NFR BLD: 73 % (ref 43–78)
NRBC # BLD: 0 K/UL (ref 0–0.2)
PLATELET # BLD AUTO: 100 K/UL (ref 150–450)
PLATELET # BLD AUTO: 135 K/UL (ref 150–450)
PLATELET # BLD AUTO: 147 K/UL (ref 150–450)
PLATELET # BLD AUTO: 153 K/UL (ref 150–450)
PLATELET # BLD AUTO: 162 K/UL (ref 150–450)
PLATELET # BLD AUTO: 173 K/UL (ref 150–450)
PLATELET # BLD AUTO: 99 K/UL (ref 150–450)
PMV BLD AUTO: 12.5 FL (ref 9.4–12.3)
PMV BLD AUTO: 12.7 FL (ref 9.4–12.3)
PMV BLD AUTO: 13.3 FL (ref 9.4–12.3)
PMV BLD AUTO: 13.5 FL (ref 9.4–12.3)
PMV BLD AUTO: ABNORMAL FL (ref 9.4–12.3)
POTASSIUM SERPL-SCNC: 3 MMOL/L (ref 3.5–5.1)
POTASSIUM SERPL-SCNC: 3.1 MMOL/L (ref 3.5–5.1)
POTASSIUM SERPL-SCNC: 3.7 MMOL/L (ref 3.5–5.1)
POTASSIUM SERPL-SCNC: 3.7 MMOL/L (ref 3.5–5.1)
POTASSIUM SERPL-SCNC: 3.8 MMOL/L (ref 3.5–5.1)
POTASSIUM SERPL-SCNC: 3.9 MMOL/L (ref 3.5–5.1)
POTASSIUM SERPL-SCNC: 4.5 MMOL/L (ref 3.5–5.1)
PPD POC: NEGATIVE NEGATIVE
PROCALCITONIN SERPL-MCNC: 0.5 NG/ML
PROT SERPL-MCNC: 5.9 G/DL (ref 6.3–8.2)
PROT SERPL-MCNC: 6.8 G/DL (ref 6.3–8.2)
PROT SERPL-MCNC: 7.4 G/DL (ref 6.3–8.2)
PROTHROMBIN TIME: 13.1 SEC (ref 11.7–14.5)
PROTHROMBIN TIME: 14.6 SEC (ref 11.7–14.5)
PROTHROMBIN TIME: 14.9 SEC (ref 11.7–14.5)
PROTHROMBIN TIME: 15.3 SEC (ref 11.7–14.5)
PROTHROMBIN TIME: 16.2 SEC (ref 11.7–14.5)
PROTHROMBIN TIME: 18.7 SEC (ref 11.7–14.5)
PROTHROMBIN TIME: 43.9 SEC (ref 11.7–14.5)
PT POC: 1.4 SECONDS (ref 11.8–14.9)
PT POC: 41.3 SECONDS (ref 11.8–14.9)
RBC # BLD AUTO: 3.35 M/UL (ref 4.23–5.6)
RBC # BLD AUTO: 3.77 M/UL (ref 4.23–5.6)
RBC # BLD AUTO: 3.83 M/UL (ref 4.23–5.6)
RBC # BLD AUTO: 4.22 M/UL (ref 4.23–5.6)
RBC # BLD AUTO: 4.3 M/UL (ref 4.23–5.6)
RBC # BLD AUTO: 5.07 M/UL (ref 4.23–5.6)
RBC # BLD AUTO: 5.25 M/UL (ref 4.23–5.6)
RBC #/AREA URNS HPF: NORMAL /HPF
SERVICE CMNT-IMP: ABNORMAL
SERVICE CMNT-IMP: NORMAL
SERVICE CMNT-IMP: NORMAL
SODIUM SERPL-SCNC: 142 MMOL/L (ref 136–145)
SODIUM SERPL-SCNC: 143 MMOL/L (ref 136–145)
SODIUM SERPL-SCNC: 144 MMOL/L (ref 136–145)
SODIUM SERPL-SCNC: 145 MMOL/L (ref 136–145)
SODIUM SERPL-SCNC: 146 MMOL/L (ref 136–145)
SODIUM SERPL-SCNC: 148 MMOL/L (ref 136–145)
VANCOMYCIN SERPL-MCNC: 13.2 UG/ML
WBC # BLD AUTO: 3.6 K/UL (ref 4.3–11.1)
WBC # BLD AUTO: 3.7 K/UL (ref 4.3–11.1)
WBC # BLD AUTO: 3.9 K/UL (ref 4.3–11.1)
WBC # BLD AUTO: 4.2 K/UL (ref 4.3–11.1)
WBC # BLD AUTO: 4.8 K/UL (ref 4.3–11.1)
WBC # BLD AUTO: 5 K/UL (ref 4.3–11.1)
WBC # BLD AUTO: 5.4 K/UL (ref 4.3–11.1)
WBC URNS QL MICRO: 0 /HPF

## 2019-01-01 PROCEDURE — 83036 HEMOGLOBIN GLYCOSYLATED A1C: CPT

## 2019-01-01 PROCEDURE — 82962 GLUCOSE BLOOD TEST: CPT

## 2019-01-01 PROCEDURE — G0299 HHS/HOSPICE OF RN EA 15 MIN: HCPCS

## 2019-01-01 PROCEDURE — 74011000250 HC RX REV CODE- 250: Performed by: INTERNAL MEDICINE

## 2019-01-01 PROCEDURE — 96365 THER/PROPH/DIAG IV INF INIT: CPT

## 2019-01-01 PROCEDURE — 74011250637 HC RX REV CODE- 250/637: Performed by: EMERGENCY MEDICINE

## 2019-01-01 PROCEDURE — 85610 PROTHROMBIN TIME: CPT

## 2019-01-01 PROCEDURE — 85730 THROMBOPLASTIN TIME PARTIAL: CPT

## 2019-01-01 PROCEDURE — 65270000029 HC RM PRIVATE

## 2019-01-01 PROCEDURE — A9270 NON-COVERED ITEM OR SERVICE: HCPCS

## 2019-01-01 PROCEDURE — 93970 EXTREMITY STUDY: CPT

## 2019-01-01 PROCEDURE — 87205 SMEAR GRAM STAIN: CPT

## 2019-01-01 PROCEDURE — 74011636637 HC RX REV CODE- 636/637: Performed by: FAMILY MEDICINE

## 2019-01-01 PROCEDURE — 36415 COLL VENOUS BLD VENIPUNCTURE: CPT

## 2019-01-01 PROCEDURE — 87077 CULTURE AEROBIC IDENTIFY: CPT

## 2019-01-01 PROCEDURE — 74011000302 HC RX REV CODE- 302: Performed by: FAMILY MEDICINE

## 2019-01-01 PROCEDURE — 51701 INSERT BLADDER CATHETER: CPT | Performed by: EMERGENCY MEDICINE

## 2019-01-01 PROCEDURE — 65390000012 HC CONDITION CODE 44 OBSERVATION

## 2019-01-01 PROCEDURE — 80048 BASIC METABOLIC PNL TOTAL CA: CPT

## 2019-01-01 PROCEDURE — A6216 NON-STERILE GAUZE<=16 SQ IN: HCPCS

## 2019-01-01 PROCEDURE — 65660000000 HC RM CCU STEPDOWN

## 2019-01-01 PROCEDURE — 96372 THER/PROPH/DIAG INJ SC/IM: CPT

## 2019-01-01 PROCEDURE — 85018 HEMOGLOBIN: CPT

## 2019-01-01 PROCEDURE — 74011250637 HC RX REV CODE- 250/637: Performed by: FAMILY MEDICINE

## 2019-01-01 PROCEDURE — 99284 EMERGENCY DEPT VISIT MOD MDM: CPT | Performed by: EMERGENCY MEDICINE

## 2019-01-01 PROCEDURE — 99218 HC RM OBSERVATION: CPT

## 2019-01-01 PROCEDURE — 74011250637 HC RX REV CODE- 250/637: Performed by: INTERNAL MEDICINE

## 2019-01-01 PROCEDURE — 80053 COMPREHEN METABOLIC PANEL: CPT

## 2019-01-01 PROCEDURE — 96366 THER/PROPH/DIAG IV INF ADDON: CPT

## 2019-01-01 PROCEDURE — 29581 APPL MULTLAYER CMPRN SYS LEG: CPT

## 2019-01-01 PROCEDURE — 400013 HH SOC

## 2019-01-01 PROCEDURE — 85025 COMPLETE CBC W/AUTO DIFF WBC: CPT

## 2019-01-01 PROCEDURE — A6402 STERILE GAUZE <= 16 SQ IN: HCPCS

## 2019-01-01 PROCEDURE — 97530 THERAPEUTIC ACTIVITIES: CPT

## 2019-01-01 PROCEDURE — 74011250636 HC RX REV CODE- 250/636: Performed by: UROLOGY

## 2019-01-01 PROCEDURE — 96375 TX/PRO/DX INJ NEW DRUG ADDON: CPT | Performed by: EMERGENCY MEDICINE

## 2019-01-01 PROCEDURE — G0152 HHCP-SERV OF OT,EA 15 MIN: HCPCS

## 2019-01-01 PROCEDURE — 83880 ASSAY OF NATRIURETIC PEPTIDE: CPT

## 2019-01-01 PROCEDURE — 74011250636 HC RX REV CODE- 250/636: Performed by: INTERNAL MEDICINE

## 2019-01-01 PROCEDURE — 96367 TX/PROPH/DG ADDL SEQ IV INF: CPT

## 2019-01-01 PROCEDURE — 81015 MICROSCOPIC EXAM OF URINE: CPT

## 2019-01-01 PROCEDURE — 77030027138 HC INCENT SPIROMETER -A

## 2019-01-01 PROCEDURE — A6446 CONFORM BAND S W>=3" <5"/YD: HCPCS

## 2019-01-01 PROCEDURE — 94760 N-INVAS EAR/PLS OXIMETRY 1: CPT

## 2019-01-01 PROCEDURE — 96361 HYDRATE IV INFUSION ADD-ON: CPT | Performed by: EMERGENCY MEDICINE

## 2019-01-01 PROCEDURE — 86140 C-REACTIVE PROTEIN: CPT

## 2019-01-01 PROCEDURE — 83605 ASSAY OF LACTIC ACID: CPT

## 2019-01-01 PROCEDURE — 83735 ASSAY OF MAGNESIUM: CPT

## 2019-01-01 PROCEDURE — 83690 ASSAY OF LIPASE: CPT

## 2019-01-01 PROCEDURE — 97535 SELF CARE MNGMENT TRAINING: CPT

## 2019-01-01 PROCEDURE — 86580 TB INTRADERMAL TEST: CPT | Performed by: FAMILY MEDICINE

## 2019-01-01 PROCEDURE — G0151 HHCP-SERV OF PT,EA 15 MIN: HCPCS

## 2019-01-01 PROCEDURE — 77030040361 HC SLV COMPR DVT MDII -B

## 2019-01-01 PROCEDURE — 99213 OFFICE O/P EST LOW 20 MIN: CPT

## 2019-01-01 PROCEDURE — 87040 BLOOD CULTURE FOR BACTERIA: CPT

## 2019-01-01 PROCEDURE — A6252 ABSORPT DRG >16 <=48 W/O BDR: HCPCS

## 2019-01-01 PROCEDURE — 74011000258 HC RX REV CODE- 258: Performed by: INTERNAL MEDICINE

## 2019-01-01 PROCEDURE — 97162 PT EVAL MOD COMPLEX 30 MIN: CPT

## 2019-01-01 PROCEDURE — 93922 UPR/L XTREMITY ART 2 LEVELS: CPT

## 2019-01-01 PROCEDURE — 73221 MRI JOINT UPR EXTREM W/O DYE: CPT

## 2019-01-01 PROCEDURE — 81003 URINALYSIS AUTO W/O SCOPE: CPT | Performed by: EMERGENCY MEDICINE

## 2019-01-01 PROCEDURE — 74011250636 HC RX REV CODE- 250/636: Performed by: FAMILY MEDICINE

## 2019-01-01 PROCEDURE — 77030020263 HC SOL INJ SOD CL0.9% LFCR 1000ML

## 2019-01-01 PROCEDURE — 85652 RBC SED RATE AUTOMATED: CPT

## 2019-01-01 PROCEDURE — 74011000258 HC RX REV CODE- 258: Performed by: FAMILY MEDICINE

## 2019-01-01 PROCEDURE — 77030011943

## 2019-01-01 PROCEDURE — A6266 IMPREG GAUZE NO H20/SAL/YARD: HCPCS

## 2019-01-01 PROCEDURE — 73590 X-RAY EXAM OF LOWER LEG: CPT

## 2019-01-01 PROCEDURE — A6223 GAUZE >16<=48 NO W/SAL W/O B: HCPCS

## 2019-01-01 PROCEDURE — A6454 SELF-ADHER BAND W>=3" <5"/YD: HCPCS

## 2019-01-01 PROCEDURE — G0157 HHC PT ASSISTANT EA 15: HCPCS

## 2019-01-01 PROCEDURE — A4927 NON-STERILE GLOVES: HCPCS

## 2019-01-01 PROCEDURE — 74176 CT ABD & PELVIS W/O CONTRAST: CPT

## 2019-01-01 PROCEDURE — 96376 TX/PRO/DX INJ SAME DRUG ADON: CPT | Performed by: EMERGENCY MEDICINE

## 2019-01-01 PROCEDURE — 80202 ASSAY OF VANCOMYCIN: CPT

## 2019-01-01 PROCEDURE — 74011250636 HC RX REV CODE- 250/636: Performed by: NURSE PRACTITIONER

## 2019-01-01 PROCEDURE — A4452 WATERPROOF TAPE: HCPCS

## 2019-01-01 PROCEDURE — 74011250636 HC RX REV CODE- 250/636: Performed by: EMERGENCY MEDICINE

## 2019-01-01 PROCEDURE — 87186 SC STD MICRODIL/AGAR DIL: CPT

## 2019-01-01 PROCEDURE — 84145 PROCALCITONIN (PCT): CPT

## 2019-01-01 PROCEDURE — 97166 OT EVAL MOD COMPLEX 45 MIN: CPT

## 2019-01-01 PROCEDURE — 96374 THER/PROPH/DIAG INJ IV PUSH: CPT | Performed by: EMERGENCY MEDICINE

## 2019-01-01 PROCEDURE — A6260 WOUND CLEANSER ANY TYPE/SIZE: HCPCS

## 2019-01-01 RX ORDER — ONDANSETRON 2 MG/ML
4 INJECTION INTRAMUSCULAR; INTRAVENOUS
Status: DISCONTINUED | OUTPATIENT
Start: 2019-01-01 | End: 2019-01-01 | Stop reason: HOSPADM

## 2019-01-01 RX ORDER — POTASSIUM CHLORIDE 20 MEQ/1
40 TABLET, EXTENDED RELEASE ORAL
Status: COMPLETED | OUTPATIENT
Start: 2019-01-01 | End: 2019-01-01

## 2019-01-01 RX ORDER — DOXYCYCLINE 100 MG/1
100 CAPSULE ORAL EVERY 12 HOURS
Qty: 8 CAP | Refills: 0 | Status: SHIPPED | OUTPATIENT
Start: 2019-01-01 | End: 2019-01-01

## 2019-01-01 RX ORDER — HYDROCODONE BITARTRATE AND ACETAMINOPHEN 5; 325 MG/1; MG/1
1-2 TABLET ORAL
Qty: 10 TAB | Refills: 0 | Status: SHIPPED | OUTPATIENT
Start: 2019-01-01 | End: 2019-01-01

## 2019-01-01 RX ORDER — TRAMADOL HYDROCHLORIDE 50 MG/1
50 TABLET ORAL
Status: DISCONTINUED | OUTPATIENT
Start: 2019-01-01 | End: 2019-01-01 | Stop reason: HOSPADM

## 2019-01-01 RX ORDER — SODIUM BICARBONATE 650 MG/1
650 TABLET ORAL 3 TIMES DAILY
Status: DISCONTINUED | OUTPATIENT
Start: 2019-01-01 | End: 2019-01-01 | Stop reason: HOSPADM

## 2019-01-01 RX ORDER — HEPARIN SODIUM 5000 [USP'U]/ML
40 INJECTION, SOLUTION INTRAVENOUS; SUBCUTANEOUS ONCE
Status: COMPLETED | OUTPATIENT
Start: 2019-01-01 | End: 2019-01-01

## 2019-01-01 RX ORDER — PANTOPRAZOLE SODIUM 40 MG/1
40 TABLET, DELAYED RELEASE ORAL DAILY
Status: DISCONTINUED | OUTPATIENT
Start: 2019-01-01 | End: 2019-01-01 | Stop reason: HOSPADM

## 2019-01-01 RX ORDER — METOPROLOL SUCCINATE 50 MG/1
50 TABLET, EXTENDED RELEASE ORAL DAILY
Status: DISCONTINUED | OUTPATIENT
Start: 2019-01-01 | End: 2019-01-01 | Stop reason: HOSPADM

## 2019-01-01 RX ORDER — HEPARIN SODIUM 5000 [USP'U]/ML
35 INJECTION, SOLUTION INTRAVENOUS; SUBCUTANEOUS ONCE
Status: COMPLETED | OUTPATIENT
Start: 2019-01-01 | End: 2019-01-01

## 2019-01-01 RX ORDER — CEPHALEXIN 500 MG/1
500 CAPSULE ORAL 4 TIMES DAILY
Qty: 40 CAP | Refills: 0 | Status: SHIPPED | OUTPATIENT
Start: 2019-01-01 | End: 2019-01-01

## 2019-01-01 RX ORDER — LANOLIN ALCOHOL/MO/W.PET/CERES
400 CREAM (GRAM) TOPICAL 2 TIMES DAILY
Status: DISCONTINUED | OUTPATIENT
Start: 2019-01-01 | End: 2019-01-01 | Stop reason: HOSPADM

## 2019-01-01 RX ORDER — METRONIDAZOLE 500 MG/100ML
500 INJECTION, SOLUTION INTRAVENOUS EVERY 12 HOURS
Status: DISCONTINUED | OUTPATIENT
Start: 2019-01-01 | End: 2019-01-01

## 2019-01-01 RX ORDER — ACETAMINOPHEN 325 MG/1
650 TABLET ORAL
Status: DISCONTINUED | OUTPATIENT
Start: 2019-01-01 | End: 2019-01-01 | Stop reason: HOSPADM

## 2019-01-01 RX ORDER — CEPHALEXIN 500 MG/1
500 CAPSULE ORAL 4 TIMES DAILY
Qty: 28 CAP | Refills: 0 | Status: SHIPPED | OUTPATIENT
Start: 2019-01-01 | End: 2019-01-01

## 2019-01-01 RX ORDER — ONDANSETRON 4 MG/1
4 TABLET, ORALLY DISINTEGRATING ORAL
Qty: 20 TAB | Refills: 0 | Status: SHIPPED | OUTPATIENT
Start: 2019-01-01

## 2019-01-01 RX ORDER — CLONIDINE 0.1 MG/24H
1 PATCH, EXTENDED RELEASE TRANSDERMAL
Status: DISCONTINUED | OUTPATIENT
Start: 2019-01-01 | End: 2019-01-01 | Stop reason: HOSPADM

## 2019-01-01 RX ORDER — LIDOCAINE 4 G/100G
1 PATCH TOPICAL EVERY 24 HOURS
Status: DISCONTINUED | OUTPATIENT
Start: 2019-01-01 | End: 2019-01-01

## 2019-01-01 RX ORDER — CLINDAMYCIN HYDROCHLORIDE 150 MG/1
300 CAPSULE ORAL EVERY 8 HOURS
Status: COMPLETED | OUTPATIENT
Start: 2019-01-01 | End: 2019-01-01

## 2019-01-01 RX ORDER — MORPHINE SULFATE 2 MG/ML
2 INJECTION, SOLUTION INTRAMUSCULAR; INTRAVENOUS
Status: COMPLETED | OUTPATIENT
Start: 2019-01-01 | End: 2019-01-01

## 2019-01-01 RX ORDER — NALOXONE HYDROCHLORIDE 0.4 MG/ML
0.4 INJECTION, SOLUTION INTRAMUSCULAR; INTRAVENOUS; SUBCUTANEOUS AS NEEDED
Status: DISCONTINUED | OUTPATIENT
Start: 2019-01-01 | End: 2019-01-01 | Stop reason: HOSPADM

## 2019-01-01 RX ORDER — ONDANSETRON 2 MG/ML
4 INJECTION INTRAMUSCULAR; INTRAVENOUS
Status: COMPLETED | OUTPATIENT
Start: 2019-01-01 | End: 2019-01-01

## 2019-01-01 RX ORDER — HYDROCODONE BITARTRATE AND ACETAMINOPHEN 5; 325 MG/1; MG/1
1 TABLET ORAL
Status: DISCONTINUED | OUTPATIENT
Start: 2019-01-01 | End: 2019-01-01 | Stop reason: SDUPTHER

## 2019-01-01 RX ORDER — DEXTROSE 50 % IN WATER (D50W) INTRAVENOUS SYRINGE
25 AS NEEDED
Status: DISCONTINUED | OUTPATIENT
Start: 2019-01-01 | End: 2019-01-01 | Stop reason: HOSPADM

## 2019-01-01 RX ORDER — AMLODIPINE BESYLATE 10 MG/1
10 TABLET ORAL DAILY
Status: DISCONTINUED | OUTPATIENT
Start: 2019-01-01 | End: 2019-01-01 | Stop reason: HOSPADM

## 2019-01-01 RX ORDER — HYDRALAZINE HYDROCHLORIDE 25 MG/1
100 TABLET, FILM COATED ORAL
Status: COMPLETED | OUTPATIENT
Start: 2019-01-01 | End: 2019-01-01

## 2019-01-01 RX ORDER — INSULIN LISPRO 100 [IU]/ML
INJECTION, SOLUTION INTRAVENOUS; SUBCUTANEOUS
Status: DISCONTINUED | OUTPATIENT
Start: 2019-01-01 | End: 2019-01-01 | Stop reason: HOSPADM

## 2019-01-01 RX ORDER — DEXTROSE 40 %
1 GEL (GRAM) ORAL AS NEEDED
Status: DISCONTINUED | OUTPATIENT
Start: 2019-01-01 | End: 2019-01-01 | Stop reason: HOSPADM

## 2019-01-01 RX ORDER — HYDRALAZINE HYDROCHLORIDE 50 MG/1
100 TABLET, FILM COATED ORAL 3 TIMES DAILY
Status: DISCONTINUED | OUTPATIENT
Start: 2019-01-01 | End: 2019-01-01 | Stop reason: HOSPADM

## 2019-01-01 RX ORDER — INSULIN LISPRO 100 [IU]/ML
14 INJECTION, SOLUTION INTRAVENOUS; SUBCUTANEOUS ONCE
Status: COMPLETED | OUTPATIENT
Start: 2019-01-01 | End: 2019-01-01

## 2019-01-01 RX ORDER — AMPICILLIN 500 MG/1
500 CAPSULE ORAL
Qty: 40 CAP | Refills: 0 | Status: SHIPPED | OUTPATIENT
Start: 2019-01-01 | End: 2019-01-01

## 2019-01-01 RX ORDER — LIDOCAINE 4 G/100G
2 PATCH TOPICAL EVERY 24 HOURS
Status: DISCONTINUED | OUTPATIENT
Start: 2019-01-01 | End: 2019-01-01 | Stop reason: HOSPADM

## 2019-01-01 RX ORDER — ACETAMINOPHEN 325 MG/1
650 TABLET ORAL
Qty: 20 TAB | Refills: 0 | Status: SHIPPED | OUTPATIENT
Start: 2019-01-01

## 2019-01-01 RX ORDER — PRAVASTATIN SODIUM 20 MG/1
40 TABLET ORAL
Status: DISCONTINUED | OUTPATIENT
Start: 2019-01-01 | End: 2019-01-01 | Stop reason: HOSPADM

## 2019-01-01 RX ORDER — TORSEMIDE 20 MG/1
40 TABLET ORAL DAILY
Status: DISCONTINUED | OUTPATIENT
Start: 2019-01-01 | End: 2019-01-01 | Stop reason: HOSPADM

## 2019-01-01 RX ORDER — METRONIDAZOLE 500 MG/1
500 TABLET ORAL EVERY 12 HOURS
Status: DISCONTINUED | OUTPATIENT
Start: 2019-01-01 | End: 2019-01-01

## 2019-01-01 RX ORDER — HEPARIN SODIUM 5000 [USP'U]/100ML
12-25 INJECTION, SOLUTION INTRAVENOUS CONTINUOUS
Status: DISCONTINUED | OUTPATIENT
Start: 2019-01-01 | End: 2019-01-01

## 2019-01-01 RX ORDER — INSULIN GLARGINE 100 [IU]/ML
15 INJECTION, SOLUTION SUBCUTANEOUS
Status: DISCONTINUED | OUTPATIENT
Start: 2019-01-01 | End: 2019-01-01 | Stop reason: HOSPADM

## 2019-01-01 RX ORDER — DOXYCYCLINE 100 MG/1
100 CAPSULE ORAL EVERY 12 HOURS
Status: DISCONTINUED | OUTPATIENT
Start: 2019-01-01 | End: 2019-01-01 | Stop reason: HOSPADM

## 2019-01-01 RX ORDER — WARFARIN SODIUM 5 MG/1
5 TABLET ORAL EVERY EVENING
Qty: 7 TAB | Refills: 0 | Status: SHIPPED | OUTPATIENT
Start: 2019-01-01 | End: 2020-01-01

## 2019-01-01 RX ORDER — HEPARIN SODIUM 5000 [USP'U]/100ML
18-36 INJECTION, SOLUTION INTRAVENOUS
Status: DISCONTINUED | OUTPATIENT
Start: 2019-01-01 | End: 2019-01-01 | Stop reason: HOSPADM

## 2019-01-01 RX ORDER — VANCOMYCIN/0.9 % SOD CHLORIDE 1.5G/250ML
1500 PLASTIC BAG, INJECTION (ML) INTRAVENOUS ONCE
Status: COMPLETED | OUTPATIENT
Start: 2019-01-01 | End: 2019-01-01

## 2019-01-01 RX ADMIN — Medication 1 AMPULE: at 20:35

## 2019-01-01 RX ADMIN — INSULIN LISPRO 2 UNITS: 100 INJECTION, SOLUTION INTRAVENOUS; SUBCUTANEOUS at 13:33

## 2019-01-01 RX ADMIN — INSULIN LISPRO 8 UNITS: 100 INJECTION, SOLUTION INTRAVENOUS; SUBCUTANEOUS at 21:46

## 2019-01-01 RX ADMIN — HYDRALAZINE HYDROCHLORIDE 100 MG: 50 TABLET, FILM COATED ORAL at 18:08

## 2019-01-01 RX ADMIN — Medication: at 09:32

## 2019-01-01 RX ADMIN — Medication: at 08:41

## 2019-01-01 RX ADMIN — SODIUM BICARBONATE 650 MG TABLET 650 MG: at 17:53

## 2019-01-01 RX ADMIN — HEPARIN SODIUM 14 UNITS/KG/HR: 5000 INJECTION, SOLUTION INTRAVENOUS at 15:50

## 2019-01-01 RX ADMIN — MORPHINE SULFATE 2 MG: 2 INJECTION, SOLUTION INTRAMUSCULAR; INTRAVENOUS at 12:14

## 2019-01-01 RX ADMIN — SODIUM BICARBONATE 650 MG TABLET 650 MG: at 08:54

## 2019-01-01 RX ADMIN — DOXYCYCLINE HYCLATE 100 MG: 100 CAPSULE ORAL at 05:36

## 2019-01-01 RX ADMIN — HYDROCODONE BITARTRATE AND ACETAMINOPHEN 1 TABLET: 5; 325 TABLET ORAL at 22:33

## 2019-01-01 RX ADMIN — WARFARIN SODIUM 5 MG: 2 TABLET ORAL at 17:53

## 2019-01-01 RX ADMIN — HYDRALAZINE HYDROCHLORIDE 100 MG: 50 TABLET, FILM COATED ORAL at 08:38

## 2019-01-01 RX ADMIN — PRAVASTATIN SODIUM 40 MG: 20 TABLET ORAL at 20:47

## 2019-01-01 RX ADMIN — TRAMADOL HYDROCHLORIDE 50 MG: 50 TABLET ORAL at 20:44

## 2019-01-01 RX ADMIN — WARFARIN SODIUM 5 MG: 2 TABLET ORAL at 18:01

## 2019-01-01 RX ADMIN — INSULIN LISPRO 14 UNITS: 100 INJECTION, SOLUTION INTRAVENOUS; SUBCUTANEOUS at 04:59

## 2019-01-01 RX ADMIN — DOXYCYCLINE HYCLATE 100 MG: 100 CAPSULE ORAL at 05:24

## 2019-01-01 RX ADMIN — HYDRALAZINE HYDROCHLORIDE 100 MG: 50 TABLET, FILM COATED ORAL at 09:31

## 2019-01-01 RX ADMIN — AMLODIPINE BESYLATE 10 MG: 10 TABLET ORAL at 08:19

## 2019-01-01 RX ADMIN — INSULIN GLARGINE 15 UNITS: 100 INJECTION, SOLUTION SUBCUTANEOUS at 21:47

## 2019-01-01 RX ADMIN — HYDRALAZINE HYDROCHLORIDE 100 MG: 50 TABLET, FILM COATED ORAL at 23:03

## 2019-01-01 RX ADMIN — Medication 400 MG: at 08:55

## 2019-01-01 RX ADMIN — CEFTRIAXONE 2 G: 2 INJECTION, POWDER, FOR SOLUTION INTRAMUSCULAR; INTRAVENOUS at 20:44

## 2019-01-01 RX ADMIN — POTASSIUM CHLORIDE 40 MEQ: 20 TABLET, EXTENDED RELEASE ORAL at 11:01

## 2019-01-01 RX ADMIN — PANTOPRAZOLE SODIUM 40 MG: 40 TABLET, DELAYED RELEASE ORAL at 08:20

## 2019-01-01 RX ADMIN — TORSEMIDE 40 MG: 20 TABLET ORAL at 09:31

## 2019-01-01 RX ADMIN — INSULIN LISPRO 4 UNITS: 100 INJECTION, SOLUTION INTRAVENOUS; SUBCUTANEOUS at 21:37

## 2019-01-01 RX ADMIN — DEXTROSE 50 % IN WATER (D50W) INTRAVENOUS SYRINGE 25 G: at 08:17

## 2019-01-01 RX ADMIN — HYDROCODONE BITARTRATE AND ACETAMINOPHEN 1 TABLET: 5; 325 TABLET ORAL at 22:59

## 2019-01-01 RX ADMIN — HYDROCODONE BITARTRATE AND ACETAMINOPHEN 1 TABLET: 5; 325 TABLET ORAL at 14:33

## 2019-01-01 RX ADMIN — DOXYCYCLINE HYCLATE 100 MG: 100 CAPSULE ORAL at 05:47

## 2019-01-01 RX ADMIN — METRONIDAZOLE 500 MG: 500 TABLET ORAL at 20:47

## 2019-01-01 RX ADMIN — HEPARIN SODIUM 13 UNITS/KG/HR: 5000 INJECTION, SOLUTION INTRAVENOUS at 21:17

## 2019-01-01 RX ADMIN — SODIUM BICARBONATE 650 MG TABLET 650 MG: at 08:41

## 2019-01-01 RX ADMIN — INSULIN LISPRO 6 UNITS: 100 INJECTION, SOLUTION INTRAVENOUS; SUBCUTANEOUS at 22:02

## 2019-01-01 RX ADMIN — SODIUM BICARBONATE 650 MG TABLET 650 MG: at 08:18

## 2019-01-01 RX ADMIN — Medication 1 AMPULE: at 21:31

## 2019-01-01 RX ADMIN — DOXYCYCLINE HYCLATE 100 MG: 100 CAPSULE ORAL at 17:53

## 2019-01-01 RX ADMIN — TRAMADOL HYDROCHLORIDE 50 MG: 50 TABLET ORAL at 14:09

## 2019-01-01 RX ADMIN — SODIUM BICARBONATE 650 MG TABLET 650 MG: at 18:01

## 2019-01-01 RX ADMIN — EPOETIN ALFA-EPBX 20000 UNITS: 10000 INJECTION, SOLUTION INTRAVENOUS; SUBCUTANEOUS at 22:36

## 2019-01-01 RX ADMIN — HYDRALAZINE HYDROCHLORIDE 100 MG: 50 TABLET, FILM COATED ORAL at 17:36

## 2019-01-01 RX ADMIN — Medication 1 AMPULE: at 21:28

## 2019-01-01 RX ADMIN — HYDRALAZINE HYDROCHLORIDE 100 MG: 50 TABLET, FILM COATED ORAL at 08:19

## 2019-01-01 RX ADMIN — SODIUM BICARBONATE 650 MG TABLET 650 MG: at 08:40

## 2019-01-01 RX ADMIN — EPOETIN ALFA-EPBX 20000 UNITS: 10000 INJECTION, SOLUTION INTRAVENOUS; SUBCUTANEOUS at 15:06

## 2019-01-01 RX ADMIN — Medication: at 09:00

## 2019-01-01 RX ADMIN — TORSEMIDE 40 MG: 20 TABLET ORAL at 09:00

## 2019-01-01 RX ADMIN — DOXYCYCLINE HYCLATE 100 MG: 100 CAPSULE ORAL at 18:00

## 2019-01-01 RX ADMIN — HYDRALAZINE HYDROCHLORIDE 100 MG: 50 TABLET, FILM COATED ORAL at 17:59

## 2019-01-01 RX ADMIN — INSULIN GLARGINE 15 UNITS: 100 INJECTION, SOLUTION SUBCUTANEOUS at 22:54

## 2019-01-01 RX ADMIN — INSULIN LISPRO 2 UNITS: 100 INJECTION, SOLUTION INTRAVENOUS; SUBCUTANEOUS at 12:27

## 2019-01-01 RX ADMIN — METOPROLOL SUCCINATE 50 MG: 50 TABLET, EXTENDED RELEASE ORAL at 09:31

## 2019-01-01 RX ADMIN — CLINDAMYCIN HYDROCHLORIDE 300 MG: 150 CAPSULE ORAL at 14:27

## 2019-01-01 RX ADMIN — Medication 1 AMPULE: at 08:18

## 2019-01-01 RX ADMIN — SODIUM BICARBONATE 650 MG TABLET 650 MG: at 17:36

## 2019-01-01 RX ADMIN — Medication 1 AMPULE: at 08:39

## 2019-01-01 RX ADMIN — INSULIN GLARGINE 15 UNITS: 100 INJECTION, SOLUTION SUBCUTANEOUS at 22:01

## 2019-01-01 RX ADMIN — CLINDAMYCIN HYDROCHLORIDE 300 MG: 150 CAPSULE ORAL at 05:51

## 2019-01-01 RX ADMIN — HEPARIN SODIUM 15 UNITS/KG/HR: 5000 INJECTION, SOLUTION INTRAVENOUS at 20:52

## 2019-01-01 RX ADMIN — TORSEMIDE 40 MG: 20 TABLET ORAL at 08:19

## 2019-01-01 RX ADMIN — HEPARIN SODIUM 3050 UNITS: 5000 INJECTION INTRAVENOUS; SUBCUTANEOUS at 21:12

## 2019-01-01 RX ADMIN — HYDRALAZINE HYDROCHLORIDE 100 MG: 50 TABLET, FILM COATED ORAL at 08:40

## 2019-01-01 RX ADMIN — POTASSIUM CHLORIDE 40 MEQ: 20 TABLET, EXTENDED RELEASE ORAL at 08:54

## 2019-01-01 RX ADMIN — METOPROLOL SUCCINATE 50 MG: 50 TABLET, EXTENDED RELEASE ORAL at 08:19

## 2019-01-01 RX ADMIN — SODIUM BICARBONATE 650 MG TABLET 650 MG: at 19:35

## 2019-01-01 RX ADMIN — METOPROLOL SUCCINATE 50 MG: 50 TABLET, EXTENDED RELEASE ORAL at 08:38

## 2019-01-01 RX ADMIN — TRAMADOL HYDROCHLORIDE 50 MG: 50 TABLET ORAL at 23:32

## 2019-01-01 RX ADMIN — TRAMADOL HYDROCHLORIDE 50 MG: 50 TABLET ORAL at 05:51

## 2019-01-01 RX ADMIN — INSULIN LISPRO 8 UNITS: 100 INJECTION, SOLUTION INTRAVENOUS; SUBCUTANEOUS at 18:22

## 2019-01-01 RX ADMIN — TRAMADOL HYDROCHLORIDE 50 MG: 50 TABLET ORAL at 21:34

## 2019-01-01 RX ADMIN — PRAVASTATIN SODIUM 40 MG: 20 TABLET ORAL at 22:52

## 2019-01-01 RX ADMIN — PANTOPRAZOLE SODIUM 40 MG: 40 TABLET, DELAYED RELEASE ORAL at 08:40

## 2019-01-01 RX ADMIN — INSULIN GLARGINE 15 UNITS: 100 INJECTION, SOLUTION SUBCUTANEOUS at 22:07

## 2019-01-01 RX ADMIN — WARFARIN SODIUM 5 MG: 2 TABLET ORAL at 16:11

## 2019-01-01 RX ADMIN — HYDRALAZINE HYDROCHLORIDE 100 MG: 50 TABLET, FILM COATED ORAL at 21:28

## 2019-01-01 RX ADMIN — Medication 1 AMPULE: at 09:31

## 2019-01-01 RX ADMIN — PANTOPRAZOLE SODIUM 40 MG: 40 TABLET, DELAYED RELEASE ORAL at 09:31

## 2019-01-01 RX ADMIN — PANTOPRAZOLE SODIUM 40 MG: 40 TABLET, DELAYED RELEASE ORAL at 08:59

## 2019-01-01 RX ADMIN — WARFARIN SODIUM 2.5 MG: 2 TABLET ORAL at 20:47

## 2019-01-01 RX ADMIN — PRAVASTATIN SODIUM 40 MG: 20 TABLET ORAL at 22:02

## 2019-01-01 RX ADMIN — SODIUM BICARBONATE 650 MG TABLET 650 MG: at 21:30

## 2019-01-01 RX ADMIN — ONDANSETRON 4 MG: 2 INJECTION INTRAMUSCULAR; INTRAVENOUS at 06:48

## 2019-01-01 RX ADMIN — METRONIDAZOLE 500 MG: 500 TABLET ORAL at 08:40

## 2019-01-01 RX ADMIN — INSULIN LISPRO 2 UNITS: 100 INJECTION, SOLUTION INTRAVENOUS; SUBCUTANEOUS at 16:15

## 2019-01-01 RX ADMIN — INSULIN LISPRO 14 UNITS: 100 INJECTION, SOLUTION INTRAVENOUS; SUBCUTANEOUS at 02:21

## 2019-01-01 RX ADMIN — HEPARIN SODIUM 16 UNITS/KG/HR: 5000 INJECTION, SOLUTION INTRAVENOUS at 18:24

## 2019-01-01 RX ADMIN — INSULIN GLARGINE 15 UNITS: 100 INJECTION, SOLUTION SUBCUTANEOUS at 22:46

## 2019-01-01 RX ADMIN — HEPARIN SODIUM 14 UNITS/KG/HR: 5000 INJECTION, SOLUTION INTRAVENOUS at 23:41

## 2019-01-01 RX ADMIN — Medication 400 MG: at 17:53

## 2019-01-01 RX ADMIN — HYDRALAZINE HYDROCHLORIDE 100 MG: 50 TABLET, FILM COATED ORAL at 22:51

## 2019-01-01 RX ADMIN — HYDRALAZINE HYDROCHLORIDE 100 MG: 50 TABLET, FILM COATED ORAL at 17:53

## 2019-01-01 RX ADMIN — HEPARIN SODIUM 18 UNITS/KG/HR: 5000 INJECTION, SOLUTION INTRAVENOUS at 21:45

## 2019-01-01 RX ADMIN — HEPARIN SODIUM 2700 UNITS: 5000 INJECTION INTRAVENOUS; SUBCUTANEOUS at 15:00

## 2019-01-01 RX ADMIN — SODIUM BICARBONATE 650 MG TABLET 650 MG: at 22:52

## 2019-01-01 RX ADMIN — INSULIN LISPRO 2 UNITS: 100 INJECTION, SOLUTION INTRAVENOUS; SUBCUTANEOUS at 08:26

## 2019-01-01 RX ADMIN — DOXYCYCLINE HYCLATE 100 MG: 100 CAPSULE ORAL at 16:12

## 2019-01-01 RX ADMIN — INSULIN LISPRO 6 UNITS: 100 INJECTION, SOLUTION INTRAVENOUS; SUBCUTANEOUS at 17:54

## 2019-01-01 RX ADMIN — INSULIN LISPRO 4 UNITS: 100 INJECTION, SOLUTION INTRAVENOUS; SUBCUTANEOUS at 12:47

## 2019-01-01 RX ADMIN — HYDROCODONE BITARTRATE AND ACETAMINOPHEN 1 TABLET: 5; 325 TABLET ORAL at 05:51

## 2019-01-01 RX ADMIN — INSULIN LISPRO 4 UNITS: 100 INJECTION, SOLUTION INTRAVENOUS; SUBCUTANEOUS at 17:49

## 2019-01-01 RX ADMIN — TORSEMIDE 40 MG: 20 TABLET ORAL at 08:38

## 2019-01-01 RX ADMIN — SODIUM BICARBONATE 650 MG TABLET 650 MG: at 21:29

## 2019-01-01 RX ADMIN — TUBERCULIN PURIFIED PROTEIN DERIVATIVE 5 UNITS: 5 INJECTION, SOLUTION INTRADERMAL at 22:11

## 2019-01-01 RX ADMIN — SODIUM BICARBONATE 650 MG TABLET 650 MG: at 16:10

## 2019-01-01 RX ADMIN — HYDRALAZINE HYDROCHLORIDE 100 MG: 50 TABLET, FILM COATED ORAL at 21:30

## 2019-01-01 RX ADMIN — HYDROCODONE BITARTRATE AND ACETAMINOPHEN 1 TABLET: 5; 325 TABLET ORAL at 06:58

## 2019-01-01 RX ADMIN — INSULIN GLARGINE 15 UNITS: 100 INJECTION, SOLUTION SUBCUTANEOUS at 21:37

## 2019-01-01 RX ADMIN — PANTOPRAZOLE SODIUM 40 MG: 40 TABLET, DELAYED RELEASE ORAL at 08:38

## 2019-01-01 RX ADMIN — INSULIN LISPRO 6 UNITS: 100 INJECTION, SOLUTION INTRAVENOUS; SUBCUTANEOUS at 08:40

## 2019-01-01 RX ADMIN — Medication 1 AMPULE: at 08:54

## 2019-01-01 RX ADMIN — Medication: at 08:45

## 2019-01-01 RX ADMIN — Medication: at 08:21

## 2019-01-01 RX ADMIN — SODIUM CHLORIDE 1000 MG: 900 INJECTION, SOLUTION INTRAVENOUS at 15:49

## 2019-01-01 RX ADMIN — SODIUM BICARBONATE 650 MG TABLET 650 MG: at 08:53

## 2019-01-01 RX ADMIN — TORSEMIDE 40 MG: 20 TABLET ORAL at 08:23

## 2019-01-01 RX ADMIN — ONDANSETRON 4 MG: 2 INJECTION INTRAMUSCULAR; INTRAVENOUS at 11:15

## 2019-01-01 RX ADMIN — AMLODIPINE BESYLATE 10 MG: 10 TABLET ORAL at 09:31

## 2019-01-01 RX ADMIN — PRAVASTATIN SODIUM 40 MG: 20 TABLET ORAL at 21:29

## 2019-01-01 RX ADMIN — SODIUM CHLORIDE 500 ML: 900 INJECTION, SOLUTION INTRAVENOUS at 12:14

## 2019-01-01 RX ADMIN — DOXYCYCLINE HYCLATE 100 MG: 100 CAPSULE ORAL at 19:34

## 2019-01-01 RX ADMIN — TRAMADOL HYDROCHLORIDE 50 MG: 50 TABLET ORAL at 05:24

## 2019-01-01 RX ADMIN — SODIUM CHLORIDE 1000 MG: 900 INJECTION, SOLUTION INTRAVENOUS at 04:03

## 2019-01-01 RX ADMIN — HYDRALAZINE HYDROCHLORIDE 100 MG: 50 TABLET, FILM COATED ORAL at 22:03

## 2019-01-01 RX ADMIN — AMLODIPINE BESYLATE 10 MG: 10 TABLET ORAL at 08:15

## 2019-01-01 RX ADMIN — Medication 400 MG: at 08:38

## 2019-01-01 RX ADMIN — INSULIN LISPRO 4 UNITS: 100 INJECTION, SOLUTION INTRAVENOUS; SUBCUTANEOUS at 18:14

## 2019-01-01 RX ADMIN — Medication 400 MG: at 09:31

## 2019-01-01 RX ADMIN — WARFARIN SODIUM 2.5 MG: 2 TABLET ORAL at 18:09

## 2019-01-01 RX ADMIN — INSULIN LISPRO 10 UNITS: 100 INJECTION, SOLUTION INTRAVENOUS; SUBCUTANEOUS at 22:54

## 2019-01-01 RX ADMIN — VANCOMYCIN HYDROCHLORIDE 1500 MG: 10 INJECTION, POWDER, LYOPHILIZED, FOR SOLUTION INTRAVENOUS at 17:49

## 2019-01-01 RX ADMIN — SODIUM BICARBONATE 650 MG TABLET 650 MG: at 22:03

## 2019-01-01 RX ADMIN — ACETAMINOPHEN 650 MG: 325 TABLET, FILM COATED ORAL at 01:26

## 2019-01-01 RX ADMIN — HYDRALAZINE HYDROCHLORIDE 100 MG: 50 TABLET, FILM COATED ORAL at 22:01

## 2019-01-01 RX ADMIN — HYDRALAZINE HYDROCHLORIDE 100 MG: 25 TABLET, FILM COATED ORAL at 13:31

## 2019-01-01 RX ADMIN — PRAVASTATIN SODIUM 40 MG: 20 TABLET ORAL at 21:31

## 2019-01-01 RX ADMIN — SODIUM BICARBONATE 650 MG TABLET 650 MG: at 09:31

## 2019-01-01 RX ADMIN — SODIUM BICARBONATE 650 MG TABLET 650 MG: at 20:48

## 2019-01-01 RX ADMIN — LEVOFLOXACIN 750 MG: 500 TABLET, FILM COATED ORAL at 22:00

## 2019-01-01 RX ADMIN — Medication 1 AMPULE: at 20:45

## 2019-01-01 RX ADMIN — Medication 1 AMPULE: at 20:48

## 2019-01-01 RX ADMIN — MORPHINE SULFATE 2 MG: 2 INJECTION, SOLUTION INTRAMUSCULAR; INTRAVENOUS at 11:15

## 2019-01-01 RX ADMIN — Medication 400 MG: at 18:00

## 2019-01-01 RX ADMIN — Medication 1 AMPULE: at 08:40

## 2019-01-01 RX ADMIN — APIXABAN 10 MG: 5 TABLET, FILM COATED ORAL at 15:19

## 2019-01-01 RX ADMIN — SODIUM BICARBONATE 650 MG TABLET 650 MG: at 18:08

## 2019-01-01 RX ADMIN — AMLODIPINE BESYLATE 10 MG: 10 TABLET ORAL at 08:42

## 2019-01-01 RX ADMIN — INSULIN LISPRO 8 UNITS: 100 INJECTION, SOLUTION INTRAVENOUS; SUBCUTANEOUS at 23:03

## 2019-01-01 RX ADMIN — AMLODIPINE BESYLATE 10 MG: 10 TABLET ORAL at 08:40

## 2019-01-01 RX ADMIN — WARFARIN SODIUM 2.5 MG: 2 TABLET ORAL at 19:36

## 2019-01-01 RX ADMIN — CLINDAMYCIN HYDROCHLORIDE 300 MG: 150 CAPSULE ORAL at 22:01

## 2019-01-01 RX ADMIN — HYDROCODONE BITARTRATE AND ACETAMINOPHEN 1 TABLET: 5; 325 TABLET ORAL at 11:07

## 2019-01-01 RX ADMIN — PRAVASTATIN SODIUM 40 MG: 20 TABLET ORAL at 22:01

## 2019-01-01 RX ADMIN — Medication 400 MG: at 16:12

## 2019-01-01 RX ADMIN — INSULIN LISPRO 10 UNITS: 100 INJECTION, SOLUTION INTRAVENOUS; SUBCUTANEOUS at 22:09

## 2019-01-01 RX ADMIN — PANTOPRAZOLE SODIUM 40 MG: 40 TABLET, DELAYED RELEASE ORAL at 08:19

## 2019-01-01 RX ADMIN — HYDRALAZINE HYDROCHLORIDE 100 MG: 50 TABLET, FILM COATED ORAL at 16:10

## 2019-01-01 RX ADMIN — HYDROCODONE BITARTRATE AND ACETAMINOPHEN 1 TABLET: 5; 325 TABLET ORAL at 07:36

## 2019-01-01 RX ADMIN — METOPROLOL SUCCINATE 50 MG: 50 TABLET, EXTENDED RELEASE ORAL at 08:40

## 2019-01-01 RX ADMIN — TORSEMIDE 40 MG: 20 TABLET ORAL at 08:40

## 2019-01-01 RX ADMIN — SODIUM BICARBONATE 650 MG TABLET 650 MG: at 22:01

## 2019-01-01 RX ADMIN — AMLODIPINE BESYLATE 10 MG: 10 TABLET ORAL at 08:57

## 2019-01-01 RX ADMIN — Medication 1 AMPULE: at 08:14

## 2019-02-01 ENCOUNTER — HOSPITAL ENCOUNTER (OUTPATIENT)
Dept: LAB | Age: 79
Discharge: HOME | End: 2019-02-01
Payer: MEDICARE

## 2019-02-01 DIAGNOSIS — D50.8 OTHER IRON DEFICIENCY ANEMIA: ICD-10-CM

## 2019-02-01 LAB
ALBUMIN SERPL-MCNC: 3.3 G/DL (ref 3.2–4.6)
ALBUMIN/GLOB SERPL: 0.8 {RATIO} (ref 1.2–3.5)
ALP SERPL-CCNC: 79 U/L (ref 50–136)
ALT SERPL-CCNC: 30 U/L (ref 12–65)
ANION GAP SERPL CALC-SCNC: 9 MMOL/L (ref 7–16)
AST SERPL-CCNC: 34 U/L (ref 15–37)
BASOPHILS # BLD: 0 K/UL (ref 0–0.2)
BASOPHILS NFR BLD: 0 % (ref 0–2)
BILIRUB SERPL-MCNC: 0.4 MG/DL (ref 0.2–1.1)
BUN SERPL-MCNC: 50 MG/DL (ref 8–23)
CALCIUM SERPL-MCNC: 7.9 MG/DL (ref 8.3–10.4)
CHLORIDE SERPL-SCNC: 114 MMOL/L (ref 98–107)
CO2 SERPL-SCNC: 23 MMOL/L (ref 21–32)
CREAT SERPL-MCNC: 4.6 MG/DL (ref 0.8–1.5)
DIFFERENTIAL METHOD BLD: ABNORMAL
EOSINOPHIL # BLD: 0 K/UL (ref 0–0.8)
EOSINOPHIL NFR BLD: 0 % (ref 0.5–7.8)
ERYTHROCYTE [DISTWIDTH] IN BLOOD BY AUTOMATED COUNT: 14.6 % (ref 11.9–14.6)
FERRITIN SERPL-MCNC: 1066 NG/ML (ref 8–388)
GLOBULIN SER CALC-MCNC: 4.1 G/DL (ref 2.3–3.5)
GLUCOSE SERPL-MCNC: 410 MG/DL (ref 65–100)
HCT VFR BLD AUTO: 31.1 % (ref 41.1–50.3)
HGB BLD-MCNC: 9.5 G/DL (ref 13.6–17.2)
IMM GRANULOCYTES # BLD AUTO: 0 K/UL (ref 0–0.5)
IMM GRANULOCYTES NFR BLD AUTO: 0 % (ref 0–5)
IRON SATN MFR SERPL: 27 %
IRON SERPL-MCNC: 47 UG/DL (ref 35–150)
LYMPHOCYTES # BLD: 0.9 K/UL (ref 0.5–4.6)
LYMPHOCYTES NFR BLD: 15 % (ref 13–44)
MCH RBC QN AUTO: 26.2 PG (ref 26.1–32.9)
MCHC RBC AUTO-ENTMCNC: 30.5 G/DL (ref 31.4–35)
MCV RBC AUTO: 85.9 FL (ref 79.6–97.8)
MONOCYTES # BLD: 0.2 K/UL (ref 0.1–1.3)
MONOCYTES NFR BLD: 4 % (ref 4–12)
NEUTS SEG # BLD: 4.9 K/UL (ref 1.7–8.2)
NEUTS SEG NFR BLD: 81 % (ref 43–78)
NRBC # BLD: 0 K/UL (ref 0–0.2)
PLATELET # BLD AUTO: 132 K/UL (ref 150–450)
PMV BLD AUTO: 11.8 FL (ref 9.4–12.3)
POTASSIUM SERPL-SCNC: 4 MMOL/L (ref 3.5–5.1)
PROT SERPL-MCNC: 7.4 G/DL (ref 6.3–8.2)
RBC # BLD AUTO: 3.62 M/UL (ref 4.23–5.67)
SODIUM SERPL-SCNC: 146 MMOL/L (ref 136–145)
TIBC SERPL-MCNC: 173 UG/DL (ref 250–450)
WBC # BLD AUTO: 6.1 K/UL (ref 4.3–11.1)

## 2019-02-01 PROCEDURE — 85025 COMPLETE CBC W/AUTO DIFF WBC: CPT

## 2019-02-01 PROCEDURE — 36415 COLL VENOUS BLD VENIPUNCTURE: CPT

## 2019-02-01 PROCEDURE — 82728 ASSAY OF FERRITIN: CPT

## 2019-02-01 PROCEDURE — 83540 ASSAY OF IRON: CPT

## 2019-02-01 PROCEDURE — 80053 COMPREHEN METABOLIC PANEL: CPT

## 2019-04-08 ENCOUNTER — HOSPITAL ENCOUNTER (OUTPATIENT)
Dept: INFUSION THERAPY | Age: 79
Discharge: HOME | End: 2019-04-08
Payer: MEDICARE

## 2019-04-08 VITALS
DIASTOLIC BLOOD PRESSURE: 65 MMHG | HEART RATE: 54 BPM | SYSTOLIC BLOOD PRESSURE: 150 MMHG | OXYGEN SATURATION: 100 % | TEMPERATURE: 97.8 F | RESPIRATION RATE: 16 BRPM

## 2019-04-08 LAB — HGB BLD-MCNC: 8.3 G/DL (ref 13.6–17.2)

## 2019-04-08 PROCEDURE — 74011250636 HC RX REV CODE- 250/636: Performed by: NURSE PRACTITIONER

## 2019-04-08 PROCEDURE — 96372 THER/PROPH/DIAG INJ SC/IM: CPT

## 2019-04-08 PROCEDURE — 85018 HEMOGLOBIN: CPT

## 2019-04-08 RX ADMIN — EPOETIN ALFA-EPBX 10000 UNITS: 10000 INJECTION, SOLUTION INTRAVENOUS; SUBCUTANEOUS at 15:31

## 2019-04-08 NOTE — PROGRESS NOTES
Arrived to the [facility]. procrit completed. Patient tolerated well. Any issues or concerns during appointment: no. 
Patient aware of next infusion appointment on 4/22 (date) at 3 (time). Discharged home.

## 2019-04-22 ENCOUNTER — HOSPITAL ENCOUNTER (OUTPATIENT)
Dept: INFUSION THERAPY | Age: 79
Discharge: HOME | End: 2019-04-22
Payer: MEDICARE

## 2019-04-22 LAB
HCT VFR BLD AUTO: 30.7 % (ref 41.1–50.3)
HGB BLD-MCNC: 9.3 G/DL (ref 13.6–17.2)

## 2019-04-22 PROCEDURE — 36415 COLL VENOUS BLD VENIPUNCTURE: CPT

## 2019-04-22 PROCEDURE — 85018 HEMOGLOBIN: CPT

## 2019-04-23 ENCOUNTER — APPOINTMENT (OUTPATIENT)
Dept: INFUSION THERAPY | Age: 79
End: 2019-04-23
Payer: MEDICARE

## 2019-04-23 ENCOUNTER — HOSPITAL ENCOUNTER (OUTPATIENT)
Dept: INFUSION THERAPY | Age: 79
Discharge: HOME | End: 2019-04-23
Payer: MEDICARE

## 2019-04-23 VITALS
DIASTOLIC BLOOD PRESSURE: 56 MMHG | RESPIRATION RATE: 18 BRPM | WEIGHT: 175.8 LBS | HEART RATE: 58 BPM | BODY MASS INDEX: 25.96 KG/M2 | TEMPERATURE: 97.8 F | OXYGEN SATURATION: 97 % | SYSTOLIC BLOOD PRESSURE: 148 MMHG

## 2019-04-23 PROCEDURE — 74011250636 HC RX REV CODE- 250/636: Performed by: NURSE PRACTITIONER

## 2019-04-23 PROCEDURE — 96372 THER/PROPH/DIAG INJ SC/IM: CPT

## 2019-04-23 RX ADMIN — EPOETIN ALFA-EPBX 10000 UNITS: 10000 INJECTION, SOLUTION INTRAVENOUS; SUBCUTANEOUS at 16:59

## 2019-04-24 ENCOUNTER — APPOINTMENT (OUTPATIENT)
Dept: INFUSION THERAPY | Age: 79
End: 2019-04-24
Payer: MEDICARE

## 2019-05-06 ENCOUNTER — APPOINTMENT (OUTPATIENT)
Dept: GENERAL RADIOLOGY | Age: 79
End: 2019-05-06
Attending: EMERGENCY MEDICINE
Payer: MEDICARE

## 2019-05-06 ENCOUNTER — HOSPITAL ENCOUNTER (EMERGENCY)
Age: 79
Discharge: HOME | End: 2019-05-06
Attending: EMERGENCY MEDICINE
Payer: MEDICARE

## 2019-05-06 ENCOUNTER — APPOINTMENT (OUTPATIENT)
Dept: CT IMAGING | Age: 79
End: 2019-05-06
Attending: EMERGENCY MEDICINE
Payer: MEDICARE

## 2019-05-06 VITALS
HEART RATE: 61 BPM | BODY MASS INDEX: 24.78 KG/M2 | SYSTOLIC BLOOD PRESSURE: 175 MMHG | HEIGHT: 71 IN | TEMPERATURE: 98.1 F | DIASTOLIC BLOOD PRESSURE: 69 MMHG | RESPIRATION RATE: 18 BRPM | WEIGHT: 177 LBS | OXYGEN SATURATION: 100 %
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
## 2019-05-06 DIAGNOSIS — S70.02XA CONTUSION OF LEFT HIP, INITIAL ENCOUNTER: ICD-10-CM

## 2019-05-06 DIAGNOSIS — S16.1XXA ACUTE CERVICAL MYOFASCIAL STRAIN, INITIAL ENCOUNTER: ICD-10-CM

## 2019-05-06 DIAGNOSIS — M79.604 ACUTE PAIN OF RIGHT LOWER EXTREMITY: ICD-10-CM

## 2019-05-06 DIAGNOSIS — S20.212A CONTUSION OF RIBS, LEFT, INITIAL ENCOUNTER: ICD-10-CM

## 2019-05-06 PROCEDURE — 71046 X-RAY EXAM CHEST 2 VIEWS: CPT

## 2019-05-06 PROCEDURE — 72125 CT NECK SPINE W/O DYE: CPT

## 2019-05-06 PROCEDURE — 72040 X-RAY EXAM NECK SPINE 2-3 VW: CPT

## 2019-05-06 PROCEDURE — 73502 X-RAY EXAM HIP UNI 2-3 VIEWS: CPT

## 2019-05-06 PROCEDURE — 71100 X-RAY EXAM RIBS UNI 2 VIEWS: CPT

## 2019-05-06 PROCEDURE — 99284 EMERGENCY DEPT VISIT MOD MDM: CPT | Performed by: EMERGENCY MEDICINE

## 2019-05-06 RX ORDER — TRAMADOL HYDROCHLORIDE 50 MG/1
50 TABLET ORAL
Qty: 11 TAB | Refills: 0 | Status: SHIPPED | OUTPATIENT
Start: 2019-05-06 | End: 2019-05-08

## 2019-05-06 RX ORDER — CYCLOBENZAPRINE HCL 5 MG
5 TABLET ORAL
Qty: 15 TAB | Refills: 0 | Status: SHIPPED | OUTPATIENT
Start: 2019-05-06 | End: 2019-05-06

## 2019-05-06 RX ORDER — CYCLOBENZAPRINE HCL 5 MG
5 TABLET ORAL
Qty: 15 TAB | Refills: 0 | Status: SHIPPED | OUTPATIENT
Start: 2019-05-06 | End: 2019-09-11

## 2019-05-06 NOTE — ED NOTES
I have reviewed discharge instructions with the patient. The patient verbalized understanding. Patient left ED via Discharge Method: wheelchair to Home with self. Opportunity for questions and clarification provided. Patient given 2 scripts. To continue your aftercare when you leave the hospital, you may receive an automated call from our care team to check in on how you are doing. This is a free service and part of our promise to provide the best care and service to meet your aftercare needs.  If you have questions, or wish to unsubscribe from this service please call [phone]. Thank you for Choosing our [facility] Emergency Department.

## 2019-05-06 NOTE — DISCHARGE INSTRUCTIONS
Rest.  May try heating pad or hot water bottle for neck. Call your doctor for recheck in 4-5 days if not improving. Patient Education        Neck Strain: Care Instructions  Your Care Instructions    You have strained the muscles and ligaments in your neck. A sudden, awkward movement can strain the neck. This often occurs with falls or car accidents or during certain sports. Everyday activities like working on a computer or sleeping can also cause neck strain if they force you to hold your neck in an awkward position for a long time. It is common for neck pain to get worse for a day or two after an injury, but it should start to feel better after that. You may have more pain and stiffness for several days before it gets better. This is expected. It may take a few weeks or longer for it to heal completely. Good home treatment can help you get better faster and avoid future neck problems. Follow-up care is a key part of your treatment and safety. Be sure to make and go to all appointments, and call your doctor if you are having problems. It's also a good idea to know your test results and keep a list of the medicines you take. How can you care for yourself at home? · If you were given a neck brace (cervical collar) to limit neck motion, wear it as instructed for as many days as your doctor tells you to. Do not wear it longer than you were told to. Wearing a brace for too long can make neck stiffness worse and weaken the neck muscles. · You can try using heat or ice to see if it helps. ? Try using a heating pad on a low or medium setting for 15 to 20 minutes every 2 to 3 hours. Try a warm shower in place of one session with the heating pad. You can also buy single-use heat wraps that last up to 8 hours. ? You can also try an ice pack for 10 to 15 minutes every 2 to 3 hours. · Take pain medicines exactly as directed. ? If the doctor gave you a prescription medicine for pain, take it as prescribed. ?  If you are not taking a prescription pain medicine, ask your doctor if you can take an over-the-counter medicine. · Gently rub the area to relieve pain and help with blood flow. Do not massage the area if it hurts to do so. · Do not do anything that makes the pain worse. Take it easy for a couple of days. You can do your usual activities if they do not hurt your neck or put it at risk for more stress or injury. · Try sleeping on a special neck pillow. Place it under your neck, not under your head. Placing a tightly rolled-up towel under your neck while you sleep will also work. If you use a neck pillow or rolled towel, do not use your regular pillow at the same time. · To prevent future neck pain, do exercises to stretch and strengthen your neck and back. Learn how to use good posture, safe lifting techniques, and proper body mechanics. When should you call for help? Call 911 anytime you think you may need emergency care. For example, call if:    · You are unable to move an arm or a leg at all.   [facility] your doctor now or seek immediate medical care if:    · You have new or worse symptoms in your arms, legs, chest, belly, or buttocks. Symptoms may include:  ? Numbness or tingling. ? Weakness. ? Pain.     · You lose bladder or bowel control.    Watch closely for changes in your health, and be sure to contact your doctor if:    · You are not getting better as expected. Where can you learn more? Go to http://svetlana-glenis.info/. Enter M253 in the search box to learn more about \"Neck Strain: Care Instructions. \"  Current as of: September 20, 2018  Content Version: 11.9  © 2207-0049 [employer]. Care instructions adapted under license by [employer] (which disclaims liability or warranty for this information).  If you have questions about a medical condition or this instruction, always ask your healthcare professional. Vanita Colindres disclaims any warranty or liability for your use of this information. Patient Education        Chest Contusion: Care Instructions  Your Care Instructions  A chest contusion, or bruise, is caused by a fall or direct blow to the chest. Car crashes, falls, getting punched, and injury from bicycle handlebars are common causes of chest contusions. A very forceful blow to the chest can injure the heart or blood vessels in the chest, the lungs, the airway, the liver, or the spleen. Pain may be caused by an injury to muscles, cartilage, or ribs. Deep breathing, coughing, or sneezing can increase your pain. Lying on the injured area also can cause pain. Follow-up care is a key part of your treatment and safety. Be sure to make and go to all appointments, and call your doctor if you are having problems. It's also a good idea to know your test results and keep a list of the medicines you take. How can you care for yourself at home? · Rest and protect the injured or sore area. Stop, change, or take a break from any activity that may be causing your pain. · Put ice or a cold pack on the area for 10 to 20 minutes at a time. Put a thin cloth between the ice and your skin. · After 2 or 3 days, if your swelling is gone, apply a heating pad set on low or a warm cloth to your chest. Some doctors suggest that you go back and forth between hot and cold. Put a thin cloth between the heating pad and your skin. · Do not wrap or tape your ribs for support. This may cause you to take smaller breaths, which could increase your risk of pneumonia and lung collapse. · Ask your doctor if you can take an over-the-counter pain medicine, such as acetaminophen (Tylenol), ibuprofen (Advil, Motrin), or naproxen (Aleve). Be safe with medicines. Read and follow all instructions on the label. · Take your medicines exactly as prescribed. Call your doctor if you think you are having a problem with your medicine.   · Gentle stretching and massage may help you feel better after a few days of rest. Stretch slowly to the point just before discomfort begins, then hold the stretch for at least 15 to 30 seconds. Do this 3 or 4 times per day. · As your pain gets better, slowly return to your normal activities. Be patient, because chest bruises can take weeks or months to heal. Any increased pain may be a sign that you need to rest a while longer. When should you call for help? Call 911 anytime you think you may need emergency care. For example, call if:    · You have severe trouble breathing.     · You cough up blood.    Call your doctor now or seek immediate medical care if:    · You have belly pain.     · You are dizzy or lightheaded, or you feel like you may faint.     · You develop new symptoms with the chest pain.     · Your chest pain gets worse.     · You have a fever.     · You have some shortness of breath.     · You have a cough that brings up mucus from the lungs.    Watch closely for changes in your health, and be sure to contact your doctor if:    · Your chest pain is not improving after 1 week. Where can you learn more? Go to http://svetlana-glenis.info/. Enter I174 in the search box to learn more about \"Chest Contusion: Care Instructions. \"  Current as of: September 23, 2018  Content Version: 11.9  © 5421-1340 [employer]. Care instructions adapted under license by [employer] (which disclaims liability or warranty for this information). If you have questions about a medical condition or this instruction, always ask your healthcare professional. [place] [address] any warranty or liability for your use of this information.
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
## 2019-05-06 NOTE — ED TRIAGE NOTES
Pt fell Saturday night and he is c/o hip, rib and neck pain. Pt states the pain is not getting any better and just lingers on.

## 2019-05-11 ENCOUNTER — HOSPITAL ENCOUNTER (EMERGENCY)
Age: 79
Discharge: HOME | End: 2019-05-11
Attending: EMERGENCY MEDICINE
Payer: MEDICARE

## 2019-05-11 VITALS
TEMPERATURE: 99.8 F | WEIGHT: 177 LBS | RESPIRATION RATE: 16 BRPM | DIASTOLIC BLOOD PRESSURE: 96 MMHG | OXYGEN SATURATION: 95 % | SYSTOLIC BLOOD PRESSURE: 220 MMHG | HEIGHT: 71 IN | BODY MASS INDEX: 24.78 KG/M2 | HEART RATE: 84 BPM

## 2019-05-11 DIAGNOSIS — L03.113 CELLULITIS OF RIGHT UPPER EXTREMITY: Primary | ICD-10-CM

## 2019-05-11 LAB
ALBUMIN SERPL-MCNC: 3 G/DL (ref 3.2–4.6)
ALBUMIN/GLOB SERPL: 0.7 {RATIO} (ref 1.2–3.5)
ALP SERPL-CCNC: 74 U/L (ref 50–136)
ALT SERPL-CCNC: 17 U/L (ref 12–65)
ANION GAP SERPL CALC-SCNC: 11 MMOL/L (ref 7–16)
AST SERPL-CCNC: 20 U/L (ref 15–37)
BASOPHILS # BLD: 0 K/UL (ref 0–0.2)
BASOPHILS NFR BLD: 0 % (ref 0–2)
BILIRUB SERPL-MCNC: 0.8 MG/DL (ref 0.2–1.1)
BUN SERPL-MCNC: 37 MG/DL (ref 8–23)
CALCIUM SERPL-MCNC: 7.7 MG/DL (ref 8.3–10.4)
CHLORIDE SERPL-SCNC: 116 MMOL/L (ref 98–107)
CO2 SERPL-SCNC: 25 MMOL/L (ref 21–32)
CREAT SERPL-MCNC: 4.04 MG/DL (ref 0.8–1.5)
DIFFERENTIAL METHOD BLD: ABNORMAL
EOSINOPHIL # BLD: 0 K/UL (ref 0–0.8)
EOSINOPHIL NFR BLD: 0 % (ref 0.5–7.8)
ERYTHROCYTE [DISTWIDTH] IN BLOOD BY AUTOMATED COUNT: 15.3 % (ref 11.9–14.6)
GLOBULIN SER CALC-MCNC: 4.1 G/DL (ref 2.3–3.5)
GLUCOSE SERPL-MCNC: 290 MG/DL (ref 65–100)
HCT VFR BLD AUTO: 35.3 % (ref 41.1–50.3)
HGB BLD-MCNC: 10.7 G/DL (ref 13.6–17.2)
IMM GRANULOCYTES # BLD AUTO: 0 K/UL (ref 0–0.5)
IMM GRANULOCYTES NFR BLD AUTO: 1 % (ref 0–5)
LYMPHOCYTES # BLD: 0.8 K/UL (ref 0.5–4.6)
LYMPHOCYTES NFR BLD: 11 % (ref 13–44)
MCH RBC QN AUTO: 26 PG (ref 26.1–32.9)
MCHC RBC AUTO-ENTMCNC: 30.3 G/DL (ref 31.4–35)
MCV RBC AUTO: 85.9 FL (ref 79.6–97.8)
MONOCYTES # BLD: 0.6 K/UL (ref 0.1–1.3)
MONOCYTES NFR BLD: 8 % (ref 4–12)
NEUTS SEG # BLD: 5.8 K/UL (ref 1.7–8.2)
NEUTS SEG NFR BLD: 81 % (ref 43–78)
NRBC # BLD: 0 K/UL (ref 0–0.2)
PLATELET # BLD AUTO: 147 K/UL (ref 150–450)
PMV BLD AUTO: 12.7 FL (ref 9.4–12.3)
POTASSIUM SERPL-SCNC: 3.9 MMOL/L (ref 3.5–5.1)
PROT SERPL-MCNC: 7.1 G/DL (ref 6.3–8.2)
RBC # BLD AUTO: 4.11 M/UL (ref 4.23–5.6)
SODIUM SERPL-SCNC: 152 MMOL/L (ref 136–145)
WBC # BLD AUTO: 7.3 K/UL (ref 4.3–11.1)

## 2019-05-11 PROCEDURE — 85025 COMPLETE CBC W/AUTO DIFF WBC: CPT

## 2019-05-11 PROCEDURE — 74011250637 HC RX REV CODE- 250/637: Performed by: EMERGENCY MEDICINE

## 2019-05-11 PROCEDURE — 96365 THER/PROPH/DIAG IV INF INIT: CPT | Performed by: EMERGENCY MEDICINE

## 2019-05-11 PROCEDURE — 99284 EMERGENCY DEPT VISIT MOD MDM: CPT | Performed by: EMERGENCY MEDICINE

## 2019-05-11 PROCEDURE — 87040 BLOOD CULTURE FOR BACTERIA: CPT

## 2019-05-11 PROCEDURE — 74011250636 HC RX REV CODE- 250/636: Performed by: EMERGENCY MEDICINE

## 2019-05-11 PROCEDURE — 80053 COMPREHEN METABOLIC PANEL: CPT

## 2019-05-11 RX ORDER — CLINDAMYCIN PHOSPHATE 600 MG/50ML
600 INJECTION INTRAVENOUS
Status: COMPLETED | OUTPATIENT
Start: 2019-05-11 | End: 2019-05-11

## 2019-05-11 RX ORDER — HYDRALAZINE HYDROCHLORIDE 25 MG/1
100 TABLET, FILM COATED ORAL
Status: COMPLETED | OUTPATIENT
Start: 2019-05-11 | End: 2019-05-11

## 2019-05-11 RX ORDER — CLINDAMYCIN HYDROCHLORIDE 150 MG/1
300 CAPSULE ORAL 3 TIMES DAILY
Qty: 42 CAP | Refills: 0 | Status: SHIPPED | OUTPATIENT
Start: 2019-05-11 | End: 2019-05-18

## 2019-05-11 RX ADMIN — HYDRALAZINE HYDROCHLORIDE 100 MG: 25 TABLET, FILM COATED ORAL at 19:20

## 2019-05-11 RX ADMIN — CLINDAMYCIN PHOSPHATE 600 MG: 600 INJECTION, SOLUTION INTRAVENOUS at 19:20

## 2019-05-11 NOTE — ED TRIAGE NOTES
Pt arrived via ems from home with increased weakness, pt unable to get up on his own. Hx of HTN and diabetes 209/94. Pt unable to  his BP meds recently. NSR 78. . Temp 100.0 oral. Pt staets having pain in his right forearm, it is 10/10 painful and warm to touch. Pt alert and oriented x 4. Pt is soaked in urine 500 cc of fluids given en route.

## 2019-05-11 NOTE — ED PROVIDER NOTES
Robles Christianson is a 69 y.o. male who presents to the ED with a chief complaint of right lower arm pain and swelling  It has gradually worsened. It is mainly in the forearm and hand. He states this has happened to him previously as well. No injury to this area, but has had falls recently. Patient has negative xray and negative CT scan. No fevers or chills. Past Medical History:  
Diagnosis Date  Acute renal failure superimposed on stage 3 chronic kidney disease ([address] 9/21/2016  Anemia  Chronic kidney disease   
 not on HD yet- surgery done for access and here for elevation of fistula- FU with [facility]- creat on 12/7/17=3.15  Chronic pancreatitis ([address] 9/22/2016  Dermatophytosis of nail 12/6/2016  Diabetic neuropathy ([address] 9/22/2016  
 avg fastings 200; last A1C-? ; hypo @ 80  Essential hypertension 9/22/2016  GERD (gastroesophageal reflux disease)   
 controlled with med  Hypercholesterolemia  Iron (Fe) deficiency anemia 9/22/2016  Kidney disease  Septicemia due to Klebsiella pneumoniae ([address] 9/22/2016  Systolic CHF, chronic ([address] 9/23/2016  Type II diabetes mellitus with nephropathy ([address] 09/22/2016  Venous insufficiency 12/6/2016  Xerosis cutis 12/6/2016 Past Surgical History:  
Procedure Laterality Date  HX COLONOSCOPY    
 HX HERNIA REPAIR Left 2010  HX PROSTATECTOMY  2012  HX TURP  2012 FOR BPH  VASCULAR SURGERY PROCEDURE UNLIST Left 10/26/2017 AVF Family History:  
Problem Relation Age of Onset  Diabetes Mother  Stroke Mother  Hypertension Mother  No Known Problems Father  Diabetes Sister  Diabetes Brother  Diabetes Sister  Diabetes Sister  Other Sister   
     fibromyalgia Social History Socioeconomic History  Marital status:  Spouse name: Not on file  Number of children: Not on file  Years of education: Not on file  Highest education level: Not on file Occupational History  Not on file Social Needs  Financial resource strain: Not on file  Food insecurity:  
  Worry: Not on file Inability: Not on file  Transportation needs:  
  Medical: Not on file Non-medical: Not on file Tobacco Use  Smoking status: Former Smoker Packs/day: 2.00 Years: 20.00 Pack years: 40.00 Last attempt to quit:  Years since quittin.3  Smokeless tobacco: Current User Substance and Sexual Activity  Alcohol use: No  
 Drug use: Not on file  Sexual activity: Not on file Lifestyle  Physical activity:  
  Days per week: Not on file Minutes per session: Not on file  Stress: Not on file Relationships  Social connections:  
  Talks on phone: Not on file Gets together: Not on file Attends Islam service: Not on file Active member of club or organization: Not on file Attends meetings of clubs or organizations: Not on file Relationship status: Not on file  Intimate partner violence:  
  Fear of current or ex partner: Not on file Emotionally abused: Not on file Physically abused: Not on file Forced sexual activity: Not on file Other Topics Concern  Not on file Social History Narrative  Not on file ALLERGIES: Patient has no known allergies. Review of Systems Constitutional: Negative for chills and fever. Respiratory: Negative for chest tightness, shortness of breath, wheezing and stridor. Cardiovascular: Negative for chest pain and palpitations. Gastrointestinal: Negative for abdominal pain, diarrhea and vomiting. Musculoskeletal: Positive for arthralgias. Negative for gait problem. Skin: Positive for color change and rash. All other systems reviewed and are negative. Vitals:  
 19 1749 19 1750 BP: (!) 211/99 (!) 211/99 Pulse: 83 85 Resp: 16 Temp: 99.8 °F (37.7 °C) SpO2: 95% 95% Weight: 80.3 kg (177 lb) Height: 5' 11\" (1.803 m) Physical Exam  
Constitutional: He is oriented to person, place, and time. He appears well-developed and well-nourished. No distress. HENT:  
Head: Normocephalic and atraumatic. Eyes: Conjunctivae are normal. No scleral icterus. Neck: No tracheal deviation present. Pulmonary/Chest: Effort normal. No respiratory distress. Abdominal: Soft. He exhibits no distension and no mass. There is no tenderness. There is no guarding. Neurological: He is alert and oriented to person, place, and time. Skin: He is not diaphoretic. Redness and swelling to the right forearm. There is an abrasion to the left chest wall from old injury. Psychiatric: He has a normal mood and affect. His behavior is normal.  
Nursing note and vitals reviewed. MDM Number of Diagnoses or Management Options Cellulitis of right upper extremity:  
Diagnosis management comments: Patient cellulitis of the right upper extremity. He does have hypertension as well but states he forgot to take all his medications and I am giving him his home medication and have him follow-up with blood pressure levels. His labs have been baseline for him. He is comfort with outpatient plan and return precautions. Tree Parra MD; 5/11/2019 @10:42 PM Voice dictation software was used during the making of this note. This software is not perfect and grammatical and other typographical errors may be present. This note has not been proofread for errors. 
=================================================================== Amount and/or Complexity of Data Reviewed Clinical lab tests: ordered and reviewed (Results for orders placed or performed during the hospital encounter of 05/11/19 
-CBC WITH AUTOMATED DIFF Result                      Value             Ref Range      WBC                         7.3               4.3 - 11.1 K* 
 RBC                         4.11 (L)          4.23 - 5.6 M* HGB                         10.7 (L)          13.6 - 17.2 * HCT                         35.3 (L)          41.1 - 50.3 % MCV                         85.9              79.6 - 97.8 * MCH                         26.0 (L)          26.1 - 32.9 * MCHC                        30.3 (L)          31.4 - 35.0 * RDW                         15.3 (H)          11.9 - 14.6 % PLATELET                    147 (L)           150 - 450 K/* MPV                         12.7 (H)          9.4 - 12.3 FL ABSOLUTE NRBC               0.00              0.0 - 0.2 K/* DF                          AUTOMATED NEUTROPHILS                 81 (H)            43 - 78 % LYMPHOCYTES                 11 (L)            13 - 44 % MONOCYTES                   8                 4.0 - 12.0 % EOSINOPHILS                 0 (L)             0.5 - 7.8 % BASOPHILS                   0                 0.0 - 2.0 % IMMATURE GRANULOCYTES       1                 0.0 - 5.0 %   
     ABS. NEUTROPHILS            5.8               1.7 - 8.2 K/* ABS. LYMPHOCYTES            0.8               0.5 - 4.6 K/* ABS. MONOCYTES              0.6               0.1 - 1.3 K/* ABS. EOSINOPHILS            0.0               0.0 - 0.8 K/* ABS. BASOPHILS              0.0               0.0 - 0.2 K/* ABS. IMM. GRANS.            0.0               0.0 - 0.5 K/* 
-METABOLIC PANEL, COMPREHENSIVE Result                      Value             Ref Range Sodium                      152 (H)           136 - 145 mm* Potassium                   3.9               3.5 - 5.1 mm* Chloride                    116 (H)           98 - 107 mmo* CO2                         25                21 - 32 mmol*      Anion gap                   11                7 - 16 mmol/L 
 Glucose                     290 (H)           65 - 100 mg/* BUN                         37 (H)            8 - 23 MG/DL Creatinine                  4. 04 (H)          0.8 - 1.5 MG* 
     GFR est AA                  19 (L)            >60 ml/min/1* GFR est non-AA              15 (L)            >60 ml/min/1* Calcium                     7.7 (L)           8.3 - 10.4 M* Bilirubin, total            0.8               0.2 - 1.1 MG* ALT (SGPT)                  17                12 - 65 U/L   
     AST (SGOT)                  20                15 - 37 U/L Alk. phosphatase            74                50 - 136 U/L Protein, total              7.1               6.3 - 8.2 g/* Albumin                     3.0 (L)           3.2 - 4.6 g/* Globulin                    4.1 (H)           2.3 - 3.5 g/* A-G Ratio                   0.7 (L)           1.2 - 3.5     ) Procedures

## 2019-05-12 NOTE — DISCHARGE INSTRUCTIONS

## 2019-05-12 NOTE — ED NOTES
I have reviewed discharge instructions with the patient. The patient verbalized understanding. Patient left ED via Discharge Method: stretcher to Home with [facility]). Opportunity for questions and clarification provided. Patient given 1 scripts. To continue your aftercare when you leave the hospital, you may receive an automated call from our care team to check in on how you are doing. This is a free service and part of our promise to provide the best care and service to meet your aftercare needs.  If you have questions, or wish to unsubscribe from this service please call [phone]. Thank you for Choosing our [address] [place] [address] Emergency Department.

## 2019-05-16 LAB
BACTERIA SPEC CULT: NORMAL
SERVICE CMNT-IMP: NORMAL

## 2019-05-20 ENCOUNTER — HOSPITAL ENCOUNTER (OUTPATIENT)
Dept: INFUSION THERAPY | Age: 79
Discharge: HOME | End: 2019-05-20
Payer: MEDICARE

## 2019-05-20 VITALS
OXYGEN SATURATION: 100 % | DIASTOLIC BLOOD PRESSURE: 46 MMHG | HEART RATE: 46 BPM | TEMPERATURE: 97.5 F | SYSTOLIC BLOOD PRESSURE: 120 MMHG | RESPIRATION RATE: 16 BRPM

## 2019-05-20 LAB
CALCIUM SERPL-MCNC: 7.4 MG/DL (ref 8.3–10.4)
HGB BLD-MCNC: 8.6 G/DL (ref 13.6–17.2)

## 2019-05-20 PROCEDURE — 85018 HEMOGLOBIN: CPT

## 2019-05-20 PROCEDURE — 36415 COLL VENOUS BLD VENIPUNCTURE: CPT

## 2019-05-20 PROCEDURE — 82310 ASSAY OF CALCIUM: CPT

## 2019-05-20 PROCEDURE — 74011250636 HC RX REV CODE- 250/636: Performed by: NURSE PRACTITIONER

## 2019-05-20 PROCEDURE — 96372 THER/PROPH/DIAG INJ SC/IM: CPT

## 2019-05-20 RX ADMIN — EPOETIN ALFA-EPBX 10000 UNITS: 10000 INJECTION, SOLUTION INTRAVENOUS; SUBCUTANEOUS at 15:55

## 2019-05-20 NOTE — PROGRESS NOTES
Arrived to the [facility]. Retacrit injection completed. Patient tolerated well. Any issues or concerns during appointment: none. Patient aware of next infusion appointment on 06.03.2019 (date) at 0 (time). Discharged in wheelchair to lobby to wait for ride to home.

## 2019-06-03 ENCOUNTER — HOSPITAL ENCOUNTER (OUTPATIENT)
Dept: INFUSION THERAPY | Age: 79
Discharge: HOME | End: 2019-06-03
Payer: MEDICARE

## 2019-06-03 LAB
HCT VFR BLD AUTO: 30.2 % (ref 41.1–50.3)
HGB BLD-MCNC: 9.2 G/DL (ref 13.6–17.2)

## 2019-06-03 PROCEDURE — 74011250636 HC RX REV CODE- 250/636: Performed by: NURSE PRACTITIONER

## 2019-06-03 PROCEDURE — 85018 HEMOGLOBIN: CPT

## 2019-06-03 PROCEDURE — 36415 COLL VENOUS BLD VENIPUNCTURE: CPT

## 2019-06-03 PROCEDURE — 96372 THER/PROPH/DIAG INJ SC/IM: CPT

## 2019-06-03 RX ADMIN — EPOETIN ALFA-EPBX 10000 UNITS: 10000 INJECTION, SOLUTION INTRAVENOUS; SUBCUTANEOUS at 15:21

## 2019-06-03 NOTE — PROGRESS NOTES
Arrived to the [facility]. Assessment and retacrit injection completed. Patient tolerated well. Any issues or concerns during appointment: none. Patient aware of next infusion appointment on 06/17/2019 (date) at 1330 (time) with infusion center. Discharged via wheelchair, with family. Patient advised to call Dr. Juaquin Dennison office if he has any problems or concerns. Patient verbalized understanding.

## 2019-06-17 ENCOUNTER — HOSPITAL ENCOUNTER (OUTPATIENT)
Dept: INFUSION THERAPY | Age: 79
Discharge: HOME | End: 2019-06-17
Payer: MEDICARE

## 2019-06-17 VITALS
RESPIRATION RATE: 18 BRPM | TEMPERATURE: 97.6 F | HEART RATE: 64 BPM | SYSTOLIC BLOOD PRESSURE: 146 MMHG | DIASTOLIC BLOOD PRESSURE: 49 MMHG | OXYGEN SATURATION: 100 %

## 2019-06-17 PROCEDURE — 96372 THER/PROPH/DIAG INJ SC/IM: CPT

## 2019-06-17 PROCEDURE — 74011250636 HC RX REV CODE- 250/636: Performed by: NURSE PRACTITIONER

## 2019-06-17 RX ADMIN — EPOETIN ALFA-EPBX 10000 UNITS: 10000 INJECTION, SOLUTION INTRAVENOUS; SUBCUTANEOUS at 15:04

## 2019-06-17 NOTE — PROGRESS NOTES
Arrived to the [facility]. Procrit completed. Patient tolerated well. Any issues or concerns during appointment: None. Patient aware of next infusion appointment on July 1st at 1500. Discharged ambulatory.

## 2019-07-01 ENCOUNTER — HOSPITAL ENCOUNTER (OUTPATIENT)
Dept: INFUSION THERAPY | Age: 79
Discharge: HOME | End: 2019-07-01
Payer: MEDICARE

## 2019-07-01 VITALS
SYSTOLIC BLOOD PRESSURE: 178 MMHG | DIASTOLIC BLOOD PRESSURE: 60 MMHG | TEMPERATURE: 97.8 F | HEART RATE: 55 BPM | OXYGEN SATURATION: 99 % | RESPIRATION RATE: 18 BRPM

## 2019-07-01 LAB
FERRITIN SERPL-MCNC: 561 NG/ML (ref 8–388)
HCT VFR BLD AUTO: 32.5 % (ref 41.1–50.3)
HGB BLD-MCNC: 9.9 G/DL (ref 13.6–17.2)
IRON SATN MFR SERPL: 33 %
IRON SERPL-MCNC: 59 UG/DL (ref 35–150)
TIBC SERPL-MCNC: 178 UG/DL (ref 250–450)

## 2019-07-01 PROCEDURE — 96372 THER/PROPH/DIAG INJ SC/IM: CPT

## 2019-07-01 PROCEDURE — 83540 ASSAY OF IRON: CPT

## 2019-07-01 PROCEDURE — 36415 COLL VENOUS BLD VENIPUNCTURE: CPT

## 2019-07-01 PROCEDURE — 85018 HEMOGLOBIN: CPT

## 2019-07-01 PROCEDURE — 74011250636 HC RX REV CODE- 250/636: Performed by: NURSE PRACTITIONER

## 2019-07-01 PROCEDURE — 82728 ASSAY OF FERRITIN: CPT

## 2019-07-01 RX ADMIN — EPOETIN ALFA-EPBX 10000 UNITS: 10000 INJECTION, SOLUTION INTRAVENOUS; SUBCUTANEOUS at 15:44

## 2019-07-01 NOTE — PROGRESS NOTES
Arrived to the [facility] using a cane . Procrit injection completed. Patient tolerated well. Any issues or concerns during appointment: none. Patient aware of next infusion appointment on 07.15.2019 (date) at 1430 (time). Discharged ambulatory with cane to home.

## 2019-07-12 ENCOUNTER — HOSPITAL ENCOUNTER (EMERGENCY)
Age: 79
Discharge: HOME | End: 2019-07-12
Attending: EMERGENCY MEDICINE
Payer: MEDICARE

## 2019-07-12 ENCOUNTER — APPOINTMENT (OUTPATIENT)
Dept: GENERAL RADIOLOGY | Age: 79
End: 2019-07-12
Attending: EMERGENCY MEDICINE
Payer: MEDICARE

## 2019-07-12 VITALS
BODY MASS INDEX: 24.38 KG/M2 | HEART RATE: 62 BPM | SYSTOLIC BLOOD PRESSURE: 155 MMHG | HEIGHT: 72 IN | WEIGHT: 180 LBS | DIASTOLIC BLOOD PRESSURE: 68 MMHG | TEMPERATURE: 98.2 F | OXYGEN SATURATION: 100 % | RESPIRATION RATE: 18 BRPM

## 2019-07-12 DIAGNOSIS — S20.212A RIB CONTUSION, LEFT, INITIAL ENCOUNTER: Primary | ICD-10-CM

## 2019-07-12 LAB — GLUCOSE BLD STRIP.AUTO-MCNC: 61 MG/DL (ref 65–100)

## 2019-07-12 PROCEDURE — 74011250637 HC RX REV CODE- 250/637: Performed by: PHYSICIAN ASSISTANT

## 2019-07-12 PROCEDURE — 82962 GLUCOSE BLOOD TEST: CPT

## 2019-07-12 PROCEDURE — 99284 EMERGENCY DEPT VISIT MOD MDM: CPT | Performed by: PHYSICIAN ASSISTANT

## 2019-07-12 PROCEDURE — 71046 X-RAY EXAM CHEST 2 VIEWS: CPT

## 2019-07-12 RX ORDER — LIDOCAINE 4 G/100G
PATCH TOPICAL
Qty: 14 PATCH | Refills: 0 | Status: SHIPPED | OUTPATIENT
Start: 2019-07-12

## 2019-07-12 RX ORDER — ACETAMINOPHEN 500 MG
1000 TABLET ORAL
Status: COMPLETED | OUTPATIENT
Start: 2019-07-12 | End: 2019-07-12

## 2019-07-12 RX ADMIN — ACETAMINOPHEN 1000 MG: 500 TABLET ORAL at 18:38

## 2019-07-12 NOTE — ED PROVIDER NOTES
Patient presents to the ER with pain in left lateral side after falling from raised, commode and striking left ribs on handrail just PTA. Patient states he laid there for 20 minutes, but was able to call 911. Pain is worse with movement and deep breaths. Denies hemoptysis, heart palpitations or chest pain other than left lateral ribs. Denies abdominal pain, nausea vomiting. Takes 325 mg aspirin daily. It has kidney disease, not on dialysis. Diabetic. Past Medical History:  
Diagnosis Date  Acute renal failure superimposed on stage 3 chronic kidney disease ([address] 9/21/2016  Anemia  Chronic kidney disease   
 not on HD yet- surgery done for access and here for elevation of fistula- FU with [facility]- creat on 12/7/17=3.15  Chronic pancreatitis ([address] 9/22/2016  Dermatophytosis of nail 12/6/2016  Diabetic neuropathy ([address] 9/22/2016  
 avg fastings 200; last A1C-? ; hypo @ 80  Essential hypertension 9/22/2016  GERD (gastroesophageal reflux disease)   
 controlled with med  Hypercholesterolemia  Iron (Fe) deficiency anemia 9/22/2016  Kidney disease  Septicemia due to Klebsiella pneumoniae ([address] 9/22/2016  Systolic CHF, chronic ([address] 9/23/2016  Type II diabetes mellitus with nephropathy ([address] 09/22/2016  Venous insufficiency 12/6/2016  Xerosis cutis 12/6/2016 Past Surgical History:  
Procedure Laterality Date  HX COLONOSCOPY    
 HX HERNIA REPAIR Left 2010  HX PROSTATECTOMY  2012  HX TURP  2012 FOR BPH  VASCULAR SURGERY PROCEDURE UNLIST Left 10/26/2017 AVF Family History:  
Problem Relation Age of Onset  Diabetes Mother  Stroke Mother  Hypertension Mother  No Known Problems Father  Diabetes Sister  Diabetes Brother  Diabetes Sister  Diabetes Sister  Other Sister   
     fibromyalgia Social History Socioeconomic History  Marital status:   
 Spouse name: Not on file  Number of children: Not on file  Years of education: Not on file  Highest education level: Not on file Occupational History  Not on file Social Needs  Financial resource strain: Not on file  Food insecurity:  
  Worry: Not on file Inability: Not on file  Transportation needs:  
  Medical: Not on file Non-medical: Not on file Tobacco Use  Smoking status: Former Smoker Packs/day: 2.00 Years: 20.00 Pack years: 40.00 Last attempt to quit:  Years since quittin.5  Smokeless tobacco: Current User Substance and Sexual Activity  Alcohol use: No  
 Drug use: Not on file  Sexual activity: Not on file Lifestyle  Physical activity:  
  Days per week: Not on file Minutes per session: Not on file  Stress: Not on file Relationships  Social connections:  
  Talks on phone: Not on file Gets together: Not on file Attends Voodoo service: Not on file Active member of club or organization: Not on file Attends meetings of clubs or organizations: Not on file Relationship status: Not on file  Intimate partner violence:  
  Fear of current or ex partner: Not on file Emotionally abused: Not on file Physically abused: Not on file Forced sexual activity: Not on file Other Topics Concern  Not on file Social History Narrative  Not on file ALLERGIES: Patient has no known allergies. Review of Systems Constitutional: Negative for activity change, diaphoresis and fatigue. Respiratory: Negative for apnea, chest tightness, shortness of breath, wheezing and stridor. Cardiovascular: Positive for chest pain. Negative for palpitations and leg swelling. Gastrointestinal: Negative for abdominal pain, rectal pain and vomiting. Skin: Negative for rash and wound. Vitals:  
 19 1640 BP: 174/65 Pulse: (!) 54 Resp: 16 Temp: 98 °F (36.7 °C) SpO2: 99% Weight: 81.6 kg (180 lb) Height: 6' (1.829 m) Physical Exam  
Constitutional: He appears well-developed and well-nourished. No distress. Elderly gentleman, walker in hand. Wincing in pain occasionally with deep breaths. No stridor, not casting for breath. HENT:  
Head: Normocephalic. Eyes: Pupils are equal, round, and reactive to light. Conjunctivae are normal.  
Neck: Normal range of motion. Pulmonary/Chest: Effort normal and breath sounds normal. No stridor. No tachypnea and no bradypnea. No respiratory distress. He has no wheezes. He has no rales. He exhibits tenderness and bony tenderness. He exhibits no edema, no deformity and no swelling. Right breast exhibits no mass, no nipple discharge and no skin change. Abdominal: Soft. Bowel sounds are normal. He exhibits no distension. There is no tenderness. There is no guarding. Abdomen soft, nontender to palpation. Nursing note and vitals reviewed. MDM Number of Diagnoses or Management Options Rib contusion, left, initial encounter: new and requires workup Diagnosis management comments: Final tests, multiple rib fractures,pneumothorax, hemothorax Amount and/or Complexity of Data Reviewed Tests in the radiology section of CPT®: ordered and reviewed Risk of Complications, Morbidity, and/or Mortality Presenting problems: moderate Diagnostic procedures: moderate Management options: moderate General comments: Patient bradycardic, review of chart shows consistent with previous bradycardia. X-ray negative 
rx lidocaine patches and Tylenol. Return precautions discussed, pulmonary toilet reviewed. Patient to follow up with primary care provider next week. Patient Progress Patient progress: stable Procedures

## 2019-07-12 NOTE — ED TRIAGE NOTES
Pt arrives from home via EMS with c/o fall from elevated toilet seat with left rib and thigh pain. 168/67, 54, 98%RA. Pt reports increased pain on palpation. Pt states the metal hand rail he was leaning on caused the whole seat to tip over and the hand rail on the left side hit him in the ribs. Pt denies SOB, but reports the left rib pain that increases with deep breathing. Pt denies hitting head or LOC

## 2019-07-12 NOTE — DISCHARGE INSTRUCTIONS
Take up to 1000 mg of acetaminophen every 6 hours for pain relief. Continue taking aspirin as directed. Apply lidocaine patch to area of pain as directed. Brace side with pillow and take several deep breaths, 5 times daily. You can also cough to encourage good airflow. This can help prevent pneumonia. Follow-up with family doctor in one week for reevaluation. Return to the ER immediately if you're having trouble breathing, heart palpitations or chest pain that is different than today. Return if other symptoms.

## 2019-07-13 NOTE — ED NOTES
I have reviewed discharge instructions with the patient. The patient verbalized understanding. Patient left ED via Discharge Method: ambulatory to Home with emilia transport. Opportunity for questions and clarification provided. Patient given 0 scripts. To continue your aftercare when you leave the hospital, you may receive an automated call from our care team to check in on how you are doing. This is a free service and part of our promise to provide the best care and service to meet your aftercare needs.  If you have questions, or wish to unsubscribe from this service please call [phone]. Thank you for Choosing our [address] [place] [address] Emergency Department.

## 2019-07-15 ENCOUNTER — HOSPITAL ENCOUNTER (OUTPATIENT)
Dept: INFUSION THERAPY | Age: 79
Discharge: HOME | End: 2019-07-15
Payer: MEDICARE

## 2019-07-15 VITALS
TEMPERATURE: 97.2 F | OXYGEN SATURATION: 100 % | SYSTOLIC BLOOD PRESSURE: 153 MMHG | HEART RATE: 52 BPM | RESPIRATION RATE: 18 BRPM | DIASTOLIC BLOOD PRESSURE: 64 MMHG

## 2019-07-15 LAB
HCT VFR BLD AUTO: 31.2 % (ref 41.1–50.3)
HGB BLD-MCNC: 9.3 G/DL (ref 13.6–17.2)

## 2019-07-15 PROCEDURE — 74011250636 HC RX REV CODE- 250/636: Performed by: NURSE PRACTITIONER

## 2019-07-15 PROCEDURE — 85018 HEMOGLOBIN: CPT

## 2019-07-15 PROCEDURE — 36415 COLL VENOUS BLD VENIPUNCTURE: CPT

## 2019-07-15 PROCEDURE — 96372 THER/PROPH/DIAG INJ SC/IM: CPT

## 2019-07-15 RX ADMIN — EPOETIN ALFA-EPBX 10000 UNITS: 10000 INJECTION, SOLUTION INTRAVENOUS; SUBCUTANEOUS at 15:25

## 2019-07-15 NOTE — PROGRESS NOTES
Pt arrived via wheelchair today at 1430, to receive procrit. Pt tolerated without difficulty. Patient discharged via wheelchair accompanied by caregiver. Instructed to notify physician of any problems, questions or concerns. Allowed opportunity for patient/family to ask questions. Verbalized understanding. Next appointment is July 29 at 1500 with Kamlesh Monae.

## 2019-07-29 ENCOUNTER — HOSPITAL ENCOUNTER (OUTPATIENT)
Dept: INFUSION THERAPY | Age: 79
Discharge: HOME | End: 2019-07-29
Payer: MEDICARE

## 2019-07-29 VITALS
DIASTOLIC BLOOD PRESSURE: 64 MMHG | RESPIRATION RATE: 18 BRPM | TEMPERATURE: 97.4 F | HEART RATE: 58 BPM | SYSTOLIC BLOOD PRESSURE: 158 MMHG

## 2019-07-29 PROCEDURE — 96372 THER/PROPH/DIAG INJ SC/IM: CPT

## 2019-07-29 PROCEDURE — 74011250636 HC RX REV CODE- 250/636: Performed by: INTERNAL MEDICINE

## 2019-07-29 RX ADMIN — EPOETIN ALFA-EPBX 20000 UNITS: 10000 INJECTION, SOLUTION INTRAVENOUS; SUBCUTANEOUS at 15:35

## 2019-07-29 NOTE — PROGRESS NOTES
Arrived to the [facility]. retacrit completed. Patient labs are monthly per orders. Patient tolerated well. Any issues or concerns during appointment: none  Patient aware of next infusion appointment on 8/12/19  Discharged via w/c with caregiver.

## 2019-08-12 ENCOUNTER — HOSPITAL ENCOUNTER (OUTPATIENT)
Dept: INFUSION THERAPY | Age: 79
Discharge: HOME | End: 2019-08-12
Payer: MEDICARE

## 2019-08-12 VITALS
HEART RATE: 53 BPM | RESPIRATION RATE: 18 BRPM | TEMPERATURE: 98.2 F | OXYGEN SATURATION: 98 % | DIASTOLIC BLOOD PRESSURE: 60 MMHG | SYSTOLIC BLOOD PRESSURE: 162 MMHG

## 2019-08-12 LAB
HCT VFR BLD AUTO: 31.6 % (ref 41.1–50.3)
HGB BLD-MCNC: 9.8 G/DL (ref 13.6–17.2)

## 2019-08-12 PROCEDURE — 85018 HEMOGLOBIN: CPT

## 2019-08-12 PROCEDURE — 36415 COLL VENOUS BLD VENIPUNCTURE: CPT

## 2019-08-12 PROCEDURE — 96372 THER/PROPH/DIAG INJ SC/IM: CPT

## 2019-08-12 PROCEDURE — 74011250636 HC RX REV CODE- 250/636: Performed by: NURSE PRACTITIONER

## 2019-08-12 RX ADMIN — EPOETIN ALFA-EPBX 20000 UNITS: 10000 INJECTION, SOLUTION INTRAVENOUS; SUBCUTANEOUS at 14:25

## 2019-08-19 ENCOUNTER — APPOINTMENT (OUTPATIENT)
Dept: ULTRASOUND IMAGING | Age: 79
End: 2019-08-19
Attending: EMERGENCY MEDICINE
Payer: MEDICARE

## 2019-08-19 ENCOUNTER — HOSPITAL ENCOUNTER (EMERGENCY)
Age: 79
Discharge: HOME | End: 2019-08-19
Attending: EMERGENCY MEDICINE
Payer: MEDICARE

## 2019-08-19 VITALS
WEIGHT: 180 LBS | OXYGEN SATURATION: 98 % | HEIGHT: 72 IN | DIASTOLIC BLOOD PRESSURE: 67 MMHG | RESPIRATION RATE: 16 BRPM | SYSTOLIC BLOOD PRESSURE: 177 MMHG | TEMPERATURE: 98.2 F | BODY MASS INDEX: 24.38 KG/M2 | HEART RATE: 56 BPM

## 2019-08-19 DIAGNOSIS — M79.89 SWELLING OF LEFT UPPER EXTREMITY: Primary | ICD-10-CM

## 2019-08-19 LAB
ALBUMIN SERPL-MCNC: 3 G/DL (ref 3.2–4.6)
ALBUMIN/GLOB SERPL: 0.9 {RATIO} (ref 1.2–3.5)
ALP SERPL-CCNC: 75 U/L (ref 50–136)
ALT SERPL-CCNC: 12 U/L (ref 12–65)
ANION GAP SERPL CALC-SCNC: 8 MMOL/L (ref 7–16)
AST SERPL-CCNC: 18 U/L (ref 15–37)
BASOPHILS # BLD: 0 K/UL (ref 0–0.2)
BASOPHILS NFR BLD: 1 % (ref 0–2)
BILIRUB SERPL-MCNC: 0.4 MG/DL (ref 0.2–1.1)
BUN SERPL-MCNC: 29 MG/DL (ref 8–23)
CALCIUM SERPL-MCNC: 8.8 MG/DL (ref 8.3–10.4)
CHLORIDE SERPL-SCNC: 114 MMOL/L (ref 98–107)
CO2 SERPL-SCNC: 26 MMOL/L (ref 21–32)
CREAT SERPL-MCNC: 3.97 MG/DL (ref 0.8–1.5)
DIFFERENTIAL METHOD BLD: ABNORMAL
EOSINOPHIL # BLD: 0.1 K/UL (ref 0–0.8)
EOSINOPHIL NFR BLD: 3 % (ref 0.5–7.8)
ERYTHROCYTE [DISTWIDTH] IN BLOOD BY AUTOMATED COUNT: 18.2 % (ref 11.9–14.6)
GLOBULIN SER CALC-MCNC: 3.2 G/DL (ref 2.3–3.5)
GLUCOSE SERPL-MCNC: 220 MG/DL (ref 65–100)
HCT VFR BLD AUTO: 34.1 % (ref 41.1–50.3)
HGB BLD-MCNC: 10.3 G/DL (ref 13.6–17.2)
IMM GRANULOCYTES # BLD AUTO: 0 K/UL (ref 0–0.5)
IMM GRANULOCYTES NFR BLD AUTO: 0 % (ref 0–5)
LYMPHOCYTES # BLD: 1.6 K/UL (ref 0.5–4.6)
LYMPHOCYTES NFR BLD: 38 % (ref 13–44)
MCH RBC QN AUTO: 25.4 PG (ref 26.1–32.9)
MCHC RBC AUTO-ENTMCNC: 30.2 G/DL (ref 31.4–35)
MCV RBC AUTO: 84.2 FL (ref 79.6–97.8)
MONOCYTES # BLD: 0.5 K/UL (ref 0.1–1.3)
MONOCYTES NFR BLD: 12 % (ref 4–12)
NEUTS SEG # BLD: 1.9 K/UL (ref 1.7–8.2)
NEUTS SEG NFR BLD: 46 % (ref 43–78)
NRBC # BLD: 0 K/UL (ref 0–0.2)
PLATELET # BLD AUTO: 114 K/UL (ref 150–450)
PMV BLD AUTO: 12 FL (ref 9.4–12.3)
POTASSIUM SERPL-SCNC: 4 MMOL/L (ref 3.5–5.1)
PROT SERPL-MCNC: 6.2 G/DL (ref 6.3–8.2)
RBC # BLD AUTO: 4.05 M/UL (ref 4.23–5.6)
SODIUM SERPL-SCNC: 148 MMOL/L (ref 136–145)
WBC # BLD AUTO: 4.1 K/UL (ref 4.3–11.1)

## 2019-08-19 PROCEDURE — 93971 EXTREMITY STUDY: CPT

## 2019-08-19 PROCEDURE — 80053 COMPREHEN METABOLIC PANEL: CPT

## 2019-08-19 PROCEDURE — 85025 COMPLETE CBC W/AUTO DIFF WBC: CPT

## 2019-08-19 PROCEDURE — 99282 EMERGENCY DEPT VISIT SF MDM: CPT | Performed by: EMERGENCY MEDICINE

## 2019-08-19 NOTE — ED PROVIDER NOTES
Antolin Umana. is a 67 y.o. male seen on 8/19/2019 at 5:54 PM in the [facility] EMERGENCY DEPT in room ERD/D. Chief Complaint   Patient presents with    Arm swelling     HPI: 23-year-old male presenting to the emergency department for swelling of his left upper extremity with some associated discomfort. He states that his symptoms began a couple of days ago. His past medical history is notable for chronic kidney disease and a fistula that was placed in his left brachial region, however he states that this was never used that he never ended up going on dialysis. He feels like the area where the fistula was placed has shifted down towards his elbow. Denies any chest pain or shortness of breath. No weakness in extremities no slurred speech. No traumatic injury. Historian: Patient    REVIEW OF SYSTEMS     Review of Systems   Constitutional: Negative for fatigue and fever. HENT: Negative. Eyes: Negative. Respiratory: Negative for cough, chest tightness, shortness of breath and wheezing. Cardiovascular: Negative for chest pain. Gastrointestinal: Negative for abdominal distention, abdominal pain, diarrhea, nausea and vomiting. Genitourinary: Negative for dysuria, flank pain, frequency, genital sores and urgency. Musculoskeletal: Negative. Skin: Negative for rash. Neurological: Negative for dizziness, syncope and headaches. All other systems reviewed and are negative.       PAST MEDICAL HISTORY     Past Medical History:   Diagnosis Date    Acute renal failure superimposed on stage 3 chronic kidney disease ([address] 9/21/2016    Anemia     Chronic kidney disease     not on HD yet- surgery done for access and here for elevation of fistula- FU with [facility]- creat on 12/7/17=3.15    Chronic pancreatitis ([address] 9/22/2016    Dermatophytosis of nail 12/6/2016    Diabetic neuropathy ([address] 9/22/2016    avg fastings 200; last A1C-? ; hypo @ [de-identified]    Essential hypertension 2016    GERD (gastroesophageal reflux disease)     controlled with med    Hypercholesterolemia     Iron (Fe) deficiency anemia 2016    Kidney disease     Septicemia due to Klebsiella pneumoniae ([facility]     Systolic CHF, chronic ([facility] 2016    Type II diabetes mellitus with nephropathy ([facility] 2016    Venous insufficiency 2016    Xerosis cutis 2016     Past Surgical History:   Procedure Laterality Date    HX COLONOSCOPY      HX HERNIA REPAIR Left     HX PROSTATECTOMY      HX TURP      FOR BPH    VASCULAR SURGERY PROCEDURE UNLIST Left 10/26/2017    AVF     Social History     Socioeconomic History    Marital status:      Spouse name: Not on file    Number of children: Not on file    Years of education: Not on file    Highest education level: Not on file   Tobacco Use    Smoking status: Former Smoker     Packs/day: 2.00     Years: 20.00     Pack years: 40.00     Last attempt to quit:      Years since quittin.6    Smokeless tobacco: Current User   Substance and Sexual Activity    Alcohol use: No     Prior to Admission Medications   Prescriptions Last Dose Informant Patient Reported? Taking? CALCITRIOL, BULK,   Yes No   Sig: by Does Not Apply route. HUMALOG KWIKPEN INSULIN 100 unit/mL kwikpen   No No   Si units three times a day with meals plus correction scale, 2 units/50 > 150, max daily dose 25 units   amLODIPine (NORVASC) 10 mg tablet   Yes No   Sig: Take 10 mg by mouth every morning. aspirin (ASPIRIN) 325 mg tablet   No No   Sig: Take 1 Tab by mouth daily. calcifediol (RAYALDEE) 30 mcg Cs24   Yes No   Sig: Take  by mouth nightly. cyclobenzaprine (FLEXERIL) 5 mg tablet   No No   Sig: Take 1 Tab by mouth three (3) times daily (with meals). diphenoxylate-atropine (LOMOTIL) 2.5-0.025 mg per tablet   Yes No   Sig: Take 1 Tab by mouth two (2) times daily as needed for Diarrhea.  Indications: Diarrhea   ferrous sulfate 325 mg (65 mg iron) tablet   Yes No   Sig: Take 325 mg by mouth two (2) times a day. hydrALAZINE (APRESOLINE) 100 mg tablet   No No   Sig: Take 1 Tab by mouth three (3) times daily. insulin glargine (LANTUS) 100 unit/mL injection   No No   Sig: 15 Units by SubCUTAneous route daily. lidocaine 4 % patch   No No   Sig: Cut to appropriate size. Apply to intact skin for 12 hours, remove for 12 hours. metoprolol succinate (TOPROL-XL) 50 mg XL tablet   Yes No   Sig: Take 50 mg by mouth every morning. omeprazole (PRILOSEC) 20 mg capsule   Yes No   Sig: Take 20 mg by mouth every morning. pravastatin (PRAVACHOL) 40 mg tablet   No No   Sig: Take 1 Tab by mouth nightly. pregabalin (LYRICA) 50 mg capsule   No No   Sig: Take 1 Cap by mouth daily. Max Daily Amount: 50 mg.   torsemide (DEMADEX) 20 mg tablet   Yes No   Sig: Take 20 mg by mouth daily. Facility-Administered Medications: None     No Known Allergies     PHYSICAL EXAM       Vitals:    08/19/19 1528   BP: 177/67   Pulse: (!) 56   Resp: 16   Temp: 98.2 °F (36.8 °C)   SpO2: 98%    Vital signs were reviewed. Physical Exam   Constitutional: He is oriented to person, place, and time. He appears well-developed and well-nourished. No distress. HENT:   Head: Normocephalic and atraumatic. Eyes: Pupils are equal, round, and reactive to light. EOM are normal.   Neck: Normal range of motion. Neck supple. Cardiovascular: Normal rate, regular rhythm, normal heart sounds and intact distal pulses. Exam reveals no gallop and no friction rub. No murmur heard. Pulses:       Radial pulses are 2+ on the right side, and 2+ on the left side. Pulmonary/Chest: Effort normal and breath sounds normal. No respiratory distress. He has no wheezes. Abdominal: Soft. Bowel sounds are normal. He exhibits no distension and no mass. There is no tenderness. There is no rebound and no guarding. Musculoskeletal: Normal range of motion.  He exhibits edema (LUE, 1+). He exhibits no tenderness or deformity. Fistula just proximal to L elbow, +thrill, +bruit   Neurological: He is alert and oriented to person, place, and time. No sensory deficit. Coordination normal.   No focal deficits   Skin: Skin is warm. Capillary refill takes less than 2 seconds. No rash noted. He is not diaphoretic. No erythema. Psychiatric: He has a normal mood and affect. His behavior is normal. Thought content normal.   Vitals reviewed. MEDICAL DECISION MAKING     MDM  Number of Diagnoses or Management Options  Swelling of left upper extremity:      Amount and/or Complexity of Data Reviewed  Tests in the radiology section of CPT®: ordered and reviewed    Risk of Complications, Morbidity, and/or Mortality  Presenting problems: high  Diagnostic procedures: high  Management options: high      Procedures    ED Course: US shows no evidence of DVT. There are no findings concerning for cellulitis, abscess, arterial obstruction, septic arthritis. patient does have edema in the left upper extremity. I recommended elevation and follow-up with the surgeon who performed the fistula surgery. I see no evidence of anything acute going on at this time. Disposition:  Dc to home  Diagnosis:  Left arm swelling  ____________________________________________________________________  A portion of this note was generated using voice recognition dictation software. While the note has been reviewed for accuracy, please note certain words and phrases may not be transcribed as intended and some grammatical and/or typographical errors may be present.

## 2019-08-19 NOTE — DISCHARGE INSTRUCTIONS
Ice your left arm for 20 minutes at a time, 4 times a day for the next 2-3 days. Elevate your extremity to help decrease swelling.

## 2019-08-19 NOTE — ED TRIAGE NOTES
Pt reports he had a dialysis catheter placed about 2 years ago in upper left chest that was never fully placed. Pt having L arm swelling and pain with a knot noted.  Pt states he has been having the pain and increased swelling for about a week

## 2019-08-19 NOTE — ED NOTES
I have reviewed discharge instructions with the patient. The patient verbalized understanding. Patient left ED via Discharge Method: wheelchair to Home with son. Opportunity for questions and clarification provided. Patient given 0 scripts. To continue your aftercare when you leave the hospital, you may receive an automated call from our care team to check in on how you are doing. This is a free service and part of our promise to provide the best care and service to meet your aftercare needs.  If you have questions, or wish to unsubscribe from this service please call [phone]. Thank you for Choosing our [address] [place] [address] Emergency Department.

## 2019-08-26 ENCOUNTER — HOSPITAL ENCOUNTER (OUTPATIENT)
Dept: INFUSION THERAPY | Age: 79
Discharge: HOME | End: 2019-08-26
Payer: MEDICARE

## 2019-08-26 VITALS
HEART RATE: 59 BPM | TEMPERATURE: 98.2 F | SYSTOLIC BLOOD PRESSURE: 167 MMHG | DIASTOLIC BLOOD PRESSURE: 66 MMHG | OXYGEN SATURATION: 100 % | RESPIRATION RATE: 18 BRPM

## 2019-08-26 PROCEDURE — 96372 THER/PROPH/DIAG INJ SC/IM: CPT

## 2019-08-26 PROCEDURE — 74011250636 HC RX REV CODE- 250/636: Performed by: NURSE PRACTITIONER

## 2019-08-26 RX ADMIN — EPOETIN ALFA-EPBX 20000 UNITS: 10000 INJECTION, SOLUTION INTRAVENOUS; SUBCUTANEOUS at 15:45

## 2019-08-26 NOTE — PROGRESS NOTES
Arrived to the [facility]. Retacrit completed. Patient tolerated well. Any issues or concerns during appointment: denies  Patient aware of next infusion appointment on 9/9/19 at 4pm.  Discharged via with friend.

## 2019-09-09 ENCOUNTER — HOSPITAL ENCOUNTER (OUTPATIENT)
Dept: INFUSION THERAPY | Age: 79
Discharge: HOME | End: 2019-09-09
Payer: MEDICARE

## 2019-09-09 VITALS
RESPIRATION RATE: 18 BRPM | DIASTOLIC BLOOD PRESSURE: 63 MMHG | HEART RATE: 56 BPM | TEMPERATURE: 97.7 F | OXYGEN SATURATION: 96 % | SYSTOLIC BLOOD PRESSURE: 157 MMHG

## 2019-09-09 LAB
HCT VFR BLD AUTO: 31.3 % (ref 41.1–50.3)
HGB BLD-MCNC: 9.4 G/DL (ref 13.6–17.2)

## 2019-09-09 PROCEDURE — 96372 THER/PROPH/DIAG INJ SC/IM: CPT

## 2019-09-09 PROCEDURE — 74011250636 HC RX REV CODE- 250/636: Performed by: NURSE PRACTITIONER

## 2019-09-09 PROCEDURE — 85018 HEMOGLOBIN: CPT

## 2019-09-09 PROCEDURE — 36415 COLL VENOUS BLD VENIPUNCTURE: CPT

## 2019-09-09 RX ADMIN — EPOETIN ALFA-EPBX 20000 UNITS: 10000 INJECTION, SOLUTION INTRAVENOUS; SUBCUTANEOUS at 16:23

## 2019-09-09 NOTE — PROGRESS NOTES
Arrived to the [facility]. Retacrit injection completed. Patient tolerated well. Any issues or concerns during appointment: none. Patient aware of next infusion appointment on 09.23.2019 (date) at [phone] (time). Discharged in wheelchair pushed to lobby to be taken home by daughter.

## 2019-09-11 ENCOUNTER — HOSPITAL ENCOUNTER (OUTPATIENT)
Dept: SURGERY | Age: 79
Discharge: HOME | End: 2019-09-11
Payer: MEDICARE

## 2019-09-11 VITALS
OXYGEN SATURATION: 96 % | BODY MASS INDEX: 24.79 KG/M2 | WEIGHT: 183 LBS | SYSTOLIC BLOOD PRESSURE: 187 MMHG | RESPIRATION RATE: 16 BRPM | DIASTOLIC BLOOD PRESSURE: 71 MMHG | HEIGHT: 72 IN | TEMPERATURE: 97.5 F | HEART RATE: 60 BPM

## 2019-09-11 LAB
ALBUMIN SERPL-MCNC: 3 G/DL (ref 3.2–4.6)
ALBUMIN/GLOB SERPL: 0.8 {RATIO} (ref 1.2–3.5)
ALP SERPL-CCNC: 69 U/L (ref 50–136)
ALT SERPL-CCNC: 20 U/L (ref 12–65)
ANION GAP SERPL CALC-SCNC: 7 MMOL/L (ref 7–16)
AST SERPL-CCNC: 20 U/L (ref 15–37)
BILIRUB SERPL-MCNC: 0.4 MG/DL (ref 0.2–1.1)
BUN SERPL-MCNC: 33 MG/DL (ref 8–23)
CALCIUM SERPL-MCNC: 9 MG/DL (ref 8.3–10.4)
CHLORIDE SERPL-SCNC: 114 MMOL/L (ref 98–107)
CO2 SERPL-SCNC: 26 MMOL/L (ref 21–32)
CREAT SERPL-MCNC: 4.42 MG/DL (ref 0.8–1.5)
ERYTHROCYTE [DISTWIDTH] IN BLOOD BY AUTOMATED COUNT: 16.8 % (ref 11.9–14.6)
GLOBULIN SER CALC-MCNC: 3.6 G/DL (ref 2.3–3.5)
GLUCOSE BLD STRIP.AUTO-MCNC: 187 MG/DL (ref 65–100)
GLUCOSE SERPL-MCNC: 204 MG/DL (ref 65–100)
HCT VFR BLD AUTO: 38.1 % (ref 41.1–50.3)
HGB BLD-MCNC: 11.6 G/DL (ref 13.6–17.2)
MCH RBC QN AUTO: 25.4 PG (ref 26.1–32.9)
MCHC RBC AUTO-ENTMCNC: 30.4 G/DL (ref 31.4–35)
MCV RBC AUTO: 83.6 FL (ref 79.6–97.8)
NRBC # BLD: 0 K/UL (ref 0–0.2)
PLATELET # BLD AUTO: 105 K/UL (ref 150–450)
PMV BLD AUTO: 12.6 FL (ref 9.4–12.3)
POTASSIUM SERPL-SCNC: 4.4 MMOL/L (ref 3.5–5.1)
PROT SERPL-MCNC: 6.6 G/DL (ref 6.3–8.2)
RBC # BLD AUTO: 4.56 M/UL (ref 4.23–5.6)
SODIUM SERPL-SCNC: 147 MMOL/L (ref 136–145)
WBC # BLD AUTO: 4.2 K/UL (ref 4.3–11.1)

## 2019-09-11 PROCEDURE — 82962 GLUCOSE BLOOD TEST: CPT

## 2019-09-11 PROCEDURE — 80053 COMPREHEN METABOLIC PANEL: CPT

## 2019-09-11 PROCEDURE — 85027 COMPLETE CBC AUTOMATED: CPT

## 2019-09-11 RX ORDER — CLONIDINE 0.1 MG/24H
1 PATCH, EXTENDED RELEASE TRANSDERMAL
COMMUNITY
End: 2020-01-01

## 2019-09-11 RX ORDER — MONTELUKAST SODIUM 4 MG/1
1 TABLET, CHEWABLE ORAL 2 TIMES DAILY
COMMUNITY

## 2019-09-11 RX ORDER — SODIUM BICARBONATE 650 MG/1
650 TABLET ORAL 3 TIMES DAILY
COMMUNITY

## 2019-09-11 RX ORDER — TRAMADOL HYDROCHLORIDE 50 MG/1
50 TABLET ORAL
COMMUNITY
End: 2020-01-01 | Stop reason: CLARIF

## 2019-09-11 NOTE — PERIOP NOTES
Patient confirms name and . Order to obtain consent  found in EHR and  matches case posting. Type 2 surgery, PAT Walk-in assessment complete. Labs per surgeon: CBC, BMP (changed to CMP per anesthesia protocol for chronic pancreatitis)  Labs per anesthesia protocol: [facility]  EK2018 WDL  Glucose: 187  Rx bottles visualized today. Hibiclens and instructions given per hospital policy. Patient provided with and instructed on educational handouts including Guide to Surgery, Pain Management, Hand Hygiene, Blood Transfusion Education, and [place] [facility] Brochure. Patient answered medical/surgical history questions at their best of ability. All prior to admission medications documented in [facility]. Patient instructed to continue previous medications as prescribed prior to surgery and to take the following medications the day of surgery according to anesthesia guidelines with a small sip of water:  Called Vascular office to verify if patient is to continue full strength aspirin. Per Charissa Mcmillan, patient to continue nightly full strength aspirin. Morning of procedure take: Norvasc, clonidine patch (inform pre-op nurse of patch location), Toprol, Prilosec, Lyrica. Take tramadol if needed. Patient states he only takes night dose of 15 units of Lantus occasionally. Instructed to take no more than 80% of usual dose if takes evening before procedure. Adjusted dose evening before procedure to be no more than 12 units. Anesthesia hypoglycemia protocol instruction with pre-op phone number provided to patient. Patient instructed to hold all vitamins 7 days prior to surgery and NSAIDS 5 days prior to surgery, patient verbalized understanding. Medications to be held: none    Patient verbalizes understanding the following medications should not be taken morning of surgery: may continue renal vitamins but do not take morning of procedure.  Do not use lidocaine patch or take torsemide or humalog morning of procedure and that failure to follow these instructions may result in surgery being cancelled/ rescheduled. Patient instructed on the following:  Arrive at [facility], time of arrival to be called the day before by 1700  NPO after midnight including gum, mints, and ice chips. Responsible adult must drive patient to the hospital, stay during surgery, and patient will require supervision 24 hours after anesthesia. Use antibacterial soap and hibiclense in shower the night before surgery and on the morning of surgery. Leave all valuables (money and jewelry) at home but bring insurance card and ID on       DOS. Do not wear make-up, nail polish, lotions, cologne, perfumes, powders, or oil on skin. Patient teach back successful and patient demonstrates knowledge of instruction.

## 2019-09-11 NOTE — PERIOP NOTES
Recent Results (from the past 12 hour(s))   GLUCOSE, POC    Collection Time: 09/11/19 11:48 AM   Result Value Ref Range    Glucose (POC) 187 (H) 65 - 100 mg/dL   CBC W/O DIFF    Collection Time: 09/11/19 11:49 AM   Result Value Ref Range    WBC 4.2 (L) 4.3 - 11.1 K/uL    RBC 4.56 4.23 - 5.6 M/uL    HGB 11.6 (L) 13.6 - 17.2 g/dL    HCT 38.1 (L) 41.1 - 50.3 %    MCV 83.6 79.6 - 97.8 FL    MCH 25.4 (L) 26.1 - 32.9 PG    MCHC 30.4 (L) 31.4 - 35.0 g/dL    RDW 16.8 (H) 11.9 - 14.6 %    PLATELET 838 (L) 743 - 450 K/uL    MPV 12.6 (H) 9.4 - 12.3 FL    ABSOLUTE NRBC 0.00 0.0 - 0.2 K/uL   METABOLIC PANEL, COMPREHENSIVE    Collection Time: 09/11/19 11:49 AM   Result Value Ref Range    Sodium 147 (H) 136 - 145 mmol/L    Potassium 4.4 3.5 - 5.1 mmol/L    Chloride 114 (H) 98 - 107 mmol/L    CO2 26 21 - 32 mmol/L    Anion gap 7 7 - 16 mmol/L    Glucose 204 (H) 65 - 100 mg/dL    BUN 33 (H) 8 - 23 MG/DL    Creatinine 4.42 (H) 0.8 - 1.5 MG/DL    GFR est AA 17 (L) >60 ml/min/1.73m2    GFR est non-AA 14 (L) >60 ml/min/1.73m2    Calcium 9.0 8.3 - 10.4 MG/DL    Bilirubin, total 0.4 0.2 - 1.1 MG/DL    ALT (SGPT) 20 12 - 65 U/L    AST (SGOT) 20 15 - 37 U/L    Alk. phosphatase 69 50 - 136 U/L    Protein, total 6.6 6.3 - 8.2 g/dL    Albumin 3.0 (L) 3.2 - 4.6 g/dL    Globulin 3.6 (H) 2.3 - 3.5 g/dL    A-G Ratio 0.8 (L) 1.2 - 3.5       Labs reviewed. Chart flagged for anesthesia to review lab results. Forwarded to surgeon.

## 2019-09-17 ENCOUNTER — ANESTHESIA EVENT (OUTPATIENT)
Dept: SURGERY | Age: 79
End: 2019-09-17
Payer: MEDICARE

## 2019-09-18 ENCOUNTER — ANESTHESIA (OUTPATIENT)
Dept: SURGERY | Age: 79
End: 2019-09-18
Payer: MEDICARE

## 2019-09-18 ENCOUNTER — HOSPITAL ENCOUNTER (OUTPATIENT)
Age: 79
Setting detail: OUTPATIENT SURGERY
Discharge: HOME | End: 2019-09-18
Attending: SURGERY | Admitting: SURGERY
Payer: MEDICARE

## 2019-09-18 VITALS
RESPIRATION RATE: 14 BRPM | BODY MASS INDEX: 24.97 KG/M2 | HEART RATE: 50 BPM | OXYGEN SATURATION: 93 % | SYSTOLIC BLOOD PRESSURE: 203 MMHG | HEIGHT: 72 IN | WEIGHT: 184.38 LBS | TEMPERATURE: 97.4 F | DIASTOLIC BLOOD PRESSURE: 91 MMHG

## 2019-09-18 LAB
ANION GAP SERPL CALC-SCNC: 6 MMOL/L (ref 7–16)
BUN SERPL-MCNC: 30 MG/DL (ref 8–23)
CALCIUM SERPL-MCNC: 8.1 MG/DL (ref 8.3–10.4)
CHLORIDE SERPL-SCNC: 117 MMOL/L (ref 98–107)
CO2 SERPL-SCNC: 27 MMOL/L (ref 21–32)
CREAT SERPL-MCNC: 4.26 MG/DL (ref 0.8–1.5)
GLUCOSE BLD STRIP.AUTO-MCNC: 159 MG/DL (ref 65–100)
GLUCOSE SERPL-MCNC: 174 MG/DL (ref 65–100)
POTASSIUM SERPL-SCNC: 4 MMOL/L (ref 3.5–5.1)
SODIUM SERPL-SCNC: 150 MMOL/L (ref 136–145)

## 2019-09-18 PROCEDURE — 76060000032 HC ANESTHESIA 0.5 TO 1 HR: Performed by: SURGERY

## 2019-09-18 PROCEDURE — 77030002996 HC SUT SLK J&J -A: Performed by: SURGERY

## 2019-09-18 PROCEDURE — 76210000006 HC OR PH I REC 0.5 TO 1 HR: Performed by: SURGERY

## 2019-09-18 PROCEDURE — 76010000160 HC OR TIME 0.5 TO 1 HR INTENSV-TIER 1: Performed by: SURGERY

## 2019-09-18 PROCEDURE — 74011250636 HC RX REV CODE- 250/636: Performed by: SURGERY

## 2019-09-18 PROCEDURE — 77030002987 HC SUT PROL J&J -B: Performed by: SURGERY

## 2019-09-18 PROCEDURE — 77030010509 HC AIRWY LMA MSK TELE -A: Performed by: ANESTHESIOLOGY

## 2019-09-18 PROCEDURE — 74011250636 HC RX REV CODE- 250/636: Performed by: ANESTHESIOLOGY

## 2019-09-18 PROCEDURE — 77030002933 HC SUT MCRYL J&J -A: Performed by: SURGERY

## 2019-09-18 PROCEDURE — 74011000250 HC RX REV CODE- 250

## 2019-09-18 PROCEDURE — 82962 GLUCOSE BLOOD TEST: CPT

## 2019-09-18 PROCEDURE — 77030002986 HC SUT PROL J&J -A: Performed by: SURGERY

## 2019-09-18 PROCEDURE — 74011250636 HC RX REV CODE- 250/636

## 2019-09-18 PROCEDURE — 76210000021 HC REC RM PH II 0.5 TO 1 HR: Performed by: SURGERY

## 2019-09-18 PROCEDURE — 77030031139 HC SUT VCRL2 J&J -A: Performed by: SURGERY

## 2019-09-18 PROCEDURE — 77030010512 HC APPL CLP LIG J&J -C: Performed by: SURGERY

## 2019-09-18 PROCEDURE — 80048 BASIC METABOLIC PNL TOTAL CA: CPT

## 2019-09-18 PROCEDURE — 74011250637 HC RX REV CODE- 250/637: Performed by: ANESTHESIOLOGY

## 2019-09-18 RX ORDER — HYDROCODONE BITARTRATE AND ACETAMINOPHEN 5; 325 MG/1; MG/1
2 TABLET ORAL AS NEEDED
Status: DISCONTINUED | OUTPATIENT
Start: 2019-09-18 | End: 2019-09-18 | Stop reason: HOSPADM

## 2019-09-18 RX ORDER — PROPOFOL 10 MG/ML
INJECTION, EMULSION INTRAVENOUS AS NEEDED
Status: DISCONTINUED | OUTPATIENT
Start: 2019-09-18 | End: 2019-09-18 | Stop reason: HOSPADM

## 2019-09-18 RX ORDER — SODIUM CHLORIDE 0.9 % (FLUSH) 0.9 %
5-40 SYRINGE (ML) INJECTION AS NEEDED
Status: DISCONTINUED | OUTPATIENT
Start: 2019-09-18 | End: 2019-09-18 | Stop reason: HOSPADM

## 2019-09-18 RX ORDER — FENTANYL CITRATE 50 UG/ML
INJECTION, SOLUTION INTRAMUSCULAR; INTRAVENOUS AS NEEDED
Status: DISCONTINUED | OUTPATIENT
Start: 2019-09-18 | End: 2019-09-18

## 2019-09-18 RX ORDER — SODIUM CHLORIDE, SODIUM LACTATE, POTASSIUM CHLORIDE, CALCIUM CHLORIDE 600; 310; 30; 20 MG/100ML; MG/100ML; MG/100ML; MG/100ML
75 INJECTION, SOLUTION INTRAVENOUS CONTINUOUS
Status: DISCONTINUED | OUTPATIENT
Start: 2019-09-18 | End: 2019-09-18 | Stop reason: HOSPADM

## 2019-09-18 RX ORDER — SODIUM CHLORIDE 0.9 % (FLUSH) 0.9 %
5-40 SYRINGE (ML) INJECTION EVERY 8 HOURS
Status: DISCONTINUED | OUTPATIENT
Start: 2019-09-18 | End: 2019-09-18 | Stop reason: HOSPADM

## 2019-09-18 RX ORDER — LIDOCAINE HYDROCHLORIDE 20 MG/ML
INJECTION, SOLUTION EPIDURAL; INFILTRATION; INTRACAUDAL; PERINEURAL AS NEEDED
Status: DISCONTINUED | OUTPATIENT
Start: 2019-09-18 | End: 2019-09-18 | Stop reason: HOSPADM

## 2019-09-18 RX ORDER — HYDROMORPHONE HYDROCHLORIDE 2 MG/ML
0.5 INJECTION, SOLUTION INTRAMUSCULAR; INTRAVENOUS; SUBCUTANEOUS
Status: DISCONTINUED | OUTPATIENT
Start: 2019-09-18 | End: 2019-09-18 | Stop reason: HOSPADM

## 2019-09-18 RX ORDER — SODIUM CHLORIDE 9 MG/ML
50 INJECTION, SOLUTION INTRAVENOUS CONTINUOUS
Status: DISCONTINUED | OUTPATIENT
Start: 2019-09-18 | End: 2019-09-18 | Stop reason: HOSPADM

## 2019-09-18 RX ORDER — CEFAZOLIN SODIUM/WATER 2 G/20 ML
2 SYRINGE (ML) INTRAVENOUS ONCE
Status: COMPLETED | OUTPATIENT
Start: 2019-09-18 | End: 2019-09-18

## 2019-09-18 RX ORDER — OXYCODONE HYDROCHLORIDE 5 MG/1
5 TABLET ORAL
Status: COMPLETED | OUTPATIENT
Start: 2019-09-18 | End: 2019-09-18

## 2019-09-18 RX ORDER — LIDOCAINE HYDROCHLORIDE 10 MG/ML
0.1 INJECTION INFILTRATION; PERINEURAL AS NEEDED
Status: DISCONTINUED | OUTPATIENT
Start: 2019-09-18 | End: 2019-09-18 | Stop reason: HOSPADM

## 2019-09-18 RX ORDER — FENTANYL CITRATE 50 UG/ML
100 INJECTION, SOLUTION INTRAMUSCULAR; INTRAVENOUS ONCE
Status: COMPLETED | OUTPATIENT
Start: 2019-09-18 | End: 2019-09-18

## 2019-09-18 RX ORDER — MIDAZOLAM HYDROCHLORIDE 1 MG/ML
2 INJECTION, SOLUTION INTRAMUSCULAR; INTRAVENOUS
Status: DISCONTINUED | OUTPATIENT
Start: 2019-09-18 | End: 2019-09-18 | Stop reason: HOSPADM

## 2019-09-18 RX ADMIN — LIDOCAINE HYDROCHLORIDE 60 MG: 20 INJECTION, SOLUTION EPIDURAL; INFILTRATION; INTRACAUDAL; PERINEURAL at 12:34

## 2019-09-18 RX ADMIN — Medication 2 G: at 12:39

## 2019-09-18 RX ADMIN — PROPOFOL 180 MG: 10 INJECTION, EMULSION INTRAVENOUS at 12:34

## 2019-09-18 RX ADMIN — FENTANYL CITRATE 50 MCG: 50 INJECTION, SOLUTION INTRAMUSCULAR; INTRAVENOUS at 12:34

## 2019-09-18 RX ADMIN — SODIUM CHLORIDE 50 ML/HR: 900 INJECTION, SOLUTION INTRAVENOUS at 11:08

## 2019-09-18 RX ADMIN — OXYCODONE HYDROCHLORIDE 5 MG: 5 TABLET ORAL at 14:19

## 2019-09-18 NOTE — DISCHARGE INSTRUCTIONS
INSTRUCTIONS FOR A-V FISTULA, GRAFT ACCESS, REVISION OR DECLOT    ACTIVITY  · As tolerated and as directed by your doctor. · Keep arm straight and raised above heart level for the next 24 hours. DIET  · Clear liquids until no nausea or vomiting; then light diet for the first day. · Advance to regular diet on second day, unless your doctor orders otherwise. · If nausea and vomiting continues, call your doctor. PAIN  · Take pain medication as directed by your doctor. · Call your doctor if pain is NOT relieved by the medication. · DO NOT take aspirin or blood thinners until directed by your doctor. DRESSING CARE  · Keep your dressing clean and dry. · Leave the dressing on until the dialysis nurse or your doctor takes it off. CARE OF YOUR ACCESS  DO:  · Keep access area clean with soap and water daily after dressing has been removed. · Feel for the thrill daily. (by placing your fingers over the graft you will be able to feel a vibration (thrill) which means blood is flowing and the graft is working.)  DO NOT:  · Wear tight sleeves, watches, belts or bracelets over graft. · Carry heavy bags across the graft. · Sleep on graft side. · Let your blood pressure be taken on graft side. · Let blood be drawn from your graft side. CALL YOUR DOCTOR IF  · Excessive bleeding that does not stop after holding mild pressure over area. · Temperature of 101 degrees F or above. · Redness, excessive swelling or bruising, and/or green or yellow, smelly discharge from incision   · Loss of sensation-cold, white, or blue fingers or toes. AFTER ANESTHESIA  · For the first 24 hours: DO NOT Drive, Drink alcoholic beverages, or Make important decisions. · Be aware of dizziness following anesthesia and while taking pain medication.      After general anesthesia or intravenous sedation, for 24 hours or while taking prescription Narcotics:  · Limit your activities  · A responsible adult needs to be with you for the next 24 hours  · Do not drive and operate hazardous machinery  · Do not make important personal or business decisions  · Do  not drink alcoholic beverages  · If you have not urinated within 8 hours after discharge, please contact your surgeon on call. · If you have sleep apnea and have a CPAP machine, please use it for all naps and sleeping. · Please use caution when taking narcotics and any of your home medications that may cause drowsiness. *  Please give a list of your current medications to your Primary Care Provider. *  Please update this list whenever your medications are discontinued, doses are      changed, or new medications (including over-the-counter products) are added. *  Please carry medication information at all times in case of emergency situations. These are general instructions for a healthy lifestyle:  No smoking/ No tobacco products/ Avoid exposure to second hand smoke  Surgeon General's Warning:  Quitting smoking now greatly reduces serious risk to your health. Obesity, smoking, and sedentary lifestyle greatly increases your risk for illness  A healthy diet, regular physical exercise & weight monitoring are important for maintaining a healthy lifestyle  You may be retaining fluid if you have a history of heart failure or if you experience any of the following symptoms:  Weight gain of 3 pounds or more overnight or 5 pounds in a week, increased swelling in our hands or feet or shortness of breath while lying flat in bed. Please call your doctor as soon as you notice any of these symptoms; do not wait until your next office visit.

## 2019-09-18 NOTE — BRIEF OP NOTE
[place] [address] [address], [address] [facility]. [address] [place] [address] FAX: [phone] Brief Op Note Template Note Pre-Op Diagnosis: End stage renal disease ([facility] [N18.6] Post-Op Diagnosis:  End stage renal disease ([facility] [address] [N18.6] Procedures: Procedure(s): LIGATION OF BRACHIAL BASILIAC ARTERIO VENOUS FISTULA Surgeon: Eleno Shelton MD 
 
Assistants: Surgeon(s): Deonna Hutchinson MD   
 
Anesthesia:  General  
 
Findings: Ligation of left brachycephalic fistula Tourniquet Time:  * No tourniquets in log * Estimated Blood Loss:       
      
Specimens:  
        
Implants:  * No implants in log * Complications: None Signed: Eleno Shelton MD 
   
Elements of this note have been dictated using speech recognition software. As a result, errors of speech recognition may have occurred.

## 2019-09-18 NOTE — ANESTHESIA PREPROCEDURE EVALUATION
Anesthetic History No history of anesthetic complications Review of Systems / Medical History Patient summary reviewed and pertinent labs reviewed Pulmonary COPD: mild Shortness of breath and smoker (40 pk years) Neuro/Psych Within defined limits Cardiovascular Hypertension: well controlled Exercise tolerance: >4 METS Comments: Echo (7/9346) - Systolic function was normal. Ejection fraction 60 % to 65 %. GI/Hepatic/Renal 
  
GERD: well controlled Renal disease (Stage 3): CRI Endo/Other Diabetes: well controlled, type 2, using insulin Other Findings Comments: Diabetic neuropathy Venous insufficiency Physical Exam 
 
Airway Mallampati: II 
TM Distance: 4 - 6 cm Neck ROM: normal range of motion Mouth opening: Normal 
 
 Cardiovascular Rhythm: regular Rate: normal 
 
 
 
 Dental 
 
Dentition: Edentulous Pulmonary Breath sounds clear to auscultation Abdominal 
GI exam deferred Other Findings Anesthetic Plan ASA: 3 Anesthesia type: general 
 
 
 
 
Induction: Intravenous Anesthetic plan and risks discussed with: Patient and Family

## 2019-09-18 NOTE — ANESTHESIA POSTPROCEDURE EVALUATION
Procedure(s): LIGATION OF BRACHIAL BASILIAC ARTERIO VENOUS FISTULA. 
 
general 
 
Anesthesia Post Evaluation Multimodal analgesia: multimodal analgesia used between 6 hours prior to anesthesia start to PACU discharge Patient location during evaluation: bedside Patient participation: complete - patient participated Level of consciousness: awake and alert Pain score: 3 Pain management: adequate Airway patency: patent Anesthetic complications: no 
Cardiovascular status: acceptable and hemodynamically stable Respiratory status: acceptable Hydration status: acceptable Post anesthesia nausea and vomiting:  none Vitals Value Taken Time /90 9/18/2019  1:55 PM  
Temp 36.3 °C (97.4 °F) 9/18/2019  1:26 PM  
Pulse 48 9/18/2019  1:59 PM  
Resp 18 9/18/2019  1:26 PM  
SpO2 92 % 9/18/2019  1:55 PM  
Vitals shown include unvalidated device data.

## 2019-09-19 NOTE — OP NOTES
[address] [facility] 
OPERATIVE REPORT Name:  Eve Loya 
MR#:  [id number] :  1940 ACCOUNT #:  [[id number]] DATE OF SERVICE:  2019 CLINICAL SERVICE:  Vascular Surgery. PREOPERATIVE DIAGNOSIS:  Central venous stenosis status post fistula placement. POSTOPERATIVE DIAGNOSIS:  Central venous stenosis status post fistula placement. PROCEDURE PERFORMED:  Ligation of left brachiobasilic fistula. SURGEON:  Dejan Lewis. Max Angel MD 
 
ASSISTANT:   
 
ANESTHESIA:  General. 
 
COMPLICATIONS:  None. SPECIMENS REMOVED:  None. IMPLANTS:   
 
ESTIMATED BLOOD LOSS:   
 
INDICATIONS FOR PROCEDURE:  This is a 27-year-old male with history of stage IV kidney disease who is status post placement of left first brachiobasilic fistula. He presented with worsening and swelling pain in the arm. He was found to have outflow stenosis; however, the patient was adamant that he did not want to be on dialysis anymore even if that being his death. Secondary to that, we elected to proceed to ligate it. PROCEDURE IN DETAIL:  After getting informed consent, the patient was brought to the operating room. Anesthesia was then induced. Preop antibiotics were given before skin incision. The patient's left arm was then prepped and draped in normal sterile fashion. Incisions were made just above the old previous incision over the brachiobasilic fistula. We dissected down to the subcutaneous tissue. There was a lot of seroma cavity, which we had to evacuate with Metzenbaum scissors. We then evaluated the fistula, was seemed to be about 6-7 mm and dilated. We got proximal and distal control with multiple 3-0 double ties, which we triple ligated. We also then between the two ties did a figure-of-eight 3-0 Prolene, ligated functionally. It was occluded distally with Doppler distal to the fistula.   The patient had a dopplerable and palpable radial pulse. We then irrigated the wound and closed the wound with 3-0 Vicryls and 4-0 Monocryl. The patient was extubated and returned to the PACU in stable condition. MD NAYE Donaldson/[email or web address]_BLAYNE/[email or web address]_P 
D:  09/18/2019 13:21 T:  09/18/2019 20:46 JOB #:  [id number]

## 2019-09-23 ENCOUNTER — APPOINTMENT (OUTPATIENT)
Dept: INFUSION THERAPY | Age: 79
End: 2019-09-23
Payer: MEDICARE

## 2019-09-23 ENCOUNTER — HOSPITAL ENCOUNTER (OUTPATIENT)
Dept: INFUSION THERAPY | Age: 79
Discharge: HOME | End: 2019-09-23
Payer: MEDICARE

## 2019-09-23 VITALS
HEART RATE: 47 BPM | RESPIRATION RATE: 20 BRPM | TEMPERATURE: 97.7 F | SYSTOLIC BLOOD PRESSURE: 153 MMHG | DIASTOLIC BLOOD PRESSURE: 61 MMHG | OXYGEN SATURATION: 98 %

## 2019-09-23 PROCEDURE — 74011250636 HC RX REV CODE- 250/636: Performed by: NURSE PRACTITIONER

## 2019-09-23 PROCEDURE — 96372 THER/PROPH/DIAG INJ SC/IM: CPT

## 2019-09-23 RX ADMIN — EPOETIN ALFA-EPBX 20000 UNITS: 10000 INJECTION, SOLUTION INTRAVENOUS; SUBCUTANEOUS at 14:55

## 2019-09-27 ENCOUNTER — HOSPITAL ENCOUNTER (OUTPATIENT)
Dept: GENERAL RADIOLOGY | Age: 79
Discharge: HOME | End: 2019-09-27
Payer: MEDICARE

## 2019-09-27 DIAGNOSIS — N18.6 ESRD (END STAGE RENAL DISEASE) ON DIALYSIS (HCC): ICD-10-CM

## 2019-09-27 DIAGNOSIS — Z99.2 ESRD (END STAGE RENAL DISEASE) ON DIALYSIS (HCC): ICD-10-CM

## 2019-09-27 PROCEDURE — 73030 X-RAY EXAM OF SHOULDER: CPT

## 2019-10-07 ENCOUNTER — APPOINTMENT (OUTPATIENT)
Dept: INFUSION THERAPY | Age: 79
End: 2019-10-07
Payer: MEDICARE

## 2019-10-07 ENCOUNTER — HOSPITAL ENCOUNTER (OUTPATIENT)
Dept: INFUSION THERAPY | Age: 79
Discharge: HOME | End: 2019-10-07
Payer: MEDICARE

## 2019-10-07 VITALS
OXYGEN SATURATION: 100 % | DIASTOLIC BLOOD PRESSURE: 66 MMHG | TEMPERATURE: 97.7 F | SYSTOLIC BLOOD PRESSURE: 183 MMHG | HEART RATE: 62 BPM | RESPIRATION RATE: 18 BRPM

## 2019-10-07 LAB
FERRITIN SERPL-MCNC: 457 NG/ML (ref 8–388)
HCT VFR BLD AUTO: 38.9 % (ref 41.1–50.3)
HGB BLD-MCNC: 11.6 G/DL (ref 13.6–17.2)
IRON SATN MFR SERPL: 36 %
IRON SERPL-MCNC: 47 UG/DL (ref 35–150)
TIBC SERPL-MCNC: 132 UG/DL (ref 250–450)

## 2019-10-07 PROCEDURE — 83540 ASSAY OF IRON: CPT

## 2019-10-07 PROCEDURE — 96372 THER/PROPH/DIAG INJ SC/IM: CPT

## 2019-10-07 PROCEDURE — 74011250636 HC RX REV CODE- 250/636: Performed by: NURSE PRACTITIONER

## 2019-10-07 PROCEDURE — 82728 ASSAY OF FERRITIN: CPT

## 2019-10-07 PROCEDURE — 85018 HEMOGLOBIN: CPT

## 2019-10-07 PROCEDURE — 36415 COLL VENOUS BLD VENIPUNCTURE: CPT

## 2019-10-07 RX ADMIN — EPOETIN ALFA-EPBX 20000 UNITS: 10000 INJECTION, SOLUTION INTRAVENOUS; SUBCUTANEOUS at 15:26

## 2019-10-12 NOTE — PROGRESS NOTES
Arrived to the [facility]. Assessment completed and labs reviewed. Procrit injection given. Patient tolerated well. Any issues or concerns during appointment: No. 
Patient aware of next infusion appointment on 10/21/19 at 230pm. 
Discharged ambulatory.

## 2019-10-24 NOTE — ED TRIAGE NOTES
Pt states he has multiple blisters on both legs that are bursting. Pt has legs wrapped up. States problem has been going on \"for some time now. \"

## 2019-10-24 NOTE — ED NOTES
I have reviewed discharge instructions with the patient. The patient verbalized understanding. Patient left ED via Discharge Method: ambulatory to Home with family Opportunity for questions and clarification provided. Patient given 2 scripts. To continue your aftercare when you leave the hospital, you may receive an automated call from our care team to check in on how you are doing. This is a free service and part of our promise to provide the best care and service to meet your aftercare needs.  If you have questions, or wish to unsubscribe from this service please call [phone]. Thank you for Choosing our [facility] Emergency Department.

## 2019-10-24 NOTE — ED PROVIDER NOTES
45-year-old male with history of ESRD who states that he denied starting on hemodialysis, type 2 diabetes, arthritis who presents with complaint of worsening blister formation to right lower extremity over the past several days. States that he has been placing bandages over site and noticed increased clear drainage from blisters. Denies fever, chills, recent trauma or injury to the leg, chest pain, shortness of breath, fever, vomiting. Patient denies any redness or warmth to site. The history is provided by the patient. No  was used. Skin Problem This is a new problem. The current episode started more than 2 days ago. The problem has not changed since onset. The problem is associated with new detergent or soap. There has been no fever. Affected Location: RLE > LLE  The pain is at a severity of 2/10. The pain is mild. The pain has been constant since onset. Associated symptoms include blisters and weeping. Pertinent negatives include no itching and no hives. He has tried nothing for the symptoms. The treatment provided no relief. Past Medical History:  
Diagnosis Date  Acute renal failure superimposed on stage 3 chronic kidney disease ([address] 9/21/2016  Anemia   
 pt states he receives iron infusions  Arthritis OA generalized  Chronic kidney disease   
 not on HD- surgery done for access and fistula being removed. Per patient, no plans to proceed with dialysis  Chronic pancreatitis ([address] 9/22/2016  Dermatophytosis of nail 12/6/2016  Diabetic neuropathy ([address] 9/22/2016  
 insulin sliding scale only- no oral meds- avg fastings avg fasting \"low 200's;  hypo @ 80 A1C 7.1 9/2019 per patient  Essential hypertension 9/22/2016  
 medication  GERD (gastroesophageal reflux disease)   
 controlled with med  Hypercholesterolemia  Iron (Fe) deficiency anemia 9/22/2016  Septicemia due to Klebsiella pneumoniae ([address] 9/22/2016  Systolic CHF, chronic ([facility] [address] 2016 ECHO 2018 EF- 60-65%  Type II diabetes mellitus with nephropathy [facility] 2016  Venous insufficiency 2016  Xerosis cutis 2016 Past Surgical History:  
Procedure Laterality Date  HX COLONOSCOPY    
 HX HERNIA REPAIR Left 2010  HX PROSTATECTOMY  2012  HX TURP   FOR BPH  VASCULAR SURGERY PROCEDURE UNLIST Left 10/26/2017 AVF  VASCULAR SURGERY PROCEDURE UNLIST Left 2019 Ligation of left brachiobasilic fistula Family History:  
Problem Relation Age of Onset  Diabetes Mother  Stroke Mother  Hypertension Mother  No Known Problems Father  Diabetes Sister  Diabetes Brother  Diabetes Sister  Diabetes Sister  Other Sister   
     fibromyalgia Social History Socioeconomic History  Marital status:  Spouse name: Not on file  Number of children: Not on file  Years of education: Not on file  Highest education level: Not on file Occupational History  Not on file Social Needs  Financial resource strain: Not on file  Food insecurity:  
  Worry: Not on file Inability: Not on file  Transportation needs:  
  Medical: Not on file Non-medical: Not on file Tobacco Use  Smoking status: Former Smoker Packs/day: 2.00 Years: 20.00 Pack years: 40.00 Last attempt to quit:  Years since quittin.8  Smokeless tobacco: Current User Substance and Sexual Activity  Alcohol use: No  
 Drug use: Never  Sexual activity: Not on file Lifestyle  Physical activity:  
  Days per week: Not on file Minutes per session: Not on file  Stress: Not on file Relationships  Social connections:  
  Talks on phone: Not on file Gets together: Not on file Attends Ca[employer] service: Not on file Active member of club or organization: Not on file Attends meetings of clubs or organizations: Not on file Relationship status: Not on file  Intimate partner violence:  
  Fear of current or ex partner: Not on file Emotionally abused: Not on file Physically abused: Not on file Forced sexual activity: Not on file Other Topics Concern  Not on file Social History Narrative  Not on file ALLERGIES: Patient has no known allergies. Review of Systems Constitutional: Negative for chills, fatigue and fever. Respiratory: Negative for cough and shortness of breath. Cardiovascular: Positive for leg swelling. Negative for chest pain and palpitations. Gastrointestinal: Negative for abdominal pain and nausea. Musculoskeletal: Negative for arthralgias, gait problem, joint swelling and myalgias. Skin: Positive for rash and wound. Negative for itching. Neurological: Negative for dizziness, light-headedness, numbness and headaches. Psychiatric/Behavioral: Negative for confusion. Vitals:  
 10/24/19 1137 BP: 174/81 Pulse: 61 Resp: 18 Temp: 97.5 °F (36.4 °C) SpO2: 99% Weight: 83.5 kg (184 lb) Height: 6' (1.829 m) Physical Exam  
Constitutional: He is oriented to person, place, and time. He appears well-developed and well-nourished. Well appearing and in NAD. HENT:  
Mouth/Throat: Oropharynx is clear and moist.  
MMM. Eyes: Pupils are equal, round, and reactive to light. Cardiovascular: Normal rate, regular rhythm, normal heart sounds and intact distal pulses. Pulmonary/Chest: Effort normal and breath sounds normal.  
Abdominal: Soft. There is no tenderness. Soft, NTND. Musculoskeletal: Normal range of motion. Large blister present to lateral aspect of right lower extremity. No purulent drainage present. DP pulses 2+ and equal bilaterally. Normal sensory exam.  Bilateral calf tenderness present to palpation. Neurological: He is alert and oriented to person, place, and time.  No cranial nerve deficit. Strength 5/5 throughout. Normal sensory. Skin: Skin is warm and dry. Nursing note and vitals reviewed. MDM Number of Diagnoses or Management Options Acute deep vein thrombosis (DVT) of popliteal vein of left lower extremity [facility]): new and requires workup Blister of right lower extremity, initial encounter: new and requires workup ESRD (end stage renal disease) [facility]):  
Diagnosis management comments: Ultrasound with evidence of nonocclusive left popliteal vein DVT. Vascular surgery presented to the ED. Dr. Sam Mccloud recommends discharging home with Eliquis. Given chronic wounds and concern for eventual development of cellulitis/infection will discharge home with Keflex. Additionally spoke with case management in regards to establishing wound care follow-up and home health follow-up with patient. Instructed that patient will need his wound cleaned regularly and bandages changed on a regular basis. Patient given strict return precautions.   
========  
glucose 338; states he has not taken his Lantus oriented of his other diabetic medications today. No elevated AG. Normal CO2. Instructed importance of compliance. Instructed to follow-up with PCP and given strict return precautions. Amount and/or Complexity of Data Reviewed Clinical lab tests: ordered and reviewed Tests in the radiology section of CPT®: ordered and reviewed Tests in the medicine section of CPT®: ordered and reviewed Review and summarize past medical records: yes Independent visualization of images, tracings, or specimens: yes Risk of Complications, Morbidity, and/or Mortality Presenting problems: moderate Diagnostic procedures: moderate Management options: moderate Patient Progress Patient progress: stable ED Course as of Oct 24 1512 Thu Oct 24, 2019  
1406 US bilateral LEs Findings: 
 
Bilateral soft tissue edema. Right: The right common femoral veins,  femoral veins, posterior tibial and 
peroneal veins, and popliteal veins are compressible and opacify with color at 
color Doppler evaluation. There is no evidence of deep vein thrombosis. Left: Incomplete compression of the left popliteal vein, but with normal color Doppler signal. The left common femoral, femoral, posterior tibial, and peroneal 
veins. Impression Impression: 
Nonocclusive DVT of the left popliteal vein. [DF] 1407 XR R rib/fib IMPRESSION: 
Soft tissue irregularity laterally about the mid and distal lower leg without 
underlying acute osseous abnormality. [DF] ED Course User Index 
[DF] Khadijah Gould MD  
 
 
Procedures Results Include: 
 
Recent Results (from the past 24 hour(s)) CBC WITH AUTOMATED DIFF Collection Time: 10/24/19 11:39 AM  
Result Value Ref Range WBC 3.6 (L) 4.3 - 11.1 K/uL  
 RBC 5.07 4.23 - 5.6 M/uL  
 HGB 12.8 (L) 13.6 - 17.2 g/dL HCT 41.1 41.1 - 50.3 % MCV 81.1 79.6 - 97.8 FL  
 MCH 25.2 (L) 26.1 - 32.9 PG  
 MCHC 31.1 (L) 31.4 - 35.0 g/dL  
 RDW 17.1 (H) 11.9 - 14.6 % PLATELET 207 (L) 762 - 450 K/uL MPV Unable to calculate. Recommend adding IPF. 9.4 - 12.3 FL ABSOLUTE NRBC 0.00 0.0 - 0.2 K/uL  
 DF AUTOMATED NEUTROPHILS 55 43 - 78 % LYMPHOCYTES 33 13 - 44 % MONOCYTES 9 4.0 - 12.0 % EOSINOPHILS 2 0.5 - 7.8 % BASOPHILS 1 0.0 - 2.0 % IMMATURE GRANULOCYTES 0 0.0 - 5.0 %  
 ABS. NEUTROPHILS 2.0 1.7 - 8.2 K/UL  
 ABS. LYMPHOCYTES 1.2 0.5 - 4.6 K/UL  
 ABS. MONOCYTES 0.3 0.1 - 1.3 K/UL  
 ABS. EOSINOPHILS 0.1 0.0 - 0.8 K/UL  
 ABS. BASOPHILS 0.0 0.0 - 0.2 K/UL  
 ABS. IMM. GRANS. 0.0 0.0 - 0.5 K/UL METABOLIC PANEL, COMPREHENSIVE Collection Time: 10/24/19 11:39 AM  
Result Value Ref Range Sodium 145 136 - 145 mmol/L Potassium 3.8 3.5 - 5.1 mmol/L Chloride 112 (H) 98 - 107 mmol/L  
 CO2 25 21 - 32 mmol/L  Anion gap 8 7 - 16 mmol/L  
 Glucose 338 (H) 65 - 100 mg/dL BUN 31 (H) 8 - 23 MG/DL Creatinine 4.79 (H) 0.8 - 1.5 MG/DL  
 GFR est AA 15 (L) >60 ml/min/1.73m2 GFR est non-AA 13 (L) >60 ml/min/1.73m2 Calcium 7.3 (L) 8.3 - 10.4 MG/DL Bilirubin, total 0.3 0.2 - 1.1 MG/DL  
 ALT (SGPT) 15 12 - 65 U/L  
 AST (SGOT) 23 15 - 37 U/L Alk. phosphatase 80 50 - 136 U/L Protein, total 5.9 (L) 6.3 - 8.2 g/dL Albumin 2.4 (L) 3.2 - 4.6 g/dL Globulin 3.5 2.3 - 3.5 g/dL A-G Ratio 0.7 (L) 1.2 - 3.5 BNP Collection Time: 10/24/19 11:39 AM  
Result Value Ref Range  (H) 0 pg/mL C REACTIVE PROTEIN, QT Collection Time: 10/24/19 11:39 AM  
Result Value Ref Range C-Reactive protein <0.3 0.0 - 0.9 mg/dL Ashley Varghese MD; 10/24/2019 @11:50 AM Voice dictation software was used during the making of this note. This software is not perfect and grammatical and other typographical errors may be present.   This note has not been proofread for errors. 
===================================================================

## 2019-10-24 NOTE — DISCHARGE INSTRUCTIONS
Take medications as prescribed. Schedule close follow-up with primary care physician. Ensure that you take your home medications as prescribed including your diet Beatties medications. Check blood sugar daily. Return if symptoms worsen or progress in any way. Schedule follow-up with your primary care physician as well as wound care. Deep Vein Thrombosis: Care Instructions  Your Care Instructions    A deep vein thrombosis (DVT) is a blood clot in certain veins of the legs, pelvis, or arms. Blood clots in these veins need to be treated because they can get bigger, break loose, and travel through the bloodstream to the lungs. A blood clot in a lung can be life-threatening. The doctor may have given you a blood thinner (anticoagulant). A blood thinner can stop the blood clot from growing larger and prevent new clots from forming. You will need to take a blood thinner for 3 to 6 months or longer. The doctor has checked you carefully, but problems can develop later. If you notice any problems or new symptoms, get medical treatment right away. Follow-up care is a key part of your treatment and safety. Be sure to make and go to all appointments, and call your doctor if you are having problems. It's also a good idea to know your test results and keep a list of the medicines you take. How can you care for yourself at home? · Take your medicines exactly as prescribed. Call your doctor if you think you are having a problem with your medicine. · If you are taking a blood thinner, be sure you get instructions about how to take your medicine safely. Blood thinners can cause serious bleeding problems. · Wear compression stockings if your doctor recommends them. These stockings are tighter at the feet than on the legs. They may reduce pain and swelling in your legs. But there are different types of stockings, and they need to fit right. So your doctor will recommend what you need.   · When you sit, use a pillow to raise the arm or leg that has the blood clot. Try to keep it above the level of your heart. When should you call for help? Call 911 anytime you think you may need emergency care. For example, call if:    · You passed out (lost consciousness).     · You have symptoms of a blood clot in your lung (called a pulmonary embolism). These include:  ? Sudden chest pain. ? Trouble breathing. ? Coughing up blood.    Call your doctor now or seek immediate medical care if:    · You have new or worse trouble breathing.     · You are dizzy or lightheaded, or you feel like you may faint.     · You have symptoms of a blood clot in your arm or leg. These may include:  ? Pain in the arm, calf, back of the knee, thigh, or groin. ? Redness and swelling in the arm, leg, or groin.    Watch closely for changes in your health, and be sure to contact your doctor if:    · You do not get better as expected. Where can you learn more? Go to http://svetlana-glenis.info/. Enter T612 in the search box to learn more about \"Deep Vein Thrombosis: Care Instructions. \"  Current as of: September 26, 2018  Content Version: 12.2  © 4100-3292 [employer]. Care instructions adapted under license by [employer] (which disclaims liability or warranty for this information). If you have questions about a medical condition or this instruction, always ask your healthcare professional. [place] [address] any warranty or liability for your use of this information.        Wound Care: After Your Visit  Your Care Instructions  Taking good care of your wound at home will help it heal quickly and reduce your chance of infection. The doctor has checked you carefully, but problems can develop later. If you notice any problems or new symptoms, get medical treatment right away. Follow-up care is a key part of your treatment and safety.  Be sure to make and go to all appointments, and call your doctor if you are having problems. It's also a good idea to know your test results and keep a list of the medicines you take. How can you care for yourself at home? · Clean the area with soap and water 2 times a day unless your doctor gives you different instructions. Don't use hydrogen peroxide or alcohol, which can slow healing. ? You may cover the wound with a thin layer of antibiotic ointment, such as bacitracin, and a nonstick bandage. ? Apply more ointment and replace the bandage as needed. · Take pain medicines exactly as directed. Some pain is normal with a wound, but do not ignore pain that is getting worse instead of better. You could have an infection. ? If the doctor gave you a prescription medicine for pain, take it as prescribed. ? If you are not taking a prescription pain medicine, ask your doctor if you can take an over-the-counter medicine. · Your doctor may have closed your wound with stitches (sutures), staples, or skin glue. ? If you have stitches, your doctor may remove them after several days to 2 weeks. Or you may have stitches that dissolve on their own.  ? If you have staples, your doctor may remove them after 7 to 10 days. ? If your wound was closed with skin glue, the glue will wear off in a few days to 2 weeks. When should you call for help? Call your doctor now or seek immediate medical care if:  · You have signs of infection, such as:  ? Increased pain, swelling, warmth, or redness near the wound. ? Red streaks leading from the wound. ? Pus draining from the wound. ? A fever. · You bleed so much from your incision that you soak one or more bandages over 2 to 4 hours. Watch closely for changes in your health, and be sure to contact your doctor if:  · The wound is not getting better each day. Where can you learn more? Go to CamStent.be  Enter M973 in the search box to learn more about \"Wound Care: After Your Visit. \"   © 8953-6931 Healthwise, [employer].  Care instructions adapted under license by [facility] (which disclaims liability or warranty for this information). This care instruction is for use with your licensed healthcare professional. If you have questions about a medical condition or this instruction, always ask your healthcare professional. [address] any warranty or liability for your use of this information. Content Version: 95.5.860184; Last Revised: April 23, 2012           Patient Education        End-Stage Renal Disease: Care Instructions  Your Care Instructions    End-stage renal (or kidney) disease happens when your kidneys can no longer do their jobs. They can't remove waste from your blood. And they aren't able to balance your body's fluids and chemicals. This stage of the disease usually occurs after you have chronic kidney disease for years. Now the kidneys work so poorly that you need dialysis or a kidney transplant. Dialysis uses a machine to filter your waste. A transplant is surgery to give you a healthy kidney from another person. Follow-up care is a key part of your treatment and safety. Be sure to make and go to all appointments, and call your doctor if you are having problems. It's also a good idea to know your test results and keep a list of the medicines you take. How can you care for yourself at home?    · Be safe with medicines. Take your medicines exactly as prescribed. Call your doctor if you have any problems with your medicine. You also may take medicine to control your blood pressure or to treat diabetes. Many people who have diabetes take blood pressure medicine.     · If you have diabetes, do your best to keep your blood sugar in your target range. You may do this by taking medicine, eating healthy food, and exercising.     · Follow your dialysis schedule.     · Do not take ibuprofen (Advil, Motrin), naproxen (Aleve), or similar medicines, unless your doctor tells you to.  These may make chronic kidney disease worse.     · Make sure your doctor knows all of the medicines, vitamins, supplements, and herbal remedies you take.     · Do not smoke or use other tobacco products. Smoking can reduce blood flow to the kidneys. If you need help quitting, talk to your doctor about stop-smoking programs and medicines. These can increase your chances of quitting for good.     · Do not drink alcohol or use illegal drugs.     · If your doctor recommends it, get more exercise. Walking is a good choice.     · If you have an advance directive, let your doctor know. It may include a living will and a durable power of  for health care. If you don't have one, you may want to prepare one. It lets your doctor and loved ones know your health care wishes if you become unable to speak for yourself. Diet    · Talk to a registered dietitian. He or she can help you make a meal plan that is right for you. Most people with kidney disease need to limit salt (sodium), fluids, and protein. Some also have to limit potassium and phosphorus. When should you call for help? Call 911 anytime you think you may need emergency care. For example, call if:    · You passed out (lost consciousness).    Call your doctor now or seek immediate medical care if:    · You have new or worse nausea and vomiting.     · You have much less urine than normal, or you have no urine.     · You are feeling confused or cannot think clearly.     · You have new or more blood in your urine.     · You have new swelling.     · You are dizzy or lightheaded, or feel like you may faint.    Watch closely for changes in your health, and be sure to contact your doctor if you have any problems. Where can you learn more? Go to http://svetlana-glenis.info/. Enter I561 in the search box to learn more about \"End-Stage Renal Disease: Care Instructions. \"  Current as of: October 31, 2018  Content Version: 12.2  © 6590-3331 [employer], [facility].  Care instructions adapted under license by [employer] (which disclaims liability or warranty for this information). If you have questions about a medical condition or this instruction, always ask your healthcare professional. [address] any warranty or liability for your use of this information.          Learning About High Blood Sugar  What is high blood sugar? Your body turns the food you eat into glucose (sugar), which it uses for energy. But if your body isn't able to use the sugar right away, it can build up in your blood and lead to high blood sugar. When the amount of sugar in your blood stays too high for too much of the time, you may have diabetes. Diabetes is a disease that can cause serious health problems. The good news is that lifestyle changes may help you get your blood sugar back to normal and avoid or delay diabetes. What causes high blood sugar? Sugar (glucose) can build up in your blood if you:  · Are overweight. · Have a family history of diabetes. · Take certain medicines, such as steroids. What are the symptoms? Having high blood sugar may not cause any symptoms at all. Or it may make you feel very thirsty or very hungry. You may also urinate more often than usual, have blurry vision, or lose weight without trying. How is high blood sugar treated? You can take steps to lower your blood sugar level if you understand what makes it get higher. Your doctor may want you to learn how to test your blood sugar level at home. Then you can see how illness, stress, or different kinds of food or medicine raise or lower your blood sugar level. Other tests may be needed to see if you have diabetes. How can you prevent high blood sugar? · Watch your weight. If you're overweight, losing just a small amount of weight may help. Reducing fat around your waist is most important. · Limit the amount of calories, sweets, and unhealthy fat you eat.  Ask your doctor if a dietitian can help you. A registered dietitian can help you create meal plans that fit your lifestyle. · Get at least 30 minutes of exercise on most days of the week. Exercise helps control your blood sugar. It also helps you maintain a healthy weight. Walking is a good choice. You also may want to do other activities, such as running, swimming, cycling, or playing tennis or team sports. · If your doctor prescribed medicines, take them exactly as prescribed. Call your doctor if you think you are having a problem with your medicine. You will get more details on the specific medicines your doctor prescribes. Follow-up care is a key part of your treatment and safety. Be sure to make and go to all appointments, and call your doctor if you are having problems. It's also a good idea to know your test results and keep a list of the medicines you take. Where can you learn more? Go to http://svetlana-glenis.info/. Enter O108 in the search box to learn more about \"Learning About High Blood Sugar. \"  Current as of: April 16, 2019  Content Version: 12.2  © 8622-3564 [employer]. Care instructions adapted under license by [employer] (which disclaims liability or warranty for this information).  If you have questions about a medical condition or this instruction, always ask your healthcare professional. [address] any warranty or liability for your use of this information.

## 2019-11-03 PROBLEM — T14.8XXA OPEN WOUND OF SKIN: Status: ACTIVE | Noted: 2019-01-01

## 2019-11-03 PROBLEM — I82.409 DVT (DEEP VENOUS THROMBOSIS) (HCC): Status: ACTIVE | Noted: 2019-01-01

## 2019-11-03 PROBLEM — N18.6 ESRD (END STAGE RENAL DISEASE) (HCC): Status: ACTIVE | Noted: 2019-01-01

## 2019-11-03 NOTE — ED PROVIDER NOTES
76-year-old gentleman presents with concerns about a weeping and draining wound on the lateral aspect of his right lower leg. He was seen here a few days ago for a blister in that area associated with some bilateral leg swelling. At that time he was found to have a nonocclusive DVT in his left popliteal vein. He was started on Eliquis for that. Additionally, given the concerns for possible developing cellulitis he was started on Keflex. Patient went for follow-up today at an urgent care due to the nature of the progression of his wound he was sent here for further evaluation. He denies any fevers or chills but says that his legs and his arm hurt. His arm has been painful for several weeks and he has followed up with his vascular surgeon and primary care doctor about that. So far no obvious answer for his arm pain has presented itself. Patient has end-stage renal disease but has apparently entirely declined dialysis. No other associated symptoms. Elements of this note were created using speech recognition software. As such, errors of speech recognition may be present. Past Medical History:  
Diagnosis Date  Acute renal failure superimposed on stage 3 chronic kidney disease ([address] 9/21/2016  Anemia   
 pt states he receives iron infusions  Arthritis OA generalized  Chronic kidney disease   
 not on HD- surgery done for access and fistula being removed. Per patient, no plans to proceed with dialysis  Chronic pancreatitis ([address] 9/22/2016  Dermatophytosis of nail 12/6/2016  Diabetic neuropathy ([facility] 9/22/2016  
 insulin sliding scale only- no oral meds- avg fastings avg fasting \"low 200's;  hypo @ 80 A1C 7.1 9/2019 per patient  Essential hypertension 9/22/2016  
 medication  GERD (gastroesophageal reflux disease)   
 controlled with med  Hypercholesterolemia  Iron (Fe) deficiency anemia 9/22/2016  Septicemia due to Klebsiella pneumoniae ([facility] 2016  Systolic CHF, chronic ([facility] 2016 ECHO 2018 EF- 60-65%  Type II diabetes mellitus with nephropathy ([facility] 2016  Venous insufficiency 2016  Xerosis cutis 2016 Past Surgical History:  
Procedure Laterality Date  HX COLONOSCOPY    
 HX HERNIA REPAIR Left 2010  HX PROSTATECTOMY  2012  HX TURP  2012 FOR BPH  VASCULAR SURGERY PROCEDURE UNLIST Left 10/26/2017 AVF  VASCULAR SURGERY PROCEDURE UNLIST Left 2019 Ligation of left brachiobasilic fistula Family History:  
Problem Relation Age of Onset  Diabetes Mother  Stroke Mother  Hypertension Mother  No Known Problems Father  Diabetes Sister  Diabetes Brother  Diabetes Sister  Diabetes Sister  Other Sister   
     fibromyalgia Social History Socioeconomic History  Marital status:  Spouse name: Not on file  Number of children: Not on file  Years of education: Not on file  Highest education level: Not on file Occupational History  Not on file Social Needs  Financial resource strain: Not on file  Food insecurity:  
  Worry: Not on file Inability: Not on file  Transportation needs:  
  Medical: Not on file Non-medical: Not on file Tobacco Use  Smoking status: Former Smoker Packs/day: 2.00 Years: 20.00 Pack years: 40.00 Last attempt to quit:  Years since quittin.8  Smokeless tobacco: Current User Substance and Sexual Activity  Alcohol use: No  
 Drug use: Never  Sexual activity: Not on file Lifestyle  Physical activity:  
  Days per week: Not on file Minutes per session: Not on file  Stress: Not on file Relationships  Social connections:  
  Talks on phone: Not on file Gets together: Not on file Attends Cheondoism service: Not on file Active member of club or organization: Not on file Attends meetings of clubs or organizations: Not on file Relationship status: Not on file  Intimate partner violence:  
  Fear of current or ex partner: Not on file Emotionally abused: Not on file Physically abused: Not on file Forced sexual activity: Not on file Other Topics Concern  Not on file Social History Narrative  Not on file ALLERGIES: Patient has no known allergies. Review of Systems Constitutional: Negative for chills and fever. Respiratory: Negative for cough and shortness of breath. Cardiovascular: Positive for leg swelling. Negative for chest pain and palpitations. Gastrointestinal: Negative for diarrhea, nausea and vomiting. Musculoskeletal: Positive for arthralgias, gait problem and myalgias. Skin: Positive for color change and wound. Vitals:  
 11/03/19 1719 BP: 117/56 Pulse: 74 Resp: 16 Temp: 98.2 °F (36.8 °C) SpO2: 98% Weight: 81.6 kg (180 lb) Height: 6' (1.829 m) Physical Exam  
Constitutional: He is oriented to person, place, and time. He appears well-developed and well-nourished. HENT:  
Head: Normocephalic and atraumatic. Pulmonary/Chest: Effort normal and breath sounds normal.  
Abdominal: Soft. Bowel sounds are normal.  
Musculoskeletal: He exhibits no deformity. Neurological: He is alert and oriented to person, place, and time. Skin: Skin is warm. chronic venous stasis with a desquamating wound on the lateral aspect of his right lower leg Nursing note and vitals reviewed. MDM Number of Diagnoses or Management Options Diagnosis management comments: We will check his basic blood work. I will empirically start him on Zosyn. I spoke with the hospitalist who kindly agreed to see the patient. Procedures

## 2019-11-03 NOTE — ED TRIAGE NOTES
Pt arrives here with caregiver for right arm cellulitis and right leg wound check. Pt states wound care did not go to pt's house to check on wound. Pt states last time wound was checked was over 1 week ago. Pt was sent from [facility]. Pt a/o x 4. Pt states ambulatory with cane. Pt denies fever at home.

## 2019-11-04 NOTE — PROGRESS NOTES
Spoke to Mr. Payton Delacruz and friend in room 1 about Case Management and discharge planning (and also Code 40 letter and LARKIN; he declined to sign either one of them). Mr. Payton Delacruz says he lives alone, but has a [facility] Medicaid aide Monday through Saturday from 9am to 4 pm each of those days. They are able to help with his personal care, drive him to appointments, and take him to the grocery store. He has ESRD, but declines HD. He also said that he was supposed to start [facility] home health last Thursday, but \"no one came out yet. \" We discussed discharge planning, including the need of home health RN to assist with wound care on his right LE, and possibly home IV abx. He may also benefit from home health OT and PT. Case Management to follow and assist with discharge planning. Care Management Interventions Current Support Network: Own Home, Lives Alone Confirm Follow Up Transport: Other (see comment) Plan discussed with Pt/Family/Caregiver:  Yes

## 2019-11-04 NOTE — H&P
Hospitalist Admission History and Physical  
 
NAME:  A B Melody Management Age:  67 y.o. 
:   1940 MRN:   [id number] PCP: Lois Jain MD 
Consulting MD: Treatment Team: Attending Provider: Mary Delgadillo DO Chief Complaint Patient presents with  
 Skin Problem HPI:  
Patient is a 67 y.o. male who presented to the ED for cc weeping wound to right lower leg with clear drainage that has worsened over the past few days. Hx of ESRD but refusing dialysis. Able to urinate 8oz three times a day per the patient. Hx of HTN, DM type II on insulin, DVT of left popliteal vein 10/24/19, and PVD. Patient originally had fistula of left arm but has been ligated on 19 by vascular surgery since patient states he does not want to be on dialysis. Patient states he follows [place] [facility]. Patient states he was recently placed on Keflex but when I review records I cannot find this. Pharmacy also cannot find where he has filled antibiotics Vitals - stable Labs- Glucose 429. Past Medical History:  
Diagnosis Date  Acute renal failure superimposed on stage 3 chronic kidney disease ([address] 2016  Anemia   
 pt states he receives iron infusions  Arthritis OA generalized  Chronic kidney disease   
 not on HD- surgery done for access and fistula being removed. Per patient, no plans to proceed with dialysis  Chronic pancreatitis ([address] 2016  Dermatophytosis of nail 2016  Diabetic neuropathy ([address] 2016  
 insulin sliding scale only- no oral meds- avg fastings avg fasting \"low 200's;  hypo @ 80 A1C 7.1 2019 per patient  Essential hypertension 2016  
 medication  GERD (gastroesophageal reflux disease)   
 controlled with med  Hypercholesterolemia  Iron (Fe) deficiency anemia 2016  Septicemia due to Klebsiella pneumoniae ([address] 2016  Systolic CHF, chronic ([address] 2016 ECHO 2018 EF- 60-65%  Type II diabetes mellitus with nephropathy ([facility] 2016  Venous insufficiency 2016  Xerosis cutis 2016 Past Surgical History:  
Procedure Laterality Date  HX COLONOSCOPY    
 HX HERNIA REPAIR Left 2010  HX PROSTATECTOMY  2012  HX TURP  2012 FOR BPH  VASCULAR SURGERY PROCEDURE UNLIST Left 10/26/2017 [facility]  VASCULAR SURGERY PROCEDURE UNLIST Left 2019 Ligation of left brachiobasilic fistula Family History Problem Relation Age of Onset  Diabetes Mother  Stroke Mother  Hypertension Mother  No Known Problems Father  Diabetes Sister  Diabetes Brother  Diabetes Sister  Diabetes Sister  Other Sister   
     fibromyalgia Social History Social History Narrative  Not on file Social History Tobacco Use  Smoking status: Former Smoker Packs/day: 2.00 Years: 20.00 Pack years: 40.00 Last attempt to quit:  Years since quittin.8  Smokeless tobacco: Current User Substance Use Topics  Alcohol use: No  
  
 
Social History Substance and Sexual Activity Drug Use Never No Known Allergies Prior to Admission medications Medication Sig Start Date End Date Taking? Authorizing Provider  
apixaban (ELIQUIS) 5 mg (74 tabs) starter pack Take 10 mg (two 5 mg tablets) by mouth twice a day for 7 days Followed by 5 mg (one 5 mg tablet) by mouth twice a day  Indications: blood clot in a deep vein of the extremities 10/24/19   Britton Alston MD  
colestipol (COLESTID) 1 gram tablet Take 1 g by mouth two (2) times daily as needed. Provider, Historical  
sodium bicarbonate 650 mg tablet Take  by mouth three (3) times daily. Provider, Historical  
traMADol (ULTRAM) 50 mg tablet Take 50 mg by mouth every six (6) hours as needed for Pain.  May take morning of procedure if needed    Provider, Historical  
 cloNIDine (CATAPRES) 0.1 mg/24 hr ptwk 1 Patch by TransDERmal route every seven (7) days. Changes on Tuesdays- on ELVIRA chest 9/11/2019 Instructed to inform pre-op nurse location of patch on arrival    Provider, Historical  
lidocaine 4 % patch Cut to appropriate size. Apply to intact skin for 12 hours, remove for 12 hours. Patient taking differently: 1 Patch by TransDERmal route every eight to twelve (8-12) hours as needed. Cut to appropriate size. Apply to intact skin for 12 hours, remove for 12 hours. States uses only occasionally and not on today 9/11/2019 Do not take morning of procedure. 7/12/19   Mccurdy Ache, PA  
CALCITRIOL, BULK, by Does Not Apply route. Provider, Historical  
hydrALAZINE (APRESOLINE) 100 mg tablet Take 1 Tab by mouth three (3) times daily. Patient taking differently: Take 100 mg by mouth three (3) times daily. Take morning of procedure. 4/19/18   Carroll Harada, MD  
pregabalin (LYRICA) 50 mg capsule Take 1 Cap by mouth daily. Max Daily Amount: 50 mg. Patient taking differently: Take 150 mg by mouth two (2) times a day. Take morning of procedure. 4/19/18   Carroll Harada, MD  
torsemide [facility]) 20 mg tablet Take 40 mg by mouth daily. Do not take morning of procedure. Provider, Historical  
insulin glargine (LANTUS) 100 unit/mL injection 15 Units by SubCUTAneous route daily. Patient taking differently: 15 Units by SubCUTAneous route daily. Rarely uses - states only uses occasionally- Instructed to take no more than 80% if he takes evening before procedure. Normal dose 15 units. Adjusted dose = maximum dose of 12 units 4/4/18   Robles Quinn MD  
pravastatin (PRAVACHOL) 40 mg tablet Take 1 Tab by mouth nightly. 4/4/18   Robles Quinn MD  
[facility] INSULIN 100 unit/mL kwikpen 5 units three times a day with meals plus correction scale, 2 units/50 > 150, max daily dose 25 units Patient taking differently: by SubCUTAneous route. Per patient sliding scale only Do not take morning of procedure. 2/28/18   Иван Lang PA-C  
calcifediol (RAYALDEE) 30 mcg Cs24 Take  by mouth nightly. Provider, Historical  
amLODIPine (NORVASC) 10 mg tablet Take 10 mg by mouth every morning. Provider, Historical  
metoprolol succinate (TOPROL-XL) 50 mg XL tablet Take 50 mg by mouth every morning. Take morning of procedure. Provider, Historical  
omeprazole (PRILOSEC) 20 mg capsule Take 20 mg by mouth every morning. Take morning of procedure. Provider, Historical  
 
 
 
 
Review of Systems Constitutional:  NAD Eyes:  no change in visual acuity, no photophobia Ears, nose, mouth, throat, and face: no  Odynphagia, dysphagia, no thrush or exudate, negative for chronic sinus congestion, recurrent headaches Respiratory: negative for SOB, hemoptysis or cough Cardiovascular: negative for CP, palpitations, or PND Gastrointestinal: negative for abdominal pain, no hematemesis, hematochezia or BRBPR Genitourinary: no urgency, frequency, or dysuria, no nocturia Integument/breast: Skin wound to right leg. Hematologic/lymphatic: negative for known bleeding disorder Musculoskeletal:right leg pain. Increased edema to extremities. Neurological: negative for lightheadedness, syncope or presyncopal events, no seizure or CVA history Behavioral/Psych: negative for depression or chronic anxiety, Endocrine: negative for polydyspia, polyuria or intolerance to heat or cold Allergic/Immunologic: negative for chronic allergic rhinitis, or known connective tissue disorder Objective:  
 
Visit Vitals /81 (BP 1 Location: Right arm, BP Patient Position: At rest) Pulse 69 Temp 98 °F (36.7 °C) Resp 18 Ht 6' (1.829 m) Wt 81.6 kg (180 lb) SpO2 98% BMI 24.41 kg/m² No intake/output data recorded. No intake/output data recorded. Data Review: Recent Results (from the past 24 hour(s)) CBC WITH AUTOMATED DIFF Collection Time: 11/03/19  5:25 PM  
Result Value Ref Range WBC 5.0 4.3 - 11.1 K/uL  
 RBC 5.25 4.23 - 5.6 M/uL  
 HGB 13.3 (L) 13.6 - 17.2 g/dL HCT 41.3 41.1 - 50.3 % MCV 78.7 (L) 79.6 - 97.8 FL  
 MCH 25.3 (L) 26.1 - 32.9 PG  
 MCHC 32.2 31.4 - 35.0 g/dL  
 RDW 16.2 (H) 11.9 - 14.6 % PLATELET 939 292 - 146 K/uL MPV 13.3 (H) 9.4 - 12.3 FL ABSOLUTE NRBC 0.00 0.0 - 0.2 K/uL  
 DF AUTOMATED NEUTROPHILS 73 43 - 78 % LYMPHOCYTES 16 13 - 44 % MONOCYTES 9 4.0 - 12.0 % EOSINOPHILS 0 (L) 0.5 - 7.8 % BASOPHILS 1 0.0 - 2.0 % IMMATURE GRANULOCYTES 0 0.0 - 5.0 %  
 ABS. NEUTROPHILS 3.7 1.7 - 8.2 K/UL  
 ABS. LYMPHOCYTES 0.8 0.5 - 4.6 K/UL  
 ABS. MONOCYTES 0.5 0.1 - 1.3 K/UL  
 ABS. EOSINOPHILS 0.0 0.0 - 0.8 K/UL  
 ABS. BASOPHILS 0.0 0.0 - 0.2 K/UL  
 ABS. IMM. GRANS. 0.0 0.0 - 0.5 K/UL METABOLIC PANEL, COMPREHENSIVE Collection Time: 11/03/19  5:25 PM  
Result Value Ref Range Sodium 145 136 - 145 mmol/L Potassium 3.7 3.5 - 5.1 mmol/L Chloride 110 (H) 98 - 107 mmol/L  
 CO2 23 21 - 32 mmol/L Anion gap 12 7 - 16 mmol/L Glucose 429 (H) 65 - 100 mg/dL BUN 61 (H) 8 - 23 MG/DL Creatinine 4.72 (H) 0.8 - 1.5 MG/DL  
 GFR est AA 16 (L) >60 ml/min/1.73m2 GFR est non-AA 13 (L) >60 ml/min/1.73m2 Calcium 8.0 (L) 8.3 - 10.4 MG/DL Bilirubin, total 0.4 0.2 - 1.1 MG/DL  
 ALT (SGPT) 10 (L) 12 - 65 U/L  
 AST (SGOT) 22 15 - 37 U/L Alk. phosphatase 78 50 - 136 U/L Protein, total 7.4 6.3 - 8.2 g/dL Albumin 2.2 (L) 3.2 - 4.6 g/dL Globulin 5.2 (H) 2.3 - 3.5 g/dL A-G Ratio 0.4 (L) 1.2 - 3.5 PROCALCITONIN Collection Time: 11/03/19  5:25 PM  
Result Value Ref Range Procalcitonin 0.5 ng/mL POC LACTIC ACID Collection Time: 11/03/19  7:00 PM  
Result Value Ref Range Lactic Acid (POC) 1.06 0.5 - 1.9 mmol/L Physical Exam: General:  Alert, cooperative, no distress, appears stated age. Eyes:  Conjunctivae/corneas clear. Ears:  Normal TMs and external ear canals both ears. Nose: Nares normal. Septum midline. Mouth/Throat: Lips, mucosa, and tongue normal. Teeth and gums normal.  
Neck:  no JVD. Back:   deferred Lungs:   Clear to auscultation bilaterally. Heart:  Regular rate and rhythm, S1, S2 normal, no murmur, click, rub or gallop. Abdomen:   Soft, non-tender. Bowel sounds normal. No masses,  No organomegaly. Extremities: 2+ edema to UE bilaterally. No pulse to left UE were fistula ligated. LE bilaterally has chronic venous stasis. Wound to right LE is bandaged but clear drainage noted. Poor pulses palpated bilaterally at PT. Pulses: 2+ and symmetric all extremities. Skin: Dry skin, chronic venous changes. Lymph nodes: Cervical, supraclavicular, and axillary nodes normal.  
Neurologic: CNII-XII intact. Limited ROM in bed. Assessment and Plan Principal Problem: 
  Open wound of skin (11/3/2019) Active Problems: 
  Type II diabetes mellitus with nephropathy ([facility] (9/22/2016) Chronic systolic congestive heart failure ([facility] (9/23/2016) ESRD (end stage renal disease) ([facility] (11/3/2019) DVT (deep venous thrombosis) ([facility] (11/3/2019) Open skin wound of right leg - Suspecting skin rupture from fluid expansion due to increased edema to extremeities since not receiving dialysis for his ESRD. Likely will continue to worsen but patient does not want dialysis. Consult vascular surgery and wound care. ER has tried multiple times to place central line with no success. Will give one time dose of Levofloxacin orally and start Clindamycin orally tonight. Will consult PICC team to see tomorrow. Nephrology consulted to approve for central line access. Would likely benefit from changing to Vancomycin and Zosyn tomorrow once can obtain IV access. Order TONY HTN- Amlodipine 10mg daily. Clonidine patch. Hydralazine 100mg TID. Metoprolol succinate 50 mg daily. ESRD -sodium bicarb 3 times daily. Demadex 40 mg daily. DVT popliteal vein - Hold Eliquis in case needs surgical intervention. Heparin drip for now. DM type II - States he only takes SS at home. Start SS. Check A1C. Discussed code status and he would like to be FULL CODE Poor prognosis long term in patient with worsening renal function. Update@ 21:45- Patient told nurse he takes Lantus 15 units at home. Glucose >500, will restart Lantus. Also told nurse he has not taken his lantus in a few days. DVT prophylaxis - SCD to left LE and heparin drip Signed By: Jefferson Strong DO November 3, 2019

## 2019-11-04 NOTE — PROGRESS NOTES
Problem: Mobility Impaired (Adult and Pediatric) Goal: *Acute Goals and Plan of Care (Insert Text) Description Goals: 
(1.)Mr. Lala Tello will move from supine to sit and sit to supine , scoot up and down and roll side to side with MODIFIED INDEPENDENCE within 4 treatment day(s). (2.)Mr. Lala Tello will transfer from bed to chair and chair to bed with SUPERVISION using the least restrictive device within 4 treatment day(s). (3.)Mr. Lala Tello will ambulate with SUPERVISION for >150 feet with the least restrictive device within 4 treatment day(s). (4.)Mr. Lala Tello will be able to use his BUEs to assist with pushing up sit to stand and with bed mobility for rolling and supine to sit with SBA within 4 treatment days PHYSICAL THERAPY: Initial Assessment and AM 11/4/2019 OBSERVATION:   
Payor: Wesley Whipple / Plan: [address] / Product Type: [employer] Medicare /   
  
NAME/AGE/GENDER: Patricia Macias is a 67 y.o. male PRIMARY DIAGNOSIS: Open wound of skin [T14. Alice Thornton ESRD (end stage renal disease) ([facility] [N18.6] Open wound of skin [T14. Alice Thornton ESRD (end stage renal disease) ([facility] [N18.6] Open wound of skin Open wound of skin ICD-10: Treatment Diagnosis:  
 · Generalized Muscle Weakness (M62.81) · Difficulty in walking, Not elsewhere classified (R26.2) Precaution/Allergies: 
Patient has no known allergies. ASSESSMENT:  
 
Mr. Lala Tello is a 67year old AAM with an admitting diagnosis of Open wound of skin and ESRD refusing HD. His PMH includes HTN, DM type II on insulin, DVT of left popliteal vein 10/24/19, and PVD. He presents sitting up in the bedside chair and was agreeable to have therapy. He reports he lives alone in a mobile home with a ramp entry. He states his PLOF has been independent with use of a cane. He states he has care givers that come daily and stay to assist him several hours as needed.   He is complaining of BUE hand/arm pain and he has noted swelling and tightness making functional use more difficult. His BLE AROM is WFLs despite having his RLE wound. Sit to stand from the bedside chair was minimal assist x 2 secondary to minimal use of his BUEs/hands. Static standing with the r/walker was fair. He ambulated 21' with the r/walker with CGA. He was steady once he got up and situated with his UEs on the r/walker. He was able to maneuver the r/walker to turn and sit on the stretcher that was waiting to take him for a test.  He needs CGA for sit to supine to assist his BLEs on/off the bed. Mr. Payton Delacruz would benefit from continued skilled PT to maximize his functional abilities while in the hospital.  PT uncertain about discharge as his UEs remain limited in functional use at this time. This section established at most recent assessment PROBLEM LIST (Impairments causing functional limitations): 1. Decreased Strength 2. Decreased ADL/Functional Activities 3. Decreased Transfer Abilities 4. Decreased Ambulation Ability/Technique 5. Increased Pain 6. Edema/Girth INTERVENTIONS PLANNED: (Benefits and precautions of physical therapy have been discussed with the patient.) 1. Bed Mobility 2. Gait Training 3. Therapeutic Activites 4. Therapeutic Exercise/Strengthening 5. Transfer Training TREATMENT PLAN: Frequency/Duration: 3 times a week for duration of hospital stay Rehabilitation Potential For Stated Goals: Fair REHAB RECOMMENDATIONS (at time of discharge pending progress):   
Placement: It is my opinion, based on this patient's performance to date, that Mr. Payton Delacruz may benefit from participating in 1-2 additional therapy sessions in order to continue to assess for rehab potential and then make recommendation for disposition at discharge. Equipment:  
? None at this time HISTORY:  
History of Present Injury/Illness (Reason for Referral): 
 Patient is a 67 y.o. male who presented to the ED for cc weeping wound to right lower leg with clear drainage that has worsened over the past few days. Hx of ESRD but refusing dialysis. Able to urinate 8oz three times a day per the patient. Hx of HTN, DM type II on insulin, DVT of left popliteal vein 10/24/19, and PVD. Patient originally had fistula of left arm but has been ligated on 9/18/19 by vascular surgery since patient states he does not want to be on dialysis. Patient states he follows [place] [facility]. Patient states he was recently placed on Keflex but when I review records I cannot find this. Pharmacy also cannot find where he has filled antibiotics Past Medical History/Comorbidities:  
Mr. Rhona Cassidy  has a past medical history of Acute renal failure superimposed on stage 3 chronic kidney disease ([address] (9/21/2016), Anemia, Arthritis, Chronic kidney disease, Chronic pancreatitis ([address] (9/22/2016), Dermatophytosis of nail (12/6/2016), Diabetic neuropathy ([address] (9/22/2016), Essential hypertension (9/22/2016), GERD (gastroesophageal reflux disease), Hypercholesterolemia, Iron (Fe) deficiency anemia (9/22/2016), Septicemia due to Klebsiella pneumoniae ([address] (2/92/2681), Systolic CHF, chronic ([address] (9/23/2016), Type II diabetes mellitus with nephropathy ([address] (09/22/2016), Venous insufficiency (12/6/2016), and Xerosis cutis (12/6/2016). He also has no past medical history of Adverse effect of anesthesia, Congestive heart failure, unspecified, Difficult intubation, Malignant hyperthermia due to anesthesia, Nausea & vomiting, or Pseudocholinesterase deficiency. Mr. Rhona Cassidy  has a past surgical history that includes hx hernia repair (Left, 2010); hx colonoscopy; hx turp (2012); hx prostatectomy (2012); vascular surgery procedure unlist (Left, 10/26/2017); and vascular surgery procedure unlist (Left, 09/18/2019). Social History/Living Environment:  
Home Environment: Trailer/mobile home # Steps to Enter: 0 Wheelchair Ramp: Yes One/Two Story Residence: One story Living Alone: Yes Support Systems: Family member(s) Patient Expects to be Discharged to[de-identified] Unknown Current DME Used/Available at Home: Cane, straight, Walker, rolling Prior Level of Function/Work/Activity: 
Patient reports he uses his cane at home for independent mobility Number of Personal Factors/Comorbidities that affect the Plan of Care: 1-2: MODERATE COMPLEXITY EXAMINATION:  
Most Recent Physical Functioning:  
Gross Assessment: 
AROM: Generally decreased, functional 
PROM: Generally decreased, functional 
Strength: Generally decreased, functional 
Coordination: Generally decreased, functional 
Sensation: Intact Posture: 
Posture (WDL): Within defined limits Balance: 
Sitting: Intact Standing: Impaired Standing - Static: Fair;Constant support Standing - Dynamic : Fair Bed Mobility: 
  
Wheelchair Mobility: 
  
Transfers: 
Sit to Stand: Minimum assistance;Assist x2(could not use his hands to push up and assist) Stand to Sit: Minimum assistance Bed to Chair: Contact guard assistance Gait: 
  
Base of Support: Narrowed Speed/Melva: Pace decreased (<100 feet/min) Step Length: Left shortened;Right shortened Distance (ft): 20 Feet (ft) Assistive Device: Walker, rolling Ambulation - Level of Assistance: Contact guard assistance Interventions: Safety awareness training Body Structures Involved: 1. Metabolic 2. Endocrine 3. Skin  Body Functions Affected: 1. Skin Related 2. Metobolic/Endocrine Activities and Participation Affected: 1. General Tasks and Demands 2. Mobility 3. Self Care Number of elements that affect the Plan of Care: 3: MODERATE COMPLEXITY CLINICAL PRESENTATION:  
Presentation: Evolving clinical presentation with changing clinical characteristics: MODERATE COMPLEXITY CLINICAL DECISION MAKIN \A Chronology of [place] [facility]\"" [address] AM-PAC 6 Clicks Basic Mobility Inpatient Short Form How much difficulty does the patient currently have. .. Unable A Lot A Little None 1. Turning over in bed (including adjusting bedclothes, sheets and blankets)? ? 1   ? 2   ? 3   ? 4  
2. Sitting down on and standing up from a chair with arms ( e.g., wheelchair, bedside commode, etc.)   ? 1   ? 2   ? 3   ? 4  
3. Moving from lying on back to sitting on the side of the bed?   ? 1   ? 2   ? 3   ? 4 How much help from another person does the patient currently need. .. Total A Lot A Little None 4. Moving to and from a bed to a chair (including a wheelchair)? ? 1   ? 2   ? 3   ? 4  
5. Need to walk in hospital room? ? 1   ? 2   ? 3   ? 4  
6. Climbing 3-5 steps with a railing? ? 1   ? 2   ? 3   ? 4  
© 2007, Trustees of [address] [place], [place] [address], under license to [employer]. All rights reserved Score:  Initial: 16 Most Recent: X (Date: -- ) Interpretation of Tool:  Represents activities that are increasingly more difficult (i.e. Bed mobility, Transfers, Gait). Medical Necessity:    
· Patient is expected to demonstrate progress in strength and functional technique ·  to increase independence with bed mobility, SPT and gait with LRAD · . Reason for Services/Other Comments: 
· Patient continues to require skilled intervention due to medical complications · . Use of outcome tool(s) and clinical judgement create a POC that gives a: Questionable prediction of patient's progress: MODERATE COMPLEXITY  
  
 
 
 
TREATMENT:  
(In addition to Assessment/Re-Assessment sessions the following treatments were rendered) Pre-treatment Symptoms/Complaints:  Patient complained of BUE hand/arm pain both which are very swollen and tight and not functional for use at this time Pain: Initial:  
Pain Intensity 1: 7 Pain Location 1: Hand  Post Session:  7/10 Assessment/Reassessment only, no treatment provided today Braces/Orthotics/Lines/Etc:  
· O2 Device: Room air Treatment/Session Assessment: · Response to Treatment:  patient participated well with therapy. · Interdisciplinary Collaboration:  
o Physical Therapist 
o Registered Nurse · After treatment position/precautions:  
o On a stretcher headed for some additional tests. · Compliance with Program/Exercises: Will assess as treatment progresses · Recommendations/Intent for next treatment session: \"Next visit will focus on advancements to more challenging activities and reduction in assistance provided\". Total Treatment Duration: PT Patient Time In/Time Out Time In: 1440 Time Out: 1205 Alyson Ocasio [place]

## 2019-11-04 NOTE — PROGRESS NOTES
Pharmacokinetic Consult to Pharmacist 
 
Ad Still is a 67 y.o. male being treated for cellulitis with vancomycin. Height: 6' (182.9 cm)  Weight: 74.8 kg (164 lb 14.4 oz) Lab Results Component Value Date/Time BUN 60 (H) 11/04/2019 03:22 AM  
 Creatinine 4.70 (H) 11/04/2019 03:22 AM  
 WBC 3.7 (L) 11/04/2019 03:22 AM  
 Procalcitonin 0.5 11/03/2019 05:25 PM  
 Lactic acid 3.0 (H) 09/22/2016 09:55 PM  
 Lactic Acid (POC) 1.06 11/03/2019 07:00 PM  
  
Estimated Creatinine Clearance: 13.7 mL/min (A) (based on SCr of 4.7 mg/dL (H)). CULTURES: 
pending Day 1 of vancomycin. Goal trough is 15-20, but dosing per random levels due to renal dysfunction. Vancomycin dose initiated at 1500 mg X 1 pending IV access. Will plan to dose intermittently for now. Will continue to follow patient. Thank you, 
Sima Cano, PharmD Clinical Pharmacist 
[phone]

## 2019-11-04 NOTE — WOUND CARE
Pt seen for RLE wound. Noted vascular following and TONY's ordered. RLE with dry scaly skin and partial thickness wound present to right lateral lower extremity. Cleansed with dermal wound cleanser. Able to adjust viable skin flap over some open areas. Covered with Xeroform gauze and ABD pad and secured with kerlix gauze wrap. Recommend dressing change daily and as needed. Will also order aquaphor to BLE daily. Wound team will continue to follow while in acute care setting.

## 2019-11-04 NOTE — ED NOTES
TRANSFER - OUT REPORT: 
 
Verbal report given to Nohemi(name) on A B Adiel Reza Leeks  being transferred to [facility](unit) for routine progression of care Report consisted of patients Situation, Background, Assessment and  
Recommendations(SBAR). Information from the following report(s) SBAR was reviewed with the receiving nurse. Lines:    
 
Opportunity for questions and clarification was provided. Patient transported with: 
 [employer]

## 2019-11-04 NOTE — CONSULTS
Patient is a 67year old male with CKD stage IV-V corey is followed by [facility]. He is adamant that he does not want dialysis and has had his AVF tied off. He was last seen in the office in October 2019. Renal function stable. He is on Retacrit for anemia at the infusion center. Patient is admitted now for foot infection and is in need of PICC line for antibiotics. Renal function is at baseline and has ranged 4.5-6 creatinine over the last couple of years. From a nephrology standpoint would prefer a tunneled PICC line given his advanced CKD. Would still prefer to save his arms in the event he changes his mind about dialysis in the future. Please call for questions. He will need to follow up in the office after discharge. No charge for visit. Patient not seen.

## 2019-11-04 NOTE — PROGRESS NOTES
Mr. Marlene De La Torre of room 217 is still off the floor; thus, did not deliver Code 44 letter (inpatient to observation status) or the Medicare MOON letter.

## 2019-11-04 NOTE — PROGRESS NOTES
Problem: Self Care Deficits Care Plan (Adult) Goal: *Acute Goals and Plan of Care (Insert Text) Description 1. Pt will toilet with SBA 2. Pt will complete functional mobility for ADLs with SBA 3. Pt will complete lower body dressing with min A using AE as needed 4. Pt will complete grooming and hygiene at sink with SBA 5. Pt will demonstrate independence with HEP to promote increased BUE strength, coordination,, and functional use for ADLs 6. Pt will tolerate 23 minutes functional activity with min or fewer rest breaks to promote increased endurance for ADLs Timeframe: 7 days Outcome: Progressing Towards Goal 
  
OCCUPATIONAL THERAPY: Initial Assessment and Daily Note 11/4/2019 OBSERVATION: OT Visit Days: 1 Payor: Harry Anthony / Plan: [address] / Product Type: [employer] Care Medicare /  
  
NAME/AGE/GENDER: Taiwo Mckeon is a 67 y.o. male PRIMARY DIAGNOSIS:  Open wound of skin [T14. Khris Oats ESRD (end stage renal disease) ([facility] [N18.6] Open wound of skin [T14. Khris Oats ESRD (end stage renal disease) ([facility] [N18.6] Open wound of skin Open wound of skin ICD-10: Treatment Diagnosis:  
 · Generalized Muscle Weakness (M62.81) Precautions/Allergies: 
  falls Patient has no known allergies. ASSESSMENT:  
 
Mr. Deisi Ennis was admitted with RLE wound, ESRD. Pt lives alone, has daily caregivers from 9am-4pm and is assisted with bathing, is otherwise independent with ADLs and mobility for ADLs using a cane. Pt reports being mainly independent with IADLs but does not drive. Pt endorses 2 recent falls. This session, pt presented with deficits in UE strength, ROM, , and coordination as well as mobility, balance, and endurance impacting ADLs. Pt c/o progressive pain in B hands and wrists and L elbow and shoulder for last 2 weeks and < LUE use since dialysis fistula placement several weeks ago.  BUE ROM and strength is severely limited with moderately impaired R hand  and FMC, lacks functional use of BUEs for ADLs. Pt required mod A to don gown d/t UE deficits, max A to don/ doff socks, mod-max A for LB dressing. Pt educated on adaptive techniques for UB dressing to compensate for weakness. Pt stood from bed with CGA, transferred to chair with CGA using RW. Pt is well below his functional baseline and would benefit from skilled OT services to address deficits. This section established at most recent assessment PROBLEM LIST (Impairments causing functional limitations): 1. Decreased Strength 2. Decreased ADL/Functional Activities 3. Decreased Transfer Abilities 4. Decreased Balance 5. Increased Pain 6. Decreased Activity Tolerance 7. Decreased Flexibility/Joint Mobility INTERVENTIONS PLANNED: (Benefits and precautions of occupational therapy have been discussed with the patient.) 1. Activities of daily living training 2. Adaptive equipment training 3. Balance training 4. Therapeutic activity 5. Therapeutic exercise TREATMENT PLAN: Frequency/Duration: Follow patient 3 times/ week to address above goals. Rehabilitation Potential For Stated Goals: Good REHAB RECOMMENDATIONS (at time of discharge pending progress):   
Placement: It is my opinion, based on this patient's performance to date, that Mr. Marlene De La Torre may benefit from intensive therapy at a [address] [place] [address] after discharge due to the functional deficits listed above that are likely to improve with skilled rehabilitation and concerns that he/she may be unsafe to be unsupervised at home due to fall risk, inability to complete ADLs . Equipment:  
? None at this time OCCUPATIONAL PROFILE AND HISTORY:  
History of Present Injury/Illness (Reason for Referral): 
See H&P Past Medical History/Comorbidities:  
Mr. Marlene De La Torre  has a past medical history of Acute renal failure superimposed on stage 3 chronic kidney disease ([facility] [address] (9/21/2016), Anemia, Arthritis, Chronic kidney disease, Chronic pancreatitis [facility] (9/22/2016), Dermatophytosis of nail (12/6/2016), Diabetic neuropathy ([facility] (9/22/2016), Essential hypertension (9/22/2016), GERD (gastroesophageal reflux disease), Hypercholesterolemia, Iron (Fe) deficiency anemia (9/22/2016), Septicemia due to Klebsiella pneumoniae ([facility] (8/25/8226), Systolic CHF, chronic ([facility] (9/23/2016), Type II diabetes mellitus with nephropathy ([facility] (09/22/2016), Venous insufficiency (12/6/2016), and Xerosis cutis (12/6/2016). He also has no past medical history of Adverse effect of anesthesia, Congestive heart failure, unspecified, Difficult intubation, Malignant hyperthermia due to anesthesia, Nausea & vomiting, or Pseudocholinesterase deficiency. Mr. Verner Kung  has a past surgical history that includes hx hernia repair (Left, 2010); hx colonoscopy; hx turp (2012); hx prostatectomy (2012); vascular surgery procedure unlist (Left, 10/26/2017); and vascular surgery procedure unlist (Left, 09/18/2019). Social History/Living Environment:  
Home Environment: Trailer/mobile home # Steps to Enter: 0 Wheelchair Ramp: Yes One/Two Story Residence: One story Living Alone: Yes Support Systems: Home care staff Patient Expects to be Discharged to[de-identified] Unknown Current DME Used/Available at Home: Walker, rolling, Shower chair, Safety frame toliet, Raised toilet seat, Cane, straight Tub or Shower Type: Tub/Shower combination Prior Level of Function/Work/Activity: 
Pt lives alone, has daily caregivers from 9am-4pm and is assisted with bathing, is otherwise independent with ADLs and mobility for ADLs using a cane. Pt reports being mainly independent with IADLs but does not drive. Pt endorses 2 recent falls. Number of Personal Factors/Comorbidities that affect the Plan of Care: Expanded review of therapy/medical records (1-2):  MODERATE COMPLEXITY ASSESSMENT OF OCCUPATIONAL PERFORMANCE[de-identified]  
Activities of Daily Living: Basic ADLs (From Assessment) Complex ADLs (From Assessment) Feeding: Minimum assistance Oral Facial Hygiene/Grooming: Minimum assistance Bathing: Moderate assistance Upper Body Dressing: Moderate assistance Lower Body Dressing: Moderate assistance Toileting: Minimum assistance Instrumental ADL Meal Preparation: Maximum assistance Homemaking: Maximum assistance Medication Management: Moderate assistance Grooming/Bathing/Dressing Activities of Daily Living Cognitive Retraining Safety/Judgement: Awareness of environment; Fall prevention Upper Body Dressing Assistance Hospital Gown: Moderate assistance Lower Body Dressing Assistance Socks: Maximum assistance Bed/Mat Mobility Sit to Stand: Minimum assistance Stand to Sit: Contact guard assistance Bed to Chair: Contact guard assistance Most Recent Physical Functioning:  
Gross Assessment: 
AROM: Grossly decreased, non-functional 
Strength: Grossly decreased, non-functional 
Coordination: Generally decreased, functional 
         
  
Posture: 
Posture (WDL): Within defined limits Balance: 
Sitting: Intact Standing: Impaired Standing - Static: Fair Standing - Dynamic : Fair Bed Mobility: 
  
Wheelchair Mobility: 
  
Transfers: 
Sit to Stand: Minimum assistance Stand to Sit: Contact guard assistance Bed to Chair: Contact guard assistance Patient Vitals for the past 6 hrs: 
 BP BP Patient Position SpO2 Pulse 11/04/19 0731 129/61 At rest 100 % (!) 58  
11/04/19 1124 131/53 Sitting 98 % (!) 54 Mental Status Neurologic State: Alert Orientation Level: Oriented X4 Cognition: Follows commands Perception: Appears intact Perseveration: No perseveration noted Safety/Judgement: Awareness of environment, Fall prevention Physical Skills Involved: 1. Range of Motion 2. Balance 3. Strength 4. Activity Tolerance 5. Fine Motor Control 6. Gross Motor Control 7. Pain (acute) Cognitive Skills Affected (resulting in the inability to perform in a timely and safe manner): 1. none  Psychosocial Skills Affected: 1. Habits/Routines 2. Environmental Adaptation Number of elements that affect the Plan of Care: 5+:  HIGH COMPLEXITY CLINICAL DECISION MAKIN[address] [place] [address] AM-PAC 6 Clicks Daily Activity Inpatient Short Form How much help from another person does the patient currently need. .. Total A Lot A Little None 1. Putting on and taking off regular lower body clothing? ? 1   ? 2   ? 3   ? 4  
2. Bathing (including washing, rinsing, drying)? ? 1   ? 2   ? 3   ? 4  
3. Toileting, which includes using toilet, bedpan or urinal?   ? 1   ? 2   ? 3   ? 4  
4. Putting on and taking off regular upper body clothing? ? 1   ? 2   ? 3   ? 4  
5. Taking care of personal grooming such as brushing teeth? ? 1   ? 2   ? 3   ? 4  
6. Eating meals? ? 1   ? 2   ? 3   ? 4  
© , Trustees of [address] [place] [address], under license to [employer]. All rights reserved Score:  Initial: 14 Most Recent: X (Date: -- ) Interpretation of Tool:  Represents activities that are increasingly more difficult (i.e. Bed mobility, Transfers, Gait). Medical Necessity:    
· Patient demonstrates good ·  rehab potential due to higher previous functional level. Reason for Services/Other Comments: 
· Patient continues to require present interventions due to patient's inability to complete ADLs · . Use of outcome tool(s) and clinical judgement create a POC that gives a: MODERATE COMPLEXITY  
 
 
 
TREATMENT:  
(In addition to Assessment/Re-Assessment sessions the following treatments were rendered) Pre-treatment Symptoms/Complaints:   
Pain: Initial:  
Pain Intensity 1: 6 Pain Location 1: Hand Pain Orientation 1: Right Pain Intervention(s) 1: Emotional support  Post Session:  no c/o pain Self Care: (10 min): Procedure(s) (per grid) utilized to improve and/or restore self-care/home management as related to dressing and grooming. Required minimal   cueing to facilitate activities of daily living skills and compensatory activities. Braces/Orthotics/Lines/Etc:  
· O2 Device: Room air Treatment/Session Assessment:   
· Response to Treatment:  no adverse reaction · Interdisciplinary Collaboration:  
o Occupational Therapist 
o Registered Nurse · After treatment position/precautions:  
o Up in chair 
o Bed/Chair-wheels locked 
o Bed in low position 
o Call light within reach 
o RN notified · Compliance with Program/Exercises: Compliant all of the time. · Recommendations/Intent for next treatment session: \"Next visit will focus on advancements to more challenging activities and reduction in assistance provided\". Total Treatment Duration: OT Patient Time In/Time Out Time In: 3954 Time Out: 1017 Ayesha Abraham, OT

## 2019-11-04 NOTE — PROGRESS NOTES
TRANSFER - IN REPORT: 
 
Verbal report received from Paulina(name) on A B Adiel [employer]  being received from ER(unit) for routine progression of care Report consisted of patients Situation, Background, Assessment and  
Recommendations(SBAR). Information from the following report(s) ED Summary was reviewed with the receiving nurse. Opportunity for questions and clarification was provided. Assessment completed upon patients arrival to unit and care assumed. Arrived awake, alert,oriented x4. Gets mixed up with some names of meds. Family arrived later; do not have a list of his medicines. Pt states his \"doctor told me not to take any insulin. \" then pt told me he takes Lantus insulin \"once in a while. \" Right arm slightly reddened/hand swollen slightly. Pt thinks this is from pulling himself up in bed. Right leg with old drsg covering wound. States this has been on \"all week. Jennifer Nam and has been draining. \" Small amt of serosanguinous drainage on old drsg. Has \"elephant-like\" skin to both lower legs. Has skin tear on right lower leg measuring 15cm x 2cm showing pink skin. This was covered by xeroform/kerlix after being washed with wound cleanser. Has scrapes on both knees. Has bandaids on 2 toes of left placed there this past week by podiatrist.  Call to hospitalist for ; MD ordered lantus insulin which was given. Also given Norco for pain. Having difficulty holding cups due to swelling in hands. Call light in reach. Has spoken to family via phone. 0215-. Call to hospitalists with orders received to give Humalog 14units sc now and recheck in 1 hour. [id number].  Informed hospitalist; will recheck at [place].

## 2019-11-04 NOTE — PROGRESS NOTES
Warfarin dosing per pharmacist 
 
SILVIO Sandhu is a 67 y.o. male. Height: 6' (182.9 cm)    Weight: 74.8 kg (164 lb 14.4 oz) Indication:  DVT Goal INR:  2-3 Home dose:  New start Risk factors or significant drug interactions:  Metronidazole starting 11/4 Other anticoagulants:  Heparin gtt bridge Daily Monitoring Date  INR     Warfarin dose HGB              Notes 11/3  1.0  N/A  13.3   
11/4  ---  2.5 mg  10.8  Alb 2.2 Pharmacy consulted to manage warfarin dosing on this admission. Patient is warfarin naive; INR 1.0 yesterday. Will dose conservatively given major DDI with metronidazole and advanced age. Will give 2.5 mg this evening and continue to follow daily INR. Thank you, Urbano Monroe, PharmD, [facility] Clinical Pharmacist 
[phone]

## 2019-11-04 NOTE — PROGRESS NOTES
Virtual Utilization Review RN  has determined this patient meets the Condition Code 44 criteria under the Medicare guidelines for change from Inpatient to observation status. This case sent to second level physician review for correct status determination and agreed upon by the attending physician. Admission status has been changed in the computer system. A copy of the Utilization Review RN documentation and the LARKIN letter explaining the  outpatient/observation services was given to Mr. Derick Castellano in room 217 with verbal explanation and verbal understanding about information given. Mr. Derick Castellano declines to sign either the Code 40 letter or the MOON notice; however, copies left in room for his review. Unsigned copy placed in the patients chart for scanning by HIS and copy left at bedside for patient information.

## 2019-11-05 NOTE — PROGRESS NOTES
Warfarin dosing per pharmacist 
 
SILVIO Elizabeth Bear is a 67 y.o. male. Height: 6' (182.9 cm)    Weight: 76.4 kg (168 lb 8 oz) Indication:  DVT Goal INR:  2-3 Home dose:  New start Risk factors or significant drug interactions:  Metronidazole starting 11/4 Other anticoagulants:  Heparin gtt bridge Daily Monitoring Date  INR     Warfarin dose HGB              Notes 11/3  1.0  N/A  13.3   
11/4  ---  2.5 mg  10.8  Alb 2.2 
11/5                 1.1                   2.5 mg              9.6 Warfarin therapy was initiated with 2.5mg yesterday. Suggest to continue current dose this evening and recheck status of INR tomorrow. Pharmacy will continue to follow. Thank you, Timothy Pagan, PharmD

## 2019-11-05 NOTE — PROGRESS NOTES
Hospitalist Note Admit Date:  11/3/2019  5:22 PM  
Name:  Skye Class Age:  67 y.o. 
:  1940 MRN:  [id number] PCP:  Angel Choudhary MD 
Treatment Team: Attending Provider: Destinee Dominguez MD; Consulting Provider: Russel Goldberg MD; Consulting Provider: Ace Cameron MD; Utilization Review: Daysi Jensen RN; Care Manager: Deana Jimenez RN  
 
Mr. Kannan Simpson is a 68 yo male with PMH of CKD 4 (declines dialysis) , HTN, DM2, DVT 10,2019 on eliquis admitted with RLE cellulitis. He has been started on oral clindamycin pending IV access placement. He is a difficult stick for access and unable to have PICC due to CKD. IR consulted for tunnel cath and then cans tart vancomycin/ zosyn. BC pending. Nephrology following. Vascular consulted due to PVD and current cellulitis with recommendations for wound care. Subjective: He was found in no distress, sitting in a chair. He stated both arms are swollen an both shoulders hurt. He denies fever. Objective:  
 
Patient Vitals for the past 24 hrs: 
 Temp Pulse Resp BP SpO2  
19 0713 98.1 °F (36.7 °C) 77 16 148/66 98 % 19 0340 98.8 °F (37.1 °C) (!) 56 16 166/77 96 % 19 2348 98 °F (36.7 °C) (!) 56 16 153/77 98 % 19 1947 97.9 °F (36.6 °C) (!) 56 16 156/61 100 % 19 1736  (!) 54  169/66   
19 1436 98.1 °F (36.7 °C) 65 16 156/73 97 % 19 1124 97.9 °F (36.6 °C) (!) 54 12 131/53 98 % Oxygen Therapy O2 Sat (%): 98 % (19 0713) O2 Device: Room air (19) Estimated body mass index is 22.85 kg/m² as calculated from the following: 
  Height as of this encounter: 6' (1.829 m). Weight as of this encounter: 76.4 kg (168 lb 8 oz). Intake/Output Summary (Last 24 hours) at 2019 8235 Last data filed at 2019 1081 Gross per 24 hour Intake 360 ml Output  Net 360 ml  
   
 *Note that automatically entered I/Os may not be accurate; dependent on patient compliance with collection and accurate  by techs. General:    Well nourished. Alert. No distress CV:   RRR. No murmur, rub, or gallop. 1+ edema with venous  Stasis Lungs:   CTAB. No wheezing, rhonchi, or rales. anterior Abdomen:   Soft, nontender, nondistended. Extremities: both arms are swollen. Both shoulders have limited ROM Skin:     Erythema right anterior shin, venous stasis dermatitis - chronic skin changes on both legs Neuro:  No gross focal deficits Data Review: 
I have reviewed all labs, meds, and studies from the last 24 hours: 
 
Recent Results (from the past 24 hour(s)) GLUCOSE, POC Collection Time: 11/04/19  2:08 PM  
Result Value Ref Range Glucose (POC) 109 (H) 65 - 100 mg/dL GLUCOSE, POC Collection Time: 11/04/19  4:46 PM  
Result Value Ref Range Glucose (POC) 203 (H) 65 - 100 mg/dL GLUCOSE, POC Collection Time: 11/04/19  9:12 PM  
Result Value Ref Range Glucose (POC) 311 (H) 65 - 100 mg/dL PLEASE READ & DOCUMENT PPD TEST IN 24 HRS Collection Time: 11/04/19 10:10 PM  
Result Value Ref Range PPD Negative Negative  
 mm Induration 0 0 - 5 mm CBC WITH AUTOMATED DIFF Collection Time: 11/05/19  3:41 AM  
Result Value Ref Range WBC 4.8 4.3 - 11.1 K/uL  
 RBC 3.83 (L) 4.23 - 5.6 M/uL HGB 9.6 (L) 13.6 - 17.2 g/dL HCT 29.9 (L) 41.1 - 50.3 % MCV 78.1 (L) 79.6 - 97.8 FL  
 MCH 25.1 (L) 26.1 - 32.9 PG  
 MCHC 32.1 31.4 - 35.0 g/dL  
 RDW 16.2 (H) 11.9 - 14.6 % PLATELET 626 (L) 233 - 450 K/uL MPV 12.7 (H) 9.4 - 12.3 FL ABSOLUTE NRBC 0.00 0.0 - 0.2 K/uL  
 DF AUTOMATED NEUTROPHILS 54 43 - 78 % LYMPHOCYTES 33 13 - 44 % MONOCYTES 11 4.0 - 12.0 % EOSINOPHILS 2 0.5 - 7.8 % BASOPHILS 0 0.0 - 2.0 % IMMATURE GRANULOCYTES 0 0.0 - 5.0 %  
 ABS. NEUTROPHILS 2.6 1.7 - 8.2 K/UL  
 ABS. LYMPHOCYTES 1.6 0.5 - 4.6 K/UL ABS. MONOCYTES 0.5 0.1 - 1.3 K/UL  
 ABS. EOSINOPHILS 0.1 0.0 - 0.8 K/UL  
 ABS. BASOPHILS 0.0 0.0 - 0.2 K/UL  
 ABS. IMM. GRANS. 0.0 0.0 - 0.5 K/UL METABOLIC PANEL, BASIC Collection Time: 11/05/19  3:41 AM  
Result Value Ref Range Sodium 145 136 - 145 mmol/L Potassium 3.1 (L) 3.5 - 5.1 mmol/L Chloride 112 (H) 98 - 107 mmol/L  
 CO2 24 21 - 32 mmol/L Anion gap 9 7 - 16 mmol/L Glucose 423 (H) 65 - 100 mg/dL BUN 58 (H) 8 - 23 MG/DL Creatinine 4.88 (H) 0.8 - 1.5 MG/DL  
 GFR est AA 15 (L) >60 ml/min/1.73m2 GFR est non-AA 12 (L) >60 ml/min/1.73m2 Calcium 7.1 (L) 8.3 - 10.4 MG/DL PROTHROMBIN TIME + INR Collection Time: 11/05/19  3:41 AM  
Result Value Ref Range Prothrombin time 14.9 (H) 11.7 - 14.5 sec INR 1.1 PTT Collection Time: 11/05/19  3:41 AM  
Result Value Ref Range aPTT 124.9 (H) 24.7 - 39.8 SEC  
GLUCOSE, POC Collection Time: 11/05/19  4:04 AM  
Result Value Ref Range Glucose (POC) 414 (H) 65 - 100 mg/dL GLUCOSE, POC Collection Time: 11/05/19  7:17 AM  
Result Value Ref Range Glucose (POC) 289 (H) 65 - 100 mg/dL All Micro Results Procedure Component Value Units Date/Time CULTURE, BLOOD [[id number]] Collected:  11/03/19 2125 Order Status:  Completed Specimen:  Blood Updated:  11/05/19 0825 Special Requests: --     
  RIGHT 
ARM Culture result: NO GROWTH 2 DAYS     
 CULTURE, BLOOD [[id number]] Collected:  11/03/19 2133 Order Status:  Completed Specimen:  Blood Updated:  11/05/19 0825 Special Requests: --     
  RIGHT 
FOREARM Culture result: NO GROWTH 2 DAYS No results found for this visit on 11/03/19. Current Meds: 
Current Facility-Administered Medications Medication Dose Route Frequency  alcohol 62% (NOZIN) nasal  1 Ampule  1 Ampule Topical Q12H  pantothenic ac-min oil-pet,hyd (AQUAPHOR) 41 % ointment   Topical DAILY  Vancomycin Pharmacy Intermittent Dosing   Other Rx Dosing/Monitoring  heparin 25,000 units in dextrose 500 mL infusion  18-36 Units/kg/hr IntraVENous TITRATE  metroNIDAZOLE (FLAGYL) tablet 500 mg  500 mg Oral Q12H  warfarin (COUMADIN) tablet 2.5 mg  2.5 mg Oral QPM  
 cefTRIAXone (ROCEPHIN) 2 g in 0.9% sodium chloride (MBP/ADV) 50 mL  2 g IntraVENous Q24H  
 acetaminophen (TYLENOL) tablet 650 mg  650 mg Oral Q4H PRN  
 HYDROcodone-acetaminophen (NORCO) 5-325 mg per tablet 1 Tab  1 Tab Oral Q4H PRN  
 naloxone (NARCAN) injection 0.4 mg  0.4 mg IntraVENous PRN  
 ondansetron (ZOFRAN) injection 4 mg  4 mg IntraVENous Q4H PRN  
 amLODIPine (NORVASC) tablet 10 mg  10 mg Oral DAILY  cloNIDine (CATAPRES) 0.1 mg/24 hr patch 1 Patch  1 Patch TransDERmal Q7D  
 hydrALAZINE (APRESOLINE) tablet 100 mg  100 mg Oral TID  pantoprazole (PROTONIX) tablet 40 mg  40 mg Oral DAILY  metoprolol succinate (TOPROL-XL) XL tablet 50 mg  50 mg Oral DAILY  pravastatin (PRAVACHOL) tablet 40 mg  40 mg Oral QHS  sodium bicarbonate tablet 650 mg  650 mg Oral TID  torsemide (DEMADEX) tablet 40 mg  40 mg Oral DAILY  traMADol (ULTRAM) tablet 50 mg  50 mg Oral Q6H PRN  
 insulin lispro (HUMALOG) injection   SubCUTAneous AC&HS  insulin glargine (LANTUS) injection 15 Units  15 Units SubCUTAneous QHS Other Studies (last 24 hours): Ankle Brachial Index Result Date: 11/4/2019 HISTORY: 67years of age Male with right leg open wound. EXAM/TECHNIQUE: Ankle/Brachial Index Measurement. Blood Pressure was measured in the upper and lower extremities. Brachial artery, Posterior Tibial artery, Dorsalis Pedis artery, and Toe Pressures were obtained. Ankle/Brachial Indices were calculated. COMPARISON: None.  FINDINGS: Measurements with Blood pressures: (in mmHg): Right brachial: 143 Left brachial: 143 Right lower extremity: Max between dorsalis pedis and posterior tibial arteries: Unable to be accurately obtained due to being greater than 254 mmHg Toe pressure: 156 Right TONY: Unable to be obtained Right TBI: 1.09 Left lower extremity: Max between dorsalis pedis and posterior tibial arteries: 207 Toe pressure: 108 Left TONY: 1.45 Left TBI: 0.76 IMPRESSION:   Right TONY: Unable to be obtained, as above. However, there was a normal right TBI of 1.09. Left TONY: Normal.  
 
 
Assessment and Plan:  
 
Hospital Problems as of 11/5/2019 Date Reviewed: 2/1/2019 Codes Class Noted - Resolved POA  
 ESRD (end stage renal disease) ([facility] [address] ICD-10-CM: N18.6 ICD-9-CM: 585.6  11/3/2019 - Present Unknown * (Principal) Open wound of skin ICD-10-CM: T14. [place] ICD-9-CM: 879.8  11/3/2019 - Present Unknown DVT (deep venous thrombosis) (HCC) ICD-10-CM: I82.409 ICD-9-CM: 453.40  11/3/2019 - Present Unknown Chronic systolic congestive heart failure (HCC) (Chronic) ICD-10-CM: R28.64 ICD-9-CM: 428.22, 428.0  9/23/2016 - Present Yes 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 
ICD-9-CM: 250.40, 583.81  9/22/2016 - Present Yes Plan: 
 
-RLE cellulitis: No fever, no leukocytosis Pain is better BC so far are negative Discontinue rocephin, flagyl and vancomycin Start ancef awaiting result of cultures Wound care 
 
-Right and left shoulder pain Both arms are edematous He does have a prior left arm surgery ( fistula ) anticipating HD, Chronically swollen Dr. Robles Hairston ordered MRI with contrast. I spoke with Dr. Myke Abraham ( nephrology ) and in view of high risk for systemic sclerosis, another modality should be considered Check duplex to RO DVT He may need MRI  Or CT scan wo contrast 
 
-Left popliteal DVT: On heparin gtt and coumadin bridge Not a candidate for eliquis due to CKD stage -Hypokalemia: replace prn  
 
-CKD4: stable 
no plans for HD due to his current wishes, continued demadex and bicarb -PVD: seen by vascular and recommends cellulitis treatment and wound care, left TONY was normal.   
 
-DM2:  lantus and SSI  
 
-Pancytopenia: stable -HTN: continued norvasc/ clonidine/ hydralazine/ metoprolol Diet:  DIET RENAL  
 
DVT ppx:  IV heparin DC planning/Disposition: home once medically ready. He does not want STR.   
 
 
Signed: 
Charisma Romo MD

## 2019-11-05 NOTE — PROGRESS NOTES
Problem: Mobility Impaired (Adult and Pediatric) Goal: *Acute Goals and Plan of Care (Insert Text) Description Goals: 
(1.)Mr. Wilberto Ring will move from supine to sit and sit to supine , scoot up and down and roll side to side with MODIFIED INDEPENDENCE within 4 treatment day(s). (2.)Mr. Wilberto Ring will transfer from bed to chair and chair to bed with SUPERVISION using the least restrictive device within 4 treatment day(s). (3.)Mr. Wilberto Ring will ambulate with SUPERVISION for >150 feet with the least restrictive device within 4 treatment day(s). (4.)Mr. Wilberto Ring will be able to use his BUEs to assist with pushing up sit to stand and with bed mobility for rolling and supine to sit with SBA within 4 treatment days PHYSICAL THERAPY: Daily Note and PM 11/5/2019 OBSERVATION: PT Visit Days : 1 Payor: Zahra Hagan / Plan: [employer] [address] / Product Type: Managed Care Medicare /   
  
NAME/AGE/GENDER: Kem Solorio is a 67 y.o. male PRIMARY DIAGNOSIS: Open wound of skin [T14. [place] ESRD (end stage renal disease) ([facility] [address] [N18.6] Open wound of skin [T14. [place] ESRD (end stage renal disease) ([facility] [address] [N18.6] Open wound of skin Open wound of skin ICD-10: Treatment Diagnosis:  
 · Generalized Muscle Weakness (M62.81) · Difficulty in walking, Not elsewhere classified (R26.2) Precaution/Allergies: 
Patient has no known allergies. ASSESSMENT:  
 
Mr. Wilberto Ring is a 67year old AAM with an admitting diagnosis of Open wound of skin and ESRD refusing HD. His PMH includes HTN, DM type II on insulin, DVT of left popliteal vein 10/24/19, and PVD. 11/05/19 He presents sitting up in the bedside chair and was agreeable to have therapy. He reports he lives alone in a mobile home with a ramp entry. He states his PLOF has been independent with use of a cane.   He states he has care givers that come daily and stay to assist him several hours as needed. He is complaining of BUE hand/arm/shoulder  pain and he has noted swelling and tightness making functional use more difficult. His BLE AROM is WFLs despite having his RLE wound. Sit to stand from the bedside chair was minimal assist x 1. Static standing with the support  was fair. He ambulated 250' with the use of the IV pole with CGA. He was steady once he got up and situated with his UEs on the pole. Patient is returned to the recliner at the end of the treatment session with needs within reach. MD comes in to visit upon this writers exit. Mr. Deisi Ennis would benefit from continued skilled PT to maximize his functional abilities while in the hospital.  PT uncertain about discharge as his UEs remain limited in functional use at this time. Will continue PT efforts. This section established at most recent assessment PROBLEM LIST (Impairments causing functional limitations): 1. Decreased Strength 2. Decreased ADL/Functional Activities 3. Decreased Transfer Abilities 4. Decreased Ambulation Ability/Technique 5. Increased Pain 6. Edema/Girth INTERVENTIONS PLANNED: (Benefits and precautions of physical therapy have been discussed with the patient.) 1. Bed Mobility 2. Gait Training 3. Therapeutic Activites 4. Therapeutic Exercise/Strengthening 5. Transfer Training TREATMENT PLAN: Frequency/Duration: 3 times a week for duration of hospital stay Rehabilitation Potential For Stated Goals: Fair REHAB RECOMMENDATIONS (at time of discharge pending progress):   
Placement: It is my opinion, based on this patient's performance to date, that Mr. Deisi Ennis may benefit from participating in 1-2 additional therapy sessions in order to continue to assess for rehab potential and then make recommendation for disposition at discharge. Equipment:  
? None at this time HISTORY:  
History of Present Injury/Illness (Reason for Referral): 
 Patient is a 67 y.o. male who presented to the ED for cc weeping wound to right lower leg with clear drainage that has worsened over the past few days. Hx of ESRD but refusing dialysis. Able to urinate 8oz three times a day per the patient. Hx of HTN, DM type II on insulin, DVT of left popliteal vein 10/24/19, and PVD. Patient originally had fistula of left arm but has been ligated on 9/18/19 by vascular surgery since patient states he does not want to be on dialysis. Patient states he follows [facility]. Patient states he was recently placed on Keflex but when I review records I cannot find this. Pharmacy also cannot find where he has filled antibiotics Past Medical History/Comorbidities:  
Mr. Rosalee Lopez  has a past medical history of Acute renal failure superimposed on stage 3 chronic kidney disease ([address] (9/21/2016), Anemia, Arthritis, Chronic kidney disease, Chronic pancreatitis ([address] (9/22/2016), Dermatophytosis of nail (12/6/2016), Diabetic neuropathy ([address] (9/22/2016), Essential hypertension (9/22/2016), GERD (gastroesophageal reflux disease), Hypercholesterolemia, Iron (Fe) deficiency anemia (9/22/2016), Septicemia due to Klebsiella pneumoniae ([address] (5/42/2589), Systolic CHF, chronic ([address] (9/23/2016), Type II diabetes mellitus with nephropathy ([address] (09/22/2016), Venous insufficiency (12/6/2016), and Xerosis cutis (12/6/2016). He also has no past medical history of Adverse effect of anesthesia, Congestive heart failure, unspecified, Difficult intubation, Malignant hyperthermia due to anesthesia, Nausea & vomiting, or Pseudocholinesterase deficiency. Mr. Rosalee Lopez  has a past surgical history that includes hx hernia repair (Left, 2010); hx colonoscopy; hx turp (2012); hx prostatectomy (2012); vascular surgery procedure unlist (Left, 10/26/2017); and vascular surgery procedure unlist (Left, 09/18/2019). Social History/Living Environment:  
Home Environment: Trailer/mobile home # Steps to Enter: 0 Wheelchair Ramp: Yes One/Two Story Residence: One story Living Alone: Yes Support Systems: Home care staff Patient Expects to be Discharged to[de-identified] Unknown Current DME Used/Available at Home: Walker, rolling, Shower chair, Safety frame toliet, Raised toilet seat, Cane, straight Tub or Shower Type: Tub/Shower combination Prior Level of Function/Work/Activity: 
Patient reports he uses his cane at home for independent mobility Number of Personal Factors/Comorbidities that affect the Plan of Care: 1-2: MODERATE COMPLEXITY EXAMINATION:  
Most Recent Physical Functioning:  
Gross Assessment: 
  
         
  
Posture: 
  
Balance: 
Sitting: Intact Standing: Impaired Standing - Static: Fair Standing - Dynamic : Fair Bed Mobility: 
  
Wheelchair Mobility: 
  
Transfers: 
Sit to Stand: Minimum assistance Stand to Sit: Minimum assistance Gait: 
  
Base of Support: Narrowed Step Length: Left shortened;Right shortened Distance (ft): 250 Feet (ft) Assistive Device: Other (comment)(IV pole) Ambulation - Level of Assistance: Contact guard assistance;Minimal assistance Interventions: Safety awareness training Body Structures Involved: 1. Metabolic 2. Endocrine 3. Skin  Body Functions Affected: 1. Skin Related 2. Metobolic/Endocrine Activities and Participation Affected: 1. General Tasks and Demands 2. Mobility 3. Self Care Number of elements that affect the Plan of Care: 3: MODERATE COMPLEXITY CLINICAL PRESENTATION:  
Presentation: Evolving clinical presentation with changing clinical characteristics: MODERATE COMPLEXITY CLINICAL DECISION MAKING:  
[employer] 6 Clicks Basic Mobility Inpatient Short Form How much difficulty does the patient currently have. .. Unable A Lot A Little None 1. Turning over in bed (including adjusting bedclothes, sheets and blankets)? ? 1   ? 2   ? 3   ? 4  
2.   Sitting down on and standing up from a chair with arms ( e.g., wheelchair, bedside commode, etc.)   ? 1   ? 2   ? 3   ? 4  
3. Moving from lying on back to sitting on the side of the bed?   ? 1   ? 2   ? 3   ? 4 How much help from another person does the patient currently need. .. Total A Lot A Little None 4. Moving to and from a bed to a chair (including a wheelchair)? ? 1   ? 2   ? 3   ? 4  
5. Need to walk in hospital room? ? 1   ? 2   ? 3   ? 4  
6. Climbing 3-5 steps with a railing? ? 1   ? 2   ? 3   ? 4  
© 2007, Trustees of [address] [place] [address] [address] [address], under license to [employer]. All rights reserved Score:  Initial: 16 Most Recent: X (Date: -- ) Interpretation of Tool:  Represents activities that are increasingly more difficult (i.e. Bed mobility, Transfers, Gait). Medical Necessity:    
· Patient is expected to demonstrate progress in strength and functional technique ·  to increase independence with bed mobility, SPT and gait with LRAD · . Reason for Services/Other Comments: 
· Patient continues to require skilled intervention due to medical complications · . Use of outcome tool(s) and clinical judgement create a POC that gives a: Questionable prediction of patient's progress: MODERATE COMPLEXITY  
  
 
 
 
TREATMENT:  
(In addition to Assessment/Re-Assessment sessions the following treatments were rendered) Pre-treatment Symptoms/Complaints: \"OK let's try\" Pain: Initial:  
Pain Intensity 1: 5 Pain Location 1: Shoulder  Post Session:  5/10 Therapeutic Activity: (    23 minutes): Therapeutic activities including transfers and gait training  to improve mobility, strength, balance and coordination. Required minimum verbal cues with  Safety awareness training to increase mobility and safety with functional mobility activities. Braces/Orthotics/Lines/Etc:  
· IV 
· O2 Device: Room air Treatment/Session Assessment:   
· Response to Treatment:  patient participated well with therapy. · Interdisciplinary Collaboration: o Physical Therapist 
o Registered Nurse · After treatment position/precautions:  
o Up in chair 
o Bed/Chair-wheels locked 
o Call light within reach 
o RN notified 
o MD at bedside · Compliance with Program/Exercises: Will assess as treatment progresses · Recommendations/Intent for next treatment session: \"Next visit will focus on advancements to more challenging activities and reduction in assistance provided\". Total Treatment Duration: PT Patient Time In/Time Out Time In: 7399 Time Out: 1443 Eliezer Natarajan, PTA

## 2019-11-05 NOTE — PROGRESS NOTES
Patient seen and examined. Patient admitted for right lower leg wound. Wound looks more venous in nature. Outside tissue has sloughed off, we recommend just wound care with Xeroform and draped in dressing. Patient is not a surgical candidate. Lower extremity duplex study showed adequate enough circulation to heal.  Patient most complaint of left shoulder pain. May recommend a left MRI for better evaluation,  Patient was initially scheduled to see orthopedic surgery today. Maryanne Negrete

## 2019-11-05 NOTE — PROGRESS NOTES
Called about recommendations regarding this patient potentially receiving MRI with gadolinium. Given advanced kidney disease, recommend avoidance of gadolinium. Ed Pal MD 
[facility]

## 2019-11-05 NOTE — PROGRESS NOTES
I contacted Dr. Myke Abraham ( nephrology ) regarding the need for MRI with contrast of the left shoulder for MrJuli Castellano. The patient has stage 4 CKD and has a ascencion risk for systemic sclerosis. Dr. Myke Abraham suggested a different modality to assess joint pain. The patient has bilateral arm edema, pain. I will order BL duplex to RO DVT. He may undergo MRI wo contrast instead tomorrow.

## 2019-11-05 NOTE — PROGRESS NOTES
Problem: Diabetes Self-Management Goal: *Disease process and treatment process Description Define diabetes and identify own type of diabetes; list 3 options for treating diabetes. Outcome: Progressing Towards Goal 
Goal: *Incorporating nutritional management into lifestyle Description Describe effect of type, amount and timing of food on blood glucose; list 3 methods for planning meals. Outcome: Progressing Towards Goal 
Goal: *Incorporating physical activity into lifestyle Description State effect of exercise on blood glucose levels. Outcome: Progressing Towards Goal 
Goal: *Developing strategies to promote health/change behavior Description Define the ABC's of diabetes; identify appropriate screenings, schedule and personal plan for screenings. Outcome: Progressing Towards Goal 
Goal: *Using medications safely Description State effect of diabetes medications on diabetes; name diabetes medication taking, action and side effects. Outcome: Progressing Towards Goal 
Goal: *Monitoring blood glucose, interpreting and using results Description Identify recommended blood glucose targets  and personal targets. Outcome: Progressing Towards Goal 
Goal: *Prevention, detection, treatment of acute complications Description List symptoms of hyper- and hypoglycemia; describe how to treat low blood sugar and actions for lowering  high blood glucose level. Outcome: Progressing Towards Goal 
Goal: *Prevention, detection and treatment of chronic complications Description Define the natural course of diabetes and describe the relationship of blood glucose levels to long term complications of diabetes. Outcome: Progressing Towards Goal 
Goal: *Developing strategies to address psychosocial issues Description Describe feelings about living with diabetes; identify support needed and support network Outcome: Progressing Towards Goal 
Goal: *Insulin pump training Outcome: Progressing Towards Goal 
 Goal: *Sick day guidelines Outcome: Progressing Towards Goal 
Goal: *Patient Specific Goal (EDIT GOAL, INSERT TEXT) Outcome: Progressing Towards Goal 
  
Problem: Patient Education: Go to Patient Education Activity Goal: Patient/Family Education Outcome: Progressing Towards Goal 
  
Problem: Patient Education: Go to Patient Education Activity Goal: Patient/Family Education Outcome: Progressing Towards Goal 
  
Problem: Patient Education: Go to Patient Education Activity Goal: Patient/Family Education Outcome: Progressing Towards Goal 
  
Problem: Patient Education: Go to Patient Education Activity Goal: Patient/Family Education Outcome: Progressing Towards Goal 
  
Problem: Patient Education: Go to Patient Education Activity Goal: Patient/Family Education Outcome: Progressing Towards Goal

## 2019-11-05 NOTE — PROGRESS NOTES
PT note:  Treatment deferred as patient is currently eating and talking on the phone. RN made aware.   Hari Leo, PTA

## 2019-11-06 NOTE — PROGRESS NOTES
Problem: Self Care Deficits Care Plan (Adult) Goal: *Acute Goals and Plan of Care (Insert Text) Description 1. Pt will toilet with SBA 2. Pt will complete functional mobility for ADLs with SBA 3. Pt will complete lower body dressing with min A using AE as needed 4. Pt will complete grooming and hygiene at sink with SBA 5. Pt will demonstrate independence with HEP to promote increased BUE strength, coordination,, and functional use for ADLs 6. Pt will tolerate 23 minutes functional activity with min or fewer rest breaks to promote increased endurance for ADLs Timeframe: 7 days Outcome: Progressing Towards Goal 
  
OCCUPATIONAL THERAPY: Daily Note 11/6/2019 OBSERVATION: OT Visit Days: 2 Payor: Larissa Abreu / Plan: [employer] / Product Type: [employer] /  
  
NAME/AGE/GENDER: Rubi Mathias is a 67 y.o. male PRIMARY DIAGNOSIS:  Open wound of skin [T14. Shelvia Anitha ESRD (end stage renal disease) ([facility] [address] [N18.6] Open wound of skin [T14. Shelvia Anitha ESRD (end stage renal disease) ([facility] [address] [N18.6] Open wound of skin Open wound of skin ICD-10: Treatment Diagnosis:  
 · Generalized Muscle Weakness (M62.81) Precautions/Allergies: 
  falls Patient has no known allergies. ASSESSMENT:  
 
Mr. Monse Kelly was admitted with RLE wound, ESRD. Pt lives alone, has daily caregivers from 9am-4pm and is assisted with bathing, is otherwise independent with ADLs and mobility for ADLs using a cane. Pt reports being mainly independent with IADLs but does not drive. Pt endorses 2 recent falls. Pt c/o progressive pain in B hands and wrists and L elbow and shoulder for last 2 weeks and < LUE use since dialysis fistula placement several weeks ago. BUE ROM and strength is severely limited with moderately impaired R hand  and FMC, lacks functional use of BUEs for ADLs.   
This session, pt greeted seated in chair, very pleasant and agreeable to OT session. Pt slumped down in chair, sat upright and scooted back to reposition w/ SBA. Pt issued theraputty and HEP to promote > R hand strength, pinch, , and coordination for ADLs. Pt returned demonstration independently. Pt is progressing towards goals, continue POC. This section established at most recent assessment PROBLEM LIST (Impairments causing functional limitations): 1. Decreased Strength 2. Decreased ADL/Functional Activities 3. Decreased Transfer Abilities 4. Decreased Balance 5. Increased Pain 6. Decreased Activity Tolerance 7. Decreased Flexibility/Joint Mobility INTERVENTIONS PLANNED: (Benefits and precautions of occupational therapy have been discussed with the patient.) 1. Activities of daily living training 2. Adaptive equipment training 3. Balance training 4. Therapeutic activity 5. Therapeutic exercise TREATMENT PLAN: Frequency/Duration: Follow patient 3 times/ week to address above goals. Rehabilitation Potential For Stated Goals: Good REHAB RECOMMENDATIONS (at time of discharge pending progress):   
Placement: It is my opinion, based on this patient's performance to date, that Mr. Mohsen Caruso may benefit from intensive therapy at a [address] [place] [address] after discharge due to the functional deficits listed above that are likely to improve with skilled rehabilitation and concerns that he/she may be unsafe to be unsupervised at home due to fall risk, inability to complete ADLs . Equipment:  
? None at this time OCCUPATIONAL PROFILE AND HISTORY:  
History of Present Injury/Illness (Reason for Referral): 
See H&P Past Medical History/Comorbidities:  
Mr. Mohsen Caruso  has a past medical history of Acute renal failure superimposed on stage 3 chronic kidney disease ([facility] [address] (9/21/2016), Anemia, Arthritis, Chronic kidney disease, Chronic pancreatitis ([address] (9/22/2016), Dermatophytosis of nail (12/6/2016), Diabetic neuropathy ([address] (9/22/2016), Essential hypertension (9/22/2016), GERD (gastroesophageal reflux disease), Hypercholesterolemia, Iron (Fe) deficiency anemia (9/22/2016), Septicemia due to Klebsiella pneumoniae ([facility] (2/54/8224), Systolic CHF, chronic ([facility] (9/23/2016), Type II diabetes mellitus with nephropathy ([facility] (09/22/2016), Venous insufficiency (12/6/2016), and Xerosis cutis (12/6/2016). He also has no past medical history of Adverse effect of anesthesia, Congestive heart failure, unspecified, Difficult intubation, Malignant hyperthermia due to anesthesia, Nausea & vomiting, or Pseudocholinesterase deficiency. Mr. Delmi Ibarra  has a past surgical history that includes hx hernia repair (Left, 2010); hx colonoscopy; hx turp (2012); hx prostatectomy (2012); vascular surgery procedure unlist (Left, 10/26/2017); and vascular surgery procedure unlist (Left, 09/18/2019). Social History/Living Environment:  
Home Environment: Trailer/mobile home # Steps to Enter: 0 Wheelchair Ramp: Yes One/Two Story Residence: One story Living Alone: Yes Support Systems: Home care staff Patient Expects to be Discharged to[de-identified] Unknown Current DME Used/Available at Home: Walker, rolling, Shower chair, Safety frame toliet, Raised toilet seat, Cane, straight Tub or Shower Type: Tub/Shower combination Prior Level of Function/Work/Activity: 
Pt lives alone, has daily caregivers from 9am-4pm and is assisted with bathing, is otherwise independent with ADLs and mobility for ADLs using a cane. Pt reports being mainly independent with IADLs but does not drive. Pt endorses 2 recent falls. Number of Personal Factors/Comorbidities that affect the Plan of Care: Expanded review of therapy/medical records (1-2):  MODERATE COMPLEXITY ASSESSMENT OF OCCUPATIONAL PERFORMANCE[de-identified]  
Activities of Daily Living:  
Basic ADLs (From Assessment) Complex ADLs (From Assessment) Feeding: Minimum assistance Oral Facial Hygiene/Grooming: Minimum assistance Bathing: Moderate assistance Upper Body Dressing: Moderate assistance Lower Body Dressing: Moderate assistance Toileting: Minimum assistance Instrumental ADL Meal Preparation: Maximum assistance Homemaking: Maximum assistance Medication Management: Moderate assistance Grooming/Bathing/Dressing Activities of Daily Living Most Recent Physical Functioning:  
Gross Assessment: 
AROM: Grossly decreased, non-functional 
Strength: Grossly decreased, non-functional 
Coordination: Generally decreased, functional 
         
  
Posture: 
Posture (WDL): Within defined limits Balance: 
  Bed Mobility: 
  
Wheelchair Mobility: 
  
Transfers: 
   
 
    
 
Patient Vitals for the past 6 hrs: 
 BP SpO2 Pulse 11/06/19 1108 121/54 99 % 60 Mental Status Neurologic State: Alert Orientation Level: Oriented X4 Cognition: Appropriate for age attention/concentration, Follows commands Perception: Appears intact Perseveration: No perseveration noted Safety/Judgement: Awareness of environment, Fall prevention Physical Skills Involved: 1. Range of Motion 2. Balance 3. Strength 4. Activity Tolerance 5. Fine Motor Control 6. Gross Motor Control 7. Pain (acute) Cognitive Skills Affected (resulting in the inability to perform in a timely and safe manner): 1. none  Psychosocial Skills Affected: 1. Habits/Routines 2. Environmental Adaptation Number of elements that affect the Plan of Care: 5+:  HIGH COMPLEXITY CLINICAL DECISION MAKING:  
[facility] AM-PAC 6 Clicks Daily Activity Inpatient Short Form How much help from another person does the patient currently need. .. Total A Lot A Little None 1. Putting on and taking off regular lower body clothing? ? 1   ? 2   ? 3   ? 4  
2. Bathing (including washing, rinsing, drying)? ? 1   ? 2   ? 3   ? 4  
3.   Toileting, which includes using toilet, bedpan or urinal?   ? 1   ? 2   ? 3   ? 4  
 4.  Putting on and taking off regular upper body clothing? ? 1   ? 2   ? 3   ? 4  
5. Taking care of personal grooming such as brushing teeth? ? 1   ? 2   ? 3   ? 4  
6. Eating meals? ? 1   ? 2   ? 3   ? 4  
© 2007, Trustees of [address] [place] [address] [address] [address], under license to [employer]. All rights reserved Score:  Initial: 14 Most Recent: X (Date: -- ) Interpretation of Tool:  Represents activities that are increasingly more difficult (i.e. Bed mobility, Transfers, Gait). Medical Necessity:    
· Patient demonstrates good ·  rehab potential due to higher previous functional level. Reason for Services/Other Comments: 
· Patient continues to require present interventions due to patient's inability to complete ADLs · . Use of outcome tool(s) and clinical judgement create a POC that gives a: MODERATE COMPLEXITY  
 
 
 
TREATMENT:  
(In addition to Assessment/Re-Assessment sessions the following treatments were rendered) Pre-treatment Symptoms/Complaints:   
Pain: Initial:  
Pain Intensity 1: 0 Pain Location 1: Hand Pain Orientation 1: Right Pain Intervention(s) 1: Emotional support  Post Session:  no c/o pain Therapeutic Activity: (    15 min): Therapeutic activities including theraputty and UE AROM exercises to improve strength, coordination and functional use of R hand and BUE for ADLs. Braces/Orthotics/Lines/Etc:  
· O2 Device: Room air Treatment/Session Assessment:   
· Response to Treatment:  no adverse reaction · Interdisciplinary Collaboration:  
o Occupational Therapist 
o Registered Nurse · After treatment position/precautions:  
o Up in chair 
o Bed/Chair-wheels locked 
o Bed in low position 
o Call light within reach 
o RN notified · Compliance with Program/Exercises: Compliant all of the time. · Recommendations/Intent for next treatment session: \"Next visit will focus on advancements to more challenging activities and reduction in assistance provided\". Total Treatment Duration: OT Patient Time In/Time Out Time In: 8458 Time Out: 6407 Azucena Bell, OT

## 2019-11-06 NOTE — PROGRESS NOTES
Blood sugar 395 orders to give 10 units and call physican. Receiving 15 units of Lantus. Paged Dr. Maria Dolores Bess. No additional orders received

## 2019-11-06 NOTE — PROGRESS NOTES
IV heparin rate has been adjusted based on the most recent PTT results. Lab Results Component Value Date/Time  
 aPTT >200.0 () 11/06/2019 03:12 AM  
 
Infusion stopped for 1 hour. To be restarted at 0518 rate to be decreased 3 units/kg/hour new rate will be 11 units/kg per hour. Next ptt  To be drawn at 1118

## 2019-11-06 NOTE — PROGRESS NOTES
IV heparin rate has been adjusted based on the most recent PTT results. Lab Results Component Value Date/Time  
 aPTT 105.8 (H) 11/05/2019 06:55 PM  
 
 
No change in rate next ptt at 0248.

## 2019-11-06 NOTE — PROGRESS NOTES
END OF SHIFT NOTE: 
 
INTAKE/OUTPUT No intake/output data recorded. Voiding: YES Catheter: NO 
Drain:   
 
 
 
 
 
Flatus: Patient does have flatus present. Stool:  1 occurrences. Characteristics: 
Stool Assessment Stool Color: [place] Stool Appearance: Soft Stool Amount: Medium Stool Source/Status: Rectum Emesis: 0 occurrences. Characteristics: VITAL SIGNS Patient Vitals for the past 12 hrs: 
 Temp Pulse Resp BP SpO2  
11/06/19 0354 98.1 °F (36.7 °C) (!) 54 16 125/53 99 % 11/05/19 2340 98.4 °F (36.9 °C) (!) 58 16 125/68 97 % 11/05/19 2251  (!) 55  141/66   
11/05/19 1949 98.3 °F (36.8 °C) (!) 58 16 142/64 98 % 11/05/19 1938     97 % Pain Assessment Pain Intensity 1: 3 (11/05/19 2340) Pain Location 1: Neck Pain Intervention(s) 1: Repositioned Patient Stated Pain Goal: 0 Ambulating No 
 
Shift report given to oncoming nurse at the bedside.  
 
Cheli Conway RN

## 2019-11-06 NOTE — PROGRESS NOTES
Patient seen and examined. No acute issues overnight. Patient with chronic right lower extremity venous wound. Would recommend continue wound care patient is not a surgical candidate. Patient has history of DVT currently on Eliquis. I reviewed his  his upper extremity duplex study which showed occlusion of subclavian axillary. From vascular standpoint can stop heparin drip and continue back on his Eliquis. MRI today is ordered for pain in his left shoulder. Milan Chahal

## 2019-11-06 NOTE — PROGRESS NOTES
Hospitalist Note Admit Date:  11/3/2019  5:22 PM  
Name:  Vikas Batch Age:  67 y.o. 
:  1940 MRN:  [id number] PCP:  David Castellano MD 
Treatment Team: Attending Provider: Miguel Mora MD; Consulting Provider: Austin Barker MD; Consulting Provider: Tatyana Loya MD; Utilization Review: Nancy hZong RN; Care Manager: Harrison Alatorre RN  
 
Mr. Becki Dawson is a 66 yo male with PMH of CKD 4 (declines dialysis) , HTN, DM2, DVT 10,2019 on eliquis admitted with RLE cellulitis. He has been started on oral clindamycin pending IV access placement. He is a difficult stick for access and unable to have PICC due to CKD. IR consulted for tunnel cath and then cans tart vancomycin/ zosyn. BC pending. Nephrology following. Vascular consulted due to PVD and current cellulitis with recommendations for wound care. Duplex of LUE showed subclavian, axillary, basilic vein thrombosis. Subjective: He still complains of left arm pain. Objective:  
 
Patient Vitals for the past 24 hrs: 
 Temp Pulse Resp BP SpO2  
19 0354 98.1 °F (36.7 °C) (!) 54 16 125/53 99 % 19 2340 98.4 °F (36.9 °C) (!) 58 16 125/68 97 % 19 2251  (!) 55  141/66   
19 1949 98.3 °F (36.8 °C) (!) 58 16 142/64 98 % 19 1938     97 % 19 1808  (!) 57  173/79   
19 1615 97.9 °F (36.6 °C) (!) 53 16 139/78 99 % 19 1128 98 °F (36.7 °C) (!) 51 16 132/60 99 % Oxygen Therapy O2 Sat (%): 99 % (19) Pulse via Oximetry: 59 beats per minute (19) O2 Device: Room air (19) Estimated body mass index is 22.7 kg/m² as calculated from the following: 
  Height as of 19: 6' (1.829 m). Weight as of this encounter: 75.9 kg (167 lb 6.4 oz). No intake or output data in the 24 hours ending 19 0746 *Note that automatically entered I/Os may not be accurate; dependent on patient compliance with collection and accurate  by [employer]. General:    Well nourished. Alert. No distress CV:   RRR. No murmur, rub, or gallop. 1+ edema with venous  Stasis Lungs:   CTAB. No wheezing, rhonchi, or rales. anterior Abdomen:   Soft, nontender, nondistended. Extremities: both arms are swollen. Both shoulders have limited ROM Skin:     Erythema right anterior shin, venous stasis dermatitis - chronic skin changes on both legs Neuro:  No gross focal deficits Data Review: 
I have reviewed all labs, meds, and studies from the last 24 hours: 
 
Recent Results (from the past 24 hour(s)) PTT Collection Time: 11/05/19 11:13 AM  
Result Value Ref Range aPTT 150.7 (H) 24.7 - 39.8 SEC  
GLUCOSE, POC Collection Time: 11/05/19 11:30 AM  
Result Value Ref Range Glucose (POC) 124 (H) 65 - 100 mg/dL GLUCOSE, POC Collection Time: 11/05/19  5:50 PM  
Result Value Ref Range Glucose (POC) 214 (H) 65 - 100 mg/dL PTT Collection Time: 11/05/19  6:55 PM  
Result Value Ref Range aPTT 105.8 (H) 24.7 - 39.8 SEC  
GLUCOSE, POC Collection Time: 11/05/19  9:36 PM  
Result Value Ref Range Glucose (POC) 395 (H) 65 - 100 mg/dL PLEASE READ & DOCUMENT PPD TEST IN 48 HRS Collection Time: 11/05/19 10:11 PM  
Result Value Ref Range PPD Negative Negative  
 mm Induration 0 0 - 5 mm CBC WITH AUTOMATED DIFF Collection Time: 11/06/19  3:12 AM  
Result Value Ref Range WBC 4.2 (L) 4.3 - 11.1 K/uL  
 RBC 3.77 (L) 4.23 - 5.6 M/uL HGB 9.5 (L) 13.6 - 17.2 g/dL HCT 29.8 (L) 41.1 - 50.3 % MCV 79.0 (L) 79.6 - 97.8 FL  
 MCH 25.2 (L) 26.1 - 32.9 PG  
 MCHC 31.9 31.4 - 35.0 g/dL  
 RDW 16.1 (H) 11.9 - 14.6 % PLATELET 233 281 - 005 K/uL MPV 13.5 (H) 9.4 - 12.3 FL ABSOLUTE NRBC 0.00 0.0 - 0.2 K/uL  
 DF AUTOMATED NEUTROPHILS 40 (L) 43 - 78 % LYMPHOCYTES 45 (H) 13 - 44 % MONOCYTES 11 4.0 - 12.0 % EOSINOPHILS 2 0.5 - 7.8 % BASOPHILS 1 0.0 - 2.0 % IMMATURE GRANULOCYTES 1 0.0 - 5.0 %  
 ABS. NEUTROPHILS 1.7 1.7 - 8.2 K/UL  
 ABS. LYMPHOCYTES 1.9 0.5 - 4.6 K/UL  
 ABS. MONOCYTES 0.5 0.1 - 1.3 K/UL  
 ABS. EOSINOPHILS 0.1 0.0 - 0.8 K/UL  
 ABS. BASOPHILS 0.0 0.0 - 0.2 K/UL  
 ABS. IMM. GRANS. 0.0 0.0 - 0.5 K/UL METABOLIC PANEL, BASIC Collection Time: 11/06/19  3:12 AM  
Result Value Ref Range Sodium 144 136 - 145 mmol/L Potassium 3.1 (L) 3.5 - 5.1 mmol/L Chloride 109 (H) 98 - 107 mmol/L  
 CO2 26 21 - 32 mmol/L Anion gap 9 7 - 16 mmol/L Glucose 276 (H) 65 - 100 mg/dL BUN 53 (H) 8 - 23 MG/DL Creatinine 5.16 (H) 0.8 - 1.5 MG/DL  
 GFR est AA 14 (L) >60 ml/min/1.73m2 GFR est non-AA 12 (L) >60 ml/min/1.73m2 Calcium 6.9 (L) 8.3 - 10.4 MG/DL PROTHROMBIN TIME + INR Collection Time: 11/06/19  3:12 AM  
Result Value Ref Range Prothrombin time 14.6 (H) 11.7 - 14.5 sec INR 1.1 [place] Collection Time: 11/06/19  3:12 AM  
Result Value Ref Range Vancomycin, random 13.2 UG/ML  
PTT Collection Time: 11/06/19  3:12 AM  
Result Value Ref Range aPTT >200.0 (HH) 24.7 - 39.8 SEC  
GLUCOSE, POC Collection Time: 11/06/19  5:02 AM  
Result Value Ref Range Glucose (POC) 193 (H) 65 - 100 mg/dL GLUCOSE, POC Collection Time: 11/06/19  7:30 AM  
Result Value Ref Range Glucose (POC) 135 (H) 65 - 100 mg/dL All Micro Results Procedure Component Value Units Date/Time CULTURE, BLOOD [[id number]] Collected:  11/03/19 2125 Order Status:  Completed Specimen:  Blood Updated:  11/06/19 6980 Special Requests: --     
  RIGHT 
ARM Culture result: NO GROWTH 3 DAYS     
 CULTURE, BLOOD [[id number]] Collected:  11/03/19 2133 Order Status:  Completed Specimen:  Blood Updated:  11/06/19 6173 Special Requests: --     
  RIGHT 
FOREARM Culture result: NO GROWTH 3 DAYS No results found for this visit on 11/03/19. Current Meds: Current Facility-Administered Medications Medication Dose Route Frequency  ceFAZolin (ANCEF) 1,000 mg in 0.9% sodium chloride (MBP/ADV) 50 mL ADV  1 g IntraVENous Q12H  
 alcohol 62% (NOZIN) nasal  1 Ampule  1 Ampule Topical Q12H  pantothenic ac-min oil-pet,hyd (AQUAPHOR) 41 % ointment   Topical DAILY  heparin 25,000 units in dextrose 500 mL infusion  18-36 Units/kg/hr IntraVENous TITRATE  warfarin (COUMADIN) tablet 2.5 mg  2.5 mg Oral QPM  
 acetaminophen (TYLENOL) tablet 650 mg  650 mg Oral Q4H PRN  
 HYDROcodone-acetaminophen (NORCO) 5-325 mg per tablet 1 Tab  1 Tab Oral Q4H PRN  
 naloxone (NARCAN) injection 0.4 mg  0.4 mg IntraVENous PRN  
 ondansetron (ZOFRAN) injection 4 mg  4 mg IntraVENous Q4H PRN  
 amLODIPine (NORVASC) tablet 10 mg  10 mg Oral DAILY  cloNIDine (CATAPRES) 0.1 mg/24 hr patch 1 Patch  1 Patch TransDERmal Q7D  
 hydrALAZINE (APRESOLINE) tablet 100 mg  100 mg Oral TID  pantoprazole (PROTONIX) tablet 40 mg  40 mg Oral DAILY  metoprolol succinate (TOPROL-XL) XL tablet 50 mg  50 mg Oral DAILY  pravastatin (PRAVACHOL) tablet 40 mg  40 mg Oral QHS  sodium bicarbonate tablet 650 mg  650 mg Oral TID  torsemide (DEMADEX) tablet 40 mg  40 mg Oral DAILY  traMADol (ULTRAM) tablet 50 mg  50 mg Oral Q6H PRN  
 insulin lispro (HUMALOG) injection   SubCUTAneous AC&HS  insulin glargine (LANTUS) injection 15 Units  15 Units SubCUTAneous QHS Other Studies (last 24 hours): 
Duplex Upper Ext Venous Bilat Result Date: 11/5/2019 Bilateral upper extremity duplex venous study, 11/5/2019. CLINICAL HISTORY: Basial basilic fistula ligation. Technique: Grayscale, and duplex Doppler images of the right upper extremity venous vascular system were obtained using an 8 MHz transducer. FINDINGS: Nonocclusive thrombus is seen in the left subclavian and axillary veins.  Occlusive thrombus is seen in the mid to distal left basilic vein. By image labeling, this involves the patient's basio-basilic fistula. Nonocclusive thrombus is seen in the right cephalic vein at the antecubital fossa. Normal respiratory variation is seen in the central venous structures. Soft tissue edema seen at multiple levels. No abnormal drainable fluid collection is identified. IMPRESSION: 1. Occlusive and nonocclusive venous thromboses with occlusive thrombus involving the patient's left basio-basilic fistula by images provided. Assessment and Plan:  
 
Hospital Problems as of 11/6/2019 Date Reviewed: 2/1/2019 Codes Class Noted - Resolved POA  
 ESRD (end stage renal disease) ([facility] [address] ICD-10-CM: N18.6 ICD-9-CM: 585.6  11/3/2019 - Present Unknown * (Principal) Open wound of skin ICD-10-CM: T14. Frank Tyler ICD-9-CM: 879.8  11/3/2019 - Present Unknown DVT (deep venous thrombosis) (HCC) ICD-10-CM: I82.409 ICD-9-CM: 453.40  11/3/2019 - Present Unknown Chronic systolic congestive heart failure (HCC) (Chronic) ICD-10-CM: C60.67 ICD-9-CM: 428.22, 428.0  9/23/2016 - Present Yes 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 
ICD-9-CM: 250.40, 583.81  9/22/2016 - Present Yes Plan: 
 
-RLE cellulitis: No fever, no leukocytosis Pain is better BC so far are negative Discontinue rocephin, flagyl and vancomycin DC ancef since [facility] are negative Change to doxycycline Wound care -Left arm DVT: 
Subclavian, axillary, lisette-basilic thrombosis Already on heparin gtt and bridging to coumadin Case discussed with IR over the phone ( Dr. Cordova [employer] ). Compression hose is an option to easy his edema He does have a prior left arm surgery ( fistula ) anticipating HD, Chronically swollen 
 
-Left shoulder pain: 
Plan for MRI  
 
-Left popliteal DVT: On heparin gtt and coumadin bridge Not a candidate for eliquis due to CKD stage -Hypokalemia: replace prn  
 
 -CKD4: stable 
no plans for HD due to his current wishes, continued demadex and bicarb -PVD: seen by vascular and recommends cellulitis treatment and wound care, left TONY was normal.   
 
-DM2:  lantus and SSI  
 
-Pancytopenia: stable -HTN: continued norvasc/ clonidine/ hydralazine/ metoprolol Diet:  DIET RENAL  
 
DVT ppx:  IV heparin DC planning/Disposition: home once medically ready. Patients status changed to inpatient. ROS, pmh, social hx, family hx unchanged from hpi.  
 
 
Signed: 
Anamaria Dsouza MD

## 2019-11-06 NOTE — PROGRESS NOTES
Warfarin dosing per pharmacist 
 
SILVIO Allen is a 67 y.o. male. Height: 6' (182.9 cm)    Weight: 75.9 kg (167 lb 6.4 oz) Indication:  DVT Goal INR:  2-3 Home dose:  New start Risk factors or significant drug interactions:  Metronidazole starting 11/4 Other anticoagulants:  Heparin gtt bridge Daily Monitoring Date  INR     Warfarin dose HGB              Notes 11/3  1.0  N/A  13.3   
11/4  ---  2.5 mg  10.8  Alb 2.2 
11/5                 1.1                   2.5 mg              9.6 
11/6  1.1  2.5 mg  9.5 Warfarin therapy was initiated with 2.5mg daily on 11/4. Continue with 2.5mg again tonight. INR remains at 1.1 but will wait until three consecutive doses before changing the dose as warfarin takes some time to affect INR. Based on the INR result tomorrow may need to increase dose. Pharmacy will continue to follow. Thank you, 
Cruz Hines PharmD 
[phone]

## 2019-11-07 NOTE — PROGRESS NOTES
Hospitalist Note Admit Date:  11/3/2019  5:22 PM  
Name:  Marco Antonio Middleton Age:  67 y.o. 
:  1940 MRN:  [id number] PCP:  Олег Garcia MD 
Treatment Team: Attending Provider: West Crowley MD; Consulting Provider: Shade Sanders MD; Consulting Provider: Cleo Sanchez MD; Utilization Review: Cody Ford RN; Care Manager: Nasra Hodge RN; Physical Therapy Assistant: Sylwia Locke PTA Mr. Enrique Villalba is a 66 yo male with PMH of CKD 4 (declines dialysis) , HTN, DM2, DVT 10,2019 on eliquis admitted with RLE cellulitis. He has been started on oral clindamycin pending IV access placement. He is a difficult stick for access and unable to have PICC due to CKD. IR consulted for tunnel cath and then cans tart vancomycin/ zosyn. BC pending. Nephrology following. Vascular consulted due to PVD and current cellulitis with recommendations for wound care. Duplex of LUE showed subclavian, axillary, basilic vein thrombosis. A left shoulder MRI revealed a chronic, massive RCT. Orthopedic is planning to follow as outpatient. Subjective: He has no new complains. He is wearing a left arm sleeve. I explained to him the results of left shoulder MRI. Objective:  
 
Patient Vitals for the past 24 hrs: 
 Temp Pulse Resp BP SpO2  
19 0720 98.5 °F (36.9 °C) 72 16 151/70 99 % 19 0307 98.8 °F (37.1 °C) (!) 58 16 158/63 100 % 19 2315 98 °F (36.7 °C) 66 16 148/77 99 % 19 2130  63  155/73   
19 1952 97.6 °F (36.4 °C) 63 16 178/73 99 % 19 1527 98.1 °F (36.7 °C) (!) 55 16 130/55 99 % 19 1108 98.4 °F (36.9 °C) 60 16 121/54 99 % Oxygen Therapy O2 Sat (%): 99 % (19 0720) Pulse via Oximetry: 59 beats per minute (11/05/19 1938) O2 Device: Room air (19 1430) Estimated body mass index is 22.84 kg/m² as calculated from the following: 
  Height as of 19: 6' (1.829 m). Weight as of this encounter: 76.4 kg (168 lb 6.4 oz). Intake/Output Summary (Last 24 hours) at 11/7/2019 0757 Last data filed at 11/6/2019 1208 Gross per 24 hour Intake  Output 500 ml Net -500 ml *Note that automatically entered I/Os may not be accurate; dependent on patient compliance with collection and accurate  by techs. General:    Well nourished. Alert. No distress CV:   RRR. No murmur, rub, or gallop. 1+ edema with venous  Stasis Lungs:   CTAB. No wheezing, rhonchi, or rales. anterior Abdomen:   Soft, nontender, nondistended. Extremities: both arms are swollen. Both shoulders have limited ROM Skin:     Erythema right anterior shin, venous stasis dermatitis - chronic skin changes on both legs Neuro:  No gross focal deficits Data Review: 
I have reviewed all labs, meds, and studies from the last 24 hours: 
 
Recent Results (from the past 24 hour(s)) PTT Collection Time: 11/06/19 11:29 AM  
Result Value Ref Range aPTT 49.1 (H) 24.7 - 39.8 SEC  
GLUCOSE, POC Collection Time: 11/06/19 11:41 AM  
Result Value Ref Range Glucose (POC) 229 (H) 65 - 100 mg/dL PTT Collection Time: 11/06/19  7:21 PM  
Result Value Ref Range aPTT 41.7 (H) 24.7 - 39.8 SEC  
GLUCOSE, POC Collection Time: 11/06/19  9:40 PM  
Result Value Ref Range Glucose (POC) 305 (H) 65 - 100 mg/dL PLEASE READ & DOCUMENT PPD TEST IN 72 HRS Collection Time: 11/06/19 10:11 PM  
Result Value Ref Range PPD Negative Negative  
 mm Induration 0 0 - 5 mm CBC WITH AUTOMATED DIFF Collection Time: 11/07/19  3:56 AM  
Result Value Ref Range WBC 5.4 4.3 - 11.1 K/uL  
 RBC 4.22 (L) 4.23 - 5.6 M/uL  
 HGB 10.5 (L) 13.6 - 17.2 g/dL HCT 33.5 (L) 41.1 - 50.3 % MCV 79.4 (L) 79.6 - 97.8 FL  
 MCH 24.9 (L) 26.1 - 32.9 PG  
 MCHC 31.3 (L) 31.4 - 35.0 g/dL  
 RDW 16.1 (H) 11.9 - 14.6 % PLATELET 264 852 - 794 K/uL MPV Unable to calculate. Recommend adding IPF. 9.4 - 12.3 FL ABSOLUTE NRBC 0.00 0.0 - 0.2 K/uL  
 DF AUTOMATED NEUTROPHILS 49 43 - 78 % LYMPHOCYTES 36 13 - 44 % MONOCYTES 11 4.0 - 12.0 % EOSINOPHILS 3 0.5 - 7.8 % BASOPHILS 1 0.0 - 2.0 % IMMATURE GRANULOCYTES 1 0.0 - 5.0 %  
 ABS. NEUTROPHILS 2.7 1.7 - 8.2 K/UL  
 ABS. LYMPHOCYTES 2.0 0.5 - 4.6 K/UL  
 ABS. MONOCYTES 0.6 0.1 - 1.3 K/UL  
 ABS. EOSINOPHILS 0.1 0.0 - 0.8 K/UL  
 ABS. BASOPHILS 0.0 0.0 - 0.2 K/UL  
 ABS. IMM. GRANS. 0.0 0.0 - 0.5 K/UL PROTHROMBIN TIME + INR Collection Time: 11/07/19  3:56 AM  
Result Value Ref Range Prothrombin time 15.3 (H) 11.7 - 14.5 sec INR 1.2 METABOLIC PANEL, BASIC Collection Time: 11/07/19  3:56 AM  
Result Value Ref Range Sodium 145 136 - 145 mmol/L Potassium 3.1 (L) 3.5 - 5.1 mmol/L Chloride 111 (H) 98 - 107 mmol/L  
 CO2 27 21 - 32 mmol/L Anion gap 7 7 - 16 mmol/L Glucose 191 (H) 65 - 100 mg/dL BUN 49 (H) 8 - 23 MG/DL Creatinine 5.00 (H) 0.8 - 1.5 MG/DL  
 GFR est AA 15 (L) >60 ml/min/1.73m2 GFR est non-AA 12 (L) >60 ml/min/1.73m2 Calcium 7.2 (L) 8.3 - 10.4 MG/DL MAGNESIUM Collection Time: 11/07/19  3:56 AM  
Result Value Ref Range Magnesium 1.5 (L) 1.8 - 2.4 mg/dL PTT Collection Time: 11/07/19  3:56 AM  
Result Value Ref Range aPTT >200.0 (HH) 24.7 - 39.8 SEC  
GLUCOSE, POC Collection Time: 11/07/19  7:12 AM  
Result Value Ref Range Glucose (POC) 161 (H) 65 - 100 mg/dL All Micro Results Procedure Component Value Units Date/Time CULTURE, BLOOD [[id number]] Collected:  11/03/19 2125 Order Status:  Completed Specimen:  Blood Updated:  11/07/19 9659 Special Requests: --     
  RIGHT 
ARM Culture result: NO GROWTH 4 DAYS     
 CULTURE, BLOOD [[id number]] Collected:  11/03/19 2133 Order Status:  Completed Specimen:  Blood Updated:  11/07/19 1055   Special Requests: --     
  RIGHT 
FOREARM 
  
 Culture result: NO GROWTH 4 DAYS No results found for this visit on 11/03/19. Current Meds: 
Current Facility-Administered Medications Medication Dose Route Frequency  lidocaine 4 % patch 2 Patch  2 Patch TransDERmal Q24H  
 doxycycline (VIBRAMYCIN) capsule 100 mg  100 mg Oral Q12H  
 alcohol 62% (NOZIN) nasal  1 Ampule  1 Ampule Topical Q12H  pantothenic ac-min oil-pet,hyd (AQUAPHOR) 41 % ointment   Topical DAILY  heparin 25,000 units in dextrose 500 mL infusion  18-36 Units/kg/hr IntraVENous TITRATE  warfarin (COUMADIN) tablet 2.5 mg  2.5 mg Oral QPM  
 acetaminophen (TYLENOL) tablet 650 mg  650 mg Oral Q4H PRN  
 naloxone (NARCAN) injection 0.4 mg  0.4 mg IntraVENous PRN  
 ondansetron (ZOFRAN) injection 4 mg  4 mg IntraVENous Q4H PRN  
 amLODIPine (NORVASC) tablet 10 mg  10 mg Oral DAILY  cloNIDine (CATAPRES) 0.1 mg/24 hr patch 1 Patch  1 Patch TransDERmal Q7D  
 hydrALAZINE (APRESOLINE) tablet 100 mg  100 mg Oral TID  pantoprazole (PROTONIX) tablet 40 mg  40 mg Oral DAILY  metoprolol succinate (TOPROL-XL) XL tablet 50 mg  50 mg Oral DAILY  pravastatin (PRAVACHOL) tablet 40 mg  40 mg Oral QHS  sodium bicarbonate tablet 650 mg  650 mg Oral TID  torsemide (DEMADEX) tablet 40 mg  40 mg Oral DAILY  traMADol (ULTRAM) tablet 50 mg  50 mg Oral Q6H PRN  
 insulin lispro (HUMALOG) injection   SubCUTAneous AC&HS  insulin glargine (LANTUS) injection 15 Units  15 Units SubCUTAneous QHS Other Studies (last 24 hours): No results found. Assessment and Plan:  
 
Hospital Problems as of 11/7/2019 Date Reviewed: 2/1/2019 Codes Class Noted - Resolved POA  
 ESRD (end stage renal disease) ([facility] ICD-10-CM: N18.6 ICD-9-CM: 585.6  11/3/2019 - Present Unknown * (Principal) Open wound of skin ICD-10-CM: T14. Priscilla Franklinmarline ICD-9-CM: 879.8  11/3/2019 - Present Unknown DVT (deep venous thrombosis) (HCC) ICD-10-CM: I82.409 ICD-9-CM: 453.40  11/3/2019 - Present Unknown Chronic systolic congestive heart failure (HCC) (Chronic) ICD-10-CM: J49.36 ICD-9-CM: 428.22, 428.0  9/23/2016 - Present Yes 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 
ICD-9-CM: 250.40, 583.81  9/22/2016 - Present Yes Plan: 
 
-RLE cellulitis: On doxycycline BC are negative  
 
-Left arm DVT: 
Subclavian, axillary, lisette-basilic thrombosis Already on heparin gtt and bridging to coumadin He does have a prior left arm surgery ( fistula ) anticipating HD, Chronically swollen Keep compression hose  
 
-Left shoulder, chronic complete Rotator cuff tear: 
Orthopedic consulted. Plan to follow as outpatient  
 
-Left popliteal DVT: On heparin gtt and coumadin bridge Not a candidate for eliquis due to CKD stage -Hypokalemia: replace prn  
 
-Hypomagnesemia: replace prn  
 
-CKD4: stable 
no plans for HD due to his current wishes, continued demadex and bicarb -PVD: seen by vascular and recommends cellulitis treatment and wound care, left TONY was normal.   
 
-DM2:  lantus and SSI  
 
-Pancytopenia: stable -HTN: continued norvasc/ clonidine/ hydralazine/ metoprolol Diet:  DIET RENAL  
 
DVT ppx:  IV heparin DC planning/Disposition: home once medically ready.   
 
 
Signed: 
Darryl Stevenson MD

## 2019-11-07 NOTE — PROGRESS NOTES
Warfarin dosing per pharmacist 
 
SILVIO Bishop is a 67 y.o. male. Height: 6' (182.9 cm)    Weight: 76.4 kg (168 lb 6.4 oz) Indication:  DVT Goal INR:  2-3 Home dose:  New start Risk factors or significant drug interactions:  Metronidazole starting 11/4 Other anticoagulants:  Heparin gtt bridge Daily Monitoring Date  INR     Warfarin dose HGB              Notes 11/3  1.0  N/A  13.3   
11/4  ---  2.5 mg  10.8  Alb 2.2 
11/5                 1.1                   2.5 mg              9.6 
11/6  1.1  2.5 mg  9.5 
11/7  1.2  5 mg  10.5 Day 4 of therapy. Patient remains subtherapeutic with INR 1.2. Pharmacy will increase daily dose to 5 mg daily. Thank you, BRIANNA Polk, PharmD 
[phone]

## 2019-11-07 NOTE — PROGRESS NOTES
Spoke to lab tech about PTT that was drawn around 1430. Still has not resulted, but was told it will result soon.

## 2019-11-07 NOTE — PROGRESS NOTES
ptt 41.7, placed call to Dr. Annamaria Nelson for bolus dose per heparin gtt protocol. Orders received

## 2019-11-07 NOTE — PROGRESS NOTES
Problem: Mobility Impaired (Adult and Pediatric) Goal: *Acute Goals and Plan of Care (Insert Text) Description Goals: 
(1.)Mr. Dariela Hercules will move from supine to sit and sit to supine , scoot up and down and roll side to side with MODIFIED INDEPENDENCE within 4 treatment day(s). (2.)Mr. Dariela Hercules will transfer from bed to chair and chair to bed with SUPERVISION using the least restrictive device within 4 treatment day(s). (3.)Mr. Dariela Hercules will ambulate with SUPERVISION for >150 feet with the least restrictive device within 4 treatment day(s). Goal met 11/07/19 
(4.)Mr. Dariela Hercules will be able to use his BUEs to assist with pushing up sit to stand and with bed mobility for rolling and supine to sit with SBA within 4 treatment days PHYSICAL THERAPY: Daily Note and AM 11/7/2019 INPATIENT: PT Visit Days : 2 Payor: Lamont Shah / Plan: [employer] [address] / Product Type: Managed Care Medicare /   
  
NAME/AGE/GENDER: Marquis Andersen is a 67 y.o. male PRIMARY DIAGNOSIS: Open wound of skin [T14. Klaudia Cutting ESRD (end stage renal disease) ([facility] [N18.6] Open wound of skin [T14. Klaudia Cutting ESRD (end stage renal disease) ([facility] [N18.6] DVT (deep venous thrombosis) ([facility] [I82.409] Open wound of skin Open wound of skin ICD-10: Treatment Diagnosis:  
 · Generalized Muscle Weakness (M62.81) · Difficulty in walking, Not elsewhere classified (R26.2) Precaution/Allergies: 
Patient has no known allergies. ASSESSMENT:  
 
Mr. Dariela Hercules is a 67year old AAM with an admitting diagnosis of Open wound of skin and ESRD refusing HD. His PMH includes HTN, DM type II on insulin, DVT of left popliteal vein 10/24/19, and PVD. 11/07/19 He presents sitting up in the bedside chair and was agreeable to have therapy. He reports he lives alone in a mobile home with a ramp entry.   He states his PLOF has been independent with use of a cane.  He states he has care givers that come daily and stay to assist him several hours as needed. He is complaining of BUE hand/arm/shoulder  pain and he has noted swelling and tightness making functional use more difficult. Sit to stand from the bedside chair was minimal assist x 1. Static standing with the support  was fair. He ambulated 250' with the use of the IV pole with CGA. He was steady once he got up and situated with his UEs on the pole. Patient is returned to the recliner at the end of the treatment session with needs within reach. MD comes in to visit. Mr. Flaco Melchor would benefit from continued skilled PT to maximize his functional abilities while in the hospital.  PT uncertain about discharge as his UEs remain limited in functional use at this time. Will continue PT efforts. This section established at most recent assessment PROBLEM LIST (Impairments causing functional limitations): 1. Decreased Strength 2. Decreased ADL/Functional Activities 3. Decreased Transfer Abilities 4. Decreased Ambulation Ability/Technique 5. Increased Pain 6. Edema/Girth INTERVENTIONS PLANNED: (Benefits and precautions of physical therapy have been discussed with the patient.) 1. Bed Mobility 2. Gait Training 3. Therapeutic Activites 4. Therapeutic Exercise/Strengthening 5. Transfer Training TREATMENT PLAN: Frequency/Duration: 3 times a week for duration of hospital stay Rehabilitation Potential For Stated Goals: Fair REHAB RECOMMENDATIONS (at time of discharge pending progress):   
Placement: It is my opinion, based on this patient's performance to date, that Mr. Flaco Melchor may benefit from participating in 1-2 additional therapy sessions in order to continue to assess for rehab potential and then make recommendation for disposition at discharge. Equipment:  
? None at this time HISTORY:  
History of Present Injury/Illness (Reason for Referral): 
 Patient is a 67 y.o. male who presented to the ED for cc weeping wound to right lower leg with clear drainage that has worsened over the past few days. Hx of ESRD but refusing dialysis. Able to urinate 8oz three times a day per the patient. Hx of HTN, DM type II on insulin, DVT of left popliteal vein 10/24/19, and PVD. Patient originally had fistula of left arm but has been ligated on 9/18/19 by vascular surgery since patient states he does not want to be on dialysis. Patient states he follows [facility]. Patient states he was recently placed on Keflex but when I review records I cannot find this. Pharmacy also cannot find where he has filled antibiotics Past Medical History/Comorbidities:  
Mr. Jimmy Mujica  has a past medical history of Acute renal failure superimposed on stage 3 chronic kidney disease ([address] (9/21/2016), Anemia, Arthritis, Chronic kidney disease, Chronic pancreatitis ([address] (9/22/2016), Dermatophytosis of nail (12/6/2016), Diabetic neuropathy ([address] (9/22/2016), Essential hypertension (9/22/2016), GERD (gastroesophageal reflux disease), Hypercholesterolemia, Iron (Fe) deficiency anemia (9/22/2016), Septicemia due to Klebsiella pneumoniae ([address] (0/63/3539), Systolic CHF, chronic ([address] (9/23/2016), Type II diabetes mellitus with nephropathy ([address] (09/22/2016), Venous insufficiency (12/6/2016), and Xerosis cutis (12/6/2016). He also has no past medical history of Adverse effect of anesthesia, Congestive heart failure, unspecified, Difficult intubation, Malignant hyperthermia due to anesthesia, Nausea & vomiting, or Pseudocholinesterase deficiency. Mr. Jimmy Mujica  has a past surgical history that includes hx hernia repair (Left, 2010); hx colonoscopy; hx turp (2012); hx prostatectomy (2012); vascular surgery procedure unlist (Left, 10/26/2017); and vascular surgery procedure unlist (Left, 09/18/2019). Social History/Living Environment:  
Home Environment: Trailer/mobile home # Steps to Enter: 0 Wheelchair Ramp: Yes One/Two Story Residence: One story Living Alone: Yes Support Systems: Home care staff Patient Expects to be Discharged to[de-identified] Unknown Current DME Used/Available at Home: Walker, rolling, Shower chair, Safety frame toliet, Raised toilet seat, Cane, straight Tub or Shower Type: Tub/Shower combination Prior Level of Function/Work/Activity: 
Patient reports he uses his cane at home for independent mobility Number of Personal Factors/Comorbidities that affect the Plan of Care: 1-2: MODERATE COMPLEXITY EXAMINATION:  
Most Recent Physical Functioning:  
Gross Assessment: 
  
         
  
Posture: 
  
Balance: 
Sitting: Intact Standing: Impaired Standing - Static: Fair Standing - Dynamic : Fair Bed Mobility: 
  
Wheelchair Mobility: 
  
Transfers: 
Sit to Stand: Minimum assistance Stand to Sit: Minimum assistance Gait: 
  
Base of Support: Narrowed Distance (ft): 250 Feet (ft) Assistive Device: Other (comment)(IV pole) Ambulation - Level of Assistance: Contact guard assistance Body Structures Involved: 1. Metabolic 2. Endocrine 3. Skin  Body Functions Affected: 1. Skin Related 2. Metobolic/Endocrine Activities and Participation Affected: 1. General Tasks and Demands 2. Mobility 3. Self Care Number of elements that affect the Plan of Care: 3: MODERATE COMPLEXITY CLINICAL PRESENTATION:  
Presentation: Evolving clinical presentation with changing clinical characteristics: MODERATE COMPLEXITY CLINICAL DECISION MAKING:  
[employer] 6 Clicks Basic Mobility Inpatient Short Form How much difficulty does the patient currently have. .. Unable A Lot A Little None 1. Turning over in bed (including adjusting bedclothes, sheets and blankets)? ? 1   ? 2   ? 3   ? 4  
2. Sitting down on and standing up from a chair with arms ( e.g., wheelchair, bedside commode, etc.)   ?  1   ? 2   ? 3   ? 4  
 3. Moving from lying on back to sitting on the side of the bed?   ? 1   ? 2   ? 3   ? 4 How much help from another person does the patient currently need. .. Total A Lot A Little None 4. Moving to and from a bed to a chair (including a wheelchair)? ? 1   ? 2   ? 3   ? 4  
5. Need to walk in hospital room? ? 1   ? 2   ? 3   ? 4  
6. Climbing 3-5 steps with a railing? ? 1   ? 2   ? 3   ? 4  
© 2007, Trustees of [address] [place] [address] [address] [address], under license to [employer]. All rights reserved Score:  Initial: 16 Most Recent: X (Date: -- ) Interpretation of Tool:  Represents activities that are increasingly more difficult (i.e. Bed mobility, Transfers, Gait). Medical Necessity:    
· Patient is expected to demonstrate progress in strength and functional technique ·  to increase independence with bed mobility, SPT and gait with LRAD · . Reason for Services/Other Comments: 
· Patient continues to require skilled intervention due to medical complications · . Use of outcome tool(s) and clinical judgement create a POC that gives a: Questionable prediction of patient's progress: MODERATE COMPLEXITY  
  
 
 
 
TREATMENT:  
(In addition to Assessment/Re-Assessment sessions the following treatments were rendered) Pre-treatment Symptoms/Complaints: \"OK \" 
Pain: Initial:  
Pain Intensity 1: 0 Pain Location 1: Shoulder  Post Session:  0/10 Therapeutic Activity: (    10 minutes): Therapeutic activities including transfers and gait training  to improve mobility, strength, balance and coordination. Required minimum verbal cues     to increase mobility and safety with functional mobility activities. Braces/Orthotics/Lines/Etc:  
· IV 
· O2 Device: Room air Treatment/Session Assessment:   
· Response to Treatment:  patient participated well with therapy. · Interdisciplinary Collaboration:  
o Physical Therapy Assistant 
o Registered Nurse · After treatment position/precautions:  
o Up in chair o Bed/Chair-wheels locked 
o Call light within reach 
o RN notified 
o MD at bedside · Compliance with Program/Exercises: Compliant all of the time · Recommendations/Intent for next treatment session: \"Next visit will focus on advancements to more challenging activities and reduction in assistance provided\". Total Treatment Duration: PT Patient Time In/Time Out Time In: 1150 Time Out: 1200 Jodie Beaulieu, PTA

## 2019-11-07 NOTE — CONSULTS
[facility] 
CONSULTATION Name:  Tye Krueger 
MR#:  [id number] :  1940 ACCOUNT #:  [de-identified] DATE OF SERVICE:  2019 ORTHOPEDIC CONSULTATION 
 
REASON FOR CONSULTATION:  Left shoulder pain. HISTORY OF PRESENT ILLNESS:  I was asked by the hospitalist service to see this pleasant 67-year-old gentleman, who presented to the emergency room for wounds of his right leg. He has a significant history of end-stage renal disease, but has refused dialysis. He is oliguric. He has a history of hypertension, diabetes mellitus, and DVT. He originally had a fistula placed in his left arm but that was ligated by Vascular Surgery because the patient stated he did not want to be on dialysis. He complains of about a year of left shoulder pain. He denies any history of trauma. He is a former smoker. He does not drink. He is retired. He is currently on Eliquis for a DVT. He states that his limited active shoulder motion is a longstanding problem and not a recent development. PAST MEDICAL HISTORY:  Reviewed with the patient and in the electronic medical record and noncontributory except as noted above. PAST SURGICAL HISTORY:  Reviewed with the patient and in the electronic medical record and noncontributory except as noted above. MEDICATIONS:  Reviewed with the patient and in the electronic medical record and noncontributory except as noted above. ALLERGIES:  REVIEWED WITH THE PATIENT AND IN THE ELECTRONIC MEDICAL RECORD AND NONCONTRIBUTORY EXCEPT AS NOTED ABOVE. FAMILY HISTORY:  Reviewed with the patient and in the electronic medical record and noncontributory except as noted above. SOCIAL HISTORY:  Reviewed with the patient and in the electronic medical record and noncontributory except as noted above. REVIEW OF SYSTEMS:  Reviewed with the patient and in the electronic medical record and noncontributory except as noted above. PHYSICAL EXAMINATION:  Demonstrates a chronically ill-appearing elderly gentleman in no acute distress. There is no pain with range of motion of the cervical spine. He has some mild pain with left shoulder range of motion. He cannot raise his left shoulder above his head. He has a compression garment on his left upper extremity with diffuse left upper extremity edema. There is some crepitus with left shoulder range of motion. No focal motor or sensory nerve deficit. Radiographs dated 09/27/2019 are reviewed and demonstrates superior migration of the humeral head, abutting the undersurface of the acromion as well as acromioclavicular arthritis. MRI is also reviewed. MRI dated 11/06/2019 is reviewed and demonstrates a chronic massive rotator cuff tear with full-thickness tearing of the supraspinatus and the infraspinatus with a fatty atrophy and retraction to the level of the glenoid. There is a glenohumeral osteoarthritis and acromioclavicular arthritis. IMPRESSION:  Longstanding full-thickness retracted rotator cuff tear with atrophy. The patient is not a candidate for rotator cuff repair. He can follow up with a shoulder specialist after his discharge. He states that he already had an outpatient appointment scheduled prior to his hospitalization. I encouraged him to reschedule that. In terms of his anatomy, he would be a candidate for a reverse total shoulder arthroplasty, but I am not sure if he is medically stable enough to permit consideration of that. That decision can be made on an outpatient basis. Marry Gore MD 
 
 
[id number] 
D:  11/07/2019 14:29 T:  11/07/2019 18:30 
JOB #:  [id number]

## 2019-11-07 NOTE — PROGRESS NOTES
ptt ordered timed at 0314 for heparin gtt was drawn at 0400. Questioned [facility] and was told they have a window of time to draw blood.

## 2019-11-07 NOTE — PROGRESS NOTES
IV heparin rate has been adjusted based on the most recent PTT results. Lab Results Component Value Date/Time  
 aPTT 41.7 (H) 11/06/2019 07:21 PM  
 
Infusion rate increased by 4 units/kg/hr. New rate at 19 units/kg/hr. Next ptt to be drawn at 6204

## 2019-11-07 NOTE — PROGRESS NOTES
Dispo update:  Spoke to [place] yesterday via phone ([phone]; [address]), the [facility] CLTC manager for Mr. Enrique Villalba. She confirmed that he has aide services M-F for about 7 hours each day, and 4 hours on Saturdays. Updated her on Mr. Mantilla's status, and that he may also require home health OT, PT, and RN (not sure yet). He may also require home IV abx. Case Management is following, and will assist with discharge planning.

## 2019-11-08 NOTE — PROGRESS NOTES
END OF SHIFT NOTE: 
 
INTAKE/OUTPUT 
11/07 0701 - 11/08 0700 In: 6668 [I.V.:1441] Out: 200 [Urine:200] Voiding: YES Catheter: NO 
Drain:   
 
 
 
 
 
Flatus: Patient does have flatus present. Stool:  2 occurrences. Characteristics: 
Stool Assessment Stool Color: Wilmon Bence Stool Appearance: Loose Stool Amount: Large Stool Source/Status: Rectum Emesis: 0 occurrences. Characteristics: VITAL SIGNS Patient Vitals for the past 12 hrs: 
 Temp Pulse Resp BP SpO2  
11/08/19 0337 98 °F (36.7 °C) (!) 53 16 159/69 99 % 11/07/19 2339 97.8 °F (36.6 °C) (!) 54 16 145/67 99 % 11/07/19 1939 97.9 °F (36.6 °C) (!) 59 15 165/71 99 % Pain Assessment Pain Intensity 1: 6 (11/08/19 0551) Pain Location 1: Neck, Shoulder Pain Intervention(s) 1: Medication (see MAR) Patient Stated Pain Goal: 0 Ambulating Yes, to [facility] Shift report given to oncoming nurse at the bedside.  
 
Radha Crane RN

## 2019-11-08 NOTE — PROGRESS NOTES
[facility] Face to Face Encounter Patients Name: SILVIO Healy 2    YOB: 1940 Ordering Physician: Dr. Marcia Moyer MD  
 
Primary Diagnosis: Open wound of skin [T14. Viviana [place]ak ESRD (end stage renal disease) ([facility] [N18.6] Open wound of skin [T14. Viviana Adriana ESRD (end stage renal disease) ([facility] [N18.6] DVT (deep venous thrombosis) ([facility] [I82.409] Date of Face to Face:   11/8/2019 Face to Face Encounter findings are related to primary reason for home care:   yes. 1. I certify that the patient needs intermittent care as follows: skilled nursing care:  teaching/training of new meds, including coumadin, PT/INR checks  
physical therapy: strengthening, stretching/ROM, transfer training, gait/stair training, balance training and pt/caregiver education 
occupational therapy:  ADL safety (ie. cooking, bathing, dressing), ROM and pt/caregiver education 2. I certify that this patient is homebound, that is: 1) patient requires the use of a cane device, special transportation, or assistance of another to leave the home; or 2) patient's condition makes leaving the home medically contraindicated; and 3) patient has a normal inability to leave the home and leaving the home requires considerable and taxing effort. Patient may leave the home for infrequent and short duration for medical reasons, and occasional absences for non-medical reasons. Homebound status is due to the following functional limitations: Patient with strength deficits limiting the performance of all ADL's without caregiver assistance or the use of an assistive device. 3. I certify that this patient is under my care and that I, or a nurse practitioner or  [id number], or clinical nurse specialist, or certified nurse midwife, working with me, had a Face-to-Face Encounter that meets the physician Face-to-Face Encounter requirements.   The following are the clinical findings from the Face-to-Face encounter that support the need for skilled services and is a summary of the encounter: see hospital chart See hospital chart Vicky Gracia RN 
11/8/2019 THE FOLLOWING TO BE COMPLETED BY THE COMMUNITY PHYSICIAN: 
 
I concur with the findings described above from the F2F encounter that this patient is homebound and in need of a skilled service. Certifying Physician: _____________________________________ Printed Certifying Physician Name: _____________________________________ Date: _________________

## 2019-11-08 NOTE — PROGRESS NOTES
Call to Dr. Edwin Alonzo at [facility] in [place] ([phone]) about managing coumadin and PT/INRs related to his left arm DVT (subclavian, axillary, lisette-basilic thrombosis). Left message with Damian Gurrola, Dr. Joycelyn Hernandez assistant. Addendum:  Yes, per call back from Damian Gurrola, Dr. Edwin Alonzo MD to manage coumadin, including PT/INR blood draws.

## 2019-11-08 NOTE — PROGRESS NOTES
Warfarin dosing per pharmacist 
 
SILVIO Amalia Elias is a 67 y.o. male. Height: 6' (182.9 cm)    Weight: 77.1 kg (170 lb) Indication:  DVT Goal INR:  2-3 Home dose:  New start Risk factors or significant drug interactions:  Metronidazole starting 11/4 Other anticoagulants:  Heparin gtt bridge Daily Monitoring Date  INR     Warfarin dose HGB              Notes 11/3  1.0  N/A  13.3   
11/4  ---  2.5 mg  10.8  Alb 2.2 
11/5                 1.1                   2.5 mg              9.6 
11/6  1.1  2.5 mg  9.5 
11/7  1.2  5 mg  10.5 
11/8  1.3  5 mg  10.2 Pharmacy consulted to assist dosing warfarin. INR today starting to slowly trend up, but still subtherapeutic at 1.3. Will give 5 mg again this evening, but plan to give bigger dose tomorrow if INR is not trending up appropriately at that time. Continue daily INR for now. Would recommend to continue bridge therapy with heparin drip until INR therapeutic. Thank you, 
[facility], PharmD Clinical Pharmacist 
[phone]

## 2019-11-08 NOTE — PROGRESS NOTES
Hospitalist Note Admit Date:  11/3/2019  5:22 PM  
Name:  Fay Infante Age:  67 y.o. 
:  1940 MRN:  [id number] PCP:  Virginie Healy MD 
Treatment Team: Attending Provider: Ihsan Austin MD; Consulting Provider: Bryanna Odell MD; Utilization Review: Karen Casper RN; Care Manager: Audra Pate RN; Consulting Provider: Rashid Hayes MD  
 
Mr. Naima Camacho is a 68 yo male with PMH of CKD 4 (declines dialysis) , HTN, DM2, DVT 10,2019 on eliquis admitted with RLE cellulitis. He has been started on oral clindamycin pending IV access placement. He is a difficult stick for access and unable to have PICC due to CKD. IR consulted for tunnel cath and then cans tart vancomycin/ zosyn. BC pending. Nephrology following. Vascular consulted due to PVD and current cellulitis with recommendations for wound care. Duplex of LUE showed subclavian, axillary, basilic vein thrombosis. A left shoulder MRI revealed a chronic, massive RCT. Dr. Peng Martin from Orthopedics suggested to follow as outpatient. Subjective: He was found in no distress, sitting in a chair. His left arm is a little less swollen. Objective:  
 
Patient Vitals for the past 24 hrs: 
 Temp Pulse Resp BP SpO2  
19 98 °F (36.7 °C) (!) 53 16 159/69 99 % 19 2339 97.8 °F (36.6 °C) (!) 54 16 145/67 99 % 19 1939 97.9 °F (36.6 °C) (!) 59 15 165/71 99 % 19 1512 98.7 °F (37.1 °C) (!) 56 14 153/65 100 % 19 1112 98.2 °F (36.8 °C) (!) 56 14 126/49 98 % Oxygen Therapy O2 Sat (%): 99 % (19) Pulse via Oximetry: 59 beats per minute (19) O2 Device: Room air (19 0030) Estimated body mass index is 23.06 kg/m² as calculated from the following: 
  Height as of 19: 6' (1.829 m). Weight as of this encounter: 77.1 kg (170 lb). Intake/Output Summary (Last 24 hours) at 2019 1823 Last data filed at 2019 9575 Gross per 24 hour Intake 1441 ml Output 200 ml Net 1241 ml  
   
*Note that automatically entered I/Os may not be accurate; dependent on patient compliance with collection and accurate  by techs. General:    Well nourished. Alert. No distress CV:   RRR. No murmur, rub, or gallop. 1+ edema with venous  Stasis Lungs:   CTAB. No wheezing, rhonchi, or rales. anterior Abdomen:   Soft, nontender, nondistended. Extremities: both arms are swollen. Both shoulders have limited ROM Skin:     Erythema right anterior shin, venous stasis dermatitis - chronic skin changes on both legs Neuro:  No gross focal deficits Data Review: 
I have reviewed all labs, meds, and studies from the last 24 hours: 
 
Recent Results (from the past 24 hour(s)) GLUCOSE, POC Collection Time: 11/07/19 11:08 AM  
Result Value Ref Range Glucose (POC) 180 (H) 65 - 100 mg/dL PTT Collection Time: 11/07/19  2:33 PM  
Result Value Ref Range aPTT 97.4 (H) 24.7 - 39.8 SEC  
GLUCOSE, POC Collection Time: 11/07/19  6:11 PM  
Result Value Ref Range Glucose (POC) 323 (H) 65 - 100 mg/dL PTT Collection Time: 11/07/19  8:27 PM  
Result Value Ref Range aPTT 151.2 (H) 24.7 - 39.8 SEC  
GLUCOSE, POC Collection Time: 11/07/19  9:36 PM  
Result Value Ref Range Glucose (POC) 228 (H) 65 - 100 mg/dL PROTHROMBIN TIME + INR Collection Time: 11/08/19  5:23 AM  
Result Value Ref Range Prothrombin time 16.2 (H) 11.7 - 14.5 sec INR 1.3 METABOLIC PANEL, BASIC Collection Time: 11/08/19  5:23 AM  
Result Value Ref Range Sodium 143 136 - 145 mmol/L Potassium 3.7 3.5 - 5.1 mmol/L Chloride 109 (H) 98 - 107 mmol/L  
 CO2 27 21 - 32 mmol/L Anion gap 7 7 - 16 mmol/L Glucose 77 65 - 100 mg/dL BUN 41 (H) 8 - 23 MG/DL Creatinine 4.80 (H) 0.8 - 1.5 MG/DL  
 GFR est AA 15 (L) >60 ml/min/1.73m2 GFR est non-AA 13 (L) >60 ml/min/1.73m2  Calcium 7.3 (L) 8.3 - 10.4 MG/DL  
HGB & HCT  
 Collection Time: 11/08/19  5:23 AM  
Result Value Ref Range HGB 10.2 (L) 13.6 - 17.2 g/dL HCT 32.8 (L) 41.1 - 50.3 % PTT Collection Time: 11/08/19  5:23 AM  
Result Value Ref Range aPTT 176.6 (HH) 24.7 - 39.8 SEC  
GLUCOSE, POC Collection Time: 11/08/19  7:34 AM  
Result Value Ref Range Glucose (POC) 93 65 - 100 mg/dL All Micro Results Procedure Component Value Units Date/Time CULTURE, BLOOD [[id number]] Collected:  11/03/19 2125 Order Status:  Completed Specimen:  Blood Updated:  11/07/19 9595 Special Requests: --     
  RIGHT 
ARM Culture result: NO GROWTH 4 DAYS     
 CULTURE, BLOOD [[id number]] Collected:  11/03/19 2133 Order Status:  Completed Specimen:  Blood Updated:  11/07/19 9226 Special Requests: --     
  RIGHT 
FOREARM Culture result: NO GROWTH 4 DAYS No results found for this visit on 11/03/19. Current Meds: 
Current Facility-Administered Medications Medication Dose Route Frequency  magnesium oxide (MAG-OX) tablet 400 mg  400 mg Oral BID  warfarin (COUMADIN) tablet 5 mg  5 mg Oral QPM  
 lidocaine 4 % patch 2 Patch  2 Patch TransDERmal Q24H  
 doxycycline (VIBRAMYCIN) capsule 100 mg  100 mg Oral Q12H  
 alcohol 62% (NOZIN) nasal  1 Ampule  1 Ampule Topical Q12H  pantothenic ac-min oil-pet,hyd (AQUAPHOR) 41 % ointment   Topical DAILY  heparin 25,000 units in dextrose 500 mL infusion  18-36 Units/kg/hr IntraVENous TITRATE  acetaminophen (TYLENOL) tablet 650 mg  650 mg Oral Q4H PRN  
 naloxone (NARCAN) injection 0.4 mg  0.4 mg IntraVENous PRN  
 ondansetron (ZOFRAN) injection 4 mg  4 mg IntraVENous Q4H PRN  
 amLODIPine (NORVASC) tablet 10 mg  10 mg Oral DAILY  cloNIDine (CATAPRES) 0.1 mg/24 hr patch 1 Patch  1 Patch TransDERmal Q7D  
 hydrALAZINE (APRESOLINE) tablet 100 mg  100 mg Oral TID  pantoprazole (PROTONIX) tablet 40 mg  40 mg Oral DAILY  metoprolol succinate (TOPROL-XL) XL tablet 50 mg  50 mg Oral DAILY  pravastatin (PRAVACHOL) tablet 40 mg  40 mg Oral QHS  sodium bicarbonate tablet 650 mg  650 mg Oral TID  torsemide (DEMADEX) tablet 40 mg  40 mg Oral DAILY  traMADol (ULTRAM) tablet 50 mg  50 mg Oral Q6H PRN  
 insulin lispro (HUMALOG) injection   SubCUTAneous AC&HS  insulin glargine (LANTUS) injection 15 Units  15 Units SubCUTAneous QHS Other Studies (last 24 hours): No results found. Assessment and Plan:  
 
Hospital Problems as of 11/8/2019 Date Reviewed: 2/1/2019 Codes Class Noted - Resolved POA  
 ESRD (end stage renal disease) ([facility] ICD-10-CM: N18.6 ICD-9-CM: 585.6  11/3/2019 - Present Unknown * (Principal) Open wound of skin ICD-10-CM: T14. 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 
ICD-9-CM: 250.40, 583.81  9/22/2016 - Present Yes Plan: 
 
-RLE cellulitis: On doxycycline EOT 11/13/19 BC are negative  
 
-Left arm DVT: 
Subclavian, axillary, lisette-basilic thrombosis Already on heparin gtt and bridging to coumadin He does have a prior left arm surgery ( fistula ) anticipating HD, Chronically swollen Keep compression hose  
 
-Left shoulder, chronic complete Rotator cuff tear: 
Orthopedic consulted. Plan to follow as outpatient  
 
-Left popliteal DVT: On heparin gtt and coumadin bridge Not a candidate for eliquis due to CKD stage  
 
-CKD4: stable 
no plans for HD due to his current wishes, continued demadex and bicarb -PVD: seen by vascular and recommends cellulitis treatment and wound care, left TONY was normal.   
 
-DM2:  lantus and SSI -Pancytopenia: stable -HTN: continued norvasc/ clonidine/ hydralazine/ metoprolol Diet:  DIET RENAL  
 
DVT ppx:  IV heparin DC planning/Disposition: home once medically ready, possible tomorrow Signed: 
Suzan Negrete MD

## 2019-11-08 NOTE — PROGRESS NOTES
IV heparin rate has been adjusted based on the most recent PTT results. Lab Results Component Value Date/Time  
 aPTT 176.6 (HH) 11/08/2019 05:23 AM  
 
Heparin infusion has been stopped. Ptt has been ordered for 1252p, 11/8/19

## 2019-11-08 NOTE — PROGRESS NOTES
IV heparin rate has been adjusted based on the most recent PTT results. Lab Results Component Value Date/Time  
 aPTT 151.2 (H) 11/07/2019 08:27 PM  
 
Heparin infusion rate decreased by 2 units. New rate at 14 units/kg/hr. Next ptt to be drawn at [facility], 11/8/19.

## 2019-11-08 NOTE — PROGRESS NOTES
END OF SHIFT NOTE: 
 
INTAKE/OUTPUT 
11/07 0701 - 11/08 0700 In: 9059 [I.V.:1441] Out: 200 [Urine:200] Voiding: YES Catheter: NO 
Drain:   
 
 
 
 
 
Flatus: Patient does have flatus present. Stool:  1 occurrences. Characteristics: 
Stool Assessment Stool Color: Shi Olden Stool Appearance: Loose Stool Amount: Large Stool Source/Status: Rectum Emesis: 0 occurrences. Characteristics: VITAL SIGNS Patient Vitals for the past 12 hrs: 
 Temp Pulse Resp BP SpO2  
11/08/19 1518 97.4 °F (36.3 °C) (!) 52 16 140/65 99 % 11/08/19 1129 97.2 °F (36.2 °C) (!) 53 16 150/71 100 % 11/08/19 0732 98.1 °F (36.7 °C) (!) 53 16 148/76 100 % Pain Assessment Pain Intensity 1: 4 (11/08/19 0645) Pain Location 1: Neck, Shoulder Pain Intervention(s) 1: Medication (see MAR) Patient Stated Pain Goal: 0 Ambulating Yes, short distances. Chair to bed and viceversa. Shift report given to oncoming nurse at the bedside.  
 
Christiane Holt RN

## 2019-11-09 NOTE — PROGRESS NOTES
Patient with hypoglycemia of 45 this morning. This is the second morning this happens despite eating a midnight snack last night. Patient reports he usually takes lantus on mornings not nights. He is experiencing \"shakes\" and sleepiness. Other vitals signs stable. Alert and oriented x4, up in chair since  early morning. Dextrose 50 iv stat administered. Eating breakfast at this time. Will recheck sugar after finishing eating.

## 2019-11-09 NOTE — PROGRESS NOTES
END OF SHIFT NOTE: 
 
INTAKE/OUTPUT 
11/08 0701 - 11/09 0700 In: 481 [I.V.:481] Out: 1100 [Urine:800] Voiding: YES Catheter: NO 
Drain:   
 
 
 
 
 
Flatus: Patient does have flatus present. Stool:  3 occurrences. Characteristics: 
Stool Assessment Stool Color: Lenoard Bolton Stool Appearance: Loose Stool Amount: Large Stool Source/Status: Rectum Emesis: 0 occurrences. Characteristics: VITAL SIGNS Patient Vitals for the past 12 hrs: 
 Temp Pulse Resp BP SpO2  
11/09/19 0343 98.1 °F (36.7 °C) 60 17 146/53 99 % 11/08/19 2325 98.1 °F (36.7 °C) (!) 57 18 131/61 100 % 11/08/19 1933 97.5 °F (36.4 °C) (!) 57 16 161/72 99 % Pain Assessment Pain Intensity 1: 5 (11/08/19 2130) Pain Location 1: Neck Pain Intervention(s) 1: Medication (see MAR) Patient Stated Pain Goal: 0 Ambulating Yes, up to [facility] or chair in room Shift report given to oncoming nurse at the bedside.  
 
Trey Frias RN

## 2019-11-09 NOTE — PROGRESS NOTES
IV heparin rate has been adjusted based on the most recent PTT results. Lab Results Component Value Date/Time  
 aPTT 112.8 (H) 11/09/2019 03:52 AM  
 
No rate change to heparin infusion based on most recent results being within therapeutic range. Will order ptt for 6 hours from now per protocol.

## 2019-11-09 NOTE — DISCHARGE INSTRUCTIONS
Activity: Activity as tolerated  Diet: Diabetic Diet  Wound Care: As directed           Follow-up Appointments   Procedures    FOLLOW UP VISIT Appointment in: 3 - 5 Days pcp Monday or Tuesday to have INR checked       pcp Monday or Tuesday to have INR checked       DISCHARGE SUMMARY from Nurse    PATIENT INSTRUCTIONS:    After general anesthesia or intravenous sedation, for 24 hours or while taking prescription Narcotics:  · Limit your activities  · Do not drive and operate hazardous machinery  · Do not make important personal or business decisions  · Do  not drink alcoholic beverages  · If you have not urinated within 8 hours after discharge, please contact your surgeon on call. Report the following to your surgeon:  · Excessive pain, swelling, redness or odor of or around the surgical area  · Temperature over 100.5  · Nausea and vomiting lasting longer than 4 hours or if unable to take medications  · Any signs of decreased circulation or nerve impairment to extremity: change in color, persistent  numbness, tingling, coldness or increase pain  · Any questions    What to do at Home:  Recommended activity: {discharge activity:48016}, ***    If you experience any of the following symptoms ***, please follow up with ***. *  Please give a list of your current medications to your Primary Care Provider. *  Please update this list whenever your medications are discontinued, doses are      changed, or new medications (including over-the-counter products) are added. *  Please carry medication information at all times in case of emergency situations. These are general instructions for a healthy lifestyle:    No smoking/ No tobacco products/ Avoid exposure to second hand smoke  Surgeon General's Warning:  Quitting smoking now greatly reduces serious risk to your health.     Obesity, smoking, and sedentary lifestyle greatly increases your risk for illness    A healthy diet, regular physical exercise & weight monitoring are important for maintaining a healthy lifestyle    You may be retaining fluid if you have a history of heart failure or if you experience any of the following symptoms:  Weight gain of 3 pounds or more overnight or 5 pounds in a week, increased swelling in our hands or feet or shortness of breath while lying flat in bed. Please call your doctor as soon as you notice any of these symptoms; do not wait until your next office visit. The discharge information has been reviewed with the {PATIENT PARENT GUARDIAN:03726}. The {PATIENT PARENT GUARDIAN:26690} verbalized understanding. Discharge medications reviewed with the {[facility]} and appropriate educational materials and side effects teaching were provided.   ___________________________________________________________________________________________________________________________________

## 2019-11-09 NOTE — PROGRESS NOTES
Discharge instructions along with prescriptions reviewed with patient and caregiver at bedside. RLE wound dressing changed. [facility] set up by CM. All questions answered. Patient wheeled down to lobby by nurse assistant.

## 2019-11-09 NOTE — DISCHARGE SUMMARY
Discharge Summary Patient: Joe Roberts MRN: [id number]  SSN: [id number] YOB: 1940  Age: 67 y.o. Sex: male Admit Date: 11/3/2019 Discharge Date: 11/9/2019 Admission Diagnoses: Open wound of skin [T14. Yasmin Dewayne ESRD (end stage renal disease) ([facility] [N18.6] Open wound of skin [T14. Yasmin Dewayne ESRD (end stage renal disease) ([facility] [N18.6] DVT (deep venous thrombosis) ([facility] [I82.409] Discharge Diagnoses:  
Problem List as of 11/9/2019 Date Reviewed: 2/1/2019 Codes Class Noted - Resolved ESRD (end stage renal disease) ([facility] ICD-10-CM: N18.6 ICD-9-CM: 585.6  11/3/2019 - Present * (Principal) Open wound of skin ICD-10-CM: T14. Kathy Sheikh ICD-9-CM: 879.8  11/3/2019 - Present DVT (deep venous thrombosis) (HCC) ICD-10-CM: I82.409 ICD-9-CM: 453.40  11/3/2019 - Present Rhabdomyolysis ICD-10-CM: Q02.22 ICD-9-CM: 728.88  4/30/2018 - Present Metabolic acidosis BYW-47-IS: E87.2 ICD-9-CM: 276.2  4/30/2018 - Present Renal failure (ARF), acute on chronic (HCC) ICD-10-CM: N17.9, N18.9 ICD-9-CM: 584.9, 585.9  4/29/2018 - Present Dementia without behavioral disturbance (HCC) (Chronic) ICD-10-CM: F03.90 ICD-9-CM: 294.20  4/19/2018 - Present Hypernatremia ICD-10-CM: E87.0 ICD-9-CM: 276.0  4/18/2018 - Present Seizure ([facility] ICD-10-CM: R56.9 ICD-9-CM: 780.39  4/18/2018 - Present Volume overload ICD-10-CM: E87.70 ICD-9-CM: 276.69  4/4/2018 - Present Chest pain ICD-10-CM: R07.9 ICD-9-CM: 786.50  4/4/2018 - Present Cauda equina compression (HCC) ICD-10-CM: G83.4 ICD-9-CM: 344.60  4/2/2018 - Present Hypercholesterolemia ICD-10-CM: E78.00 ICD-9-CM: 272.0  Unknown - Present Uncontrolled diabetes mellitus, with long-term current use of insulin (HCC) (Chronic) ICD-10-CM: E11.65, Z79.4 ICD-9-CM: 250.02, V58.67  2/28/2018 - Present Carotid bruit ICD-10-CM: R09.89 ICD-9-CM: 785.9  2/28/2018 - Present Arteriovenous fistula (HCC) ICD-10-CM: I77.0 ICD-9-CM: 447.0  10/31/2017 - Present Overview Signed 10/31/2017  3:13 PM by Iva Mccauley NP  
  10/26/17 (Dr. Maria Esther Bustillos) Creation of left brachiobasilic fistula. Onychomycosis ICD-10-CM: B35.1 ICD-9-CM: 110.1  9/13/2017 - Present Controlled type 2 diabetes mellitus with complication, with long-term current use of insulin (HCC) (Chronic) ICD-10-CM: E11.8, Z79.4 ICD-9-CM: 250.90, V58.67  9/13/2017 - Present Stage 4 chronic kidney disease (HCC) (Chronic) ICD-10-CM: N18.4 ICD-9-CM: 585.4  4/4/2017 - Present Stasis edema of both lower extremities (Chronic) ICD-10-CM: Q26.412 ICD-9-CM: 459.30  4/4/2017 - Present Cellulitis of left lower leg ICD-10-CM: L03.116 ICD-9-CM: 682.6  4/4/2017 - Present Venous stasis ulcer of left lower extremity ([facility] ICD-10-CM: U48.660, R69.857 ICD-9-CM: 454.0  4/1/2017 - Present Venous insufficiency (Chronic) ICD-10-CM: D61.4 ICD-9-CM: 459.81  12/6/2016 - Present Chronic systolic congestive heart failure (HCC) (Chronic) ICD-10-CM: T56.51 ICD-9-CM: 428.22, 428.0  9/23/2016 - Present Septicemia due to Klebsiella pneumoniae [facility]) ICD-10-CM: A41.4 ICD-9-CM: 038.3  9/22/2016 - Present 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 
ICD-9-CM: 250.40, 583.81  9/22/2016 - Present Essential hypertension ICD-10-CM: I10 
ICD-9-CM: 401.9  9/22/2016 - Present GERD (gastroesophageal reflux disease) ICD-10-CM: K21.9 ICD-9-CM: 530.81  9/22/2016 - Present Diabetic neuropathy (HCC) ICD-10-CM: E11.40 ICD-9-CM: 250.60, 357.2  9/22/2016 - Present Chronic pancreatitis (HCC) (Chronic) ICD-10-CM: K86.1 ICD-9-CM: 351.1  9/22/2016 - Present Iron (Fe) deficiency anemia ICD-10-CM: D50.9 ICD-9-CM: 280.9  9/22/2016 - Present  Acute renal failure superimposed on stage 3 chronic kidney disease ([facility] ICD-10-CM: N17.9, N18.3 ICD-9-CM: 584.9, 585.3  9/21/2016 - Present RESOLVED: Community acquired pneumonia of left lower lobe of lung ([facility] ICD-10-CM: J18.1 ICD-9-CM: 900  4/18/2018 - 4/18/2018 RESOLVED: Acute renal failure superimposed on stage 4 chronic kidney disease ([facility] ICD-10-CM: N17.9, N18.4 ICD-9-CM: 584.9, 585.4  4/14/2018 - 4/18/2018 RESOLVED: Altered mental status ICD-10-CM: R41.82 
ICD-9-CM: 780.97  4/13/2018 - 4/18/2018 RESOLVED: Acute metabolic encephalopathy DJK-50-WT: G93.41 
ICD-9-CM: 348.31  4/13/2018 - 4/18/2018 RESOLVED: TIA (transient ischemic attack) ICD-10-CM: G45.9 ICD-9-CM: 435.9  4/2/2018 - 4/4/2018 RESOLVED: CVA (cerebral vascular accident) ([facility] ICD-10-CM: I63.9 ICD-9-CM: 434.91  4/2/2018 - 4/4/2018 RESOLVED: Acute exacerbation of CHF (congestive heart failure) ([facility]) ICD-10-CM: I50.9 ICD-9-CM: 428.0  4/2/2018 - 4/4/2018 Discharge Condition: [facility] Course: Mr. Reza Flores is a 68 yo male with PMH of CKD 4 (declines dialysis) , HTN, DM2, DVT 10,2019 on eliquis admitted with RLE cellulitis. He was started on oral clindamycin pending IV access placement. IR consulted for tunnel cath and then started vancomycin/ zosyn. Cultures were negative, changed to doxycycline eventually. The patient was found with a left popliteal vein DVT. Also in view of left arm edema a Duplex of LUE showed subclavian, axillary, basilic vein thrombosis. heparin gtt initiated. In view of his CKD stage, OAC were not safe and coumadin was bridged. Nephrology followed. No need for HD since patient refused. His cr levels remained stable. He developed hypoglycemia events. Lantus held. Vascular consulted due to PVD and cellulitis with recommendations for wound care. He had persisting left shoulder and arm pain. A shoulder MRI revealed a chronic, massive RCT. Dr. Dunlap Free from Orthopedics suggested to follow as outpatient.  
He remains on left arm compression hose. Santhosh MATOS, arranged for him to have INR checks by PCP Dr. Naima Orourke on Monday ([phone] ). He will have home health services. He should continue HISS and resume lantus in the morning at 10 units once FS readings are > 150 mg. The patient would like to go home today. Physical exam: 
General:          Well nourished. Alert. No distress CV:                  RRR. No murmur, rub, or gallop. 1+ edema with venous  Stasis Lungs:             CTAB. No wheezing, rhonchi, or rales. anterior Abdomen:        Soft, nontender, nondistended. Extremities: both arms are swollen. Both shoulders have limited ROM Skin:                Erythema right anterior shin, venous stasis dermatitis - chronic skin changes on both legs Neuro:             No gross focal deficits Consults: Orthopedics and Vascular Surgery Significant Diagnostic Studies: see note Disposition: home Discharge Medications:  
Current Discharge Medication List  
  
START taking these medications Details  
acetaminophen (TYLENOL) 325 mg tablet Take 2 Tabs by mouth every four (4) hours as needed for Pain. Qty: 20 Tab, Refills: 0  
  
doxycycline (VIBRAMYCIN) 100 mg capsule Take 1 Cap by mouth every twelve (12) hours for 4 days. Qty: 8 Cap, Refills: 0  
  
ondansetron (ZOFRAN ODT) 4 mg disintegrating tablet Take 1 Tab by mouth every eight (8) hours as needed for Nausea. Qty: 20 Tab, Refills: 0  
  
warfarin (COUMADIN) 5 mg tablet Take 1 Tab by mouth every evening. Qty: 7 Tab, Refills: 0  
  
pantothenic ac-min oil-pet,hyd (AQUAPHOR) 41 % ointment Apply  to affected area daily. Apply over legs Qty: 53 g, Refills: 0 CONTINUE these medications which have NOT CHANGED Details  
sodium bicarbonate 650 mg tablet Take  by mouth three (3) times daily. traMADol (ULTRAM) 50 mg tablet Take 50 mg by mouth every six (6) hours as needed for Pain.  May take morning of procedure if needed  
  
cloNIDine (CATAPRES) 0.1 mg/24 hr ptwk 1 Patch by TransDERmal route every seven (7) days. Changes on Tuesdays- on ELVIRA chest 9/11/2019 Instructed to inform pre-op nurse location of patch on arrival  
  
CALCITRIOL, BULK, by Does Not Apply route. Associated Diagnoses: Other iron deficiency anemia  
  
hydrALAZINE (APRESOLINE) 100 mg tablet Take 1 Tab by mouth three (3) times daily. Qty: 90 Tab, Refills: 2  
  
pregabalin (LYRICA) 50 mg capsule Take 1 Cap by mouth daily. Max Daily Amount: 50 mg. 
Qty: 10 Cap, Refills: 0 Associated Diagnoses: Diabetic polyneuropathy associated with type 2 diabetes mellitus (HCC)  
  
torsemide (DEMADEX) 20 mg tablet Take 40 mg by mouth daily. Do not take morning of procedure. pravastatin (PRAVACHOL) 40 mg tablet Take 1 Tab by mouth nightly. Qty: 30 Tab, Refills: 0 HUMALOG KWIKPEN INSULIN 100 unit/mL kwikpen 5 units three times a day with meals plus correction scale, 2 units/50 > 150, max daily dose 25 units Qty: 5 Pen, Refills: 11  
 Associated Diagnoses: Uncontrolled type 2 diabetes mellitus with hypoglycemia without coma, with long-term current use of insulin ([facility])  
  
calcifediol (RAYALDEE) 30 mcg Cs24 Take  by mouth nightly. amLODIPine (NORVASC) 10 mg tablet Take 10 mg by mouth every morning. metoprolol succinate (TOPROL-XL) 50 mg XL tablet Take 50 mg by mouth every morning. Take morning of procedure. omeprazole (PRILOSEC) 20 mg capsule Take 20 mg by mouth every morning. Take morning of procedure. colestipol (COLESTID) 1 gram tablet Take 1 g by mouth two (2) times daily as needed. lidocaine 4 % patch Cut to appropriate size. Apply to intact skin for 12 hours, remove for 12 hours. Qty: 14 Patch, Refills: 0 STOP taking these medications  
  
 apixaban (ELIQUIS) 5 mg (74 tabs) starter pack Comments:  
Reason for Stopping:   
   
 insulin glargine (LANTUS) 100 unit/mL injection Comments:  
Reason for Stopping: RESUME IT ONCE FS ARE CONSISTENTLY ABOVE > 150 mg. Start a dose of 10 units before breakfast. Do not use 15 units since you experience hypoglycemia. Activity: Activity as tolerated Diet: Diabetic Diet Wound Care: As directed Follow-up Appointments Procedures  FOLLOW UP VISIT Appointment in: 3 - 5 Days pcp Monday or Tuesday to have INR checked  
  pcp Monday or Tuesday to have INR checked Standing Status:   Standing Number of Occurrences:   1 Order Specific Question:   Appointment in Answer:   3 - 5 Days Signed By: Jose Gleason MD   
 November 9, 2019

## 2019-11-09 NOTE — PROGRESS NOTES
Warfarin dosing per pharmacist 
 
SILVIO Tapia is a 67 y.o. male. Height: 6' (182.9 cm)    Weight: 72.9 kg (160 lb 12.8 oz) Indication:  DVT Goal INR:  2-3 Home dose:  New start Risk factors or significant drug interactions:  Metronidazole starting 11/4 Other anticoagulants:  Heparin gtt bridge Daily Monitoring Date  INR     Warfarin dose HGB              Notes 11/3  1.0  N/A  13.3   
11/4  ---  2.5 mg  10.8  Alb 2.2 
11/5                 1.1                   2.5 mg              9.6 
11/6  1.1  2.5 mg  9.5 
11/7  1.2  5 mg  10.5 
11/8  1.3  5 mg  10.2 
11/9  1.5  5 mg  --- Pharmacy consulted to assist dosing warfarin. INR today starting to slowly trend up, but still subtherapeutic at 1.5. Will give 5 mg again this evening, but plan to give bigger dose tomorrow if INR is not trending up appropriately at that time. Continue daily INR for now. Would recommend to continue bridge therapy with heparin drip until INR therapeutic. Thank you, Ajay Holt, PharmD Clinical Pharmacist 
[phone]

## 2019-11-09 NOTE — PROGRESS NOTES
IV heparin rate has been adjusted based on the most recent PTT results. Lab Results Component Value Date/Time  
 aPTT 119.0 (H) 11/08/2019 08:56 PM  
 
No rate change to Heparin infusion. Will order ptt to be drawn in 6 hours.

## 2019-11-11 NOTE — PROGRESS NOTES
This note will not be viewable in [address] Transition of Care Discharge Follow-up Questionnaire Date/Time of Call: 
 11/11/19 
 1252pm  
What was the patient hospitalized for? Open wound of skin Does the patient understand his/her diagnosis and/or treatment and what happened during the hospitalization? Yes, spoke with patient, he states understanding of diagnosis and treatment; and is agreeable to call. Patient states he is doing well, HH is there now Did the patient receive discharge instructions? Yes   
CM Assessed Risk for Readmission:  
 
 
Patient stated Risk for Readmission:  
 
 moderate r/t diagnosis, comorbidities and/or complications of surgery None stated Review any discharge instructions (see discharge instructions/AVS in [facility]). Ask patient if they understand these. Do they have any questions? Reviewed, understanding is stated, no questions at this time Were home services ordered (nursing, PT, OT, ST, etc.)? Yes, [facility] SN/PT/OT If so, has the first visit occurred? If not, why? (Assist with coordination of services if necessary.) [facility] 11/11/19 Was any DME ordered? No  
  
If so, has it been received? If not, why?  (Assist patient in obtaining DME orders &/or equipment if necessary.) N/A Complete a review of all medications (new, continued and discontinued meds per the D/C instructions and medication tab in [facility]). [facility] START taking: 
acetaminophen 325 mg tablet (TYLENOL) doxycycline 100 mg capsule (VIBRAMYCIN) 
ondansetron 4 mg disintegrating tablet (ZOFRAN ODT) pantothenic ac-min oil-pet,hyd 41 % 
ointment (AQUAPHOR) Start taking on: 11/10/2019 
warfarin 5 mg tablet (COUMADIN) STOP taking: 
apixaban 5 mg (74 tabs) starter pack (ELIQUIS) 
insulin glargine 100 unit/mL injection (LANTUS) Ask CALCITRIOL (BULK) 
colestipol 1 gram tablet (COLESTID) omeprazole 20 mg capsule (PRILOSEC) pregabalin 50 mg capsule (LYRICA) RAYALDEE 30 mcg Cs24 Were all new prescriptions filled? If not, why?  (Assist patient in obtaining medications if necessary  escalate for CCM &/or SW if ongoing issues are verbalized by pt or anticipated) Yes Does the patient understand the purpose and dosing instructions for all medications? (If patient has questions, provide explanation and education.) Yes Does the patient have any problems in performing ADLs? (If patient is unable to perform ADLs  what is the limiting factor(s)? Do they have a support system that can assist? If no support system is present, discuss possible assistance that they may be able to obtain. Escalate for CCM/SW if ongoing issues are verbalized by pt or anticipated) Independent with ADLs, has [facility] aide M-F 7hr/day, Sa 4hr Does the patient have all follow-up appointments scheduled? 7 day f/up with PCP?  
(f/up with PCP may be w/in 14 days if patient has a f/up with their specialist w/in 7 days) 7-14 day f/up with specialist?  
(or per discharge instructions) If f/up has not been made  what actions has the care coordinator made to accomplish this? Has transportation been arranged? Yes Dr. Reyes Kraus 11/12/19 Yes, no transportation needs identified at this time Any other questions or concerns expressed by the patient? No other needs or concerns identified. Patient states his gratitude for follow up. Contact information for Care Coordinator was given, instructed to call with new questions or concerns. Schedule next appointment with CRISTINA Contreras or refer to RN Case Manager/ per the workflow guidelines. When is care coordinators next follow-up call scheduled? If referred for CCM  what RN care manager was the referral assigned? Care Coordinator will follow per workflow guidelines. Within 14days JACQUELIN Call Completed By: Colonel Nicholas LPN Care Coordinator

## 2019-11-11 NOTE — PROGRESS NOTES
This note will not be viewable in [address] Initial JACQUELIN outreach attempt to patient's home/mobile number was unsuccessful. Left message to return call. Will attempt second outreach within 24 hours.

## 2019-11-26 NOTE — PROGRESS NOTES
This note will not be viewable in [address] Attempted outreach follow up without success, left message. Per [facility] patient had follow up with Dr. Lee Ann Lamar 11/12/19. [facility] remains active. Patient is graduated from [employer] program.  Will reopen if call is returned.

## 2019-12-03 NOTE — WOUND CARE
Clinic Level of Care Assessment NAME:  Joe Roberts YOB: 1940 GENDER: male MEDICAL RECORD NUMBER:  [id number] DATE:  12/3/2019 Wound Count Document in [facility] Number of Wounds Assessed Points No Wounds/Ulcers []   0 Less than Three Wounds/Ulcers [x]   1  
3-6 Wounds/Ulcers []   2 Greater than 6 Wounds/Ulcers []   3 Ambulation Status Document in Coord[facility]ility tab Status Definition Points Independent Independently able to ambulate. Fully able (without any assistance) to get on/off exam table/chair. []   0 Minimal Physical Assistance Requires physical assistance of one person to ambulate and/or position patient to be examined. Includes necessary physical assistance to position lower extremities on/off stool. [x]   1 Moderate Physical Assistance Requires at least one staff member to physically assist patient in ambulating into treatment room, and on/off exam table. []   2 Full Assistance Requires assistance of at least two staff members to transfer patient into treatment room and/or on/off exam table/chair. \"Total Transfer\". []   3 Dressing Complexity Document in [facility] and Write Appropriate Order Complexity Definition Points No Dressing  []   0 Simple Minimal, simple dressing. i.e. Band-aid, gauze, simple wrap. []   1 Intermediate Moderately complicated requiring licensed personnel to apply i.e. collagen matrix, ointments, gels, alginates. []   2 Complex Complicated requiring licensed personnel to apply dressings 6 or more wounds. [x]   3 Teaching Effort Document in Education Tab Effort Definition Points No Teaching  []   0 Simple Reinforce two or less topics. Document in Education navigator.  []   1 Intermediate Reinforce three to five topics and/or one additional  
new topic. Document in Education navigator. [x]   2 Complex Teach more than one new topic. New patient information  
packet reviewed and/or reinforce more than three topics. Document in Education navigator. HBO initial instruction. []   3 Patient Assessment and Planning Planning Definition Points Simple Multiple System Simple: Simple follow-up with routine assessment and planning. If Discharged, instructions and long term/follow-up care given to patient/caregiver. Discharged, instructions and/or After Visit Summary given to patient/caregiver and instructions completed. []   1 Intermediate Multiple System Intermediate: Contact with outside resources; i.e. Telephone calls to home health, [facility]. May include filling out forms and writing letters, arranging transportation, communication with insurance , vendors, etc.  Discharged, instructions and/or After Visit Summary given to patient/caregiver and instructions completed. [x]   2 Complex Multiple System Complex: Full, comprehensive assessment and planning. Follow the entire navigator under Wound Visit charting filling out each tab which includes OP Adm Database Screening, Education and CarePlan  HBO risk assessment completed. Discharged, instructions and/or After Visit Summary given to patient/caregiver and instructions completed. []   3 Is this the Patient's First Visit to the [facility] [address] No 
 
 
Is this Patient Established @ [facility] 
Yes Clinical Level of Care Points  0-2  Level 1 [] Points  3-5  Level 2 [] Points  6-9  Level 3 [x] Points  10-12  Level 4 [] Points  13-15  Level 5 [] Electronically signed by Toney Conner RN on 12/3/2019 at 3:00 PM

## 2019-12-03 NOTE — WOUND CARE
Multilayer Compression Wrap 
 (Not Unna) Below the Knee NAME:  Marco Antonio Middleton YOB: 1940 MEDICAL RECORD NUMBER:  [id number] DATE:  12/3/2019 Applied primary and secondary dressing as ordered. Applied multilayered dressing below the knee to right lower leg. Applied multilayered dressing below the knee to left lower leg. Instructed patient/caregiver not to remove dressing and to keep it clean and dry. Instructed patient/caregiver on complications to report to provider, such as pain, numbness in toes, heavy drainage, and slippage of dressing. Instructed patient on purpose of compression dressing and on activity and exercise recommendations.  
 
 
Electronically signed by Laura Carnes RN on 12/3/2019 at 3:01 PM

## 2019-12-03 NOTE — WOUND CARE
12/03/19 1404 Wound Leg lower Right;Lateral  
Date First Assessed/Time First Assessed: 12/03/19 1404   Primary Wound Type: Venous  Location: Leg lower  Wound Location Orientation: Right;Lateral  
Dressing Status Clean, dry, and intact Dressing Type  
(xeroform, gauze, coban) Non-staged Wound Description Full thickness Wound Length (cm) 21.5 cm Wound Width (cm) 6.5 cm Wound Depth (cm) 0.1 cm Wound Surface Area (cm^2) 139.75 cm^2 Wound Volume (cm^3) 13.98 cm^3 Condition of Base Granulation;Slough;Pink Tissue Type Percent Pink 10 Tissue Type Percent Red 50 Tissue Type Percent Yellow 40 Drainage Amount Large Drainage Color Serous Wound Odor None Cleansing and Cleansing Agents  Normal saline Wound Leg lower Left;Lateral  
Date First Assessed/Time First Assessed: 12/03/19 1429   Primary Wound Type: Venous  Location: Leg lower  Wound Location Orientation: Left;Lateral  
Dressing Type  
(no dressing) Wound Length (cm) 5 cm Wound Width (cm) 5 cm Wound Depth (cm) 0 cm Wound Surface Area (cm^2) 25 cm^2 Wound Volume (cm^3) 0 cm^3 Condition of Base Other (comment) (unable to observe/ blister) Drainage Amount None Wound Odor None Tammy-wound Assessment Intact

## 2019-12-03 NOTE — WOUND CARE
12/03/19 1404 Wound Leg lower Right;Lateral  
Date First Assessed/Time First Assessed: 12/03/19 1404   Primary Wound Type: Venous  Location: Leg lower  Wound Location Orientation: Right;Lateral  
Dressing Status Clean, dry, and intact Dressing Type  
(xeroform, gauze, coban) Non-staged Wound Description Full thickness Wound Length (cm) 21.5 cm Wound Width (cm) 6.5 cm Wound Depth (cm) 0.1 cm Wound Surface Area (cm^2) 139.75 cm^2 Wound Volume (cm^3) 13.98 cm^3 Condition of Base Granulation;Slough;Pink Tissue Type Percent Pink 10 Tissue Type Percent Red 50 Tissue Type Percent Yellow 40 Drainage Amount Large Drainage Color Serous Wound Odor None Cleansing and Cleansing Agents  Normal saline Patient is taking Warfarin daily

## 2019-12-03 NOTE — WOUND CARE
[address] [place] [address], [facility] [address] Phone: [phone] Fax: [phone] Patient: Skye Ryder MRN: [id number]  SSN: [id number] YOB: 1940  Age: 67 y.o. Sex: male Return Appointment: 1 week with Ruth Moe MD 
 
Instructions: Cleanse wounds with normal saline. Right lateral leg, left lateral leg: 
Xeroform- apply to wound bed. Cover with ABD, 2 layer compression with wound and lower extremity assessment. If there is any problem with the dressing (too tight, slides down, etc.) Patient to return to wound clinic to have re-wrapped by clinician. Do not get dressings wet. May purchase cast cover from pharmacy of choice for use when showering or bathing. Home Health to change dressing three times a week. Return to [address] [place] [address] in 1 week. C&S obtained today Should you experience increased redness, swelling, pain, foul odor, size of wound(s), or have a temperature over 101 degrees please contact the [address] [place] [address] at [phone] or if after hours contact your primary care physician or go to the hospital emergency department. Signed By: Nury Lewis RN December 3, 2019

## 2019-12-03 NOTE — PROGRESS NOTES
Selene Kamara is a 21-year-old gentleman who was in this facility a year or so ago. At that time he had similar problems with stasis dermatitis and leg ulcers. Recently he was in the hospital with an episode of cellulitis which was treated but he developed bad edema of both legs and blisters have popped and that he now has ulcers on both legs. Today cultures were taken his ABIs are adequate therefore we can apply compression. He will be dressed with Xeroform on both sides as well as Unna boots. He will be seen again in 1 week Wound Center Progress Note Patient: Ad Still MRN: [id number]  SSN: [id number] YOB: 1940  Age: 67 y.o. Sex: male Subjective: Chief Complaint: Ad Still is a 67 y.o. BLACK OR  male who presents with R lower leg lateral, L lower leg lateral wound of 2 weeks duration. History of Present Illness:    
 
 
 
 
See above note Wound Caused By: chronic pressure/irritation due to Edema and venous insufficiency following hospitalization for cellulitis Associated Signs and Symptoms: Swelling drainage and pain Timing: Continuous Quality: Aching Severity: 2/10 Modifying Factors: Venous insufficiency Current Wound Care: See nurse's notes Past Medical History:  
Diagnosis Date  Acute renal failure superimposed on stage 3 chronic kidney disease ([address] 9/21/2016  Anemia   
 pt states he receives iron infusions  Arthritis OA generalized  Chronic kidney disease   
 not on HD- surgery done for access and fistula being removed. Per patient, no plans to proceed with dialysis  Chronic pancreatitis ([address] 9/22/2016  Dermatophytosis of nail 12/6/2016  Diabetic neuropathy ([facility] 9/22/2016  
 insulin sliding scale only- no oral meds- avg fastings avg fasting \"low 200's;  hypo @ 80 A1C 7.1 9/2019 per patient  Essential hypertension 9/22/2016  
 medication  GERD (gastroesophageal reflux disease) controlled with med  Hypercholesterolemia  Iron (Fe) deficiency anemia 2016  Septicemia due to Klebsiella pneumoniae ([facility] 2016  Systolic CHF, chronic ([facility] 2016 ECHO 2018 EF- 60-65%  Type II diabetes mellitus with nephropathy ([facility] 2016  Venous insufficiency 2016  Xerosis cutis 2016 Past Surgical History:  
Procedure Laterality Date  HX COLONOSCOPY    
 HX HERNIA REPAIR Left 2010  HX PROSTATECTOMY  2012  HX TURP  2012 FOR BPH  VASCULAR SURGERY PROCEDURE UNLIST Left 10/26/2017 AVF  VASCULAR SURGERY PROCEDURE UNLIST Left 2019 Ligation of left brachiobasilic fistula Family History Problem Relation Age of Onset  Diabetes Mother  Stroke Mother  Hypertension Mother  No Known Problems Father  Diabetes Sister  Diabetes Brother  Diabetes Sister  Diabetes Sister  Other Sister   
     fibromyalgia Social History Tobacco Use  Smoking status: Former Smoker Packs/day: 2.00 Years: 20.00 Pack years: 40.00 Last attempt to quit:  Years since quittin.9  Smokeless tobacco: Current User Substance Use Topics  Alcohol use: No  
   
Prior to Admission medications Medication Sig Start Date End Date Taking? Authorizing Provider  
clindamycin (CLEOCIN) 300 mg capsule Take 300 mg by mouth four (4) times daily. Provider, Historical  
torsemide (DEMADEX) 20 mg tablet Take 40 mg by mouth daily. Provider, Historical  
insulin glargine (LANTUS,BASAGLAR) 100 unit/mL (3 mL) inpn 15 Units by SubCUTAneous route daily (with breakfast). Provider, Historical  
oxymetazoline (AFRIN) 0.05 % nasal spray 2 Sprays by Both Nostrils route once as needed for Congestion. 19   Provider, Historical  
Lactoperoxi/Gluc Oxid/Pot Thio (BIOTENE DRY MOUTH MM) Take 2 Sprays by mouth as needed for Other (dry mouth).     Provider, Historical  
 lipase-protease-amylase (CREON) 36,000-114,000- 180,000 unit cpDR capsule Take 3,600 Units by mouth See Admin Instructions. Take 2 capsules by mouth with each meal, and 1 capsule by mouth with each snack. Emerita Montero MD  
dicyclomine (BENTYL) 10 mg capsule Take 10 mg by mouth two (2) times a day. Provider, Historical  
acetaminophen (TYLENOL) 325 mg tablet Take 2 Tabs by mouth every four (4) hours as needed for Pain. 11/9/19   Bernadette Villareal MD  
ondansetron (ZOFRAN ODT) 4 mg disintegrating tablet Take 1 Tab by mouth every eight (8) hours as needed for Nausea. 11/9/19   Bernadette Villareal MD  
warfarin (COUMADIN) 5 mg tablet Take 1 Tab by mouth every evening. Patient taking differently: Take 4 mg by mouth every evening. 11/9/19   Bernadette Villareal MD  
pantothenic ac-min oil-pet,hyd (AQUAPHOR) 41 % ointment Apply  to affected area daily. Apply over legs 11/10/19   Bernadette Villareal MD  
colestipol (COLESTID) 1 gram tablet Take 1 g by mouth two (2) times a day. Provider, Historical  
sodium bicarbonate 650 mg tablet Take  by mouth three (3) times daily. Provider, Historical  
traMADol (ULTRAM) 50 mg tablet Take 50 mg by mouth every six (6) hours as needed for Pain. Provider, Historical  
cloNIDine (CATAPRES) 0.1 mg/24 hr ptwk 1 Patch by TransDERmal route every seven (7) days. Changes on Tuesdays- on ELVIRA chest 9/11/2019 Provider, Historical  
lidocaine 4 % patch Cut to appropriate size. Apply to intact skin for 12 hours, remove for 12 hours. Patient taking differently: 1 Patch by TransDERmal route as needed. Cut to appropriate size. Apply to intact skin for 12 hours, remove for 12 hours. States uses only occasionally and not on today 9/11/2019 Do not take morning of procedure. 7/12/19   JAMARCUS Chandler  
CALCITRIOL, BULK, by Does Not Apply route. Provider, Historical  
hydrALAZINE (APRESOLINE) 100 mg tablet Take 1 Tab by mouth three (3) times daily. Patient taking differently: Take 100 mg by mouth three (3) times daily. Take morning of procedure. 4/19/18   Albin Chester MD  
pregabalin (LYRICA) 50 mg capsule Take 1 Cap by mouth daily. Max Daily Amount: 50 mg. Patient taking differently: Take 150 mg by mouth two (2) times a day. 4/19/18   Albin Chester MD  
pravastatin (PRAVACHOL) 40 mg tablet Take 1 Tab by mouth nightly. 4/4/18   Nicola Godoy MD  
[facility] INSULIN 100 unit/mL kwikpen 5 units three times a day with meals plus correction scale, 2 units/50 > 150, max daily dose 25 units Patient not taking: Reported on 11/11/2019 2/28/18   Dorothy ANGUIANO PA-C  
calcifediol (RAYALDEE) 30 mcg Cs24 Take  by mouth nightly. Provider, Historical  
amLODIPine (NORVASC) 10 mg tablet Take 10 mg by mouth every morning. Provider, Historical  
metoprolol succinate (TOPROL-XL) 50 mg XL tablet Take 50 mg by mouth every morning. Provider, Historical  
omeprazole (PRILOSEC) 20 mg capsule Take 20 mg by mouth every morning. Provider, Historical  
 
No Known Allergies Review of Systems: A comprehensive review of systems was negative except for that written in the History of Present Illness. Lab Results Component Value Date/Time Hemoglobin A1c 8.4 (H) 11/03/2019 09:25 PM  
 Hemoglobin A1c (POC) 6.5 03/14/2018 11:20 AM  
  
 
Immunization History Administered Date(s) Administered  Pneumococcal Polysaccharide (PPSV-23) 02/07/2012  TB Skin Test (PPD) Intradermal 04/02/2018, 04/16/2018, 04/30/2018, 11/03/2019 Body mass index is 24.14 kg/m². Counseling regarding nutrition done: No  
 
Current medications: 
Current Outpatient Medications Medication Sig Dispense Refill  clindamycin (CLEOCIN) 300 mg capsule Take 300 mg by mouth four (4) times daily.  torsemide (DEMADEX) 20 mg tablet Take 40 mg by mouth daily.     
 insulin glargine (LANTUS,BASAGLAR) 100 unit/mL (3 mL) inpn 15 Units by SubCUTAneous route daily (with breakfast).  oxymetazoline (AFRIN) 0.05 % nasal spray 2 Sprays by Both Nostrils route once as needed for Congestion.  Lactoperoxi/Gluc Oxid/Pot Thio (BIOTENE DRY MOUTH MM) Take 2 Sprays by mouth as needed for Other (dry mouth).  lipase-protease-amylase (CREON) 36,000-114,000- 180,000 unit cpDR capsule Take 3,600 Units by mouth See Admin Instructions. Take 2 capsules by mouth with each meal, and 1 capsule by mouth with each snack.  dicyclomine (BENTYL) 10 mg capsule Take 10 mg by mouth two (2) times a day.  acetaminophen (TYLENOL) 325 mg tablet Take 2 Tabs by mouth every four (4) hours as needed for Pain. 20 Tab 0  
 ondansetron (ZOFRAN ODT) 4 mg disintegrating tablet Take 1 Tab by mouth every eight (8) hours as needed for Nausea. 20 Tab 0  
 warfarin (COUMADIN) 5 mg tablet Take 1 Tab by mouth every evening. (Patient taking differently: Take 4 mg by mouth every evening.) 7 Tab 0  
 pantothenic ac-min oil-pet,hyd (AQUAPHOR) 41 % ointment Apply  to affected area daily. Apply over legs 53 g 0  
 colestipol (COLESTID) 1 gram tablet Take 1 g by mouth two (2) times a day.  sodium bicarbonate 650 mg tablet Take  by mouth three (3) times daily.  traMADol (ULTRAM) 50 mg tablet Take 50 mg by mouth every six (6) hours as needed for Pain.  cloNIDine (CATAPRES) 0.1 mg/24 hr ptwk 1 Patch by TransDERmal route every seven (7) days. Changes on Tuesdays- on ELVIRA chest 9/11/2019  lidocaine 4 % patch Cut to appropriate size. Apply to intact skin for 12 hours, remove for 12 hours. (Patient taking differently: 1 Patch by TransDERmal route as needed. Cut to appropriate size. Apply to intact skin for 12 hours, remove for 12 hours. States uses only occasionally and not on today 9/11/2019 Do not take morning of procedure.) 14 Patch 0  
 CALCITRIOL, BULK, by Does Not Apply route.     
 hydrALAZINE (APRESOLINE) 100 mg tablet Take 1 Tab by mouth three (3) times daily. (Patient taking differently: Take 100 mg by mouth three (3) times daily. Take morning of procedure.) 90 Tab 2  pregabalin (LYRICA) 50 mg capsule Take 1 Cap by mouth daily. Max Daily Amount: 50 mg. (Patient taking differently: Take 150 mg by mouth two (2) times a day.) 10 Cap 0  
 pravastatin (PRAVACHOL) 40 mg tablet Take 1 Tab by mouth nightly. 30 Tab 0  
 HUMALOG KWIKPEN INSULIN 100 unit/mL kwikpen 5 units three times a day with meals plus correction scale, 2 units/50 > 150, max daily dose 25 units (Patient not taking: Reported on 11/11/2019) 5 Pen 11  
 calcifediol (RAYALDEE) 30 mcg Cs24 Take  by mouth nightly.  amLODIPine (NORVASC) 10 mg tablet Take 10 mg by mouth every morning.  metoprolol succinate (TOPROL-XL) 50 mg XL tablet Take 50 mg by mouth every morning.  omeprazole (PRILOSEC) 20 mg capsule Take 20 mg by mouth every morning. Objective:  
 
Physical Exam:  
 
Visit Vitals /75 (BP 1 Location: Right arm) Pulse 60 Temp 98.1 °F (36.7 °C) Resp 20 Ht 6' (1.829 m) Wt 80.7 kg (178 lb) SpO2 95% BMI 24.14 kg/m² General: well developed, well nourished, pleasant , NAD. Hygiene good Psych: cooperative. Pleasant. No anxiety or depression. Normal mood and affect. Neuro: alert and oriented to person/place/situation. Otherwise nonfocal. 
Derm: Normal turgor for age, dry skin HEENT: Normocephalic, atraumatic. EOMI. Conjunctiva clear. No scleral icterus. Neck: Normal range of motion. No masses. Chest: Good air entry bilaterally. Respirations nonlabored Cardio: Normal heart sounds,no rubs, murmurs or gallops Abdomen: Soft, nontender, nondistended, normoactive bowel sounds Lower extremities: color normal; temperature normal. Hair growth is not present. Calves are supple, nontender, approximately equally sized in comparison. Capillary refill <3 sec Ulcer Description: Wound Leg Anterior;Mid;Right (Active) Number of days: 601 Wound Pretibial Right;Lateral (Active) Number of days: 29 Wound Leg lower Right;Lateral (Active) Dressing Status Clean, dry, and intact 12/3/2019  2:04 PM  
Non-staged Wound Description Full thickness 12/3/2019  2:04 PM  
Wound Length (cm) 21.5 cm 12/3/2019  2:04 PM  
Wound Width (cm) 6.5 cm 12/3/2019  2:04 PM  
Wound Depth (cm) 0.1 cm 12/3/2019  2:04 PM  
Wound Surface Area (cm^2) 139.75 cm^2 12/3/2019  2:04 PM  
Wound Volume (cm^3) 13.98 cm^3 12/3/2019  2:04 PM  
Condition of Base Granulation;Slough;Pink 12/3/2019  2:04 PM  
Tissue Type Percent Pink 10 12/3/2019  2:04 PM  
Tissue Type Percent Red 50 12/3/2019  2:04 PM  
Tissue Type Percent Yellow 40 12/3/2019  2:04 PM  
Drainage Amount Large 12/3/2019  2:04 PM  
Drainage Color Serous 12/3/2019  2:04 PM  
Wound Odor None 12/3/2019  2:04 PM  
Cleansing and Cleansing Agents  Normal saline 12/3/2019  2:04 PM  
Number of days: 0 Wound Leg lower Left;Lateral (Active) Wound Length (cm) 5 cm 12/3/2019  2:04 PM  
Wound Width (cm) 5 cm 12/3/2019  2:04 PM  
Wound Depth (cm) 0 cm 12/3/2019  2:04 PM  
Wound Surface Area (cm^2) 25 cm^2 12/3/2019  2:04 PM  
Wound Volume (cm^3) 0 cm^3 12/3/2019  2:04 PM  
Condition of Base Other (comment) 12/3/2019  2:04 PM  
Drainage Amount None 12/3/2019  2:04 PM  
Wound Odor None 12/3/2019  2:04 PM  
Tammy-wound Assessment Intact 12/3/2019  2:04 PM  
Number of days: 0 [REMOVED] Wound Leg Left; Anterior (Removed) Number of days: 375 [REMOVED] Wound Arm Left (Removed) Number of days: 167 [REMOVED] Wound Left (Removed) Number of days: 30 Data Review: No results found for this or any previous visit (from the past 24 hour(s)). Assessment:  
 
67 y.o. male with R lower leg lateral, L lower leg lateral venous stasis ulcer. Problem List  Date Reviewed: 2/1/2019 Codes Class Noted ESRD (end stage renal disease) ([facility] ICD-10-CM: N18.6 ICD-9-CM: 585.6  11/3/2019 Open wound of skin ICD-10-CM: T14. Billy Payment ICD-9-CM: 879.8  11/3/2019 DVT (deep venous thrombosis) (HCC) ICD-10-CM: I82.409 ICD-9-CM: 453.40  11/3/2019 Rhabdomyolysis ICD-10-CM: G08.66 ICD-9-CM: 728.88  4/30/2018 Metabolic acidosis G-18-HQ: E87.2 ICD-9-CM: 276.2  4/30/2018 Renal failure (ARF), acute on chronic (HCC) ICD-10-CM: N17.9, N18.9 ICD-9-CM: 584.9, 585.9  4/29/2018 Dementia without behavioral disturbance (HCC) (Chronic) ICD-10-CM: F03.90 ICD-9-CM: 294.20  4/19/2018 Hypernatremia ICD-10-CM: E87.0 ICD-9-CM: 276.0  4/18/2018 Seizure ([address] ICD-10-CM: R56.9 ICD-9-CM: 780.39  4/18/2018 Volume overload ICD-10-CM: E87.70 ICD-9-CM: 276.69  4/4/2018 Chest pain ICD-10-CM: R07.9 ICD-9-CM: 786.50  4/4/2018 Cauda equina compression (HCC) ICD-10-CM: G83.4 ICD-9-CM: 344.60  4/2/2018 Hypercholesterolemia ICD-10-CM: E78.00 ICD-9-CM: 272.0  Unknown Uncontrolled diabetes mellitus, with long-term current use of insulin (HCC) (Chronic) ICD-10-CM: E11.65, Z79.4 ICD-9-CM: 250.02, V58.67  2/28/2018 Carotid bruit ICD-10-CM: R09.89 ICD-9-CM: 785.9  2/28/2018 Arteriovenous fistula (HCC) ICD-10-CM: I77.0 ICD-9-CM: 447.0  10/31/2017 Overview Signed 10/31/2017  3:13 PM by Venu Beverly NP  
  10/26/17 (Dr. Furman Leventhal) Creation of left brachiobasilic fistula. Onychomycosis ICD-10-CM: B35.1 ICD-9-CM: 110.1  9/13/2017 Controlled type 2 diabetes mellitus with complication, with long-term current use of insulin (HCC) (Chronic) ICD-10-CM: E11.8, Z79.4 ICD-9-CM: 250.90, V58.67  9/13/2017 Stage 4 chronic kidney disease (HCC) (Chronic) ICD-10-CM: N18.4 ICD-9-CM: 585.4  4/4/2017 Stasis edema of both lower extremities (Chronic) ICD-10-CM: U02.361 ICD-9-CM: 459.30  4/4/2017 Cellulitis of left lower leg ICD-10-CM: L03.116 ICD-9-CM: 682.6  4/4/2017 Venous stasis ulcer of left lower extremity ([facility] ICD-10-CM: P23.050, X14.877 ICD-9-CM: 454.0  4/1/2017 Venous insufficiency (Chronic) ICD-10-CM: N66.4 ICD-9-CM: 459.81  12/6/2016 Chronic systolic congestive heart failure (HCC) (Chronic) ICD-10-CM: S63.99 ICD-9-CM: 428.22, 428.0  9/23/2016 Septicemia due to Klebsiella pneumoniae [facility]) ICD-10-CM: A41.4 ICD-9-CM: 038.3  9/22/2016 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 
ICD-9-CM: 250.40, 583.81  9/22/2016 Essential hypertension ICD-10-CM: I10 
ICD-9-CM: 401.9  9/22/2016 GERD (gastroesophageal reflux disease) ICD-10-CM: K21.9 ICD-9-CM: 530.81  9/22/2016 Diabetic neuropathy (HCC) ICD-10-CM: E11.40 ICD-9-CM: 250.60, 357.2  9/22/2016 Chronic pancreatitis (HCC) (Chronic) ICD-10-CM: K86.1 ICD-9-CM: 210.9  9/22/2016 Iron (Fe) deficiency anemia ICD-10-CM: D50.9 ICD-9-CM: 280.9  9/22/2016 Acute renal failure superimposed on stage 3 chronic kidney disease (HCC) ICD-10-CM: N17.9, N18.3 ICD-9-CM: 584.9, 585.3  9/21/2016 Plan:  
 
Orders Placed This Encounter  CULTURE, WOUND W GRAM STAIN Standing Status:   Standing Number of Occurrences:   1  
 WOUND CARE, DRESSING CHANGE Cleanse wounds with normal saline. Right lateral leg, left lateral leg: 
Xeroform- apply to wound bed. Cover with ABD, 2 layer compression with wound and lower extremity assessment. If there is any problem with the dressing (too tight, slides down, etc.) Patient to return to wound clinic to have re-wrapped by clinician. Do not get dressings wet. May purchase cast cover from pharmacy of choice for use when showering or bathing. Home Health to change dressing three times a week. Return to [address] in 1 week. C&S obtained today Standing Status:   Standing Number of Occurrences:   1 Patient understood and agrees with plan. Questions answered. Follow-up Information Follow up With Specialties Details Why Contact Info [facility] Wound Care In 1 week  [address] Madeleine Capone [phone] 
[phone] Any procedures done today in the [address] [facility] are documented in a separate note in [facility] and made part of this record by reference. Dictated using voice recognition software; proofread, but unrecognized errors may exist. 
 
Signed By: Prabha Gallegos MD   
 December 3, 2019

## 2019-12-10 NOTE — WOUND CARE
Multilayer Compression Wrap 
 (Not Unna) Below the Knee NAME:  Fay Infante YOB: 1940 MEDICAL RECORD NUMBER:  [id number] DATE:  12/10/2019 Removed old Multilayer wrap if indicated and wash leg with mild soap/water. Applied moisturizing agent to dry skin as needed. Applied primary and secondary dressing as ordered. Applied multilayered dressing below the knee to right lower leg. Instructed patient/caregiver not to remove dressing and to keep it clean and dry. Instructed patient/caregiver on complications to report to provider, such as pain, numbness in toes, heavy drainage, and slippage of dressing. Instructed patient on purpose of compression dressing and on activity and exercise recommendations.  
 
 
Electronically signed by Bell Macdonald RN on 12/10/2019 at 11:24 AM

## 2019-12-10 NOTE — PROGRESS NOTES
Both leg wounds are looking better continue dressing with Xeroform and compression see again in 2 week Wound Center Progress Note Patient: Taiwo Mckeon MRN: [id number]  SSN: [id number] YOB: 1940  Age: 67 y.o. Sex: male Subjective: Chief Complaint: Taiwo Mckeon is a 67 y.o. BLACK OR  male who presents with both lower extremities below the knee wound of 2 months duration. History of Present Illness:    
Wound looks better blister on the left has collapsed and will be treated with Xeroform and compression wound on the right looks much better with skin growth. Wound Caused By: chronic pressure/irritation due to venous insufficiency Associated Signs and Symptoms: Mild pain Timing: Intermittent Quality: Burning Severity: 2/10 Modifying Factors: Venous insufficiency Current Wound Care: See nurses notes Past Medical History:  
Diagnosis Date  Acute renal failure superimposed on stage 3 chronic kidney disease ([facility] 9/21/2016  Anemia   
 pt states he receives iron infusions  Arthritis OA generalized  Chronic kidney disease   
 not on HD- surgery done for access and fistula being removed. Per patient, no plans to proceed with dialysis  Chronic pancreatitis ([facility] 9/22/2016  Dermatophytosis of nail 12/6/2016  Diabetic neuropathy ([facility] 9/22/2016  
 insulin sliding scale only- no oral meds- avg fastings avg fasting \"low 200's;  hypo @ 80 A1C 7.1 9/2019 per patient  Essential hypertension 9/22/2016  
 medication  GERD (gastroesophageal reflux disease)   
 controlled with med  Hypercholesterolemia  Iron (Fe) deficiency anemia 9/22/2016  Septicemia due to Klebsiella pneumoniae ([facility] 9/22/2016  Systolic CHF, chronic ([facility] 9/23/2016 ECHO 4/2018 EF- 60-65%  Type II diabetes mellitus with nephropathy ([facility] 09/22/2016  Venous insufficiency 12/6/2016  Xerosis cutis 12/6/2016 Past Surgical History:  
Procedure Laterality Date  HX COLONOSCOPY    
 HX HERNIA REPAIR Left 2010  HX PROSTATECTOMY  2012  HX TURP  2012 FOR BPH  VASCULAR SURGERY PROCEDURE UNLIST Left 10/26/2017 AVF  VASCULAR SURGERY PROCEDURE UNLIST Left 2019 Ligation of left brachiobasilic fistula Family History Problem Relation Age of Onset  Diabetes Mother  Stroke Mother  Hypertension Mother  No Known Problems Father  Diabetes Sister  Diabetes Brother  Diabetes Sister  Diabetes Sister  Other Sister   
     fibromyalgia Social History Tobacco Use  Smoking status: Former Smoker Packs/day: 2.00 Years: 20.00 Pack years: 40.00 Last attempt to quit:  Years since quittin.9  Smokeless tobacco: Current User Substance Use Topics  Alcohol use: No  
   
Prior to Admission medications Medication Sig Start Date End Date Taking? Authorizing Provider  
torsemide (DEMADEX) 20 mg tablet Take 40 mg by mouth daily. Provider, Historical  
insulin glargine (LANTUS,BASAGLAR) 100 unit/mL (3 mL) inpn 15 Units by SubCUTAneous route daily (with breakfast). Provider, Historical  
oxymetazoline (AFRIN) 0.05 % nasal spray 2 Sprays by Both Nostrils route once as needed for Congestion. 19   Provider, Historical  
Lactoperoxi/Gluc Oxid/Pot Thio (BIOTENE DRY MOUTH MM) Take 2 Sprays by mouth as needed for Other (dry mouth). Provider, Historical  
lipase-protease-amylase (CREON) 36,000-114,000- 180,000 unit cpDR capsule Take 3,600 Units by mouth See Admin Instructions. Take 2 capsules by mouth with each meal, and 1 capsule by mouth with each snack. Shauna Cohen MD  
dicyclomine (BENTYL) 10 mg capsule Take 10 mg by mouth two (2) times a day. Provider, Historical  
acetaminophen (TYLENOL) 325 mg tablet Take 2 Tabs by mouth every four (4) hours as needed for Pain.  19   West Crowley MD  
 ondansetron (ZOFRAN ODT) 4 mg disintegrating tablet Take 1 Tab by mouth every eight (8) hours as needed for Nausea. 11/9/19   Dyana Oseguera MD  
warfarin (COUMADIN) 5 mg tablet Take 1 Tab by mouth every evening. Patient taking differently: Take 4 mg by mouth every evening. 11/9/19   Dyana Oseguera MD  
pantothenic ac-min oil-pet,hyd (AQUAPHOR) 41 % ointment Apply  to affected area daily. Apply over legs 11/10/19   Dyana Oseguera MD  
colestipol (COLESTID) 1 gram tablet Take 1 g by mouth two (2) times a day. Provider, Historical  
sodium bicarbonate 650 mg tablet Take  by mouth three (3) times daily. Provider, Historical  
traMADol (ULTRAM) 50 mg tablet Take 50 mg by mouth every six (6) hours as needed for Pain. Provider, Historical  
cloNIDine (CATAPRES) 0.1 mg/24 hr ptwk 1 Patch by TransDERmal route every seven (7) days. Changes on Tuesdays- on ELVIRA chest 9/11/2019 Provider, Historical  
lidocaine 4 % patch Cut to appropriate size. Apply to intact skin for 12 hours, remove for 12 hours. Patient taking differently: 1 Patch by TransDERmal route as needed. Cut to appropriate size. Apply to intact skin for 12 hours, remove for 12 hours. States uses only occasionally and not on today 9/11/2019 Do not take morning of procedure. 7/12/19   JAMARCUS Chahal  
CALCITRIOL, BULK, by Does Not Apply route. Provider, Historical  
hydrALAZINE (APRESOLINE) 100 mg tablet Take 1 Tab by mouth three (3) times daily. Patient taking differently: Take 100 mg by mouth three (3) times daily. Take morning of procedure. 4/19/18   Corina Payan MD  
pregabalin (LYRICA) 50 mg capsule Take 1 Cap by mouth daily. Max Daily Amount: 50 mg. Patient taking differently: Take 150 mg by mouth two (2) times a day. 4/19/18   Corina Payan MD  
pravastatin (PRAVACHOL) 40 mg tablet Take 1 Tab by mouth nightly.  4/4/18   Malcom Anderson MD  
 HUMALOG KWIKPEN INSULIN 100 unit/mL kwikpen 5 units three times a day with meals plus correction scale, 2 units/50 > 150, max daily dose 25 units Patient not taking: Reported on 11/11/2019 2/28/18   Julianne ANGUIANO PA-C  
calcifediol (RAYALDEE) 30 mcg Cs24 Take  by mouth nightly. Provider, Historical  
amLODIPine (NORVASC) 10 mg tablet Take 10 mg by mouth every morning. Provider, Historical  
metoprolol succinate (TOPROL-XL) 50 mg XL tablet Take 50 mg by mouth every morning. Provider, Historical  
omeprazole (PRILOSEC) 20 mg capsule Take 20 mg by mouth every morning. Provider, Historical  
 
No Known Allergies Review of Systems: A comprehensive review of systems was negative except for that written in the History of Present Illness. Lab Results Component Value Date/Time Hemoglobin A1c 8.4 (H) 11/03/2019 09:25 PM  
 Hemoglobin A1c (POC) 6.5 03/14/2018 11:20 AM  
  
 
Immunization History Administered Date(s) Administered  Pneumococcal Polysaccharide (PPSV-23) 02/07/2012  TB Skin Test (PPD) Intradermal 04/02/2018, 04/16/2018, 04/30/2018, 11/03/2019 Body mass index is 24.55 kg/m². Counseling regarding nutrition done: No  
 
Current medications: 
Current Outpatient Medications Medication Sig Dispense Refill  torsemide (DEMADEX) 20 mg tablet Take 40 mg by mouth daily.  insulin glargine (LANTUS,BASAGLAR) 100 unit/mL (3 mL) inpn 15 Units by SubCUTAneous route daily (with breakfast).  oxymetazoline (AFRIN) 0.05 % nasal spray 2 Sprays by Both Nostrils route once as needed for Congestion.  Lactoperoxi/Gluc Oxid/Pot Thio (BIOTENE DRY MOUTH MM) Take 2 Sprays by mouth as needed for Other (dry mouth).  lipase-protease-amylase (CREON) 36,000-114,000- 180,000 unit cpDR capsule Take 3,600 Units by mouth See Admin Instructions. Take 2 capsules by mouth with each meal, and 1 capsule by mouth with each snack.  dicyclomine (BENTYL) 10 mg capsule Take 10 mg by mouth two (2) times a day.  acetaminophen (TYLENOL) 325 mg tablet Take 2 Tabs by mouth every four (4) hours as needed for Pain. 20 Tab 0  
 ondansetron (ZOFRAN ODT) 4 mg disintegrating tablet Take 1 Tab by mouth every eight (8) hours as needed for Nausea. 20 Tab 0  
 warfarin (COUMADIN) 5 mg tablet Take 1 Tab by mouth every evening. (Patient taking differently: Take 4 mg by mouth every evening.) 7 Tab 0  
 pantothenic ac-min oil-pet,hyd (AQUAPHOR) 41 % ointment Apply  to affected area daily. Apply over legs 53 g 0  
 colestipol (COLESTID) 1 gram tablet Take 1 g by mouth two (2) times a day.  sodium bicarbonate 650 mg tablet Take  by mouth three (3) times daily.  traMADol (ULTRAM) 50 mg tablet Take 50 mg by mouth every six (6) hours as needed for Pain.  cloNIDine (CATAPRES) 0.1 mg/24 hr ptwk 1 Patch by TransDERmal route every seven (7) days. Changes on Tuesdays- on ELVIRA chest 9/11/2019  lidocaine 4 % patch Cut to appropriate size. Apply to intact skin for 12 hours, remove for 12 hours. (Patient taking differently: 1 Patch by TransDERmal route as needed. Cut to appropriate size. Apply to intact skin for 12 hours, remove for 12 hours. States uses only occasionally and not on today 9/11/2019 Do not take morning of procedure.) 14 Patch 0  
 CALCITRIOL, BULK, by Does Not Apply route.  hydrALAZINE (APRESOLINE) 100 mg tablet Take 1 Tab by mouth three (3) times daily. (Patient taking differently: Take 100 mg by mouth three (3) times daily. Take morning of procedure.) 90 Tab 2  pregabalin (LYRICA) 50 mg capsule Take 1 Cap by mouth daily. Max Daily Amount: 50 mg. (Patient taking differently: Take 150 mg by mouth two (2) times a day.) 10 Cap 0  
 pravastatin (PRAVACHOL) 40 mg tablet Take 1 Tab by mouth nightly.  30 Tab 0  
 HUMALOG KWIKPEN INSULIN 100 unit/mL kwikpen 5 units three times a day with meals plus correction scale, 2 units/50 > 150, max daily dose 25 units (Patient not taking: Reported on 11/11/2019) 5 Pen 11  
 calcifediol (RAYALDEE) 30 mcg Cs24 Take  by mouth nightly.  amLODIPine (NORVASC) 10 mg tablet Take 10 mg by mouth every morning.  metoprolol succinate (TOPROL-XL) 50 mg XL tablet Take 50 mg by mouth every morning.  omeprazole (PRILOSEC) 20 mg capsule Take 20 mg by mouth every morning. Objective:  
 
Physical Exam:  
 
Visit Vitals /70 (BP 1 Location: Right arm, BP Patient Position: Sitting) Pulse 66 Temp 98.1 °F (36.7 °C) Resp 18 Ht 6' (1.829 m) Wt 82.1 kg (181 lb) SpO2 95% BMI 24.55 kg/m² General: well developed, well nourished, pleasant , NAD. Hygiene good Psych: cooperative. Pleasant. No anxiety or depression. Normal mood and affect. Neuro: alert and oriented to person/place/situation. Otherwise nonfocal. 
Derm: Normal turgor for age, dry skin HEENT: Normocephalic, atraumatic. EOMI. Conjunctiva clear. No scleral icterus. Neck: Normal range of motion. No masses. Chest: Good air entry bilaterally. Respirations nonlabored Cardio: Normal heart sounds,no rubs, murmurs or gallops Abdomen: Soft, nontender, nondistended, normoactive bowel sounds Lower extremities: color normal; temperature normal. Hair growth is not present. Calves are supple, nontender, approximately equally sized in comparison. Capillary refill <3 sec Ulcer Description: Wound Leg Anterior;Mid;Right (Active) Number of days: 608 Wound Pretibial Right;Lateral (Active) Number of days: 36 Wound Leg lower Right;Lateral (Active) Dressing Status Clean, dry, and intact 12/10/2019 10:27 AM  
Non-staged Wound Description Full thickness 12/10/2019 10:27 AM  
Wound Length (cm) 2 cm 12/10/2019 10:27 AM  
Wound Width (cm) 1 cm 12/10/2019 10:27 AM  
Wound Depth (cm) 0.1 cm 12/10/2019 10:27 AM  
 Wound Surface Area (cm^2) 2 cm^2 12/10/2019 10:27 AM  
Wound Volume (cm^3) 0.2 cm^3 12/10/2019 10:27 AM  
Condition of Base Granulation;Epithelializing 12/10/2019 10:27 AM  
Tissue Type Percent Pink 90 12/10/2019 10:27 AM  
Tissue Type Percent Red 10 12/10/2019 10:27 AM  
Tissue Type Percent Yellow 40 12/3/2019  2:04 PM  
Drainage Amount Small 12/10/2019 10:27 AM  
Drainage Color Serosanguinous 12/10/2019 10:27 AM  
Wound Odor None 12/10/2019 10:27 AM  
Tammy-wound Assessment Blanchable erythema 12/10/2019 10:27 AM  
Margins Attached edges 12/10/2019 10:27 AM  
Cleansing and Cleansing Agents  Normal saline 12/10/2019 10:27 AM  
Dressing Changed Changed/New 12/3/2019  2:04 PM  
Number of days: 7 Wound Leg lower Left;Lateral (Active) Dressing Status Old drainage;Moist 12/10/2019 10:27 AM  
Wound Length (cm) 3.4 cm 12/10/2019 10:27 AM  
Wound Width (cm) 5.5 cm 12/10/2019 10:27 AM  
Wound Depth (cm) 0 cm 12/10/2019 10:27 AM  
Wound Surface Area (cm^2) 18.7 cm^2 12/10/2019 10:27 AM  
Wound Volume (cm^3) 0 cm^3 12/10/2019 10:27 AM  
Condition of Base Other (comment) 12/10/2019 10:27 AM  
Drainage Amount Large 12/10/2019 10:27 AM  
Drainage Color Serous 12/10/2019 10:27 AM  
Wound Odor None 12/10/2019 10:27 AM  
Tammy-wound Assessment Intact 12/10/2019 10:27 AM  
Cleansing and Cleansing Agents  Normal saline 12/10/2019 10:27 AM  
Dressing Changed Changed/New 12/10/2019 10:27 AM  
Number of days: 7 [REMOVED] Wound Leg Left; Anterior (Removed) Number of days: 375 [REMOVED] Wound Arm Left (Removed) Number of days: 167 [REMOVED] Wound Left (Removed) Number of days: 30 Data Review: No results found for this or any previous visit (from the past 24 hour(s)). Assessment:  
 
67 y.o. male with both lower extremities venous stasis ulcer. Problem List  Date Reviewed: 2/1/2019 Codes Class Noted ESRD (end stage renal disease) [facility] ICD-10-CM: N18.6 ICD-9-CM: 585.6  11/3/2019 Open wound of skin ICD-10-CM: T14. Robbin Leonard ICD-9-CM: 879.8  11/3/2019 DVT (deep venous thrombosis) (HCC) ICD-10-CM: I82.409 ICD-9-CM: 453.40  11/3/2019 Rhabdomyolysis ICD-10-CM: C01.39 ICD-9-CM: 728.88  4/30/2018 Metabolic acidosis NPC-36-UV: E87.2 ICD-9-CM: 276.2  4/30/2018 Renal failure (ARF), acute on chronic (HCC) ICD-10-CM: N17.9, N18.9 ICD-9-CM: 584.9, 585.9  4/29/2018 Dementia without behavioral disturbance (HCC) (Chronic) ICD-10-CM: F03.90 ICD-9-CM: 294.20  4/19/2018 Hypernatremia ICD-10-CM: E87.0 ICD-9-CM: 276.0  4/18/2018 Seizure ([address] ICD-10-CM: R56.9 ICD-9-CM: 780.39  4/18/2018 Volume overload ICD-10-CM: E87.70 ICD-9-CM: 276.69  4/4/2018 Chest pain ICD-10-CM: R07.9 ICD-9-CM: 786.50  4/4/2018 Cauda equina compression (HCC) ICD-10-CM: G83.4 ICD-9-CM: 344.60  4/2/2018 Hypercholesterolemia ICD-10-CM: E78.00 ICD-9-CM: 272.0  Unknown Uncontrolled diabetes mellitus, with long-term current use of insulin (HCC) (Chronic) ICD-10-CM: E11.65, Z79.4 ICD-9-CM: 250.02, V58.67  2/28/2018 Carotid bruit ICD-10-CM: R09.89 ICD-9-CM: 785.9  2/28/2018 Arteriovenous fistula (HCC) ICD-10-CM: I77.0 ICD-9-CM: 447.0  10/31/2017 Overview Signed 10/31/2017  3:13 PM by Tahira Yip NP  
  10/26/17 (Dr. Lipscomb Person) Creation of left brachiobasilic fistula. Onychomycosis ICD-10-CM: B35.1 ICD-9-CM: 110.1  9/13/2017 Controlled type 2 diabetes mellitus with complication, with long-term current use of insulin (HCC) (Chronic) ICD-10-CM: E11.8, Z79.4 ICD-9-CM: 250.90, V58.67  9/13/2017 Stage 4 chronic kidney disease (HCC) (Chronic) ICD-10-CM: N18.4 ICD-9-CM: 585.4  4/4/2017 Stasis edema of both lower extremities (Chronic) ICD-10-CM: Q35.467 ICD-9-CM: 459.30  4/4/2017 Cellulitis of left lower leg ICD-10-CM: L03.116 ICD-9-CM: 682.6  4/4/2017 Venous stasis ulcer of left lower extremity ([facility] ICD-10-CM: A73.898, P00.841 ICD-9-CM: 454.0  4/1/2017 Venous insufficiency (Chronic) ICD-10-CM: Z17.7 ICD-9-CM: 459.81  12/6/2016 Chronic systolic congestive heart failure (HCC) (Chronic) ICD-10-CM: X23.03 ICD-9-CM: 428.22, 428.0  9/23/2016 Septicemia due to Klebsiella pneumoniae [facility]) ICD-10-CM: A41.4 ICD-9-CM: 038.3  9/22/2016 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 
ICD-9-CM: 250.40, 583.81  9/22/2016 Essential hypertension ICD-10-CM: I10 
ICD-9-CM: 401.9  9/22/2016 GERD (gastroesophageal reflux disease) ICD-10-CM: K21.9 ICD-9-CM: 530.81  9/22/2016 Diabetic neuropathy (HCC) ICD-10-CM: E11.40 ICD-9-CM: 250.60, 357.2  9/22/2016 Chronic pancreatitis (HCC) (Chronic) ICD-10-CM: K86.1 ICD-9-CM: 918.5  9/22/2016 Iron (Fe) deficiency anemia ICD-10-CM: D50.9 ICD-9-CM: 280.9  9/22/2016 Acute renal failure superimposed on stage 3 chronic kidney disease (HCC) ICD-10-CM: N17.9, N18.3 ICD-9-CM: 584.9, 585.3  9/21/2016 Plan:  
 
Orders Placed This Encounter  WOUND CARE, DRESSING CHANGE Right lateral leg, left lateral leg: 
Cleanse wound and periwound with wound cleanser or normal saline. Xeroform- apply to wound bed. Cover with ABD, 2 layer compression with wound and lower extremity assessment. If there is any problem with the dressing (too tight, slides down, etc.) Patient to return to wound clinic to have re-wrapped by clinician. Elevate legs above the level of the heart when sitting Do not get dressings wet. May purchase cast cover from pharmacy of choice for use when showering or bathing. Home Health to change dressing three times a week. Standing Status:   Standing Number of Occurrences:   1 Patient understood and agrees with plan. Questions answered. Follow-up Information None Any procedures done today in the [facility] are documented in a separate note in [facility] and made part of this record by reference. Dictated using voice recognition software; proofread, but unrecognized errors may exist. 
 
Signed By: Antonio Nava MD   
 December 10, 2019

## 2019-12-10 NOTE — WOUND CARE
[address] [place] [address] [address] Phone: [phone] Fax: [phone] Patient: Rodney Donald MRN: [id number]  SSN: [id number] YOB: 1940  Age: 67 y.o. Sex: male Return Appointment: 2 weeks with Amrik Ferrari MD 
 
Instructions:  
Right lateral lower leg: 
Cleanse wound and periwound with wound cleanser or normal saline. Xeroform- apply to wound bed. Cover with ABD, 2 layer compression with wound and lower extremity assessment. If there is any problem with the dressing (too tight, slides down, etc.) Patient to return to wound clinic to have re-wrapped by clinician. Left Lateral Lower Leg: 
Cleanse wound and periwound with wound cleanser or normal saline. Xeroform- apply to wound bed. Cover with ABD, wrap with Rolled Gauze. Dressing change 3x weekly and more often in presence of breakthrough drainage. Elevate legs above the level of the heart when sitting Do not get dressings wet. May purchase cast cover from pharmacy of choice for use when showering or bathing. Home Health to change dressings three times weekly. Should you experience increased redness, swelling, pain, foul odor, size of wound(s), or have a temperature over 101 degrees please contact the [address] [place] [address] Road at [phone] or if after hours contact your primary care physician or go to the hospital emergency department. Signed By: Altagracia Solomon RN December 10, 2019

## 2019-12-10 NOTE — WOUND CARE
12/10/19 1027 Wound Leg lower Right;Lateral  
Date First Assessed/Time First Assessed: 12/03/19 1404   Primary Wound Type: Venous  Location: Leg lower  Wound Location Orientation: Right;Lateral  
Dressing Status Clean, dry, and intact Dressing Type  
(Xeroform, ABD, Multi-Layer Compression) Non-staged Wound Description Full thickness Wound Length (cm) 2 cm Wound Width (cm) 1 cm Wound Depth (cm) 0.1 cm Wound Surface Area (cm^2) 2 cm^2 Wound Volume (cm^3) 0.2 cm^3 Condition of Base Granulation;Epithelializing Tissue Type Percent Pink 90 Tissue Type Percent Red 10 Drainage Amount Small Drainage Color Serosanguinous Wound Odor None Tammy-wound Assessment Blanchable erythema Margins Attached edges Cleansing and Cleansing Agents  Normal saline Wound Leg lower Left;Lateral  
Date First Assessed/Time First Assessed: 12/03/19 1429   Primary Wound Type: Venous  Location: Leg lower  Wound Location Orientation: Left;Lateral  
Dressing Status Old drainage;Moist  
Dressing Type  
(Xeroform, ABD, Rolled Gauze) Wound Length (cm) 3.4 cm Wound Width (cm) 5.5 cm Wound Depth (cm) 0 cm Wound Surface Area (cm^2) 18.7 cm^2 Wound Volume (cm^3) 0 cm^3 Condition of Base Other (comment) 
(pinhole in blister; blister otherwise intact) Drainage Amount Large Drainage Color Serous Wound Odor None Tammy-wound Assessment Intact Cleansing and Cleansing Agents  Normal saline Dressing Changed Changed/New Patient is on ANTICOAGULANT THERAPY: Warfarin. Light mechanical debridement to Right Lower Leg wound with gauze and normal saline. Expressed serous, then serosanguinous drainage from Left Lower Leg blister.

## 2019-12-17 NOTE — WOUND CARE
12/17/19 1131 Wound Leg lower Right;Lateral  
Date First Assessed/Time First Assessed: 12/03/19 1404   Primary Wound Type: Venous  Location: Leg lower  Wound Location Orientation: Right;Lateral  
Dressing Status Clean, dry, and intact Dressing Type  
(xeroform, abd, coflex 2) Non-staged Wound Description Full thickness Wound Length (cm) 20 cm Wound Width (cm) 4 cm Wound Depth (cm) 0.1 cm Wound Surface Area (cm^2) 80 cm^2 Wound Volume (cm^3) 8 cm^3 Condition of Base Granulation;Epithelializing;Eschar Tissue Type Percent Black 20 % Tissue Type Percent Pink 70 Tissue Type Percent Red 10 Drainage Amount Moderate Drainage Color Serosanguinous Wound Odor None Tammy-wound Assessment Edema Cleansing and Cleansing Agents  Soap and water Wound Leg lower Left;Lateral  
Date First Assessed/Time First Assessed: 12/03/19 1429   Primary Wound Type: Venous  Location: Leg lower  Wound Location Orientation: Left;Lateral  
Dressing Status Clean, dry, and intact Dressing Type  
(xeroform, abd, rolled gauze) Wound Length (cm) 3 cm Wound Width (cm) 5.5 cm Wound Depth (cm) 0 cm Wound Surface Area (cm^2) 16.5 cm^2 Wound Volume (cm^3) 0 cm^3 Condition of Base Other (comment) (unable to visualize wound base, blister) Drainage Amount Small Drainage Color Serous Wound Odor None Tammy-wound Assessment Intact Cleansing and Cleansing Agents  Soap and water PATIENT IS ON ANTICOAGULANT WARFARIN.

## 2019-12-17 NOTE — WOUND CARE
Multilayer Compression Wrap 
 (Not Unna) Below the Knee NAME:  Theodore Ruiz YOB: 1940 MEDICAL RECORD NUMBER:  [id number] DATE:  12/17/2019 Removed old Multilayer wrap if indicated and wash leg with mild soap/water. Applied moisturizing agent to dry skin as needed. Applied primary and secondary dressing as ordered. Applied multilayered dressing below the knee to right lower leg. Instructed patient/caregiver not to remove dressing and to keep it clean and dry. Instructed patient/caregiver on complications to report to provider, such as pain, numbness in toes, heavy drainage, and slippage of dressing. Instructed patient on purpose of compression dressing and on activity and exercise recommendations.  
 
 
Electronically signed by Mazin Harrington RN on 12/17/2019 at 12:15 PM

## 2019-12-17 NOTE — WOUND CARE
[address] [place] [address], [facility] [address] Phone: [phone] Fax: [phone] Patient: Vikas Mitchell MRN: [id number]  SSN: [id number] YOB: 1940  Age: 67 y.o. Sex: male Return Appointment: 1 week with Highland Goodell, MD 
 
Instructions:  
Right lateral lower leg: 
Cleanse wound and periwound with wound cleanser or normal saline. Xeroform- apply to wound bed. Cover with ABD, and wrap right lower leg with 2 layer compression with wound and lower extremity assessment. If there is any problem with the dressing (too tight, slides down, etc.) Patient to return to wound clinic to have re-wrapped by clinician. Dressing to be changed three times weekly by Home Health. Left Lateral Lower Leg: 
Cleanse wound and periwound with wound cleanser or normal saline. Xeroform- apply to wound bed. Cover with ABD, wrap with Rolled Gauze. Dressing change 3x weekly and more often in presence of breakthrough drainage. 
  
Elevate legs above the level of the heart when sitting. Do not get dressings wet. May purchase cast cover from pharmacy of choice for use when showering or bathing. Home Health to change dressings three times weekly. Should you experience increased redness, swelling, pain, foul odor, size of wound(s), or have a temperature over 101 degrees please contact the [address] [place] [address] Road at [phone] or if after hours contact your primary care physician or go to the hospital emergency department. Signed By: Shanell Gould RN December 17, 2019

## 2019-12-17 NOTE — WOUND CARE
Clinic Level of Care Assessment NAME:  Maria Ines Amato YOB: 1940 GENDER: male MEDICAL RECORD NUMBER:  [id number] DATE:  12/17/2019 Wound Count Document in [facility] Number of Wounds Assessed Points No Wounds/Ulcers []   0 Less than Three Wounds/Ulcers [x]   1  
3-6 Wounds/Ulcers []   2 Greater than 6 Wounds/Ulcers []   3 Ambulation Status Document in Coord/ZION/Mobility tab Status Definition Points Independent Independently able to ambulate. Fully able (without any assistance) to get on/off exam table/chair. []   0 Minimal Physical Assistance Requires physical assistance of one person to ambulate and/or position patient to be examined. Includes necessary physical assistance to position lower extremities on/off stool. [x]   1 Moderate Physical Assistance Requires at least one staff member to physically assist patient in ambulating into treatment room, and on/off exam table. []   2 Full Assistance Requires assistance of at least two staff members to transfer patient into treatment room and/or on/off exam table/chair. \"Total Transfer\". []   3 Dressing Complexity Document in [facility] and Write Appropriate Order Complexity Definition Points No Dressing  []   0 Simple Minimal, simple dressing. i.e. Band-aid, gauze, simple wrap. []   1 Intermediate Moderately complicated requiring licensed personnel to apply i.e. collagen matrix, ointments, gels, alginates. [x]   2 Complex Complicated requiring licensed personnel to apply dressings 6 or more wounds. []   3 Teaching Effort Document in Education Tab Effort Definition Points No Teaching  []   0 Simple Reinforce two or less topics. Document in Education navigator. [x]   1 Intermediate Reinforce three to five topics and/or one additional  
new topic. Document in Education navigator. []   2 Complex Teach more than one new topic. New patient information packet reviewed and/or reinforce more than three topics. Document in Education navigator. HBO initial instruction. []   3 Patient Assessment and Planning Planning Definition Points Simple Multiple System Simple: Simple follow-up with routine assessment and planning. If Discharged, instructions and long term/follow-up care given to patient/caregiver. Discharged, instructions and/or After Visit Summary given to patient/caregiver and instructions completed. [x]   1 Intermediate Multiple System Intermediate: Contact with outside resources; i.e. Telephone calls to home health, [facility]. May include filling out forms and writing letters, arranging transportation, communication with insurance , vendors, etc.  Discharged, instructions and/or After Visit Summary given to patient/caregiver and instructions completed. []   2 Complex Multiple System Complex: Full, comprehensive assessment and planning. Follow the entire navigator under Wound Visit charting filling out each tab which includes OP Adm Database Screening, Education and CarePlan  HBO risk assessment completed. Discharged, instructions and/or After Visit Summary given to patient/caregiver and instructions completed. []   3 Is this the Patient's First Visit to the [address] [place] [address] Road No 
 
 
Is this Patient Established @ [facility] 
Yes Clinical Level of Care Points  0-2  Level 1 [] Points  3-5  Level 2 [] Points  6-9  Level 3 [x] Points  10-12  Level 4 [] Points  13-15  Level 5 [] Electronically signed by Mazin Harrington RN on 12/17/2019 at 12:15 PM

## 2019-12-25 NOTE — ED PROVIDER NOTES
76-year-old gentleman has a history of hypertension diabetes renal insufficiency. Also some pancreas troubles in the past.  Right flank pain rating to right lower quadrant started 6:00 last night. Pain is been rather constant with occasional periods of worsening. No fever nausea vomiting diarrhea no change in urine. No abnormal bleeding. The pain like this previously. Denies any gallbladder or kidney issues. Past medical history includes hernia surgery and prostate surgery but no other abdominal surgery. The history is provided by the patient. Abdominal Pain This is a new problem. The current episode started 12 to 24 hours ago. The problem occurs constantly. The problem has not changed since onset. The pain is associated with vomiting. The pain is located in the RUQ and RLQ. The quality of the pain is dull and aching. The pain is moderate. Associated symptoms include back pain. Pertinent negatives include no fever, no diarrhea, no hematochezia, no melena, no nausea, no vomiting, no constipation, no dysuria, no frequency, no hematuria, no headaches, no chest pain and no testicular pain. Nothing worsens the pain. The pain is relieved by nothing. The patient's surgical history non-contributory. Past Medical History:  
Diagnosis Date  Acute renal failure superimposed on stage 3 chronic kidney disease ([address] 9/21/2016  Anemia   
 pt states he receives iron infusions  Arthritis OA generalized  Chronic kidney disease   
 not on HD- surgery done for access and fistula being removed. Per patient, no plans to proceed with dialysis  Chronic pancreatitis ([address] 9/22/2016  Dermatophytosis of nail 12/6/2016  Diabetic neuropathy ([facility] 9/22/2016  
 insulin sliding scale only- no oral meds- avg fastings avg fasting \"low 200's;  hypo @ 80 A1C 7.1 9/2019 per patient  Essential hypertension 9/22/2016  
 medication  GERD (gastroesophageal reflux disease)   
 controlled with med  Hypercholesterolemia  Iron (Fe) deficiency anemia 2016  Septicemia due to Klebsiella pneumoniae ([facility] 2016  Systolic CHF, chronic ([facility] 2016 ECHO 2018 EF- 60-65%  Type II diabetes mellitus with nephropathy ([facility] 2016  Venous insufficiency 2016  Xerosis cutis 2016 Past Surgical History:  
Procedure Laterality Date  HX COLONOSCOPY    
 HX HERNIA REPAIR Left 2010  HX PROSTATECTOMY  2012  HX TURP  2012 FOR BPH  VASCULAR SURGERY PROCEDURE UNLIST Left 10/26/2017 AVF  VASCULAR SURGERY PROCEDURE UNLIST Left 2019 Ligation of left brachiobasilic fistula Family History:  
Problem Relation Age of Onset  Diabetes Mother  Stroke Mother  Hypertension Mother  No Known Problems Father  Diabetes Sister  Diabetes Brother  Diabetes Sister  Diabetes Sister  Other Sister   
     fibromyalgia Social History Socioeconomic History  Marital status:  Spouse name: Not on file  Number of children: Not on file  Years of education: Not on file  Highest education level: Not on file Occupational History  Not on file Social Needs  Financial resource strain: Not on file  Food insecurity:  
  Worry: Not on file Inability: Not on file  Transportation needs:  
  Medical: Not on file Non-medical: Not on file Tobacco Use  Smoking status: Former Smoker Packs/day: 2.00 Years: 20.00 Pack years: 40.00 Last attempt to quit:  Years since quittin.9  Smokeless tobacco: Current User Substance and Sexual Activity  Alcohol use: No  
 Drug use: Never  Sexual activity: Not on file Lifestyle  Physical activity:  
  Days per week: Not on file Minutes per session: Not on file  Stress: Not on file Relationships  Social connections:  
  Talks on phone: Not on file Gets together: Not on file Attends [employer] service: Not on file Active member of club or organization: Not on file Attends meetings of clubs or organizations: Not on file Relationship status: Not on file  Intimate partner violence:  
  Fear of current or ex partner: Not on file Emotionally abused: Not on file Physically abused: Not on file Forced sexual activity: Not on file Other Topics Concern  Not on file Social History Narrative  Not on file ALLERGIES: Patient has no known allergies. Review of Systems Constitutional: Negative for chills and fever. Respiratory: Negative for cough and shortness of breath. Cardiovascular: Negative for chest pain and palpitations. Gastrointestinal: Positive for abdominal pain. Negative for constipation, diarrhea, hematochezia, melena, nausea and vomiting. Genitourinary: Negative for dysuria, flank pain, frequency, hematuria and testicular pain. Musculoskeletal: Positive for back pain. Negative for neck pain. Skin: Negative for color change and rash. Neurological: Negative for syncope and headaches. All other systems reviewed and are negative. Vitals:  
 12/25/19 1052 BP: (!) 210/96 Pulse: 60 Resp: 18 Temp: 97.6 °F (36.4 °C) SpO2: 98% Weight: 80.7 kg (178 lb) Height: 6' (1.829 m) Physical Exam 
Vitals signs and nursing note reviewed. Constitutional:   
   General: He is not in acute distress. Appearance: He is well-developed. HENT:  
   Head: Normocephalic and atraumatic. Right Ear: External ear normal.  
   Left Ear: External ear normal.  
   Mouth/Throat:  
   Pharynx: No oropharyngeal exudate. Eyes:  
   Conjunctiva/sclera: Conjunctivae normal.  
   Pupils: Pupils are equal, round, and reactive to light. Neck: Musculoskeletal: Normal range of motion and neck supple. Cardiovascular:  
   Rate and Rhythm: Normal rate and regular rhythm. Heart sounds: No murmur. Pulmonary: Effort: No respiratory distress. Breath sounds: Normal breath sounds. Abdominal:  
   General: Bowel sounds are normal.  
   Palpations: Abdomen is soft. There is no mass. Tenderness: There is tenderness in the right lower quadrant. There is right CVA tenderness. There is no guarding or rebound. Negative signs include Calix's sign and McBurney's sign. Hernia: No hernia is present. Comments: No right upper quadrant tenderness. No rash visible. Tender right lower quadrant very near the anterior superior iliac spine. No tenderness at McBurney's point. No mass rebound or guarding. No pulsatile mass. Skin: 
   General: Skin is warm and dry. Neurological:  
   Mental Status: He is alert and oriented to person, place, and time. Gait: Gait normal.  
   Comments: Nl speech Psychiatric:     
   Speech: Speech normal.  
 
  
 
MDM Number of Diagnoses or Management Options Diagnosis management comments: No evidence for shingles. Possibility of kidney stone. Less likely would be appendicitis. Doubt aneurysm. Possibly a muscle skeletal pain Amount and/or Complexity of Data Reviewed Clinical lab tests: ordered and reviewed Tests in the radiology section of CPT®: ordered and reviewed Risk of Complications, Morbidity, and/or Mortality Presenting problems: moderate Diagnostic procedures: minimal 
Management options: low Patient Progress Patient progress: stable Procedures Results Include: 
 
Recent Results (from the past 24 hour(s)) CBC WITH AUTOMATED DIFF Collection Time: 12/25/19 11:14 AM  
Result Value Ref Range WBC 3.9 (L) 4.3 - 11.1 K/uL  
 RBC 3.35 (L) 4.23 - 5.6 M/uL HGB 8.3 (L) 13.6 - 17.2 g/dL HCT 27.1 (L) 41.1 - 50.3 % MCV 80.9 79.6 - 97.8 FL  
 MCH 24.8 (L) 26.1 - 32.9 PG  
 MCHC 30.6 (L) 31.4 - 35.0 g/dL  
 RDW 17.2 (H) 11.9 - 14.6 % PLATELET 99 (L) 497 - 450 K/uL  MPV 12.5 (H) 9.4 - 12.3 FL  
 ABSOLUTE NRBC 0.00 0.0 - 0.2 K/uL  
 DF AUTOMATED NEUTROPHILS 49 43 - 78 % LYMPHOCYTES 35 13 - 44 % MONOCYTES 12 4.0 - 12.0 % EOSINOPHILS 3 0.5 - 7.8 % BASOPHILS 0 0.0 - 2.0 % IMMATURE GRANULOCYTES 1 0.0 - 5.0 %  
 ABS. NEUTROPHILS 1.9 1.7 - 8.2 K/UL  
 ABS. LYMPHOCYTES 1.4 0.5 - 4.6 K/UL  
 ABS. MONOCYTES 0.5 0.1 - 1.3 K/UL  
 ABS. EOSINOPHILS 0.1 0.0 - 0.8 K/UL  
 ABS. BASOPHILS 0.0 0.0 - 0.2 K/UL  
 ABS. IMM. GRANS. 0.0 0.0 - 0.5 K/UL METABOLIC PANEL, COMPREHENSIVE Collection Time: 12/25/19 11:14 AM  
Result Value Ref Range Sodium 148 (H) 136 - 145 mmol/L Potassium 4.5 3.5 - 5.1 mmol/L Chloride 115 (H) 98 - 107 mmol/L  
 CO2 28 21 - 32 mmol/L Anion gap 5 (L) 7 - 16 mmol/L Glucose 241 (H) 65 - 100 mg/dL BUN 67 (H) 8 - 23 MG/DL Creatinine 4.87 (H) 0.8 - 1.5 MG/DL  
 GFR est AA 15 (L) >60 ml/min/1.73m2 GFR est non-AA 12 (L) >60 ml/min/1.73m2 Calcium 8.0 (L) 8.3 - 10.4 MG/DL Bilirubin, total 0.3 0.2 - 1.1 MG/DL  
 ALT (SGPT) 13 12 - 65 U/L  
 AST (SGOT) 20 15 - 37 U/L Alk. phosphatase 66 50 - 136 U/L Protein, total 6.8 6.3 - 8.2 g/dL Albumin 2.4 (L) 3.2 - 4.6 g/dL Globulin 4.4 (H) 2.3 - 3.5 g/dL A-G Ratio 0.5 (L) 1.2 - 3.5 LIPASE Collection Time: 12/25/19 11:14 AM  
Result Value Ref Range Lipase 15 (L) 73 - 393 U/L Other than some increase in BUN and hemoglobin drop of 2 g over the last few months, no significant change in labs. Ct Urogram Wo Cont Result Date: 12/25/2019 EXAMINATION: CT  ABDOMEN / PELVIS   12/25/2019 11:37 AM ACCESSION NUMBER:  [id number] INDICATION:  abd pain/ stone protocol COMPARISON: Abdominal CT 9/19/2016 TECHNIQUE: Contiguous axial computed tomographic images were obtained from the domes of the diaphragm to the symphysis pubis without intravenous contrast. Coronal reconstructions were also performed.  Please note that the detection of solid organ and vascular abnormalities is limited in the absence of intravenous contrast. Radiation dose reduction techniques were used for this study:  Our CT scanners use one or all of the following: Automated exposure control, adjustment of the mA and/or kVp according to patient's size, iterative reconstruction. FINDINGS: LIMITED THORAX:Right coronary artery atherosclerosis. Unremarkable included portions of pericardium. Bibasilar atelectasis. PERITONEUM/MESENTERY: No evidence of ascites, free gas, or lymphadenopathy. GI TRACT: There is rectal wall thickening. Scattered areas of colonic diverticulosis without evidence of diverticulitis. The appendix is normal. There is no evidence of a small bowel obstruction. The stomach is unremarkable. SPLEEN: Normal ADRENALS: Minimal left adrenal gland thickening. The right adrenal gland is unremarkable. PANCREAS: There is pancreatic parenchymal loss, prominence of the pancreatic duct and calcifications throughout the pancreas. This represents a now chronic findings of the pancreatitis demonstrated at the time of the prior exam. LIVER: No intrahepatic or common bile duct dilation. Overall unremarkable noncontrast appearance of the liver. GALLBLADDER: Cholelithiasis KIDNEYS: Normal BLADDER/: Distended urinary bladder. The prostate gland and seminal vesicles are unremarkable. VESSELS: Scattered atherosclerosis. No evidence of abdominal aortic aneurysm. BONES/SOFT TISSUES: No suspicious lytic or blastic bony lesions. IMPRESSION: 1. There is rectal wall thickening. While this could be related to incomplete distention, it is new compared to the prior exam. Correlation for associated symptoms is recommended. No evidence of perforation or surrounding free fluid. 2. Fluid along flanks, which may represent developing anasarca. 3. Findings of chronic pancreatitis, with chronic features otherwise as detailed above. VOICE DICTATED BY: Dr. Manuel Ortiz Patient tender on palpation on the right flank. Probable musculoskeletal pain. Patient needs to follow-up with primary care doctor for further work-up. Pain control in the meantime.

## 2019-12-25 NOTE — ED TRIAGE NOTES
Patient arrives via EMS from home. States called out for right lower quad abd pain beginning last night at 1800. Patient denies n/v/d. Rates pain 10/10. Pain located in right lower quad radiating to back flank. Describes as sharp.

## 2019-12-25 NOTE — DISCHARGE INSTRUCTIONS
Tylenol or pain medication but noPatient Education        Flank Pain: Care Instructions  Your Care Instructions  Flank pain is pain on the side of the back just below the rib cage and above the waist. It can be on one or both sides. Flank pain has many possible causes, including a kidney stone, a urinary tract infection, or back strain. Flank pain may get better on its own. But don't ignore new symptoms, such as fever, nausea and vomiting, urination problems, pain that gets worse, and dizziness. These may be signs of a more serious problem. You may have to have tests or other treatment. Follow-up care is a key part of your treatment and safety. Be sure to make and go to all appointments, and call your doctor if you are having problems. It's also a good idea to know your test results and keep a list of the medicines you take. How can you care for yourself at home? · Rest until you feel better. · Take pain medicines exactly as directed. ? If the doctor gave you a prescription medicine for pain, take it as prescribed. ? If you are not taking a prescription pain medicine, ask your doctor if you can take an over-the-counter pain medicine, such as acetaminophen (Tylenol), ibuprofen (Advil, Motrin), or naproxen (Aleve). Read and follow all instructions on the label. · If your doctor prescribed antibiotics, take them as directed. Do not stop taking them just because you feel better. You need to take the full course of antibiotics. · To apply heat, put a warm water bottle, a heating pad set on low, or a warm cloth on the painful area. Do not go to sleep with a heating pad on your skin. · To prevent dehydration, drink plenty of fluids, enough so that your urine is light yellow or clear like water. Choose water and other caffeine-free clear liquids until you feel better.  If you have kidney, heart, or liver disease and have to limit fluids, talk with your doctor before you increase the amount of fluids you drink.  When should you call for help? Call your doctor now or seek immediate medical care if:    · Your flank pain gets worse.     · You have new symptoms, such as fever, nausea, or vomiting.     · You have symptoms of a urinary problem. For example:  ? You have blood or pus in your urine. ? You have chills or body aches. ? It hurts to urinate. ? You have groin or belly pain.    Watch closely for changes in your health, and be sure to contact your doctor if you do not get better as expected. Where can you learn more? Go to http://svetlana-glenis.info/. Enter S191 in the search box to learn more about \"Flank Pain: Care Instructions. \"  Current as of: June 26, 2019  Content Version: 12.2  © 9873-4122 [employer]. Care instructions adapted under license by [employer] (which disclaims liability or warranty for this information). If you have questions about a medical condition or this instruction, always ask your healthcare professional. [place] [address] any warranty or liability for your use of this information. t both. Rest.  Heating pad or hot water bottle. Her next week if not improving. Recheck sooner for worse pain, vomiting, high fever or rash.

## 2019-12-30 NOTE — WOUND CARE
12/30/19 1034   Wound Leg lower Right;Lateral   Date First Assessed/Time First Assessed: 12/03/19 1404   Primary Wound Type: Venous  Location: Leg lower  Wound Location Orientation: Right;Lateral   Dressing Status Clean, dry, and intact   Dressing Type   (xeroform, abd, 2 layer wrap)   Non-staged Wound Description Full thickness   Wound Length (cm) 5.5 cm   Wound Width (cm) 1.2 cm   Wound Depth (cm) 0.1 cm   Wound Surface Area (cm^2) 6.6 cm^2   Wound Volume (cm^3) 0.66 cm^3   Condition of Base Granulation;Epithelializing   Tissue Type Percent Pink 100   Drainage Amount Moderate   Drainage Color Serosanguinous   Wound Odor None   Tammy-wound Assessment Edema   Cleansing and Cleansing Agents  Soap and water   Dressing Changed Changed/New       Is this patient on anticoagulation therapy? yes, coumadin

## 2019-12-30 NOTE — WOUND CARE
[address]  [address], [address]  Phone: [phone]  Fax: [phone]    Patient: Gracia Dowell MRN: [id number]  SSN: [id number]    YOB: 1940  Age: 67 y.o. Sex: male       Return Appointment: 2 weeks with William Chance MD    Instructions: Right lateral lower leg:  Cleanse wound and periwound with wound cleanser or normal saline. Xeroform- apply to wound bed. Cover with ABD, and wrap right lower leg with 2 layer compression with wound and lower extremity assessment. If there is any problem with the dressing (too tight, slides down, etc.) Patient to return to wound clinic to have re-wrapped by clinician. Dressing to be changed three times weekly by Home Health.       Elevate legs above the level of the heart when sitting. Do not get dressings wet. May purchase cast cover from pharmacy of choice for use when showering or bathing. Apply xeroform to any blisters, cover with ABD, change every other day or as needed for excessive drainage. Should you experience increased redness, swelling, pain, foul odor, size of wound(s), or have a temperature over 101 degrees please contact the [address] at [phone] or if after hours contact your primary care physician or go to the hospital emergency department.     Signed By: Ernst Bates RN     December 30, 2019

## 2019-12-30 NOTE — PROGRESS NOTES
Leg wound is almost completely well continue the same dressing with Xeroform and compression and will see again in 2 weeks. Wound Center Progress Note    Patient: Nelly Goldstein Shown MRN: [id number]  SSN: [id number]    YOB: 1940  Age: 67 y.o. Sex: male      Subjective:     Chief Complaint:  SILVIO Goldstein Shown is a 67 y.o. BLACK OR  male who presents with R lower leg lateral wound of 3 months duration. History of Present Illness:       Wound Caused By: chronic pressure/irritation due to venous insufficiency  Associated Signs and Symptoms: Mild pain  Timing: Intermittent  Quality: Aching  Severity: 2/10  Modifying Factors: Venous insufficiency  Current Wound Care: See nurses notes    Past Medical History:   Diagnosis Date    Acute renal failure superimposed on stage 3 chronic kidney disease ([address] 9/21/2016    Anemia     pt states he receives iron infusions    Arthritis     OA generalized    Chronic kidney disease     not on HD- surgery done for access and fistula being removed.  Per patient, no plans to proceed with dialysis    Chronic pancreatitis ([facility] 9/22/2016    Dermatophytosis of nail 12/6/2016    Diabetic neuropathy (HCC) 9/22/2016    insulin sliding scale only- no oral meds- avg fastings avg fasting \"low 200's;  hypo @ 80 A1C 7.1 9/2019 per patient    Essential hypertension 9/22/2016    medication    GERD (gastroesophageal reflux disease)     controlled with med    Hypercholesterolemia     Iron (Fe) deficiency anemia 9/22/2016    Septicemia due to Klebsiella pneumoniae ([address] 9/16/4976    Systolic CHF, chronic ([address] 9/23/2016    ECHO 4/2018 EF- 60-65%    Type II diabetes mellitus with nephropathy ([address] 09/22/2016    Venous insufficiency 12/6/2016    Xerosis cutis 12/6/2016      Past Surgical History:   Procedure Laterality Date    HX COLONOSCOPY      HX HERNIA REPAIR Left 2010    HX PROSTATECTOMY  2012    HX TURP  2012    FOR BPH    VASCULAR SURGERY PROCEDURE UNLIST Left 10/26/2017    AVF    VASCULAR SURGERY PROCEDURE UNLIST Left 2019     Ligation of left brachiobasilic fistula     Family History   Problem Relation Age of Onset    Diabetes Mother     Stroke Mother     Hypertension Mother     No Known Problems Father     Diabetes Sister     Diabetes Brother     Diabetes Sister     Diabetes Sister     Other Sister         fibromyalgia      Social History     Tobacco Use    Smoking status: Former Smoker     Packs/day: 2.00     Years: 20.00     Pack years: 40.00     Last attempt to quit:      Years since quittin.0    Smokeless tobacco: Current User   Substance Use Topics    Alcohol use: No       Prior to Admission medications    Medication Sig Start Date End Date Taking? Authorizing Provider   torsemide (DEMADEX) 20 mg tablet Take 40 mg by mouth daily. Provider, Historical   insulin glargine (LANTUS,BASAGLAR) 100 unit/mL (3 mL) inpn 15 Units by SubCUTAneous route daily (with breakfast). Provider, Historical   oxymetazoline (AFRIN) 0.05 % nasal spray 2 Sprays by Both Nostrils route once as needed for Congestion. 19   Provider, Historical   Lactoperoxi/Gluc Oxid/Pot Thio (BIOTENE DRY MOUTH MM) Take 2 Sprays by mouth as needed for Other (dry mouth). Provider, Historical   lipase-protease-amylase (CREON) 36,000-114,000- 180,000 unit cpDR capsule Take 3,600 Units by mouth See Admin Instructions. Take 2 capsules by mouth with each meal, and 1 capsule by mouth with each snack. Yosi Gomes MD   dicyclomine (BENTYL) 10 mg capsule Take 10 mg by mouth two (2) times a day. Provider, Historical   acetaminophen (TYLENOL) 325 mg tablet Take 2 Tabs by mouth every four (4) hours as needed for Pain. 19   Judy Mendez MD   ondansetron (ZOFRAN ODT) 4 mg disintegrating tablet Take 1 Tab by mouth every eight (8) hours as needed for Nausea.  19   Judy Mendez MD   warfarin (COUMADIN) 5 mg tablet Take 1 Tab by mouth every evening. Patient taking differently: Take 4 mg by mouth every evening. 11/9/19   Gladys Connor MD   pantothenic ac-min oil-pet,hyd (AQUAPHOR) 41 % ointment Apply  to affected area daily. Apply over legs 11/10/19   Gladys Connor MD   colestipol (COLESTID) 1 gram tablet Take 1 g by mouth two (2) times a day. Provider, Historical   sodium bicarbonate 650 mg tablet Take  by mouth three (3) times daily. Provider, Historical   cloNIDine (CATAPRES) 0.1 mg/24 hr ptwk 1 Patch by TransDERmal route every seven (7) days. Changes on Tuesdays- on ELVIRA chest 9/11/2019       Provider, Historical   lidocaine 4 % patch Cut to appropriate size. Apply to intact skin for 12 hours, remove for 12 hours. Patient taking differently: 1 Patch by TransDERmal route as needed. Cut to appropriate size. Apply to intact skin for 12 hours, remove for 12 hours. States uses only occasionally and not on today 9/11/2019  Do not take morning of procedure. 7/12/19   JAMARCUS Elliott   CALCITRIOL, BULK, by Does Not Apply route. Provider, Historical   hydrALAZINE (APRESOLINE) 100 mg tablet Take 1 Tab by mouth three (3) times daily. Patient taking differently: Take 100 mg by mouth three (3) times daily. Take morning of procedure. 4/19/18   Germain Ash MD   pregabalin (LYRICA) 50 mg capsule Take 1 Cap by mouth daily. Max Daily Amount: 50 mg. Patient taking differently: Take 150 mg by mouth two (2) times a day. 4/19/18   Germain Ash MD   pravastatin (PRAVACHOL) 40 mg tablet Take 1 Tab by mouth nightly. 4/4/18   MD KAROL Gonzalez [facility] INSULIN 100 unit/mL kwikpen 5 units three times a day with meals plus correction scale, 2 units/50 > 150, max daily dose 25 units  Patient not taking: Reported on 11/11/2019 2/28/18   Kathia Guzman PA-C   calcifediol (RAYALDEE) 30 mcg Cs24 Take  by mouth nightly. Provider, Historical   amLODIPine (NORVASC) 10 mg tablet Take 10 mg by mouth every morning. Provider, Historical   metoprolol succinate (TOPROL-XL) 50 mg XL tablet Take 50 mg by mouth every morning. Provider, Historical   omeprazole (PRILOSEC) 20 mg capsule Take 20 mg by mouth every morning. Provider, Historical     No Known Allergies     Review of Systems:  A comprehensive review of systems was negative except for that written in the History of Present Illness. Lab Results   Component Value Date/Time    Hemoglobin A1c 8.4 (H) 11/03/2019 09:25 PM    Hemoglobin A1c (POC) 6.5 03/14/2018 11:20 AM        Immunization History   Administered Date(s) Administered    Pneumococcal Polysaccharide (PPSV-23) 02/07/2012    TB Skin Test (PPD) Intradermal 04/02/2018, 04/16/2018, 04/30/2018, 11/03/2019       Body mass index is 24.14 kg/m². Counseling regarding nutrition done: No     Current medications:  Current Outpatient Medications   Medication Sig Dispense Refill    torsemide (DEMADEX) 20 mg tablet Take 40 mg by mouth daily.  insulin glargine (LANTUS,BASAGLAR) 100 unit/mL (3 mL) inpn 15 Units by SubCUTAneous route daily (with breakfast).  oxymetazoline (AFRIN) 0.05 % nasal spray 2 Sprays by Both Nostrils route once as needed for Congestion.  Lactoperoxi/Gluc Oxid/Pot Thio (BIOTENE DRY MOUTH MM) Take 2 Sprays by mouth as needed for Other (dry mouth).  lipase-protease-amylase (CREON) 36,000-114,000- 180,000 unit cpDR capsule Take 3,600 Units by mouth See Admin Instructions. Take 2 capsules by mouth with each meal, and 1 capsule by mouth with each snack.  dicyclomine (BENTYL) 10 mg capsule Take 10 mg by mouth two (2) times a day.  acetaminophen (TYLENOL) 325 mg tablet Take 2 Tabs by mouth every four (4) hours as needed for Pain. 20 Tab 0    ondansetron (ZOFRAN ODT) 4 mg disintegrating tablet Take 1 Tab by mouth every eight (8) hours as needed for Nausea. 20 Tab 0    warfarin (COUMADIN) 5 mg tablet Take 1 Tab by mouth every evening.  (Patient taking differently: Take 4 mg by mouth every evening.) 7 Tab 0    pantothenic ac-min oil-pet,hyd (AQUAPHOR) 41 % ointment Apply  to affected area daily. Apply over legs 53 g 0    colestipol (COLESTID) 1 gram tablet Take 1 g by mouth two (2) times a day.  sodium bicarbonate 650 mg tablet Take  by mouth three (3) times daily.  cloNIDine (CATAPRES) 0.1 mg/24 hr ptwk 1 Patch by TransDERmal route every seven (7) days. Changes on Tuesdays- on ELVIRA chest 9/11/2019         lidocaine 4 % patch Cut to appropriate size. Apply to intact skin for 12 hours, remove for 12 hours. (Patient taking differently: 1 Patch by TransDERmal route as needed. Cut to appropriate size. Apply to intact skin for 12 hours, remove for 12 hours. States uses only occasionally and not on today 9/11/2019  Do not take morning of procedure.) 14 Patch 0    CALCITRIOL, BULK, by Does Not Apply route.  hydrALAZINE (APRESOLINE) 100 mg tablet Take 1 Tab by mouth three (3) times daily. (Patient taking differently: Take 100 mg by mouth three (3) times daily. Take morning of procedure.) 90 Tab 2    pregabalin (LYRICA) 50 mg capsule Take 1 Cap by mouth daily. Max Daily Amount: 50 mg. (Patient taking differently: Take 150 mg by mouth two (2) times a day.) 10 Cap 0    pravastatin (PRAVACHOL) 40 mg tablet Take 1 Tab by mouth nightly. 30 Tab 0    HUMALOG KWIKPEN INSULIN 100 unit/mL kwikpen 5 units three times a day with meals plus correction scale, 2 units/50 > 150, max daily dose 25 units (Patient not taking: Reported on 11/11/2019) 5 Pen 11    calcifediol (RAYALDEE) 30 mcg Cs24 Take  by mouth nightly.  amLODIPine (NORVASC) 10 mg tablet Take 10 mg by mouth every morning.  metoprolol succinate (TOPROL-XL) 50 mg XL tablet Take 50 mg by mouth every morning.  omeprazole (PRILOSEC) 20 mg capsule Take 20 mg by mouth every morning.            Objective:     Physical Exam:     Visit Vitals  /74 (BP 1 Location: Right arm, BP Patient Position: Sitting)   Pulse 63 Temp 97.4 °F (36.3 °C)   Resp 18   Ht 6' (1.829 m)   BMI 24.14 kg/m²       General: well developed, well nourished, pleasant , NAD. Hygiene good  Psych: cooperative. Pleasant. No anxiety or depression. Normal mood and affect. Neuro: alert and oriented to person/place/situation. Otherwise nonfocal.  Derm: Normal turgor for age, dry skin  HEENT: Normocephalic, atraumatic. EOMI. Conjunctiva clear. No scleral icterus. Neck: Normal range of motion. No masses. Chest: Good air entry bilaterally. Respirations nonlabored  Cardio: Normal heart sounds,no rubs, murmurs or gallops  Abdomen: Soft, nontender, nondistended, normoactive bowel sounds  Lower extremities: color normal; temperature normal. Hair growth is not present. Calves are supple, nontender, approximately equally sized in comparison.  Capillary refill <3 sec      Diabetic Foot Ulcer/Neuropathic   Is Wound Greater than 1.0 sq cm ? : Yes  Re-Current Wound with Skin Substitue within Last Year : No  X-Ray in Last 3 Months: Yes  TONY In Last 6 Months : Yes  Smoking Status: Non- Smoker  Wound Free from Infection : Wound Culture Completed  Is Wound Free of Eschar, Slough , and / or Bio[place]: No  Malignant Process in Wound : No Biopsy Done  Hemoglobin A1Cin Last 3 Months: Yes  Compression Therapy of 20mm or greater ?: Yes     Ulcer Description:   Wound Leg Anterior;Mid;Right (Active)   Number of days: 628       Wound Pretibial Right;Lateral (Active)   Number of days: 56       Wound Leg lower Right;Lateral (Active)   Dressing Status Clean, dry, and intact 12/30/2019 10:34 AM   Non-staged Wound Description Full thickness 12/30/2019 10:34 AM   Wound Length (cm) 5.5 cm 12/30/2019 10:34 AM   Wound Width (cm) 1.2 cm 12/30/2019 10:34 AM   Wound Depth (cm) 0.1 cm 12/30/2019 10:34 AM   Wound Surface Area (cm^2) 6.6 cm^2 12/30/2019 10:34 AM   Wound Volume (cm^3) 0.66 cm^3 12/30/2019 10:34 AM   Condition of Base Granulation;Epithelializing 12/30/2019 10:34 AM   Tissue Type Percent Black 20 % 12/17/2019 11:31 AM   Tissue Type Percent Pink 100 12/30/2019 10:34 AM   Tissue Type Percent Red 10 12/17/2019 11:31 AM   Tissue Type Percent Yellow 40 12/3/2019  2:04 PM   Drainage Amount Moderate 12/30/2019 10:34 AM   Drainage Color Serosanguinous 12/30/2019 10:34 AM   Wound Odor None 12/30/2019 10:34 AM   Tammy-wound Assessment Edema 12/30/2019 10:34 AM   Margins Attached edges 12/10/2019 10:27 AM   Cleansing and Cleansing Agents  Soap and water 12/30/2019 10:34 AM   Dressing Changed Changed/New 12/30/2019 10:34 AM   Procedure Tolerated Well 12/17/2019 11:31 AM   Number of days: 27       [REMOVED] Wound Leg Left; Anterior (Removed)   Number of days: 375       [REMOVED] Wound Arm Left (Removed)   Number of days: 167       [REMOVED] Wound Left (Removed)   Number of days: 30       [REMOVED] Wound Leg lower Left;Lateral (Removed)   Dressing Status Clean, dry, and intact 12/17/2019 11:31 AM   Wound Length (cm) 3 cm 12/17/2019 11:31 AM   Wound Width (cm) 5.5 cm 12/17/2019 11:31 AM   Wound Depth (cm) 0 cm 12/17/2019 11:31 AM   Wound Surface Area (cm^2) 16.5 cm^2 12/17/2019 11:31 AM   Wound Volume (cm^3) 0 cm^3 12/17/2019 11:31 AM   Condition of Base Other (comment) 12/17/2019 11:31 AM   Drainage Amount Small 12/17/2019 11:31 AM   Drainage Color Serous 12/17/2019 11:31 AM   Wound Odor None 12/17/2019 11:31 AM   Tammy-wound Assessment Intact 12/17/2019 11:31 AM   Cleansing and Cleansing Agents  Soap and water 12/17/2019 11:31 AM   Dressing Changed Changed/New 12/17/2019 11:31 AM   Procedure Tolerated Well 12/17/2019 11:31 AM   Number of days: 24         Data Review:   No results found for this or any previous visit (from the past 24 hour(s)). Assessment:     67 y.o. male with R lower leg lateral combined ulcer.     Problem List  Date Reviewed: 2/1/2019          Codes Class Noted    ESRD (end stage renal disease) ([facility] ICD-10-CM: N18.6  ICD-9-CM: 585.6  11/3/2019        Open wound of skin ICD-10-CM: T14. 8XXA  ICD-9-CM: 879.8  11/3/2019        DVT (deep venous thrombosis) ([facility] ICD-10-CM: I82.409  ICD-9-CM: 453.40  11/3/2019        Rhabdomyolysis ICD-10-CM: M62.82  ICD-9-CM: 728.88  8/45/1785        Metabolic acidosis KNY-45-QM: E87.2  ICD-9-CM: 276.2  4/30/2018        Renal failure (ARF), acute on chronic (HCC) ICD-10-CM: N17.9, N18.9  ICD-9-CM: 584.9, 585.9  4/29/2018        Dementia without behavioral disturbance (HCC) (Chronic) ICD-10-CM: F03.90  ICD-9-CM: 294.20  4/19/2018        Hypernatremia ICD-10-CM: E87.0  ICD-9-CM: 276.0  4/18/2018        Seizure ([facility] ICD-10-CM: R56.9  ICD-9-CM: 780.39  4/18/2018        Volume overload ICD-10-CM: E87.70  ICD-9-CM: 276.69  4/4/2018        Chest pain ICD-10-CM: R07.9  ICD-9-CM: 786.50  4/4/2018        Cauda equina compression ([facility] ICD-10-CM: G83.4  ICD-9-CM: 344.60  4/2/2018        Hypercholesterolemia ICD-10-CM: E78.00  ICD-9-CM: 272.0  Unknown        Uncontrolled diabetes mellitus, with long-term current use of insulin (HCC) (Chronic) ICD-10-CM: E11.65, Z79.4  ICD-9-CM: 250.02, V58.67  2/28/2018        Carotid bruit ICD-10-CM: R09.89  ICD-9-CM: 785.9  2/28/2018        Arteriovenous fistula ([facility] ICD-10-CM: I77.0  ICD-9-CM: 447.0  10/31/2017    Overview Signed 10/31/2017  3:13 PM by Sebastien Brock NP     10/26/17 (Dr. Charisma Weems) Creation of left brachiobasilic fistula.              Onychomycosis ICD-10-CM: B35.1  ICD-9-CM: 110.1  9/13/2017        Controlled type 2 diabetes mellitus with complication, with long-term current use of insulin (HCC) (Chronic) ICD-10-CM: E11.8, Z79.4  ICD-9-CM: 250.90, V58.67  9/13/2017        Stage 4 chronic kidney disease (HCC) (Chronic) ICD-10-CM: N18.4  ICD-9-CM: 585.4  4/4/2017        Stasis edema of both lower extremities (Chronic) ICD-10-CM: H31.658  ICD-9-CM: 459.30  4/4/2017        Cellulitis of left lower leg ICD-10-CM: L03.116  ICD-9-CM: 682.6  4/4/2017        Venous stasis ulcer of left lower extremity ([facility] ICD-10-CM: [id number], L97.929  ICD-9-CM: 454.0  4/1/2017        Venous insufficiency (Chronic) ICD-10-CM: K54.0  ICD-9-CM: 459.81  12/6/2016        Chronic systolic congestive heart failure (HCC) (Chronic) ICD-10-CM: I50.22  ICD-9-CM: 428.22, 428.0  9/23/2016        Septicemia due to Klebsiella pneumoniae [facility]) ICD-10-CM: A41.4  ICD-9-CM: 038.3  9/22/2016        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  ICD-9-CM: 250.40, 583.81  9/22/2016        Essential hypertension ICD-10-CM: I10  ICD-9-CM: 401.9  9/22/2016        GERD (gastroesophageal reflux disease) ICD-10-CM: K21.9  ICD-9-CM: 530.81  9/22/2016        Diabetic neuropathy ([facility] ICD-10-CM: E11.40  ICD-9-CM: 250.60, 357.2  9/22/2016        Chronic pancreatitis (HCC) (Chronic) ICD-10-CM: K86.1  ICD-9-CM: 577.1  9/22/2016        Iron (Fe) deficiency anemia ICD-10-CM: D50.9  ICD-9-CM: 280.9  9/22/2016        Acute renal failure superimposed on stage 3 chronic kidney disease ([facility] ICD-10-CM: N17.9, N18.3  ICD-9-CM: 584.9, 585.3  9/21/2016               Plan:     Orders Placed This Encounter    WOUND CARE, DRESSING CHANGE     Right lateral lower leg:  Cleanse wound and periwound with wound cleanser or normal saline. Xeroform- apply to wound bed. Cover with ABD, and wrap right lower leg with 2 layer compression with wound and lower extremity assessment. If there is any problem with the dressing (too tight, slides down, etc.) Patient to return to wound clinic to have re-wrapped by clinician. Dressing to be changed three times weekly by Home Health.       Elevate legs above the level of the heart when sitting. Do not get dressings wet. May purchase cast cover from pharmacy of choice for use when showering or bathing. Apply xeroform to any blisters, cover with ABD, change every other day or as needed for excessive drainage. Standing Status:   Standing     Number of Occurrences:   1        Patient understood and agrees with plan. Questions answered. Follow-up Information     Follow up With Specialties Details Why Contact Info    [address] In 2 weeks  [facility] [address] [facility] [address] [place] [address]  [phone]             Any procedures done today in the [address] are documented in a separate note in [facility] and made part of this record by reference.      Dictated using voice recognition software; proofread, but unrecognized errors may exist.    Signed By: Erich David MD     December 30, 2019

## 2019-12-30 NOTE — WOUND CARE
Clinic Level of Care Assessment    NAME:  SILVIO Harrington OF BIRTH:  1940 GENDER: male  MEDICAL RECORD NUMBER:  [id number]   DATE:  12/30/2019      Wound Count Document in LDA  Number of Wounds Assessed Points   No Wounds/Ulcers []   0   Less than Three Wounds/Ulcers [x]   1   3-6 Wounds/Ulcers []   2   Greater than 6 Wounds/Ulcers []   3     Ambulation Status Document in Coord/ZION/Mobility tab  Status Definition Points   Independent Independently able to ambulate. Fully able (without any assistance) to get on/off exam table/chair. [x]   0   Minimal Physical Assistance Requires physical assistance of one person to ambulate and/or position patient to be examined. Includes necessary physical assistance to position lower extremities on/off stool. []   1   Moderate Physical Assistance Requires at least one staff member to physically assist patient in ambulating into treatment room, and on/off exam table. []   2   Full Assistance Requires assistance of at least two staff members to transfer patient into treatment room and/or on/off exam table/chair. \"Total Transfer\". []   3     Dressing Complexity Document in LDA and Write Appropriate Order  Complexity Definition Points   No Dressing  []   0   Simple Minimal, simple dressing. i.e. Band-aid, gauze, simple wrap. []   1   Intermediate Moderately complicated requiring licensed personnel to apply i.e. collagen matrix, ointments, gels, alginates. [x]   2   Complex Complicated requiring licensed personnel to apply dressings 6 or more wounds. []   3     Teaching Effort Document in Education Tab   Effort Definition Points   No Teaching  []   0   Simple Reinforce two or less topics. Document in Education navigator.  []   1   Intermediate Reinforce three to five topics and/or one additional   new topic. Document in Education navigator. [x]   2   Complex Teach more than one new topic.  New patient information   packet reviewed and/or reinforce more than three topics. Document in Education navigator. HBO initial instruction. []   3       Patient Assessment and Planning  Planning Definition Points   Simple Multiple System Simple: Simple follow-up with routine assessment and planning. If Discharged, instructions and long term/follow-up care given to patient/caregiver. Discharged, instructions and/or After Visit Summary given to patient/caregiver and instructions completed. []   1   Intermediate Multiple System Intermediate: Contact with outside resources; i.e. Telephone calls to home health, [facility]. May include filling out forms and writing letters, arranging transportation, communication with insurance , vendors, etc.  Discharged, instructions and/or After Visit Summary given to patient/caregiver and instructions completed. [x]   2   Complex Multiple System Complex: Full, comprehensive assessment and planning. Follow the entire navigator under Wound Visit charting filling out each tab which includes OP Adm Database Screening, Education and CarePlan  HBO risk assessment completed. Discharged, instructions and/or After Visit Summary given to patient/caregiver and instructions completed.    []   3           Is this the Patient's First Visit to the [address] [place] [address] Road  No      Is this Patient Established @ [facility]  Yes             Clinical Level of Care      Points  0-2  Level 1 []     Points  3-5  Level 2 []     Points  6-9  Level 3 [x]     Points  10-12  Level 4 []     Points  13-15  Level 5 []       Electronically signed by Terence Chaves RN on 12/30/2019 at 11:04 AM

## 2019-12-30 NOTE — WOUND CARE
Multilayer Compression Wrap   (Not Unna) Below the Knee    NAME:  SILVIO Humphreys OF BIRTH:  1940  MEDICAL RECORD NUMBER:  [id number]  DATE:  12/30/2019    Removed old Multilayer wrap if indicated and wash leg with mild soap/water. Applied moisturizing agent to dry skin as needed. Applied primary and secondary dressing as ordered. Applied multilayered dressing below the knee to right lower leg. Instructed patient/caregiver not to remove dressing and to keep it clean and dry. Instructed patient/caregiver on complications to report to provider, such as pain, numbness in toes, heavy drainage, and slippage of dressing. Instructed patient on purpose of compression dressing and on activity and exercise recommendations.       Electronically signed by Jayy Gutierrez RN on 12/30/2019 at 11:04 AM

## 2019-12-31 NOTE — PROGRESS NOTES
Arrived to the [facility]. Retacrit completed. Patient tolerated well.   Any issues or concerns during appointment: denies  Patient aware of next infusion appointment on 1/13/20 at 330pm  Discharged via w/c with caregiver

## 2020-01-01 ENCOUNTER — HOSPITAL ENCOUNTER (OUTPATIENT)
Dept: WOUND CARE | Age: 80
Discharge: HOME | End: 2020-01-29
Attending: SURGERY
Payer: MEDICARE

## 2020-01-01 ENCOUNTER — HOME HEALTH ADMISSION (OUTPATIENT)
Dept: HOME HEALTH SERVICES | Facility: HOME HEALTH | Age: 80
End: 2020-01-01
Payer: MEDICARE

## 2020-01-01 ENCOUNTER — PATIENT OUTREACH (OUTPATIENT)
Dept: CASE MANAGEMENT | Age: 80
End: 2020-01-01

## 2020-01-01 ENCOUNTER — HOSPITAL ENCOUNTER (OUTPATIENT)
Dept: WOUND CARE | Age: 80
Discharge: HOME | End: 2020-03-18
Attending: SURGERY
Payer: MEDICARE

## 2020-01-01 ENCOUNTER — HOSPITAL ENCOUNTER (OUTPATIENT)
Dept: INFUSION THERAPY | Age: 80
Discharge: HOME | End: 2020-04-07
Payer: MEDICARE

## 2020-01-01 ENCOUNTER — HOSPITAL ENCOUNTER (INPATIENT)
Age: 80
LOS: 3 days | Discharge: HOME HEALTH | DRG: 638 | End: 2020-10-15
Attending: EMERGENCY MEDICINE | Admitting: FAMILY MEDICINE
Payer: MEDICARE

## 2020-01-01 ENCOUNTER — HOME CARE VISIT (OUTPATIENT)
Dept: SCHEDULING | Facility: HOME HEALTH | Age: 80
End: 2020-01-01
Payer: MEDICARE

## 2020-01-01 ENCOUNTER — HOSPITAL ENCOUNTER (EMERGENCY)
Age: 80
Discharge: HOME | End: 2020-08-05
Attending: EMERGENCY MEDICINE
Payer: MEDICARE

## 2020-01-01 ENCOUNTER — HOSPITAL ENCOUNTER (OUTPATIENT)
Dept: INFUSION THERAPY | Age: 80
Discharge: HOME | End: 2020-02-24
Payer: MEDICARE

## 2020-01-01 ENCOUNTER — HOME CARE VISIT (OUTPATIENT)
Dept: HOME HEALTH SERVICES | Facility: HOME HEALTH | Age: 80
End: 2020-01-01
Payer: MEDICARE

## 2020-01-01 ENCOUNTER — HOSPITAL ENCOUNTER (OUTPATIENT)
Dept: INFUSION THERAPY | Age: 80
Discharge: HOME | End: 2020-02-10
Payer: MEDICARE

## 2020-01-01 ENCOUNTER — HOSPITAL ENCOUNTER (OUTPATIENT)
Dept: INFUSION THERAPY | Age: 80
Discharge: HOME | End: 2020-04-21
Payer: MEDICARE

## 2020-01-01 ENCOUNTER — HOSPITAL ENCOUNTER (OUTPATIENT)
Dept: INFUSION THERAPY | Age: 80
Discharge: HOME | End: 2020-01-27
Payer: MEDICARE

## 2020-01-01 ENCOUNTER — HOSPITAL ENCOUNTER (INPATIENT)
Age: 80
LOS: 5 days | Discharge: NURSING, REHAB OR LONG-TERM CARE | DRG: 683 | End: 2020-07-31
Attending: EMERGENCY MEDICINE | Admitting: INTERNAL MEDICINE
Payer: MEDICARE

## 2020-01-01 ENCOUNTER — HOSPITAL ENCOUNTER (OUTPATIENT)
Dept: INFUSION THERAPY | Age: 80
Discharge: HOME | End: 2020-07-14
Payer: MEDICARE

## 2020-01-01 ENCOUNTER — HOSPITAL ENCOUNTER (OUTPATIENT)
Dept: INFUSION THERAPY | Age: 80
Discharge: HOME | End: 2020-01-13
Payer: MEDICARE

## 2020-01-01 ENCOUNTER — APPOINTMENT (OUTPATIENT)
Dept: ULTRASOUND IMAGING | Age: 80
DRG: 683 | End: 2020-01-01
Attending: INTERNAL MEDICINE
Payer: MEDICARE

## 2020-01-01 ENCOUNTER — HOSPITAL ENCOUNTER (INPATIENT)
Age: 80
LOS: 3 days | Discharge: HOME HEALTH | DRG: 682 | End: 2020-08-19
Attending: EMERGENCY MEDICINE | Admitting: FAMILY MEDICINE
Payer: MEDICARE

## 2020-01-01 ENCOUNTER — HOSPITAL ENCOUNTER (INPATIENT)
Age: 80
LOS: 6 days | Discharge: HOME | DRG: 637 | End: 2020-09-02
Attending: EMERGENCY MEDICINE | Admitting: FAMILY MEDICINE
Payer: MEDICARE

## 2020-01-01 ENCOUNTER — HOSPITAL ENCOUNTER (OUTPATIENT)
Dept: INFUSION THERAPY | Age: 80
Discharge: HOME | End: 2020-05-05
Payer: MEDICARE

## 2020-01-01 ENCOUNTER — HOSPITAL ENCOUNTER (OUTPATIENT)
Dept: WOUND CARE | Age: 80
Discharge: HOME | End: 2020-07-14
Attending: SURGERY
Payer: MEDICARE

## 2020-01-01 ENCOUNTER — HOME HEALTH ADMISSION (OUTPATIENT)
Dept: HOME HEALTH SERVICES | Facility: HOME HEALTH | Age: 80
End: 2020-01-01

## 2020-01-01 ENCOUNTER — HOSPITAL ENCOUNTER (OUTPATIENT)
Dept: WOUND CARE | Age: 80
Discharge: HOME | End: 2020-04-13
Attending: SURGERY
Payer: MEDICARE

## 2020-01-01 ENCOUNTER — HOSPITAL ENCOUNTER (EMERGENCY)
Age: 80
Discharge: HOME | End: 2020-03-22
Attending: EMERGENCY MEDICINE
Payer: MEDICARE

## 2020-01-01 ENCOUNTER — APPOINTMENT (OUTPATIENT)
Dept: GENERAL RADIOLOGY | Age: 80
DRG: 637 | End: 2020-01-01
Attending: EMERGENCY MEDICINE
Payer: MEDICARE

## 2020-01-01 ENCOUNTER — HOSPITAL ENCOUNTER (INPATIENT)
Age: 80
LOS: 1 days | DRG: 296 | End: 2020-10-19
Attending: EMERGENCY MEDICINE | Admitting: INTERNAL MEDICINE
Payer: MEDICARE

## 2020-01-01 ENCOUNTER — APPOINTMENT (OUTPATIENT)
Dept: GENERAL RADIOLOGY | Age: 80
DRG: 683 | End: 2020-01-01
Attending: INTERNAL MEDICINE
Payer: MEDICARE

## 2020-01-01 ENCOUNTER — HOSPITAL ENCOUNTER (OUTPATIENT)
Dept: WOUND CARE | Age: 80
Discharge: HOME | End: 2020-04-27
Attending: SURGERY
Payer: MEDICARE

## 2020-01-01 ENCOUNTER — APPOINTMENT (OUTPATIENT)
Dept: CT IMAGING | Age: 80
DRG: 296 | End: 2020-01-01
Attending: EMERGENCY MEDICINE
Payer: MEDICARE

## 2020-01-01 ENCOUNTER — APPOINTMENT (OUTPATIENT)
Dept: GENERAL RADIOLOGY | Age: 80
DRG: 296 | End: 2020-01-01
Attending: EMERGENCY MEDICINE
Payer: MEDICARE

## 2020-01-01 ENCOUNTER — HOSPITAL ENCOUNTER (OUTPATIENT)
Dept: INFUSION THERAPY | Age: 80
Discharge: HOME | End: 2020-03-10
Payer: MEDICARE

## 2020-01-01 ENCOUNTER — HOSPITAL ENCOUNTER (OUTPATIENT)
Dept: INFUSION THERAPY | Age: 80
End: 2020-01-01

## 2020-01-01 ENCOUNTER — APPOINTMENT (OUTPATIENT)
Dept: ULTRASOUND IMAGING | Age: 80
DRG: 683 | End: 2020-01-01
Attending: NURSE PRACTITIONER
Payer: MEDICARE

## 2020-01-01 ENCOUNTER — HOSPITAL ENCOUNTER (OUTPATIENT)
Dept: WOUND CARE | Age: 80
Discharge: HOME | End: 2020-03-10
Attending: SURGERY
Payer: MEDICARE

## 2020-01-01 ENCOUNTER — HOSPITAL ENCOUNTER (OUTPATIENT)
Dept: INFUSION THERAPY | Age: 80
Discharge: HOME | End: 2020-06-30
Payer: MEDICARE

## 2020-01-01 ENCOUNTER — HOSPITAL ENCOUNTER (OUTPATIENT)
Dept: INFUSION THERAPY | Age: 80
Discharge: HOME | End: 2020-03-24
Payer: MEDICARE

## 2020-01-01 ENCOUNTER — HOSPITAL ENCOUNTER (OUTPATIENT)
Dept: WOUND CARE | Age: 80
Discharge: HOME | End: 2020-04-01
Attending: SURGERY
Payer: MEDICARE

## 2020-01-01 ENCOUNTER — HOSPITAL ENCOUNTER (OUTPATIENT)
Dept: WOUND CARE | Age: 80
Discharge: HOME | End: 2020-07-21
Attending: SURGERY
Payer: MEDICARE

## 2020-01-01 ENCOUNTER — APPOINTMENT (OUTPATIENT)
Dept: GENERAL RADIOLOGY | Age: 80
DRG: 638 | End: 2020-01-01
Attending: EMERGENCY MEDICINE
Payer: MEDICARE

## 2020-01-01 VITALS
DIASTOLIC BLOOD PRESSURE: 78 MMHG | HEART RATE: 87 BPM | SYSTOLIC BLOOD PRESSURE: 142 MMHG | OXYGEN SATURATION: 98 % | TEMPERATURE: 98.1 F | RESPIRATION RATE: 18 BRPM

## 2020-01-01 VITALS
TEMPERATURE: 97.7 F | DIASTOLIC BLOOD PRESSURE: 79 MMHG | RESPIRATION RATE: 18 BRPM | SYSTOLIC BLOOD PRESSURE: 145 MMHG | HEART RATE: 52 BPM | OXYGEN SATURATION: 100 %

## 2020-01-01 VITALS
RESPIRATION RATE: 18 BRPM | DIASTOLIC BLOOD PRESSURE: 60 MMHG | HEART RATE: 56 BPM | OXYGEN SATURATION: 98 % | SYSTOLIC BLOOD PRESSURE: 153 MMHG | TEMPERATURE: 98.5 F

## 2020-01-01 VITALS
DIASTOLIC BLOOD PRESSURE: 64 MMHG | OXYGEN SATURATION: 98 % | SYSTOLIC BLOOD PRESSURE: 132 MMHG | RESPIRATION RATE: 16 BRPM | TEMPERATURE: 98.8 F | HEART RATE: 68 BPM

## 2020-01-01 VITALS
HEART RATE: 53 BPM | RESPIRATION RATE: 18 BRPM | DIASTOLIC BLOOD PRESSURE: 67 MMHG | TEMPERATURE: 97.4 F | OXYGEN SATURATION: 100 % | SYSTOLIC BLOOD PRESSURE: 162 MMHG

## 2020-01-01 VITALS
RESPIRATION RATE: 16 BRPM | TEMPERATURE: 97.8 F | OXYGEN SATURATION: 100 % | DIASTOLIC BLOOD PRESSURE: 68 MMHG | SYSTOLIC BLOOD PRESSURE: 162 MMHG | HEART RATE: 56 BPM

## 2020-01-01 VITALS
RESPIRATION RATE: 18 BRPM | DIASTOLIC BLOOD PRESSURE: 64 MMHG | OXYGEN SATURATION: 100 % | TEMPERATURE: 97.4 F | SYSTOLIC BLOOD PRESSURE: 133 MMHG | HEART RATE: 55 BPM

## 2020-01-01 VITALS
RESPIRATION RATE: 18 BRPM | DIASTOLIC BLOOD PRESSURE: 80 MMHG | TEMPERATURE: 98.4 F | HEART RATE: 80 BPM | SYSTOLIC BLOOD PRESSURE: 136 MMHG | OXYGEN SATURATION: 98 %

## 2020-01-01 VITALS
RESPIRATION RATE: 18 BRPM | DIASTOLIC BLOOD PRESSURE: 78 MMHG | OXYGEN SATURATION: 99 % | HEART RATE: 59 BPM | SYSTOLIC BLOOD PRESSURE: 144 MMHG | TEMPERATURE: 99.3 F

## 2020-01-01 VITALS
BODY MASS INDEX: 24.27 KG/M2 | SYSTOLIC BLOOD PRESSURE: 152 MMHG | WEIGHT: 179.2 LBS | DIASTOLIC BLOOD PRESSURE: 83 MMHG | HEART RATE: 71 BPM | HEIGHT: 72 IN | TEMPERATURE: 98.1 F

## 2020-01-01 VITALS
BODY MASS INDEX: 24.14 KG/M2 | HEART RATE: 51 BPM | WEIGHT: 178 LBS | DIASTOLIC BLOOD PRESSURE: 83 MMHG | TEMPERATURE: 98.6 F | SYSTOLIC BLOOD PRESSURE: 183 MMHG

## 2020-01-01 VITALS
OXYGEN SATURATION: 99 % | TEMPERATURE: 97.7 F | HEART RATE: 52 BPM | SYSTOLIC BLOOD PRESSURE: 169 MMHG | RESPIRATION RATE: 18 BRPM | DIASTOLIC BLOOD PRESSURE: 67 MMHG

## 2020-01-01 VITALS
RESPIRATION RATE: 20 BRPM | OXYGEN SATURATION: 99 % | TEMPERATURE: 98.5 F | HEART RATE: 60 BPM | DIASTOLIC BLOOD PRESSURE: 80 MMHG | SYSTOLIC BLOOD PRESSURE: 120 MMHG

## 2020-01-01 VITALS
SYSTOLIC BLOOD PRESSURE: 128 MMHG | RESPIRATION RATE: 18 BRPM | DIASTOLIC BLOOD PRESSURE: 78 MMHG | OXYGEN SATURATION: 96 % | HEART RATE: 78 BPM | TEMPERATURE: 98.1 F

## 2020-01-01 VITALS
HEIGHT: 72 IN | WEIGHT: 196.4 LBS | OXYGEN SATURATION: 100 % | HEART RATE: 58 BPM | SYSTOLIC BLOOD PRESSURE: 155 MMHG | RESPIRATION RATE: 18 BRPM | TEMPERATURE: 97.8 F | DIASTOLIC BLOOD PRESSURE: 69 MMHG | BODY MASS INDEX: 26.6 KG/M2

## 2020-01-01 VITALS
TEMPERATURE: 97 F | DIASTOLIC BLOOD PRESSURE: 92 MMHG | SYSTOLIC BLOOD PRESSURE: 179 MMHG | HEIGHT: 72 IN | BODY MASS INDEX: 24.65 KG/M2 | OXYGEN SATURATION: 95 % | WEIGHT: 182 LBS | RESPIRATION RATE: 18 BRPM | HEART RATE: 62 BPM

## 2020-01-01 VITALS
OXYGEN SATURATION: 100 % | TEMPERATURE: 97.5 F | SYSTOLIC BLOOD PRESSURE: 195 MMHG | DIASTOLIC BLOOD PRESSURE: 71 MMHG | HEART RATE: 56 BPM | RESPIRATION RATE: 18 BRPM

## 2020-01-01 VITALS
HEART RATE: 67 BPM | BODY MASS INDEX: 27.63 KG/M2 | DIASTOLIC BLOOD PRESSURE: 73 MMHG | TEMPERATURE: 98.3 F | HEIGHT: 72 IN | RESPIRATION RATE: 18 BRPM | OXYGEN SATURATION: 96 % | SYSTOLIC BLOOD PRESSURE: 148 MMHG | WEIGHT: 204 LBS

## 2020-01-01 VITALS
RESPIRATION RATE: 17 BRPM | HEART RATE: 86 BPM | DIASTOLIC BLOOD PRESSURE: 67 MMHG | BODY MASS INDEX: 21.56 KG/M2 | SYSTOLIC BLOOD PRESSURE: 145 MMHG | WEIGHT: 159.17 LBS | HEIGHT: 72 IN | OXYGEN SATURATION: 97 % | TEMPERATURE: 98 F

## 2020-01-01 VITALS
SYSTOLIC BLOOD PRESSURE: 179 MMHG | WEIGHT: 175.2 LBS | OXYGEN SATURATION: 100 % | TEMPERATURE: 97.8 F | DIASTOLIC BLOOD PRESSURE: 75 MMHG | HEART RATE: 63 BPM | BODY MASS INDEX: 23.73 KG/M2 | HEIGHT: 72 IN | RESPIRATION RATE: 20 BRPM

## 2020-01-01 VITALS
HEART RATE: 70 BPM | TEMPERATURE: 98.2 F | WEIGHT: 148.71 LBS | OXYGEN SATURATION: 98 % | DIASTOLIC BLOOD PRESSURE: 64 MMHG | RESPIRATION RATE: 18 BRPM | HEIGHT: 72 IN | BODY MASS INDEX: 20.14 KG/M2 | SYSTOLIC BLOOD PRESSURE: 149 MMHG

## 2020-01-01 VITALS
TEMPERATURE: 98.4 F | HEART RATE: 64 BPM | RESPIRATION RATE: 18 BRPM | SYSTOLIC BLOOD PRESSURE: 152 MMHG | DIASTOLIC BLOOD PRESSURE: 60 MMHG | OXYGEN SATURATION: 100 %

## 2020-01-01 VITALS
DIASTOLIC BLOOD PRESSURE: 68 MMHG | RESPIRATION RATE: 16 BRPM | HEIGHT: 72 IN | TEMPERATURE: 97.9 F | SYSTOLIC BLOOD PRESSURE: 160 MMHG | BODY MASS INDEX: 24.52 KG/M2 | HEART RATE: 55 BPM | WEIGHT: 181 LBS

## 2020-01-01 VITALS
TEMPERATURE: 98.1 F | HEART RATE: 82 BPM | RESPIRATION RATE: 16 BRPM | OXYGEN SATURATION: 98 % | SYSTOLIC BLOOD PRESSURE: 130 MMHG | DIASTOLIC BLOOD PRESSURE: 78 MMHG

## 2020-01-01 VITALS
OXYGEN SATURATION: 98 % | RESPIRATION RATE: 16 BRPM | SYSTOLIC BLOOD PRESSURE: 150 MMHG | TEMPERATURE: 98.2 F | DIASTOLIC BLOOD PRESSURE: 58 MMHG | HEART RATE: 60 BPM

## 2020-01-01 VITALS
SYSTOLIC BLOOD PRESSURE: 172 MMHG | HEIGHT: 72 IN | TEMPERATURE: 98.2 F | WEIGHT: 179.2 LBS | DIASTOLIC BLOOD PRESSURE: 70 MMHG | OXYGEN SATURATION: 96 % | HEART RATE: 60 BPM | BODY MASS INDEX: 24.27 KG/M2 | RESPIRATION RATE: 20 BRPM

## 2020-01-01 VITALS
DIASTOLIC BLOOD PRESSURE: 60 MMHG | HEART RATE: 82 BPM | RESPIRATION RATE: 16 BRPM | SYSTOLIC BLOOD PRESSURE: 142 MMHG | TEMPERATURE: 98.2 F | OXYGEN SATURATION: 99 %

## 2020-01-01 VITALS
HEART RATE: 64 BPM | OXYGEN SATURATION: 98 % | DIASTOLIC BLOOD PRESSURE: 90 MMHG | RESPIRATION RATE: 10 BRPM | TEMPERATURE: 98.2 F | SYSTOLIC BLOOD PRESSURE: 180 MMHG

## 2020-01-01 VITALS
HEART RATE: 50 BPM | SYSTOLIC BLOOD PRESSURE: 213 MMHG | BODY MASS INDEX: 23.98 KG/M2 | RESPIRATION RATE: 18 BRPM | TEMPERATURE: 97.5 F | DIASTOLIC BLOOD PRESSURE: 90 MMHG | HEIGHT: 72 IN | OXYGEN SATURATION: 96 % | WEIGHT: 177 LBS

## 2020-01-01 VITALS
TEMPERATURE: 97.9 F | DIASTOLIC BLOOD PRESSURE: 63 MMHG | SYSTOLIC BLOOD PRESSURE: 172 MMHG | HEIGHT: 72 IN | OXYGEN SATURATION: 97 % | RESPIRATION RATE: 18 BRPM | TEMPERATURE: 97.8 F | WEIGHT: 200 LBS | HEART RATE: 58 BPM | HEART RATE: 70 BPM | BODY MASS INDEX: 27.09 KG/M2 | SYSTOLIC BLOOD PRESSURE: 146 MMHG | OXYGEN SATURATION: 97 % | DIASTOLIC BLOOD PRESSURE: 77 MMHG | RESPIRATION RATE: 18 BRPM

## 2020-01-01 VITALS
RESPIRATION RATE: 16 BRPM | SYSTOLIC BLOOD PRESSURE: 140 MMHG | OXYGEN SATURATION: 97 % | DIASTOLIC BLOOD PRESSURE: 70 MMHG | HEART RATE: 76 BPM | TEMPERATURE: 98.3 F

## 2020-01-01 VITALS
DIASTOLIC BLOOD PRESSURE: 67 MMHG | RESPIRATION RATE: 18 BRPM | SYSTOLIC BLOOD PRESSURE: 175 MMHG | WEIGHT: 177.8 LBS | BODY MASS INDEX: 24.08 KG/M2 | TEMPERATURE: 97.6 F | HEIGHT: 72 IN | OXYGEN SATURATION: 96 % | HEART RATE: 53 BPM

## 2020-01-01 VITALS
SYSTOLIC BLOOD PRESSURE: 128 MMHG | RESPIRATION RATE: 16 BRPM | OXYGEN SATURATION: 97 % | DIASTOLIC BLOOD PRESSURE: 76 MMHG | TEMPERATURE: 98.1 F | HEART RATE: 80 BPM

## 2020-01-01 VITALS
WEIGHT: 200 LBS | DIASTOLIC BLOOD PRESSURE: 82 MMHG | RESPIRATION RATE: 16 BRPM | HEART RATE: 63 BPM | HEIGHT: 72 IN | SYSTOLIC BLOOD PRESSURE: 170 MMHG | TEMPERATURE: 98.2 F | OXYGEN SATURATION: 100 % | BODY MASS INDEX: 27.09 KG/M2

## 2020-01-01 VITALS
HEART RATE: 63 BPM | OXYGEN SATURATION: 98 % | TEMPERATURE: 98.5 F | DIASTOLIC BLOOD PRESSURE: 69 MMHG | RESPIRATION RATE: 18 BRPM | SYSTOLIC BLOOD PRESSURE: 154 MMHG

## 2020-01-01 VITALS
HEART RATE: 63 BPM | OXYGEN SATURATION: 97 % | TEMPERATURE: 97.7 F | RESPIRATION RATE: 16 BRPM | SYSTOLIC BLOOD PRESSURE: 125 MMHG | DIASTOLIC BLOOD PRESSURE: 58 MMHG

## 2020-01-01 VITALS
BODY MASS INDEX: 21.54 KG/M2 | DIASTOLIC BLOOD PRESSURE: 23 MMHG | HEIGHT: 72 IN | HEART RATE: 27 BPM | SYSTOLIC BLOOD PRESSURE: 47 MMHG | OXYGEN SATURATION: 26 % | WEIGHT: 159 LBS

## 2020-01-01 VITALS
TEMPERATURE: 98.2 F | RESPIRATION RATE: 18 BRPM | OXYGEN SATURATION: 96 % | SYSTOLIC BLOOD PRESSURE: 122 MMHG | DIASTOLIC BLOOD PRESSURE: 76 MMHG | HEART RATE: 82 BPM

## 2020-01-01 VITALS
SYSTOLIC BLOOD PRESSURE: 166 MMHG | HEART RATE: 51 BPM | OXYGEN SATURATION: 100 % | RESPIRATION RATE: 16 BRPM | TEMPERATURE: 97.4 F | DIASTOLIC BLOOD PRESSURE: 67 MMHG

## 2020-01-01 DIAGNOSIS — N17.9 ACUTE KIDNEY INJURY SUPERIMPOSED ON CHRONIC KIDNEY DISEASE (HCC): ICD-10-CM

## 2020-01-01 DIAGNOSIS — M62.838 TRAPEZIUS MUSCLE SPASM: ICD-10-CM

## 2020-01-01 DIAGNOSIS — T14.8XXA OPEN WOUND OF SKIN: ICD-10-CM

## 2020-01-01 DIAGNOSIS — G93.41 ACUTE METABOLIC ENCEPHALOPATHY: ICD-10-CM

## 2020-01-01 DIAGNOSIS — R53.81 DEBILITY: ICD-10-CM

## 2020-01-01 DIAGNOSIS — E11.21 TYPE II DIABETES MELLITUS WITH NEPHROPATHY (HCC): Chronic | ICD-10-CM

## 2020-01-01 DIAGNOSIS — Z51.5 ENCOUNTER FOR PALLIATIVE CARE: ICD-10-CM

## 2020-01-01 DIAGNOSIS — E87.79 OTHER HYPERVOLEMIA: ICD-10-CM

## 2020-01-01 DIAGNOSIS — N18.6 ESRD (END STAGE RENAL DISEASE) (HCC): ICD-10-CM

## 2020-01-01 DIAGNOSIS — G89.4 CHRONIC PAIN SYNDROME: ICD-10-CM

## 2020-01-01 DIAGNOSIS — E87.5 ACUTE HYPERKALEMIA: ICD-10-CM

## 2020-01-01 DIAGNOSIS — N18.9 ACUTE KIDNEY INJURY SUPERIMPOSED ON CHRONIC KIDNEY DISEASE (HCC): ICD-10-CM

## 2020-01-01 DIAGNOSIS — E11.10 DIABETIC KETOACIDOSIS WITHOUT COMA ASSOCIATED WITH TYPE 2 DIABETES MELLITUS (HCC): Primary | ICD-10-CM

## 2020-01-01 DIAGNOSIS — I83.028 VENOUS STASIS ULCER OF OTHER PART OF LEFT LOWER LEG LIMITED TO BREAKDOWN OF SKIN, UNSPECIFIED WHETHER VARICOSE VEINS PRESENT (HCC): ICD-10-CM

## 2020-01-01 DIAGNOSIS — J18.9 PNEUMONIA OF BOTH LUNGS DUE TO INFECTIOUS ORGANISM, UNSPECIFIED PART OF LUNG: ICD-10-CM

## 2020-01-01 DIAGNOSIS — I87.303 STASIS EDEMA OF BOTH LOWER EXTREMITIES: Chronic | ICD-10-CM

## 2020-01-01 DIAGNOSIS — N18.4 STAGE 4 CHRONIC KIDNEY DISEASE (HCC): Chronic | ICD-10-CM

## 2020-01-01 DIAGNOSIS — E83.51 HYPOCALCEMIA: ICD-10-CM

## 2020-01-01 DIAGNOSIS — R41.82 ALTERED MENTAL STATUS, UNSPECIFIED ALTERED MENTAL STATUS TYPE: ICD-10-CM

## 2020-01-01 DIAGNOSIS — L97.929 VENOUS STASIS ULCER OF LEFT LOWER EXTREMITY (HCC): ICD-10-CM

## 2020-01-01 DIAGNOSIS — R53.83 FATIGUE, UNSPECIFIED TYPE: ICD-10-CM

## 2020-01-01 DIAGNOSIS — D63.8 ANEMIA OF CHRONIC DISEASE: ICD-10-CM

## 2020-01-01 DIAGNOSIS — R54 FRAILTY: ICD-10-CM

## 2020-01-01 DIAGNOSIS — N28.9 ACUTE ON CHRONIC RENAL INSUFFICIENCY: ICD-10-CM

## 2020-01-01 DIAGNOSIS — S81.809A: ICD-10-CM

## 2020-01-01 DIAGNOSIS — L97.821 VENOUS STASIS ULCER OF OTHER PART OF LEFT LOWER LEG LIMITED TO BREAKDOWN OF SKIN, UNSPECIFIED WHETHER VARICOSE VEINS PRESENT (HCC): ICD-10-CM

## 2020-01-01 DIAGNOSIS — M62.838 TRAPEZIUS MUSCLE SPASM: Primary | ICD-10-CM

## 2020-01-01 DIAGNOSIS — E83.42 HYPOMAGNESEMIA: ICD-10-CM

## 2020-01-01 DIAGNOSIS — N17.9 ACUTE RENAL FAILURE SUPERIMPOSED ON CHRONIC KIDNEY DISEASE, UNSPECIFIED CKD STAGE, UNSPECIFIED ACUTE RENAL FAILURE TYPE (HCC): ICD-10-CM

## 2020-01-01 DIAGNOSIS — L97.929 VENOUS STASIS ULCER OF LEFT LOWER EXTREMITY (HCC): Primary | ICD-10-CM

## 2020-01-01 DIAGNOSIS — Z71.89 ACP (ADVANCE CARE PLANNING): ICD-10-CM

## 2020-01-01 DIAGNOSIS — N18.9 ACUTE RENAL FAILURE SUPERIMPOSED ON CHRONIC KIDNEY DISEASE, UNSPECIFIED CKD STAGE, UNSPECIFIED ACUTE RENAL FAILURE TYPE (HCC): ICD-10-CM

## 2020-01-01 DIAGNOSIS — I83.029 VENOUS STASIS ULCER OF LEFT LOWER EXTREMITY (HCC): ICD-10-CM

## 2020-01-01 DIAGNOSIS — I87.2 CHRONIC VENOUS STASIS DERMATITIS: ICD-10-CM

## 2020-01-01 DIAGNOSIS — N39.0 URINARY TRACT INFECTION WITHOUT HEMATURIA, SITE UNSPECIFIED: Primary | ICD-10-CM

## 2020-01-01 DIAGNOSIS — M54.12 CERVICAL RADICULOPATHY: ICD-10-CM

## 2020-01-01 DIAGNOSIS — N18.6 END STAGE RENAL DISEASE (HCC): Primary | ICD-10-CM

## 2020-01-01 DIAGNOSIS — E87.0 HYPERNATREMIA: ICD-10-CM

## 2020-01-01 DIAGNOSIS — M25.511 CHRONIC RIGHT SHOULDER PAIN: ICD-10-CM

## 2020-01-01 DIAGNOSIS — I46.9 CARDIAC ARREST (HCC): Primary | ICD-10-CM

## 2020-01-01 DIAGNOSIS — G89.29 CHRONIC RIGHT SHOULDER PAIN: ICD-10-CM

## 2020-01-01 DIAGNOSIS — L03.116 CELLULITIS OF LEFT LOWER LEG: ICD-10-CM

## 2020-01-01 DIAGNOSIS — G62.9 NEUROPATHY: ICD-10-CM

## 2020-01-01 DIAGNOSIS — I83.029 VENOUS STASIS ULCER OF LEFT LOWER EXTREMITY (HCC): Primary | ICD-10-CM

## 2020-01-01 DIAGNOSIS — R91.8 PULMONARY INFILTRATES: ICD-10-CM

## 2020-01-01 DIAGNOSIS — E87.29 METABOLIC ACIDOSIS, INCREASED ANION GAP: ICD-10-CM

## 2020-01-01 DIAGNOSIS — D64.9 ANEMIA, UNSPECIFIED TYPE: ICD-10-CM

## 2020-01-01 DIAGNOSIS — T14.8XXA OPEN WOUND OF SKIN: Primary | ICD-10-CM

## 2020-01-01 DIAGNOSIS — N18.9 ACUTE ON CHRONIC RENAL INSUFFICIENCY: ICD-10-CM

## 2020-01-01 DIAGNOSIS — I45.81 LONG Q-T SYNDROME: ICD-10-CM

## 2020-01-01 LAB
25(OH)D3 SERPL-MCNC: 17.2 NG/ML (ref 30–100)
25(OH)D3 SERPL-MCNC: 27.6 NG/ML (ref 30–100)
ABO + RH BLD: NORMAL
ABO + RH BLD: NORMAL
ADMINISTERED INITIALS, ADMINIT: NORMAL
ALBUMIN SERPL-MCNC: 1.4 G/DL (ref 3.2–4.6)
ALBUMIN SERPL-MCNC: 1.6 G/DL (ref 3.2–4.6)
ALBUMIN SERPL-MCNC: 2.1 G/DL (ref 3.2–4.6)
ALBUMIN SERPL-MCNC: 2.2 G/DL (ref 3.2–4.6)
ALBUMIN SERPL-MCNC: 2.2 G/DL (ref 3.2–4.6)
ALBUMIN SERPL-MCNC: 2.3 G/DL (ref 3.2–4.6)
ALBUMIN SERPL-MCNC: 2.5 G/DL (ref 3.2–4.6)
ALBUMIN SERPL-MCNC: 2.7 G/DL (ref 3.2–4.6)
ALBUMIN/GLOB SERPL: 0.5 {RATIO} (ref 1.2–3.5)
ALBUMIN/GLOB SERPL: 0.6 {RATIO} (ref 1.2–3.5)
ALBUMIN/GLOB SERPL: 0.6 {RATIO} (ref 1.2–3.5)
ALBUMIN/GLOB SERPL: 0.7 {RATIO} (ref 1.2–3.5)
ALP SERPL-CCNC: 100 U/L (ref 50–136)
ALP SERPL-CCNC: 60 U/L (ref 50–136)
ALP SERPL-CCNC: 62 U/L (ref 50–136)
ALP SERPL-CCNC: 62 U/L (ref 50–136)
ALP SERPL-CCNC: 70 U/L (ref 50–136)
ALP SERPL-CCNC: 78 U/L (ref 50–136)
ALP SERPL-CCNC: 81 U/L (ref 50–136)
ALP SERPL-CCNC: 84 U/L (ref 50–136)
ALP SERPL-CCNC: 99 U/L (ref 50–136)
ALT SERPL-CCNC: 14 U/L (ref 12–65)
ALT SERPL-CCNC: 14 U/L (ref 12–65)
ALT SERPL-CCNC: 17 U/L (ref 12–65)
ALT SERPL-CCNC: 18 U/L (ref 12–65)
ALT SERPL-CCNC: 19 U/L (ref 12–65)
ALT SERPL-CCNC: 21 U/L (ref 12–65)
ALT SERPL-CCNC: 24 U/L (ref 12–65)
ALT SERPL-CCNC: 31 U/L (ref 12–65)
ALT SERPL-CCNC: 9 U/L (ref 12–65)
AMMONIA PLAS-SCNC: 35 UMOL/L (ref 11–32)
ANION GAP SERPL CALC-SCNC: 10 MMOL/L (ref 7–16)
ANION GAP SERPL CALC-SCNC: 11 MMOL/L (ref 7–16)
ANION GAP SERPL CALC-SCNC: 12 MMOL/L (ref 7–16)
ANION GAP SERPL CALC-SCNC: 13 MMOL/L (ref 7–16)
ANION GAP SERPL CALC-SCNC: 14 MMOL/L (ref 7–16)
ANION GAP SERPL CALC-SCNC: 15 MMOL/L (ref 7–16)
ANION GAP SERPL CALC-SCNC: 15 MMOL/L (ref 7–16)
ANION GAP SERPL CALC-SCNC: 16 MMOL/L (ref 7–16)
ANION GAP SERPL CALC-SCNC: 17 MMOL/L (ref 7–16)
ANION GAP SERPL CALC-SCNC: 18 MMOL/L (ref 7–16)
ANION GAP SERPL CALC-SCNC: 18 MMOL/L (ref 7–16)
ANION GAP SERPL CALC-SCNC: 19 MMOL/L (ref 7–16)
ANION GAP SERPL CALC-SCNC: 19 MMOL/L (ref 7–16)
ANION GAP SERPL CALC-SCNC: 20 MMOL/L (ref 7–16)
ANION GAP SERPL CALC-SCNC: 22 MMOL/L (ref 7–16)
ANION GAP SERPL CALC-SCNC: 23 MMOL/L (ref 7–16)
ANION GAP SERPL CALC-SCNC: 9 MMOL/L (ref 7–16)
APPEARANCE UR: ABNORMAL
APPEARANCE UR: CLEAR
APPEARANCE UR: CLEAR
ARTERIAL PATENCY WRIST A: ABNORMAL
ARTERIAL PATENCY WRIST A: YES
AST SERPL-CCNC: 13 U/L (ref 15–37)
AST SERPL-CCNC: 16 U/L (ref 15–37)
AST SERPL-CCNC: 25 U/L (ref 15–37)
AST SERPL-CCNC: 26 U/L (ref 15–37)
AST SERPL-CCNC: 26 U/L (ref 15–37)
AST SERPL-CCNC: 43 U/L (ref 15–37)
AST SERPL-CCNC: 45 U/L (ref 15–37)
AST SERPL-CCNC: 60 U/L (ref 15–37)
AST SERPL-CCNC: 74 U/L (ref 15–37)
ATRIAL RATE: 129 BPM
ATRIAL RATE: 312 BPM
ATRIAL RATE: 75 BPM
ATRIAL RATE: 77 BPM
ATRIAL RATE: 80 BPM
ATRIAL RATE: 86 BPM
BACTERIA SPEC CULT: ABNORMAL
BACTERIA SPEC CULT: NORMAL
BACTERIA SPEC CULT: NORMAL
BACTERIA URNS QL MICRO: 0 /HPF
BACTERIA URNS QL MICRO: 0 /HPF
BACTERIA URNS QL MICRO: ABNORMAL /HPF
BACTERIA URNS QL MICRO: ABNORMAL /HPF
BASE DEFICIT BLD-SCNC: 16 MMOL/L
BASE DEFICIT BLD-SCNC: 27 MMOL/L
BASE DEFICIT BLD-SCNC: 9 MMOL/L
BASE DEFICIT BLDV-SCNC: 16 MMOL/L
BASE DEFICIT BLDV-SCNC: 19 MMOL/L
BASE DEFICIT BLDV-SCNC: 19 MMOL/L
BASOPHILS # BLD: 0 K/UL (ref 0–0.2)
BASOPHILS NFR BLD: 0 % (ref 0–2)
BASOPHILS NFR BLD: 1 % (ref 0–2)
BASOPHILS NFR BLD: 1 % (ref 0–2)
BDY SITE: ABNORMAL
BILIRUB SERPL-MCNC: 0.2 MG/DL (ref 0.2–1.1)
BILIRUB SERPL-MCNC: 0.2 MG/DL (ref 0.2–1.1)
BILIRUB SERPL-MCNC: 0.3 MG/DL (ref 0.2–1.1)
BILIRUB SERPL-MCNC: 0.4 MG/DL (ref 0.2–1.1)
BILIRUB SERPL-MCNC: 0.4 MG/DL (ref 0.2–1.1)
BILIRUB SERPL-MCNC: 0.5 MG/DL (ref 0.2–1.1)
BILIRUB SERPL-MCNC: 0.6 MG/DL (ref 0.2–1.1)
BILIRUB UR QL: NEGATIVE
BLD PROD TYP BPU: NORMAL
BLOOD GROUP ANTIBODIES SERPL: NORMAL
BLOOD GROUP ANTIBODIES SERPL: NORMAL
BPU ID: NORMAL
BUN SERPL-MCNC: 101 MG/DL (ref 8–23)
BUN SERPL-MCNC: 104 MG/DL (ref 8–23)
BUN SERPL-MCNC: 105 MG/DL (ref 8–23)
BUN SERPL-MCNC: 108 MG/DL (ref 8–23)
BUN SERPL-MCNC: 108 MG/DL (ref 8–23)
BUN SERPL-MCNC: 114 MG/DL (ref 8–23)
BUN SERPL-MCNC: 115 MG/DL (ref 8–23)
BUN SERPL-MCNC: 117 MG/DL (ref 8–23)
BUN SERPL-MCNC: 119 MG/DL (ref 8–23)
BUN SERPL-MCNC: 121 MG/DL (ref 8–23)
BUN SERPL-MCNC: 121 MG/DL (ref 8–23)
BUN SERPL-MCNC: 122 MG/DL (ref 8–23)
BUN SERPL-MCNC: 123 MG/DL (ref 8–23)
BUN SERPL-MCNC: 123 MG/DL (ref 8–23)
BUN SERPL-MCNC: 124 MG/DL (ref 8–23)
BUN SERPL-MCNC: 140 MG/DL (ref 8–23)
BUN SERPL-MCNC: 74 MG/DL (ref 8–23)
BUN SERPL-MCNC: 77 MG/DL (ref 8–23)
BUN SERPL-MCNC: 77 MG/DL (ref 8–23)
BUN SERPL-MCNC: 78 MG/DL (ref 8–23)
BUN SERPL-MCNC: 78 MG/DL (ref 8–23)
BUN SERPL-MCNC: 79 MG/DL (ref 8–23)
BUN SERPL-MCNC: 80 MG/DL (ref 8–23)
BUN SERPL-MCNC: 82 MG/DL (ref 8–23)
BUN SERPL-MCNC: 82 MG/DL (ref 8–23)
BUN SERPL-MCNC: 83 MG/DL (ref 8–23)
BUN SERPL-MCNC: 84 MG/DL (ref 8–23)
BUN SERPL-MCNC: 85 MG/DL (ref 8–23)
BUN SERPL-MCNC: 86 MG/DL (ref 8–23)
BUN SERPL-MCNC: 90 MG/DL (ref 8–23)
BUN SERPL-MCNC: 94 MG/DL (ref 8–23)
BUN SERPL-MCNC: 95 MG/DL (ref 8–23)
CA-I BLD-MCNC: 0.96 MMOL/L (ref 1.12–1.32)
CA-I BLD-MCNC: 3.12 MG/DL (ref 4–5.2)
CA-I BLD-MCNC: 3.2 MG/DL (ref 4–5.2)
CA-I BLD-MCNC: 3.32 MG/DL (ref 4–5.2)
CA-I BLD-MCNC: 3.52 MG/DL (ref 4–5.2)
CA-I BLD-MCNC: 3.52 MG/DL (ref 4–5.2)
CA-I BLD-MCNC: 3.76 MG/DL (ref 4–5.2)
CA-I BLD-MCNC: 3.88 MG/DL (ref 4–5.2)
CA-I BLD-MCNC: 3.88 MG/DL (ref 4–5.2)
CA-I BLD-MCNC: 3.96 MG/DL (ref 4–5.2)
CA-I BLD-MCNC: 4.04 MG/DL (ref 4–5.2)
CALCIUM SERPL-MCNC: 5.1 MG/DL (ref 8.3–10.4)
CALCIUM SERPL-MCNC: 5.2 MG/DL (ref 8.3–10.4)
CALCIUM SERPL-MCNC: 5.2 MG/DL (ref 8.3–10.4)
CALCIUM SERPL-MCNC: 5.5 MG/DL (ref 8.3–10.4)
CALCIUM SERPL-MCNC: 5.6 MG/DL (ref 8.3–10.4)
CALCIUM SERPL-MCNC: 5.7 MG/DL (ref 8.3–10.4)
CALCIUM SERPL-MCNC: 5.9 MG/DL (ref 8.3–10.4)
CALCIUM SERPL-MCNC: 6.1 MG/DL (ref 8.3–10.4)
CALCIUM SERPL-MCNC: 6.1 MG/DL (ref 8.3–10.4)
CALCIUM SERPL-MCNC: 6.2 MG/DL (ref 8.3–10.4)
CALCIUM SERPL-MCNC: 6.2 MG/DL (ref 8.3–10.4)
CALCIUM SERPL-MCNC: 6.3 MG/DL (ref 8.3–10.4)
CALCIUM SERPL-MCNC: 6.4 MG/DL (ref 8.3–10.4)
CALCIUM SERPL-MCNC: 6.5 MG/DL (ref 8.3–10.4)
CALCIUM SERPL-MCNC: 6.6 MG/DL (ref 8.3–10.4)
CALCIUM SERPL-MCNC: 6.7 MG/DL (ref 8.3–10.4)
CALCIUM SERPL-MCNC: 6.7 MG/DL (ref 8.3–10.4)
CALCIUM SERPL-MCNC: 6.8 MG/DL (ref 8.3–10.4)
CALCIUM SERPL-MCNC: 6.8 MG/DL (ref 8.3–10.4)
CALCIUM SERPL-MCNC: 7 MG/DL (ref 8.3–10.4)
CALCIUM SERPL-MCNC: 7 MG/DL (ref 8.3–10.4)
CALCIUM SERPL-MCNC: 7.1 MG/DL (ref 8.3–10.4)
CALCIUM SERPL-MCNC: 7.2 MG/DL (ref 8.3–10.4)
CALCIUM SERPL-MCNC: 7.3 MG/DL (ref 8.3–10.4)
CALCIUM SERPL-MCNC: 7.9 MG/DL (ref 8.3–10.4)
CALCIUM SERPL-MCNC: 8.9 MG/DL (ref 8.3–10.4)
CALCULATED P AXIS, ECG09: 105 DEGREES
CALCULATED P AXIS, ECG09: 44 DEGREES
CALCULATED P AXIS, ECG09: 76 DEGREES
CALCULATED P AXIS, ECG09: 96 DEGREES
CALCULATED R AXIS, ECG10: -11 DEGREES
CALCULATED R AXIS, ECG10: -14 DEGREES
CALCULATED R AXIS, ECG10: -15 DEGREES
CALCULATED R AXIS, ECG10: -22 DEGREES
CALCULATED R AXIS, ECG10: -23 DEGREES
CALCULATED R AXIS, ECG10: -44 DEGREES
CALCULATED T AXIS, ECG11: 11 DEGREES
CALCULATED T AXIS, ECG11: 49 DEGREES
CALCULATED T AXIS, ECG11: 57 DEGREES
CALCULATED T AXIS, ECG11: 62 DEGREES
CALCULATED T AXIS, ECG11: 66 DEGREES
CALCULATED T AXIS, ECG11: 80 DEGREES
CASTS URNS QL MICRO: ABNORMAL /LPF
CASTS URNS QL MICRO: NORMAL /LPF
CHLORIDE SERPL-SCNC: 105 MMOL/L (ref 98–107)
CHLORIDE SERPL-SCNC: 106 MMOL/L (ref 98–107)
CHLORIDE SERPL-SCNC: 107 MMOL/L (ref 98–107)
CHLORIDE SERPL-SCNC: 108 MMOL/L (ref 98–107)
CHLORIDE SERPL-SCNC: 109 MMOL/L (ref 98–107)
CHLORIDE SERPL-SCNC: 110 MMOL/L (ref 98–107)
CHLORIDE SERPL-SCNC: 111 MMOL/L (ref 98–107)
CHLORIDE SERPL-SCNC: 112 MMOL/L (ref 98–107)
CHLORIDE SERPL-SCNC: 113 MMOL/L (ref 98–107)
CHLORIDE SERPL-SCNC: 113 MMOL/L (ref 98–107)
CHLORIDE SERPL-SCNC: 114 MMOL/L (ref 98–107)
CHLORIDE SERPL-SCNC: 114 MMOL/L (ref 98–107)
CHLORIDE SERPL-SCNC: 116 MMOL/L (ref 98–107)
CHLORIDE SERPL-SCNC: 117 MMOL/L (ref 98–107)
CHLORIDE SERPL-SCNC: 117 MMOL/L (ref 98–107)
CHLORIDE SERPL-SCNC: 118 MMOL/L (ref 98–107)
CHLORIDE SERPL-SCNC: 118 MMOL/L (ref 98–107)
CHLORIDE SERPL-SCNC: 119 MMOL/L (ref 98–107)
CHLORIDE SERPL-SCNC: 119 MMOL/L (ref 98–107)
CHLORIDE SERPL-SCNC: 121 MMOL/L (ref 98–107)
CHLORIDE SERPL-SCNC: 121 MMOL/L (ref 98–107)
CHLORIDE SERPL-SCNC: 122 MMOL/L (ref 98–107)
CHLORIDE SERPL-SCNC: 122 MMOL/L (ref 98–107)
CHLORIDE SERPL-SCNC: 123 MMOL/L (ref 98–107)
CHLORIDE SERPL-SCNC: 123 MMOL/L (ref 98–107)
CHLORIDE SERPL-SCNC: 124 MMOL/L (ref 98–107)
CHLORIDE SERPL-SCNC: 124 MMOL/L (ref 98–107)
CHLORIDE SERPL-SCNC: 125 MMOL/L (ref 98–107)
CHLORIDE SERPL-SCNC: 125 MMOL/L (ref 98–107)
CHLORIDE SERPL-SCNC: 126 MMOL/L (ref 98–107)
CHLORIDE SERPL-SCNC: 126 MMOL/L (ref 98–107)
CHLORIDE SERPL-SCNC: 127 MMOL/L (ref 98–107)
CHLORIDE SERPL-SCNC: 127 MMOL/L (ref 98–107)
CHLORIDE SERPL-SCNC: 128 MMOL/L (ref 98–107)
CHLORIDE SERPL-SCNC: 130 MMOL/L (ref 98–107)
CHLORIDE SERPL-SCNC: 130 MMOL/L (ref 98–107)
CHLORIDE SERPL-SCNC: 98 MMOL/L (ref 98–107)
CK SERPL-CCNC: 1039 U/L (ref 21–215)
CK SERPL-CCNC: 1273 U/L (ref 21–215)
CK SERPL-CCNC: 1461 U/L (ref 21–215)
CK SERPL-CCNC: 165 U/L (ref 21–215)
CK SERPL-CCNC: 601 U/L (ref 21–215)
CK SERPL-CCNC: 868 U/L (ref 21–215)
CO2 BLD-SCNC: 10 MMOL/L
CO2 BLD-SCNC: 11 MMOL/L
CO2 BLD-SCNC: 17 MMOL/L
CO2 BLD-SCNC: 8 MMOL/L
CO2 BLD-SCNC: 8 MMOL/L
CO2 SERPL-SCNC: 10 MMOL/L (ref 21–32)
CO2 SERPL-SCNC: 11 MMOL/L (ref 21–32)
CO2 SERPL-SCNC: 12 MMOL/L (ref 21–32)
CO2 SERPL-SCNC: 13 MMOL/L (ref 21–32)
CO2 SERPL-SCNC: 14 MMOL/L (ref 21–32)
CO2 SERPL-SCNC: 15 MMOL/L (ref 21–32)
CO2 SERPL-SCNC: 16 MMOL/L (ref 21–32)
CO2 SERPL-SCNC: 17 MMOL/L (ref 21–32)
CO2 SERPL-SCNC: 18 MMOL/L (ref 21–32)
CO2 SERPL-SCNC: 19 MMOL/L (ref 21–32)
CO2 SERPL-SCNC: 19 MMOL/L (ref 21–32)
CO2 SERPL-SCNC: 20 MMOL/L (ref 21–32)
CO2 SERPL-SCNC: 20 MMOL/L (ref 21–32)
CO2 SERPL-SCNC: 8 MMOL/L (ref 21–32)
CO2 SERPL-SCNC: 9 MMOL/L (ref 21–32)
COLLECT TIME,HTIME: 1000
COLLECT TIME,HTIME: 1415
COLLECT TIME,HTIME: 1555
COLLECT TIME,HTIME: 1847
COLLECT TIME,HTIME: 2225
COLLECT TIME,HTIME: 645
COLLECTION COMMENT, COLCM: NORMAL
COLOR UR: ABNORMAL
COLOR UR: YELLOW
COLOR UR: YELLOW
COVID-19 RAPID TEST, COVR: NOT DETECTED
CREAT SERPL-MCNC: 10.1 MG/DL (ref 0.8–1.5)
CREAT SERPL-MCNC: 10.2 MG/DL (ref 0.8–1.5)
CREAT SERPL-MCNC: 10.4 MG/DL (ref 0.8–1.5)
CREAT SERPL-MCNC: 11 MG/DL (ref 0.8–1.5)
CREAT SERPL-MCNC: 11 MG/DL (ref 0.8–1.5)
CREAT SERPL-MCNC: 11.2 MG/DL (ref 0.8–1.5)
CREAT SERPL-MCNC: 11.3 MG/DL (ref 0.8–1.5)
CREAT SERPL-MCNC: 11.4 MG/DL (ref 0.8–1.5)
CREAT SERPL-MCNC: 11.5 MG/DL (ref 0.8–1.5)
CREAT SERPL-MCNC: 11.8 MG/DL (ref 0.8–1.5)
CREAT SERPL-MCNC: 11.9 MG/DL (ref 0.8–1.5)
CREAT SERPL-MCNC: 12.3 MG/DL (ref 0.8–1.5)
CREAT SERPL-MCNC: 12.6 MG/DL (ref 0.8–1.5)
CREAT SERPL-MCNC: 6.9 MG/DL (ref 0.8–1.5)
CREAT SERPL-MCNC: 6.94 MG/DL (ref 0.8–1.5)
CREAT SERPL-MCNC: 7.04 MG/DL (ref 0.8–1.5)
CREAT SERPL-MCNC: 7.1 MG/DL (ref 0.8–1.5)
CREAT SERPL-MCNC: 7.11 MG/DL (ref 0.8–1.5)
CREAT SERPL-MCNC: 7.16 MG/DL (ref 0.8–1.5)
CREAT SERPL-MCNC: 7.17 MG/DL (ref 0.8–1.5)
CREAT SERPL-MCNC: 7.23 MG/DL (ref 0.8–1.5)
CREAT SERPL-MCNC: 7.31 MG/DL (ref 0.8–1.5)
CREAT SERPL-MCNC: 7.4 MG/DL (ref 0.8–1.5)
CREAT SERPL-MCNC: 7.42 MG/DL (ref 0.8–1.5)
CREAT SERPL-MCNC: 7.45 MG/DL (ref 0.8–1.5)
CREAT SERPL-MCNC: 7.53 MG/DL (ref 0.8–1.5)
CREAT SERPL-MCNC: 7.63 MG/DL (ref 0.8–1.5)
CREAT SERPL-MCNC: 7.99 MG/DL (ref 0.8–1.5)
CREAT SERPL-MCNC: 8.29 MG/DL (ref 0.8–1.5)
CREAT SERPL-MCNC: 8.34 MG/DL (ref 0.8–1.5)
CREAT SERPL-MCNC: 8.78 MG/DL (ref 0.8–1.5)
CREAT SERPL-MCNC: 9.14 MG/DL (ref 0.8–1.5)
CREAT SERPL-MCNC: 9.59 MG/DL (ref 0.8–1.5)
CREAT SERPL-MCNC: 9.64 MG/DL (ref 0.8–1.5)
CREAT SERPL-MCNC: 9.69 MG/DL (ref 0.8–1.5)
CREAT SERPL-MCNC: 9.76 MG/DL (ref 0.8–1.5)
CREAT SERPL-MCNC: 9.89 MG/DL (ref 0.8–1.5)
CREAT UR-MCNC: 46.9 MG/DL
CROSSMATCH RESULT,%XM: NORMAL
D50 ADMINISTERED, D50ADM: 0 ML
D50 ADMINISTERED, D50ADM: 13 ML
D50 ADMINISTERED, D50ADM: 16 ML
D50 ADMINISTERED, D50ADM: 23 ML
D50 ORDER, D50ORD: 0 ML
D50 ORDER, D50ORD: 13 ML
D50 ORDER, D50ORD: 16 ML
D50 ORDER, D50ORD: 23 ML
DIAGNOSIS, 93000: NORMAL
DIFFERENTIAL METHOD BLD: ABNORMAL
EOSINOPHIL # BLD: 0 K/UL (ref 0–0.8)
EOSINOPHIL # BLD: 0.1 K/UL (ref 0–0.8)
EOSINOPHIL # BLD: 0.2 K/UL (ref 0–0.8)
EOSINOPHIL # BLD: 0.2 K/UL (ref 0–0.8)
EOSINOPHIL # BLD: 0.3 K/UL (ref 0–0.8)
EOSINOPHIL # BLD: 0.3 K/UL (ref 0–0.8)
EOSINOPHIL NFR BLD: 0 % (ref 0.5–7.8)
EOSINOPHIL NFR BLD: 1 % (ref 0.5–7.8)
EOSINOPHIL NFR BLD: 2 % (ref 0.5–7.8)
EOSINOPHIL NFR BLD: 3 % (ref 0.5–7.8)
EOSINOPHIL NFR BLD: 4 % (ref 0.5–7.8)
EOSINOPHIL NFR BLD: 5 % (ref 0.5–7.8)
EPI CELLS #/AREA URNS HPF: ABNORMAL /HPF
EPI CELLS #/AREA URNS HPF: NORMAL /HPF
ERYTHROCYTE [DISTWIDTH] IN BLOOD BY AUTOMATED COUNT: 16.2 % (ref 11.9–14.6)
ERYTHROCYTE [DISTWIDTH] IN BLOOD BY AUTOMATED COUNT: 16.8 % (ref 11.9–14.6)
ERYTHROCYTE [DISTWIDTH] IN BLOOD BY AUTOMATED COUNT: 16.8 % (ref 11.9–14.6)
ERYTHROCYTE [DISTWIDTH] IN BLOOD BY AUTOMATED COUNT: 17 % (ref 11.9–14.6)
ERYTHROCYTE [DISTWIDTH] IN BLOOD BY AUTOMATED COUNT: 17.1 % (ref 11.9–14.6)
ERYTHROCYTE [DISTWIDTH] IN BLOOD BY AUTOMATED COUNT: 17.1 % (ref 11.9–14.6)
ERYTHROCYTE [DISTWIDTH] IN BLOOD BY AUTOMATED COUNT: 17.2 % (ref 11.9–14.6)
ERYTHROCYTE [DISTWIDTH] IN BLOOD BY AUTOMATED COUNT: 17.3 % (ref 11.9–14.6)
ERYTHROCYTE [DISTWIDTH] IN BLOOD BY AUTOMATED COUNT: 17.4 % (ref 11.9–14.6)
ERYTHROCYTE [DISTWIDTH] IN BLOOD BY AUTOMATED COUNT: 17.4 % (ref 11.9–14.6)
ERYTHROCYTE [DISTWIDTH] IN BLOOD BY AUTOMATED COUNT: 17.7 % (ref 11.9–14.6)
ERYTHROCYTE [DISTWIDTH] IN BLOOD BY AUTOMATED COUNT: 18.5 %
ERYTHROCYTE [DISTWIDTH] IN BLOOD BY AUTOMATED COUNT: 18.6 % (ref 11.9–14.6)
ERYTHROCYTE [DISTWIDTH] IN BLOOD BY AUTOMATED COUNT: 19 % (ref 11.9–14.6)
ERYTHROCYTE [DISTWIDTH] IN BLOOD BY AUTOMATED COUNT: 19.2 % (ref 11.9–14.6)
ERYTHROCYTE [DISTWIDTH] IN BLOOD BY AUTOMATED COUNT: 19.4 % (ref 11.9–14.6)
ERYTHROCYTE [DISTWIDTH] IN BLOOD BY AUTOMATED COUNT: 19.8 % (ref 11.9–14.6)
EST. AVERAGE GLUCOSE BLD GHB EST-MCNC: 223 MG/DL
EXHALED MINUTE VOLUME, VE: 7.6 L/MIN
FERRITIN SERPL-MCNC: 328 NG/ML (ref 8–388)
FERRITIN SERPL-MCNC: 402 NG/ML (ref 8–388)
FERRITIN SERPL-MCNC: 511 NG/ML (ref 8–388)
FERRITIN SERPL-MCNC: 563 NG/ML (ref 8–388)
GAS FLOW.O2 O2 DELIVERY SYS: ABNORMAL L/MIN
GAS FLOW.O2 SETTING OXYMISER: 18 BPM
GLOBULIN SER CALC-MCNC: 2.9 G/DL (ref 2.3–3.5)
GLOBULIN SER CALC-MCNC: 3.2 G/DL (ref 2.3–3.5)
GLOBULIN SER CALC-MCNC: 3.3 G/DL (ref 2.3–3.5)
GLOBULIN SER CALC-MCNC: 3.8 G/DL (ref 2.3–3.5)
GLOBULIN SER CALC-MCNC: 3.9 G/DL (ref 2.3–3.5)
GLOBULIN SER CALC-MCNC: 4.2 G/DL (ref 2.3–3.5)
GLOBULIN SER CALC-MCNC: 4.2 G/DL (ref 2.3–3.5)
GLOBULIN SER CALC-MCNC: 4.4 G/DL (ref 2.3–3.5)
GLOBULIN SER CALC-MCNC: 5 G/DL (ref 2.3–3.5)
GLSCOM COMMENTS: NORMAL
GLUCOSE BLD STRIP.AUTO-MCNC: 101 MG/DL (ref 65–100)
GLUCOSE BLD STRIP.AUTO-MCNC: 108 MG/DL (ref 65–100)
GLUCOSE BLD STRIP.AUTO-MCNC: 110 MG/DL (ref 65–100)
GLUCOSE BLD STRIP.AUTO-MCNC: 112 MG/DL (ref 65–100)
GLUCOSE BLD STRIP.AUTO-MCNC: 113 MG/DL (ref 65–100)
GLUCOSE BLD STRIP.AUTO-MCNC: 114 MG/DL (ref 65–100)
GLUCOSE BLD STRIP.AUTO-MCNC: 116 MG/DL (ref 65–100)
GLUCOSE BLD STRIP.AUTO-MCNC: 117 MG/DL (ref 65–100)
GLUCOSE BLD STRIP.AUTO-MCNC: 118 MG/DL (ref 65–100)
GLUCOSE BLD STRIP.AUTO-MCNC: 120 MG/DL (ref 65–100)
GLUCOSE BLD STRIP.AUTO-MCNC: 124 MG/DL (ref 65–100)
GLUCOSE BLD STRIP.AUTO-MCNC: 128 MG/DL (ref 65–100)
GLUCOSE BLD STRIP.AUTO-MCNC: 132 MG/DL (ref 65–100)
GLUCOSE BLD STRIP.AUTO-MCNC: 133 MG/DL (ref 65–100)
GLUCOSE BLD STRIP.AUTO-MCNC: 136 MG/DL (ref 65–100)
GLUCOSE BLD STRIP.AUTO-MCNC: 137 MG/DL (ref 65–100)
GLUCOSE BLD STRIP.AUTO-MCNC: 143 MG/DL (ref 65–100)
GLUCOSE BLD STRIP.AUTO-MCNC: 150 MG/DL (ref 65–100)
GLUCOSE BLD STRIP.AUTO-MCNC: 152 MG/DL (ref 65–100)
GLUCOSE BLD STRIP.AUTO-MCNC: 153 MG/DL (ref 65–100)
GLUCOSE BLD STRIP.AUTO-MCNC: 156 MG/DL (ref 65–100)
GLUCOSE BLD STRIP.AUTO-MCNC: 159 MG/DL (ref 65–100)
GLUCOSE BLD STRIP.AUTO-MCNC: 161 MG/DL (ref 65–100)
GLUCOSE BLD STRIP.AUTO-MCNC: 164 MG/DL (ref 65–100)
GLUCOSE BLD STRIP.AUTO-MCNC: 165 MG/DL (ref 65–100)
GLUCOSE BLD STRIP.AUTO-MCNC: 167 MG/DL (ref 65–100)
GLUCOSE BLD STRIP.AUTO-MCNC: 171 MG/DL (ref 65–100)
GLUCOSE BLD STRIP.AUTO-MCNC: 173 MG/DL (ref 65–100)
GLUCOSE BLD STRIP.AUTO-MCNC: 175 MG/DL (ref 65–100)
GLUCOSE BLD STRIP.AUTO-MCNC: 176 MG/DL (ref 65–100)
GLUCOSE BLD STRIP.AUTO-MCNC: 177 MG/DL (ref 65–100)
GLUCOSE BLD STRIP.AUTO-MCNC: 180 MG/DL (ref 65–100)
GLUCOSE BLD STRIP.AUTO-MCNC: 184 MG/DL (ref 65–100)
GLUCOSE BLD STRIP.AUTO-MCNC: 188 MG/DL (ref 65–100)
GLUCOSE BLD STRIP.AUTO-MCNC: 188 MG/DL (ref 65–100)
GLUCOSE BLD STRIP.AUTO-MCNC: 192 MG/DL (ref 65–100)
GLUCOSE BLD STRIP.AUTO-MCNC: 193 MG/DL (ref 65–100)
GLUCOSE BLD STRIP.AUTO-MCNC: 194 MG/DL (ref 65–100)
GLUCOSE BLD STRIP.AUTO-MCNC: 195 MG/DL (ref 65–100)
GLUCOSE BLD STRIP.AUTO-MCNC: 197 MG/DL (ref 65–100)
GLUCOSE BLD STRIP.AUTO-MCNC: 202 MG/DL (ref 65–100)
GLUCOSE BLD STRIP.AUTO-MCNC: 207 MG/DL (ref 65–100)
GLUCOSE BLD STRIP.AUTO-MCNC: 209 MG/DL (ref 65–100)
GLUCOSE BLD STRIP.AUTO-MCNC: 212 MG/DL (ref 65–100)
GLUCOSE BLD STRIP.AUTO-MCNC: 217 MG/DL (ref 65–100)
GLUCOSE BLD STRIP.AUTO-MCNC: 217 MG/DL (ref 65–100)
GLUCOSE BLD STRIP.AUTO-MCNC: 218 MG/DL (ref 65–100)
GLUCOSE BLD STRIP.AUTO-MCNC: 228 MG/DL (ref 65–100)
GLUCOSE BLD STRIP.AUTO-MCNC: 242 MG/DL (ref 65–100)
GLUCOSE BLD STRIP.AUTO-MCNC: 242 MG/DL (ref 65–100)
GLUCOSE BLD STRIP.AUTO-MCNC: 244 MG/DL (ref 65–100)
GLUCOSE BLD STRIP.AUTO-MCNC: 251 MG/DL (ref 65–100)
GLUCOSE BLD STRIP.AUTO-MCNC: 256 MG/DL (ref 65–100)
GLUCOSE BLD STRIP.AUTO-MCNC: 258 MG/DL (ref 65–100)
GLUCOSE BLD STRIP.AUTO-MCNC: 269 MG/DL (ref 65–100)
GLUCOSE BLD STRIP.AUTO-MCNC: 269 MG/DL (ref 65–100)
GLUCOSE BLD STRIP.AUTO-MCNC: 270 MG/DL (ref 65–100)
GLUCOSE BLD STRIP.AUTO-MCNC: 272 MG/DL (ref 65–100)
GLUCOSE BLD STRIP.AUTO-MCNC: 273 MG/DL (ref 65–100)
GLUCOSE BLD STRIP.AUTO-MCNC: 274 MG/DL (ref 65–100)
GLUCOSE BLD STRIP.AUTO-MCNC: 275 MG/DL (ref 65–100)
GLUCOSE BLD STRIP.AUTO-MCNC: 278 MG/DL (ref 65–100)
GLUCOSE BLD STRIP.AUTO-MCNC: 286 MG/DL (ref 65–100)
GLUCOSE BLD STRIP.AUTO-MCNC: 288 MG/DL (ref 65–100)
GLUCOSE BLD STRIP.AUTO-MCNC: 295 MG/DL (ref 65–100)
GLUCOSE BLD STRIP.AUTO-MCNC: 301 MG/DL (ref 65–100)
GLUCOSE BLD STRIP.AUTO-MCNC: 305 MG/DL (ref 65–100)
GLUCOSE BLD STRIP.AUTO-MCNC: 314 MG/DL (ref 65–100)
GLUCOSE BLD STRIP.AUTO-MCNC: 331 MG/DL (ref 65–100)
GLUCOSE BLD STRIP.AUTO-MCNC: 333 MG/DL (ref 65–100)
GLUCOSE BLD STRIP.AUTO-MCNC: 344 MG/DL (ref 65–100)
GLUCOSE BLD STRIP.AUTO-MCNC: 346 MG/DL (ref 65–100)
GLUCOSE BLD STRIP.AUTO-MCNC: 347 MG/DL (ref 65–100)
GLUCOSE BLD STRIP.AUTO-MCNC: 356 MG/DL (ref 65–100)
GLUCOSE BLD STRIP.AUTO-MCNC: 356 MG/DL (ref 65–100)
GLUCOSE BLD STRIP.AUTO-MCNC: 377 MG/DL (ref 65–100)
GLUCOSE BLD STRIP.AUTO-MCNC: 402 MG/DL (ref 65–100)
GLUCOSE BLD STRIP.AUTO-MCNC: 406 MG/DL (ref 65–100)
GLUCOSE BLD STRIP.AUTO-MCNC: 410 MG/DL (ref 65–100)
GLUCOSE BLD STRIP.AUTO-MCNC: 42 MG/DL (ref 65–100)
GLUCOSE BLD STRIP.AUTO-MCNC: 457 MG/DL (ref 65–100)
GLUCOSE BLD STRIP.AUTO-MCNC: 511 MG/DL (ref 65–100)
GLUCOSE BLD STRIP.AUTO-MCNC: 524 MG/DL (ref 65–100)
GLUCOSE BLD STRIP.AUTO-MCNC: 55 MG/DL (ref 65–100)
GLUCOSE BLD STRIP.AUTO-MCNC: 56 MG/DL (ref 65–100)
GLUCOSE BLD STRIP.AUTO-MCNC: 60 MG/DL (ref 65–100)
GLUCOSE BLD STRIP.AUTO-MCNC: 60 MG/DL (ref 65–100)
GLUCOSE BLD STRIP.AUTO-MCNC: 68 MG/DL (ref 65–100)
GLUCOSE BLD STRIP.AUTO-MCNC: 78 MG/DL (ref 65–100)
GLUCOSE BLD STRIP.AUTO-MCNC: 86 MG/DL (ref 65–100)
GLUCOSE BLD STRIP.AUTO-MCNC: 87 MG/DL (ref 65–100)
GLUCOSE BLD STRIP.AUTO-MCNC: 89 MG/DL (ref 65–100)
GLUCOSE BLD STRIP.AUTO-MCNC: 91 MG/DL (ref 65–100)
GLUCOSE BLD STRIP.AUTO-MCNC: 94 MG/DL (ref 65–100)
GLUCOSE BLD STRIP.AUTO-MCNC: 94 MG/DL (ref 65–100)
GLUCOSE BLD STRIP.AUTO-MCNC: 95 MG/DL (ref 65–100)
GLUCOSE BLD STRIP.AUTO-MCNC: 98 MG/DL (ref 65–100)
GLUCOSE BLD STRIP.AUTO-MCNC: >600 MG/DL (ref 65–100)
GLUCOSE SERPL-MCNC: 104 MG/DL (ref 65–100)
GLUCOSE SERPL-MCNC: 104 MG/DL (ref 65–100)
GLUCOSE SERPL-MCNC: 109 MG/DL (ref 65–100)
GLUCOSE SERPL-MCNC: 112 MG/DL (ref 65–100)
GLUCOSE SERPL-MCNC: 1243 MG/DL (ref 65–100)
GLUCOSE SERPL-MCNC: 137 MG/DL (ref 65–100)
GLUCOSE SERPL-MCNC: 151 MG/DL (ref 65–100)
GLUCOSE SERPL-MCNC: 155 MG/DL (ref 65–100)
GLUCOSE SERPL-MCNC: 155 MG/DL (ref 65–100)
GLUCOSE SERPL-MCNC: 163 MG/DL (ref 65–100)
GLUCOSE SERPL-MCNC: 170 MG/DL (ref 65–100)
GLUCOSE SERPL-MCNC: 176 MG/DL (ref 65–100)
GLUCOSE SERPL-MCNC: 183 MG/DL (ref 65–100)
GLUCOSE SERPL-MCNC: 189 MG/DL (ref 65–100)
GLUCOSE SERPL-MCNC: 190 MG/DL (ref 65–100)
GLUCOSE SERPL-MCNC: 194 MG/DL (ref 65–100)
GLUCOSE SERPL-MCNC: 199 MG/DL (ref 65–100)
GLUCOSE SERPL-MCNC: 208 MG/DL (ref 65–100)
GLUCOSE SERPL-MCNC: 212 MG/DL (ref 65–100)
GLUCOSE SERPL-MCNC: 217 MG/DL (ref 65–100)
GLUCOSE SERPL-MCNC: 243 MG/DL (ref 65–100)
GLUCOSE SERPL-MCNC: 244 MG/DL (ref 65–100)
GLUCOSE SERPL-MCNC: 263 MG/DL (ref 65–100)
GLUCOSE SERPL-MCNC: 266 MG/DL (ref 65–100)
GLUCOSE SERPL-MCNC: 267 MG/DL (ref 65–100)
GLUCOSE SERPL-MCNC: 277 MG/DL (ref 65–100)
GLUCOSE SERPL-MCNC: 278 MG/DL (ref 65–100)
GLUCOSE SERPL-MCNC: 325 MG/DL (ref 65–100)
GLUCOSE SERPL-MCNC: 332 MG/DL (ref 65–100)
GLUCOSE SERPL-MCNC: 397 MG/DL (ref 65–100)
GLUCOSE SERPL-MCNC: 51 MG/DL (ref 65–100)
GLUCOSE SERPL-MCNC: 592 MG/DL (ref 65–100)
GLUCOSE SERPL-MCNC: 644 MG/DL (ref 65–100)
GLUCOSE SERPL-MCNC: 65 MG/DL (ref 65–100)
GLUCOSE SERPL-MCNC: 74 MG/DL (ref 65–100)
GLUCOSE SERPL-MCNC: 772 MG/DL (ref 65–100)
GLUCOSE SERPL-MCNC: 89 MG/DL (ref 65–100)
GLUCOSE SERPL-MCNC: 926 MG/DL (ref 65–100)
GLUCOSE SERPL-MCNC: 98 MG/DL (ref 65–100)
GLUCOSE UR STRIP.AUTO-MCNC: 100 MG/DL
GLUCOSE UR STRIP.AUTO-MCNC: 100 MG/DL
GLUCOSE UR STRIP.AUTO-MCNC: >1000 MG/DL
GLUCOSE, GLC: 101 MG/DL
GLUCOSE, GLC: 112 MG/DL
GLUCOSE, GLC: 116 MG/DL
GLUCOSE, GLC: 117 MG/DL
GLUCOSE, GLC: 118 MG/DL
GLUCOSE, GLC: 124 MG/DL
GLUCOSE, GLC: 128 MG/DL
GLUCOSE, GLC: 132 MG/DL
GLUCOSE, GLC: 164 MG/DL
GLUCOSE, GLC: 176 MG/DL
GLUCOSE, GLC: 188 MG/DL
GLUCOSE, GLC: 228 MG/DL
GLUCOSE, GLC: 301 MG/DL
GLUCOSE, GLC: 305 MG/DL
GLUCOSE, GLC: 333 MG/DL
GLUCOSE, GLC: 356 MG/DL
GLUCOSE, GLC: 410 MG/DL
GLUCOSE, GLC: 42 MG/DL
GLUCOSE, GLC: 457 MG/DL
GLUCOSE, GLC: 511 MG/DL
GLUCOSE, GLC: 524 MG/DL
GLUCOSE, GLC: 60 MG/DL
GLUCOSE, GLC: 601 MG/DL
GLUCOSE, GLC: 68 MG/DL
GLUCOSE, GLC: 89 MG/DL
GLUCOSE, GLC: 98 MG/DL
GRAM STN SPEC: ABNORMAL
GRAM STN SPEC: ABNORMAL
HBA1C MFR BLD: 9.4 % (ref 4.8–6)
HCO3 BLD-SCNC: 16.1 MMOL/L (ref 22–26)
HCO3 BLD-SCNC: 6.7 MMOL/L (ref 22–26)
HCO3 BLD-SCNC: 9.2 MMOL/L (ref 22–26)
HCO3 BLDV-SCNC: 10.3 MMOL/L (ref 23–28)
HCO3 BLDV-SCNC: 7.2 MMOL/L (ref 23–28)
HCO3 BLDV-SCNC: 7.6 MMOL/L (ref 23–28)
HCT VFR BLD AUTO: 21.3 % (ref 41.1–50.3)
HCT VFR BLD AUTO: 23 % (ref 41.1–50.3)
HCT VFR BLD AUTO: 23.2 % (ref 41.1–50.3)
HCT VFR BLD AUTO: 23.5 % (ref 41.1–50.3)
HCT VFR BLD AUTO: 24.7 % (ref 41.1–50.3)
HCT VFR BLD AUTO: 24.8 % (ref 41.1–50.3)
HCT VFR BLD AUTO: 25 % (ref 41.1–50.3)
HCT VFR BLD AUTO: 25.4 % (ref 41.1–50.3)
HCT VFR BLD AUTO: 26 % (ref 41.1–50.3)
HCT VFR BLD AUTO: 26.1 % (ref 41.1–50.3)
HCT VFR BLD AUTO: 26.3 % (ref 41.1–50.3)
HCT VFR BLD AUTO: 27.1 % (ref 41.1–50.3)
HCT VFR BLD AUTO: 27.3 % (ref 41.1–50.3)
HCT VFR BLD AUTO: 28.1 % (ref 41.1–50.3)
HCT VFR BLD AUTO: 28.1 % (ref 41.1–50.3)
HCT VFR BLD AUTO: 29 % (ref 41.1–50.3)
HCT VFR BLD AUTO: 29.1 % (ref 41.1–50.3)
HCT VFR BLD AUTO: 29.2 % (ref 41.1–50.3)
HCT VFR BLD AUTO: 29.5 % (ref 41.1–50.3)
HCT VFR BLD AUTO: 30.6 % (ref 41.1–50.3)
HCT VFR BLD AUTO: 31.2 % (ref 41.1–50.3)
HCT VFR BLD AUTO: 31.2 % (ref 41.1–50.3)
HCT VFR BLD AUTO: 31.4 % (ref 41.1–50.3)
HCT VFR BLD AUTO: 32.2 % (ref 41.1–50.3)
HCT VFR BLD AUTO: 34.5 % (ref 41.1–50.3)
HCT VFR BLD AUTO: 35.1 % (ref 41.1–50.3)
HCT VFR BLD AUTO: 35.5 % (ref 41.1–50.3)
HGB BLD-MCNC: 10 G/DL (ref 13.6–17.2)
HGB BLD-MCNC: 10.7 G/DL (ref 13.6–17.2)
HGB BLD-MCNC: 10.8 G/DL (ref 13.6–17.2)
HGB BLD-MCNC: 6.1 G/DL (ref 13.6–17.2)
HGB BLD-MCNC: 6.4 G/DL (ref 13.6–17.2)
HGB BLD-MCNC: 7 G/DL (ref 13.6–17.2)
HGB BLD-MCNC: 7 G/DL (ref 13.6–17.2)
HGB BLD-MCNC: 7.2 G/DL (ref 13.6–17.2)
HGB BLD-MCNC: 7.3 G/DL (ref 13.6–17.2)
HGB BLD-MCNC: 7.4 G/DL (ref 13.6–17.2)
HGB BLD-MCNC: 7.4 G/DL (ref 13.6–17.2)
HGB BLD-MCNC: 7.5 G/DL (ref 13.6–17.2)
HGB BLD-MCNC: 7.6 G/DL (ref 13.6–17.2)
HGB BLD-MCNC: 7.6 G/DL (ref 13.6–17.2)
HGB BLD-MCNC: 7.7 G/DL (ref 13.6–17.2)
HGB BLD-MCNC: 7.8 G/DL (ref 13.6–17.2)
HGB BLD-MCNC: 8 G/DL (ref 13.6–17.2)
HGB BLD-MCNC: 8.1 G/DL (ref 13.6–17.2)
HGB BLD-MCNC: 8.2 G/DL (ref 13.6–17.2)
HGB BLD-MCNC: 8.3 G/DL (ref 13.6–17.2)
HGB BLD-MCNC: 8.4 G/DL (ref 13.6–17.2)
HGB BLD-MCNC: 8.5 G/DL (ref 13.6–17.2)
HGB BLD-MCNC: 8.5 G/DL (ref 13.6–17.2)
HGB BLD-MCNC: 8.6 G/DL (ref 13.6–17.2)
HGB BLD-MCNC: 8.8 G/DL (ref 13.6–17.2)
HGB BLD-MCNC: 8.9 G/DL (ref 13.6–17.2)
HGB BLD-MCNC: 8.9 G/DL (ref 13.6–17.2)
HGB BLD-MCNC: 9 G/DL (ref 13.6–17.2)
HGB BLD-MCNC: 9.3 G/DL (ref 13.6–17.2)
HGB BLD-MCNC: 9.3 G/DL (ref 13.6–17.2)
HGB BLD-MCNC: 9.4 G/DL (ref 13.6–17.2)
HGB BLD-MCNC: 9.5 G/DL (ref 13.6–17.2)
HGB BLD-MCNC: 9.5 G/DL (ref 13.6–17.2)
HGB BLD-MCNC: 9.7 G/DL (ref 13.6–17.2)
HGB BLD-MCNC: 9.8 G/DL (ref 13.6–17.2)
HGB UR QL STRIP: ABNORMAL
HGB UR QL STRIP: ABNORMAL
HGB UR QL STRIP: NEGATIVE
HIGH TARGET, HITG: 250 MG/DL
IMM GRANULOCYTES # BLD AUTO: 0 K/UL (ref 0–0.5)
IMM GRANULOCYTES # BLD AUTO: 0.1 K/UL (ref 0–0.5)
IMM GRANULOCYTES NFR BLD AUTO: 0 % (ref 0–5)
IMM GRANULOCYTES NFR BLD AUTO: 1 % (ref 0–5)
INSULIN ADMINSTERED, INSADM: 0 UNITS/HOUR
INSULIN ADMINSTERED, INSADM: 0.3 UNITS/HOUR
INSULIN ADMINSTERED, INSADM: 0.3 UNITS/HOUR
INSULIN ADMINSTERED, INSADM: 1 UNITS/HOUR
INSULIN ADMINSTERED, INSADM: 1 UNITS/HOUR
INSULIN ADMINSTERED, INSADM: 1.4 UNITS/HOUR
INSULIN ADMINSTERED, INSADM: 1.6 UNITS/HOUR
INSULIN ADMINSTERED, INSADM: 1.8 UNITS/HOUR
INSULIN ADMINSTERED, INSADM: 10.8 UNITS/HOUR
INSULIN ADMINSTERED, INSADM: 10.8 UNITS/HOUR
INSULIN ADMINSTERED, INSADM: 11.8 UNITS/HOUR
INSULIN ADMINSTERED, INSADM: 11.9 UNITS/HOUR
INSULIN ADMINSTERED, INSADM: 14 UNITS/HOUR
INSULIN ADMINSTERED, INSADM: 14.8 UNITS/HOUR
INSULIN ADMINSTERED, INSADM: 16.2 UNITS/HOUR
INSULIN ADMINSTERED, INSADM: 16.4 UNITS/HOUR
INSULIN ADMINSTERED, INSADM: 16.9 UNITS/HOUR
INSULIN ADMINSTERED, INSADM: 17.2 UNITS/HOUR
INSULIN ADMINSTERED, INSADM: 2.5 UNITS/HOUR
INSULIN ADMINSTERED, INSADM: 21.6 UNITS/HOUR
INSULIN ADMINSTERED, INSADM: 27.1 UNITS/HOUR
INSULIN ADMINSTERED, INSADM: 32.5 UNITS/HOUR
INSULIN ADMINSTERED, INSADM: 32.5 UNITS/HOUR
INSULIN ADMINSTERED, INSADM: 37.9 UNITS/HOUR
INSULIN ADMINSTERED, INSADM: 4 UNITS/HOUR
INSULIN ADMINSTERED, INSADM: 7.3 UNITS/HOUR
INSULIN ADMINSTERED, INSADM: 8.1 UNITS/HOUR
INSULIN ADMINSTERED, INSADM: 9 UNITS/HOUR
INSULIN ADMINSTERED, INSADM: 9 UNITS/HOUR
INSULIN ORDER, INSORD: 0 UNITS/HOUR
INSULIN ORDER, INSORD: 0.3 UNITS/HOUR
INSULIN ORDER, INSORD: 0.3 UNITS/HOUR
INSULIN ORDER, INSORD: 1 UNITS/HOUR
INSULIN ORDER, INSORD: 1 UNITS/HOUR
INSULIN ORDER, INSORD: 1.4 UNITS/HOUR
INSULIN ORDER, INSORD: 1.6 UNITS/HOUR
INSULIN ORDER, INSORD: 1.8 UNITS/HOUR
INSULIN ORDER, INSORD: 10.8 UNITS/HOUR
INSULIN ORDER, INSORD: 10.8 UNITS/HOUR
INSULIN ORDER, INSORD: 11.8 UNITS/HOUR
INSULIN ORDER, INSORD: 11.9 UNITS/HOUR
INSULIN ORDER, INSORD: 14 UNITS/HOUR
INSULIN ORDER, INSORD: 14.8 UNITS/HOUR
INSULIN ORDER, INSORD: 16.2 UNITS/HOUR
INSULIN ORDER, INSORD: 16.4 UNITS/HOUR
INSULIN ORDER, INSORD: 16.9 UNITS/HOUR
INSULIN ORDER, INSORD: 17.2 UNITS/HOUR
INSULIN ORDER, INSORD: 2.5 UNITS/HOUR
INSULIN ORDER, INSORD: 21.6 UNITS/HOUR
INSULIN ORDER, INSORD: 27.1 UNITS/HOUR
INSULIN ORDER, INSORD: 32.5 UNITS/HOUR
INSULIN ORDER, INSORD: 32.5 UNITS/HOUR
INSULIN ORDER, INSORD: 37.9 UNITS/HOUR
INSULIN ORDER, INSORD: 4 UNITS/HOUR
INSULIN ORDER, INSORD: 7.3 UNITS/HOUR
INSULIN ORDER, INSORD: 8.1 UNITS/HOUR
INSULIN ORDER, INSORD: 9 UNITS/HOUR
INSULIN ORDER, INSORD: 9 UNITS/HOUR
IRON SATN MFR SERPL: 16 %
IRON SATN MFR SERPL: 28 %
IRON SATN MFR SERPL: 32 %
IRON SERPL-MCNC: 26 UG/DL (ref 35–150)
IRON SERPL-MCNC: 41 UG/DL (ref 35–150)
IRON SERPL-MCNC: 54 UG/DL (ref 35–150)
IRON SERPL-MCNC: 54 UG/DL (ref 35–150)
IRON SERPL-MCNC: 57 UG/DL (ref 35–150)
KETONES UR QL STRIP.AUTO: ABNORMAL MG/DL
KETONES UR QL STRIP.AUTO: NEGATIVE MG/DL
KETONES UR QL STRIP.AUTO: NEGATIVE MG/DL
LACTATE SERPL-SCNC: 1.7 MMOL/L (ref 0.4–2)
LACTATE SERPL-SCNC: 1.9 MMOL/L (ref 0.4–2)
LACTATE SERPL-SCNC: 2.4 MMOL/L (ref 0.4–2)
LACTATE SERPL-SCNC: 2.9 MMOL/L (ref 0.4–2)
LACTATE SERPL-SCNC: 4.4 MMOL/L (ref 0.4–2)
LACTATE SERPL-SCNC: 9.7 MMOL/L (ref 0.4–2)
LEUKOCYTE ESTERASE UR QL STRIP.AUTO: ABNORMAL
LEUKOCYTE ESTERASE UR QL STRIP.AUTO: ABNORMAL
LEUKOCYTE ESTERASE UR QL STRIP.AUTO: NEGATIVE
LOW TARGET, LOT: 150 MG/DL
LYMPHOCYTES # BLD: 0.5 K/UL (ref 0.5–4.6)
LYMPHOCYTES # BLD: 0.7 K/UL (ref 0.5–4.6)
LYMPHOCYTES # BLD: 0.7 K/UL (ref 0.5–4.6)
LYMPHOCYTES # BLD: 0.9 K/UL (ref 0.5–4.6)
LYMPHOCYTES # BLD: 1 K/UL (ref 0.5–4.6)
LYMPHOCYTES # BLD: 1.2 K/UL (ref 0.5–4.6)
LYMPHOCYTES # BLD: 1.3 K/UL (ref 0.5–4.6)
LYMPHOCYTES # BLD: 1.4 K/UL (ref 0.5–4.6)
LYMPHOCYTES # BLD: 1.6 K/UL (ref 0.5–4.6)
LYMPHOCYTES # BLD: 1.7 K/UL (ref 0.5–4.6)
LYMPHOCYTES # BLD: 1.9 K/UL (ref 0.5–4.6)
LYMPHOCYTES # BLD: 2 K/UL (ref 0.5–4.6)
LYMPHOCYTES # BLD: 2.3 K/UL (ref 0.5–4.6)
LYMPHOCYTES NFR BLD: 11 % (ref 13–44)
LYMPHOCYTES NFR BLD: 12 % (ref 13–44)
LYMPHOCYTES NFR BLD: 12 % (ref 13–44)
LYMPHOCYTES NFR BLD: 16 % (ref 13–44)
LYMPHOCYTES NFR BLD: 18 % (ref 13–44)
LYMPHOCYTES NFR BLD: 20 % (ref 13–44)
LYMPHOCYTES NFR BLD: 20 % (ref 13–44)
LYMPHOCYTES NFR BLD: 21 % (ref 13–44)
LYMPHOCYTES NFR BLD: 23 % (ref 13–44)
LYMPHOCYTES NFR BLD: 25 % (ref 13–44)
LYMPHOCYTES NFR BLD: 26 % (ref 13–44)
LYMPHOCYTES NFR BLD: 26 % (ref 13–44)
LYMPHOCYTES NFR BLD: 27 % (ref 13–44)
LYMPHOCYTES NFR BLD: 33 % (ref 13–44)
LYMPHOCYTES NFR BLD: 71 % (ref 13–44)
Lab: NORMAL
MAGNESIUM SERPL-MCNC: 1.4 MG/DL (ref 1.8–2.4)
MAGNESIUM SERPL-MCNC: 1.4 MG/DL (ref 1.8–2.4)
MAGNESIUM SERPL-MCNC: 1.5 MG/DL (ref 1.8–2.4)
MAGNESIUM SERPL-MCNC: 1.7 MG/DL (ref 1.8–2.4)
MAGNESIUM SERPL-MCNC: 1.7 MG/DL (ref 1.8–2.4)
MAGNESIUM SERPL-MCNC: 1.9 MG/DL (ref 1.8–2.4)
MAGNESIUM SERPL-MCNC: 2.1 MG/DL (ref 1.8–2.4)
MAGNESIUM SERPL-MCNC: 2.1 MG/DL (ref 1.8–2.4)
MAGNESIUM SERPL-MCNC: 2.2 MG/DL (ref 1.8–2.4)
MAGNESIUM SERPL-MCNC: 2.4 MG/DL (ref 1.8–2.4)
MAGNESIUM SERPL-MCNC: 2.5 MG/DL (ref 1.8–2.4)
MAGNESIUM SERPL-MCNC: 2.5 MG/DL (ref 1.8–2.4)
MCH RBC QN AUTO: 24.8 PG (ref 26.1–32.9)
MCH RBC QN AUTO: 24.8 PG (ref 26.1–32.9)
MCH RBC QN AUTO: 24.9 PG (ref 26.1–32.9)
MCH RBC QN AUTO: 24.9 PG (ref 26.1–32.9)
MCH RBC QN AUTO: 25 PG (ref 26.1–32.9)
MCH RBC QN AUTO: 25.3 PG (ref 26.1–32.9)
MCH RBC QN AUTO: 25.4 PG (ref 26.1–32.9)
MCH RBC QN AUTO: 25.5 PG (ref 26.1–32.9)
MCH RBC QN AUTO: 25.7 PG (ref 26.1–32.9)
MCH RBC QN AUTO: 25.9 PG (ref 26.1–32.9)
MCH RBC QN AUTO: 26.1 PG (ref 26.1–32.9)
MCH RBC QN AUTO: 26.2 PG (ref 26.1–32.9)
MCH RBC QN AUTO: 26.2 PG (ref 26.1–32.9)
MCHC RBC AUTO-ENTMCNC: 27.4 G/DL (ref 31.4–35)
MCHC RBC AUTO-ENTMCNC: 28 G/DL (ref 31.4–35)
MCHC RBC AUTO-ENTMCNC: 28.6 G/DL (ref 31.4–35)
MCHC RBC AUTO-ENTMCNC: 29 G/DL (ref 31.4–35)
MCHC RBC AUTO-ENTMCNC: 29.1 G/DL (ref 31.4–35)
MCHC RBC AUTO-ENTMCNC: 29.2 G/DL (ref 31.4–35)
MCHC RBC AUTO-ENTMCNC: 29.8 G/DL (ref 31.4–35)
MCHC RBC AUTO-ENTMCNC: 30.3 G/DL (ref 31.4–35)
MCHC RBC AUTO-ENTMCNC: 30.4 G/DL (ref 31.4–35)
MCHC RBC AUTO-ENTMCNC: 30.6 G/DL (ref 31.4–35)
MCHC RBC AUTO-ENTMCNC: 30.7 G/DL (ref 31.4–35)
MCHC RBC AUTO-ENTMCNC: 30.8 G/DL (ref 31.4–35)
MCHC RBC AUTO-ENTMCNC: 30.9 G/DL (ref 31.4–35)
MCHC RBC AUTO-ENTMCNC: 32.3 G/DL (ref 31.4–35)
MCV RBC AUTO: 77 FL (ref 79.6–97.8)
MCV RBC AUTO: 82.4 FL (ref 79.6–97.8)
MCV RBC AUTO: 83.3 FL (ref 79.6–97.8)
MCV RBC AUTO: 83.6 FL (ref 79.6–97.8)
MCV RBC AUTO: 83.8 FL (ref 79.6–97.8)
MCV RBC AUTO: 83.8 FL (ref 79.6–97.8)
MCV RBC AUTO: 84.1 FL (ref 79.6–97.8)
MCV RBC AUTO: 84.2 FL (ref 79.6–97.8)
MCV RBC AUTO: 84.6 FL (ref 79.6–97.8)
MCV RBC AUTO: 84.6 FL (ref 79.6–97.8)
MCV RBC AUTO: 85.5 FL (ref 79.6–97.8)
MCV RBC AUTO: 85.5 FL (ref 79.6–97.8)
MCV RBC AUTO: 85.7 FL (ref 79.6–97.8)
MCV RBC AUTO: 86.3 FL (ref 79.6–97.8)
MCV RBC AUTO: 86.6 FL (ref 79.6–97.8)
MCV RBC AUTO: 87 FL (ref 79.6–97.8)
MCV RBC AUTO: 88.8 FL (ref 79.6–97.8)
MCV RBC AUTO: 90.3 FL (ref 79.6–97.8)
MCV RBC AUTO: 91.2 FL (ref 79.6–97.8)
MINUTES UNTIL NEXT BG, NBG: 15 MIN
MINUTES UNTIL NEXT BG, NBG: 60 MIN
MM INDURATION POC: 0 MM (ref 0–5)
MONOCYTES # BLD: 0 K/UL (ref 0.1–1.3)
MONOCYTES # BLD: 0.1 K/UL (ref 0.1–1.3)
MONOCYTES # BLD: 0.4 K/UL (ref 0.1–1.3)
MONOCYTES # BLD: 0.4 K/UL (ref 0.1–1.3)
MONOCYTES # BLD: 0.5 K/UL (ref 0.1–1.3)
MONOCYTES # BLD: 0.6 K/UL (ref 0.1–1.3)
MONOCYTES # BLD: 0.6 K/UL (ref 0.1–1.3)
MONOCYTES # BLD: 0.7 K/UL (ref 0.1–1.3)
MONOCYTES # BLD: 0.8 K/UL (ref 0.1–1.3)
MONOCYTES # BLD: 0.8 K/UL (ref 0.1–1.3)
MONOCYTES NFR BLD: 1 % (ref 4–12)
MONOCYTES NFR BLD: 10 % (ref 4–12)
MONOCYTES NFR BLD: 10 % (ref 4–12)
MONOCYTES NFR BLD: 11 % (ref 4–12)
MONOCYTES NFR BLD: 12 % (ref 4–12)
MONOCYTES NFR BLD: 15 % (ref 4–12)
MONOCYTES NFR BLD: 4 % (ref 4–12)
MONOCYTES NFR BLD: 7 % (ref 4–12)
MONOCYTES NFR BLD: 8 % (ref 4–12)
MONOCYTES NFR BLD: 9 % (ref 4–12)
MULTIPLIER, MUL: 0
MULTIPLIER, MUL: 0.01
MULTIPLIER, MUL: 0.02
MULTIPLIER, MUL: 0.03
MULTIPLIER, MUL: 0.04
MULTIPLIER, MUL: 0.05
MULTIPLIER, MUL: 0.05
MULTIPLIER, MUL: 0.06
MULTIPLIER, MUL: 0.07
NEUTS SEG # BLD: 0.9 K/UL (ref 1.7–8.2)
NEUTS SEG # BLD: 2.4 K/UL (ref 1.7–8.2)
NEUTS SEG # BLD: 2.8 K/UL (ref 1.7–8.2)
NEUTS SEG # BLD: 2.9 K/UL (ref 1.7–8.2)
NEUTS SEG # BLD: 2.9 K/UL (ref 1.7–8.2)
NEUTS SEG # BLD: 3 K/UL (ref 1.7–8.2)
NEUTS SEG # BLD: 3.1 K/UL (ref 1.7–8.2)
NEUTS SEG # BLD: 4.4 K/UL (ref 1.7–8.2)
NEUTS SEG # BLD: 4.8 K/UL (ref 1.7–8.2)
NEUTS SEG # BLD: 4.8 K/UL (ref 1.7–8.2)
NEUTS SEG # BLD: 4.9 K/UL (ref 1.7–8.2)
NEUTS SEG # BLD: 6.2 K/UL (ref 1.7–8.2)
NEUTS SEG # BLD: 7.1 K/UL (ref 1.7–8.2)
NEUTS SEG NFR BLD: 27 % (ref 43–78)
NEUTS SEG NFR BLD: 51 % (ref 43–78)
NEUTS SEG NFR BLD: 54 % (ref 43–78)
NEUTS SEG NFR BLD: 59 % (ref 43–78)
NEUTS SEG NFR BLD: 61 % (ref 43–78)
NEUTS SEG NFR BLD: 61 % (ref 43–78)
NEUTS SEG NFR BLD: 66 % (ref 43–78)
NEUTS SEG NFR BLD: 67 % (ref 43–78)
NEUTS SEG NFR BLD: 74 % (ref 43–78)
NEUTS SEG NFR BLD: 74 % (ref 43–78)
NEUTS SEG NFR BLD: 77 % (ref 43–78)
NEUTS SEG NFR BLD: 79 % (ref 43–78)
NEUTS SEG NFR BLD: 80 % (ref 43–78)
NITRITE UR QL STRIP.AUTO: NEGATIVE
NITRITE UR QL STRIP.AUTO: NEGATIVE
NITRITE UR QL STRIP.AUTO: POSITIVE
NRBC # BLD: 0 K/UL
NRBC # BLD: 0 K/UL (ref 0–0.2)
NRBC # BLD: 0.02 K/UL (ref 0–0.2)
NRBC # BLD: 0.03 K/UL (ref 0–0.2)
NRBC # BLD: 0.03 K/UL (ref 0–0.2)
O2/TOTAL GAS SETTING VFR VENT: 0.21 %
O2/TOTAL GAS SETTING VFR VENT: 100 %
ORDER INITIALS, ORDINIT: NORMAL
OTHER OBSERVATIONS,UCOM: ABNORMAL
P-R INTERVAL, ECG05: 212 MS
P-R INTERVAL, ECG05: 224 MS
P-R INTERVAL, ECG05: 234 MS
PCO2 BLD: 20.9 MMHG (ref 35–45)
PCO2 BLD: 30 MMHG (ref 35–45)
PCO2 BLD: 52.5 MMHG (ref 35–45)
PCO2 BLDV: 17.2 MMHG (ref 41–51)
PCO2 BLDV: 21.3 MMHG (ref 41–51)
PCO2 BLDV: 26.9 MMHG (ref 41–51)
PH BLD: 6.71 [PH] (ref 7.35–7.45)
PH BLD: 7.25 [PH] (ref 7.35–7.45)
PH BLD: 7.34 [PH] (ref 7.35–7.45)
PH BLDV: 7.16 [PH] (ref 7.32–7.42)
PH BLDV: 7.19 [PH] (ref 7.32–7.42)
PH BLDV: 7.23 [PH] (ref 7.32–7.42)
PH UR STRIP: 5 [PH] (ref 5–9)
PH UR STRIP: 5 [PH] (ref 5–9)
PH UR STRIP: 5.5 [PH] (ref 5–9)
PHOSPHATE SERPL-MCNC: 4.5 MG/DL (ref 2.3–3.7)
PHOSPHATE SERPL-MCNC: 4.6 MG/DL (ref 2.3–3.7)
PHOSPHATE SERPL-MCNC: 4.6 MG/DL (ref 2.3–3.7)
PHOSPHATE SERPL-MCNC: 4.7 MG/DL (ref 2.3–3.7)
PHOSPHATE SERPL-MCNC: 4.8 MG/DL (ref 2.3–3.7)
PHOSPHATE SERPL-MCNC: 5.3 MG/DL (ref 2.3–3.7)
PHOSPHATE SERPL-MCNC: 5.9 MG/DL (ref 2.3–3.7)
PHOSPHATE SERPL-MCNC: 5.9 MG/DL (ref 2.3–3.7)
PHOSPHATE SERPL-MCNC: 6 MG/DL (ref 2.3–3.7)
PHOSPHATE SERPL-MCNC: 6.2 MG/DL (ref 2.3–3.7)
PHOSPHATE SERPL-MCNC: 6.2 MG/DL (ref 2.3–3.7)
PHOSPHATE SERPL-MCNC: 6.9 MG/DL (ref 2.3–3.7)
PHOSPHATE SERPL-MCNC: 7.1 MG/DL (ref 2.3–3.7)
PHOSPHATE SERPL-MCNC: 8.4 MG/DL (ref 2.3–3.7)
PHOSPHATE SERPL-MCNC: 8.8 MG/DL (ref 2.3–3.7)
PLATELET # BLD AUTO: 101 K/UL (ref 150–450)
PLATELET # BLD AUTO: 133 K/UL (ref 150–450)
PLATELET # BLD AUTO: 134 K/UL (ref 150–450)
PLATELET # BLD AUTO: 143 K/UL (ref 150–450)
PLATELET # BLD AUTO: 144 K/UL (ref 150–450)
PLATELET # BLD AUTO: 150 K/UL (ref 150–450)
PLATELET # BLD AUTO: 171 K/UL (ref 150–450)
PLATELET # BLD AUTO: 181 K/UL (ref 150–450)
PLATELET # BLD AUTO: 184 K/UL (ref 150–450)
PLATELET # BLD AUTO: 187 K/UL (ref 150–450)
PLATELET # BLD AUTO: 188 K/UL (ref 150–450)
PLATELET # BLD AUTO: 188 K/UL (ref 150–450)
PLATELET # BLD AUTO: 191 K/UL (ref 150–450)
PLATELET # BLD AUTO: 205 K/UL (ref 150–450)
PLATELET # BLD AUTO: 233 K/UL (ref 150–450)
PLATELET # BLD AUTO: 82 K/UL (ref 150–450)
PLATELET # BLD AUTO: 83 K/UL (ref 150–450)
PLATELET # BLD AUTO: 88 K/UL
PLATELET # BLD AUTO: 95 K/UL (ref 150–450)
PLATELET COMMENTS,PCOM: ABNORMAL
PMV BLD AUTO: 10.6 FL (ref 9.4–12.3)
PMV BLD AUTO: 10.7 FL (ref 9.4–12.3)
PMV BLD AUTO: 11.4 FL (ref 9.4–12.3)
PMV BLD AUTO: 11.4 FL (ref 9.4–12.3)
PMV BLD AUTO: 11.6 FL (ref 9.4–12.3)
PMV BLD AUTO: 11.8 FL (ref 9.4–12.3)
PMV BLD AUTO: 11.9 FL (ref 9.4–12.3)
PMV BLD AUTO: 12 FL (ref 9.4–12.3)
PMV BLD AUTO: 12.2 FL (ref 9.4–12.3)
PMV BLD AUTO: 12.5 FL (ref 9.4–12.3)
PMV BLD AUTO: 12.7 FL (ref 9.4–12.3)
PMV BLD AUTO: 12.8 FL (ref 9.4–12.3)
PMV BLD AUTO: 13.7 FL (ref 9.4–12.3)
PMV BLD AUTO: ABNORMAL FL (ref 9.4–12.3)
PO2 BLD: 105 MMHG (ref 75–100)
PO2 BLD: 106 MMHG (ref 75–100)
PO2 BLD: 81 MMHG (ref 75–100)
PO2 BLDV: 26 MMHG
PO2 BLDV: 47 MMHG
PO2 BLDV: 58 MMHG
POTASSIUM BLD-SCNC: 5.4 MMOL/L (ref 3.5–5.1)
POTASSIUM SERPL-SCNC: 3 MMOL/L (ref 3.5–5.1)
POTASSIUM SERPL-SCNC: 3.1 MMOL/L (ref 3.5–5.1)
POTASSIUM SERPL-SCNC: 3.2 MMOL/L (ref 3.5–5.1)
POTASSIUM SERPL-SCNC: 3.3 MMOL/L (ref 3.5–5.1)
POTASSIUM SERPL-SCNC: 3.3 MMOL/L (ref 3.5–5.1)
POTASSIUM SERPL-SCNC: 3.4 MMOL/L (ref 3.5–5.1)
POTASSIUM SERPL-SCNC: 3.4 MMOL/L (ref 3.5–5.1)
POTASSIUM SERPL-SCNC: 3.5 MMOL/L (ref 3.5–5.1)
POTASSIUM SERPL-SCNC: 3.6 MMOL/L (ref 3.5–5.1)
POTASSIUM SERPL-SCNC: 3.7 MMOL/L (ref 3.5–5.1)
POTASSIUM SERPL-SCNC: 3.7 MMOL/L (ref 3.5–5.1)
POTASSIUM SERPL-SCNC: 3.8 MMOL/L (ref 3.5–5.1)
POTASSIUM SERPL-SCNC: 4 MMOL/L (ref 3.5–5.1)
POTASSIUM SERPL-SCNC: 4.1 MMOL/L (ref 3.5–5.1)
POTASSIUM SERPL-SCNC: 4.2 MMOL/L (ref 3.5–5.1)
POTASSIUM SERPL-SCNC: 4.2 MMOL/L (ref 3.5–5.1)
POTASSIUM SERPL-SCNC: 4.3 MMOL/L (ref 3.5–5.1)
POTASSIUM SERPL-SCNC: 4.4 MMOL/L (ref 3.5–5.1)
POTASSIUM SERPL-SCNC: 4.6 MMOL/L (ref 3.5–5.1)
POTASSIUM SERPL-SCNC: 4.7 MMOL/L (ref 3.5–5.1)
POTASSIUM SERPL-SCNC: 5 MMOL/L (ref 3.5–5.1)
POTASSIUM SERPL-SCNC: 5.2 MMOL/L (ref 3.5–5.1)
POTASSIUM SERPL-SCNC: 5.6 MMOL/L (ref 3.5–5.1)
POTASSIUM SERPL-SCNC: 6 MMOL/L (ref 3.5–5.1)
PPD POC: NEGATIVE NEGATIVE
PROCALCITONIN SERPL-MCNC: 0.54 NG/ML
PROCALCITONIN SERPL-MCNC: 36.85 NG/ML
PROT SERPL-MCNC: 4.3 G/DL (ref 6.3–8.2)
PROT SERPL-MCNC: 5.5 G/DL (ref 6.3–8.2)
PROT SERPL-MCNC: 5.5 G/DL (ref 6.3–8.2)
PROT SERPL-MCNC: 5.9 G/DL (ref 6.3–8.2)
PROT SERPL-MCNC: 6.5 G/DL (ref 6.3–8.2)
PROT SERPL-MCNC: 6.6 G/DL (ref 6.3–8.2)
PROT SERPL-MCNC: 6.6 G/DL (ref 6.3–8.2)
PROT SERPL-MCNC: 6.7 G/DL (ref 6.3–8.2)
PROT SERPL-MCNC: 7.3 G/DL (ref 6.3–8.2)
PROT UR STRIP-MCNC: 100 MG/DL
PROT UR STRIP-MCNC: 100 MG/DL
PROT UR STRIP-MCNC: 300 MG/DL
PROT UR-MCNC: 223 MG/DL
PROT/CREAT UR-RTO: 4.8
PTH-INTACT SERPL-MCNC: 200.5 PG/ML (ref 18.5–88)
PTH-INTACT SERPL-MCNC: 206.1 PG/ML (ref 18.5–88)
Q-T INTERVAL, ECG07: 422 MS
Q-T INTERVAL, ECG07: 444 MS
Q-T INTERVAL, ECG07: 444 MS
Q-T INTERVAL, ECG07: 462 MS
Q-T INTERVAL, ECG07: 482 MS
Q-T INTERVAL, ECG07: 496 MS
QRS DURATION, ECG06: 100 MS
QRS DURATION, ECG06: 102 MS
QRS DURATION, ECG06: 84 MS
QRS DURATION, ECG06: 86 MS
QRS DURATION, ECG06: 88 MS
QRS DURATION, ECG06: 98 MS
QTC CALCULATION (BEZET), ECG08: 454 MS
QTC CALCULATION (BEZET), ECG08: 483 MS
QTC CALCULATION (BEZET), ECG08: 495 MS
QTC CALCULATION (BEZET), ECG08: 507 MS
QTC CALCULATION (BEZET), ECG08: 512 MS
QTC CALCULATION (BEZET), ECG08: 545 MS
RBC # BLD AUTO: 2.46 M/UL (ref 4.23–5.6)
RBC # BLD AUTO: 2.72 M/UL (ref 4.23–5.6)
RBC # BLD AUTO: 2.9 M/UL (ref 4.23–5.6)
RBC # BLD AUTO: 2.92 M/UL (ref 4.23–5.6)
RBC # BLD AUTO: 2.94 M/UL (ref 4.23–5.6)
RBC # BLD AUTO: 2.99 M/UL (ref 4.23–5.6)
RBC # BLD AUTO: 3.03 M/UL
RBC # BLD AUTO: 3.05 M/UL (ref 4.23–5.6)
RBC # BLD AUTO: 3.08 M/UL (ref 4.23–5.6)
RBC # BLD AUTO: 3.14 M/UL (ref 4.23–5.6)
RBC # BLD AUTO: 3.22 M/UL (ref 4.23–5.6)
RBC # BLD AUTO: 3.28 M/UL (ref 4.23–5.6)
RBC # BLD AUTO: 3.47 M/UL (ref 4.23–5.6)
RBC # BLD AUTO: 3.48 M/UL (ref 4.23–5.6)
RBC # BLD AUTO: 3.53 M/UL (ref 4.23–5.6)
RBC # BLD AUTO: 3.58 M/UL (ref 4.23–5.6)
RBC # BLD AUTO: 3.64 M/UL (ref 4.23–5.6)
RBC # BLD AUTO: 3.82 M/UL (ref 4.23–5.6)
RBC # BLD AUTO: 3.85 M/UL (ref 4.23–5.6)
RBC #/AREA URNS HPF: ABNORMAL /HPF
RBC #/AREA URNS HPF: NORMAL /HPF
RBC MORPH BLD: ABNORMAL
RBC MORPH BLD: ABNORMAL
REFERENCE LAB,REFLB: NORMAL
SAO2 % BLD: 87 % (ref 95–98)
SAO2 % BLD: 95 % (ref 95–98)
SAO2 % BLD: 97 % (ref 95–98)
SAO2 % BLDV: 37 % (ref 65–88)
SAO2 % BLDV: 76 % (ref 65–88)
SAO2 % BLDV: 83 % (ref 65–88)
SARS COV-2, XPGCVT: NEGATIVE
SERVICE CMNT-IMP: ABNORMAL
SERVICE CMNT-IMP: NORMAL
SERVICE CMNT-IMP: NORMAL
SODIUM BLD-SCNC: 140 MMOL/L (ref 136–145)
SODIUM SERPL-SCNC: 128 MMOL/L (ref 136–145)
SODIUM SERPL-SCNC: 135 MMOL/L (ref 136–145)
SODIUM SERPL-SCNC: 138 MMOL/L (ref 136–145)
SODIUM SERPL-SCNC: 139 MMOL/L (ref 136–145)
SODIUM SERPL-SCNC: 140 MMOL/L (ref 136–145)
SODIUM SERPL-SCNC: 140 MMOL/L (ref 136–145)
SODIUM SERPL-SCNC: 143 MMOL/L (ref 136–145)
SODIUM SERPL-SCNC: 143 MMOL/L (ref 136–145)
SODIUM SERPL-SCNC: 144 MMOL/L (ref 136–145)
SODIUM SERPL-SCNC: 144 MMOL/L (ref 136–145)
SODIUM SERPL-SCNC: 145 MMOL/L (ref 136–145)
SODIUM SERPL-SCNC: 146 MMOL/L (ref 136–145)
SODIUM SERPL-SCNC: 148 MMOL/L (ref 136–145)
SODIUM SERPL-SCNC: 149 MMOL/L (ref 136–145)
SODIUM SERPL-SCNC: 149 MMOL/L (ref 136–145)
SODIUM SERPL-SCNC: 150 MMOL/L (ref 136–145)
SODIUM SERPL-SCNC: 151 MMOL/L (ref 136–145)
SODIUM SERPL-SCNC: 152 MMOL/L (ref 136–145)
SODIUM SERPL-SCNC: 152 MMOL/L (ref 136–145)
SODIUM SERPL-SCNC: 153 MMOL/L (ref 136–145)
SODIUM SERPL-SCNC: 153 MMOL/L (ref 136–145)
SODIUM SERPL-SCNC: 154 MMOL/L (ref 136–145)
SODIUM SERPL-SCNC: 155 MMOL/L (ref 136–145)
SODIUM SERPL-SCNC: 156 MMOL/L (ref 136–145)
SODIUM SERPL-SCNC: 157 MMOL/L (ref 136–145)
SODIUM SERPL-SCNC: 157 MMOL/L (ref 136–145)
SODIUM SERPL-SCNC: 158 MMOL/L (ref 136–145)
SODIUM SERPL-SCNC: 162 MMOL/L (ref 136–145)
SODIUM UR-SCNC: 101 MMOL/L
SOURCE, COVRS: NORMAL
SP GR UR REFRACTOMETRY: 1.01 (ref 1–1.02)
SP GR UR REFRACTOMETRY: 1.01 (ref 1–1.02)
SP GR UR REFRACTOMETRY: 1.02 (ref 1–1.02)
SPECIMEN EXP DATE BLD: NORMAL
SPECIMEN EXP DATE BLD: NORMAL
SPECIMEN TYPE: ABNORMAL
STATUS OF UNIT,%ST: NORMAL
T4 FREE SERPL-MCNC: 1 NG/DL (ref 0.78–1.46)
TEST DESCRIPTION:,ATST: NORMAL
TIBC SERPL-MCNC: 130 UG/DL (ref 250–450)
TIBC SERPL-MCNC: 162 UG/DL (ref 250–450)
TIBC SERPL-MCNC: 196 UG/DL (ref 250–450)
TRANSFERRIN SERPL-MCNC: 121 MG/DL (ref 202–364)
TROPONIN-HIGH SENSITIVITY: 132.3 PG/ML (ref 0–14)
TROPONIN-HIGH SENSITIVITY: 33.6 PG/ML (ref 0–14)
TSH SERPL DL<=0.005 MIU/L-ACNC: 2.5 UIU/ML (ref 0.36–3.74)
UNIT DIVISION, %UDIV: 0
UROBILINOGEN UR QL STRIP.AUTO: 0.2 EU/DL (ref 0.2–1)
VENTILATION MODE VENT: ABNORMAL
VENTRICULAR RATE, ECG03: 63 BPM
VENTRICULAR RATE, ECG03: 63 BPM
VENTRICULAR RATE, ECG03: 69 BPM
VENTRICULAR RATE, ECG03: 77 BPM
VENTRICULAR RATE, ECG03: 79 BPM
VENTRICULAR RATE, ECG03: 80 BPM
VT SETTING VENT: 450 ML
WBC # BLD AUTO: 3.2 K/UL (ref 4.3–11.1)
WBC # BLD AUTO: 3.7 K/UL (ref 4.3–11.1)
WBC # BLD AUTO: 3.7 K/UL (ref 4.3–11.1)
WBC # BLD AUTO: 4 K/UL (ref 4.3–11.1)
WBC # BLD AUTO: 4.2 K/UL (ref 4.3–11.1)
WBC # BLD AUTO: 4.7 K/UL (ref 4.3–11.1)
WBC # BLD AUTO: 4.7 K/UL (ref 4.3–11.1)
WBC # BLD AUTO: 4.8 K/UL (ref 4.3–11.1)
WBC # BLD AUTO: 4.9 K/UL (ref 4.3–11.1)
WBC # BLD AUTO: 5.3 K/UL (ref 4.3–11.1)
WBC # BLD AUTO: 5.5 K/UL (ref 4.3–11.1)
WBC # BLD AUTO: 5.9 K/UL (ref 4.3–11.1)
WBC # BLD AUTO: 6.1 K/UL (ref 4.3–11.1)
WBC # BLD AUTO: 6.1 K/UL (ref 4.3–11.1)
WBC # BLD AUTO: 7.3 K/UL (ref 4.3–11.1)
WBC # BLD AUTO: 7.4 K/UL (ref 4.3–11.1)
WBC # BLD AUTO: 8.1 K/UL (ref 4.3–11.1)
WBC # BLD AUTO: 8.8 K/UL (ref 4.3–11.1)
WBC # BLD AUTO: 9.3 K/UL (ref 4.3–11.1)
WBC MORPH BLD: ABNORMAL
WBC URNS QL MICRO: >100 /HPF
WBC URNS QL MICRO: ABNORMAL /HPF
WBC URNS QL MICRO: ABNORMAL /HPF
WBC URNS QL MICRO: NORMAL /HPF

## 2020-01-01 PROCEDURE — A6454 SELF-ADHER BAND W>=3" <5"/YD: HCPCS

## 2020-01-01 PROCEDURE — 82803 BLOOD GASES ANY COMBINATION: CPT

## 2020-01-01 PROCEDURE — 84100 ASSAY OF PHOSPHORUS: CPT

## 2020-01-01 PROCEDURE — 36415 COLL VENOUS BLD VENIPUNCTURE: CPT

## 2020-01-01 PROCEDURE — 74011250636 HC RX REV CODE- 250/636: Performed by: INTERNAL MEDICINE

## 2020-01-01 PROCEDURE — 96365 THER/PROPH/DIAG IV INF INIT: CPT

## 2020-01-01 PROCEDURE — 82962 GLUCOSE BLOOD TEST: CPT

## 2020-01-01 PROCEDURE — 85025 COMPLETE CBC W/AUTO DIFF WBC: CPT

## 2020-01-01 PROCEDURE — 74011250636 HC RX REV CODE- 250/636: Performed by: NURSE PRACTITIONER

## 2020-01-01 PROCEDURE — 74011250636 HC RX REV CODE- 250/636: Performed by: FAMILY MEDICINE

## 2020-01-01 PROCEDURE — 74011000250 HC RX REV CODE- 250: Performed by: FAMILY MEDICINE

## 2020-01-01 PROCEDURE — 51702 INSERT TEMP BLADDER CATH: CPT

## 2020-01-01 PROCEDURE — 82330 ASSAY OF CALCIUM: CPT

## 2020-01-01 PROCEDURE — 87186 SC STD MICRODIL/AGAR DIL: CPT

## 2020-01-01 PROCEDURE — 29581 APPL MULTLAYER CMPRN SYS LEG: CPT

## 2020-01-01 PROCEDURE — 93005 ELECTROCARDIOGRAM TRACING: CPT | Performed by: EMERGENCY MEDICINE

## 2020-01-01 PROCEDURE — 99212 OFFICE O/P EST SF 10 MIN: CPT

## 2020-01-01 PROCEDURE — 74011636637 HC RX REV CODE- 636/637: Performed by: FAMILY MEDICINE

## 2020-01-01 PROCEDURE — 80053 COMPREHEN METABOLIC PANEL: CPT

## 2020-01-01 PROCEDURE — 74011000258 HC RX REV CODE- 258: Performed by: INTERNAL MEDICINE

## 2020-01-01 PROCEDURE — 71045 X-RAY EXAM CHEST 1 VIEW: CPT

## 2020-01-01 PROCEDURE — 74011250636 HC RX REV CODE- 250/636: Performed by: EMERGENCY MEDICINE

## 2020-01-01 PROCEDURE — 3331090001 HH PPS REVENUE CREDIT

## 2020-01-01 PROCEDURE — 93005 ELECTROCARDIOGRAM TRACING: CPT | Performed by: FAMILY MEDICINE

## 2020-01-01 PROCEDURE — 99233 SBSQ HOSP IP/OBS HIGH 50: CPT | Performed by: INTERNAL MEDICINE

## 2020-01-01 PROCEDURE — 80048 BASIC METABOLIC PNL TOTAL CA: CPT

## 2020-01-01 PROCEDURE — 97535 SELF CARE MNGMENT TRAINING: CPT

## 2020-01-01 PROCEDURE — 83605 ASSAY OF LACTIC ACID: CPT

## 2020-01-01 PROCEDURE — 99213 OFFICE O/P EST LOW 20 MIN: CPT

## 2020-01-01 PROCEDURE — 87088 URINE BACTERIA CULTURE: CPT

## 2020-01-01 PROCEDURE — 74011000250 HC RX REV CODE- 250: Performed by: EMERGENCY MEDICINE

## 2020-01-01 PROCEDURE — 3331090002 HH PPS REVENUE DEBIT

## 2020-01-01 PROCEDURE — 74011250637 HC RX REV CODE- 250/637: Performed by: FAMILY MEDICINE

## 2020-01-01 PROCEDURE — 74011000258 HC RX REV CODE- 258: Performed by: FAMILY MEDICINE

## 2020-01-01 PROCEDURE — 97530 THERAPEUTIC ACTIVITIES: CPT

## 2020-01-01 PROCEDURE — 85027 COMPLETE CBC AUTOMATED: CPT

## 2020-01-01 PROCEDURE — 96372 THER/PROPH/DIAG INJ SC/IM: CPT

## 2020-01-01 PROCEDURE — 83735 ASSAY OF MAGNESIUM: CPT

## 2020-01-01 PROCEDURE — 96361 HYDRATE IV INFUSION ADD-ON: CPT

## 2020-01-01 PROCEDURE — 74011250637 HC RX REV CODE- 250/637: Performed by: EMERGENCY MEDICINE

## 2020-01-01 PROCEDURE — 99284 EMERGENCY DEPT VISIT MOD MDM: CPT

## 2020-01-01 PROCEDURE — 74011000258 HC RX REV CODE- 258: Performed by: EMERGENCY MEDICINE

## 2020-01-01 PROCEDURE — 83540 ASSAY OF IRON: CPT

## 2020-01-01 PROCEDURE — 74011636637 HC RX REV CODE- 636/637: Performed by: INTERNAL MEDICINE

## 2020-01-01 PROCEDURE — 65660000000 HC RM CCU STEPDOWN

## 2020-01-01 PROCEDURE — 99222 1ST HOSP IP/OBS MODERATE 55: CPT | Performed by: INTERNAL MEDICINE

## 2020-01-01 PROCEDURE — 96376 TX/PRO/DX INJ SAME DRUG ADON: CPT

## 2020-01-01 PROCEDURE — G0299 HHS/HOSPICE OF RN EA 15 MIN: HCPCS

## 2020-01-01 PROCEDURE — 99232 SBSQ HOSP IP/OBS MODERATE 35: CPT | Performed by: INTERNAL MEDICINE

## 2020-01-01 PROCEDURE — 81003 URINALYSIS AUTO W/O SCOPE: CPT

## 2020-01-01 PROCEDURE — 76450000000

## 2020-01-01 PROCEDURE — A4452 WATERPROOF TAPE: HCPCS

## 2020-01-01 PROCEDURE — A6198 ALGINATE DRESSING > 48 SQ IN: HCPCS

## 2020-01-01 PROCEDURE — 99285 EMERGENCY DEPT VISIT HI MDM: CPT

## 2020-01-01 PROCEDURE — 99213 OFFICE O/P EST LOW 20 MIN: CPT | Performed by: PHYSICAL MEDICINE & REHABILITATION

## 2020-01-01 PROCEDURE — 82947 ASSAY GLUCOSE BLOOD QUANT: CPT

## 2020-01-01 PROCEDURE — 74011250637 HC RX REV CODE- 250/637: Performed by: INTERNAL MEDICINE

## 2020-01-01 PROCEDURE — 85018 HEMOGLOBIN: CPT

## 2020-01-01 PROCEDURE — 77030040393 HC DRSG OPTIFOAM GENT MDII -B

## 2020-01-01 PROCEDURE — 65610000006 HC RM INTENSIVE CARE

## 2020-01-01 PROCEDURE — 74011250637 HC RX REV CODE- 250/637: Performed by: NURSE PRACTITIONER

## 2020-01-01 PROCEDURE — 82306 VITAMIN D 25 HYDROXY: CPT

## 2020-01-01 PROCEDURE — 96374 THER/PROPH/DIAG INJ IV PUSH: CPT

## 2020-01-01 PROCEDURE — 86923 COMPATIBILITY TEST ELECTRIC: CPT

## 2020-01-01 PROCEDURE — 90471 IMMUNIZATION ADMIN: CPT

## 2020-01-01 PROCEDURE — 97116 GAIT TRAINING THERAPY: CPT

## 2020-01-01 PROCEDURE — 82728 ASSAY OF FERRITIN: CPT

## 2020-01-01 PROCEDURE — 96368 THER/DIAG CONCURRENT INF: CPT

## 2020-01-01 PROCEDURE — 36600 WITHDRAWAL OF ARTERIAL BLOOD: CPT

## 2020-01-01 PROCEDURE — 5A1935Z RESPIRATORY VENTILATION, LESS THAN 24 CONSECUTIVE HOURS: ICD-10-PCS | Performed by: EMERGENCY MEDICINE

## 2020-01-01 PROCEDURE — 97162 PT EVAL MOD COMPLEX 30 MIN: CPT

## 2020-01-01 PROCEDURE — 97530 THERAPEUTIC ACTIVITIES: CPT | Performed by: PHYSICAL THERAPIST

## 2020-01-01 PROCEDURE — 83970 ASSAY OF PARATHORMONE: CPT

## 2020-01-01 PROCEDURE — 2709999900 HC NON-CHARGEABLE SUPPLY

## 2020-01-01 PROCEDURE — 82550 ASSAY OF CK (CPK): CPT

## 2020-01-01 PROCEDURE — 84145 PROCALCITONIN (PCT): CPT

## 2020-01-01 PROCEDURE — 97112 NEUROMUSCULAR REEDUCATION: CPT

## 2020-01-01 PROCEDURE — 74011000250 HC RX REV CODE- 250: Performed by: INTERNAL MEDICINE

## 2020-01-01 PROCEDURE — 77030041247 HC PROTECTOR HEEL HEELMEDIX MDII -B

## 2020-01-01 PROCEDURE — 87086 URINE CULTURE/COLONY COUNT: CPT

## 2020-01-01 PROCEDURE — 97165 OT EVAL LOW COMPLEX 30 MIN: CPT

## 2020-01-01 PROCEDURE — 84156 ASSAY OF PROTEIN URINE: CPT

## 2020-01-01 PROCEDURE — 81015 MICROSCOPIC EXAM OF URINE: CPT

## 2020-01-01 PROCEDURE — 65610000001 HC ROOM ICU GENERAL

## 2020-01-01 PROCEDURE — 2W1MX6Z COMPRESSION OF LEFT LOWER EXTREMITY USING PRESSURE DRESSING: ICD-10-PCS | Performed by: INTERNAL MEDICINE

## 2020-01-01 PROCEDURE — 83036 HEMOGLOBIN GLYCOSYLATED A1C: CPT

## 2020-01-01 PROCEDURE — 2W1LX6Z COMPRESSION OF RIGHT LOWER EXTREMITY USING PRESSURE DRESSING: ICD-10-PCS | Performed by: INTERNAL MEDICINE

## 2020-01-01 PROCEDURE — 87635 SARS-COV-2 COVID-19 AMP PRB: CPT

## 2020-01-01 PROCEDURE — 97161 PT EVAL LOW COMPLEX 20 MIN: CPT | Performed by: PHYSICAL THERAPIST

## 2020-01-01 PROCEDURE — 77030040830 HC CATH URETH FOL MDII -A

## 2020-01-01 PROCEDURE — A6223 GAUZE >16<=48 NO W/SAL W/O B: HCPCS

## 2020-01-01 PROCEDURE — 96375 TX/PRO/DX INJ NEW DRUG ADDON: CPT

## 2020-01-01 PROCEDURE — 93971 EXTREMITY STUDY: CPT

## 2020-01-01 PROCEDURE — 83930 ASSAY OF BLOOD OSMOLALITY: CPT

## 2020-01-01 PROCEDURE — 86900 BLOOD TYPING SEROLOGIC ABO: CPT

## 2020-01-01 PROCEDURE — 0BH17EZ INSERTION OF ENDOTRACHEAL AIRWAY INTO TRACHEA, VIA NATURAL OR ARTIFICIAL OPENING: ICD-10-PCS | Performed by: EMERGENCY MEDICINE

## 2020-01-01 PROCEDURE — 71046 X-RAY EXAM CHEST 2 VIEWS: CPT

## 2020-01-01 PROCEDURE — 99285 EMERGENCY DEPT VISIT HI MDM: CPT | Performed by: INTERNAL MEDICINE

## 2020-01-01 PROCEDURE — 77030034850

## 2020-01-01 PROCEDURE — 86580 TB INTRADERMAL TEST: CPT | Performed by: INTERNAL MEDICINE

## 2020-01-01 PROCEDURE — 65270000029 HC RM PRIVATE

## 2020-01-01 PROCEDURE — 82140 ASSAY OF AMMONIA: CPT

## 2020-01-01 PROCEDURE — 84484 ASSAY OF TROPONIN QUANT: CPT

## 2020-01-01 PROCEDURE — A6252 ABSORPT DRG >16 <=48 W/O BDR: HCPCS

## 2020-01-01 PROCEDURE — A6402 STERILE GAUZE <= 16 SQ IN: HCPCS

## 2020-01-01 PROCEDURE — 51798 US URINE CAPACITY MEASURE: CPT

## 2020-01-01 PROCEDURE — P9016 RBC LEUKOCYTES REDUCED: HCPCS

## 2020-01-01 PROCEDURE — 76770 US EXAM ABDO BACK WALL COMP: CPT

## 2020-01-01 PROCEDURE — 81001 URINALYSIS AUTO W/SCOPE: CPT

## 2020-01-01 PROCEDURE — 90686 IIV4 VACC NO PRSV 0.5 ML IM: CPT | Performed by: INTERNAL MEDICINE

## 2020-01-01 PROCEDURE — 84300 ASSAY OF URINE SODIUM: CPT

## 2020-01-01 PROCEDURE — 87070 CULTURE OTHR SPECIMN AEROBIC: CPT

## 2020-01-01 PROCEDURE — 99223 1ST HOSP IP/OBS HIGH 75: CPT | Performed by: NURSE PRACTITIONER

## 2020-01-01 PROCEDURE — 94762 N-INVAS EAR/PLS OXIMTRY CONT: CPT

## 2020-01-01 PROCEDURE — 99283 EMERGENCY DEPT VISIT LOW MDM: CPT

## 2020-01-01 PROCEDURE — 36430 TRANSFUSION BLD/BLD COMPNT: CPT

## 2020-01-01 PROCEDURE — 74011000302 HC RX REV CODE- 302: Performed by: INTERNAL MEDICINE

## 2020-01-01 PROCEDURE — 400013 HH SOC

## 2020-01-01 PROCEDURE — 73030 X-RAY EXAM OF SHOULDER: CPT

## 2020-01-01 PROCEDURE — 97110 THERAPEUTIC EXERCISES: CPT

## 2020-01-01 PROCEDURE — 77030019905 HC CATH URETH INTMIT MDII -A

## 2020-01-01 PROCEDURE — 99223 1ST HOSP IP/OBS HIGH 75: CPT | Performed by: INTERNAL MEDICINE

## 2020-01-01 PROCEDURE — 94002 VENT MGMT INPAT INIT DAY: CPT

## 2020-01-01 PROCEDURE — 74011250636 HC RX REV CODE- 250/636

## 2020-01-01 PROCEDURE — 84443 ASSAY THYROID STIM HORMONE: CPT

## 2020-01-01 PROCEDURE — 74011636637 HC RX REV CODE- 636/637: Performed by: EMERGENCY MEDICINE

## 2020-01-01 PROCEDURE — 87040 BLOOD CULTURE FOR BACTERIA: CPT

## 2020-01-01 PROCEDURE — 84466 ASSAY OF TRANSFERRIN: CPT

## 2020-01-01 PROCEDURE — 82040 ASSAY OF SERUM ALBUMIN: CPT

## 2020-01-01 PROCEDURE — 73590 X-RAY EXAM OF LOWER LEG: CPT

## 2020-01-01 PROCEDURE — 99211 OFF/OP EST MAY X REQ PHY/QHP: CPT

## 2020-01-01 PROCEDURE — 30233N1 TRANSFUSION OF NONAUTOLOGOUS RED BLOOD CELLS INTO PERIPHERAL VEIN, PERCUTANEOUS APPROACH: ICD-10-PCS | Performed by: FAMILY MEDICINE

## 2020-01-01 PROCEDURE — 97166 OT EVAL MOD COMPLEX 45 MIN: CPT

## 2020-01-01 PROCEDURE — 87077 CULTURE AEROBIC IDENTIFY: CPT

## 2020-01-01 PROCEDURE — 84439 ASSAY OF FREE THYROXINE: CPT

## 2020-01-01 PROCEDURE — A6216 NON-STERILE GAUZE<=16 SQ IN: HCPCS

## 2020-01-01 RX ORDER — DEXTROSE MONOHYDRATE 50 MG/ML
75 INJECTION, SOLUTION INTRAVENOUS CONTINUOUS
Status: DISCONTINUED | OUTPATIENT
Start: 2020-01-01 | End: 2020-01-01 | Stop reason: HOSPADM

## 2020-01-01 RX ORDER — TRAMADOL HYDROCHLORIDE 50 MG/1
50 TABLET ORAL
Status: DISCONTINUED | OUTPATIENT
Start: 2020-01-01 | End: 2020-01-01 | Stop reason: HOSPADM

## 2020-01-01 RX ORDER — LANOLIN ALCOHOL/MO/W.PET/CERES
325 CREAM (GRAM) TOPICAL 2 TIMES DAILY WITH MEALS
Status: DISCONTINUED | OUTPATIENT
Start: 2020-01-01 | End: 2020-01-01 | Stop reason: HOSPADM

## 2020-01-01 RX ORDER — PREGABALIN 150 MG/1
150 CAPSULE ORAL 2 TIMES DAILY
Status: DISCONTINUED | OUTPATIENT
Start: 2020-01-01 | End: 2020-01-01 | Stop reason: SDUPTHER

## 2020-01-01 RX ORDER — SODIUM BICARBONATE 650 MG/1
650 TABLET ORAL 3 TIMES DAILY
Status: DISCONTINUED | OUTPATIENT
Start: 2020-01-01 | End: 2020-01-01 | Stop reason: HOSPADM

## 2020-01-01 RX ORDER — HYDROMORPHONE HYDROCHLORIDE 2 MG/1
0.5 TABLET ORAL
Qty: 45 TAB | Refills: 0 | Status: SHIPPED | OUTPATIENT
Start: 2020-01-01 | End: 2020-01-01

## 2020-01-01 RX ORDER — METOPROLOL SUCCINATE 50 MG/1
50 TABLET, EXTENDED RELEASE ORAL
Status: DISCONTINUED | OUTPATIENT
Start: 2020-01-01 | End: 2020-01-01 | Stop reason: HOSPADM

## 2020-01-01 RX ORDER — INSULIN LISPRO 100 [IU]/ML
INJECTION, SOLUTION INTRAVENOUS; SUBCUTANEOUS
Status: DISCONTINUED | OUTPATIENT
Start: 2020-01-01 | End: 2020-01-01 | Stop reason: HOSPADM

## 2020-01-01 RX ORDER — PREGABALIN 150 MG/1
150 CAPSULE ORAL 2 TIMES DAILY
COMMUNITY

## 2020-01-01 RX ORDER — DEXTROSE 50 % IN WATER (D50W) INTRAVENOUS SYRINGE
25-50 AS NEEDED
Status: DISCONTINUED | OUTPATIENT
Start: 2020-01-01 | End: 2020-01-01 | Stop reason: HOSPADM

## 2020-01-01 RX ORDER — SODIUM CHLORIDE 9 MG/ML
1000 INJECTION, SOLUTION INTRAVENOUS ONCE
Status: COMPLETED | OUTPATIENT
Start: 2020-01-01 | End: 2020-01-01

## 2020-01-01 RX ORDER — SODIUM BICARBONATE 1 MEQ/ML
50 SYRINGE (ML) INTRAVENOUS
Status: COMPLETED | OUTPATIENT
Start: 2020-01-01 | End: 2020-01-01

## 2020-01-01 RX ORDER — DIAZEPAM 2 MG/1
2 TABLET ORAL
Status: COMPLETED | OUTPATIENT
Start: 2020-01-01 | End: 2020-01-01

## 2020-01-01 RX ORDER — ACETAMINOPHEN 325 MG/1
650 TABLET ORAL
Status: DISCONTINUED | OUTPATIENT
Start: 2020-01-01 | End: 2020-01-01 | Stop reason: HOSPADM

## 2020-01-01 RX ORDER — SODIUM CHLORIDE 450 MG/100ML
100 INJECTION, SOLUTION INTRAVENOUS CONTINUOUS
Status: DISCONTINUED | OUTPATIENT
Start: 2020-01-01 | End: 2020-01-01

## 2020-01-01 RX ORDER — HYDROMORPHONE HYDROCHLORIDE 1 MG/ML
0.2 INJECTION, SOLUTION INTRAMUSCULAR; INTRAVENOUS; SUBCUTANEOUS
Status: DISCONTINUED | OUTPATIENT
Start: 2020-01-01 | End: 2020-01-01

## 2020-01-01 RX ORDER — POTASSIUM CHLORIDE 750 MG/1
10 TABLET, EXTENDED RELEASE ORAL
Status: COMPLETED | OUTPATIENT
Start: 2020-01-01 | End: 2020-01-01

## 2020-01-01 RX ORDER — ACETAMINOPHEN 325 MG/1
650 TABLET ORAL
Status: DISCONTINUED | OUTPATIENT
Start: 2020-01-01 | End: 2020-01-01 | Stop reason: SDUPTHER

## 2020-01-01 RX ORDER — HYDROMORPHONE HYDROCHLORIDE 2 MG/1
0.5 TABLET ORAL
Status: DISCONTINUED | OUTPATIENT
Start: 2020-01-01 | End: 2020-01-01 | Stop reason: HOSPADM

## 2020-01-01 RX ORDER — CLINDAMYCIN PHOSPHATE 600 MG/50ML
600 INJECTION INTRAVENOUS EVERY 8 HOURS
Status: DISCONTINUED | OUTPATIENT
Start: 2020-01-01 | End: 2020-01-01

## 2020-01-01 RX ORDER — AMLODIPINE BESYLATE 10 MG/1
10 TABLET ORAL
Status: DISCONTINUED | OUTPATIENT
Start: 2020-01-01 | End: 2020-01-01

## 2020-01-01 RX ORDER — FAMOTIDINE 20 MG/1
20 TABLET, FILM COATED ORAL 2 TIMES DAILY
Status: DISCONTINUED | OUTPATIENT
Start: 2020-01-01 | End: 2020-01-01 | Stop reason: DRUGHIGH

## 2020-01-01 RX ORDER — POLYETHYLENE GLYCOL 3350 17 G/17G
17 POWDER, FOR SOLUTION ORAL DAILY PRN
Status: DISCONTINUED | OUTPATIENT
Start: 2020-01-01 | End: 2020-01-01 | Stop reason: HOSPADM

## 2020-01-01 RX ORDER — SODIUM CHLORIDE 9 MG/ML
250 INJECTION, SOLUTION INTRAVENOUS ONCE
Status: COMPLETED | OUTPATIENT
Start: 2020-01-01 | End: 2020-01-01

## 2020-01-01 RX ORDER — HYDRALAZINE HYDROCHLORIDE 50 MG/1
100 TABLET, FILM COATED ORAL 3 TIMES DAILY
Status: DISCONTINUED | OUTPATIENT
Start: 2020-01-01 | End: 2020-01-01 | Stop reason: HOSPADM

## 2020-01-01 RX ORDER — ACETAMINOPHEN 650 MG/1
650 SUPPOSITORY RECTAL
Status: DISCONTINUED | OUTPATIENT
Start: 2020-01-01 | End: 2020-01-01 | Stop reason: HOSPADM

## 2020-01-01 RX ORDER — CALCIUM ACETATE 667 MG/1
2 TABLET ORAL
Status: DISCONTINUED | OUTPATIENT
Start: 2020-01-01 | End: 2020-01-01 | Stop reason: HOSPADM

## 2020-01-01 RX ORDER — MAGNESIUM SULFATE HEPTAHYDRATE 40 MG/ML
4 INJECTION, SOLUTION INTRAVENOUS ONCE
Status: COMPLETED | OUTPATIENT
Start: 2020-01-01 | End: 2020-01-01

## 2020-01-01 RX ORDER — ONDANSETRON 2 MG/ML
4 INJECTION INTRAMUSCULAR; INTRAVENOUS
Status: DISCONTINUED | OUTPATIENT
Start: 2020-01-01 | End: 2020-01-01 | Stop reason: HOSPADM

## 2020-01-01 RX ORDER — LINEZOLID 2 MG/ML
600 INJECTION, SOLUTION INTRAVENOUS EVERY 12 HOURS
Status: DISCONTINUED | OUTPATIENT
Start: 2020-01-01 | End: 2020-01-01 | Stop reason: DRUGHIGH

## 2020-01-01 RX ORDER — PREGABALIN 150 MG/1
150 CAPSULE ORAL EVERY 12 HOURS
Status: DISCONTINUED | OUTPATIENT
Start: 2020-01-01 | End: 2020-01-01 | Stop reason: HOSPADM

## 2020-01-01 RX ORDER — HYDRALAZINE HYDROCHLORIDE 20 MG/ML
20 INJECTION INTRAMUSCULAR; INTRAVENOUS
Status: DISCONTINUED | OUTPATIENT
Start: 2020-01-01 | End: 2020-01-01 | Stop reason: HOSPADM

## 2020-01-01 RX ORDER — PRAVASTATIN SODIUM 20 MG/1
40 TABLET ORAL
Status: DISCONTINUED | OUTPATIENT
Start: 2020-01-01 | End: 2020-01-01 | Stop reason: HOSPADM

## 2020-01-01 RX ORDER — DEXTROSE 40 %
15 GEL (GRAM) ORAL AS NEEDED
Status: DISCONTINUED | OUTPATIENT
Start: 2020-01-01 | End: 2020-01-01

## 2020-01-01 RX ORDER — CLINDAMYCIN HYDROCHLORIDE 150 MG/1
600 CAPSULE ORAL EVERY 8 HOURS
Status: DISCONTINUED | OUTPATIENT
Start: 2020-01-01 | End: 2020-01-01 | Stop reason: HOSPADM

## 2020-01-01 RX ORDER — DEXTROSE MONOHYDRATE AND SODIUM CHLORIDE 5; .45 G/100ML; G/100ML
150 INJECTION, SOLUTION INTRAVENOUS CONTINUOUS
Status: DISCONTINUED | OUTPATIENT
Start: 2020-01-01 | End: 2020-01-01

## 2020-01-01 RX ORDER — AMLODIPINE BESYLATE 10 MG/1
10 TABLET ORAL
Status: DISCONTINUED | OUTPATIENT
Start: 2020-01-01 | End: 2020-01-01 | Stop reason: HOSPADM

## 2020-01-01 RX ORDER — SODIUM CHLORIDE 9 MG/ML
250 INJECTION, SOLUTION INTRAVENOUS AS NEEDED
Status: DISCONTINUED | OUTPATIENT
Start: 2020-01-01 | End: 2020-01-01 | Stop reason: HOSPADM

## 2020-01-01 RX ORDER — SEVELAMER CARBONATE 800 MG/1
1600 TABLET, FILM COATED ORAL
Qty: 90 TAB | Refills: 2 | Status: SHIPPED | OUTPATIENT
Start: 2020-01-01

## 2020-01-01 RX ORDER — VANCOMYCIN/0.9 % SOD CHLORIDE 1.5G/250ML
1500 PLASTIC BAG, INJECTION (ML) INTRAVENOUS ONCE
Status: DISCONTINUED | OUTPATIENT
Start: 2020-01-01 | End: 2020-01-01

## 2020-01-01 RX ORDER — LORAZEPAM 2 MG/ML
INJECTION INTRAMUSCULAR
Status: COMPLETED
Start: 2020-01-01 | End: 2020-01-01

## 2020-01-01 RX ORDER — SODIUM CHLORIDE 9 MG/ML
75 INJECTION, SOLUTION INTRAVENOUS CONTINUOUS
Status: DISCONTINUED | OUTPATIENT
Start: 2020-01-01 | End: 2020-01-01

## 2020-01-01 RX ORDER — DEXTROSE MONOHYDRATE 50 MG/ML
150 INJECTION, SOLUTION INTRAVENOUS CONTINUOUS
Status: DISCONTINUED | OUTPATIENT
Start: 2020-01-01 | End: 2020-01-01

## 2020-01-01 RX ORDER — DEXTROSE 50 % IN WATER (D50W) INTRAVENOUS SYRINGE
25-50 AS NEEDED
Status: DISCONTINUED | OUTPATIENT
Start: 2020-01-01 | End: 2020-01-01

## 2020-01-01 RX ORDER — DEXTROSE MONOHYDRATE AND SODIUM CHLORIDE 5; .45 G/100ML; G/100ML
50 INJECTION, SOLUTION INTRAVENOUS CONTINUOUS
Status: DISCONTINUED | OUTPATIENT
Start: 2020-01-01 | End: 2020-01-01

## 2020-01-01 RX ORDER — SODIUM BICARBONATE 84 MG/ML
50 INJECTION, SOLUTION INTRAVENOUS
Status: COMPLETED | OUTPATIENT
Start: 2020-01-01 | End: 2020-01-01

## 2020-01-01 RX ORDER — HEPARIN SODIUM 5000 [USP'U]/ML
5000 INJECTION, SOLUTION INTRAVENOUS; SUBCUTANEOUS EVERY 8 HOURS
Status: DISCONTINUED | OUTPATIENT
Start: 2020-01-01 | End: 2020-01-01 | Stop reason: HOSPADM

## 2020-01-01 RX ORDER — MAGNESIUM SULFATE HEPTAHYDRATE 40 MG/ML
2 INJECTION, SOLUTION INTRAVENOUS ONCE
Status: COMPLETED | OUTPATIENT
Start: 2020-01-01 | End: 2020-01-01

## 2020-01-01 RX ORDER — HYDROCODONE BITARTRATE AND ACETAMINOPHEN 5; 325 MG/1; MG/1
1 TABLET ORAL
Status: DISCONTINUED | OUTPATIENT
Start: 2020-01-01 | End: 2020-01-01 | Stop reason: HOSPADM

## 2020-01-01 RX ORDER — POTASSIUM CHLORIDE 14.9 MG/ML
20 INJECTION INTRAVENOUS
Status: DISPENSED | OUTPATIENT
Start: 2020-01-01 | End: 2020-01-01

## 2020-01-01 RX ORDER — PREGABALIN 150 MG/1
150 CAPSULE ORAL 2 TIMES DAILY
Qty: 6 CAP | Refills: 0 | Status: SHIPPED | OUTPATIENT
Start: 2020-01-01 | End: 2020-01-01

## 2020-01-01 RX ORDER — INSULIN GLARGINE 100 [IU]/ML
15 INJECTION, SOLUTION SUBCUTANEOUS
Qty: 1 VIAL | Refills: 0 | Status: ON HOLD
Start: 2020-01-01 | End: 2020-01-01 | Stop reason: SDUPTHER

## 2020-01-01 RX ORDER — DIAZEPAM 2 MG/1
2 TABLET ORAL
Status: DISCONTINUED | OUTPATIENT
Start: 2020-01-01 | End: 2020-01-01 | Stop reason: HOSPADM

## 2020-01-01 RX ORDER — LINEZOLID 600 MG/1
600 TABLET, FILM COATED ORAL EVERY 12 HOURS
Status: COMPLETED | OUTPATIENT
Start: 2020-01-01 | End: 2020-01-01

## 2020-01-01 RX ORDER — DEXTROSE 40 %
15 GEL (GRAM) ORAL AS NEEDED
Status: DISCONTINUED | OUTPATIENT
Start: 2020-01-01 | End: 2020-01-01 | Stop reason: HOSPADM

## 2020-01-01 RX ORDER — DIAZEPAM 2 MG/1
2 TABLET ORAL
Qty: 6 TAB | Refills: 0 | Status: SHIPPED | OUTPATIENT
Start: 2020-01-01 | End: 2020-01-01

## 2020-01-01 RX ORDER — CALCIUM ACETATE 667 MG/1
1334 TABLET ORAL
Status: DISCONTINUED | OUTPATIENT
Start: 2020-01-01 | End: 2020-01-01 | Stop reason: HOSPADM

## 2020-01-01 RX ORDER — DEXTROSE, SODIUM CHLORIDE, AND POTASSIUM CHLORIDE 5; .45; .3 G/100ML; G/100ML; G/100ML
INJECTION INTRAVENOUS CONTINUOUS
Status: DISCONTINUED | OUTPATIENT
Start: 2020-01-01 | End: 2020-01-01

## 2020-01-01 RX ORDER — CLONIDINE 0.3 MG/24H
1 PATCH, EXTENDED RELEASE TRANSDERMAL
Status: DISCONTINUED | OUTPATIENT
Start: 2020-01-01 | End: 2020-01-01 | Stop reason: HOSPADM

## 2020-01-01 RX ORDER — LINEZOLID 600 MG/1
600 TABLET, FILM COATED ORAL EVERY 12 HOURS
Status: DISCONTINUED | OUTPATIENT
Start: 2020-01-01 | End: 2020-01-01

## 2020-01-01 RX ORDER — LABETALOL HYDROCHLORIDE 5 MG/ML
20 INJECTION, SOLUTION INTRAVENOUS
Status: DISCONTINUED | OUTPATIENT
Start: 2020-01-01 | End: 2020-01-01 | Stop reason: HOSPADM

## 2020-01-01 RX ORDER — INSULIN GLARGINE 100 [IU]/ML
30 INJECTION, SOLUTION SUBCUTANEOUS DAILY
Status: DISCONTINUED | OUTPATIENT
Start: 2020-01-01 | End: 2020-01-01

## 2020-01-01 RX ORDER — DIAZEPAM 2 MG/1
2 TABLET ORAL
Qty: 10 TAB | Refills: 0 | Status: ON HOLD | OUTPATIENT
Start: 2020-01-01 | End: 2020-01-01 | Stop reason: SDUPTHER

## 2020-01-01 RX ORDER — PREGABALIN 150 MG/1
150 CAPSULE ORAL 2 TIMES DAILY
Status: DISCONTINUED | OUTPATIENT
Start: 2020-01-01 | End: 2020-01-01 | Stop reason: HOSPADM

## 2020-01-01 RX ORDER — DEXTROSE 50 % IN WATER (D50W) INTRAVENOUS SYRINGE
25 ONCE
Status: COMPLETED | OUTPATIENT
Start: 2020-01-01 | End: 2020-01-01

## 2020-01-01 RX ORDER — PHENTOLAMINE MESYLATE 5 MG/1
10 INJECTION INTRAMUSCULAR; INTRAVENOUS
Status: DISCONTINUED | OUTPATIENT
Start: 2020-01-01 | End: 2020-01-01

## 2020-01-01 RX ORDER — SODIUM CHLORIDE 0.9 % (FLUSH) 0.9 %
5-40 SYRINGE (ML) INJECTION EVERY 8 HOURS
Status: DISCONTINUED | OUTPATIENT
Start: 2020-01-01 | End: 2020-01-01 | Stop reason: HOSPADM

## 2020-01-01 RX ORDER — SODIUM CHLORIDE 9 MG/ML
500 INJECTION, SOLUTION INTRAVENOUS ONCE
Status: COMPLETED | OUTPATIENT
Start: 2020-01-01 | End: 2020-01-01

## 2020-01-01 RX ORDER — SODIUM CHLORIDE, SODIUM LACTATE, POTASSIUM CHLORIDE, CALCIUM CHLORIDE 600; 310; 30; 20 MG/100ML; MG/100ML; MG/100ML; MG/100ML
150 INJECTION, SOLUTION INTRAVENOUS CONTINUOUS
Status: DISCONTINUED | OUTPATIENT
Start: 2020-01-01 | End: 2020-01-01

## 2020-01-01 RX ORDER — INSULIN GLARGINE 100 [IU]/ML
20 INJECTION, SOLUTION SUBCUTANEOUS
Qty: 1 VIAL | Refills: 0 | Status: SHIPPED | OUTPATIENT
Start: 2020-01-01

## 2020-01-01 RX ORDER — HYDRALAZINE HYDROCHLORIDE 25 MG/1
100 TABLET, FILM COATED ORAL 3 TIMES DAILY
Status: DISCONTINUED | OUTPATIENT
Start: 2020-01-01 | End: 2020-01-01

## 2020-01-01 RX ORDER — GABAPENTIN 300 MG/1
300 CAPSULE ORAL DAILY
Qty: 30 CAP | Refills: 0 | Status: SHIPPED | OUTPATIENT
Start: 2020-01-01 | End: 2020-01-01

## 2020-01-01 RX ORDER — INSULIN LISPRO 100 [IU]/ML
INJECTION, SOLUTION INTRAVENOUS; SUBCUTANEOUS
Qty: 1 VIAL | Refills: 0 | Status: SHIPPED
Start: 2020-01-01

## 2020-01-01 RX ORDER — LORAZEPAM 2 MG/ML
2 INJECTION INTRAMUSCULAR
Status: DISCONTINUED | OUTPATIENT
Start: 2020-01-01 | End: 2020-10-19 | Stop reason: HOSPADM

## 2020-01-01 RX ORDER — LIDOCAINE 4 G/100G
1 PATCH TOPICAL AS NEEDED
Status: DISCONTINUED | OUTPATIENT
Start: 2020-01-01 | End: 2020-01-01 | Stop reason: HOSPADM

## 2020-01-01 RX ORDER — CALCIUM ACETATE 667 MG/1
1 TABLET ORAL
Status: COMPLETED | OUTPATIENT
Start: 2020-01-01 | End: 2020-01-01

## 2020-01-01 RX ORDER — PREDNISONE 20 MG/1
40 TABLET ORAL
Status: DISCONTINUED | OUTPATIENT
Start: 2020-01-01 | End: 2020-01-01 | Stop reason: HOSPADM

## 2020-01-01 RX ORDER — GLYCOPYRROLATE 0.2 MG/ML
0.2 INJECTION INTRAMUSCULAR; INTRAVENOUS
Status: DISCONTINUED | OUTPATIENT
Start: 2020-01-01 | End: 2020-10-19 | Stop reason: HOSPADM

## 2020-01-01 RX ORDER — GABAPENTIN 300 MG/1
300 CAPSULE ORAL DAILY
Status: DISCONTINUED | OUTPATIENT
Start: 2020-01-01 | End: 2020-01-01 | Stop reason: HOSPADM

## 2020-01-01 RX ORDER — LANOLIN ALCOHOL/MO/W.PET/CERES
1 CREAM (GRAM) TOPICAL 2 TIMES DAILY WITH MEALS
Status: DISCONTINUED | OUTPATIENT
Start: 2020-01-01 | End: 2020-01-01 | Stop reason: HOSPADM

## 2020-01-01 RX ORDER — GABAPENTIN 100 MG/1
300 CAPSULE ORAL DAILY
Status: DISCONTINUED | OUTPATIENT
Start: 2020-01-01 | End: 2020-01-01 | Stop reason: HOSPADM

## 2020-01-01 RX ORDER — INSULIN GLARGINE 100 [IU]/ML
15 INJECTION, SOLUTION SUBCUTANEOUS DAILY
Status: DISCONTINUED | OUTPATIENT
Start: 2020-01-01 | End: 2020-01-01

## 2020-01-01 RX ORDER — DEXTROSE MONOHYDRATE AND SODIUM CHLORIDE 5; .9 G/100ML; G/100ML
125 INJECTION, SOLUTION INTRAVENOUS CONTINUOUS
Status: DISCONTINUED | OUTPATIENT
Start: 2020-01-01 | End: 2020-01-01

## 2020-01-01 RX ORDER — HYDRALAZINE HYDROCHLORIDE 20 MG/ML
10 INJECTION INTRAMUSCULAR; INTRAVENOUS
Status: DISCONTINUED | OUTPATIENT
Start: 2020-01-01 | End: 2020-01-01

## 2020-01-01 RX ORDER — SODIUM CHLORIDE 0.9 % (FLUSH) 0.9 %
5-40 SYRINGE (ML) INJECTION AS NEEDED
Status: DISCONTINUED | OUTPATIENT
Start: 2020-01-01 | End: 2020-01-01 | Stop reason: HOSPADM

## 2020-01-01 RX ORDER — PANTOPRAZOLE SODIUM 40 MG/1
40 TABLET, DELAYED RELEASE ORAL
Status: DISCONTINUED | OUTPATIENT
Start: 2020-01-01 | End: 2020-01-01 | Stop reason: HOSPADM

## 2020-01-01 RX ORDER — MONTELUKAST SODIUM 4 MG/1
1 TABLET, CHEWABLE ORAL 2 TIMES DAILY
Status: DISCONTINUED | OUTPATIENT
Start: 2020-01-01 | End: 2020-01-01 | Stop reason: HOSPADM

## 2020-01-01 RX ORDER — LABETALOL HYDROCHLORIDE 5 MG/ML
20 INJECTION, SOLUTION INTRAVENOUS
Status: COMPLETED | OUTPATIENT
Start: 2020-01-01 | End: 2020-01-01

## 2020-01-01 RX ORDER — NITROGLYCERIN 20 MG/100ML
0-20 INJECTION INTRAVENOUS
Status: DISCONTINUED | OUTPATIENT
Start: 2020-01-01 | End: 2020-01-01

## 2020-01-01 RX ORDER — PROMETHAZINE HYDROCHLORIDE 25 MG/1
12.5 TABLET ORAL
Status: DISCONTINUED | OUTPATIENT
Start: 2020-01-01 | End: 2020-01-01 | Stop reason: HOSPADM

## 2020-01-01 RX ORDER — POTASSIUM CHLORIDE 14.9 MG/ML
20 INJECTION INTRAVENOUS ONCE
Status: COMPLETED | OUTPATIENT
Start: 2020-01-01 | End: 2020-01-01

## 2020-01-01 RX ORDER — MORPHINE SULFATE 2 MG/ML
2 INJECTION, SOLUTION INTRAMUSCULAR; INTRAVENOUS
Status: DISCONTINUED | OUTPATIENT
Start: 2020-01-01 | End: 2020-10-19 | Stop reason: HOSPADM

## 2020-01-01 RX ORDER — NIFEDIPINE 30 MG/1
30 TABLET, EXTENDED RELEASE ORAL DAILY
Status: DISCONTINUED | OUTPATIENT
Start: 2020-01-01 | End: 2020-01-01 | Stop reason: HOSPADM

## 2020-01-01 RX ORDER — LORAZEPAM 2 MG/ML
1 INJECTION INTRAMUSCULAR
Status: COMPLETED | OUTPATIENT
Start: 2020-01-01 | End: 2020-01-01

## 2020-01-01 RX ORDER — LANOLIN ALCOHOL/MO/W.PET/CERES
325 CREAM (GRAM) TOPICAL 2 TIMES DAILY WITH MEALS
Qty: 1 TAB | Refills: 0 | Status: SHIPPED
Start: 2020-01-01

## 2020-01-01 RX ORDER — INSULIN GLARGINE 100 [IU]/ML
15 INJECTION, SOLUTION SUBCUTANEOUS EVERY 24 HOURS
Status: DISCONTINUED | OUTPATIENT
Start: 2020-01-01 | End: 2020-01-01

## 2020-01-01 RX ORDER — SEVELAMER CARBONATE 800 MG/1
1600 TABLET, FILM COATED ORAL
Status: DISCONTINUED | OUTPATIENT
Start: 2020-01-01 | End: 2020-01-01 | Stop reason: HOSPADM

## 2020-01-01 RX ORDER — SODIUM BICARBONATE 84 MG/ML
100 INJECTION, SOLUTION INTRAVENOUS ONCE
Status: COMPLETED | OUTPATIENT
Start: 2020-01-01 | End: 2020-01-01

## 2020-01-01 RX ORDER — LINEZOLID 2 MG/ML
600 INJECTION, SOLUTION INTRAVENOUS EVERY 12 HOURS
Status: DISCONTINUED | OUTPATIENT
Start: 2020-01-01 | End: 2020-01-01

## 2020-01-01 RX ORDER — SODIUM CHLORIDE 450 MG/100ML
150 INJECTION, SOLUTION INTRAVENOUS CONTINUOUS
Status: DISCONTINUED | OUTPATIENT
Start: 2020-01-01 | End: 2020-01-01

## 2020-01-01 RX ORDER — INSULIN GLARGINE 100 [IU]/ML
15 INJECTION, SOLUTION SUBCUTANEOUS
Status: DISCONTINUED | OUTPATIENT
Start: 2020-01-01 | End: 2020-01-01 | Stop reason: HOSPADM

## 2020-01-01 RX ORDER — CALCIUM ACETATE 667 MG/1
1334 TABLET ORAL
Qty: 1 TAB | Refills: 0 | Status: SHIPPED
Start: 2020-01-01 | End: 2020-01-01

## 2020-01-01 RX ORDER — NIFEDIPINE 30 MG/1
30 TABLET, EXTENDED RELEASE ORAL DAILY
Qty: 30 TAB | Refills: 2 | Status: SHIPPED | OUTPATIENT
Start: 2020-01-01

## 2020-01-01 RX ORDER — INSULIN GLARGINE 100 [IU]/ML
15 INJECTION, SOLUTION SUBCUTANEOUS DAILY
Status: DISCONTINUED | OUTPATIENT
Start: 2020-01-01 | End: 2020-01-01 | Stop reason: HOSPADM

## 2020-01-01 RX ORDER — NOREPINEPHRINE BITARTRATE/D5W 4MG/250ML
.5-16 PLASTIC BAG, INJECTION (ML) INTRAVENOUS
Status: DISCONTINUED | OUTPATIENT
Start: 2020-01-01 | End: 2020-01-01

## 2020-01-01 RX ORDER — FAMOTIDINE 20 MG/1
20 TABLET, FILM COATED ORAL DAILY
Status: DISCONTINUED | OUTPATIENT
Start: 2020-01-01 | End: 2020-01-01 | Stop reason: HOSPADM

## 2020-01-01 RX ORDER — CALCIUM GLUCONATE 94 MG/ML
1 INJECTION, SOLUTION INTRAVENOUS
Status: DISCONTINUED | OUTPATIENT
Start: 2020-01-01 | End: 2020-01-01 | Stop reason: SDUPTHER

## 2020-01-01 RX ORDER — SODIUM POLYSTYRENE SULFONATE 15 G/60ML
30 SUSPENSION ORAL; RECTAL
Status: COMPLETED | OUTPATIENT
Start: 2020-01-01 | End: 2020-01-01

## 2020-01-01 RX ORDER — PREDNISONE 20 MG/1
40 TABLET ORAL
Status: DISCONTINUED | OUTPATIENT
Start: 2020-01-01 | End: 2020-01-01

## 2020-01-01 RX ORDER — MAGNESIUM SULFATE HEPTAHYDRATE 40 MG/ML
2 INJECTION, SOLUTION INTRAVENOUS
Status: COMPLETED | OUTPATIENT
Start: 2020-01-01 | End: 2020-01-01

## 2020-01-01 RX ORDER — SODIUM CHLORIDE 9 MG/ML
125 INJECTION, SOLUTION INTRAVENOUS CONTINUOUS
Status: DISCONTINUED | OUTPATIENT
Start: 2020-01-01 | End: 2020-01-01

## 2020-01-01 RX ORDER — INSULIN GLARGINE 100 [IU]/ML
20 INJECTION, SOLUTION SUBCUTANEOUS DAILY
Status: DISCONTINUED | OUTPATIENT
Start: 2020-01-01 | End: 2020-01-01

## 2020-01-01 RX ORDER — LIDOCAINE 4 G/100G
1 PATCH TOPICAL EVERY 24 HOURS
Status: DISCONTINUED | OUTPATIENT
Start: 2020-01-01 | End: 2020-01-01 | Stop reason: HOSPADM

## 2020-01-01 RX ORDER — DICYCLOMINE HYDROCHLORIDE 10 MG/1
10 CAPSULE ORAL 2 TIMES DAILY
Status: DISCONTINUED | OUTPATIENT
Start: 2020-01-01 | End: 2020-01-01 | Stop reason: HOSPADM

## 2020-01-01 RX ORDER — HYDRALAZINE HYDROCHLORIDE 20 MG/ML
20 INJECTION INTRAMUSCULAR; INTRAVENOUS
Status: DISCONTINUED | OUTPATIENT
Start: 2020-01-01 | End: 2020-01-01 | Stop reason: RX

## 2020-01-01 RX ORDER — HEPARIN SODIUM 5000 [USP'U]/ML
5000 INJECTION, SOLUTION INTRAVENOUS; SUBCUTANEOUS EVERY 8 HOURS
Status: DISCONTINUED | OUTPATIENT
Start: 2020-01-01 | End: 2020-01-01

## 2020-01-01 RX ORDER — PREDNISONE 20 MG/1
40 TABLET ORAL
Qty: 1 TAB | Refills: 0 | Status: SHIPPED
Start: 2020-01-01 | End: 2020-01-01

## 2020-01-01 RX ORDER — TRAMADOL HYDROCHLORIDE 50 MG/1
50 TABLET ORAL
Qty: 12 TAB | Refills: 0 | Status: SHIPPED | OUTPATIENT
Start: 2020-01-01 | End: 2020-01-01

## 2020-01-01 RX ORDER — INSULIN GLARGINE 100 [IU]/ML
10 INJECTION, SOLUTION SUBCUTANEOUS DAILY
Status: DISCONTINUED | OUTPATIENT
Start: 2020-01-01 | End: 2020-01-01

## 2020-01-01 RX ORDER — HEPARIN SODIUM 5000 [USP'U]/ML
5000 INJECTION, SOLUTION INTRAVENOUS; SUBCUTANEOUS EVERY 12 HOURS
Status: DISCONTINUED | OUTPATIENT
Start: 2020-01-01 | End: 2020-01-01 | Stop reason: HOSPADM

## 2020-01-01 RX ORDER — CIPROFLOXACIN 250 MG/1
250 TABLET, FILM COATED ORAL 2 TIMES DAILY
Qty: 20 TAB | Refills: 0 | Status: SHIPPED | OUTPATIENT
Start: 2020-01-01 | End: 2020-01-01

## 2020-01-01 RX ORDER — DEXTROSE 50 % IN WATER (D50W) INTRAVENOUS SYRINGE
25
Status: COMPLETED | OUTPATIENT
Start: 2020-01-01 | End: 2020-01-01

## 2020-01-01 RX ADMIN — HEPARIN SODIUM 5000 UNITS: 5000 INJECTION INTRAVENOUS; SUBCUTANEOUS at 21:43

## 2020-01-01 RX ADMIN — PREGABALIN 150 MG: 150 CAPSULE ORAL at 21:27

## 2020-01-01 RX ADMIN — INSULIN LISPRO 9 UNITS: 100 INJECTION, SOLUTION INTRAVENOUS; SUBCUTANEOUS at 12:00

## 2020-01-01 RX ADMIN — Medication 10 ML: at 13:59

## 2020-01-01 RX ADMIN — HEPARIN SODIUM 5000 UNITS: 5000 INJECTION INTRAVENOUS; SUBCUTANEOUS at 15:47

## 2020-01-01 RX ADMIN — Medication 10 ML: at 21:27

## 2020-01-01 RX ADMIN — HYDRALAZINE HYDROCHLORIDE 100 MG: 50 TABLET, FILM COATED ORAL at 17:00

## 2020-01-01 RX ADMIN — HEPARIN SODIUM 5000 UNITS: 5000 INJECTION INTRAVENOUS; SUBCUTANEOUS at 05:33

## 2020-01-01 RX ADMIN — PANCRELIPASE LIPASE, PANCRELIPASE PROTEASE, PANCRELIPASE AMYLASE 3 CAPSULE: 20000; 63000; 84000 CAPSULE, DELAYED RELEASE ORAL at 12:29

## 2020-01-01 RX ADMIN — PREGABALIN 150 MG: 150 CAPSULE ORAL at 09:50

## 2020-01-01 RX ADMIN — Medication 1334 MG: at 09:50

## 2020-01-01 RX ADMIN — PIPERACILLIN AND TAZOBACTAM 4.5 G: 4; .5 INJECTION, POWDER, FOR SOLUTION INTRAVENOUS at 17:36

## 2020-01-01 RX ADMIN — SODIUM CHLORIDE 16.4 UNITS/HR: 900 INJECTION, SOLUTION INTRAVENOUS at 00:49

## 2020-01-01 RX ADMIN — DEXTROSE MONOHYDRATE 25 G: 25 INJECTION, SOLUTION INTRAVENOUS at 20:48

## 2020-01-01 RX ADMIN — HYDRALAZINE HYDROCHLORIDE 100 MG: 50 TABLET, FILM COATED ORAL at 17:31

## 2020-01-01 RX ADMIN — Medication 10 ML: at 06:21

## 2020-01-01 RX ADMIN — METOPROLOL SUCCINATE 50 MG: 50 TABLET, EXTENDED RELEASE ORAL at 06:33

## 2020-01-01 RX ADMIN — LINEZOLID 600 MG: 600 TABLET, FILM COATED ORAL at 20:06

## 2020-01-01 RX ADMIN — HEPARIN SODIUM 5000 UNITS: 5000 INJECTION INTRAVENOUS; SUBCUTANEOUS at 20:29

## 2020-01-01 RX ADMIN — Medication 10 ML: at 08:36

## 2020-01-01 RX ADMIN — SODIUM BICARBONATE 650 MG TABLET 650 MG: at 10:58

## 2020-01-01 RX ADMIN — Medication 10 ML: at 15:13

## 2020-01-01 RX ADMIN — SODIUM BICARBONATE 650 MG TABLET 650 MG: at 16:59

## 2020-01-01 RX ADMIN — Medication 1 AMPULE: at 09:00

## 2020-01-01 RX ADMIN — POTASSIUM CHLORIDE 20 MEQ: 14.9 INJECTION, SOLUTION INTRAVENOUS at 12:21

## 2020-01-01 RX ADMIN — HYDRALAZINE HYDROCHLORIDE 100 MG: 50 TABLET, FILM COATED ORAL at 20:30

## 2020-01-01 RX ADMIN — DICYCLOMINE HYDROCHLORIDE 10 MG: 10 CAPSULE ORAL at 19:37

## 2020-01-01 RX ADMIN — PANCRELIPASE LIPASE, PANCRELIPASE PROTEASE, PANCRELIPASE AMYLASE 4 CAPSULE: 20000; 63000; 84000 CAPSULE, DELAYED RELEASE ORAL at 11:33

## 2020-01-01 RX ADMIN — Medication 5 ML: at 14:00

## 2020-01-01 RX ADMIN — FERROUS SULFATE TAB 325 MG (65 MG ELEMENTAL FE) 325 MG: 325 (65 FE) TAB at 09:52

## 2020-01-01 RX ADMIN — INSULIN LISPRO 4 UNITS: 100 INJECTION, SOLUTION INTRAVENOUS; SUBCUTANEOUS at 22:29

## 2020-01-01 RX ADMIN — Medication 1334 MG: at 12:24

## 2020-01-01 RX ADMIN — INSULIN LISPRO 6 UNITS: 100 INJECTION, SOLUTION INTRAVENOUS; SUBCUTANEOUS at 16:30

## 2020-01-01 RX ADMIN — HYDRALAZINE HYDROCHLORIDE 100 MG: 50 TABLET, FILM COATED ORAL at 08:54

## 2020-01-01 RX ADMIN — SODIUM BICARBONATE 650 MG TABLET 650 MG: at 17:25

## 2020-01-01 RX ADMIN — SODIUM BICARBONATE 650 MG TABLET 650 MG: at 08:53

## 2020-01-01 RX ADMIN — Medication 10 ML: at 05:31

## 2020-01-01 RX ADMIN — SEVELAMER CARBONATE 1600 MG: 800 TABLET, FILM COATED ORAL at 09:02

## 2020-01-01 RX ADMIN — HEPARIN SODIUM 5000 UNITS: 5000 INJECTION INTRAVENOUS; SUBCUTANEOUS at 21:35

## 2020-01-01 RX ADMIN — Medication 10 ML: at 16:21

## 2020-01-01 RX ADMIN — METOPROLOL SUCCINATE 50 MG: 50 TABLET, EXTENDED RELEASE ORAL at 05:32

## 2020-01-01 RX ADMIN — FERROUS SULFATE TAB 325 MG (65 MG ELEMENTAL FE) 325 MG: 325 (65 FE) TAB at 09:02

## 2020-01-01 RX ADMIN — Medication 1334 MG: at 12:02

## 2020-01-01 RX ADMIN — SODIUM BICARBONATE 650 MG TABLET 650 MG: at 17:01

## 2020-01-01 RX ADMIN — HYDRALAZINE HYDROCHLORIDE 100 MG: 50 TABLET, FILM COATED ORAL at 09:15

## 2020-01-01 RX ADMIN — SODIUM BICARBONATE 650 MG TABLET 650 MG: at 09:50

## 2020-01-01 RX ADMIN — PANCRELIPASE LIPASE, PANCRELIPASE PROTEASE, PANCRELIPASE AMYLASE 4 CAPSULE: 20000; 63000; 84000 CAPSULE, DELAYED RELEASE ORAL at 12:12

## 2020-01-01 RX ADMIN — SODIUM CHLORIDE 11.9 UNITS/HR: 900 INJECTION, SOLUTION INTRAVENOUS at 20:57

## 2020-01-01 RX ADMIN — INSULIN LISPRO 4 UNITS: 100 INJECTION, SOLUTION INTRAVENOUS; SUBCUTANEOUS at 11:06

## 2020-01-01 RX ADMIN — SODIUM BICARBONATE 650 MG TABLET 650 MG: at 16:34

## 2020-01-01 RX ADMIN — Medication 1334 MG: at 10:56

## 2020-01-01 RX ADMIN — SEVELAMER CARBONATE 1600 MG: 800 TABLET, FILM COATED ORAL at 17:13

## 2020-01-01 RX ADMIN — SODIUM BICARBONATE 650 MG TABLET 650 MG: at 09:14

## 2020-01-01 RX ADMIN — GABAPENTIN 300 MG: 300 CAPSULE ORAL at 08:07

## 2020-01-01 RX ADMIN — HYDROCODONE BITARTRATE AND ACETAMINOPHEN 1 TABLET: 5; 325 TABLET ORAL at 04:27

## 2020-01-01 RX ADMIN — DEXTROSE MONOHYDRATE 12.5 G: 25 INJECTION, SOLUTION INTRAVENOUS at 12:00

## 2020-01-01 RX ADMIN — LINEZOLID 600 MG: 600 TABLET, FILM COATED ORAL at 20:30

## 2020-01-01 RX ADMIN — SODIUM BICARBONATE 650 MG TABLET 650 MG: at 17:00

## 2020-01-01 RX ADMIN — PREDNISONE 40 MG: 20 TABLET ORAL at 09:58

## 2020-01-01 RX ADMIN — PRAVASTATIN SODIUM 40 MG: 20 TABLET ORAL at 20:30

## 2020-01-01 RX ADMIN — DEXTROSE MONOHYDRATE 75 ML/HR: 5 INJECTION, SOLUTION INTRAVENOUS at 12:00

## 2020-01-01 RX ADMIN — METOPROLOL SUCCINATE 50 MG: 50 TABLET, EXTENDED RELEASE ORAL at 08:56

## 2020-01-01 RX ADMIN — SODIUM BICARBONATE 650 MG TABLET 650 MG: at 09:30

## 2020-01-01 RX ADMIN — HYDRALAZINE HYDROCHLORIDE 100 MG: 50 TABLET, FILM COATED ORAL at 17:26

## 2020-01-01 RX ADMIN — SEVELAMER CARBONATE 1600 MG: 800 TABLET, FILM COATED ORAL at 12:29

## 2020-01-01 RX ADMIN — FERROUS SULFATE TAB 325 MG (65 MG ELEMENTAL FE) 325 MG: 325 (65 FE) TAB at 18:11

## 2020-01-01 RX ADMIN — TRAMADOL HYDROCHLORIDE 50 MG: 50 TABLET, FILM COATED ORAL at 19:37

## 2020-01-01 RX ADMIN — Medication 10 ML: at 22:13

## 2020-01-01 RX ADMIN — HYDRALAZINE HYDROCHLORIDE 100 MG: 50 TABLET, FILM COATED ORAL at 22:23

## 2020-01-01 RX ADMIN — TRAMADOL HYDROCHLORIDE 50 MG: 50 TABLET ORAL at 15:15

## 2020-01-01 RX ADMIN — DICYCLOMINE HYDROCHLORIDE 10 MG: 10 CAPSULE ORAL at 17:22

## 2020-01-01 RX ADMIN — DICYCLOMINE HYDROCHLORIDE 10 MG: 10 CAPSULE ORAL at 09:56

## 2020-01-01 RX ADMIN — INSULIN LISPRO 3 UNITS: 100 INJECTION, SOLUTION INTRAVENOUS; SUBCUTANEOUS at 22:00

## 2020-01-01 RX ADMIN — TUBERCULIN PURIFIED PROTEIN DERIVATIVE 5 UNITS: 5 INJECTION, SOLUTION INTRADERMAL at 14:17

## 2020-01-01 RX ADMIN — TRAMADOL HYDROCHLORIDE 50 MG: 50 TABLET ORAL at 23:59

## 2020-01-01 RX ADMIN — LORAZEPAM 1 MG: 2 INJECTION, SOLUTION INTRAMUSCULAR; INTRAVENOUS at 04:37

## 2020-01-01 RX ADMIN — PREGABALIN 150 MG: 150 CAPSULE ORAL at 22:22

## 2020-01-01 RX ADMIN — TUBERCULIN PURIFIED PROTEIN DERIVATIVE 5 UNITS: 5 INJECTION, SOLUTION INTRADERMAL at 12:11

## 2020-01-01 RX ADMIN — EPOETIN ALFA-EPBX 12000 UNITS: 10000 INJECTION, SOLUTION INTRAVENOUS; SUBCUTANEOUS at 12:00

## 2020-01-01 RX ADMIN — HYDROMORPHONE HYDROCHLORIDE 0.2 MG: 1 INJECTION, SOLUTION INTRAMUSCULAR; INTRAVENOUS; SUBCUTANEOUS at 12:39

## 2020-01-01 RX ADMIN — PIPERACILLIN AND TAZOBACTAM 4.5 G: 4; .5 INJECTION, POWDER, FOR SOLUTION INTRAVENOUS at 06:21

## 2020-01-01 RX ADMIN — INSULIN LISPRO 2 UNITS: 100 INJECTION, SOLUTION INTRAVENOUS; SUBCUTANEOUS at 12:10

## 2020-01-01 RX ADMIN — AMLODIPINE BESYLATE 10 MG: 10 TABLET ORAL at 06:08

## 2020-01-01 RX ADMIN — LINEZOLID 600 MG: 600 INJECTION, SOLUTION INTRAVENOUS at 23:03

## 2020-01-01 RX ADMIN — HEPARIN SODIUM 5000 UNITS: 5000 INJECTION INTRAVENOUS; SUBCUTANEOUS at 06:21

## 2020-01-01 RX ADMIN — DEXTROSE MONOHYDRATE 75 ML/HR: 5 INJECTION, SOLUTION INTRAVENOUS at 05:38

## 2020-01-01 RX ADMIN — Medication 10 ML: at 21:35

## 2020-01-01 RX ADMIN — PIPERACILLIN AND TAZOBACTAM 4.5 G: 4; .5 INJECTION, POWDER, FOR SOLUTION INTRAVENOUS at 17:12

## 2020-01-01 RX ADMIN — EPOETIN ALFA-EPBX 20000 UNITS: 10000 INJECTION, SOLUTION INTRAVENOUS; SUBCUTANEOUS at 10:20

## 2020-01-01 RX ADMIN — CALCIUM GLUCONATE 1 G: 98 INJECTION, SOLUTION INTRAVENOUS at 16:00

## 2020-01-01 RX ADMIN — TRAMADOL HYDROCHLORIDE 50 MG: 50 TABLET ORAL at 16:16

## 2020-01-01 RX ADMIN — EPOETIN ALFA-EPBX 20000 UNITS: 10000 INJECTION, SOLUTION INTRAVENOUS; SUBCUTANEOUS at 15:47

## 2020-01-01 RX ADMIN — MAGNESIUM SULFATE HEPTAHYDRATE 4 G: 40 INJECTION, SOLUTION INTRAVENOUS at 09:55

## 2020-01-01 RX ADMIN — HUMAN INSULIN 4 UNITS: 100 INJECTION, SOLUTION SUBCUTANEOUS at 08:04

## 2020-01-01 RX ADMIN — INSULIN LISPRO 4 UNITS: 100 INJECTION, SOLUTION INTRAVENOUS; SUBCUTANEOUS at 23:35

## 2020-01-01 RX ADMIN — INSULIN LISPRO 6 UNITS: 100 INJECTION, SOLUTION INTRAVENOUS; SUBCUTANEOUS at 07:30

## 2020-01-01 RX ADMIN — GABAPENTIN 300 MG: 300 CAPSULE ORAL at 09:30

## 2020-01-01 RX ADMIN — TRAMADOL HYDROCHLORIDE 50 MG: 50 TABLET, FILM COATED ORAL at 14:01

## 2020-01-01 RX ADMIN — Medication 10 ML: at 14:00

## 2020-01-01 RX ADMIN — SODIUM CHLORIDE 9 UNITS/HR: 900 INJECTION, SOLUTION INTRAVENOUS at 19:50

## 2020-01-01 RX ADMIN — HUMAN INSULIN 6 UNITS: 100 INJECTION, SOLUTION SUBCUTANEOUS at 23:57

## 2020-01-01 RX ADMIN — TRAMADOL HYDROCHLORIDE 50 MG: 50 TABLET ORAL at 03:36

## 2020-01-01 RX ADMIN — SODIUM CHLORIDE, SODIUM LACTATE, POTASSIUM CHLORIDE, AND CALCIUM CHLORIDE 150 ML/HR: 600; 310; 30; 20 INJECTION, SOLUTION INTRAVENOUS at 01:31

## 2020-01-01 RX ADMIN — Medication 10 ML: at 22:48

## 2020-01-01 RX ADMIN — SODIUM CHLORIDE 500 ML: 900 INJECTION, SOLUTION INTRAVENOUS at 21:40

## 2020-01-01 RX ADMIN — ONDANSETRON 4 MG: 2 INJECTION INTRAMUSCULAR; INTRAVENOUS at 22:28

## 2020-01-01 RX ADMIN — SEVELAMER CARBONATE 1600 MG: 800 TABLET, FILM COATED ORAL at 11:48

## 2020-01-01 RX ADMIN — SODIUM CHLORIDE 100 ML/HR: 450 INJECTION, SOLUTION INTRAVENOUS at 21:20

## 2020-01-01 RX ADMIN — POTASSIUM CHLORIDE 10 MEQ: 750 TABLET, EXTENDED RELEASE ORAL at 12:03

## 2020-01-01 RX ADMIN — DICYCLOMINE HYDROCHLORIDE 10 MG: 10 CAPSULE ORAL at 09:58

## 2020-01-01 RX ADMIN — LINEZOLID 600 MG: 600 TABLET, FILM COATED ORAL at 21:42

## 2020-01-01 RX ADMIN — METOPROLOL SUCCINATE 50 MG: 50 TABLET, EXTENDED RELEASE ORAL at 06:08

## 2020-01-01 RX ADMIN — Medication 10 ML: at 05:33

## 2020-01-01 RX ADMIN — FERROUS SULFATE TAB 325 MG (65 MG ELEMENTAL FE) 325 MG: 325 (65 FE) TAB at 16:55

## 2020-01-01 RX ADMIN — INSULIN GLARGINE 15 UNITS: 100 INJECTION, SOLUTION SUBCUTANEOUS at 09:12

## 2020-01-01 RX ADMIN — CALCIUM GLUCONATE 4 G: 98 INJECTION, SOLUTION INTRAVENOUS at 09:27

## 2020-01-01 RX ADMIN — NITROGLYCERIN 10 MCG/MIN: 20 INJECTION INTRAVENOUS at 19:02

## 2020-01-01 RX ADMIN — GABAPENTIN 300 MG: 300 CAPSULE ORAL at 08:53

## 2020-01-01 RX ADMIN — SODIUM CHLORIDE 150 ML/HR: 450 INJECTION, SOLUTION INTRAVENOUS at 17:46

## 2020-01-01 RX ADMIN — SEVELAMER CARBONATE 1600 MG: 800 TABLET, FILM COATED ORAL at 13:43

## 2020-01-01 RX ADMIN — SODIUM CHLORIDE 75 ML/HR: 9 INJECTION, SOLUTION INTRAVENOUS at 19:34

## 2020-01-01 RX ADMIN — SODIUM CHLORIDE 250 ML/HR: 900 INJECTION, SOLUTION INTRAVENOUS at 22:39

## 2020-01-01 RX ADMIN — PANCRELIPASE LIPASE, PANCRELIPASE PROTEASE, PANCRELIPASE AMYLASE 3 CAPSULE: 20000; 63000; 84000 CAPSULE, DELAYED RELEASE ORAL at 08:53

## 2020-01-01 RX ADMIN — HYDRALAZINE HYDROCHLORIDE 100 MG: 50 TABLET, FILM COATED ORAL at 18:11

## 2020-01-01 RX ADMIN — SODIUM BICARBONATE 650 MG TABLET 650 MG: at 23:14

## 2020-01-01 RX ADMIN — HYDRALAZINE HYDROCHLORIDE 100 MG: 50 TABLET, FILM COATED ORAL at 19:37

## 2020-01-01 RX ADMIN — SODIUM BICARBONATE 650 MG TABLET 650 MG: at 16:18

## 2020-01-01 RX ADMIN — POTASSIUM CHLORIDE, DEXTROSE MONOHYDRATE AND SODIUM CHLORIDE: 300; 5; 450 INJECTION, SOLUTION INTRAVENOUS at 09:20

## 2020-01-01 RX ADMIN — Medication 1334 MG: at 09:42

## 2020-01-01 RX ADMIN — DEXTROSE MONOHYDRATE 12.5 G: 25 INJECTION, SOLUTION INTRAVENOUS at 12:21

## 2020-01-01 RX ADMIN — SEVELAMER CARBONATE 1600 MG: 800 TABLET, FILM COATED ORAL at 15:04

## 2020-01-01 RX ADMIN — HUMAN INSULIN 2 UNITS: 100 INJECTION, SOLUTION SUBCUTANEOUS at 12:28

## 2020-01-01 RX ADMIN — DICYCLOMINE HYDROCHLORIDE 10 MG: 10 CAPSULE ORAL at 09:50

## 2020-01-01 RX ADMIN — HEPARIN SODIUM 5000 UNITS: 5000 INJECTION INTRAVENOUS; SUBCUTANEOUS at 14:58

## 2020-01-01 RX ADMIN — PANCRELIPASE LIPASE, PANCRELIPASE PROTEASE, PANCRELIPASE AMYLASE 4 CAPSULE: 20000; 63000; 84000 CAPSULE, DELAYED RELEASE ORAL at 17:31

## 2020-01-01 RX ADMIN — DEXTROSE MONOHYDRATE AND SODIUM CHLORIDE 50 ML/HR: 5; .45 INJECTION, SOLUTION INTRAVENOUS at 19:02

## 2020-01-01 RX ADMIN — MAGNESIUM SULFATE HEPTAHYDRATE 2 G: 40 INJECTION, SOLUTION INTRAVENOUS at 22:38

## 2020-01-01 RX ADMIN — PANCRELIPASE LIPASE, PANCRELIPASE PROTEASE, PANCRELIPASE AMYLASE 4 CAPSULE: 20000; 63000; 84000 CAPSULE, DELAYED RELEASE ORAL at 12:26

## 2020-01-01 RX ADMIN — SODIUM CHLORIDE 150 ML/HR: 450 INJECTION, SOLUTION INTRAVENOUS at 01:00

## 2020-01-01 RX ADMIN — Medication 10 ML: at 06:39

## 2020-01-01 RX ADMIN — PIPERACILLIN AND TAZOBACTAM 4.5 G: 4; .5 INJECTION, POWDER, FOR SOLUTION INTRAVENOUS at 05:31

## 2020-01-01 RX ADMIN — INSULIN GLARGINE 10 UNITS: 100 INJECTION, SOLUTION SUBCUTANEOUS at 21:46

## 2020-01-01 RX ADMIN — SODIUM BICARBONATE 650 MG TABLET 650 MG: at 16:07

## 2020-01-01 RX ADMIN — INSULIN GLARGINE 15 UNITS: 100 INJECTION, SOLUTION SUBCUTANEOUS at 21:27

## 2020-01-01 RX ADMIN — HYDRALAZINE HYDROCHLORIDE 100 MG: 50 TABLET, FILM COATED ORAL at 22:17

## 2020-01-01 RX ADMIN — HYDRALAZINE HYDROCHLORIDE 20 MG: 20 INJECTION, SOLUTION INTRAMUSCULAR; INTRAVENOUS at 12:52

## 2020-01-01 RX ADMIN — Medication 1334 MG: at 16:58

## 2020-01-01 RX ADMIN — PANTOPRAZOLE SODIUM 40 MG: 40 TABLET, DELAYED RELEASE ORAL at 06:09

## 2020-01-01 RX ADMIN — INSULIN LISPRO 6 UNITS: 100 INJECTION, SOLUTION INTRAVENOUS; SUBCUTANEOUS at 21:43

## 2020-01-01 RX ADMIN — Medication 1334 MG: at 17:31

## 2020-01-01 RX ADMIN — FAMOTIDINE 20 MG: 20 TABLET, FILM COATED ORAL at 08:53

## 2020-01-01 RX ADMIN — INSULIN LISPRO 15 UNITS: 100 INJECTION, SOLUTION INTRAVENOUS; SUBCUTANEOUS at 08:33

## 2020-01-01 RX ADMIN — HYDRALAZINE HYDROCHLORIDE 100 MG: 50 TABLET, FILM COATED ORAL at 21:27

## 2020-01-01 RX ADMIN — HYDRALAZINE HYDROCHLORIDE 100 MG: 50 TABLET, FILM COATED ORAL at 09:58

## 2020-01-01 RX ADMIN — INSULIN LISPRO 6 UNITS: 100 INJECTION, SOLUTION INTRAVENOUS; SUBCUTANEOUS at 22:23

## 2020-01-01 RX ADMIN — HUMAN INSULIN 3 UNITS: 100 INJECTION, SOLUTION SUBCUTANEOUS at 09:19

## 2020-01-01 RX ADMIN — PANCRELIPASE LIPASE, PANCRELIPASE PROTEASE, PANCRELIPASE AMYLASE 3 CAPSULE: 20000; 63000; 84000 CAPSULE, DELAYED RELEASE ORAL at 13:43

## 2020-01-01 RX ADMIN — SODIUM CHLORIDE 5 MG/HR: 900 INJECTION, SOLUTION INTRAVENOUS at 21:43

## 2020-01-01 RX ADMIN — TRAMADOL HYDROCHLORIDE 50 MG: 50 TABLET ORAL at 17:01

## 2020-01-01 RX ADMIN — AMLODIPINE BESYLATE 10 MG: 10 TABLET ORAL at 10:56

## 2020-01-01 RX ADMIN — CALCIUM GLUCONATE 2 G: 98 INJECTION, SOLUTION INTRAVENOUS at 19:35

## 2020-01-01 RX ADMIN — HEPARIN SODIUM 5000 UNITS: 5000 INJECTION INTRAVENOUS; SUBCUTANEOUS at 20:06

## 2020-01-01 RX ADMIN — INSULIN LISPRO 3 UNITS: 100 INJECTION, SOLUTION INTRAVENOUS; SUBCUTANEOUS at 07:45

## 2020-01-01 RX ADMIN — SODIUM BICARBONATE 650 MG TABLET 650 MG: at 21:45

## 2020-01-01 RX ADMIN — SODIUM BICARBONATE 650 MG TABLET 650 MG: at 11:33

## 2020-01-01 RX ADMIN — PREGABALIN 150 MG: 150 CAPSULE ORAL at 08:33

## 2020-01-01 RX ADMIN — INSULIN LISPRO 4 UNITS: 100 INJECTION, SOLUTION INTRAVENOUS; SUBCUTANEOUS at 16:59

## 2020-01-01 RX ADMIN — ACETAMINOPHEN 650 MG: 325 TABLET, FILM COATED ORAL at 02:10

## 2020-01-01 RX ADMIN — SEVELAMER CARBONATE 1600 MG: 800 TABLET, FILM COATED ORAL at 09:14

## 2020-01-01 RX ADMIN — PANCRELIPASE LIPASE, PANCRELIPASE PROTEASE, PANCRELIPASE AMYLASE 4 CAPSULE: 20000; 63000; 84000 CAPSULE, DELAYED RELEASE ORAL at 15:04

## 2020-01-01 RX ADMIN — PANCRELIPASE LIPASE, PANCRELIPASE PROTEASE, PANCRELIPASE AMYLASE 4 CAPSULE: 20000; 63000; 84000 CAPSULE, DELAYED RELEASE ORAL at 16:35

## 2020-01-01 RX ADMIN — CEFEPIME HYDROCHLORIDE 0.5 G: 1 INJECTION, POWDER, FOR SOLUTION INTRAMUSCULAR; INTRAVENOUS at 02:25

## 2020-01-01 RX ADMIN — FAMOTIDINE 20 MG: 20 TABLET, FILM COATED ORAL at 09:02

## 2020-01-01 RX ADMIN — SEVELAMER CARBONATE 1600 MG: 800 TABLET, FILM COATED ORAL at 08:26

## 2020-01-01 RX ADMIN — ACETAMINOPHEN 650 MG: 325 TABLET, FILM COATED ORAL at 21:42

## 2020-01-01 RX ADMIN — Medication 1334 MG: at 09:00

## 2020-01-01 RX ADMIN — HEPARIN SODIUM 5000 UNITS: 5000 INJECTION INTRAVENOUS; SUBCUTANEOUS at 04:27

## 2020-01-01 RX ADMIN — SODIUM BICARBONATE 100 MEQ: 84 INJECTION, SOLUTION INTRAVENOUS at 19:49

## 2020-01-01 RX ADMIN — HEPARIN SODIUM 5000 UNITS: 5000 INJECTION INTRAVENOUS; SUBCUTANEOUS at 22:12

## 2020-01-01 RX ADMIN — PREGABALIN 150 MG: 150 CAPSULE ORAL at 11:34

## 2020-01-01 RX ADMIN — HYDRALAZINE HYDROCHLORIDE 100 MG: 50 TABLET, FILM COATED ORAL at 20:06

## 2020-01-01 RX ADMIN — DEXTROSE MONOHYDRATE 75 ML/HR: 5 INJECTION, SOLUTION INTRAVENOUS at 10:01

## 2020-01-01 RX ADMIN — HYDRALAZINE HYDROCHLORIDE 100 MG: 50 TABLET, FILM COATED ORAL at 16:07

## 2020-01-01 RX ADMIN — INSULIN GLARGINE 20 UNITS: 100 INJECTION, SOLUTION SUBCUTANEOUS at 09:00

## 2020-01-01 RX ADMIN — SODIUM BICARBONATE 650 MG TABLET 650 MG: at 21:55

## 2020-01-01 RX ADMIN — INSULIN HUMAN 10 UNITS: 100 INJECTION, SOLUTION PARENTERAL at 11:46

## 2020-01-01 RX ADMIN — HEPARIN SODIUM 5000 UNITS: 5000 INJECTION INTRAVENOUS; SUBCUTANEOUS at 15:29

## 2020-01-01 RX ADMIN — CALCIUM GLUCONATE 2 G: 94 INJECTION, SOLUTION INTRAVENOUS at 09:54

## 2020-01-01 RX ADMIN — DEXTROSE MONOHYDRATE AND SODIUM CHLORIDE 150 ML/HR: 5; .45 INJECTION, SOLUTION INTRAVENOUS at 08:21

## 2020-01-01 RX ADMIN — Medication 10 ML: at 05:06

## 2020-01-01 RX ADMIN — PIPERACILLIN SODIUM AND TAZOBACTAM SODIUM 4.5 G: 4; .5 INJECTION, POWDER, LYOPHILIZED, FOR SOLUTION INTRAVENOUS at 19:29

## 2020-01-01 RX ADMIN — HYDRALAZINE HYDROCHLORIDE 100 MG: 50 TABLET, FILM COATED ORAL at 21:34

## 2020-01-01 RX ADMIN — HEPARIN SODIUM 5000 UNITS: 5000 INJECTION INTRAVENOUS; SUBCUTANEOUS at 05:31

## 2020-01-01 RX ADMIN — PRAVASTATIN SODIUM 40 MG: 20 TABLET ORAL at 21:35

## 2020-01-01 RX ADMIN — HYDRALAZINE HYDROCHLORIDE 100 MG: 50 TABLET, FILM COATED ORAL at 23:25

## 2020-01-01 RX ADMIN — INSULIN LISPRO 4 UNITS: 100 INJECTION, SOLUTION INTRAVENOUS; SUBCUTANEOUS at 21:27

## 2020-01-01 RX ADMIN — HYDRALAZINE HYDROCHLORIDE 100 MG: 50 TABLET, FILM COATED ORAL at 08:08

## 2020-01-01 RX ADMIN — INSULIN HUMAN 10 UNITS: 100 INJECTION, SOLUTION PARENTERAL at 16:46

## 2020-01-01 RX ADMIN — SODIUM CHLORIDE 500 ML: 900 INJECTION, SOLUTION INTRAVENOUS at 15:19

## 2020-01-01 RX ADMIN — INSULIN GLARGINE 15 UNITS: 100 INJECTION, SOLUTION SUBCUTANEOUS at 23:34

## 2020-01-01 RX ADMIN — LINEZOLID 600 MG: 600 TABLET, FILM COATED ORAL at 08:07

## 2020-01-01 RX ADMIN — LABETALOL HYDROCHLORIDE 20 MG: 5 INJECTION INTRAVENOUS at 09:43

## 2020-01-01 RX ADMIN — LINEZOLID 600 MG: 600 TABLET, FILM COATED ORAL at 09:14

## 2020-01-01 RX ADMIN — DICYCLOMINE HYDROCHLORIDE 10 MG: 10 CAPSULE ORAL at 18:11

## 2020-01-01 RX ADMIN — Medication 1 AMPULE: at 21:35

## 2020-01-01 RX ADMIN — HYDROCODONE BITARTRATE AND ACETAMINOPHEN 1 TABLET: 5; 325 TABLET ORAL at 22:28

## 2020-01-01 RX ADMIN — HYDRALAZINE HYDROCHLORIDE 100 MG: 50 TABLET, FILM COATED ORAL at 09:42

## 2020-01-01 RX ADMIN — HUMAN INSULIN 12 UNITS: 100 INJECTION, SOLUTION SUBCUTANEOUS at 11:45

## 2020-01-01 RX ADMIN — PANTOPRAZOLE SODIUM 40 MG: 40 TABLET, DELAYED RELEASE ORAL at 05:04

## 2020-01-01 RX ADMIN — HYDRALAZINE HYDROCHLORIDE 100 MG: 50 TABLET, FILM COATED ORAL at 08:33

## 2020-01-01 RX ADMIN — PANCRELIPASE LIPASE, PANCRELIPASE PROTEASE, PANCRELIPASE AMYLASE 4 CAPSULE: 20000; 63000; 84000 CAPSULE, DELAYED RELEASE ORAL at 12:36

## 2020-01-01 RX ADMIN — PANCRELIPASE LIPASE, PANCRELIPASE PROTEASE, PANCRELIPASE AMYLASE 4 CAPSULE: 20000; 63000; 84000 CAPSULE, DELAYED RELEASE ORAL at 09:50

## 2020-01-01 RX ADMIN — SODIUM CHLORIDE 10.8 UNITS/HR: 900 INJECTION, SOLUTION INTRAVENOUS at 18:01

## 2020-01-01 RX ADMIN — INSULIN GLARGINE 15 UNITS: 100 INJECTION, SOLUTION SUBCUTANEOUS at 14:00

## 2020-01-01 RX ADMIN — HYDRALAZINE HYDROCHLORIDE 100 MG: 50 TABLET, FILM COATED ORAL at 20:38

## 2020-01-01 RX ADMIN — DICYCLOMINE HYDROCHLORIDE 10 MG: 10 CAPSULE ORAL at 17:31

## 2020-01-01 RX ADMIN — Medication 10 ML: at 23:23

## 2020-01-01 RX ADMIN — DEXTROSE MONOHYDRATE AND SODIUM CHLORIDE 50 ML/HR: 5; .45 INJECTION, SOLUTION INTRAVENOUS at 20:41

## 2020-01-01 RX ADMIN — AMLODIPINE BESYLATE 10 MG: 10 TABLET ORAL at 09:56

## 2020-01-01 RX ADMIN — CLINDAMYCIN HYDROCHLORIDE 600 MG: 150 CAPSULE ORAL at 13:28

## 2020-01-01 RX ADMIN — TRAMADOL HYDROCHLORIDE 50 MG: 50 TABLET, FILM COATED ORAL at 00:56

## 2020-01-01 RX ADMIN — SEVELAMER CARBONATE 1600 MG: 800 TABLET, FILM COATED ORAL at 09:30

## 2020-01-01 RX ADMIN — AMLODIPINE BESYLATE 10 MG: 10 TABLET ORAL at 06:23

## 2020-01-01 RX ADMIN — Medication 1334 MG: at 17:00

## 2020-01-01 RX ADMIN — Medication 10 ML: at 22:00

## 2020-01-01 RX ADMIN — INSULIN LISPRO 12 UNITS: 100 INJECTION, SOLUTION INTRAVENOUS; SUBCUTANEOUS at 16:53

## 2020-01-01 RX ADMIN — PIPERACILLIN AND TAZOBACTAM 4.5 G: 4; .5 INJECTION, POWDER, FOR SOLUTION INTRAVENOUS at 17:29

## 2020-01-01 RX ADMIN — CLINDAMYCIN IN 5 PERCENT DEXTROSE 600 MG: 12 INJECTION, SOLUTION INTRAVENOUS at 05:19

## 2020-01-01 RX ADMIN — INSULIN GLARGINE 15 UNITS: 100 INJECTION, SOLUTION SUBCUTANEOUS at 08:35

## 2020-01-01 RX ADMIN — PANCRELIPASE LIPASE, PANCRELIPASE PROTEASE, PANCRELIPASE AMYLASE 4 CAPSULE: 20000; 63000; 84000 CAPSULE, DELAYED RELEASE ORAL at 16:58

## 2020-01-01 RX ADMIN — INSULIN HUMAN 10 UNITS: 100 INJECTION, SOLUTION PARENTERAL at 15:20

## 2020-01-01 RX ADMIN — NIFEDIPINE 30 MG: 30 TABLET, FILM COATED, EXTENDED RELEASE ORAL at 08:25

## 2020-01-01 RX ADMIN — AMLODIPINE BESYLATE 10 MG: 10 TABLET ORAL at 07:53

## 2020-01-01 RX ADMIN — HEPARIN SODIUM 5000 UNITS: 5000 INJECTION INTRAVENOUS; SUBCUTANEOUS at 05:11

## 2020-01-01 RX ADMIN — Medication 10 ML: at 20:30

## 2020-01-01 RX ADMIN — Medication 10 ML: at 14:17

## 2020-01-01 RX ADMIN — HUMAN INSULIN 9 UNITS: 100 INJECTION, SOLUTION SUBCUTANEOUS at 13:46

## 2020-01-01 RX ADMIN — DEXTROSE MONOHYDRATE 7 MCG/MIN: 50 INJECTION, SOLUTION INTRAVENOUS at 19:23

## 2020-01-01 RX ADMIN — PANCRELIPASE LIPASE, PANCRELIPASE PROTEASE, PANCRELIPASE AMYLASE 3 CAPSULE: 20000; 63000; 84000 CAPSULE, DELAYED RELEASE ORAL at 07:53

## 2020-01-01 RX ADMIN — PANCRELIPASE LIPASE, PANCRELIPASE PROTEASE, PANCRELIPASE AMYLASE 4 CAPSULE: 20000; 63000; 84000 CAPSULE, DELAYED RELEASE ORAL at 08:33

## 2020-01-01 RX ADMIN — INSULIN GLARGINE 15 UNITS: 100 INJECTION, SOLUTION SUBCUTANEOUS at 23:03

## 2020-01-01 RX ADMIN — CALCIUM GLUCONATE 2 G: 98 INJECTION, SOLUTION INTRAVENOUS at 12:21

## 2020-01-01 RX ADMIN — HYDRALAZINE HYDROCHLORIDE 100 MG: 50 TABLET, FILM COATED ORAL at 22:12

## 2020-01-01 RX ADMIN — CLINDAMYCIN IN 5 PERCENT DEXTROSE 600 MG: 12 INJECTION, SOLUTION INTRAVENOUS at 21:35

## 2020-01-01 RX ADMIN — HYDRALAZINE HYDROCHLORIDE 100 MG: 50 TABLET, FILM COATED ORAL at 17:48

## 2020-01-01 RX ADMIN — PIPERACILLIN AND TAZOBACTAM 4.5 G: 4; .5 INJECTION, POWDER, FOR SOLUTION INTRAVENOUS at 18:19

## 2020-01-01 RX ADMIN — Medication 10 ML: at 19:40

## 2020-01-01 RX ADMIN — HUMAN INSULIN 6 UNITS: 100 INJECTION, SOLUTION SUBCUTANEOUS at 20:03

## 2020-01-01 RX ADMIN — SODIUM BICARBONATE 650 MG TABLET 650 MG: at 21:35

## 2020-01-01 RX ADMIN — TRAMADOL HYDROCHLORIDE 50 MG: 50 TABLET, FILM COATED ORAL at 06:55

## 2020-01-01 RX ADMIN — Medication 1334 MG: at 08:56

## 2020-01-01 RX ADMIN — AMLODIPINE BESYLATE 10 MG: 10 TABLET ORAL at 08:56

## 2020-01-01 RX ADMIN — CALCIUM GLUCONATE 1 G: 98 INJECTION, SOLUTION INTRAVENOUS at 03:28

## 2020-01-01 RX ADMIN — SODIUM BICARBONATE 650 MG TABLET 650 MG: at 08:07

## 2020-01-01 RX ADMIN — PRAVASTATIN SODIUM 40 MG: 20 TABLET ORAL at 20:06

## 2020-01-01 RX ADMIN — PANCRELIPASE LIPASE, PANCRELIPASE PROTEASE, PANCRELIPASE AMYLASE 3 CAPSULE: 20000; 63000; 84000 CAPSULE, DELAYED RELEASE ORAL at 09:02

## 2020-01-01 RX ADMIN — CALCIUM GLUCONATE 1 G: 98 INJECTION, SOLUTION INTRAVENOUS at 19:48

## 2020-01-01 RX ADMIN — HEPARIN SODIUM 5000 UNITS: 5000 INJECTION INTRAVENOUS; SUBCUTANEOUS at 17:02

## 2020-01-01 RX ADMIN — PREGABALIN 150 MG: 150 CAPSULE ORAL at 23:02

## 2020-01-01 RX ADMIN — Medication 1 AMPULE: at 21:55

## 2020-01-01 RX ADMIN — HEPARIN SODIUM 5000 UNITS: 5000 INJECTION INTRAVENOUS; SUBCUTANEOUS at 21:27

## 2020-01-01 RX ADMIN — EPOETIN ALFA-EPBX 20000 UNITS: 10000 INJECTION, SOLUTION INTRAVENOUS; SUBCUTANEOUS at 10:50

## 2020-01-01 RX ADMIN — FERROUS SULFATE TAB 325 MG (65 MG ELEMENTAL FE) 325 MG: 325 (65 FE) TAB at 10:03

## 2020-01-01 RX ADMIN — Medication 10 ML: at 14:59

## 2020-01-01 RX ADMIN — FERROUS SULFATE TAB 325 MG (65 MG ELEMENTAL FE) 325 MG: 325 (65 FE) TAB at 16:07

## 2020-01-01 RX ADMIN — FERROUS SULFATE TAB 325 MG (65 MG ELEMENTAL FE) 325 MG: 325 (65 FE) TAB at 17:00

## 2020-01-01 RX ADMIN — PIPERACILLIN AND TAZOBACTAM 4.5 G: 4; .5 INJECTION, POWDER, FOR SOLUTION INTRAVENOUS at 17:03

## 2020-01-01 RX ADMIN — PREDNISONE 40 MG: 20 TABLET ORAL at 18:46

## 2020-01-01 RX ADMIN — METOPROLOL SUCCINATE 50 MG: 50 TABLET, EXTENDED RELEASE ORAL at 08:26

## 2020-01-01 RX ADMIN — SEVELAMER CARBONATE 1600 MG: 800 TABLET, FILM COATED ORAL at 07:53

## 2020-01-01 RX ADMIN — INSULIN GLARGINE 15 UNITS: 100 INJECTION, SOLUTION SUBCUTANEOUS at 22:29

## 2020-01-01 RX ADMIN — SODIUM BICARBONATE 650 MG TABLET 650 MG: at 08:36

## 2020-01-01 RX ADMIN — HYDRALAZINE HYDROCHLORIDE 100 MG: 50 TABLET, FILM COATED ORAL at 23:14

## 2020-01-01 RX ADMIN — Medication 1334 MG: at 12:12

## 2020-01-01 RX ADMIN — METOPROLOL SUCCINATE 50 MG: 50 TABLET, EXTENDED RELEASE ORAL at 09:01

## 2020-01-01 RX ADMIN — HYDRALAZINE HYDROCHLORIDE 100 MG: 50 TABLET, FILM COATED ORAL at 09:56

## 2020-01-01 RX ADMIN — TRAMADOL HYDROCHLORIDE 50 MG: 50 TABLET ORAL at 09:31

## 2020-01-01 RX ADMIN — INSULIN LISPRO 8 UNITS: 100 INJECTION, SOLUTION INTRAVENOUS; SUBCUTANEOUS at 11:30

## 2020-01-01 RX ADMIN — PANCRELIPASE LIPASE, PANCRELIPASE PROTEASE, PANCRELIPASE AMYLASE 4 CAPSULE: 20000; 63000; 84000 CAPSULE, DELAYED RELEASE ORAL at 08:26

## 2020-01-01 RX ADMIN — SODIUM BICARBONATE 650 MG TABLET 650 MG: at 20:56

## 2020-01-01 RX ADMIN — SODIUM BICARBONATE 650 MG TABLET 650 MG: at 08:56

## 2020-01-01 RX ADMIN — PRAVASTATIN SODIUM 40 MG: 20 TABLET ORAL at 21:45

## 2020-01-01 RX ADMIN — HYDRALAZINE HYDROCHLORIDE 100 MG: 50 TABLET, FILM COATED ORAL at 09:30

## 2020-01-01 RX ADMIN — SODIUM BICARBONATE 650 MG TABLET 650 MG: at 23:25

## 2020-01-01 RX ADMIN — METOPROLOL SUCCINATE 50 MG: 50 TABLET, EXTENDED RELEASE ORAL at 05:13

## 2020-01-01 RX ADMIN — NITROGLYCERIN 1 INCH: 20 OINTMENT TOPICAL at 16:03

## 2020-01-01 RX ADMIN — PANTOPRAZOLE SODIUM 40 MG: 40 TABLET, DELAYED RELEASE ORAL at 11:34

## 2020-01-01 RX ADMIN — Medication 10 ML: at 13:46

## 2020-01-01 RX ADMIN — DEXTROSE MONOHYDRATE 12.5 G: 25 INJECTION, SOLUTION INTRAVENOUS at 13:54

## 2020-01-01 RX ADMIN — TRAMADOL HYDROCHLORIDE 50 MG: 50 TABLET ORAL at 20:38

## 2020-01-01 RX ADMIN — PANTOPRAZOLE SODIUM 40 MG: 40 TABLET, DELAYED RELEASE ORAL at 09:54

## 2020-01-01 RX ADMIN — NIFEDIPINE 30 MG: 30 TABLET, FILM COATED, EXTENDED RELEASE ORAL at 08:33

## 2020-01-01 RX ADMIN — TRAMADOL HYDROCHLORIDE 50 MG: 50 TABLET ORAL at 15:54

## 2020-01-01 RX ADMIN — GABAPENTIN 300 MG: 300 CAPSULE ORAL at 09:15

## 2020-01-01 RX ADMIN — PANCRELIPASE LIPASE, PANCRELIPASE PROTEASE, PANCRELIPASE AMYLASE 4 CAPSULE: 20000; 63000; 84000 CAPSULE, DELAYED RELEASE ORAL at 18:11

## 2020-01-01 RX ADMIN — Medication 10 ML: at 05:51

## 2020-01-01 RX ADMIN — CLINDAMYCIN HYDROCHLORIDE 600 MG: 150 CAPSULE ORAL at 21:34

## 2020-01-01 RX ADMIN — PIPERACILLIN AND TAZOBACTAM 4.5 G: 4; .5 INJECTION, POWDER, FOR SOLUTION INTRAVENOUS at 18:10

## 2020-01-01 RX ADMIN — DIAZEPAM 2 MG: 2 TABLET ORAL at 12:21

## 2020-01-01 RX ADMIN — GABAPENTIN 300 MG: 100 CAPSULE ORAL at 13:28

## 2020-01-01 RX ADMIN — INSULIN HUMAN 10 UNITS: 100 INJECTION, SOLUTION PARENTERAL at 16:55

## 2020-01-01 RX ADMIN — CLINDAMYCIN IN 5 PERCENT DEXTROSE 600 MG: 12 INJECTION, SOLUTION INTRAVENOUS at 04:37

## 2020-01-01 RX ADMIN — HYDRALAZINE HYDROCHLORIDE 100 MG: 50 TABLET, FILM COATED ORAL at 16:57

## 2020-01-01 RX ADMIN — FERROUS SULFATE TAB 325 MG (65 MG ELEMENTAL FE) 325 MG: 325 (65 FE) TAB at 16:30

## 2020-01-01 RX ADMIN — CLINDAMYCIN HYDROCHLORIDE 600 MG: 150 CAPSULE ORAL at 17:01

## 2020-01-01 RX ADMIN — Medication 10 ML: at 13:36

## 2020-01-01 RX ADMIN — PANCRELIPASE LIPASE, PANCRELIPASE PROTEASE, PANCRELIPASE AMYLASE 4 CAPSULE: 20000; 63000; 84000 CAPSULE, DELAYED RELEASE ORAL at 12:09

## 2020-01-01 RX ADMIN — HYDRALAZINE HYDROCHLORIDE 100 MG: 50 TABLET, FILM COATED ORAL at 16:30

## 2020-01-01 RX ADMIN — TRAMADOL HYDROCHLORIDE 50 MG: 50 TABLET ORAL at 06:01

## 2020-01-01 RX ADMIN — SODIUM BICARBONATE 650 MG TABLET 650 MG: at 20:06

## 2020-01-01 RX ADMIN — INSULIN LISPRO 6 UNITS: 100 INJECTION, SOLUTION INTRAVENOUS; SUBCUTANEOUS at 12:15

## 2020-01-01 RX ADMIN — LINEZOLID 600 MG: 600 TABLET, FILM COATED ORAL at 20:38

## 2020-01-01 RX ADMIN — PREGABALIN 150 MG: 150 CAPSULE ORAL at 09:56

## 2020-01-01 RX ADMIN — SODIUM CHLORIDE 150 ML/HR: 450 INJECTION, SOLUTION INTRAVENOUS at 17:49

## 2020-01-01 RX ADMIN — CALCIUM GLUCONATE 1 G: 98 INJECTION, SOLUTION INTRAVENOUS at 15:15

## 2020-01-01 RX ADMIN — FERROUS SULFATE TAB 325 MG (65 MG ELEMENTAL FE) 325 MG: 325 (65 FE) TAB at 17:01

## 2020-01-01 RX ADMIN — INSULIN LISPRO 6 UNITS: 100 INJECTION, SOLUTION INTRAVENOUS; SUBCUTANEOUS at 11:46

## 2020-01-01 RX ADMIN — DEXTROSE MONOHYDRATE 25 G: 25 INJECTION, SOLUTION INTRAVENOUS at 11:31

## 2020-01-01 RX ADMIN — FERROUS SULFATE TAB 325 MG (65 MG ELEMENTAL FE) 325 MG: 325 (65 FE) TAB at 17:13

## 2020-01-01 RX ADMIN — PREGABALIN 150 MG: 150 CAPSULE ORAL at 08:56

## 2020-01-01 RX ADMIN — CLINDAMYCIN HYDROCHLORIDE 600 MG: 150 CAPSULE ORAL at 05:33

## 2020-01-01 RX ADMIN — HYDRALAZINE HYDROCHLORIDE 100 MG: 50 TABLET, FILM COATED ORAL at 21:56

## 2020-01-01 RX ADMIN — DICYCLOMINE HYDROCHLORIDE 10 MG: 10 CAPSULE ORAL at 08:56

## 2020-01-01 RX ADMIN — HEPARIN SODIUM 5000 UNITS: 5000 INJECTION INTRAVENOUS; SUBCUTANEOUS at 16:30

## 2020-01-01 RX ADMIN — HEPARIN SODIUM 5000 UNITS: 5000 INJECTION INTRAVENOUS; SUBCUTANEOUS at 17:49

## 2020-01-01 RX ADMIN — DICYCLOMINE HYDROCHLORIDE 10 MG: 10 CAPSULE ORAL at 11:34

## 2020-01-01 RX ADMIN — PREGABALIN 150 MG: 150 CAPSULE ORAL at 21:17

## 2020-01-01 RX ADMIN — FERROUS SULFATE TAB 325 MG (65 MG ELEMENTAL FE) 325 MG: 325 (65 FE) TAB at 09:42

## 2020-01-01 RX ADMIN — HEPARIN SODIUM 5000 UNITS: 5000 INJECTION INTRAVENOUS; SUBCUTANEOUS at 13:59

## 2020-01-01 RX ADMIN — INSULIN GLARGINE 15 UNITS: 100 INJECTION, SOLUTION SUBCUTANEOUS at 22:17

## 2020-01-01 RX ADMIN — HYDRALAZINE HYDROCHLORIDE 100 MG: 50 TABLET, FILM COATED ORAL at 11:34

## 2020-01-01 RX ADMIN — METOPROLOL SUCCINATE 50 MG: 50 TABLET, EXTENDED RELEASE ORAL at 09:54

## 2020-01-01 RX ADMIN — SEVELAMER CARBONATE 1600 MG: 800 TABLET, FILM COATED ORAL at 08:56

## 2020-01-01 RX ADMIN — Medication 10 ML: at 05:12

## 2020-01-01 RX ADMIN — AMLODIPINE BESYLATE 10 MG: 10 TABLET ORAL at 09:43

## 2020-01-01 RX ADMIN — Medication 1334 MG: at 13:28

## 2020-01-01 RX ADMIN — METOPROLOL SUCCINATE 50 MG: 50 TABLET, EXTENDED RELEASE ORAL at 06:39

## 2020-01-01 RX ADMIN — PRAVASTATIN SODIUM 40 MG: 20 TABLET ORAL at 21:26

## 2020-01-01 RX ADMIN — Medication 10 ML: at 05:35

## 2020-01-01 RX ADMIN — PRAVASTATIN SODIUM 40 MG: 20 TABLET ORAL at 20:38

## 2020-01-01 RX ADMIN — INSULIN HUMAN 10 UNITS: 100 INJECTION, SOLUTION PARENTERAL at 09:13

## 2020-01-01 RX ADMIN — HYDRALAZINE HYDROCHLORIDE 100 MG: 50 TABLET, FILM COATED ORAL at 09:02

## 2020-01-01 RX ADMIN — SODIUM BICARBONATE 650 MG TABLET 650 MG: at 21:26

## 2020-01-01 RX ADMIN — HYDRALAZINE HYDROCHLORIDE 100 MG: 50 TABLET, FILM COATED ORAL at 09:50

## 2020-01-01 RX ADMIN — SODIUM BICARBONATE 650 MG TABLET 650 MG: at 09:42

## 2020-01-01 RX ADMIN — HEPARIN SODIUM 5000 UNITS: 5000 INJECTION INTRAVENOUS; SUBCUTANEOUS at 05:18

## 2020-01-01 RX ADMIN — DEXTROSE MONOHYDRATE AND SODIUM CHLORIDE 125 ML/HR: 5; .9 INJECTION, SOLUTION INTRAVENOUS at 12:02

## 2020-01-01 RX ADMIN — EPOETIN ALFA-EPBX 20000 UNITS: 10000 INJECTION, SOLUTION INTRAVENOUS; SUBCUTANEOUS at 11:24

## 2020-01-01 RX ADMIN — SEVELAMER CARBONATE 1600 MG: 800 TABLET, FILM COATED ORAL at 08:33

## 2020-01-01 RX ADMIN — Medication 10 ML: at 05:29

## 2020-01-01 RX ADMIN — PRAVASTATIN SODIUM 40 MG: 20 TABLET ORAL at 21:56

## 2020-01-01 RX ADMIN — INSULIN LISPRO 4 UNITS: 100 INJECTION, SOLUTION INTRAVENOUS; SUBCUTANEOUS at 17:30

## 2020-01-01 RX ADMIN — TRAMADOL HYDROCHLORIDE 50 MG: 50 TABLET ORAL at 11:00

## 2020-01-01 RX ADMIN — Medication 1334 MG: at 12:26

## 2020-01-01 RX ADMIN — HYDRALAZINE HYDROCHLORIDE 100 MG: 50 TABLET, FILM COATED ORAL at 15:04

## 2020-01-01 RX ADMIN — PANCRELIPASE LIPASE, PANCRELIPASE PROTEASE, PANCRELIPASE AMYLASE 3 CAPSULE: 20000; 63000; 84000 CAPSULE, DELAYED RELEASE ORAL at 09:31

## 2020-01-01 RX ADMIN — PANTOPRAZOLE SODIUM 40 MG: 40 TABLET, DELAYED RELEASE ORAL at 05:35

## 2020-01-01 RX ADMIN — Medication 10 ML: at 23:01

## 2020-01-01 RX ADMIN — SEVELAMER CARBONATE 1600 MG: 800 TABLET, FILM COATED ORAL at 11:45

## 2020-01-01 RX ADMIN — PANCRELIPASE LIPASE, PANCRELIPASE PROTEASE, PANCRELIPASE AMYLASE 3 CAPSULE: 20000; 63000; 84000 CAPSULE, DELAYED RELEASE ORAL at 12:28

## 2020-01-01 RX ADMIN — SODIUM POLYSTYRENE SULFONATE 30 G: 15 SUSPENSION ORAL; RECTAL at 19:38

## 2020-01-01 RX ADMIN — LABETALOL HYDROCHLORIDE 20 MG: 5 INJECTION INTRAVENOUS at 21:40

## 2020-01-01 RX ADMIN — GABAPENTIN 300 MG: 300 CAPSULE ORAL at 09:03

## 2020-01-01 RX ADMIN — METOPROLOL SUCCINATE 50 MG: 50 TABLET, EXTENDED RELEASE ORAL at 09:43

## 2020-01-01 RX ADMIN — SODIUM BICARBONATE 50 MEQ: 84 INJECTION, SOLUTION INTRAVENOUS at 19:00

## 2020-01-01 RX ADMIN — INSULIN LISPRO 2 UNITS: 100 INJECTION, SOLUTION INTRAVENOUS; SUBCUTANEOUS at 12:27

## 2020-01-01 RX ADMIN — SEVELAMER CARBONATE 1600 MG: 800 TABLET, FILM COATED ORAL at 13:04

## 2020-01-01 RX ADMIN — Medication 1334 MG: at 16:07

## 2020-01-01 RX ADMIN — Medication 10 ML: at 21:50

## 2020-01-01 RX ADMIN — EPOETIN ALFA-EPBX 20000 UNITS: 10000 INJECTION, SOLUTION INTRAVENOUS; SUBCUTANEOUS at 10:00

## 2020-01-01 RX ADMIN — FERROUS SULFATE TAB 325 MG (65 MG ELEMENTAL FE) 325 MG: 325 (65 FE) TAB at 07:53

## 2020-01-01 RX ADMIN — ACETAMINOPHEN 650 MG: 325 TABLET, FILM COATED ORAL at 08:26

## 2020-01-01 RX ADMIN — PANCRELIPASE LIPASE, PANCRELIPASE PROTEASE, PANCRELIPASE AMYLASE 3 CAPSULE: 20000; 63000; 84000 CAPSULE, DELAYED RELEASE ORAL at 13:04

## 2020-01-01 RX ADMIN — DEXTROSE MONOHYDRATE 150 ML/HR: 5 INJECTION, SOLUTION INTRAVENOUS at 11:23

## 2020-01-01 RX ADMIN — PREGABALIN 150 MG: 150 CAPSULE ORAL at 22:28

## 2020-01-01 RX ADMIN — METOPROLOL SUCCINATE 50 MG: 50 TABLET, EXTENDED RELEASE ORAL at 06:00

## 2020-01-01 RX ADMIN — Medication 10 ML: at 14:58

## 2020-01-01 RX ADMIN — Medication 10 ML: at 05:36

## 2020-01-01 RX ADMIN — CALCIUM GLUCONATE 2 G: 98 INJECTION, SOLUTION INTRAVENOUS at 01:08

## 2020-01-01 RX ADMIN — DEXTROSE MONOHYDRATE AND SODIUM CHLORIDE 150 ML/HR: 5; .45 INJECTION, SOLUTION INTRAVENOUS at 23:00

## 2020-01-01 RX ADMIN — LINEZOLID 600 MG: 600 TABLET, FILM COATED ORAL at 08:54

## 2020-01-01 RX ADMIN — DEXTROSE MONOHYDRATE AND SODIUM CHLORIDE 150 ML/HR: 5; .45 INJECTION, SOLUTION INTRAVENOUS at 11:16

## 2020-01-01 RX ADMIN — PREGABALIN 150 MG: 150 CAPSULE ORAL at 17:20

## 2020-01-01 RX ADMIN — SODIUM BICARBONATE 650 MG TABLET 650 MG: at 09:00

## 2020-01-01 RX ADMIN — SODIUM BICARBONATE 650 MG TABLET 650 MG: at 17:48

## 2020-01-01 RX ADMIN — SEVELAMER CARBONATE 1600 MG: 800 TABLET, FILM COATED ORAL at 17:49

## 2020-01-01 RX ADMIN — HUMAN INSULIN 6 UNITS: 100 INJECTION, SOLUTION SUBCUTANEOUS at 03:45

## 2020-01-01 RX ADMIN — SODIUM BICARBONATE 650 MG TABLET 650 MG: at 09:56

## 2020-01-01 RX ADMIN — Medication 10 ML: at 14:24

## 2020-01-01 RX ADMIN — Medication 10 ML: at 20:07

## 2020-01-01 RX ADMIN — Medication 1334 MG: at 11:33

## 2020-01-01 RX ADMIN — PANCRELIPASE LIPASE, PANCRELIPASE PROTEASE, PANCRELIPASE AMYLASE 4 CAPSULE: 20000; 63000; 84000 CAPSULE, DELAYED RELEASE ORAL at 09:55

## 2020-01-01 RX ADMIN — HYDRALAZINE HYDROCHLORIDE 100 MG: 50 TABLET, FILM COATED ORAL at 23:02

## 2020-01-01 RX ADMIN — INSULIN HUMAN 10 UNITS: 100 INJECTION, SOLUTION PARENTERAL at 13:43

## 2020-01-01 RX ADMIN — HUMAN INSULIN 3 UNITS: 100 INJECTION, SOLUTION SUBCUTANEOUS at 16:56

## 2020-01-01 RX ADMIN — EPOETIN ALFA-EPBX 20000 UNITS: 10000 INJECTION, SOLUTION INTRAVENOUS; SUBCUTANEOUS at 15:38

## 2020-01-01 RX ADMIN — EPOETIN ALFA-EPBX 20000 UNITS: 10000 INJECTION, SOLUTION INTRAVENOUS; SUBCUTANEOUS at 15:53

## 2020-01-01 RX ADMIN — Medication 1334 MG: at 18:11

## 2020-01-01 RX ADMIN — PANTOPRAZOLE SODIUM 40 MG: 40 TABLET, DELAYED RELEASE ORAL at 06:33

## 2020-01-01 RX ADMIN — CEFEPIME HYDROCHLORIDE 0.5 G: 1 INJECTION, POWDER, FOR SOLUTION INTRAMUSCULAR; INTRAVENOUS at 22:04

## 2020-01-01 RX ADMIN — HYDRALAZINE HYDROCHLORIDE 100 MG: 50 TABLET, FILM COATED ORAL at 16:16

## 2020-01-01 RX ADMIN — ACETAMINOPHEN 650 MG: 325 TABLET, FILM COATED ORAL at 17:25

## 2020-01-01 RX ADMIN — SODIUM BICARBONATE 650 MG TABLET 650 MG: at 20:29

## 2020-01-01 RX ADMIN — PANCRELIPASE LIPASE, PANCRELIPASE PROTEASE, PANCRELIPASE AMYLASE 3 CAPSULE: 20000; 63000; 84000 CAPSULE, DELAYED RELEASE ORAL at 18:10

## 2020-01-01 RX ADMIN — SODIUM CHLORIDE 32.5 UNITS/HR: 900 INJECTION, SOLUTION INTRAVENOUS at 04:06

## 2020-01-01 RX ADMIN — HUMAN INSULIN 6 UNITS: 100 INJECTION, SOLUTION SUBCUTANEOUS at 12:00

## 2020-01-01 RX ADMIN — TRAMADOL HYDROCHLORIDE 50 MG: 50 TABLET, FILM COATED ORAL at 08:56

## 2020-01-01 RX ADMIN — ACETAMINOPHEN 650 MG: 325 TABLET, FILM COATED ORAL at 13:59

## 2020-01-01 RX ADMIN — Medication 10 ML: at 13:20

## 2020-01-01 RX ADMIN — CLINDAMYCIN IN 5 PERCENT DEXTROSE 600 MG: 12 INJECTION, SOLUTION INTRAVENOUS at 21:56

## 2020-01-01 RX ADMIN — SODIUM BICARBONATE 650 MG TABLET 650 MG: at 22:17

## 2020-01-01 RX ADMIN — DEXTROSE MONOHYDRATE AND SODIUM CHLORIDE 150 ML/HR: 5; .45 INJECTION, SOLUTION INTRAVENOUS at 03:45

## 2020-01-01 RX ADMIN — FERROUS SULFATE TAB 325 MG (65 MG ELEMENTAL FE) 325 MG: 325 (65 FE) TAB at 10:56

## 2020-01-01 RX ADMIN — SEVELAMER CARBONATE 1600 MG: 800 TABLET, FILM COATED ORAL at 16:57

## 2020-01-01 RX ADMIN — PRAVASTATIN SODIUM 40 MG: 20 TABLET ORAL at 22:48

## 2020-01-01 RX ADMIN — TRAMADOL HYDROCHLORIDE 50 MG: 50 TABLET ORAL at 09:42

## 2020-01-01 RX ADMIN — SODIUM CHLORIDE 1000 ML: 900 INJECTION, SOLUTION INTRAVENOUS at 18:52

## 2020-01-01 RX ADMIN — HYDRALAZINE HYDROCHLORIDE 100 MG: 50 TABLET, FILM COATED ORAL at 09:00

## 2020-01-01 RX ADMIN — INSULIN HUMAN 10 UNITS: 100 INJECTION, SOLUTION PARENTERAL at 17:25

## 2020-01-01 RX ADMIN — TRAMADOL HYDROCHLORIDE 50 MG: 50 TABLET ORAL at 09:04

## 2020-01-01 RX ADMIN — HYDRALAZINE HYDROCHLORIDE 20 MG: 20 INJECTION, SOLUTION INTRAMUSCULAR; INTRAVENOUS at 18:42

## 2020-01-01 RX ADMIN — INSULIN LISPRO 4 UNITS: 100 INJECTION, SOLUTION INTRAVENOUS; SUBCUTANEOUS at 23:03

## 2020-01-01 RX ADMIN — HEPARIN SODIUM 5000 UNITS: 5000 INJECTION INTRAVENOUS; SUBCUTANEOUS at 05:52

## 2020-01-01 RX ADMIN — DICYCLOMINE HYDROCHLORIDE 10 MG: 10 CAPSULE ORAL at 17:00

## 2020-01-01 RX ADMIN — HUMAN INSULIN 3 UNITS: 100 INJECTION, SOLUTION SUBCUTANEOUS at 22:00

## 2020-01-01 RX ADMIN — INSULIN LISPRO 2 UNITS: 100 INJECTION, SOLUTION INTRAVENOUS; SUBCUTANEOUS at 09:56

## 2020-01-01 RX ADMIN — HEPARIN SODIUM 5000 UNITS: 5000 INJECTION INTRAVENOUS; SUBCUTANEOUS at 05:06

## 2020-01-01 RX ADMIN — TRAMADOL HYDROCHLORIDE 50 MG: 50 TABLET ORAL at 17:25

## 2020-01-01 RX ADMIN — HUMAN INSULIN 6 UNITS: 100 INJECTION, SOLUTION SUBCUTANEOUS at 17:00

## 2020-01-01 RX ADMIN — SODIUM CHLORIDE, SODIUM LACTATE, POTASSIUM CHLORIDE, AND CALCIUM CHLORIDE 500 ML: 600; 310; 30; 20 INJECTION, SOLUTION INTRAVENOUS at 12:30

## 2020-01-01 RX ADMIN — PIPERACILLIN AND TAZOBACTAM 4.5 G: 4; .5 INJECTION, POWDER, FOR SOLUTION INTRAVENOUS at 06:02

## 2020-01-01 RX ADMIN — TRAMADOL HYDROCHLORIDE 50 MG: 50 TABLET, FILM COATED ORAL at 18:14

## 2020-01-01 RX ADMIN — PREGABALIN 150 MG: 150 CAPSULE ORAL at 09:58

## 2020-01-01 RX ADMIN — Medication 10 ML: at 13:05

## 2020-01-01 RX ADMIN — HYDRALAZINE HYDROCHLORIDE 100 MG: 50 TABLET, FILM COATED ORAL at 09:54

## 2020-01-01 RX ADMIN — PANCRELIPASE LIPASE, PANCRELIPASE PROTEASE, PANCRELIPASE AMYLASE 4 CAPSULE: 20000; 63000; 84000 CAPSULE, DELAYED RELEASE ORAL at 17:22

## 2020-01-01 RX ADMIN — SEVELAMER CARBONATE 1600 MG: 800 TABLET, FILM COATED ORAL at 17:26

## 2020-01-01 RX ADMIN — HYDRALAZINE HYDROCHLORIDE 100 MG: 50 TABLET, FILM COATED ORAL at 21:45

## 2020-01-01 RX ADMIN — LORAZEPAM 1 MG: 2 INJECTION INTRAMUSCULAR; INTRAVENOUS at 04:37

## 2020-01-01 RX ADMIN — DEXTROSE MONOHYDRATE AND SODIUM CHLORIDE 150 ML/HR: 5; .45 INJECTION, SOLUTION INTRAVENOUS at 01:44

## 2020-01-01 RX ADMIN — PANCRELIPASE LIPASE, PANCRELIPASE PROTEASE, PANCRELIPASE AMYLASE 3 CAPSULE: 20000; 63000; 84000 CAPSULE, DELAYED RELEASE ORAL at 17:26

## 2020-01-01 RX ADMIN — HYDRALAZINE HYDROCHLORIDE 100 MG: 50 TABLET, FILM COATED ORAL at 16:58

## 2020-01-01 RX ADMIN — INSULIN LISPRO 2 UNITS: 100 INJECTION, SOLUTION INTRAVENOUS; SUBCUTANEOUS at 18:01

## 2020-01-01 RX ADMIN — PANCRELIPASE LIPASE, PANCRELIPASE PROTEASE, PANCRELIPASE AMYLASE 3 CAPSULE: 20000; 63000; 84000 CAPSULE, DELAYED RELEASE ORAL at 16:57

## 2020-01-01 RX ADMIN — INSULIN LISPRO 2 UNITS: 100 INJECTION, SOLUTION INTRAVENOUS; SUBCUTANEOUS at 08:55

## 2020-01-01 RX ADMIN — POTASSIUM CHLORIDE 20 MEQ: 14.9 INJECTION, SOLUTION INTRAVENOUS at 09:19

## 2020-01-01 RX ADMIN — FERROUS SULFATE TAB 325 MG (65 MG ELEMENTAL FE) 325 MG: 325 (65 FE) TAB at 09:15

## 2020-01-01 RX ADMIN — SODIUM BICARBONATE 650 MG TABLET 650 MG: at 22:23

## 2020-01-01 RX ADMIN — INSULIN LISPRO 12 UNITS: 100 INJECTION, SOLUTION INTRAVENOUS; SUBCUTANEOUS at 16:30

## 2020-01-01 RX ADMIN — DEXTROSE MONOHYDRATE 75 ML/HR: 5 INJECTION, SOLUTION INTRAVENOUS at 02:35

## 2020-01-01 RX ADMIN — FERROUS SULFATE TAB 325 MG (65 MG ELEMENTAL FE) 325 MG: 325 (65 FE) TAB at 09:00

## 2020-01-01 RX ADMIN — Medication 10 ML: at 22:30

## 2020-01-01 RX ADMIN — SODIUM BICARBONATE 650 MG TABLET 650 MG: at 17:22

## 2020-01-01 RX ADMIN — SEVELAMER CARBONATE 1600 MG: 800 TABLET, FILM COATED ORAL at 12:28

## 2020-01-01 RX ADMIN — HUMAN INSULIN 3 UNITS: 100 INJECTION, SOLUTION SUBCUTANEOUS at 11:30

## 2020-01-01 RX ADMIN — CEFEPIME HYDROCHLORIDE 0.5 G: 1 INJECTION, POWDER, FOR SOLUTION INTRAMUSCULAR; INTRAVENOUS at 21:50

## 2020-01-01 RX ADMIN — CALCIUM GLUCONATE 2 G: 94 INJECTION, SOLUTION INTRAVENOUS at 23:12

## 2020-01-01 RX ADMIN — SEVELAMER CARBONATE 1600 MG: 800 TABLET, FILM COATED ORAL at 09:54

## 2020-01-01 RX ADMIN — FAMOTIDINE 20 MG: 20 TABLET, FILM COATED ORAL at 08:07

## 2020-01-01 RX ADMIN — INSULIN LISPRO 3 UNITS: 100 INJECTION, SOLUTION INTRAVENOUS; SUBCUTANEOUS at 11:30

## 2020-01-01 RX ADMIN — LABETALOL HYDROCHLORIDE 20 MG: 5 INJECTION INTRAVENOUS at 22:16

## 2020-01-01 RX ADMIN — INSULIN GLARGINE 15 UNITS: 100 INJECTION, SOLUTION SUBCUTANEOUS at 08:59

## 2020-01-01 RX ADMIN — PIPERACILLIN AND TAZOBACTAM 4.5 G: 4; .5 INJECTION, POWDER, FOR SOLUTION INTRAVENOUS at 05:52

## 2020-01-01 RX ADMIN — FERROUS SULFATE TAB 325 MG (65 MG ELEMENTAL FE) 325 MG: 325 (65 FE) TAB at 18:46

## 2020-01-01 RX ADMIN — NIFEDIPINE 30 MG: 30 TABLET, FILM COATED, EXTENDED RELEASE ORAL at 09:31

## 2020-01-01 RX ADMIN — Medication 10 ML: at 05:18

## 2020-01-01 RX ADMIN — HEPARIN SODIUM 5000 UNITS: 5000 INJECTION INTRAVENOUS; SUBCUTANEOUS at 06:02

## 2020-01-01 RX ADMIN — SODIUM BICARBONATE 50 MEQ: 84 INJECTION, SOLUTION INTRAVENOUS at 18:54

## 2020-01-01 RX ADMIN — PIPERACILLIN AND TAZOBACTAM 4.5 G: 4; .5 INJECTION, POWDER, FOR SOLUTION INTRAVENOUS at 05:11

## 2020-01-01 RX ADMIN — PANCRELIPASE LIPASE, PANCRELIPASE PROTEASE, PANCRELIPASE AMYLASE 4 CAPSULE: 20000; 63000; 84000 CAPSULE, DELAYED RELEASE ORAL at 09:53

## 2020-01-01 RX ADMIN — Medication 667 MG: at 16:25

## 2020-01-01 RX ADMIN — SEVELAMER CARBONATE 1600 MG: 800 TABLET, FILM COATED ORAL at 11:49

## 2020-01-01 RX ADMIN — EPOETIN ALFA-EPBX 20000 UNITS: 10000 INJECTION, SOLUTION INTRAVENOUS; SUBCUTANEOUS at 15:06

## 2020-01-01 RX ADMIN — METOPROLOL SUCCINATE 50 MG: 50 TABLET, EXTENDED RELEASE ORAL at 09:56

## 2020-01-01 RX ADMIN — CLINDAMYCIN IN 5 PERCENT DEXTROSE 600 MG: 12 INJECTION, SOLUTION INTRAVENOUS at 13:00

## 2020-01-01 RX ADMIN — FERROUS SULFATE TAB 325 MG (65 MG ELEMENTAL FE) 325 MG: 325 (65 FE) TAB at 08:33

## 2020-01-01 RX ADMIN — LINEZOLID 600 MG: 600 TABLET, FILM COATED ORAL at 09:31

## 2020-01-01 RX ADMIN — DEXTROSE MONOHYDRATE AND SODIUM CHLORIDE 150 ML/HR: 5; .45 INJECTION, SOLUTION INTRAVENOUS at 00:04

## 2020-01-01 RX ADMIN — Medication 10 ML: at 06:09

## 2020-01-01 RX ADMIN — PREGABALIN 150 MG: 150 CAPSULE ORAL at 08:26

## 2020-01-01 RX ADMIN — PANCRELIPASE LIPASE, PANCRELIPASE PROTEASE, PANCRELIPASE AMYLASE 4 CAPSULE: 20000; 63000; 84000 CAPSULE, DELAYED RELEASE ORAL at 11:49

## 2020-01-01 RX ADMIN — INSULIN GLARGINE 15 UNITS: 100 INJECTION, SOLUTION SUBCUTANEOUS at 09:00

## 2020-01-01 RX ADMIN — SODIUM BICARBONATE 650 MG TABLET 650 MG: at 17:35

## 2020-01-01 RX ADMIN — CALCIUM GLUCONATE 1 G: 98 INJECTION, SOLUTION INTRAVENOUS at 06:25

## 2020-01-01 RX ADMIN — HEPARIN SODIUM 5000 UNITS: 5000 INJECTION INTRAVENOUS; SUBCUTANEOUS at 20:38

## 2020-01-01 RX ADMIN — TRAMADOL HYDROCHLORIDE 50 MG: 50 TABLET ORAL at 22:48

## 2020-01-01 RX ADMIN — FERROUS SULFATE TAB 325 MG (65 MG ELEMENTAL FE) 325 MG: 325 (65 FE) TAB at 17:50

## 2020-01-01 RX ADMIN — PANCRELIPASE LIPASE, PANCRELIPASE PROTEASE, PANCRELIPASE AMYLASE 3 CAPSULE: 20000; 63000; 84000 CAPSULE, DELAYED RELEASE ORAL at 11:45

## 2020-01-01 RX ADMIN — SODIUM BICARBONATE 650 MG TABLET 650 MG: at 22:48

## 2020-01-01 RX ADMIN — MAGNESIUM SULFATE HEPTAHYDRATE 4 G: 40 INJECTION, SOLUTION INTRAVENOUS at 01:31

## 2020-01-01 RX ADMIN — PANCRELIPASE LIPASE, PANCRELIPASE PROTEASE, PANCRELIPASE AMYLASE 4 CAPSULE: 20000; 63000; 84000 CAPSULE, DELAYED RELEASE ORAL at 11:48

## 2020-01-01 RX ADMIN — DEXTROSE MONOHYDRATE AND SODIUM CHLORIDE 150 ML/HR: 5; .45 INJECTION, SOLUTION INTRAVENOUS at 19:01

## 2020-01-01 RX ADMIN — SODIUM BICARBONATE 650 MG TABLET 650 MG: at 15:04

## 2020-01-01 RX ADMIN — HYDROMORPHONE HYDROCHLORIDE 0.2 MG: 1 INJECTION, SOLUTION INTRAMUSCULAR; INTRAVENOUS; SUBCUTANEOUS at 06:06

## 2020-01-01 RX ADMIN — METOPROLOL SUCCINATE 50 MG: 50 TABLET, EXTENDED RELEASE ORAL at 09:19

## 2020-01-01 RX ADMIN — INSULIN LISPRO 6 UNITS: 100 INJECTION, SOLUTION INTRAVENOUS; SUBCUTANEOUS at 22:18

## 2020-01-01 RX ADMIN — INFLUENZA VIRUS VACCINE 0.5 ML: 15; 15; 15; 15 SUSPENSION INTRAMUSCULAR at 13:36

## 2020-01-01 RX ADMIN — INSULIN LISPRO 2 UNITS: 100 INJECTION, SOLUTION INTRAVENOUS; SUBCUTANEOUS at 08:00

## 2020-01-01 RX ADMIN — SODIUM BICARBONATE 650 MG TABLET 650 MG: at 00:01

## 2020-01-01 RX ADMIN — Medication 10 ML: at 05:22

## 2020-01-01 RX ADMIN — HEPARIN SODIUM 5000 UNITS: 5000 INJECTION INTRAVENOUS; SUBCUTANEOUS at 13:47

## 2020-01-01 RX ADMIN — INSULIN LISPRO 9 UNITS: 100 INJECTION, SOLUTION INTRAVENOUS; SUBCUTANEOUS at 16:30

## 2020-01-01 RX ADMIN — PANCRELIPASE LIPASE, PANCRELIPASE PROTEASE, PANCRELIPASE AMYLASE 4 CAPSULE: 20000; 63000; 84000 CAPSULE, DELAYED RELEASE ORAL at 17:48

## 2020-01-01 RX ADMIN — INSULIN GLARGINE 15 UNITS: 100 INJECTION, SOLUTION SUBCUTANEOUS at 08:05

## 2020-01-01 RX ADMIN — EPOETIN ALFA-EPBX 20000 UNITS: 10000 INJECTION, SOLUTION INTRAVENOUS; SUBCUTANEOUS at 13:55

## 2020-01-01 RX ADMIN — FAMOTIDINE 20 MG: 20 TABLET, FILM COATED ORAL at 09:14

## 2020-01-01 RX ADMIN — AMLODIPINE BESYLATE 10 MG: 10 TABLET ORAL at 09:00

## 2020-01-01 RX ADMIN — HUMAN INSULIN 6 UNITS: 100 INJECTION, SOLUTION SUBCUTANEOUS at 17:32

## 2020-01-01 RX ADMIN — METOPROLOL SUCCINATE 50 MG: 50 TABLET, EXTENDED RELEASE ORAL at 06:22

## 2020-01-01 RX ADMIN — AMLODIPINE BESYLATE 10 MG: 10 TABLET ORAL at 19:37

## 2020-01-01 RX ADMIN — SODIUM BICARBONATE 650 MG TABLET 650 MG: at 18:10

## 2020-01-01 RX ADMIN — Medication 1334 MG: at 17:22

## 2020-01-01 RX ADMIN — SODIUM CHLORIDE 1000 ML: 900 INJECTION, SOLUTION INTRAVENOUS at 15:56

## 2020-01-01 RX ADMIN — AMLODIPINE BESYLATE 10 MG: 10 TABLET ORAL at 06:40

## 2020-01-01 RX ADMIN — HYDRALAZINE HYDROCHLORIDE 100 MG: 50 TABLET, FILM COATED ORAL at 21:42

## 2020-01-01 RX ADMIN — SODIUM BICARBONATE 650 MG TABLET 650 MG: at 18:11

## 2020-01-01 RX ADMIN — NITROGLYCERIN 2 INCH: 20 OINTMENT TOPICAL at 08:21

## 2020-01-01 RX ADMIN — Medication 1334 MG: at 12:37

## 2020-01-01 RX ADMIN — POTASSIUM CHLORIDE 10 MEQ: 750 TABLET, EXTENDED RELEASE ORAL at 17:50

## 2020-01-01 RX ADMIN — ACETAMINOPHEN 650 MG: 325 TABLET, FILM COATED ORAL at 09:42

## 2020-01-01 RX ADMIN — FERROUS SULFATE TAB 325 MG (65 MG ELEMENTAL FE) 325 MG: 325 (65 FE) TAB at 17:27

## 2020-01-01 RX ADMIN — INSULIN GLARGINE 15 UNITS: 100 INJECTION, SOLUTION SUBCUTANEOUS at 08:00

## 2020-01-01 RX ADMIN — AMLODIPINE BESYLATE 10 MG: 10 TABLET ORAL at 06:34

## 2020-01-01 RX ADMIN — SODIUM BICARBONATE 650 MG TABLET 650 MG: at 08:33

## 2020-01-01 RX ADMIN — PANCRELIPASE LIPASE, PANCRELIPASE PROTEASE, PANCRELIPASE AMYLASE 3 CAPSULE: 20000; 63000; 84000 CAPSULE, DELAYED RELEASE ORAL at 17:13

## 2020-01-01 RX ADMIN — HYDRALAZINE HYDROCHLORIDE 100 MG: 50 TABLET, FILM COATED ORAL at 10:56

## 2020-01-01 RX ADMIN — METOPROLOL SUCCINATE 50 MG: 50 TABLET, EXTENDED RELEASE ORAL at 07:53

## 2020-01-01 RX ADMIN — Medication 10 ML: at 21:29

## 2020-01-01 RX ADMIN — Medication 1 AMPULE: at 10:56

## 2020-01-01 RX ADMIN — MAGNESIUM SULFATE HEPTAHYDRATE 2 G: 40 INJECTION, SOLUTION INTRAVENOUS at 08:57

## 2020-01-01 RX ADMIN — CEFTRIAXONE SODIUM 1 G: 1 INJECTION, POWDER, FOR SOLUTION INTRAMUSCULAR; INTRAVENOUS at 15:33

## 2020-01-01 RX ADMIN — PANTOPRAZOLE SODIUM 40 MG: 40 TABLET, DELAYED RELEASE ORAL at 08:26

## 2020-01-01 RX ADMIN — HUMAN INSULIN 2 UNITS: 100 INJECTION, SOLUTION SUBCUTANEOUS at 07:30

## 2020-01-01 RX ADMIN — INSULIN LISPRO 2 UNITS: 100 INJECTION, SOLUTION INTRAVENOUS; SUBCUTANEOUS at 16:11

## 2020-01-01 RX ADMIN — FERROUS SULFATE TAB 325 MG (65 MG ELEMENTAL FE) 325 MG: 325 (65 FE) TAB at 09:31

## 2020-01-01 RX ADMIN — Medication 10 ML: at 15:31

## 2020-01-01 RX ADMIN — NIFEDIPINE 30 MG: 30 TABLET, FILM COATED, EXTENDED RELEASE ORAL at 09:03

## 2020-01-01 RX ADMIN — SEVELAMER CARBONATE 1600 MG: 800 TABLET, FILM COATED ORAL at 18:11

## 2020-01-01 RX ADMIN — HYDRALAZINE HYDROCHLORIDE 20 MG: 20 INJECTION, SOLUTION INTRAMUSCULAR; INTRAVENOUS at 00:46

## 2020-01-01 RX ADMIN — DEXTROSE MONOHYDRATE 25 G: 25 INJECTION, SOLUTION INTRAVENOUS at 18:55

## 2020-01-01 RX ADMIN — LINEZOLID 600 MG: 600 TABLET, FILM COATED ORAL at 21:26

## 2020-01-01 RX ADMIN — NIFEDIPINE 30 MG: 30 TABLET, FILM COATED, EXTENDED RELEASE ORAL at 08:56

## 2020-01-01 RX ADMIN — NIFEDIPINE 30 MG: 30 TABLET, FILM COATED, EXTENDED RELEASE ORAL at 09:54

## 2020-01-01 RX ADMIN — Medication 10 ML: at 21:45

## 2020-01-01 RX ADMIN — Medication 1334 MG: at 12:09

## 2020-01-01 RX ADMIN — PREGABALIN 150 MG: 150 CAPSULE ORAL at 17:49

## 2020-01-01 RX ADMIN — PRAVASTATIN SODIUM 40 MG: 20 TABLET ORAL at 22:22

## 2020-01-01 RX ADMIN — Medication 10 ML: at 10:00

## 2020-01-01 RX ADMIN — SODIUM BICARBONATE 650 MG TABLET 650 MG: at 09:54

## 2020-01-01 RX ADMIN — PANTOPRAZOLE SODIUM 40 MG: 40 TABLET, DELAYED RELEASE ORAL at 06:40

## 2020-01-01 RX ADMIN — Medication 1334 MG: at 16:59

## 2020-01-01 RX ADMIN — SODIUM CHLORIDE 10.8 UNITS/HR: 900 INJECTION, SOLUTION INTRAVENOUS at 23:16

## 2020-01-01 RX ADMIN — PANCRELIPASE LIPASE, PANCRELIPASE PROTEASE, PANCRELIPASE AMYLASE 3 CAPSULE: 20000; 63000; 84000 CAPSULE, DELAYED RELEASE ORAL at 09:14

## 2020-01-01 RX ADMIN — Medication 1 AMPULE: at 09:41

## 2020-01-01 RX ADMIN — Medication 10 ML: at 20:37

## 2020-01-01 RX ADMIN — MAGNESIUM SULFATE 4 G: 4 INJECTION INTRAVENOUS at 09:18

## 2020-01-01 RX ADMIN — HYDRALAZINE HYDROCHLORIDE 20 MG: 20 INJECTION, SOLUTION INTRAMUSCULAR; INTRAVENOUS at 03:47

## 2020-01-01 RX ADMIN — PANCRELIPASE LIPASE, PANCRELIPASE PROTEASE, PANCRELIPASE AMYLASE 4 CAPSULE: 20000; 63000; 84000 CAPSULE, DELAYED RELEASE ORAL at 08:56

## 2020-01-01 RX ADMIN — INSULIN LISPRO 2 UNITS: 100 INJECTION, SOLUTION INTRAVENOUS; SUBCUTANEOUS at 16:23

## 2020-01-01 RX ADMIN — HYDRALAZINE HYDROCHLORIDE 100 MG: 50 TABLET, FILM COATED ORAL at 08:56

## 2020-01-01 RX ADMIN — INSULIN GLARGINE 30 UNITS: 100 INJECTION, SOLUTION SUBCUTANEOUS at 09:13

## 2020-01-01 RX ADMIN — SODIUM BICARBONATE 650 MG TABLET 650 MG: at 09:58

## 2020-01-01 RX ADMIN — DEXTROSE MONOHYDRATE 4 MCG/MIN: 50 INJECTION, SOLUTION INTRAVENOUS at 19:14

## 2020-01-01 RX ADMIN — INSULIN LISPRO 15 UNITS: 100 INJECTION, SOLUTION INTRAVENOUS; SUBCUTANEOUS at 11:48

## 2020-01-01 RX ADMIN — HYDRALAZINE HYDROCHLORIDE 100 MG: 50 TABLET, FILM COATED ORAL at 17:22

## 2020-01-01 RX ADMIN — TRAMADOL HYDROCHLORIDE 50 MG: 50 TABLET, FILM COATED ORAL at 11:43

## 2020-01-01 RX ADMIN — NIFEDIPINE 30 MG: 30 TABLET, FILM COATED, EXTENDED RELEASE ORAL at 13:42

## 2020-01-01 RX ADMIN — HYDRALAZINE HYDROCHLORIDE 100 MG: 50 TABLET, FILM COATED ORAL at 21:35

## 2020-01-01 RX ADMIN — FERROUS SULFATE TAB 325 MG (65 MG ELEMENTAL FE) 325 MG: 325 (65 FE) TAB at 08:26

## 2020-01-01 RX ADMIN — Medication 10 ML: at 13:03

## 2020-01-01 RX ADMIN — SODIUM BICARBONATE 650 MG TABLET 650 MG: at 23:16

## 2020-01-01 RX ADMIN — PREGABALIN 150 MG: 150 CAPSULE ORAL at 09:52

## 2020-01-01 RX ADMIN — Medication 1334 MG: at 09:58

## 2020-01-01 RX ADMIN — HYDROMORPHONE HYDROCHLORIDE 0.2 MG: 1 INJECTION, SOLUTION INTRAMUSCULAR; INTRAVENOUS; SUBCUTANEOUS at 21:40

## 2020-01-01 RX ADMIN — FAMOTIDINE 20 MG: 20 TABLET, FILM COATED ORAL at 09:31

## 2020-01-01 RX ADMIN — HYDROCODONE BITARTRATE AND ACETAMINOPHEN 1 TABLET: 5; 325 TABLET ORAL at 10:19

## 2020-01-01 RX ADMIN — SODIUM BICARBONATE 650 MG TABLET 650 MG: at 21:34

## 2020-01-01 RX ADMIN — PRAVASTATIN SODIUM 40 MG: 20 TABLET ORAL at 00:01

## 2020-01-01 RX ADMIN — SODIUM BICARBONATE 650 MG TABLET 650 MG: at 09:04

## 2020-01-01 RX ADMIN — SODIUM CHLORIDE 32.5 UNITS/HR: 900 INJECTION, SOLUTION INTRAVENOUS at 06:48

## 2020-01-01 RX ADMIN — Medication 10 ML: at 21:32

## 2020-01-01 RX ADMIN — Medication 10 ML: at 22:24

## 2020-01-01 RX ADMIN — SODIUM CHLORIDE 1000 ML: 900 INJECTION, SOLUTION INTRAVENOUS at 19:15

## 2020-01-01 RX ADMIN — METOPROLOL SUCCINATE 50 MG: 50 TABLET, EXTENDED RELEASE ORAL at 10:57

## 2020-01-01 RX ADMIN — FERROUS SULFATE TAB 325 MG (65 MG ELEMENTAL FE) 325 MG: 325 (65 FE) TAB at 08:54

## 2020-01-01 RX ADMIN — HYDROMORPHONE HYDROCHLORIDE 0.2 MG: 1 INJECTION, SOLUTION INTRAMUSCULAR; INTRAVENOUS; SUBCUTANEOUS at 12:40

## 2020-01-01 RX ADMIN — HUMAN INSULIN 10 UNITS: 100 INJECTION, SOLUTION SUBCUTANEOUS at 21:29

## 2020-01-01 RX ADMIN — SEVELAMER CARBONATE 1600 MG: 800 TABLET, FILM COATED ORAL at 16:34

## 2020-01-01 RX ADMIN — HYDRALAZINE HYDROCHLORIDE 100 MG: 50 TABLET, FILM COATED ORAL at 08:26

## 2020-01-01 RX ADMIN — PANCRELIPASE LIPASE, PANCRELIPASE PROTEASE, PANCRELIPASE AMYLASE 4 CAPSULE: 20000; 63000; 84000 CAPSULE, DELAYED RELEASE ORAL at 09:58

## 2020-01-01 RX ADMIN — INSULIN LISPRO 15 UNITS: 100 INJECTION, SOLUTION INTRAVENOUS; SUBCUTANEOUS at 21:44

## 2020-01-16 NOTE — PROGRESS NOTES
Arrived to the [facility]. Retacrit injection completed. Patient tolerated well. Any issues or concerns during appointment: none. Patient aware of next infusion appointment on 01.27.2020 (date) at 1500 (time). Discharged in wheelchair to home with spouse.

## 2020-01-27 NOTE — PROGRESS NOTES
Arrived to the [facility]. Assessment completed and labs reviewed. Procrit injection completed. Patient tolerated well. Any issues or concerns during appointment: No. 
Patient aware of next infusion appointment on 02/24/20 at 330pm. 
Discharged via w/c accompanied by caregiver.

## 2020-01-29 NOTE — WOUND CARE
[address] [place] [address], [address] [address] Phone: [phone] Fax: [phone] Patient: Nora Valdes MRN: [id number]  SSN: [id number] YOB: 1940  Age: 67 y.o. Sex: male Return Appointment: Discharge from [address] [facility] with Fariba Quezada MD 
 
Instructions: Discharge from Wound Healing Center-Wound healed May Return if Needed if Wound Re-Occurs Should you experience increased redness, swelling, pain, foul odor, size of wound(s), or have a temperature over 101 degrees please contact the [address] [place] [address] Road at [phone] or if after hours contact your primary care physician or go to the hospital emergency department. Signed By: Tao Ortez RN   
 January 29, 2020

## 2020-01-29 NOTE — PROGRESS NOTES
Wound appears to be healed he will continue with compression stockings and see us on an as-needed basis. Wound Center Progress Note Patient: Jasmin Ferreira MRN: [id number]  SSN: [id number] YOB: 1940  Age: 67 y.o. Sex: male Subjective: Chief Complaint: Jasmin Ferreira is a 67 y.o. BLACK OR  male who presents with R lower leg lateral wound of 3 months duration. History of Present Illness:    
Appears to be healed he is released Wound Caused By: none Associated Signs and Symptoms: None Timing: No crispin 
Quality: Dull Severity: 0/10 Modifying Factors: None Current Wound Care: See nurses notes Past Medical History:  
Diagnosis Date  Acute renal failure superimposed on stage 3 chronic kidney disease ([address] 9/21/2016  Anemia   
 pt states he receives iron infusions  Arthritis OA generalized  Chronic kidney disease   
 not on HD- surgery done for access and fistula being removed. Per patient, no plans to proceed with dialysis  Chronic pancreatitis ([facility] 9/22/2016  Dermatophytosis of nail 12/6/2016  Diabetic neuropathy ([address] 9/22/2016  
 insulin sliding scale only- no oral meds- avg fastings avg fasting \"low 200's;  hypo @ 80 A1C 7.1 9/2019 per patient  Essential hypertension 9/22/2016  
 medication  GERD (gastroesophageal reflux disease)   
 controlled with med  Hypercholesterolemia  Iron (Fe) deficiency anemia 9/22/2016  Septicemia due to Klebsiella pneumoniae ([address] 9/22/2016  Systolic CHF, chronic ([address] 9/23/2016 ECHO 4/2018 EF- 60-65%  Type II diabetes mellitus with nephropathy ([address] 09/22/2016  Venous insufficiency 12/6/2016  Xerosis cutis 12/6/2016 Past Surgical History:  
Procedure Laterality Date  HX COLONOSCOPY    
 HX HERNIA REPAIR Left 2010  HX PROSTATECTOMY  2012  HX TURP  2012 FOR BPH  VASCULAR SURGERY PROCEDURE UNLIST Left 10/26/2017 AVF  VASCULAR SURGERY PROCEDURE UNLIST Left 2019 Ligation of left brachiobasilic fistula Family History Problem Relation Age of Onset  Diabetes Mother  Stroke Mother  Hypertension Mother  No Known Problems Father  Diabetes Sister  Diabetes Brother  Diabetes Sister  Diabetes Sister  Other Sister   
     fibromyalgia Social History Tobacco Use  Smoking status: Former Smoker Packs/day: 2.00 Years: 20.00 Pack years: 40.00 Last attempt to quit:  Years since quittin.0  Smokeless tobacco: Current User Substance Use Topics  Alcohol use: No  
   
Prior to Admission medications Medication Sig Start Date End Date Taking? Authorizing Provider  
traMADol (ULTRAM) 50 mg tablet Take 50 mg by mouth every six (6) hours as needed for Pain. Provider, Historical  
torsemide (DEMADEX) 20 mg tablet Take 40 mg by mouth daily. Provider, Historical  
insulin glargine (LANTUS,BASAGLAR) 100 unit/mL (3 mL) inpn 17 Units by SubCUTAneous route daily (with breakfast). Provider, Historical  
oxymetazoline (AFRIN) 0.05 % nasal spray 2 Sprays by Both Nostrils route once as needed for Congestion. 19   Provider, Historical  
Lactoperoxi/Gluc Oxid/Pot Thio (BIOTENE DRY MOUTH MM) Take 2 Sprays by mouth as needed for Other (dry mouth). Provider, Historical  
lipase-protease-amylase (CREON) 36,000-114,000- 180,000 unit cpDR capsule Take 3,600 Units by mouth See Admin Instructions. Take 2 capsules by mouth with each meal, and 1 capsule by mouth with each snack. Amirah Tovar MD  
dicyclomine (BENTYL) 10 mg capsule Take 10 mg by mouth two (2) times a day. Provider, Historical  
acetaminophen (TYLENOL) 325 mg tablet Take 2 Tabs by mouth every four (4) hours as needed for Pain.  19   Naomi Leiva MD  
ondansetron (ZOFRAN ODT) 4 mg disintegrating tablet Take 1 Tab by mouth every eight (8) hours as needed for Nausea. 11/9/19   Kellie Zimmerman MD  
warfarin (COUMADIN) 5 mg tablet Take 1 Tab by mouth every evening. Patient taking differently: Take 4 mg by mouth every evening. 11/9/19   Kellie Zimmerman MD  
pantothenic ac-min oil-pet,hyd (AQUAPHOR) 41 % ointment Apply  to affected area daily. Apply over legs 11/10/19   Kellie Zimmerman MD  
colestipol (COLESTID) 1 gram tablet Take 1 g by mouth two (2) times a day. Provider, Historical  
sodium bicarbonate 650 mg tablet Take  by mouth three (3) times daily. Provider, Historical  
cloNIDine (CATAPRES) 0.1 mg/24 hr ptwk 1 Patch by TransDERmal route every seven (7) days. Changes on Tuesdays- on ELVIRA chest 9/11/2019 Provider, Historical  
lidocaine 4 % patch Cut to appropriate size. Apply to intact skin for 12 hours, remove for 12 hours. Patient taking differently: 1 Patch by TransDERmal route as needed. Cut to appropriate size. Apply to intact skin for 12 hours, remove for 12 hours. States uses only occasionally and not on today 9/11/2019 Do not take morning of procedure. 7/12/19   JAMARCUS Acosta  
CALCITRIOL, BULK, by Does Not Apply route. Provider, Historical  
hydrALAZINE (APRESOLINE) 100 mg tablet Take 1 Tab by mouth three (3) times daily. 4/19/18   Carrie Aponte MD  
pregabalin (LYRICA) 50 mg capsule Take 1 Cap by mouth daily. Max Daily Amount: 50 mg. Patient taking differently: Take 150 mg by mouth two (2) times a day. 4/19/18   Carrie Aponte MD  
pravastatin (PRAVACHOL) 40 mg tablet Take 1 Tab by mouth nightly. 4/4/18   Clyde Bowden MD  
[facility] INSULIN 100 unit/mL kwikpen 5 units three times a day with meals plus correction scale, 2 units/50 > 150, max daily dose 25 units Patient not taking: Reported on 11/11/2019 2/28/18   John ANGUIANO PA-C  
calcifediol (RAYALDEE) 30 mcg Cs24 Take  by mouth nightly.     Provider, Historical  
 amLODIPine (NORVASC) 10 mg tablet Take 10 mg by mouth every morning. Provider, Historical  
metoprolol succinate (TOPROL-XL) 50 mg XL tablet Take 50 mg by mouth every morning. Provider, Historical  
omeprazole (PRILOSEC) 20 mg capsule Take 20 mg by mouth every morning. Provider, Historical  
 
No Known Allergies Review of Systems: A comprehensive review of systems was negative except for that written in the History of Present Illness. Lab Results Component Value Date/Time Hemoglobin A1c 8.4 (H) 11/03/2019 09:25 PM  
 Hemoglobin A1c (POC) 6.5 03/14/2018 11:20 AM  
  
 
Immunization History Administered Date(s) Administered  Pneumococcal Polysaccharide (PPSV-23) 02/07/2012  TB Skin Test (PPD) Intradermal 04/02/2018, 04/16/2018, 04/30/2018, 11/03/2019 Body mass index is 24.3 kg/m². Counseling regarding nutrition done: Yes Pateint counsled about risks of smoking and cessation Current medications: 
Current Outpatient Medications Medication Sig Dispense Refill  traMADol (ULTRAM) 50 mg tablet Take 50 mg by mouth every six (6) hours as needed for Pain.  torsemide (DEMADEX) 20 mg tablet Take 40 mg by mouth daily.  insulin glargine (LANTUS,BASAGLAR) 100 unit/mL (3 mL) inpn 17 Units by SubCUTAneous route daily (with breakfast).  oxymetazoline (AFRIN) 0.05 % nasal spray 2 Sprays by Both Nostrils route once as needed for Congestion.  Lactoperoxi/Gluc Oxid/Pot Thio (BIOTENE DRY MOUTH MM) Take 2 Sprays by mouth as needed for Other (dry mouth).  lipase-protease-amylase (CREON) 36,000-114,000- 180,000 unit cpDR capsule Take 3,600 Units by mouth See Admin Instructions. Take 2 capsules by mouth with each meal, and 1 capsule by mouth with each snack.  dicyclomine (BENTYL) 10 mg capsule Take 10 mg by mouth two (2) times a day.  acetaminophen (TYLENOL) 325 mg tablet Take 2 Tabs by mouth every four (4) hours as needed for Pain.  20 Tab 0  
  ondansetron (ZOFRAN ODT) 4 mg disintegrating tablet Take 1 Tab by mouth every eight (8) hours as needed for Nausea. 20 Tab 0  
 warfarin (COUMADIN) 5 mg tablet Take 1 Tab by mouth every evening. (Patient taking differently: Take 4 mg by mouth every evening.) 7 Tab 0  
 pantothenic ac-min oil-pet,hyd (AQUAPHOR) 41 % ointment Apply  to affected area daily. Apply over legs 53 g 0  
 colestipol (COLESTID) 1 gram tablet Take 1 g by mouth two (2) times a day.  sodium bicarbonate 650 mg tablet Take  by mouth three (3) times daily.  cloNIDine (CATAPRES) 0.1 mg/24 hr ptwk 1 Patch by TransDERmal route every seven (7) days. Changes on Tuesdays- on ELVIRA chest 9/11/2019  lidocaine 4 % patch Cut to appropriate size. Apply to intact skin for 12 hours, remove for 12 hours. (Patient taking differently: 1 Patch by TransDERmal route as needed. Cut to appropriate size. Apply to intact skin for 12 hours, remove for 12 hours. States uses only occasionally and not on today 9/11/2019 Do not take morning of procedure.) 14 Patch 0  
 CALCITRIOL, BULK, by Does Not Apply route.  hydrALAZINE (APRESOLINE) 100 mg tablet Take 1 Tab by mouth three (3) times daily. 90 Tab 2  pregabalin (LYRICA) 50 mg capsule Take 1 Cap by mouth daily. Max Daily Amount: 50 mg. (Patient taking differently: Take 150 mg by mouth two (2) times a day.) 10 Cap 0  
 pravastatin (PRAVACHOL) 40 mg tablet Take 1 Tab by mouth nightly. 30 Tab 0  
 HUMALOG KWIKPEN INSULIN 100 unit/mL kwikpen 5 units three times a day with meals plus correction scale, 2 units/50 > 150, max daily dose 25 units (Patient not taking: Reported on 11/11/2019) 5 Pen 11  
 calcifediol (RAYALDEE) 30 mcg Cs24 Take  by mouth nightly.  amLODIPine (NORVASC) 10 mg tablet Take 10 mg by mouth every morning.  metoprolol succinate (TOPROL-XL) 50 mg XL tablet Take 50 mg by mouth every morning.  omeprazole (PRILOSEC) 20 mg capsule Take 20 mg by mouth every morning. Objective:  
 
Physical Exam:  
 
Visit Vitals /70 (BP 1 Location: Right arm, BP Patient Position: Sitting) Pulse 60 Temp 98.2 °F (36.8 °C) Resp 20 Ht 6' (1.829 m) Wt 81.3 kg (179 lb 3.2 oz) SpO2 96% BMI 24.30 kg/m² General: well developed, well nourished, pleasant , NAD. Hygiene good Psych: cooperative. Pleasant. No anxiety or depression. Normal mood and affect. Neuro: alert and oriented to person/place/situation. Otherwise nonfocal. 
Derm: Normal turgor for age, dry skin HEENT: Normocephalic, atraumatic. EOMI. Conjunctiva clear. No scleral icterus. Neck: Normal range of motion. No masses. Chest: Good air entry bilaterally. Respirations nonlabored Cardio: Normal heart sounds,no rubs, murmurs or gallops Abdomen: Soft, nontender, nondistended, normoactive bowel sounds Lower extremities: color normal; temperature normal. Hair growth is not present. Calves are supple, nontender, approximately equally sized in comparison. Capillary refill <3 sec Diabetic Foot Ulcer/Neuropathic Is Wound Greater than 1.0 sq cm ? : No 
Re-Current Wound with Skin Substitue within Last Year : No 
X-Ray in Last 3 Months: No 
TONY In Last 6 Months : Yes Smoking Status: Non- Smoker Wound Free from Infection : Wound Culture Completed Is Wound Free of [place] , and / or Bio-[place]: Yes Malignant Process in Wound : No Biopsy Done Hemoglobin A1Cin Last 3 Months: Yes Compression Therapy of 20mm or greater ?: Yes Ulcer Description: Wound Leg Anterior;Mid;Right (Active) Number of days: 290 Wound Pretibial Right;Lateral (Active) Number of days: 86  
   
[REMOVED] Wound Leg Left; Anterior (Removed) Number of days: 375 [REMOVED] Wound Arm Left (Removed) Number of days: 167 [REMOVED] Wound Left (Removed) Number of days: 30  
   
[REMOVED] Wound Leg lower Right;Lateral (Removed) Dressing Status Clean, dry, and intact 1/29/2020  9:49 AM  
Non-staged Wound Description Full thickness 12/30/2019 10:34 AM  
Wound Length (cm) 0 cm 1/29/2020  9:49 AM  
Wound Width (cm) 0 cm 1/29/2020  9:49 AM  
Wound Depth (cm) 0 cm 1/29/2020  9:49 AM  
Wound Surface Area (cm^2) 0 cm^2 1/29/2020  9:49 AM  
Wound Volume (cm^3) 0 cm^3 1/29/2020  9:49 AM  
Condition of Base Epithelializing 1/29/2020  9:49 AM  
Epithelialization (%) 100 1/29/2020  9:49 AM  
Tissue Type Percent Black 20 % 12/17/2019 11:31 AM  
Tissue Type Percent Pink 100 12/30/2019 10:34 AM  
Tissue Type Percent Red 10 12/17/2019 11:31 AM  
Tissue Type Percent Yellow 40 12/3/2019  2:04 PM  
Drainage Amount None 1/29/2020  9:49 AM  
Drainage Color Serosanguinous 12/30/2019 10:34 AM  
Wound Odor None 1/29/2020  9:49 AM  
Tammy-wound Assessment Edema 12/30/2019 10:34 AM  
Margins Attached edges 12/10/2019 10:27 AM  
Cleansing and Cleansing Agents  Soap and water 1/29/2020  9:49 AM  
Dressing Changed Changed/New 12/30/2019 10:34 AM  
Procedure Tolerated Well 12/17/2019 11:31 AM  
Number of days: 57  
   
[REMOVED] Wound Leg lower Left;Lateral (Removed) Dressing Status Clean, dry, and intact 12/17/2019 11:31 AM  
Wound Length (cm) 3 cm 12/17/2019 11:31 AM  
Wound Width (cm) 5.5 cm 12/17/2019 11:31 AM  
Wound Depth (cm) 0 cm 12/17/2019 11:31 AM  
Wound Surface Area (cm^2) 16.5 cm^2 12/17/2019 11:31 AM  
Wound Volume (cm^3) 0 cm^3 12/17/2019 11:31 AM  
Condition of Base Other (comment) 12/17/2019 11:31 AM  
Drainage Amount Small 12/17/2019 11:31 AM  
Drainage Color Serous 12/17/2019 11:31 AM  
Wound Odor None 12/17/2019 11:31 AM  
Tammy-wound Assessment Intact 12/17/2019 11:31 AM  
Cleansing and Cleansing Agents  Soap and water 12/17/2019 11:31 AM  
Dressing Changed Changed/New 12/17/2019 11:31 AM  
Procedure Tolerated Well 12/17/2019 11:31 AM  
Number of days: 24 Data Review: No results found for this or any previous visit (from the past 24 hour(s)). Assessment:  
 
67 y.o. male with right lateral legvenous stasis venous stasis ulcer. Problem List  Date Reviewed: 2/1/2019 Codes Class Noted ESRD (end stage renal disease) ([facility] ICD-10-CM: N18.6 ICD-9-CM: 585.6  11/3/2019 Open wound of skin ICD-10-CM: T14. Marsha Seo ICD-9-CM: 879.8  11/3/2019 DVT (deep venous thrombosis) ([facility]) ICD-10-CM: I82.409 ICD-9-CM: 453.40  11/3/2019 Rhabdomyolysis ICD-10-CM: E78.20 ICD-9-CM: 728.88  4/30/2018 Metabolic acidosis YKI-34-NM: E87.2 ICD-9-CM: 276.2  4/30/2018 Renal failure (ARF), acute on chronic (HCC) ICD-10-CM: N17.9, N18.9 ICD-9-CM: 584.9, 585.9  4/29/2018 Dementia without behavioral disturbance (HCC) (Chronic) ICD-10-CM: F03.90 ICD-9-CM: 294.20  4/19/2018 Hypernatremia ICD-10-CM: E87.0 ICD-9-CM: 276.0  4/18/2018 Seizure ([facility] ICD-10-CM: R56.9 ICD-9-CM: 780.39  4/18/2018 Volume overload ICD-10-CM: E87.70 ICD-9-CM: 276.69  4/4/2018 Chest pain ICD-10-CM: R07.9 ICD-9-CM: 786.50  4/4/2018 Cauda equina compression (HCC) ICD-10-CM: G83.4 ICD-9-CM: 344.60  4/2/2018 Hypercholesterolemia ICD-10-CM: E78.00 ICD-9-CM: 272.0  Unknown Uncontrolled diabetes mellitus, with long-term current use of insulin (HCC) (Chronic) ICD-10-CM: E11.65, Z79.4 ICD-9-CM: 250.02, V58.67  2/28/2018 Carotid bruit ICD-10-CM: R09.89 ICD-9-CM: 785.9  2/28/2018 Arteriovenous fistula (HCC) ICD-10-CM: I77.0 ICD-9-CM: 447.0  10/31/2017 Overview Signed 10/31/2017  3:13 PM by Tejas Gay NP  
  10/26/17 (Dr. Risa Harris) Creation of left brachiobasilic fistula. Onychomycosis ICD-10-CM: B35.1 ICD-9-CM: 110.1  9/13/2017 Controlled type 2 diabetes mellitus with complication, with long-term current use of insulin (HCC) (Chronic) ICD-10-CM: E11.8, Z79.4 ICD-9-CM: 250.90, V58.67  9/13/2017 Stage 4 chronic kidney disease (HCC) (Chronic) ICD-10-CM: N18.4 ICD-9-CM: 585.4  4/4/2017 Stasis edema of both lower extremities (Chronic) ICD-10-CM: K73.483 ICD-9-CM: 459.30  4/4/2017 Cellulitis of left lower leg ICD-10-CM: L03.116 ICD-9-CM: 682.6  4/4/2017 Venous stasis ulcer of left lower extremity ([facility] ICD-10-CM: U22.984, H28.981 ICD-9-CM: 454.0  4/1/2017 Venous insufficiency (Chronic) ICD-10-CM: Q74.4 ICD-9-CM: 459.81  12/6/2016 Chronic systolic congestive heart failure (HCC) (Chronic) ICD-10-CM: I21.18 ICD-9-CM: 428.22, 428.0  9/23/2016 Septicemia due to Klebsiella pneumoniae [facility]) ICD-10-CM: A41.4 ICD-9-CM: 038.3  9/22/2016 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 
ICD-9-CM: 250.40, 583.81  9/22/2016 Essential hypertension ICD-10-CM: I10 
ICD-9-CM: 401.9  9/22/2016 GERD (gastroesophageal reflux disease) ICD-10-CM: K21.9 ICD-9-CM: 530.81  9/22/2016 Diabetic neuropathy (HCC) ICD-10-CM: E11.40 ICD-9-CM: 250.60, 357.2  9/22/2016 Chronic pancreatitis (HCC) (Chronic) ICD-10-CM: K86.1 ICD-9-CM: 827.2  9/22/2016 Iron (Fe) deficiency anemia ICD-10-CM: D50.9 ICD-9-CM: 280.9  9/22/2016 Acute renal failure superimposed on stage 3 chronic kidney disease (HCC) ICD-10-CM: N17.9, N18.3 ICD-9-CM: 584.9, 585.3  9/21/2016 Plan:  
 
Orders Placed This Encounter  WOUND CARE, DRESSING CHANGE Discharge from Wound Healing Center-Wound healed May Return if Needed if Wound Re-Occurs Standing Status:   Standing Number of Occurrences:   1 Patient understood and agrees with plan. Questions answered. Follow-up Information Follow up With Specialties Details Why Contact Info [facility] OP WOUND CARE Wound Care  Discharge from [phone] 
[phone] Any procedures done today in the [address] [facility] are documented in a separate note in [facility] and made part of this record by reference. Dictated using voice recognition software; proofread, but unrecognized errors may exist. 
 
Signed By: Michael Voss MD   
 January 29, 2020

## 2020-01-29 NOTE — WOUND CARE
Clinic Level of Care Assessment NAME:  Chelsy Villanueva YOB: 1940 GENDER: male MEDICAL RECORD NUMBER:  [id number] DATE:  1/29/2020 Wound Count Document in [address] [place] [address] Number of Wounds Assessed Points No Wounds/Ulcers [x]   0 Less than Three Wounds/Ulcers []   1  
3-6 Wounds/Ulcers []   2 Greater than 6 Wounds/Ulcers []   3 Ambulation Status Document in Coord/ZION/Mobility tab Status Definition Points Independent Independently able to ambulate. Fully able (without any assistance) to get on/off exam table/chair. []   0 Minimal Physical Assistance Requires physical assistance of one person to ambulate and/or position patient to be examined. Includes necessary physical assistance to position lower extremities on/off stool. [x]   1 Moderate Physical Assistance Requires at least one staff member to physically assist patient in ambulating into treatment room, and on/off exam table. []   2 Full Assistance Requires assistance of at least two staff members to transfer patient into treatment room and/or on/off exam table/chair. \"Total Transfer\". []   3 Dressing Complexity Document in [address] [place] [address] and Write Appropriate Order Complexity Definition Points No Dressing  [x]   0 Simple Minimal, simple dressing. i.e. Band-aid, gauze, simple wrap. []   1 Intermediate Moderately complicated requiring licensed personnel to apply i.e. collagen matrix, ointments, gels, alginates. []   2 Complex Complicated requiring licensed personnel to apply dressings 6 or more wounds. []   3 Teaching Effort Document in Education Tab Effort Definition Points No Teaching  []   0 Simple Reinforce two or less topics. Document in Education navigator. [x]   1 Intermediate Reinforce three to five topics and/or one additional  
new topic. Document in Education navigator. []   2 Complex Teach more than one new topic. New patient information  
packet reviewed and/or reinforce more than three topics. Document in Education navigator. HBO initial instruction. []   3 Patient Assessment and Planning Planning Definition Points Simple Multiple System Simple: Simple follow-up with routine assessment and planning. If Discharged, instructions and long term/follow-up care given to patient/caregiver. Discharged, instructions and/or After Visit Summary given to patient/caregiver and instructions completed. [x]   1 Intermediate Multiple System Intermediate: Contact with outside resources; i.e. Telephone calls to home health, [facility]. May include filling out forms and writing letters, arranging transportation, communication with insurance , vendors, etc.  Discharged, instructions and/or After Visit Summary given to patient/caregiver and instructions completed. []   2 Complex Multiple System Complex: Full, comprehensive assessment and planning. Follow the entire navigator under Wound Visit charting filling out each tab which includes OP Adm Database Screening, Education and CarePlan  HBO risk assessment completed. Discharged, instructions and/or After Visit Summary given to patient/caregiver and instructions completed. []   3 Is this the Patient's First Visit to the [facility] [address] No 
 
 
Is this Patient Established @ [facility] 
Yes Clinical Level of Care Points  0-2  Level 1 [] Points  3-5  Level 2 [x] Points  6-9  Level 3 [] Points  10-12  Level 4 [] Points  13-15  Level 5 [] Electronically signed by Cherrie Domínguez RN on 1/29/2020 at 10:11 AM

## 2020-02-24 NOTE — PROGRESS NOTES
Arrived to the [facility]. Retacrit held-B/P in right arm=195/71 Provided education on Retacrit Any issues or concerns during appointment:Patient states that he took his blood pressure medicine later than usual today Patient aware of next infusion appointment on Tuesday,March 10th @ 1000 Discharged home via wheelchair

## 2020-03-10 NOTE — WOUND CARE
Multilayer Compression Wrap 
 (Not Unna) Below the Knee NAME:  Mame Bryant YOB: 1940 MEDICAL RECORD NUMBER:  [id number] DATE:  3/10/2020 Removed old Multilayer wrap if indicated and wash leg with mild soap/water. Applied moisturizing agent to dry skin as needed. Applied primary and secondary dressing as ordered. Applied multilayered dressing below the knee to right lower leg. Instructed patient/caregiver not to remove dressing and to keep it clean and dry. Instructed patient/caregiver on complications to report to provider, such as pain, numbness in toes, heavy drainage, and slippage of dressing. Instructed patient on purpose of compression dressing and on activity and exercise recommendations.  
 
 
Electronically signed by Nayeli Canales on 3/10/2020 at 2:16 PM

## 2020-03-10 NOTE — PROGRESS NOTES
Mr. Berenice Duarte developed a large blister on his right anterior tibial area this was drained cultured and we applied Aquacel silver and ABDs as well along with 2 layer compression. He will return in 1 week. Wound Center Progress Note Patient: Angela Ortega MRN: [id number]  SSN: [id number] YOB: 1940  Age: 67 y.o. Sex: male Subjective: Chief Complaint: Angela Ortega is a 67 y.o. BLACK OR  male who presents with R lower leg anterior wound of 2 weeks duration. History of Present Illness:    
Blister was evacuated and cultured Wound Caused By: chronic pressure/irritation due to lymphedema/venous insufficiency Associated Signs and Symptoms: Drainage and mild pain Timing: Intermittent Quality: Burning Severity: 2/10 Modifying Factors: Lymphedema and anemia Current Wound Care see nurses notes Past Medical History:  
Diagnosis Date  Acute renal failure superimposed on stage 3 chronic kidney disease ([address] 9/21/2016  Anemia   
 pt states he receives iron infusions  Arthritis OA generalized  Chronic kidney disease   
 not on HD- surgery done for access and fistula being removed. Per patient, no plans to proceed with dialysis  Chronic pancreatitis ([address] 9/22/2016  Dermatophytosis of nail 12/6/2016  Diabetic neuropathy ([address] 9/22/2016  
 insulin sliding scale only- no oral meds- avg fastings avg fasting \"low 200's;  hypo @ 80 A1C 7.1 9/2019 per patient  Essential hypertension 9/22/2016  
 medication  GERD (gastroesophageal reflux disease)   
 controlled with med  Hypercholesterolemia  Iron (Fe) deficiency anemia 9/22/2016  Septicemia due to Klebsiella pneumoniae ([address] 9/22/2016  Systolic CHF, chronic ([address] 9/23/2016 ECHO 4/2018 EF- 60-65%  Type II diabetes mellitus with nephropathy ([address] 09/22/2016  Venous insufficiency 12/6/2016  Xerosis cutis 12/6/2016 Past Surgical History: Procedure Laterality Date  HX COLONOSCOPY    
 HX HERNIA REPAIR Left 2010  HX PROSTATECTOMY  2012  HX TURP  2012 FOR BPH  VASCULAR SURGERY PROCEDURE UNLIST Left 10/26/2017 AVF  VASCULAR SURGERY PROCEDURE UNLIST Left 2019 Ligation of left brachiobasilic fistula Family History Problem Relation Age of Onset  Diabetes Mother  Stroke Mother  Hypertension Mother  No Known Problems Father  Diabetes Sister  Diabetes Brother  Diabetes Sister  Diabetes Sister  Other Sister   
     fibromyalgia Social History Tobacco Use  Smoking status: Former Smoker Packs/day: 2.00 Years: 20.00 Pack years: 40.00 Last attempt to quit:  Years since quittin.2  Smokeless tobacco: Current User Substance Use Topics  Alcohol use: No  
   
Prior to Admission medications Medication Sig Start Date End Date Taking? Authorizing Provider  
traMADol (ULTRAM) 50 mg tablet Take 50 mg by mouth every six (6) hours as needed for Pain. Yes Provider, Historical  
torsemide (DEMADEX) 20 mg tablet Take 40 mg by mouth daily. Yes Provider, Historical  
insulin glargine (LANTUS,BASAGLAR) 100 unit/mL (3 mL) inpn 17 Units by SubCUTAneous route daily (with breakfast). Yes Provider, Historical  
lipase-protease-amylase (CREON) 36,000-114,000- 180,000 unit cpDR capsule Take 3,600 Units by mouth See Admin Instructions. Take 2 capsules by mouth with each meal, and 1 capsule by mouth with each snack. Yes Jagjit Rincon MD  
dicyclomine (BENTYL) 10 mg capsule Take 10 mg by mouth two (2) times a day. Yes Provider, Historical  
ondansetron (ZOFRAN ODT) 4 mg disintegrating tablet Take 1 Tab by mouth every eight (8) hours as needed for Nausea. 19  Yes Colonel Eriberto MD  
warfarin (COUMADIN) 5 mg tablet Take 1 Tab by mouth every evening. Patient taking differently: Take 4 mg by mouth every evening.  19  Yes Colonel Eriberto MD  
 colestipol (COLESTID) 1 gram tablet Take 1 g by mouth two (2) times a day. Yes Provider, Historical  
sodium bicarbonate 650 mg tablet Take  by mouth three (3) times daily. Yes Provider, Historical  
cloNIDine (CATAPRES) 0.1 mg/24 hr ptwk 1 Patch by TransDERmal route every seven (7) days. Changes on Tuesdays- on ELVIRA chest 9/11/2019 Yes Provider, Historical  
lidocaine 4 % patch Cut to appropriate size. Apply to intact skin for 12 hours, remove for 12 hours. Patient taking differently: 1 Patch by TransDERmal route as needed. Cut to appropriate size. Apply to intact skin for 12 hours, remove for 12 hours. States uses only occasionally and not on today 9/11/2019 Do not take morning of procedure. 7/12/19  Yes JAMARCUS Alvarez  
CALCITRIOL, BULK, by Does Not Apply route. Yes Provider, Historical  
hydrALAZINE (APRESOLINE) 100 mg tablet Take 1 Tab by mouth three (3) times daily. 4/19/18  Yes Liss Elmore MD  
pregabalin (LYRICA) 50 mg capsule Take 1 Cap by mouth daily. Max Daily Amount: 50 mg. Patient taking differently: Take 150 mg by mouth two (2) times a day. 4/19/18  Yes Liss Elmore MD  
pravastatin (PRAVACHOL) 40 mg tablet Take 1 Tab by mouth nightly. 4/4/18  Yes Celia Johnson MD  
calcifediol (RAYALDEE) 30 mcg Cs24 Take  by mouth nightly. Yes Provider, Historical  
amLODIPine (NORVASC) 10 mg tablet Take 10 mg by mouth every morning. Yes Provider, Historical  
metoprolol succinate (TOPROL-XL) 50 mg XL tablet Take 50 mg by mouth every morning. Yes Provider, Historical  
omeprazole (PRILOSEC) 20 mg capsule Take 20 mg by mouth every morning. Yes Provider, Historical  
oxymetazoline (AFRIN) 0.05 % nasal spray 2 Sprays by Both Nostrils route once as needed for Congestion. 11/13/19   Provider, Historical  
Lactoperoxi/Gluc Oxid/Pot Thio (BIOTENE DRY MOUTH MM) Take 2 Sprays by mouth as needed for Other (dry mouth).     Provider, Historical  
 acetaminophen (TYLENOL) 325 mg tablet Take 2 Tabs by mouth every four (4) hours as needed for Pain. 11/9/19   Amena Ribeiro MD  
pantothenic ac-min oil-pet,hyd (AQUAPHOR) 41 % ointment Apply  to affected area daily. Apply over legs 11/10/19   Amena Ribeiro MD  
[facility] INSULIN 100 unit/mL kwikpen 5 units three times a day with meals plus correction scale, 2 units/50 > 150, max daily dose 25 units Patient not taking: Reported on 11/11/2019 2/28/18   Mitch Aponte PA-C No Known Allergies Review of Systems: A comprehensive review of systems was negative except for that written in the History of Present Illness. Lab Results Component Value Date/Time Hemoglobin A1c 8.4 (H) 11/03/2019 09:25 PM  
 Hemoglobin A1c (POC) 6.5 03/14/2018 11:20 AM  
  
 
Immunization History Administered Date(s) Administered  Pneumococcal Polysaccharide (PPSV-23) 02/07/2012  TB Skin Test (PPD) Intradermal 04/02/2018, 04/16/2018, 04/30/2018, 11/03/2019 Body mass index is 24.55 kg/m². Counseling regarding nutrition done: No  
 
Current medications: 
Current Outpatient Medications Medication Sig Dispense Refill  traMADol (ULTRAM) 50 mg tablet Take 50 mg by mouth every six (6) hours as needed for Pain.  torsemide (DEMADEX) 20 mg tablet Take 40 mg by mouth daily.  insulin glargine (LANTUS,BASAGLAR) 100 unit/mL (3 mL) inpn 17 Units by SubCUTAneous route daily (with breakfast).  lipase-protease-amylase (CREON) 36,000-114,000- 180,000 unit cpDR capsule Take 3,600 Units by mouth See Admin Instructions. Take 2 capsules by mouth with each meal, and 1 capsule by mouth with each snack.  dicyclomine (BENTYL) 10 mg capsule Take 10 mg by mouth two (2) times a day.  ondansetron (ZOFRAN ODT) 4 mg disintegrating tablet Take 1 Tab by mouth every eight (8) hours as needed for Nausea. 20 Tab 0  
 warfarin (COUMADIN) 5 mg tablet Take 1 Tab by mouth every evening. (Patient taking differently: Take 4 mg by mouth every evening.) 7 Tab 0  
 colestipol (COLESTID) 1 gram tablet Take 1 g by mouth two (2) times a day.  sodium bicarbonate 650 mg tablet Take  by mouth three (3) times daily.  cloNIDine (CATAPRES) 0.1 mg/24 hr ptwk 1 Patch by TransDERmal route every seven (7) days. Changes on Tuesdays- on ELVIRA chest 9/11/2019  lidocaine 4 % patch Cut to appropriate size. Apply to intact skin for 12 hours, remove for 12 hours. (Patient taking differently: 1 Patch by TransDERmal route as needed. Cut to appropriate size. Apply to intact skin for 12 hours, remove for 12 hours. States uses only occasionally and not on today 9/11/2019 Do not take morning of procedure.) 14 Patch 0  
 CALCITRIOL, BULK, by Does Not Apply route.  hydrALAZINE (APRESOLINE) 100 mg tablet Take 1 Tab by mouth three (3) times daily. 90 Tab 2  pregabalin (LYRICA) 50 mg capsule Take 1 Cap by mouth daily. Max Daily Amount: 50 mg. (Patient taking differently: Take 150 mg by mouth two (2) times a day.) 10 Cap 0  
 pravastatin (PRAVACHOL) 40 mg tablet Take 1 Tab by mouth nightly. 30 Tab 0  
 calcifediol (RAYALDEE) 30 mcg Cs24 Take  by mouth nightly.  amLODIPine (NORVASC) 10 mg tablet Take 10 mg by mouth every morning.  metoprolol succinate (TOPROL-XL) 50 mg XL tablet Take 50 mg by mouth every morning.  omeprazole (PRILOSEC) 20 mg capsule Take 20 mg by mouth every morning.  oxymetazoline (AFRIN) 0.05 % nasal spray 2 Sprays by Both Nostrils route once as needed for Congestion.  Lactoperoxi/Gluc Oxid/Pot Thio (BIOTENE DRY MOUTH MM) Take 2 Sprays by mouth as needed for Other (dry mouth).  acetaminophen (TYLENOL) 325 mg tablet Take 2 Tabs by mouth every four (4) hours as needed for Pain. 20 Tab 0  
 pantothenic ac-min oil-pet,hyd (AQUAPHOR) 41 % ointment Apply  to affected area daily.  Apply over legs 53 g 0  
  HUMALOG KWIKPEN INSULIN 100 unit/mL kwikpen 5 units three times a day with meals plus correction scale, 2 units/50 > 150, max daily dose 25 units (Patient not taking: Reported on 11/11/2019) 5 Pen 11 Objective:  
 
Physical Exam:  
 
Visit Vitals /68 (BP 1 Location: Right arm, BP Patient Position: Sitting) Pulse (!) 55 Temp 97.9 °F (36.6 °C) Resp 16 Ht 6' (1.829 m) Wt 82.1 kg (181 lb) BMI 24.55 kg/m² General: well developed, well nourished, pleasant , NAD. Hygiene good Psych: cooperative. Pleasant. No anxiety or depression. Normal mood and affect. Neuro: alert and oriented to person/place/situation. Otherwise nonfocal. 
Derm: Normal turgor for age, dry skin HEENT: Normocephalic, atraumatic. EOMI. Conjunctiva clear. No scleral icterus. Neck: Normal range of motion. No masses. Chest: Good air entry bilaterally. Respirations nonlabored Cardio: Normal heart sounds,no rubs, murmurs or gallops Abdomen: Soft, nontender, nondistended, normoactive bowel sounds Lower extremities: color normal; temperature normal. Hair growth is not present. Calves are supple, nontender, approximately equally sized in comparison. Capillary refill <3 sec Ulcer Description: Wound Leg lower Right;Lateral 03/10/20 (Active) Dressing Status Clean, dry, and intact 3/10/2020  1:33 PM  
Non-staged Wound Description Partial thickness 3/10/2020  1:33 PM  
Wound Length (cm) 12 cm 3/10/2020  1:33 PM  
Wound Width (cm) 7.5 cm 3/10/2020  1:33 PM  
Wound Depth (cm) 0.1 cm 3/10/2020  1:33 PM  
Wound Surface Area (cm^2) 90 cm^2 3/10/2020  1:33 PM  
Wound Volume (cm^3) 9 cm^3 3/10/2020  1:33 PM  
Condition of Base Epithelializing 3/10/2020  1:33 PM  
Drainage Amount Large 3/10/2020  1:33 PM  
Drainage Color Serous 3/10/2020  1:33 PM  
Wound Odor Mild 3/10/2020  1:33 PM  
Tammy-wound Assessment Edema 3/10/2020  1:33 PM  
Cleansing and Cleansing Agents  Soap and water 3/10/2020  1:33 PM  
 Number of days: 0 [REMOVED] Wound Leg Left; Anterior (Removed) Number of days: 375 [REMOVED] Wound Arm Left (Removed) Number of days: 167 [REMOVED] Wound Leg Anterior;Mid;Right (Removed) Number of days: 699 [REMOVED] Wound Left (Removed) Number of days: 30  
   
[REMOVED] Wound Pretibial Right;Lateral (Removed) Number of days: 127 [REMOVED] Wound Leg lower Right;Lateral (Removed) Dressing Status Clean, dry, and intact 1/29/2020  9:49 AM  
Non-staged Wound Description Full thickness 12/30/2019 10:34 AM  
Wound Length (cm) 0 cm 1/29/2020  9:49 AM  
Wound Width (cm) 0 cm 1/29/2020  9:49 AM  
Wound Depth (cm) 0 cm 1/29/2020  9:49 AM  
Wound Surface Area (cm^2) 0 cm^2 1/29/2020  9:49 AM  
Wound Volume (cm^3) 0 cm^3 1/29/2020  9:49 AM  
Condition of Base Epithelializing 1/29/2020  9:49 AM  
Epithelialization (%) 100 1/29/2020  9:49 AM  
Tissue Type Percent Black 20 % 12/17/2019 11:31 AM  
Tissue Type Percent Pink 100 12/30/2019 10:34 AM  
Tissue Type Percent Red 10 12/17/2019 11:31 AM  
Tissue Type Percent Yellow 40 12/3/2019  2:04 PM  
Drainage Amount None 1/29/2020  9:49 AM  
Drainage Color Serosanguinous 12/30/2019 10:34 AM  
Wound Odor None 1/29/2020  9:49 AM  
Tammy-wound Assessment Edema 12/30/2019 10:34 AM  
Margins Attached edges 12/10/2019 10:27 AM  
Cleansing and Cleansing Agents  Soap and water 1/29/2020  9:49 AM  
Dressing Changed Changed/New 12/30/2019 10:34 AM  
Procedure Tolerated Well 12/17/2019 11:31 AM  
Number of days: 57  
   
[REMOVED] Wound Leg lower Left;Lateral (Removed) Dressing Status Clean, dry, and intact 12/17/2019 11:31 AM  
Wound Length (cm) 3 cm 12/17/2019 11:31 AM  
Wound Width (cm) 5.5 cm 12/17/2019 11:31 AM  
Wound Depth (cm) 0 cm 12/17/2019 11:31 AM  
Wound Surface Area (cm^2) 16.5 cm^2 12/17/2019 11:31 AM  
Wound Volume (cm^3) 0 cm^3 12/17/2019 11:31 AM  
Condition of Base Other (comment) 12/17/2019 11:31 AM  
 Drainage Amount Small 12/17/2019 11:31 AM  
Drainage Color Serous 12/17/2019 11:31 AM  
Wound Odor None 12/17/2019 11:31 AM  
Tammy-wound Assessment Intact 12/17/2019 11:31 AM  
Cleansing and Cleansing Agents  Soap and water 12/17/2019 11:31 AM  
Dressing Changed Changed/New 12/17/2019 11:31 AM  
Procedure Tolerated Well 12/17/2019 11:31 AM  
Number of days: 24 Data Review:  
Recent Results (from the past 24 hour(s)) HGB & HCT Collection Time: 03/10/20  9:28 AM  
Result Value Ref Range HGB 8.9 (L) 13.6 - 17.2 g/dL HCT 29.5 (L) 41.1 - 50.3 % Assessment:  
 
67 y.o. male with R lower leg lateral venous stasis ulcer. Problem List  Date Reviewed: 2/1/2019 Codes Class Noted ESRD (end stage renal disease) ([facility] ICD-10-CM: N18.6 ICD-9-CM: 585.6  11/3/2019 Open wound of skin ICD-10-CM: T14. Sally Navarreteter ICD-9-CM: 879.8  11/3/2019 DVT (deep venous thrombosis) (HCC) ICD-10-CM: I82.409 ICD-9-CM: 453.40  11/3/2019 Rhabdomyolysis ICD-10-CM: N62.73 ICD-9-CM: 728.88  4/30/2018 Metabolic acidosis TWG-96-LA: E87.2 ICD-9-CM: 276.2  4/30/2018 Renal failure (ARF), acute on chronic (HCC) ICD-10-CM: N17.9, N18.9 ICD-9-CM: 584.9, 585.9  4/29/2018 Dementia without behavioral disturbance (HCC) (Chronic) ICD-10-CM: F03.90 ICD-9-CM: 294.20  4/19/2018 Hypernatremia ICD-10-CM: E87.0 ICD-9-CM: 276.0  4/18/2018 Seizure ([facility] ICD-10-CM: R56.9 ICD-9-CM: 780.39  4/18/2018 Volume overload ICD-10-CM: E87.70 ICD-9-CM: 276.69  4/4/2018 Chest pain ICD-10-CM: R07.9 ICD-9-CM: 786.50  4/4/2018 Cauda equina compression (HCC) ICD-10-CM: G83.4 ICD-9-CM: 344.60  4/2/2018 Hypercholesterolemia ICD-10-CM: E78.00 ICD-9-CM: 272.0  Unknown Uncontrolled diabetes mellitus, with long-term current use of insulin (HCC) (Chronic) ICD-10-CM: E11.65, Z79.4 ICD-9-CM: 250.02, V58.67  2/28/2018  Carotid bruit ICD-10-CM: R09.89 
 ICD-9-CM: 785.9  2/28/2018 Arteriovenous fistula (HCC) ICD-10-CM: I77.0 ICD-9-CM: 447.0  10/31/2017 Overview Signed 10/31/2017  3:13 PM by Prashant Ahn NP  
  10/26/17 (Dr. Kal Franklin) Creation of left brachiobasilic fistula. Onychomycosis ICD-10-CM: B35.1 ICD-9-CM: 110.1  9/13/2017 Controlled type 2 diabetes mellitus with complication, with long-term current use of insulin (HCC) (Chronic) ICD-10-CM: E11.8, Z79.4 ICD-9-CM: 250.90, V58.67  9/13/2017 Stage 4 chronic kidney disease (HCC) (Chronic) ICD-10-CM: N18.4 ICD-9-CM: 585.4  4/4/2017 Stasis edema of both lower extremities (Chronic) ICD-10-CM: X45.689 ICD-9-CM: 459.30  4/4/2017 Cellulitis of left lower leg ICD-10-CM: L03.116 ICD-9-CM: 682.6  4/4/2017 Venous stasis ulcer of left lower extremity ([facility] ICD-10-CM: X92.051, Z34.995 ICD-9-CM: 454.0  4/1/2017 Venous insufficiency (Chronic) ICD-10-CM: M68.9 ICD-9-CM: 459.81  12/6/2016 Chronic systolic congestive heart failure (HCC) (Chronic) ICD-10-CM: P91.80 ICD-9-CM: 428.22, 428.0  9/23/2016 Septicemia due to Klebsiella pneumoniae [facility]) ICD-10-CM: A41.4 ICD-9-CM: 038.3  9/22/2016 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 
ICD-9-CM: 250.40, 583.81  9/22/2016 Essential hypertension ICD-10-CM: I10 
ICD-9-CM: 401.9  9/22/2016 GERD (gastroesophageal reflux disease) ICD-10-CM: K21.9 ICD-9-CM: 530.81  9/22/2016 Diabetic neuropathy (HCC) ICD-10-CM: E11.40 ICD-9-CM: 250.60, 357.2  9/22/2016 Chronic pancreatitis (HCC) (Chronic) ICD-10-CM: K86.1 ICD-9-CM: 757.0  9/22/2016 Iron (Fe) deficiency anemia ICD-10-CM: D50.9 ICD-9-CM: 280.9  9/22/2016 Acute renal failure superimposed on stage 3 chronic kidney disease (HCC) ICD-10-CM: N17.9, N18.3 ICD-9-CM: 584.9, 585.3  9/21/2016 Plan:  
 
Orders Placed This Encounter  CULTURE, WOUND W GRAM STAIN Right Standing Status:   Standing Number of Occurrences:   1  
 WOUND CARE, DRESSING CHANGE Right lateral leg Cleanse wound and periwound with wound cleanser or normal saline. Alginate with silver(sheets); cut to size, apply to wound bed. Secure with abd,  
2 layer compression with wound and lower extremity assessment. If there is any problem with the dressing (too tight, slides down, etc.) Patient to return to wound clinic to have re-wrapped by clinician. C/S at today's visit Admit to home health ([facility]) Home health to change dressing 1 to 2x weekly as needed. Follow up in wound center in 1 week. Standing Status:   Standing Number of Occurrences:   1 Patient understood and agrees with plan. Questions answered. Follow-up Information Follow up With Specialties Details Why Contact Info [facility]OUND [facility] Wound Care In 1 week  Violetta Mueller [address] [phone] Any procedures done today in the [address] are documented in a separate note in [facility] and made part of this record by reference. Dictated using voice recognition software; proofread, but unrecognized errors may exist. 
 
Signed By: Yenni Rajan., MD   
 March 10, 2020

## 2020-03-10 NOTE — WOUND CARE
03/10/20 1333 Wound Leg lower Right;Lateral 03/10/20 Date First Assessed/Time First Assessed: 03/10/20 1333   Present on Hospital Admission: Yes  Wound Approximate Age at First Assessment (Weeks): 1 weeks  Primary Wound Type: Blister/bullae  Location: Leg lower  Wound Location Orientation: Right;Lateral... Dressing Status Clean, dry, and intact Dressing Type  
(coban) Non-staged Wound Description Partial thickness Wound Length (cm) 12 cm Wound Width (cm) 7.5 cm Wound Depth (cm) 0.1 cm Wound Surface Area (cm^2) 90 cm^2 Wound Volume (cm^3) 9 cm^3 Condition of Base Epithelializing Drainage Amount Large Drainage Color Serous Wound Odor Mild Tammy-wound Assessment Edema Cleansing and Cleansing Agents  Soap and water Patient is on an ant coagulant daily warfarin.

## 2020-03-10 NOTE — DISCHARGE INSTRUCTIONS
[address]  [address], [address]  Phone: [phone]  Fax: [phone]    Patient: Antelmo Agustin MRN: [id number]  SSN: [id number]    YOB: 1940  Age: 67 y.o. Sex: male       Return Appointment: 1 week with Marry Voss MD  Right lateral leg  Cleanse wound and periwound with wound cleanser or normal saline. Alginate with silver(sheets); cut to size, apply to wound bed. Secure with abd,   2 layer compression with wound and lower extremity assessment. If there is any problem with the dressing (too tight, slides down, etc.) Patient to return to wound clinic to have re-wrapped by clinician. C/S at today's visit    Admit to home health ([facility])  Home health to change dressing 1 to 2x weekly as needed. Follow up in wound center in 1 week. Should you experience increased redness, swelling, pain, foul odor, size of wound(s), or have a temperature over 101 degrees please contact the [address] [place] [address] Road at [phone] or if after hours contact your primary care physician or go to the hospital emergency department.     Signed By: Nayeli Canales     March 10, 2020

## 2020-03-10 NOTE — PROGRESS NOTES
Arrived to the [facility]. Hemoglobin=8.9 Retacrit completed. Provided education on Retacrit-patient has been on this medication for a while Patient instructed to report any side affects to ordering provider-Anai Marshall<DNP Patient tolerated well Any issues or concerns during appointment: Blood pressure=169/67 Patient aware of next infusion appointment on Tuesday,March 24th @ 36 Discharged home via wheelchair with family member

## 2020-03-10 NOTE — WOUND CARE
[address] Phone: [phone] Fax: [phone] Patient: Mame Bryant MRN: [id number]  SSN: [id number] YOB: 1940  Age: 67 y.o. Sex: male Return Appointment: 1 week with Marry Voss MD 
 
Instructions: Right lateral leg Cleanse wound and periwound with wound cleanser or normal saline. Alginate with silver(sheets); cut to size, apply to wound bed. Secure with abd,  
2 layer compression with wound and lower extremity assessment. If there is any problem with the dressing (too tight, slides down, etc.) Patient to return to wound clinic to have re-wrapped by clinician. C/S at today's visit Admit to home health ([facility]) Home health to change dressing 1 to 2x weekly as needed. Follow up in wound center in 1 week. Should you experience increased redness, swelling, pain, foul odor, size of wound(s), or have a temperature over 101 degrees please contact the [address] at [phone] or if after hours contact your primary care physician or go to the hospital emergency department. Signed By: Nayeli Canales March 10, 2020

## 2020-03-18 NOTE — WOUND CARE
Multilayer Compression Wrap 
 (Not Unna) Below the Knee NAME:  Richard Nelson YOB: 1940 MEDICAL RECORD NUMBER:  [id number] DATE:  3/18/2020 Removed old Multilayer wrap if indicated and wash leg with mild soap/water. Applied primary and secondary dressing as ordered. Applied multilayered dressing below the knee to right lower leg. Instructed patient/caregiver not to remove dressing and to keep it clean and dry. Instructed patient/caregiver on complications to report to provider, such as pain, numbness in toes, heavy drainage, and slippage of dressing. Instructed patient on purpose of compression dressing and on activity and exercise recommendations.  
 
 
Electronically signed by Vance Bishop RN on 3/18/2020 at 2:45 PM

## 2020-03-18 NOTE — DISCHARGE INSTRUCTIONS
[address]  [address], [address]  Phone: [phone]  Fax: [phone]    Patient: Tulio Walker MRN: [id number]  SSN: [id number]    YOB: 1940  Age: 67 y.o. Sex: male       Return Appointment: 2 weeks with Davy Espinoza MD    Instructions: Right lateral leg  Cleanse wound and periwound with wound cleanser or normal saline. Apply Xeroform- apply to wound bed. .  Secure with abd,   2 layer compression with wound and lower extremity assessment. If there is any problem with the dressing (too tight, slides down, etc.) Patient to return to wound clinic to have re-wrapped by clinician.     [facility] to change dressing 2x/week     Follow up in wound center in 2 weeks. Should you experience increased redness, swelling, pain, foul odor, size of wound(s), or have a temperature over 101 degrees please contact the [address] [place] [address] Road at [phone] or if after hours contact your primary care physician or go to the hospital emergency department.     Signed By: Francisco J Shipley RN     March 18, 2020

## 2020-03-18 NOTE — WOUND CARE
03/18/20 1401 Wound Leg lower Right;Lateral 03/10/20 Date First Assessed/Time First Assessed: 03/10/20 1333   Present on Hospital Admission: Yes  Wound Approximate Age at First Assessment (Weeks): 1 weeks  Primary Wound Type: Venous Ulcer  Location: Leg lower  Wound Location Orientation: Right;Lateral  ... Dressing Status Breakthrough drainage Dressing Type (Silver Alginate, ABD, 2 Layer Compression) Non-staged Wound Description Partial thickness Wound Length (cm) 11.5 cm Wound Width (cm) 4.5 cm Wound Depth (cm) 0.1 cm Wound Surface Area (cm^2) 51.75 cm^2 Wound Volume (cm^3) 5.18 cm^3 Change in Wound Size % 42.5 Condition of Base Granulation;Epithelializing Tissue Type Percent Pink 50 Tissue Type Percent Red 50 Drainage Amount Large Drainage Color Serosanguinous Wound Odor None Tammy-wound Assessment Edema Cleansing and Cleansing Agents  Soap and water Patient takes No Anticoagulants

## 2020-03-18 NOTE — PROGRESS NOTES
Has new skin under the old blister area we will apply Xeroform and 2 layer compression it will be changed twice a week by home health and he will return here in 2 weeks. Wound Center Progress Note Patient: Rosalee Bueno MRN: [id number]  SSN: [id number] YOB: 1940  Age: 67 y.o. Sex: male Subjective: Chief Complaint: Rosalee Bueno is a 67 y.o. BLACK OR  male who presents with R lower leg lateral wound of 2 months duration. History of Present Illness:    
 
Wound Caused By: chronic pressure/irritation due to venous insufficiency2 Associated Signs and Symptoms: Mild pain Timing: Intermittent Quality: Aching Severity: 2/10 Modifying Factors: None Current Wound Care: See nurses notes Past Medical History:  
Diagnosis Date  Acute renal failure superimposed on stage 3 chronic kidney disease ([address] 9/21/2016  Anemia   
 pt states he receives iron infusions  Arthritis OA generalized  Chronic kidney disease   
 not on HD- surgery done for access and fistula being removed. Per patient, no plans to proceed with dialysis  Chronic pancreatitis ([address] 9/22/2016  Dermatophytosis of nail 12/6/2016  Diabetic neuropathy ([address] 9/22/2016  
 insulin sliding scale only- no oral meds- avg fastings avg fasting \"low 200's;  hypo @ 80 A1C 7.1 9/2019 per patient  Essential hypertension 9/22/2016  
 medication  GERD (gastroesophageal reflux disease)   
 controlled with med  Hypercholesterolemia  Iron (Fe) deficiency anemia 9/22/2016  Septicemia due to Klebsiella pneumoniae ([address] 9/22/2016  Systolic CHF, chronic ([address] 9/23/2016 ECHO 4/2018 EF- 60-65%  Type II diabetes mellitus with nephropathy ([address] 09/22/2016  Venous insufficiency 12/6/2016  Xerosis cutis 12/6/2016 Past Surgical History:  
Procedure Laterality Date  HX COLONOSCOPY    
 HX HERNIA REPAIR Left 2010  HX PROSTATECTOMY  2012  HX TURP   FOR BPH  VASCULAR SURGERY PROCEDURE UNLIST Left 10/26/2017 AVF  VASCULAR SURGERY PROCEDURE UNLIST Left 2019 Ligation of left brachiobasilic fistula Family History Problem Relation Age of Onset  Diabetes Mother  Stroke Mother  Hypertension Mother  No Known Problems Father  Diabetes Sister  Diabetes Brother  Diabetes Sister  Diabetes Sister  Other Sister   
     fibromyalgia Social History Tobacco Use  Smoking status: Former Smoker Packs/day: 2.00 Years: 20.00 Pack years: 40.00 Last attempt to quit:  Years since quittin.2  Smokeless tobacco: Current User Substance Use Topics  Alcohol use: No  
   
Prior to Admission medications Medication Sig Start Date End Date Taking? Authorizing Provider  
cloNIDine (Catapres-TTS-3) 0.3 mg/24 hr 1 Patch by TransDERmal route every seven (7) days. Change every thursday    Jazmine Craig NP  
glucose 4 gram chewable tablet Take 15 g by mouth as needed for Other (low blood sugar). Provider, Historical  
traMADol (ULTRAM) 50 mg tablet Take 50 mg by mouth every six (6) hours as needed for Pain. Provider, Historical  
torsemide (DEMADEX) 20 mg tablet Take 40 mg by mouth daily as needed for Other (swelling). Take 2 tablets by mouth in the morning as needed for swelling    Provider, Historical  
insulin glargine (LANTUS,BASAGLAR) 100 unit/mL (3 mL) inpn 17 Units by SubCUTAneous route daily (with breakfast). Provider, Historical  
oxymetazoline (AFRIN) 0.05 % nasal spray 2 Sprays by Both Nostrils route once as needed for Congestion. 19   Provider, Historical  
Lactoperoxi/Gluc Oxid/Pot Thio (BIOTENE DRY MOUTH MM) Take 2 Sprays by mouth as needed for Other (dry mouth). Provider, Historical  
lipase-protease-amylase (CREON) 36,000-114,000- 180,000 unit cpDR capsule Take 3,600 Units by mouth See Admin Instructions.  Take 2 capsules by mouth with each meal, and 1 capsule by mouth with each snack. Marvel Barber MD  
dicyclomine (BENTYL) 10 mg capsule Take 10 mg by mouth two (2) times a day. Provider, Historical  
acetaminophen (TYLENOL) 325 mg tablet Take 2 Tabs by mouth every four (4) hours as needed for Pain. 11/9/19   Gagan Mello MD  
ondansetron (ZOFRAN ODT) 4 mg disintegrating tablet Take 1 Tab by mouth every eight (8) hours as needed for Nausea. 11/9/19   Gagan Mello MD  
pantothenic ac-min oil-pet,hyd (AQUAPHOR) 41 % ointment Apply  to affected area daily. Apply over legs 11/10/19   Gagan Mello MD  
colestipol (COLESTID) 1 gram tablet Take 1 g by mouth two (2) times a day. Provider, Historical  
sodium bicarbonate 650 mg tablet Take  by mouth three (3) times daily. Provider, Historical  
lidocaine 4 % patch Cut to appropriate size. Apply to intact skin for 12 hours, remove for 12 hours. Patient taking differently: 1 Patch by TransDERmal route as needed. Cut to appropriate size. Apply to intact skin for 12 hours, remove for 12 hours. States uses only occasionally and not on today 9/11/2019 Do not take morning of procedure. 7/12/19   JAMARCUS Cuadra  
CALCITRIOL, BULK, by Does Not Apply route. Provider, Historical  
hydrALAZINE (APRESOLINE) 100 mg tablet Take 1 Tab by mouth three (3) times daily. 4/19/18   Janice Bosch MD  
pregabalin (LYRICA) 50 mg capsule Take 1 Cap by mouth daily. Max Daily Amount: 50 mg. Patient taking differently: Take 150 mg by mouth two (2) times a day. 4/19/18   Janice Bosch MD  
pravastatin (PRAVACHOL) 40 mg tablet Take 1 Tab by mouth nightly. 4/4/18   Dot Del Angel MD  
[facility] 100 unit/mL kwikpen 5 units three times a day with meals plus correction scale, 2 units/50 > 150, max daily dose 25 units Patient not taking: Reported on 11/11/2019 2/28/18   Dong Mccracken PA-C  
calcifediol (RAYALDEE) 30 mcg Cs24 Take 30 mcg by mouth nightly. Provider, Historical  
amLODIPine (NORVASC) 10 mg tablet Take 10 mg by mouth every morning. Provider, Historical  
metoprolol succinate (TOPROL-XL) 50 mg XL tablet Take 50 mg by mouth every morning. Provider, Historical  
omeprazole (PRILOSEC) 20 mg capsule Take 20 mg by mouth every morning. Provider, Historical  
 
No Known Allergies Review of Systems: A comprehensive review of systems was negative except for that written in the History of Present Illness. Lab Results Component Value Date/Time Hemoglobin A1c 8.4 (H) 11/03/2019 09:25 PM  
 Hemoglobin A1c (POC) 6.5 03/14/2018 11:20 AM  
  
 
Immunization History Administered Date(s) Administered  Pneumococcal Polysaccharide (PPSV-23) 02/07/2012  TB Skin Test (PPD) Intradermal 04/02/2018, 04/16/2018, 04/30/2018, 11/03/2019 Body mass index is 24.11 kg/m². Counseling regarding nutrition done: No  
 
Current medications: 
Current Outpatient Medications Medication Sig Dispense Refill  cloNIDine (Catapres-TTS-3) 0.3 mg/24 hr 1 Patch by TransDERmal route every seven (7) days. Change every thursday  glucose 4 gram chewable tablet Take 15 g by mouth as needed for Other (low blood sugar).  traMADol (ULTRAM) 50 mg tablet Take 50 mg by mouth every six (6) hours as needed for Pain.  torsemide (DEMADEX) 20 mg tablet Take 40 mg by mouth daily as needed for Other (swelling). Take 2 tablets by mouth in the morning as needed for swelling  insulin glargine (LANTUS,BASAGLAR) 100 unit/mL (3 mL) inpn 17 Units by SubCUTAneous route daily (with breakfast).  oxymetazoline (AFRIN) 0.05 % nasal spray 2 Sprays by Both Nostrils route once as needed for Congestion.  Lactoperoxi/Gluc Oxid/Pot Thio (BIOTENE DRY MOUTH MM) Take 2 Sprays by mouth as needed for Other (dry mouth).  lipase-protease-amylase (CREON) 36,000-114,000- 180,000 unit cpDR capsule Take 3,600 Units by mouth See Admin Instructions.  Take 2 capsules by mouth with each meal, and 1 capsule by mouth with each snack.  dicyclomine (BENTYL) 10 mg capsule Take 10 mg by mouth two (2) times a day.  acetaminophen (TYLENOL) 325 mg tablet Take 2 Tabs by mouth every four (4) hours as needed for Pain. 20 Tab 0  
 ondansetron (ZOFRAN ODT) 4 mg disintegrating tablet Take 1 Tab by mouth every eight (8) hours as needed for Nausea. 20 Tab 0  
 pantothenic ac-min oil-pet,hyd (AQUAPHOR) 41 % ointment Apply  to affected area daily. Apply over legs 53 g 0  
 colestipol (COLESTID) 1 gram tablet Take 1 g by mouth two (2) times a day.  sodium bicarbonate 650 mg tablet Take  by mouth three (3) times daily.  lidocaine 4 % patch Cut to appropriate size. Apply to intact skin for 12 hours, remove for 12 hours. (Patient taking differently: 1 Patch by TransDERmal route as needed. Cut to appropriate size. Apply to intact skin for 12 hours, remove for 12 hours. States uses only occasionally and not on today 9/11/2019 Do not take morning of procedure.) 14 Patch 0  
 CALCITRIOL, BULK, by Does Not Apply route.  hydrALAZINE (APRESOLINE) 100 mg tablet Take 1 Tab by mouth three (3) times daily. 90 Tab 2  pregabalin (LYRICA) 50 mg capsule Take 1 Cap by mouth daily. Max Daily Amount: 50 mg. (Patient taking differently: Take 150 mg by mouth two (2) times a day.) 10 Cap 0  
 pravastatin (PRAVACHOL) 40 mg tablet Take 1 Tab by mouth nightly. 30 Tab 0  
 HUMALOG KWIKPEN INSULIN 100 unit/mL kwikpen 5 units three times a day with meals plus correction scale, 2 units/50 > 150, max daily dose 25 units (Patient not taking: Reported on 11/11/2019) 5 Pen 11  
 calcifediol (RAYALDEE) 30 mcg Cs24 Take 30 mcg by mouth nightly.  amLODIPine (NORVASC) 10 mg tablet Take 10 mg by mouth every morning.  metoprolol succinate (TOPROL-XL) 50 mg XL tablet Take 50 mg by mouth every morning.  omeprazole (PRILOSEC) 20 mg capsule Take 20 mg by mouth every morning. Objective:  
 
Physical Exam:  
 
Visit Vitals /67 (BP 1 Location: Right arm) Pulse (!) 53 Temp 97.6 °F (36.4 °C) Resp 18 Ht 6' (1.829 m) Wt 80.6 kg (177 lb 12.8 oz) SpO2 96% BMI 24.11 kg/m² General: well developed, well nourished, pleasant , NAD. Hygiene good Psych: cooperative. Pleasant. No anxiety or depression. Normal mood and affect. Neuro: alert and oriented to person/place/situation. Otherwise nonfocal. 
Derm: Normal turgor for age, dry skin HEENT: Normocephalic, atraumatic. EOMI. Conjunctiva clear. No scleral icterus. Neck: Normal range of motion. No masses. Chest: Good air entry bilaterally. Respirations nonlabored Cardio: Normal heart sounds,no rubs, murmurs or gallops Abdomen: Soft, nontender, nondistended, normoactive bowel sounds Lower extremities: color normal; temperature normal. Hair growth is not present. Calves are supple, nontender, approximately equally sized in comparison. Capillary refill <3 sec Ulcer Description: Wound Leg lower Right;Lateral Wound #1 03/10/20 (Active) Dressing Status Breakthrough drainage 3/18/2020  2:01 PM  
Non-staged Wound Description Partial thickness 3/18/2020  2:01 PM  
Wound Length (cm) 11.5 cm 3/18/2020  2:01 PM  
Wound Width (cm) 4.5 cm 3/18/2020  2:01 PM  
Wound Depth (cm) 0.1 cm 3/18/2020  2:01 PM  
Wound Surface Area (cm^2) 51.75 cm^2 3/18/2020  2:01 PM  
Wound Volume (cm^3) 5.18 cm^3 3/18/2020  2:01 PM  
Change in Wound Size % 42.5 3/18/2020  2:01 PM  
Condition of Base Granulation;Epithelializing 3/18/2020  2:01 PM  
Tissue Type Percent Pink 50 3/18/2020  2:01 PM  
Tissue Type Percent Red 50 3/18/2020  2:01 PM  
Drainage Amount Large 3/18/2020  2:01 PM  
Drainage Color Serosanguinous 3/18/2020  2:01 PM  
Wound Odor None 3/18/2020  2:01 PM  
Tammy-wound Assessment Edema 3/18/2020  2:01 PM  
Cleansing and Cleansing Agents  Soap and water 3/18/2020  2:01 PM  
Number of days: 8 [REMOVED] Wound Leg Left; Anterior (Removed) Number of days: 375 [REMOVED] Wound Arm Left (Removed) Number of days: 167 [REMOVED] Wound Leg Anterior;Mid;Right (Removed) Number of days: 699 [REMOVED] Wound Left (Removed) Number of days: 30  
   
[REMOVED] Wound Pretibial Right;Lateral (Removed) Number of days: 127 [REMOVED] Wound Leg lower Right;Lateral (Removed) Dressing Status Clean, dry, and intact 1/29/2020  9:49 AM  
Non-staged Wound Description Full thickness 12/30/2019 10:34 AM  
Wound Length (cm) 0 cm 1/29/2020  9:49 AM  
Wound Width (cm) 0 cm 1/29/2020  9:49 AM  
Wound Depth (cm) 0 cm 1/29/2020  9:49 AM  
Wound Surface Area (cm^2) 0 cm^2 1/29/2020  9:49 AM  
Wound Volume (cm^3) 0 cm^3 1/29/2020  9:49 AM  
Condition of Base Epithelializing 1/29/2020  9:49 AM  
Epithelialization (%) 100 1/29/2020  9:49 AM  
Tissue Type Percent Black 20 % 12/17/2019 11:31 AM  
Tissue Type Percent Pink 100 12/30/2019 10:34 AM  
Tissue Type Percent Red 10 12/17/2019 11:31 AM  
Tissue Type Percent Yellow 40 12/3/2019  2:04 PM  
Drainage Amount None 1/29/2020  9:49 AM  
Drainage Color Serosanguinous 12/30/2019 10:34 AM  
Wound Odor None 1/29/2020  9:49 AM  
Tammy-wound Assessment Edema 12/30/2019 10:34 AM  
Margins Attached edges 12/10/2019 10:27 AM  
Cleansing and Cleansing Agents  Soap and water 1/29/2020  9:49 AM  
Dressing Changed Changed/New 12/30/2019 10:34 AM  
Procedure Tolerated Well 12/17/2019 11:31 AM  
Number of days: 57  
   
[REMOVED] Wound Leg lower Left;Lateral (Removed) Dressing Status Clean, dry, and intact 12/17/2019 11:31 AM  
Wound Length (cm) 3 cm 12/17/2019 11:31 AM  
Wound Width (cm) 5.5 cm 12/17/2019 11:31 AM  
Wound Depth (cm) 0 cm 12/17/2019 11:31 AM  
Wound Surface Area (cm^2) 16.5 cm^2 12/17/2019 11:31 AM  
Wound Volume (cm^3) 0 cm^3 12/17/2019 11:31 AM  
Condition of Base Other (comment) 12/17/2019 11:31 AM  
Drainage Amount Small 12/17/2019 11:31 AM  
 Drainage Color Serous 12/17/2019 11:31 AM  
Wound Odor None 12/17/2019 11:31 AM  
Tammy-wound Assessment Intact 12/17/2019 11:31 AM  
Cleansing and Cleansing Agents  Soap and water 12/17/2019 11:31 AM  
Dressing Changed Changed/New 12/17/2019 11:31 AM  
Procedure Tolerated Well 12/17/2019 11:31 AM  
Number of days: 24 Data Review: No results found for this or any previous visit (from the past 24 hour(s)). Assessment:  
 
67 y.o. male with R lower leg lateral venous stasis ulcer. Problem List  Date Reviewed: 2/1/2019 Codes Class Noted ESRD (end stage renal disease) ([facility] ICD-10-CM: N18.6 ICD-9-CM: 585.6  11/3/2019 Open wound of skin ICD-10-CM: T14. Gwendel Heady ICD-9-CM: 879.8  11/3/2019 DVT (deep venous thrombosis) (HCC) ICD-10-CM: I82.409 ICD-9-CM: 453.40  11/3/2019 Rhabdomyolysis ICD-10-CM: O02.94 ICD-9-CM: 728.88  4/30/2018 Metabolic acidosis QGH-23-NA: E87.2 ICD-9-CM: 276.2  4/30/2018 Renal failure (ARF), acute on chronic (HCC) ICD-10-CM: N17.9, N18.9 ICD-9-CM: 584.9, 585.9  4/29/2018 Dementia without behavioral disturbance (HCC) (Chronic) ICD-10-CM: F03.90 ICD-9-CM: 294.20  4/19/2018 Hypernatremia ICD-10-CM: E87.0 ICD-9-CM: 276.0  4/18/2018 Seizure ([facility] ICD-10-CM: R56.9 ICD-9-CM: 780.39  4/18/2018 Volume overload ICD-10-CM: E87.70 ICD-9-CM: 276.69  4/4/2018 Chest pain ICD-10-CM: R07.9 ICD-9-CM: 786.50  4/4/2018 Cauda equina compression (HCC) ICD-10-CM: G83.4 ICD-9-CM: 344.60  4/2/2018 Hypercholesterolemia ICD-10-CM: E78.00 ICD-9-CM: 272.0  Unknown Uncontrolled diabetes mellitus, with long-term current use of insulin (HCC) (Chronic) ICD-10-CM: E11.65, Z79.4 ICD-9-CM: 250.02, V58.67  2/28/2018 Carotid bruit ICD-10-CM: R09.89 ICD-9-CM: 785.9  2/28/2018 Arteriovenous fistula (HCC) ICD-10-CM: I77.0 ICD-9-CM: 447.0  10/31/2017 Overview Signed 10/31/2017  3:13 PM by Jacinat Patel NP  
  10/26/17 (Dr. Reid Brown) Creation of left brachiobasilic fistula. Onychomycosis ICD-10-CM: B35.1 ICD-9-CM: 110.1  9/13/2017 Controlled type 2 diabetes mellitus with complication, with long-term current use of insulin (HCC) (Chronic) ICD-10-CM: E11.8, Z79.4 ICD-9-CM: 250.90, V58.67  9/13/2017 Stage 4 chronic kidney disease (HCC) (Chronic) ICD-10-CM: N18.4 ICD-9-CM: 585.4  4/4/2017 Stasis edema of both lower extremities (Chronic) ICD-10-CM: S22.972 ICD-9-CM: 459.30  4/4/2017 Cellulitis of left lower leg ICD-10-CM: L03.116 ICD-9-CM: 682.6  4/4/2017 Venous stasis ulcer of left lower extremity ([facility] ICD-10-CM: S50.453, B86.487 ICD-9-CM: 454.0  4/1/2017 Venous insufficiency (Chronic) ICD-10-CM: K01.7 ICD-9-CM: 459.81  12/6/2016 Chronic systolic congestive heart failure (HCC) (Chronic) ICD-10-CM: D26.86 ICD-9-CM: 428.22, 428.0  9/23/2016 Septicemia due to Klebsiella pneumoniae [facility]) ICD-10-CM: A41.4 ICD-9-CM: 038.3  9/22/2016 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 
ICD-9-CM: 250.40, 583.81  9/22/2016 Essential hypertension ICD-10-CM: I10 
ICD-9-CM: 401.9  9/22/2016 GERD (gastroesophageal reflux disease) ICD-10-CM: K21.9 ICD-9-CM: 530.81  9/22/2016 Diabetic neuropathy (HCC) ICD-10-CM: E11.40 ICD-9-CM: 250.60, 357.2  9/22/2016 Chronic pancreatitis (HCC) (Chronic) ICD-10-CM: K86.1 ICD-9-CM: 021.1  9/22/2016 Iron (Fe) deficiency anemia ICD-10-CM: D50.9 ICD-9-CM: 280.9  9/22/2016 Acute renal failure superimposed on stage 3 chronic kidney disease (HCC) ICD-10-CM: N17.9, N18.3 ICD-9-CM: 584.9, 585.3  9/21/2016 Plan:  
 
Orders Placed This Encounter  WOUND CARE, DRESSING CHANGE Right lateral leg Cleanse wound and periwound with wound cleanser or normal saline. Apply Xeroform- apply to wound bed. Naomi Riser Secure with abd,  
2 layer compression with wound and lower extremity assessment. If there is any problem with the dressing (too tight, slides down, etc.) Patient to return to wound clinic to have re-wrapped by clinician. 
  
[facility] Home health to change dressing 2x/week 
  
Follow up in wound center in 2 weeks. Standing Status:   Standing Number of Occurrences:   1 Patient understood and agrees with plan. Questions answered. Follow-up Information Follow up With Specialties Details Why Contact Info [facility] OP WOUND CARE Wound Care In 2 weeks For wound re-check [facility] [address]one [address] [phone] Any procedures done today in the [address] [facility] are documented in a separate note in [facility] and made part of this record by reference. Dictated using voice recognition software; proofread, but unrecognized errors may exist. 
 
Signed By: Lauren Olivares MD   
 March 18, 2020

## 2020-03-18 NOTE — WOUND CARE
[address] [place] [address] [place], [address] Phone: [phone] Fax: [phone] Patient: Rosalee Bueno MRN: [id number]  SSN: [id number] YOB: 1940  Age: 67 y.o. Sex: male Return Appointment: 2 weeks with William Chance MD 
 
Instructions: Right lateral leg Cleanse wound and periwound with wound cleanser or normal saline. Apply Xeroform- apply to wound bed. Mayra Richards Secure with abd,  
2 layer compression with wound and lower extremity assessment. If there is any problem with the dressing (too tight, slides down, etc.) Patient to return to wound clinic to have re-wrapped by clinician. 
  
[facility] Home health to change dressing 2x/week 
  
Follow up in wound center in 2 weeks. Should you experience increased redness, swelling, pain, foul odor, size of wound(s), or have a temperature over 101 degrees please contact the [address] [place] [address] Road at [phone] or if after hours contact your primary care physician or go to the hospital emergency department. Signed By: Lydia Mane RN   
 March 18, 2020

## 2020-03-22 NOTE — ED NOTES
Medicated per mar. bp still elevated. Made md aware. Pt needs to take daily meds. Pt agrees. Says he will wait for his caregiver

## 2020-03-22 NOTE — DISCHARGE INSTRUCTIONS
Use the Valium as prescribed to help with muscle relaxation. Continue with your other medications and make sure to take your blood pressure pills. Follow-up with your pain management physician.

## 2020-03-22 NOTE — ED PROVIDER NOTES
Patient is a 56-year-old male who comes to the emergency department today complaining of left shoulder and left neck pain. He does have a long history of chronic pain in the shoulder and neck for at least a year. 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 He states he has been to pain management and using lidocaine patches. No longer controlling his pain. He states he is also taking tramadol but is not helping. He denies fever. He denies any acute injury. The history is provided by the patient. Shoulder Pain The incident occurred more than 1 week ago. There was no injury mechanism. The pain is moderate. The pain has been constant since onset. The pain does not radiate. There is no history of shoulder injury. He has no other injuries. There is no history of shoulder surgery. Pertinent negatives include no numbness and no muscle weakness. Past Medical History:  
Diagnosis Date  Acute renal failure superimposed on stage 3 chronic kidney disease ([address] 9/21/2016  Anemia   
 pt states he receives iron infusions  Arthritis OA generalized  Chronic kidney disease   
 not on HD- surgery done for access and fistula being removed. Per patient, no plans to proceed with dialysis  Chronic pancreatitis ([address] 9/22/2016  Dermatophytosis of nail 12/6/2016  Diabetic neuropathy ([address] 9/22/2016  
 insulin sliding scale only- no oral meds- avg fastings avg fasting \"low 200's;  hypo @ 80 A1C 7.1 9/2019 per patient  Essential hypertension 9/22/2016  
 medication  GERD (gastroesophageal reflux disease)   
 controlled with med  Hypercholesterolemia  Iron (Fe) deficiency anemia 9/22/2016  Septicemia due to Klebsiella pneumoniae ([address] 9/22/2016  Systolic CHF, chronic ([address] 9/23/2016 ECHO 4/2018 EF- 60-65%  Type II diabetes mellitus with nephropathy ([address] 09/22/2016  Venous insufficiency 12/6/2016  Xerosis cutis 12/6/2016 Past Surgical History: Procedure Laterality Date  HX COLONOSCOPY    
 HX HERNIA REPAIR Left 2010  HX PROSTATECTOMY  2012  HX TURP  2012 FOR BPH  VASCULAR SURGERY PROCEDURE UNLIST Left 10/26/2017 AVF  VASCULAR SURGERY PROCEDURE UNLIST Left 2019 Ligation of left brachiobasilic fistula Family History:  
Problem Relation Age of Onset  Diabetes Mother  Stroke Mother  Hypertension Mother  No Known Problems Father  Diabetes Sister  Diabetes Brother  Diabetes Sister  Diabetes Sister  Other Sister   
     fibromyalgia Social History Socioeconomic History  Marital status:  Spouse name: Not on file  Number of children: Not on file  Years of education: Not on file  Highest education level: Not on file Occupational History  Not on file Social Needs  Financial resource strain: Not on file  Food insecurity Worry: Not on file Inability: Not on file  Transportation needs Medical: Not on file Non-medical: Not on file Tobacco Use  Smoking status: Former Smoker Packs/day: 2.00 Years: 20.00 Pack years: 40.00 Last attempt to quit:  Years since quittin.2  Smokeless tobacco: Current User Substance and Sexual Activity  Alcohol use: No  
 Drug use: Never  Sexual activity: Not on file Lifestyle  Physical activity Days per week: Not on file Minutes per session: Not on file  Stress: Not on file Relationships  Social connections Talks on phone: Not on file Gets together: Not on file Attends Yarsanism service: Not on file Active member of club or organization: Not on file Attends meetings of clubs or organizations: Not on file Relationship status: Not on file  Intimate partner violence Fear of current or ex partner: Not on file Emotionally abused: Not on file Physically abused: Not on file Forced sexual activity: Not on file Other Topics Concern  Not on file Social History Narrative  Not on file ALLERGIES: Patient has no known allergies. Review of Systems Constitutional: Negative for chills, fatigue and fever. HENT: Negative for congestion, rhinorrhea and sore throat. Eyes: Negative for pain, discharge and visual disturbance. Respiratory: Negative for cough and shortness of breath. Cardiovascular: Negative for chest pain and palpitations. Gastrointestinal: Negative for abdominal pain, diarrhea and nausea. Endocrine: Negative for polydipsia and polyuria. Genitourinary: Negative for dysuria, frequency and urgency. Musculoskeletal: Positive for neck pain. Negative for back pain. Skin: Negative for rash. Neurological: Negative for seizures, syncope, weakness and numbness. Hematological: Negative. Vitals:  
 03/22/20 1107 BP: (!) 212/85 Pulse: (!) 56 Resp: 16 Temp: 97.5 °F (36.4 °C) SpO2: 96% Weight: 80.3 kg (177 lb) Height: 6' (1.829 m) Physical Exam 
Vitals signs and nursing note reviewed. Constitutional:   
   Appearance: Normal appearance. He is well-developed. Comments: Chronically ill and seated in wheelchair. HENT:  
   Head: Normocephalic and atraumatic. Nose: Nose normal.  
Eyes:  
   Extraocular Movements: Extraocular movements intact. Conjunctiva/sclera: Conjunctivae normal.  
   Pupils: Pupils are equal, round, and reactive to light. Neck: Musculoskeletal: Normal range of motion and neck supple. No neck rigidity. Comments: Tenderness and spasm to the left trapezius muscle Cardiovascular:  
   Rate and Rhythm: Normal rate and regular rhythm. Pulses: Normal pulses. Heart sounds: Normal heart sounds. Pulmonary:  
   Effort: Pulmonary effort is normal.  
   Breath sounds: Normal breath sounds. Abdominal:  
   Palpations: Abdomen is soft. Tenderness: There is no abdominal tenderness. There is no guarding or rebound. Musculoskeletal:     
   General: No swelling, tenderness or signs of injury. Comments: Decreased range of motion of the left shoulder but patient states this is chronic. Lymphadenopathy:  
   Cervical: No cervical adenopathy. Skin: 
   General: Skin is warm and dry. Capillary Refill: Capillary refill takes less than 2 seconds. Findings: No rash. Neurological:  
   General: No focal deficit present. Mental Status: He is alert and oriented to person, place, and time. GCS: GCS eye subscore is 4. GCS verbal subscore is 5. GCS motor subscore is 6. Cranial Nerves: No cranial nerve deficit. Sensory: No sensory deficit. Motor: Motor function is intact. No weakness. MDM Number of Diagnoses or Management Options Diagnosis management comments: Her records were reviewed he has had an MRI of the shoulder which shows rotator cuff tears. He has also had CT scan of the cervical spine which showed arthritic changes. 11:29 AM 
His blood pressure is elevated here but he had not yet taken his morning prescription blood pressure medication. Advised him to take those. He is already on tramadol and lidocaine patches from the pain management center. I will add a muscle relaxer but advised him to continue with those medicines and he needs to follow-up with pain management regarding this chronic pain. Voice dictation software was used during the making of this note. This software is not perfect and grammatical and other typographical errors may be present. This note has been proofread, but may still contain errors. Vera Tan MD; 3/22/2020 @11:42 AM  
=================================================================== Voice dictation software was used during the making of this note.   This software is not perfect and grammatical and other typographical errors may be present. This note has been proofread, but may still contain errors. Ainsley Colorado MD; 3/22/2020 @11:30 AM  
=================================================================== Amount and/or Complexity of Data Reviewed Review and summarize past medical records: yes Risk of Complications, Morbidity, and/or Mortality Presenting problems: low Diagnostic procedures: minimal 
Management options: minimal 
 
Patient Progress Patient progress: stable Procedures

## 2020-03-23 NOTE — PROGRESS NOTES
This note will not be viewable in [address] Emergency Room Follow up/Transitions of Care call - COVID-19 response Patient does not answer his phone, unable to reach Patient seen in ED 3/22 for shoulder pain Blood Pressure high - has not been taking medication as prescribed Tramadol available for pain Received Catapres, valium x2 while ED Discharged to home Confirmed with [facility]  that patient is current with them, RN is scheduled to see patient today.

## 2020-03-24 NOTE — PROGRESS NOTES
Arrived to the [facility]. procrit completed. Patient tolerated without difficulty. Any issues or concerns during appointment: none. Patient aware of next infusion appointment on 4/7/20 (date) at 56 (time). Discharged to home.

## 2020-04-01 NOTE — DISCHARGE INSTRUCTIONS
[address]  [address], [address]  Phone: [phone]  Fax: [phone]    Patient: Luciana Goodpasture Junior Rebecca Bolder MRN: [id number]  SSN: [id number]    YOB: 1940  Age: 67 y.o. Sex: male       Return Appointment: 2 weeks with Zuly Wright MD    Instructions: Right lateral leg and left lower leg  Cleanse wound and periwound with wound cleanser or normal saline. Apply Xeroform- apply to wound bed. .  Secure with abd,   2 layer compression with wound and lower extremity assessment. If there is any problem with the dressing (too tight, slides down, etc.) Patient to return to wound clinic to have re-wrapped by clinician.     [facility] to change dressing 2x/week     Follow up in wound center in 2 weeks. Should you experience increased redness, swelling, pain, foul odor, size of wound(s), or have a temperature over 101 degrees please contact the [address] [place] [address] Road at [phone] or if after hours contact your primary care physician or go to the hospital emergency department.     Signed By: Milan Cheney RN     April 1, 2020

## 2020-04-01 NOTE — PROGRESS NOTES
Since being seen last Mr. Myra Ignacio developed large blisters on his right leg and has had 1 rupture and soaked his pants leg. Today, we will apply 2 layer compression to both legs along with Xeroform and we are needed silver alginate. He will be seen again in 2 weeks. Home health will change his dressings twice a week. Wound Center Progress Note Patient: Vanessa Alvarez MRN: [id number]  SSN: [id number] YOB: 1940  Age: 67 y.o. Sex: male Subjective: Chief Complaint: Vanessa Alvarez is a 67 y.o. BLACK OR  male who presents with both lower extremities below the knee wound of 3 months duration. History of Present Illness:    
See above note Wound Caused By: chronic pressure/irritation due to venous insufficiency plus renal disease Associated Signs and Symptoms: Drainage primarily timing: Intermittent Quality: Burning Severity: 3/10 Modifying Factors: Renal due to chronic renal disease and chronic venous insufficiency Current Wound Care: See nurses notes Past Medical History:  
Diagnosis Date  Acute renal failure superimposed on stage 3 chronic kidney disease ([address] 9/21/2016  Anemia   
 pt states he receives iron infusions  Arthritis OA generalized  Chronic kidney disease   
 not on HD- surgery done for access and fistula being removed. Per patient, no plans to proceed with dialysis  Chronic pancreatitis ([address] 9/22/2016  Dermatophytosis of nail 12/6/2016  Diabetic neuropathy ([address] 9/22/2016  
 insulin sliding scale only- no oral meds- avg fastings avg fasting \"low 200's;  hypo @ 80 A1C 7.1 9/2019 per patient  Essential hypertension 9/22/2016  
 medication  GERD (gastroesophageal reflux disease)   
 controlled with med  Hypercholesterolemia  Iron (Fe) deficiency anemia 9/22/2016  Septicemia due to Klebsiella pneumoniae ([address] 9/22/2016  Systolic CHF, chronic ([address] 9/23/2016 ECHO 2018 EF- 60-65%  Type II diabetes mellitus with nephropathy ([facility] 2016  Venous insufficiency 2016  Xerosis cutis 2016 Past Surgical History:  
Procedure Laterality Date  HX COLONOSCOPY    
 HX HERNIA REPAIR Left 2010  HX PROSTATECTOMY  2012  HX TURP  2012 FOR BPH  VASCULAR SURGERY PROCEDURE UNLIST Left 10/26/2017 AVF  VASCULAR SURGERY PROCEDURE UNLIST Left 2019 Ligation of left brachiobasilic fistula Family History Problem Relation Age of Onset  Diabetes Mother  Stroke Mother  Hypertension Mother  No Known Problems Father  Diabetes Sister  Diabetes Brother  Diabetes Sister  Diabetes Sister  Other Sister   
     fibromyalgia Social History Tobacco Use  Smoking status: Former Smoker Packs/day: 2.00 Years: 20.00 Pack years: 40.00 Last attempt to quit:  Years since quittin.2  Smokeless tobacco: Current User Substance Use Topics  Alcohol use: No  
   
Prior to Admission medications Medication Sig Start Date End Date Taking? Authorizing Provider  
diazePAM (Valium) 2 mg tablet Take 1 Tab by mouth every twelve (12) hours as needed (spasm). Max Daily Amount: 4 mg. Indications: muscle spasm 3/22/20   Cornelius Lam MD  
cloNIDine (Catapres-TTS-3) 0.3 mg/24 hr 1 Patch by TransDERmal route every seven (7) days. Change every thursday    Amanda Davies NP  
glucose 4 gram chewable tablet Take 15 g by mouth as needed for Other (low blood sugar). Provider, Historical  
traMADol (ULTRAM) 50 mg tablet Take 50 mg by mouth every six (6) hours as needed for Pain. Provider, Historical  
torsemide (DEMADEX) 20 mg tablet Take 40 mg by mouth daily as needed for Other (swelling).  Take 2 tablets by mouth in the morning as needed for swelling    Provider, Historical  
insulin glargine (LANTUS,BASAGLAR) 100 unit/mL (3 mL) inpn 17 Units by SubCUTAneous route daily (with breakfast). Provider, Historical  
oxymetazoline (AFRIN) 0.05 % nasal spray 2 Sprays by Both Nostrils route once as needed for Congestion. 11/13/19   Provider, Historical  
Lactoperoxi/Gluc Oxid/Pot Thio (BIOTENE DRY MOUTH MM) Take 2 Sprays by mouth as needed for Other (dry mouth). Provider, Historical  
lipase-protease-amylase (CREON) 36,000-114,000- 180,000 unit cpDR capsule Take 3,600 Units by mouth See Admin Instructions. Take 2 capsules by mouth with each meal, and 1 capsule by mouth with each snack. Mily White MD  
dicyclomine (BENTYL) 10 mg capsule Take 10 mg by mouth two (2) times a day. Provider, Historical  
acetaminophen (TYLENOL) 325 mg tablet Take 2 Tabs by mouth every four (4) hours as needed for Pain. 11/9/19   Zak Dillon MD  
ondansetron (ZOFRAN ODT) 4 mg disintegrating tablet Take 1 Tab by mouth every eight (8) hours as needed for Nausea. 11/9/19   Zak Dillon MD  
pantothenic ac-min oil-pet,hyd (AQUAPHOR) 41 % ointment Apply  to affected area daily. Apply over legs 11/10/19   Zak Dillon MD  
colestipol (COLESTID) 1 gram tablet Take 1 g by mouth two (2) times a day. Provider, Historical  
sodium bicarbonate 650 mg tablet Take 650 mg by mouth three (3) times daily. Provider, Historical  
lidocaine 4 % patch Cut to appropriate size. Apply to intact skin for 12 hours, remove for 12 hours. Patient taking differently: 1 Patch by TransDERmal route as needed. Cut to appropriate size. Apply to intact skin for 12 hours, remove for 12 hours. States uses only occasionally and not on today 9/11/2019 Do not take morning of procedure. 7/12/19   JAMARCUS Davenport  
CALCITRIOL, BULK, by Does Not Apply route. Provider, Historical  
hydrALAZINE (APRESOLINE) 100 mg tablet Take 1 Tab by mouth three (3) times daily. 4/19/18   Radu Bell MD  
pregabalin (LYRICA) 50 mg capsule Take 1 Cap by mouth daily. Max Daily Amount: 50 mg. Patient taking differently: Take 150 mg by mouth two (2) times a day. 4/19/18   Madhavi Pace MD  
pravastatin (PRAVACHOL) 40 mg tablet Take 1 Tab by mouth nightly. 4/4/18   Chace Gilliland MD  
[facility] INSULIN 100 unit/mL kwikpen 5 units three times a day with meals plus correction scale, 2 units/50 > 150, max daily dose 25 units Patient not taking: Reported on 11/11/2019 2/28/18   Julián Meyer PA-C  
calcifediol (RAYALDEE) 30 mcg Cs24 Take 30 mcg by mouth nightly. Provider, Historical  
amLODIPine (NORVASC) 10 mg tablet Take 10 mg by mouth every morning. Provider, Historical  
metoprolol succinate (TOPROL-XL) 50 mg XL tablet Take 50 mg by mouth every morning. Provider, Historical  
omeprazole (PRILOSEC) 20 mg capsule Take 20 mg by mouth every morning. Provider, Historical  
 
No Known Allergies Review of Systems: A comprehensive review of systems was negative except for that written in the History of Present Illness. Lab Results Component Value Date/Time Hemoglobin A1c 8.4 (H) 11/03/2019 09:25 PM  
 Hemoglobin A1c (POC) 6.5 03/14/2018 11:20 AM  
  
 
Immunization History Administered Date(s) Administered  Pneumococcal Polysaccharide (PPSV-23) 02/07/2012  TB Skin Test (PPD) Intradermal 04/02/2018, 04/16/2018, 04/30/2018, 11/03/2019 Body mass index is 24.68 kg/m². Counseling regarding nutrition done: No  
 
Current medications: 
Current Outpatient Medications Medication Sig Dispense Refill  diazePAM (Valium) 2 mg tablet Take 1 Tab by mouth every twelve (12) hours as needed (spasm). Max Daily Amount: 4 mg. Indications: muscle spasm 10 Tab 0  cloNIDine (Catapres-TTS-3) 0.3 mg/24 hr 1 Patch by TransDERmal route every seven (7) days. Change every thursday  glucose 4 gram chewable tablet Take 15 g by mouth as needed for Other (low blood sugar).     
 traMADol (ULTRAM) 50 mg tablet Take 50 mg by mouth every six (6) hours as needed for Pain.  torsemide (DEMADEX) 20 mg tablet Take 40 mg by mouth daily as needed for Other (swelling). Take 2 tablets by mouth in the morning as needed for swelling  insulin glargine (LANTUS,BASAGLAR) 100 unit/mL (3 mL) inpn 17 Units by SubCUTAneous route daily (with breakfast).  oxymetazoline (AFRIN) 0.05 % nasal spray 2 Sprays by Both Nostrils route once as needed for Congestion.  Lactoperoxi/Gluc Oxid/Pot Thio (BIOTENE DRY MOUTH MM) Take 2 Sprays by mouth as needed for Other (dry mouth).  lipase-protease-amylase (CREON) 36,000-114,000- 180,000 unit cpDR capsule Take 3,600 Units by mouth See Admin Instructions. Take 2 capsules by mouth with each meal, and 1 capsule by mouth with each snack.  dicyclomine (BENTYL) 10 mg capsule Take 10 mg by mouth two (2) times a day.  acetaminophen (TYLENOL) 325 mg tablet Take 2 Tabs by mouth every four (4) hours as needed for Pain. 20 Tab 0  
 ondansetron (ZOFRAN ODT) 4 mg disintegrating tablet Take 1 Tab by mouth every eight (8) hours as needed for Nausea. 20 Tab 0  
 pantothenic ac-min oil-pet,hyd (AQUAPHOR) 41 % ointment Apply  to affected area daily. Apply over legs 53 g 0  
 colestipol (COLESTID) 1 gram tablet Take 1 g by mouth two (2) times a day.  sodium bicarbonate 650 mg tablet Take 650 mg by mouth three (3) times daily.  lidocaine 4 % patch Cut to appropriate size. Apply to intact skin for 12 hours, remove for 12 hours. (Patient taking differently: 1 Patch by TransDERmal route as needed. Cut to appropriate size. Apply to intact skin for 12 hours, remove for 12 hours. States uses only occasionally and not on today 9/11/2019 Do not take morning of procedure.) 14 Patch 0  
 CALCITRIOL, BULK, by Does Not Apply route.  hydrALAZINE (APRESOLINE) 100 mg tablet Take 1 Tab by mouth three (3) times daily. 90 Tab 2  pregabalin (LYRICA) 50 mg capsule Take 1 Cap by mouth daily.  Max Daily Amount: 50 mg. (Patient taking differently: Take 150 mg by mouth two (2) times a day.) 10 Cap 0  
 pravastatin (PRAVACHOL) 40 mg tablet Take 1 Tab by mouth nightly. 30 Tab 0  
 HUMALOG KWIKPEN INSULIN 100 unit/mL kwikpen 5 units three times a day with meals plus correction scale, 2 units/50 > 150, max daily dose 25 units (Patient not taking: Reported on 11/11/2019) 5 Pen 11  
 calcifediol (RAYALDEE) 30 mcg Cs24 Take 30 mcg by mouth nightly.  amLODIPine (NORVASC) 10 mg tablet Take 10 mg by mouth every morning.  metoprolol succinate (TOPROL-XL) 50 mg XL tablet Take 50 mg by mouth every morning.  omeprazole (PRILOSEC) 20 mg capsule Take 20 mg by mouth every morning. Objective:  
 
Physical Exam:  
 
Visit Vitals BP (!) 179/92 (BP 1 Location: Right arm) Pulse 62 Temp 97 °F (36.1 °C) Resp 18 Ht 6' (1.829 m) Wt 82.6 kg (182 lb) SpO2 95% BMI 24.68 kg/m² General: well developed, well nourished, pleasant , NAD. Hygiene good Psych: cooperative. Pleasant. No anxiety or depression. Normal mood and affect. Neuro: alert and oriented to person/place/situation. Otherwise nonfocal. 
Derm: Normal turgor for age, dry skin HEENT: Normocephalic, atraumatic. EOMI. Conjunctiva clear. No scleral icterus. Neck: Normal range of motion. No masses. Chest: Good air entry bilaterally. Respirations nonlabored Cardio: Normal heart sounds,no rubs, murmurs or gallops Abdomen: Soft, nontender, nondistended, normoactive bowel sounds Lower extremities: color normal; temperature normal. Hair growth is not present. Calves are supple, nontender, approximately equally sized in comparison. Capillary refill <3 sec Ulcer Description: Wound Leg lower Right;Lateral Wound #1 03/10/20 (Active) Dressing Status Clean, dry, and intact 4/1/2020 10:33 AM  
Non-staged Wound Description Partial thickness 4/1/2020 10:33 AM  
Wound Length (cm) 10.5 cm 4/1/2020 10:33 AM  
 Wound Width (cm) 1 cm 4/1/2020 10:33 AM  
Wound Depth (cm) 0.1 cm 4/1/2020 10:33 AM  
Wound Surface Area (cm^2) 10.5 cm^2 4/1/2020 10:33 AM  
Wound Volume (cm^3) 1.05 cm^3 4/1/2020 10:33 AM  
Change in Wound Size % 88.33 4/1/2020 10:33 AM  
Condition of Base Granulation;Epithelializing 4/1/2020 10:33 AM  
Tissue Type Percent Pink 100 4/1/2020 10:33 AM  
Tissue Type Percent Red 50 3/18/2020  2:01 PM  
Drainage Amount Small 4/1/2020 10:33 AM  
Drainage Color Serosanguinous 4/1/2020 10:33 AM  
Wound Odor None 4/1/2020 10:33 AM  
Tammy-wound Assessment Edema 4/1/2020 10:33 AM  
Cleansing and Cleansing Agents  Soap and water 4/1/2020 10:33 AM  
Dressing Changed Changed/New 4/1/2020 10:33 AM  
Number of days: 22 Wound Pretibial Proximal;Right #2 (Active) Dressing Status Breakthrough drainage 4/1/2020 10:35 AM  
Dressing Type Open to air 4/1/2020 10:35 AM  
Non-staged Wound Description Partial thickness 4/1/2020 10:35 AM  
Shape circular 4/1/2020 10:35 AM  
Wound Length (cm) 7 cm 4/1/2020 10:35 AM  
Wound Width (cm) 8.5 cm 4/1/2020 10:35 AM  
Wound Depth (cm) 0.1 cm 4/1/2020 10:35 AM  
Wound Surface Area (cm^2) 59.5 cm^2 4/1/2020 10:35 AM  
Wound Volume (cm^3) 5.95 cm^3 4/1/2020 10:35 AM  
Condition of Base [place] 4/1/2020 10:35 AM  
Condition of Edges Open 4/1/2020 10:35 AM  
Tissue Type Percent Pink 100 4/1/2020 10:35 AM  
Drainage Amount Copious 4/1/2020 10:35 AM  
Drainage Color Serous 4/1/2020 10:35 AM  
Wound Odor None 4/1/2020 10:35 AM  
Tammy-wound Assessment Edema 4/1/2020 10:35 AM  
Cleansing and Cleansing Agents  Soap and water 4/1/2020 10:35 AM  
Number of days: 0 Wound Pretibial Left;Proximal #3 (Active) Dressing Status Breakthrough drainage 4/1/2020 10:35 AM  
Dressing Type Open to air 4/1/2020 10:35 AM  
Non-staged Wound Description Partial thickness 4/1/2020 10:35 AM  
Shape circular 4/1/2020 10:35 AM  
Wound Length (cm) 7 cm 4/1/2020 10:35 AM  
Wound Width (cm) 9 cm 4/1/2020 10:35 AM  
 Wound Depth (cm) 0.1 cm 4/1/2020 10:35 AM  
Wound Surface Area (cm^2) 63 cm^2 4/1/2020 10:35 AM  
Wound Volume (cm^3) 6.3 cm^3 4/1/2020 10:35 AM  
Condition of Base [place] 4/1/2020 10:35 AM  
Tissue Type Percent Pink 100 4/1/2020 10:35 AM  
Drainage Amount Copious 4/1/2020 10:35 AM  
Drainage Color Serous 4/1/2020 10:35 AM  
Wound Odor Musty 4/1/2020 10:35 AM  
Tammy-wound Assessment Maceration;Edema 4/1/2020 10:35 AM  
Cleansing and Cleansing Agents  Soap and water 4/1/2020 10:35 AM  
Dressing Changed Changed/New 4/1/2020 10:35 AM  
Number of days: 0 [REMOVED] Wound Leg Left; Anterior (Removed) Number of days: 375 [REMOVED] Wound Arm Left (Removed) Number of days: 167 [REMOVED] Wound Leg Anterior;Mid;Right (Removed) Number of days: 699 [REMOVED] Wound Left (Removed) Number of days: 30  
   
[REMOVED] Wound Pretibial Right;Lateral (Removed) Number of days: 127 [REMOVED] Wound Leg lower Right;Lateral (Removed) Dressing Status Clean, dry, and intact 1/29/2020  9:49 AM  
Non-staged Wound Description Full thickness 12/30/2019 10:34 AM  
Wound Length (cm) 0 cm 1/29/2020  9:49 AM  
Wound Width (cm) 0 cm 1/29/2020  9:49 AM  
Wound Depth (cm) 0 cm 1/29/2020  9:49 AM  
Wound Surface Area (cm^2) 0 cm^2 1/29/2020  9:49 AM  
Wound Volume (cm^3) 0 cm^3 1/29/2020  9:49 AM  
Condition of Base Epithelializing 1/29/2020  9:49 AM  
Epithelialization (%) 100 1/29/2020  9:49 AM  
Tissue Type Percent Black 20 % 12/17/2019 11:31 AM  
Tissue Type Percent Pink 100 12/30/2019 10:34 AM  
Tissue Type Percent Red 10 12/17/2019 11:31 AM  
Tissue Type Percent Yellow 40 12/3/2019  2:04 PM  
Drainage Amount None 1/29/2020  9:49 AM  
Drainage Color Serosanguinous 12/30/2019 10:34 AM  
Wound Odor None 1/29/2020  9:49 AM  
Tammy-wound Assessment Edema 12/30/2019 10:34 AM  
Margins Attached edges 12/10/2019 10:27 AM  
Cleansing and Cleansing Agents  Soap and water 1/29/2020  9:49 AM  
 Dressing Changed Changed/New 12/30/2019 10:34 AM  
Procedure Tolerated Well 12/17/2019 11:31 AM  
Number of days: 57  
   
[REMOVED] Wound Leg lower Left;Lateral (Removed) Dressing Status Clean, dry, and intact 12/17/2019 11:31 AM  
Wound Length (cm) 3 cm 12/17/2019 11:31 AM  
Wound Width (cm) 5.5 cm 12/17/2019 11:31 AM  
Wound Depth (cm) 0 cm 12/17/2019 11:31 AM  
Wound Surface Area (cm^2) 16.5 cm^2 12/17/2019 11:31 AM  
Wound Volume (cm^3) 0 cm^3 12/17/2019 11:31 AM  
Condition of Base Other (comment) 12/17/2019 11:31 AM  
Drainage Amount Small 12/17/2019 11:31 AM  
Drainage Color Serous 12/17/2019 11:31 AM  
Wound Odor None 12/17/2019 11:31 AM  
Tammy-wound Assessment Intact 12/17/2019 11:31 AM  
Cleansing and Cleansing Agents  Soap and water 12/17/2019 11:31 AM  
Dressing Changed Changed/New 12/17/2019 11:31 AM  
Procedure Tolerated Well 12/17/2019 11:31 AM  
Number of days: 24 Data Review: No results found for this or any previous visit (from the past 24 hour(s)). Assessment:  
 
67 y.o. male with both lower extremities below the knees venous stasis ulcer. Problem List  Date Reviewed: 2/1/2019 Codes Class Noted ESRD (end stage renal disease) ([facility] ICD-10-CM: N18.6 ICD-9-CM: 585.6  11/3/2019 Open wound of skin ICD-10-CM: T14. Shameka Bonds ICD-9-CM: 879.8  11/3/2019 DVT (deep venous thrombosis) (HCC) ICD-10-CM: I82.409 ICD-9-CM: 453.40  11/3/2019 Rhabdomyolysis ICD-10-CM: Y50.77 ICD-9-CM: 728.88  4/30/2018 Metabolic acidosis QGG-36-EM: E87.2 ICD-9-CM: 276.2  4/30/2018 Renal failure (ARF), acute on chronic (HCC) ICD-10-CM: N17.9, N18.9 ICD-9-CM: 584.9, 585.9  4/29/2018 Dementia without behavioral disturbance (HCC) (Chronic) ICD-10-CM: F03.90 ICD-9-CM: 294.20  4/19/2018 Hypernatremia ICD-10-CM: E87.0 ICD-9-CM: 276.0  4/18/2018 Seizure ([address] ICD-10-CM: R56.9 ICD-9-CM: 780.39  4/18/2018  Volume overload ICD-10-CM: E87.70 
 ICD-9-CM: 276.69  4/4/2018 Chest pain ICD-10-CM: R07.9 ICD-9-CM: 786.50  4/4/2018 Cauda equina compression (HCC) ICD-10-CM: G83.4 ICD-9-CM: 344.60  4/2/2018 Hypercholesterolemia ICD-10-CM: E78.00 ICD-9-CM: 272.0  Unknown Uncontrolled diabetes mellitus, with long-term current use of insulin (HCC) (Chronic) ICD-10-CM: E11.65, Z79.4 ICD-9-CM: 250.02, V58.67  2/28/2018 Carotid bruit ICD-10-CM: R09.89 ICD-9-CM: 785.9  2/28/2018 Arteriovenous fistula (HCC) ICD-10-CM: I77.0 ICD-9-CM: 447.0  10/31/2017 Overview Signed 10/31/2017  3:13 PM by Avel Patel NP  
  10/26/17 (Dr. Jamila Encarnacion) Creation of left brachiobasilic fistula. Onychomycosis ICD-10-CM: B35.1 ICD-9-CM: 110.1  9/13/2017 Controlled type 2 diabetes mellitus with complication, with long-term current use of insulin (HCC) (Chronic) ICD-10-CM: E11.8, Z79.4 ICD-9-CM: 250.90, V58.67  9/13/2017 Stage 4 chronic kidney disease (HCC) (Chronic) ICD-10-CM: N18.4 ICD-9-CM: 585.4  4/4/2017 Stasis edema of both lower extremities (Chronic) ICD-10-CM: N63.168 ICD-9-CM: 459.30  4/4/2017 Cellulitis of left lower leg ICD-10-CM: L03.116 ICD-9-CM: 682.6  4/4/2017 Venous stasis ulcer of left lower extremity ([facility] ICD-10-CM: K75.121, H70.281 ICD-9-CM: 454.0  4/1/2017 Venous insufficiency (Chronic) ICD-10-CM: O53.5 ICD-9-CM: 459.81  12/6/2016 Chronic systolic congestive heart failure (HCC) (Chronic) ICD-10-CM: P90.24 ICD-9-CM: 428.22, 428.0  9/23/2016 Septicemia due to Klebsiella pneumoniae [facility]) ICD-10-CM: A41.4 ICD-9-CM: 038.3  9/22/2016 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 
ICD-9-CM: 250.40, 583.81  9/22/2016 Essential hypertension ICD-10-CM: I10 
ICD-9-CM: 401.9  9/22/2016 GERD (gastroesophageal reflux disease) ICD-10-CM: K21.9 ICD-9-CM: 530.81  9/22/2016 Diabetic neuropathy (HCC) ICD-10-CM: E11.40 ICD-9-CM: 250.60, 357.2  9/22/2016 Chronic pancreatitis (HCC) (Chronic) ICD-10-CM: K86.1 ICD-9-CM: 415.2  9/22/2016 Iron (Fe) deficiency anemia ICD-10-CM: D50.9 ICD-9-CM: 280.9  9/22/2016 Acute renal failure superimposed on stage 3 chronic kidney disease (HCC) ICD-10-CM: N17.9, N18.3 ICD-9-CM: 584.9, 585.3  9/21/2016 Plan:  
 
Orders Placed This Encounter  WOUND CARE, DRESSING CHANGE Right lateral leg and left lower leg Cleanse wound and periwound with wound cleanser or normal saline. Apply Xeroform- apply to wound bed. [place] Secure with abd,  
2 layer compression with wound and lower extremity assessment. If there is any problem with the dressing (too tight, slides down, etc.) Patient to return to wound clinic to have re-wrapped by clinician. 
  
[facility] to change dressing 2x/week 
  
Follow up in wound center in 2 weeks. 
   
  Standing Status:   Standing Number of Occurrences:   1 Patient understood and agrees with plan. Questions answered. Follow-up Information Follow up With Specialties Details Why Contact Info [facility] Wound Care In 2 weeks  Violetta Mueller [phone] [place] [id number] [phone] Any procedures done today in the [address] [facility] are documented in a separate note in [facility] and made part of this record by reference. Dictated using voice recognition software; proofread, but unrecognized errors may exist. 
 
Signed By: Angie Martin MD   
 April 1, 2020

## 2020-04-01 NOTE — PROGRESS NOTES
04/01/20 1035 Wound Pretibial Proximal;Right #2 Date First Assessed/Time First Assessed: 04/01/20 1034   Present on Hospital Admission: Yes  Wound Approximate Age at First Assessment (Weeks): 1 weeks  Primary Wound Type: Venous Ulcer  Location: Pretibial  Wound Location Orientation: Proximal;Right . .. Dressing Status Breakthrough drainage Dressing Type Open to air Non-staged Wound Description Partial thickness Shape circular Wound Length (cm) 7 cm Wound Width (cm) 8.5 cm Wound Depth (cm) 0.1 cm Wound Surface Area (cm^2) 59.5 cm^2 Wound Volume (cm^3) 5.95 cm^3 Condition of Base Pink Condition of Edges Open Tissue Type Percent Pink 100 Drainage Amount Copious Drainage Color Serous Wound Odor None Tammy-wound Assessment Edema Cleansing and Cleansing Agents  Soap and water Wound Pretibial Left;Proximal #3 Date First Assessed/Time First Assessed: 04/01/20 1034   Present on Hospital Admission: Yes  Wound Approximate Age at First Assessment (Weeks): 1 weeks  Primary Wound Type: Venous Ulcer  Location: Pretibial  Wound Location Orientation: Left;Proximal  ... Dressing Status Breakthrough drainage Dressing Type Open to air Non-staged Wound Description Partial thickness Shape circular Wound Length (cm) 7 cm Wound Width (cm) 9 cm Wound Depth (cm) 0.1 cm Wound Surface Area (cm^2) 63 cm^2 Wound Volume (cm^3) 6.3 cm^3 Condition of Base Pink Tissue Type Percent Pink 100 Drainage Amount Copious Drainage Color Serous Wound Odor Musty Tammy-wound Assessment Maceration;Edema Cleansing and Cleansing Agents  Soap and water Dressing Changed Changed/New Patient does not currently take any anticoagulants

## 2020-04-01 NOTE — WOUND CARE
[address] [place] [address], [address] [address] Phone: [phone] Fax: [phone] Patient: Maureen Hernandez MRN: [id number]  SSN: [id number] YOB: 1940  Age: 67 y.o. Sex: male Return Appointment: 2 weeks with Melany Hatchet, MD 
 
Instructions:Right lateral leg and left lower leg Cleanse wound and periwound with wound cleanser or normal saline. Apply Xeroform- apply to wound bed. [place] Secure with abd,  
2 layer compression with wound and lower extremity assessment. If there is any problem with the dressing (too tight, slides down, etc.) Patient to return to wound clinic to have re-wrapped by clinician. 
  
[facility], [facility] to change dressing 2x/week 
  
Follow up in wound center in 2 weeks. Should you experience increased redness, swelling, pain, foul odor, size of wound(s), or have a temperature over 101 degrees please contact the [address] [place] [address] Road at [phone] or if after hours contact your primary care physician or go to the hospital emergency department. Signed By: Lora Xavier RN April 1, 2020

## 2020-04-01 NOTE — WOUND CARE
Multilayer Compression Wrap 
 (Not Unna) Below the Knee NAME:  Oh Red YOB: 1940 MEDICAL RECORD NUMBER:  [id number] DATE:  4/1/2020 Removed old Multilayer wrap if indicated and wash leg with mild soap/water. Applied moisturizing agent to dry skin as needed. Applied primary and secondary dressing as ordered. Applied multilayered dressing below the knee to right lower leg. Applied multilayered dressing below the knee to left lower leg. Instructed patient/caregiver not to remove dressing and to keep it clean and dry. Instructed patient/caregiver on complications to report to provider, such as pain, numbness in toes, heavy drainage, and slippage of dressing. Instructed patient on purpose of compression dressing and on activity and exercise recommendations.  
 
 
Electronically signed by Jovita Ellis RN on 4/1/2020 at 11:00 AM

## 2020-04-13 NOTE — WOUND CARE
[address] [place] [address], [facility] [address] Phone: [phone] Fax: [phone] Patient: Dev Navarrete MRN: [id number]  SSN: [id number] YOB: 1940  Age: 67 y.o. Sex: male Return Appointment: 2 weeks with Delbert Ward MD 
Right lateral leg and left lower leg Cleanse wound and periwound with wound cleanser or normal saline. Apply Xeroform- apply to wound bed. Wilfredo Nichols Secure with abd,  
2 layer compression with wound and lower extremity assessment. If there is any problem with the dressing (too tight, slides down, etc.) Patient to return to wound clinic to have re-wrapped by clinician. 
  
[facility], [facility] to change dressing 2x/week 
  
Follow up in wound center in 2 weeks. Instructions: 
 
Should you experience increased redness, swelling, pain, foul odor, size of wound(s), or have a temperature over 101 degrees please contact the [address] [place] [address] Road at [phone] or if after hours contact your primary care physician or go to the hospital emergency department. Signed By: Teri Blair RN April 13, 2020

## 2020-04-13 NOTE — PROGRESS NOTES
[address], [address] [place], [address] 
[phone] Progress Notes SILVIO Kulkarni MRN: [id number]     : 1940     Age: 67 y.o. Subjective/HPI:  
This patient is a 67 y.o. male seen and evaluated at the wound clinic for recheck of his lower limb edema and blister like wounds. He has had recurrent bullous pemphigoid type skin blisters including his abdomen in the past. He has diabetes. He also has low albumin levels, CKD-all contributing to lower limb edema. He has compression stockings at home but has been using wrapped compression for treatment with home health assistance. He has a bullous blister that is very proximal near the right knee that is healing. No fever or chills. Review of Systems A comprehensive review of systems was negative except for that written in the HPI. Past Medical History:  
Diagnosis Date  Acute renal failure superimposed on stage 3 chronic kidney disease ([address] 2016  Anemia   
 pt states he receives iron infusions  Arthritis OA generalized  Chronic kidney disease   
 not on HD- surgery done for access and fistula being removed. Per patient, no plans to proceed with dialysis  Chronic pancreatitis ([address] 2016  Dermatophytosis of nail 2016  Diabetic neuropathy ([address] 2016  
 insulin sliding scale only- no oral meds- avg fastings avg fasting \"low 200's;  hypo @ 80 A1C 7.1 2019 per patient  Essential hypertension 2016  
 medication  GERD (gastroesophageal reflux disease)   
 controlled with med  Hypercholesterolemia  Iron (Fe) deficiency anemia 2016  Septicemia due to Klebsiella pneumoniae ([address] 2016  Systolic CHF, chronic ([address] 2016 ECHO 2018 EF- 60-65%  Type II diabetes mellitus with nephropathy ([address] 2016  Venous insufficiency 2016  Xerosis cutis 2016 Past Surgical History: Procedure Laterality Date  HX COLONOSCOPY    
 HX HERNIA REPAIR Left 2010  HX PROSTATECTOMY  2012  HX TURP   FOR BPH  VASCULAR SURGERY PROCEDURE UNLIST Left 10/26/2017 AVF  VASCULAR SURGERY PROCEDURE UNLIST Left 2019 Ligation of left brachiobasilic fistula No Known Allergies Social History Tobacco Use  Smoking status: Former Smoker Packs/day: 2.00 Years: 20.00 Pack years: 40.00 Last attempt to quit:  Years since quittin.2  Smokeless tobacco: Current User Substance Use Topics  Alcohol use: No  
  
Social History Social History Narrative  Not on file Family History Problem Relation Age of Onset  Diabetes Mother  Stroke Mother  Hypertension Mother  No Known Problems Father  Diabetes Sister  Diabetes Brother  Diabetes Sister  Diabetes Sister  Other Sister   
     fibromyalgia Cannot display prior to admission medications because the patient has not been admitted in this contact. Current Outpatient Medications Medication Sig  
 diazePAM (Valium) 2 mg tablet Take 1 Tab by mouth every twelve (12) hours as needed (spasm). Max Daily Amount: 4 mg. Indications: muscle spasm  cloNIDine (Catapres-TTS-3) 0.3 mg/24 hr 1 Patch by TransDERmal route every seven (7) days. Change every thursday  glucose 4 gram chewable tablet Take 15 g by mouth as needed for Other (low blood sugar).  traMADol (ULTRAM) 50 mg tablet Take 50 mg by mouth every six (6) hours as needed for Pain.  torsemide (DEMADEX) 20 mg tablet Take 40 mg by mouth daily as needed for Other (swelling). Take 2 tablets by mouth in the morning as needed for swelling  insulin glargine (LANTUS,BASAGLAR) 100 unit/mL (3 mL) inpn 17 Units by SubCUTAneous route daily (with breakfast).  oxymetazoline (AFRIN) 0.05 % nasal spray 2 Sprays by Both Nostrils route once as needed for Congestion.  Lactoperoxi/Gluc Oxid/Pot Thio (BIOTENE DRY MOUTH MM) Take 2 Sprays by mouth as needed for Other (dry mouth).  lipase-protease-amylase (CREON) 36,000-114,000- 180,000 unit cpDR capsule Take 3,600 Units by mouth See Admin Instructions. Take 2 capsules by mouth with each meal, and 1 capsule by mouth with each snack.  dicyclomine (BENTYL) 10 mg capsule Take 10 mg by mouth two (2) times a day.  acetaminophen (TYLENOL) 325 mg tablet Take 2 Tabs by mouth every four (4) hours as needed for Pain.  ondansetron (ZOFRAN ODT) 4 mg disintegrating tablet Take 1 Tab by mouth every eight (8) hours as needed for Nausea.  pantothenic ac-min oil-pet,hyd (AQUAPHOR) 41 % ointment Apply  to affected area daily. Apply over legs  colestipol (COLESTID) 1 gram tablet Take 1 g by mouth two (2) times a day.  sodium bicarbonate 650 mg tablet Take 650 mg by mouth three (3) times daily.  lidocaine 4 % patch Cut to appropriate size. Apply to intact skin for 12 hours, remove for 12 hours. (Patient taking differently: 1 Patch by TransDERmal route as needed. Cut to appropriate size. Apply to intact skin for 12 hours, remove for 12 hours. States uses only occasionally and not on today 9/11/2019 Do not take morning of procedure.)  CALCITRIOL, BULK, by Does Not Apply route.  hydrALAZINE (APRESOLINE) 100 mg tablet Take 1 Tab by mouth three (3) times daily.  pregabalin (LYRICA) 50 mg capsule Take 1 Cap by mouth daily. Max Daily Amount: 50 mg. (Patient taking differently: Take 150 mg by mouth two (2) times a day.)  pravastatin (PRAVACHOL) 40 mg tablet Take 1 Tab by mouth nightly.  HUMALOG KWIKPEN INSULIN 100 unit/mL kwikpen 5 units three times a day with meals plus correction scale, 2 units/50 > 150, max daily dose 25 units (Patient not taking: Reported on 11/11/2019)  calcifediol (RAYALDEE) 30 mcg Cs24 Take 30 mcg by mouth nightly.  amLODIPine (NORVASC) 10 mg tablet Take 10 mg by mouth every morning.  metoprolol succinate (TOPROL-XL) 50 mg XL tablet Take 50 mg by mouth every morning.  omeprazole (PRILOSEC) 20 mg capsule Take 20 mg by mouth every morning. No current facility-administered medications for this encounter. Objective:  
 
Vitals:  
 04/13/20 1311 04/13/20 1314 BP:  183/83 Pulse:  (!) 51 Temp: 98.6 °F (37 °C) Weight: 178 lb (80.7 kg) Physical Exam: 
BMI: normal 
Ambulation: ambulatory. Elevates legs at home Gen- the patient is well developed and in no acute distress HEENT- no focal abnormalities of the scalp or face. Neck- no JVD or retractions Lungs- normal respiratory effort. Cardiovascular:  Lower limb pulses: palpable. Abd- soft and no masses evident. Ext- warm without cyanosis. There is bilateral chronic, doughy lower leg edema that appears well controlled in compression. Skin- no jaundice or rashes. Please see the specific measurements and descriptions of the wound(s) below. There is no evidence of periwound induration or warmth. No purulence or odor. All bullae are partial thickness. Neuro- alert and oriented x 3. No gross imotor deficits are present. Psychological: Affect normal. Mood normal. Memory intact. Wound Pretibial Proximal;Right #2 (Active) Dressing Status Clean, dry, and intact 4/13/2020  1:24 PM  
Non-staged Wound Description Partial thickness 4/13/2020  1:24 PM  
Shape circular 4/13/2020  1:24 PM  
Wound Length (cm) 7 cm 4/13/2020  1:24 PM  
Wound Width (cm) 7.5 cm 4/13/2020  1:24 PM  
Wound Depth (cm) 0.1 cm 4/13/2020  1:24 PM  
Wound Surface Area (cm^2) 52.5 cm^2 4/13/2020  1:24 PM  
Wound Volume (cm^3) 5.25 cm^3 4/13/2020  1:24 PM  
Change in Wound Size % 11.76 4/13/2020  1:24 PM  
Condition of Base [place] 4/13/2020  1:24 PM  
Condition of Edges Open 4/13/2020  1:24 PM  
Tissue Type Percent Pink 100 4/13/2020  1:24 PM  
Drainage Amount Small 4/13/2020  1:24 PM  
Drainage Color Serosanguinous 4/13/2020  1:24 PM  
 Wound Odor None 4/13/2020  1:24 PM  
Tammy-wound Assessment Edema 4/13/2020  1:24 PM  
Cleansing and Cleansing Agents  Soap and water 4/13/2020  1:24 PM  
Number of days: 12 Wound Pretibial Left;Proximal #3 (Active) Dressing Status Clean, dry, and intact 4/13/2020  1:24 PM  
Non-staged Wound Description Partial thickness 4/13/2020  1:24 PM  
Shape circular 4/13/2020  1:24 PM  
Wound Length (cm) 8.1 cm 4/13/2020  1:24 PM  
Wound Width (cm) 9 cm 4/13/2020  1:24 PM  
Wound Depth (cm) 0.1 cm 4/13/2020  1:24 PM  
Wound Surface Area (cm^2) 72.9 cm^2 4/13/2020  1:24 PM  
Wound Volume (cm^3) 7.29 cm^3 4/13/2020  1:24 PM  
Change in Wound Size % -15.71 4/13/2020  1:24 PM  
Condition of Base [place] 4/13/2020  1:24 PM  
Tissue Type Percent Pink 100 4/13/2020  1:24 PM  
Drainage Amount Small 4/13/2020  1:24 PM  
Drainage Color Serous 4/1/2020 10:35 AM  
Wound Odor Mild 4/13/2020  1:24 PM  
Tammy-wound Assessment Edema 4/13/2020  1:24 PM  
Cleansing and Cleansing Agents  Soap and water 4/13/2020  1:24 PM  
Dressing Changed Changed/New 4/13/2020  1:24 PM  
Number of days: 12  
   
[REMOVED] Wound Leg Left; Anterior (Removed) Number of days: 375 [REMOVED] Wound Arm Left (Removed) Number of days: 167 [REMOVED] Wound Leg Anterior;Mid;Right (Removed) Number of days: 699 [REMOVED] Wound Left (Removed) Number of days: 30  
   
[REMOVED] Wound Pretibial Right;Lateral (Removed) Number of days: 127 [REMOVED] Wound Leg lower Right;Lateral (Removed) Dressing Status Clean, dry, and intact 1/29/2020  9:49 AM  
Non-staged Wound Description Full thickness 12/30/2019 10:34 AM  
Wound Length (cm) 0 cm 1/29/2020  9:49 AM  
Wound Width (cm) 0 cm 1/29/2020  9:49 AM  
Wound Depth (cm) 0 cm 1/29/2020  9:49 AM  
Wound Surface Area (cm^2) 0 cm^2 1/29/2020  9:49 AM  
Wound Volume (cm^3) 0 cm^3 1/29/2020  9:49 AM  
Condition of Base Epithelializing 1/29/2020  9:49 AM  
Epithelialization (%) 100 1/29/2020  9:49 AM  
 Tissue Type Percent Black 20 % 12/17/2019 11:31 AM  
Tissue Type Percent Pink 100 12/30/2019 10:34 AM  
Tissue Type Percent Red 10 12/17/2019 11:31 AM  
Tissue Type Percent Yellow 40 12/3/2019  2:04 PM  
Drainage Amount None 1/29/2020  9:49 AM  
Drainage Color Serosanguinous 12/30/2019 10:34 AM  
Wound Odor None 1/29/2020  9:49 AM  
Tammy-wound Assessment Edema 12/30/2019 10:34 AM  
Margins Attached edges 12/10/2019 10:27 AM  
Cleansing and Cleansing Agents  Soap and water 1/29/2020  9:49 AM  
Dressing Changed Changed/New 12/30/2019 10:34 AM  
Procedure Tolerated Well 12/17/2019 11:31 AM  
Number of days: 57  
   
[REMOVED] Wound Leg lower Left;Lateral (Removed) Dressing Status Clean, dry, and intact 12/17/2019 11:31 AM  
Wound Length (cm) 3 cm 12/17/2019 11:31 AM  
Wound Width (cm) 5.5 cm 12/17/2019 11:31 AM  
Wound Depth (cm) 0 cm 12/17/2019 11:31 AM  
Wound Surface Area (cm^2) 16.5 cm^2 12/17/2019 11:31 AM  
Wound Volume (cm^3) 0 cm^3 12/17/2019 11:31 AM  
Condition of Base Other (comment) 12/17/2019 11:31 AM  
Drainage Amount Small 12/17/2019 11:31 AM  
Drainage Color Serous 12/17/2019 11:31 AM  
Wound Odor None 12/17/2019 11:31 AM  
Tammy-wound Assessment Intact 12/17/2019 11:31 AM  
Cleansing and Cleansing Agents  Soap and water 12/17/2019 11:31 AM  
Dressing Changed Changed/New 12/17/2019 11:31 AM  
Procedure Tolerated Well 12/17/2019 11:31 AM  
Number of days: 24  
   
[REMOVED] Wound Leg lower Right;Lateral Wound #1 03/10/20 (Removed) Dressing Status Clean, dry, and intact 4/1/2020 10:33 AM  
Non-staged Wound Description Partial thickness 4/1/2020 10:33 AM  
Wound Length (cm) 10.5 cm 4/1/2020 10:33 AM  
Wound Width (cm) 1 cm 4/1/2020 10:33 AM  
Wound Depth (cm) 0.1 cm 4/1/2020 10:33 AM  
Wound Surface Area (cm^2) 10.5 cm^2 4/1/2020 10:33 AM  
Wound Volume (cm^3) 1.05 cm^3 4/1/2020 10:33 AM  
Change in Wound Size % 88.33 4/1/2020 10:33 AM  
Condition of Base Granulation;Epithelializing 4/1/2020 10:33 AM  
 Tissue Type Percent Pink 100 4/1/2020 10:33 AM  
Tissue Type Percent Red 50 3/18/2020  2:01 PM  
Drainage Amount Small 4/1/2020 10:33 AM  
Drainage Color Serosanguinous 4/1/2020 10:33 AM  
Wound Odor None 4/1/2020 10:33 AM  
Tammy-wound Assessment Edema 4/1/2020 10:33 AM  
Cleansing and Cleansing Agents  Soap and water 4/1/2020 10:33 AM  
Dressing Changed Changed/New 4/1/2020 10:33 AM  
Number of days: 27 Data Review All Micro Results None All Micro Results None Radiology Assessment:  
 
Patient Active Problem List  
Diagnosis Code  Acute renal failure superimposed on stage 3 chronic kidney disease ([facility]) N17.9, N18.3  Septicemia due to Klebsiella pneumoniae ([facility] A41.4  Type II diabetes mellitus with nephropathy ([facility]) E11.21  
 Essential hypertension I10  
 GERD (gastroesophageal reflux disease) K21.9  Diabetic neuropathy ([facility]) E11.40  Chronic pancreatitis ([facility] K86.1  Iron (Fe) deficiency anemia D50.9  Chronic systolic congestive heart failure ([facility]) I50.22  Venous insufficiency I87.2  Venous stasis ulcer of left lower extremity ([facility]) I83.029, L97.929  
 Stage 4 chronic kidney disease ([facility] N18.4  Stasis edema of both lower extremities I87.303  Cellulitis of left lower leg L03. 116  
 Onychomycosis B35.1  Controlled type 2 diabetes mellitus with complication, with long-term current use of insulin ([facility]) E11.8, Z79.4  Arteriovenous fistula ([facility]) I77.0  Hypercholesterolemia E78.00  
 Uncontrolled diabetes mellitus, with long-term current use of insulin ([facility]) E11.65, Z79.4  Carotid bruit R09.89  Cauda equina compression ([facility]) G83.4  Volume overload E87.70  Chest pain R07.9  Hypernatremia E87.0  Seizure ([facility] R56.9  Dementia without behavioral disturbance ([facility]) F03.90  Renal failure (ARF), acute on chronic ([facility]) N17.9, N18.9  Rhabdomyolysis Y61.95  
 Metabolic acidosis L63.6  ESRD (end stage renal disease) ([facility] N18.6  Open wound of skin T14. Lynda Dixon  DVT (deep venous thrombosis) ([facility]) I82.409 Plan: Active Orders Nursing WOUND CARE, DRESSING CHANGE Frequency: ONE TIME Number of Occurrences: 1 Occurrences Order Comments: Right lateral leg and left lower leg Cleanse wound and periwound with wound cleanser or normal saline. Apply Xeroform- apply to wound bed. Jasbir Salter Secure with abd,  
2 layer compression with wound and lower extremity assessment. If there is any problem with the dressing (too tight, slides down, etc.) Patient to return to wound clinic to have re-wrapped by clinician. 
  
[facility], [facility] to change dressing 2x/week 
  
Follow up in wound center in 2 weeks. Follow-up Information Follow up With Specialties Details Why Contact Info [facility] OP WOUND CARE Wound Care In 2 weeks  [phone] [phone] He has multiple bullae of the lower limbs but has had some of the abdomen in the past. He appears to be healing. We will works towards closure and transition to compression stockings once healed. REcheck at the wound clinic in two weeks. Dictated using voice recognition software. Proofread, but unrecognized errors may exist. 
  
 
Thank you very much for the opportunity to participate in this patient's care. Signed By: Umer Arellano MD   
 April 13, 2020

## 2020-04-13 NOTE — WOUND CARE
Multilayer Compression Wrap 
 (Not Unna) Below the Knee NAME:  Irineo Tavares YOB: 1940 MEDICAL RECORD NUMBER:  [id number] DATE:  4/13/2020 Removed old Multilayer wrap if indicated and wash leg with mild soap/water. Applied moisturizing agent to dry skin as needed. Applied primary and secondary dressing as ordered. Applied multilayered dressing below the knee to right lower leg. Applied multilayered dressing below the knee to left lower leg. Instructed patient/caregiver not to remove dressing and to keep it clean and dry. Instructed patient/caregiver on complications to report to provider, such as pain, numbness in toes, heavy drainage, and slippage of dressing. Instructed patient on purpose of compression dressing and on activity and exercise recommendations.  
 
 
Electronically signed by Apple Welch RN on 4/13/2020 at 2:05 PM

## 2020-04-13 NOTE — DISCHARGE INSTRUCTIONS
[address]  [address], [address]  Phone: [phone]  Fax: [phone]    Patient: Vaibhav Lenz MRN: [id number]  SSN: [id number]    YOB: 1940  Age: 67 y.o. Sex: male       Return Appointment: 2 weeks with Will Truong MD    Instructions: Right lateral leg and left lower leg  Cleanse wound and periwound with wound cleanser or normal saline. Apply Xeroform- apply to wound bed. .  Secure with abd,   2 layer compression with wound and lower extremity assessment. If there is any problem with the dressing (too tight, slides down, etc.) Patient to return to wound clinic to have re-wrapped by clinician.     [facility] to change dressing 2x/week     Follow up in wound center in 2 weeks. Should you experience increased redness, swelling, pain, foul odor, size of wound(s), or have a temperature over 101 degrees please contact the [address] [place] [address] Road at [phone] or if after hours contact your primary care physician or go to the hospital emergency department.     Signed By: Lora Xavier RN     April 13, 2020

## 2020-04-13 NOTE — PROGRESS NOTES
04/13/20 1324 Wound Pretibial Proximal;Right #2 Date First Assessed/Time First Assessed: 04/01/20 1034   Present on Hospital Admission: Yes  Wound Approximate Age at First Assessment (Weeks): 1 weeks  Primary Wound Type: Venous Ulcer  Location: Pretibial  Wound Location Orientation: Proximal;Right . .. Dressing Status Clean, dry, and intact Dressing Type  
(2 layer compression and xeroform) Non-staged Wound Description Partial thickness Shape circular Wound Length (cm) 7 cm Wound Width (cm) 7.5 cm Wound Depth (cm) 0.1 cm Wound Surface Area (cm^2) 52.5 cm^2 Wound Volume (cm^3) 5.25 cm^3 Change in Wound Size % 11.76 Condition of Base Pink Condition of Edges Open Tissue Type Percent Pink 100 Drainage Amount Small Drainage Color Serosanguinous Wound Odor None Tammy-wound Assessment Edema Cleansing and Cleansing Agents  Soap and water Wound Pretibial Left;Proximal #3 Date First Assessed/Time First Assessed: 04/01/20 1034   Present on Hospital Admission: Yes  Wound Approximate Age at First Assessment (Weeks): 1 weeks  Primary Wound Type: Venous Ulcer  Location: Pretibial  Wound Location Orientation: Left;Proximal  ... Dressing Status Clean, dry, and intact Dressing Type  
(xerform and 2 layer compression) Non-staged Wound Description Partial thickness Shape circular Wound Length (cm) 8.1 cm Wound Width (cm) 9 cm Wound Depth (cm) 0.1 cm Wound Surface Area (cm^2) 72.9 cm^2 Wound Volume (cm^3) 7.29 cm^3 Change in Wound Size % -15.71 Condition of Base Pink Tissue Type Percent Pink 100 Drainage Amount Small Wound Odor Mild Tammy-wound Assessment Edema Cleansing and Cleansing Agents  Soap and water Dressing Changed Changed/New

## 2020-04-27 NOTE — PROGRESS NOTES
Legs are healed I advised him to get some CeraVe a cream to put on his leg and use Farrow wraps. He is discharged from the wound clinic Wound Center Progress Note Patient: Antoine Pan MRN: [id number]  SSN: [id number] YOB: 1940  Age: 67 y.o. Sex: male Subjective: Chief Complaint: Antoine Pan is a 67 y.o. BLACK OR  male who presents with both lower extremities below the knee wound of 3 months duration. History of Present Illness:    
See above note Wound Caused By: Venous insufficiency Associated Signs and Symptoms: No pain Modifying Factors: Venous insufficiency Current Wound Care: See nurses notes Past Medical History:  
Diagnosis Date  Acute renal failure superimposed on stage 3 chronic kidney disease ([address] 9/21/2016  Anemia   
 pt states he receives iron infusions  Arthritis OA generalized  Chronic kidney disease   
 not on HD- surgery done for access and fistula being removed. Per patient, no plans to proceed with dialysis  Chronic pancreatitis ([facility] 9/22/2016  Dermatophytosis of nail 12/6/2016  Diabetic neuropathy ([facility] 9/22/2016  
 insulin sliding scale only- no oral meds- avg fastings avg fasting \"low 200's;  hypo @ 80 A1C 7.1 9/2019 per patient  Essential hypertension 9/22/2016  
 medication  GERD (gastroesophageal reflux disease)   
 controlled with med  Hypercholesterolemia  Iron (Fe) deficiency anemia 9/22/2016  Septicemia due to Klebsiella pneumoniae ([address] 9/22/2016  Systolic CHF, chronic ([facility] 9/23/2016 ECHO 4/2018 EF- 60-65%  Type II diabetes mellitus with nephropathy ([facility] 09/22/2016  Venous insufficiency 12/6/2016  Xerosis cutis 12/6/2016 Past Surgical History:  
Procedure Laterality Date  HX COLONOSCOPY    
 HX HERNIA REPAIR Left 2010  HX PROSTATECTOMY  2012  HX TURP  2012 FOR BPH  VASCULAR SURGERY PROCEDURE UNLIST Left 10/26/2017 AVF  
 VASCULAR SURGERY PROCEDURE UNLIST Left 2019 Ligation of left brachiobasilic fistula Family History Problem Relation Age of Onset  Diabetes Mother  Stroke Mother  Hypertension Mother  No Known Problems Father  Diabetes Sister  Diabetes Brother  Diabetes Sister  Diabetes Sister  Other Sister   
     fibromyalgia Social History Tobacco Use  Smoking status: Former Smoker Packs/day: 2.00 Years: 20.00 Pack years: 40.00 Last attempt to quit:  Years since quittin.3  Smokeless tobacco: Current User Substance Use Topics  Alcohol use: No  
   
Prior to Admission medications Medication Sig Start Date End Date Taking? Authorizing Provider  
diazePAM (Valium) 2 mg tablet Take 1 Tab by mouth every twelve (12) hours as needed (spasm). Max Daily Amount: 4 mg. Indications: muscle spasm 3/22/20   Marah Patches, MD  
cloNIDine (Catapres-TTS-3) 0.3 mg/24 hr 1 Patch by TransDERmal route every seven (7) days. Change every thursday    Dione Barn, NP  
glucose 4 gram chewable tablet Take 15 g by mouth as needed for Other (low blood sugar). Provider, Historical  
traMADol (ULTRAM) 50 mg tablet Take 50 mg by mouth every six (6) hours as needed for Pain. Provider, Historical  
torsemide (DEMADEX) 20 mg tablet Take 40 mg by mouth daily as needed for Other (swelling). Take 2 tablets by mouth in the morning as needed for swelling    Provider, Historical  
insulin glargine (LANTUS,BASAGLAR) 100 unit/mL (3 mL) inpn 17 Units by SubCUTAneous route daily (with breakfast). Provider, Historical  
oxymetazoline (AFRIN) 0.05 % nasal spray 2 Sprays by Both Nostrils route once as needed for Congestion. 19   Provider, Historical  
Lactoperoxi/Gluc Oxid/Pot Thio (BIOTENE DRY MOUTH MM) Take 2 Sprays by mouth as needed for Other (dry mouth).     Provider, Historical  
 lipase-protease-amylase (CREON) 36,000-114,000- 180,000 unit cpDR capsule Take 3,600 Units by mouth See Admin Instructions. Take 2 capsules by mouth with each meal, and 1 capsule by mouth with each snack. Ninfa Solis MD  
dicyclomine (BENTYL) 10 mg capsule Take 10 mg by mouth two (2) times a day. Provider, Historical  
acetaminophen (TYLENOL) 325 mg tablet Take 2 Tabs by mouth every four (4) hours as needed for Pain. 11/9/19   Ralf Lee MD  
ondansetron (ZOFRAN ODT) 4 mg disintegrating tablet Take 1 Tab by mouth every eight (8) hours as needed for Nausea. 11/9/19   Ralf Lee MD  
pantothenic ac-min oil-pet,hyd (AQUAPHOR) 41 % ointment Apply  to affected area daily. Apply over legs 11/10/19   Ralf Lee MD  
colestipol (COLESTID) 1 gram tablet Take 1 g by mouth two (2) times a day. Provider, Historical  
sodium bicarbonate 650 mg tablet Take 650 mg by mouth three (3) times daily. Provider, Historical  
lidocaine 4 % patch Cut to appropriate size. Apply to intact skin for 12 hours, remove for 12 hours. Patient taking differently: 1 Patch by TransDERmal route as needed. Cut to appropriate size. Apply to intact skin for 12 hours, remove for 12 hours. States uses only occasionally and not on today 9/11/2019 Do not take morning of procedure. 7/12/19   JAMARCUS Cottrell  
CALCITRIOL, BULK, by Does Not Apply route. Provider, Historical  
hydrALAZINE (APRESOLINE) 100 mg tablet Take 1 Tab by mouth three (3) times daily. 4/19/18   Lien Schaffer MD  
pregabalin (LYRICA) 50 mg capsule Take 1 Cap by mouth daily. Max Daily Amount: 50 mg. Patient taking differently: Take 150 mg by mouth two (2) times a day. 4/19/18   Lien Schaffer MD  
pravastatin (PRAVACHOL) 40 mg tablet Take 1 Tab by mouth nightly.  4/4/18   Terrell Smith MD  
[facility] INSULIN 100 unit/mL kwikpen 5 units three times a day with meals plus correction scale, 2 units/50 > 150, max daily dose 25 units Patient not taking: Reported on 11/11/2019 2/28/18   Yaritza Rosales PA-C  
calcifediol (RAYALDEE) 30 mcg Cs24 Take 30 mcg by mouth nightly. Provider, Historical  
amLODIPine (NORVASC) 10 mg tablet Take 10 mg by mouth every morning. Provider, Historical  
metoprolol succinate (TOPROL-XL) 50 mg XL tablet Take 50 mg by mouth every morning. Provider, Historical  
omeprazole (PRILOSEC) 20 mg capsule Take 20 mg by mouth every morning. Provider, Historical  
 
No Known Allergies Review of Systems: A comprehensive review of systems was negative except for that written in the History of Present Illness. Lab Results Component Value Date/Time Hemoglobin A1c 8.4 (H) 11/03/2019 09:25 PM  
 Hemoglobin A1c (POC) 6.5 03/14/2018 11:20 AM  
  
 
Immunization History Administered Date(s) Administered  Pneumococcal Polysaccharide (PPSV-23) 02/07/2012  TB Skin Test (PPD) Intradermal 04/02/2018, 04/16/2018, 04/30/2018, 11/03/2019 Body mass index is 24.3 kg/m². Counseling regarding nutrition done: No  
 
Current medications: 
Current Outpatient Medications Medication Sig Dispense Refill  diazePAM (Valium) 2 mg tablet Take 1 Tab by mouth every twelve (12) hours as needed (spasm). Max Daily Amount: 4 mg. Indications: muscle spasm 10 Tab 0  cloNIDine (Catapres-TTS-3) 0.3 mg/24 hr 1 Patch by TransDERmal route every seven (7) days. Change every thursday  glucose 4 gram chewable tablet Take 15 g by mouth as needed for Other (low blood sugar).  traMADol (ULTRAM) 50 mg tablet Take 50 mg by mouth every six (6) hours as needed for Pain.  torsemide (DEMADEX) 20 mg tablet Take 40 mg by mouth daily as needed for Other (swelling). Take 2 tablets by mouth in the morning as needed for swelling  insulin glargine (LANTUS,BASAGLAR) 100 unit/mL (3 mL) inpn 17 Units by SubCUTAneous route daily (with breakfast).  oxymetazoline (AFRIN) 0.05 % nasal spray 2 Sprays by Both Nostrils route once as needed for Congestion.  Lactoperoxi/Gluc Oxid/Pot Thio (BIOTENE DRY MOUTH MM) Take 2 Sprays by mouth as needed for Other (dry mouth).  lipase-protease-amylase (CREON) 36,000-114,000- 180,000 unit cpDR capsule Take 3,600 Units by mouth See Admin Instructions. Take 2 capsules by mouth with each meal, and 1 capsule by mouth with each snack.  dicyclomine (BENTYL) 10 mg capsule Take 10 mg by mouth two (2) times a day.  acetaminophen (TYLENOL) 325 mg tablet Take 2 Tabs by mouth every four (4) hours as needed for Pain. 20 Tab 0  
 ondansetron (ZOFRAN ODT) 4 mg disintegrating tablet Take 1 Tab by mouth every eight (8) hours as needed for Nausea. 20 Tab 0  
 pantothenic ac-min oil-pet,hyd (AQUAPHOR) 41 % ointment Apply  to affected area daily. Apply over legs 53 g 0  
 colestipol (COLESTID) 1 gram tablet Take 1 g by mouth two (2) times a day.  sodium bicarbonate 650 mg tablet Take 650 mg by mouth three (3) times daily.  lidocaine 4 % patch Cut to appropriate size. Apply to intact skin for 12 hours, remove for 12 hours. (Patient taking differently: 1 Patch by TransDERmal route as needed. Cut to appropriate size. Apply to intact skin for 12 hours, remove for 12 hours. States uses only occasionally and not on today 9/11/2019 Do not take morning of procedure.) 14 Patch 0  
 CALCITRIOL, BULK, by Does Not Apply route.  hydrALAZINE (APRESOLINE) 100 mg tablet Take 1 Tab by mouth three (3) times daily. 90 Tab 2  pregabalin (LYRICA) 50 mg capsule Take 1 Cap by mouth daily. Max Daily Amount: 50 mg. (Patient taking differently: Take 150 mg by mouth two (2) times a day.) 10 Cap 0  
 pravastatin (PRAVACHOL) 40 mg tablet Take 1 Tab by mouth nightly.  30 Tab 0  
 HUMALOG KWIKPEN INSULIN 100 unit/mL kwikpen 5 units three times a day with meals plus correction scale, 2 units/50 > 150, max daily dose 25 units (Patient not taking: Reported on 11/11/2019) 5 Pen 11  
 calcifediol (RAYALDEE) 30 mcg Cs24 Take 30 mcg by mouth nightly.  amLODIPine (NORVASC) 10 mg tablet Take 10 mg by mouth every morning.  metoprolol succinate (TOPROL-XL) 50 mg XL tablet Take 50 mg by mouth every morning.  omeprazole (PRILOSEC) 20 mg capsule Take 20 mg by mouth every morning. Objective:  
 
Physical Exam:  
 
Visit Vitals /83 (BP 1 Location: Right arm, BP Patient Position: At rest) Pulse 71 Temp 98.1 °F (36.7 °C) Ht 6' (1.829 m) Wt 81.3 kg (179 lb 3.2 oz) BMI 24.30 kg/m² General: well developed, well nourished, pleasant , NAD. Hygiene good Psych: cooperative. Pleasant. No anxiety or depression. Normal mood and affect. Neuro: alert and oriented to person/place/situation. Otherwise nonfocal. 
Derm: Normal turgor for age, dry skin HEENT: Normocephalic, atraumatic. EOMI. Conjunctiva clear. No scleral icterus. Neck: Normal range of motion. No masses. Chest: Good air entry bilaterally. Respirations nonlabored Cardio: Normal heart sounds,no rubs, murmurs or gallops Abdomen: Soft, nontender, nondistended, normoactive bowel sounds Lower extremities: color normal; temperature normal. Hair growth is not present. Calves are supple, nontender, approximately equally sized in comparison. Capillary refill <3 sec Ulcer Description: Wound Pretibial Proximal;Right #2 (Active) Dressing Status Clean, dry, and intact 4/27/2020  1:21 PM  
Non-staged Wound Description Partial thickness 4/13/2020  1:24 PM  
Shape circular 4/13/2020  1:24 PM  
Wound Length (cm) 0 cm 4/27/2020  1:21 PM  
Wound Width (cm) 0 cm 4/27/2020  1:21 PM  
Wound Depth (cm) 0 cm 4/27/2020  1:21 PM  
Wound Surface Area (cm^2) 0 cm^2 4/27/2020  1:21 PM  
Wound Volume (cm^3) 0 cm^3 4/27/2020  1:21 PM  
Change in Wound Size % 100 4/27/2020  1:21 PM  
Condition of Base Epithelializing 4/27/2020  1:21 PM  
 Condition of Edges Open 4/13/2020  1:24 PM  
Epithelialization (%) 100 4/27/2020  1:21 PM  
Tissue Type Percent Pink 100 4/13/2020  1:24 PM  
Drainage Amount None 4/27/2020  1:21 PM  
Drainage Color Serosanguinous 4/13/2020  1:24 PM  
Wound Odor None 4/27/2020  1:21 PM  
Tammy-wound Assessment Edema 4/13/2020  1:24 PM  
Cleansing and Cleansing Agents  Soap and water 4/27/2020  1:21 PM  
Procedure Tolerated Well 4/27/2020  1:21 PM  
Number of days: 26 Wound Pretibial Left;Proximal #3 (Active) Dressing Status Clean, dry, and intact 4/27/2020  1:21 PM  
Non-staged Wound Description Partial thickness 4/13/2020  1:24 PM  
Shape circular 4/13/2020  1:24 PM  
Wound Length (cm) 0 cm 4/27/2020  1:21 PM  
Wound Width (cm) 0 cm 4/27/2020  1:21 PM  
Wound Depth (cm) 0 cm 4/27/2020  1:21 PM  
Wound Surface Area (cm^2) 0 cm^2 4/27/2020  1:21 PM  
Wound Volume (cm^3) 0 cm^3 4/27/2020  1:21 PM  
Change in Wound Size % 100 4/27/2020  1:21 PM  
Condition of Base Epithelializing 4/27/2020  1:21 PM  
Epithelialization (%) 100 4/27/2020  1:21 PM  
Tissue Type Percent Pink 100 4/13/2020  1:24 PM  
Drainage Amount None 4/27/2020  1:21 PM  
Drainage Color Serous 4/1/2020 10:35 AM  
Wound Odor None 4/27/2020  1:21 PM  
Tammy-wound Assessment Edema 4/13/2020  1:24 PM  
Cleansing and Cleansing Agents  Soap and water 4/27/2020  1:21 PM  
Dressing Changed Changed/New 4/13/2020  1:24 PM  
Procedure Tolerated Well 4/27/2020  1:21 PM  
Number of days: 26  
   
[REMOVED] Wound Leg Left; Anterior (Removed) Number of days: 375 [REMOVED] Wound Arm Left (Removed) Number of days: 167 [REMOVED] Wound Leg Anterior;Mid;Right (Removed) Number of days: 699 [REMOVED] Wound Left (Removed) Number of days: 30  
   
[REMOVED] Wound Pretibial Right;Lateral (Removed) Number of days: 127 [REMOVED] Wound Leg lower Right;Lateral (Removed) Dressing Status Clean, dry, and intact 1/29/2020  9:49 AM  
 Non-staged Wound Description Full thickness 12/30/2019 10:34 AM  
Wound Length (cm) 0 cm 1/29/2020  9:49 AM  
Wound Width (cm) 0 cm 1/29/2020  9:49 AM  
Wound Depth (cm) 0 cm 1/29/2020  9:49 AM  
Wound Surface Area (cm^2) 0 cm^2 1/29/2020  9:49 AM  
Wound Volume (cm^3) 0 cm^3 1/29/2020  9:49 AM  
Condition of Base Epithelializing 1/29/2020  9:49 AM  
Epithelialization (%) 100 1/29/2020  9:49 AM  
Tissue Type Percent Black 20 % 12/17/2019 11:31 AM  
Tissue Type Percent Pink 100 12/30/2019 10:34 AM  
Tissue Type Percent Red 10 12/17/2019 11:31 AM  
Tissue Type Percent Yellow 40 12/3/2019  2:04 PM  
Drainage Amount None 1/29/2020  9:49 AM  
Drainage Color Serosanguinous 12/30/2019 10:34 AM  
Wound Odor None 1/29/2020  9:49 AM  
Tammy-wound Assessment Edema 12/30/2019 10:34 AM  
Margins Attached edges 12/10/2019 10:27 AM  
Cleansing and Cleansing Agents  Soap and water 1/29/2020  9:49 AM  
Dressing Changed Changed/New 12/30/2019 10:34 AM  
Procedure Tolerated Well 12/17/2019 11:31 AM  
Number of days: 57  
   
[REMOVED] Wound Leg lower Left;Lateral (Removed) Dressing Status Clean, dry, and intact 12/17/2019 11:31 AM  
Wound Length (cm) 3 cm 12/17/2019 11:31 AM  
Wound Width (cm) 5.5 cm 12/17/2019 11:31 AM  
Wound Depth (cm) 0 cm 12/17/2019 11:31 AM  
Wound Surface Area (cm^2) 16.5 cm^2 12/17/2019 11:31 AM  
Wound Volume (cm^3) 0 cm^3 12/17/2019 11:31 AM  
Condition of Base Other (comment) 12/17/2019 11:31 AM  
Drainage Amount Small 12/17/2019 11:31 AM  
Drainage Color Serous 12/17/2019 11:31 AM  
Wound Odor None 12/17/2019 11:31 AM  
Tammy-wound Assessment Intact 12/17/2019 11:31 AM  
Cleansing and Cleansing Agents  Soap and water 12/17/2019 11:31 AM  
Dressing Changed Changed/New 12/17/2019 11:31 AM  
Procedure Tolerated Well 12/17/2019 11:31 AM  
Number of days: 24  
   
[REMOVED] Wound Leg lower Right;Lateral Wound #1 03/10/20 (Removed) Dressing Status Clean, dry, and intact 4/1/2020 10:33 AM  
 Non-staged Wound Description Partial thickness 4/1/2020 10:33 AM  
Wound Length (cm) 10.5 cm 4/1/2020 10:33 AM  
Wound Width (cm) 1 cm 4/1/2020 10:33 AM  
Wound Depth (cm) 0.1 cm 4/1/2020 10:33 AM  
Wound Surface Area (cm^2) 10.5 cm^2 4/1/2020 10:33 AM  
Wound Volume (cm^3) 1.05 cm^3 4/1/2020 10:33 AM  
Change in Wound Size % 88.33 4/1/2020 10:33 AM  
Condition of Base Granulation;Epithelializing 4/1/2020 10:33 AM  
Tissue Type Percent Pink 100 4/1/2020 10:33 AM  
Tissue Type Percent Red 50 3/18/2020  2:01 PM  
Drainage Amount Small 4/1/2020 10:33 AM  
Drainage Color Serosanguinous 4/1/2020 10:33 AM  
Wound Odor None 4/1/2020 10:33 AM  
Tammy-wound Assessment Edema 4/1/2020 10:33 AM  
Cleansing and Cleansing Agents  Soap and water 4/1/2020 10:33 AM  
Dressing Changed Changed/New 4/1/2020 10:33 AM  
Number of days: 27 Data Review: No results found for this or any previous visit (from the past 24 hour(s)). Assessment:  
 
67 y.o. male with lower extremities venous stasis ulcer. Problem List  Date Reviewed: 2/1/2019 Codes Class Noted ESRD (end stage renal disease) [facility] ICD-10-CM: N18.6 ICD-9-CM: 585.6  11/3/2019 Open wound of skin ICD-10-CM: T14. Waylon Naqvi ICD-9-CM: 879.8  11/3/2019 DVT (deep venous thrombosis) (HCC) ICD-10-CM: I82.409 ICD-9-CM: 453.40  11/3/2019 Rhabdomyolysis ICD-10-CM: V51.12 ICD-9-CM: 728.88  4/30/2018 Metabolic acidosis FBL-71-WJ: E87.2 ICD-9-CM: 276.2  4/30/2018 Renal failure (ARF), acute on chronic (HCC) ICD-10-CM: N17.9, N18.9 ICD-9-CM: 584.9, 585.9  4/29/2018 Dementia without behavioral disturbance (HCC) (Chronic) ICD-10-CM: F03.90 ICD-9-CM: 294.20  4/19/2018 Hypernatremia ICD-10-CM: E87.0 ICD-9-CM: 276.0  4/18/2018 Seizure ([address] ICD-10-CM: R56.9 ICD-9-CM: 780.39  4/18/2018 Volume overload ICD-10-CM: E87.70 ICD-9-CM: 276.69  4/4/2018 Chest pain ICD-10-CM: R07.9 ICD-9-CM: 786.50  4/4/2018 Cauda equina compression (HCC) ICD-10-CM: G83.4 ICD-9-CM: 344.60  4/2/2018 Hypercholesterolemia ICD-10-CM: E78.00 ICD-9-CM: 272.0  Unknown Uncontrolled diabetes mellitus, with long-term current use of insulin (HCC) (Chronic) ICD-10-CM: E11.65, Z79.4 ICD-9-CM: 250.02, V58.67  2/28/2018 Carotid bruit ICD-10-CM: R09.89 ICD-9-CM: 785.9  2/28/2018 Arteriovenous fistula (HCC) ICD-10-CM: I77.0 ICD-9-CM: 447.0  10/31/2017 Overview Signed 10/31/2017  3:13 PM by Coni Chaves NP  
  10/26/17 (Dr. Mali Mejias) Creation of left brachiobasilic fistula. Onychomycosis ICD-10-CM: B35.1 ICD-9-CM: 110.1  9/13/2017 Controlled type 2 diabetes mellitus with complication, with long-term current use of insulin (HCC) (Chronic) ICD-10-CM: E11.8, Z79.4 ICD-9-CM: 250.90, V58.67  9/13/2017 Stage 4 chronic kidney disease (HCC) (Chronic) ICD-10-CM: N18.4 ICD-9-CM: 585.4  4/4/2017 Stasis edema of both lower extremities (Chronic) ICD-10-CM: R70.906 ICD-9-CM: 459.30  4/4/2017 Cellulitis of left lower leg ICD-10-CM: L03.116 ICD-9-CM: 682.6  4/4/2017 Venous stasis ulcer [facility] left lower extremity [facility] ICD-10-CM: Q07.026, A32.783 ICD-9-CM: 454.0  4/1/2017 Venous insufficiency (Chronic) ICD-10-CM: B61.6 ICD-9-CM: 459.81  12/6/2016 Chronic systolic congestive heart failure (HCC) (Chronic) ICD-10-CM: J74.88 ICD-9-CM: 428.22, 428.0  9/23/2016 Septicemia due to Klebsiella pneumoniae [facility]) ICD-10-CM: A41.4 ICD-9-CM: 038.3  9/22/2016 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 
ICD-9-CM: 250.40, 583.81  9/22/2016 Essential hypertension ICD-10-CM: I10 
ICD-9-CM: 401.9  9/22/2016 GERD (gastroesophageal reflux disease) ICD-10-CM: K21.9 ICD-9-CM: 530.81  9/22/2016 Diabetic neuropathy (HCC) ICD-10-CM: E11.40 ICD-9-CM: 250.60, 357.2  9/22/2016 Chronic pancreatitis (HCC) (Chronic) ICD-10-CM: K86.1 ICD-9-CM: 078.3  9/22/2016 Iron (Fe) deficiency anemia ICD-10-CM: D50.9 ICD-9-CM: 280.9  9/22/2016 Acute renal failure superimposed on stage 3 chronic kidney disease (HCC) ICD-10-CM: N17.9, N18.3 ICD-9-CM: 584.9, 585.3  9/21/2016 Plan:  
 
Orders Placed This Encounter  WOUND CARE, DRESSING CHANGE Bilateral lower leg wounds Wounds are healed! No need for dressings at this time. Patient may shower as previously. Apply moisturizer daily to both legs. Wear bilateral below knee compression stockings to both legs daily; put on first in morning and may remove at night for hygiene and sleeping. Discharge home health at this time. Discharge from wound center at this time. Standing Status:   Standing Number of Occurrences:   1 Patient understood and agrees with plan. Questions answered. Follow-up Information Follow up With Specialties Details Why Contact Info [facility]OUND [facility] Wound Care  Discharge from wound center at this time. [address] [place] [id number] [phone] Any procedures done today in the [address] [facility],[address] are documented in a separate note in [facility] and made part of this record by reference. Dictated using voice recognition software; proofread, but unrecognized errors may exist. 
 
Signed By: Iza Smith MD   
 April 27, 2020

## 2020-04-27 NOTE — WOUND CARE
[address] [place] [address] [place], [facility] [address] Phone: [phone] Fax: [phone] Patient: Sam Dove MRN: [id number]  SSN: [id number] YOB: 1940  Age: 67 y.o. Sex: male Return Appointment: Discharge from wound center at this time Instructions: Bilateral lower leg wounds Wounds are healed! No need for dressings at this time. Patient may shower as previously. Apply moisturizer daily to both legs. Wear bilateral below knee compression stockings to both legs daily; put on first in morning and may remove at night for hygiene and sleeping. Discharge home health at this time. Discharge from wound center at this time. Should you experience increased redness, swelling, pain, foul odor, size of wound(s), or have a temperature over 101 degrees please contact the [address] [place] [address] Road at [phone] or if after hours contact your primary care physician or go to the hospital emergency department. Signed By: Chinyere Quinn, PT, [address] [facility] April 27, 2020

## 2020-04-27 NOTE — PROGRESS NOTES
04/27/20 1321 Wound Pretibial Proximal;Right #2 Date First Assessed/Time First Assessed: 04/01/20 1034   Present on Hospital Admission: Yes  Wound Approximate Age at First Assessment (Weeks): 1 weeks  Primary Wound Type: Venous Ulcer  Location: Pretibial  Wound Location Orientation: Proximal;Right . .. Dressing Status Clean, dry, and intact Dressing Type (Coflex TLC bilaterally) Wound Length (cm) 0 cm Wound Width (cm) 0 cm Wound Depth (cm) 0 cm Wound Surface Area (cm^2) 0 cm^2 Wound Volume (cm^3) 0 cm^3 Change in Wound Size % 100 Condition of Base Epithelializing Epithelialization (%) 100 Drainage Amount None Wound Odor None Cleansing and Cleansing Agents  Soap and water Dressing Type Applied  
(Moisturizer and tubigrip) Procedure Tolerated Well Wound Pretibial Left;Proximal #3 Date First Assessed/Time First Assessed: 04/01/20 1034   Present on Hospital Admission: Yes  Wound Approximate Age at First Assessment (Weeks): 1 weeks  Primary Wound Type: Venous Ulcer  Location: Pretibial  Wound Location Orientation: Left;Proximal  ... Dressing Status Clean, dry, and intact Dressing Type (Coflex TLC bilaterally) Wound Length (cm) 0 cm Wound Width (cm) 0 cm Wound Depth (cm) 0 cm Wound Surface Area (cm^2) 0 cm^2 Wound Volume (cm^3) 0 cm^3 Change in Wound Size % 100 Condition of Base Epithelializing Epithelialization (%) 100 Drainage Amount None Wound Odor None Cleansing and Cleansing Agents  Soap and water Dressing Type Applied  
(Moisturizer and tubigrip) Procedure Tolerated Well

## 2020-04-27 NOTE — DISCHARGE INSTRUCTIONS
[address]  [address], [address]  Phone: [phone]  Fax: [phone]    Patient: Caio Schwab MRN: [id number]  SSN: [id number]    YOB: 1940  Age: 67 y.o. Sex: male       Return Appointment: Discharge from wound center at this time    Instructions: Bilateral lower leg wounds  Wounds are healed! No need for dressings at this time. Patient may shower as previously. Apply moisturizer daily to both legs. Wear bilateral below knee compression stockings to both legs daily; put on first in morning and may remove at night for hygiene and sleeping. Discharge home health at this time. Discharge from wound center at this time. Should you experience increased redness, swelling, pain, foul odor, size of wound(s), or have a temperature over 101 degrees please contact the [address] [place] [address] Road at [phone] or if after hours contact your primary care physician or go to the hospital emergency department.     Signed By: Jordan Ribeiro PT, [facility]     April 27, 2020

## 2020-06-30 NOTE — PROGRESS NOTES
Arrived to the [facility]. Retacrit completed. Patient tolerated well. Any issues or concerns during appointment: None. Patient aware of next infusion appointment on 7/14 (date) at 1330 (time). Discharged via wheelchair in stable condiiton.

## 2020-07-02 PROBLEM — Z79.899 ENCOUNTER FOR MEDICATION MANAGEMENT: Status: ACTIVE | Noted: 2020-01-01

## 2020-07-02 PROBLEM — M54.12 CERVICAL RADICULOPATHY: Status: ACTIVE | Noted: 2020-01-01

## 2020-07-02 PROBLEM — M79.2 NEUROPATHIC PAIN: Status: ACTIVE | Noted: 2020-01-01

## 2020-07-02 PROBLEM — R29.3 POSTURAL IMBALANCE: Status: ACTIVE | Noted: 2020-01-01

## 2020-07-14 NOTE — WOUND CARE
07/14/20 1506   Wound Pretibial Left #1 left lateral leg 07/14/20   Date First Assessed/Time First Assessed: 07/14/20 1506   Present on Hospital Admission: Yes  Wound Approximate Age at First Assessment (Weeks): 1 weeks  Primary Wound Type: Venous  Location: Pretibial  Wound Location Orientation: Left  Wound Descripti. .. Dressing Type   (no dressing)   Non-staged Wound Description Full thickness   Wound Length (cm) 2.5 cm   Wound Width (cm) 1 cm   Wound Depth (cm) 0.1 cm   Wound Surface Area (cm^2) 2.5 cm^2   Wound Volume (cm^3) 0.25 cm^3   Condition of Base Granulation   Tissue Type Percent Red 100   Drainage Amount Moderate   Drainage Color Serosanguinous   Wound Odor None   Tammy-wound Assessment Edema   Cleansing and Cleansing Agents  Soap and water   Dressing Changed Changed/New   Dressing Type Applied   (xeroform, abd, coflex)       Wound mechanically debrided with gauze and normal saline.

## 2020-07-14 NOTE — WOUND CARE
Multilayer Compression Wrap   (Not Unna) Below the Knee    NAME:  SILVIO Mantilla  YOB: 1940  MEDICAL RECORD NUMBER:  [id number]  DATE:  7/14/2020    Applied multilayered dressing below the knee to left lower leg. Instructed patient/caregiver not to remove dressing and to keep it clean and dry. Instructed patient/caregiver on complications to report to provider, such as pain, numbness in toes, heavy drainage, and slippage of dressing. Instructed patient on purpose of compression dressing and on activity and exercise recommendations.       Electronically signed by Dick Lawson on 7/14/2020 at 3:38 PM

## 2020-07-14 NOTE — WOUND CARE
[address]  [address], [address]  Phone: [phone]  Fax: [phone]    Patient: Shabana Red MRN: [id number]  SSN: [id number]    YOB: 1940  Age: 67 y.o. Sex: male       Return Appointment: 1 week with Ayo Dejesus MD    Instructions: Left lateral leg  Cleanse wound and periwound with wound cleanser or normal saline. Xeroform- apply to wound bed. Secure with abd.  2 layer compression with wound and lower extremity assessment. If there is any problem with the dressing (too tight, slides down, etc.) Patient to return to wound clinic to have re-wrapped by clinician. Change in one week at wound center or prn if needed with clinician. Should you experience increased redness, swelling, pain, foul odor, size of wound(s), or have a temperature over 101 degrees please contact the [address] [place] [address] Road at [phone] or if after hours contact your primary care physician or go to the hospital emergency department.     Signed By: Fina Kaplan     July 14, 2020

## 2020-07-14 NOTE — PROGRESS NOTES
Arrived to the [facility]. Retacrit  completed. Provided education on Retacrit    Patient instructed to report any side affects to ordering provider.   Patient tolerated well   Any issues or concerns during appointment: No  Patient aware of next infusion appointment on Tuesday,July 28th @ 1400  Discharged via wheelchair

## 2020-07-14 NOTE — PROGRESS NOTES
Mr. Jeremy Poe is seen following a visit several months ago at which time his legs were treated for edematous blisters. The same thing is happening now. We will put him in the same dressing and will see him again in a couple weeks. Wound Center Progress Note    Patient: Verena Poe MRN: [id number]  SSN: [id number]    YOB: 1940  Age: 67 y.o. Sex: male      Subjective:     Chief Complaint:  A SHERRY Poe is a 67 y.o. BLACK OR  male who presents with both lower extremities below the knees wound of 3 months duration. History of Present Illness:     See above note  Wound Caused By: none  Associated Signs and Symptoms: Swelling and some drainage  Timing: Intermittent  Quality: Burning  Severity: 2/10  Modifying Factors: Congestive heart failure and edema  Current Wound Care: See nurses notes  Past Medical History:   Diagnosis Date    Acute renal failure superimposed on stage 3 chronic kidney disease ([address] 9/21/2016    Anemia     pt states he receives iron infusions    Arthritis     OA generalized    Chronic kidney disease     not on HD- surgery done for access and fistula being removed.  Per patient, no plans to proceed with dialysis    Chronic pancreatitis ([address] 9/22/2016    Dermatophytosis of nail 12/6/2016    Diabetic neuropathy (HCC) 9/22/2016    insulin sliding scale only- no oral meds- avg fastings avg fasting \"low 200's;  hypo @ 80 A1C 7.1 9/2019 per patient    Essential hypertension 9/22/2016    medication    GERD (gastroesophageal reflux disease)     controlled with med    Hypercholesterolemia     Iron (Fe) deficiency anemia 9/22/2016    Septicemia due to Klebsiella pneumoniae ([address] 3/57/8390    Systolic CHF, chronic ([address] 9/23/2016    ECHO 4/2018 EF- 60-65%    Type II diabetes mellitus with nephropathy ([address] 09/22/2016    Venous insufficiency 12/6/2016    Xerosis cutis 12/6/2016      Past Surgical History:   Procedure Laterality Date  HX COLONOSCOPY      HX HERNIA REPAIR Left 2010    HX PROSTATECTOMY  2012    HX TURP      FOR BPH    VASCULAR SURGERY PROCEDURE UNLIST Left 10/26/2017    AVF    VASCULAR SURGERY PROCEDURE UNLIST Left 2019     Ligation of left brachiobasilic fistula     Family History   Problem Relation Age of Onset    Diabetes Mother     Stroke Mother     Hypertension Mother     No Known Problems Father     Diabetes Sister     Diabetes Brother     Diabetes Sister     Diabetes Sister     Other Sister         fibromyalgia      Social History     Tobacco Use    Smoking status: Former Smoker     Packs/day: 2.00     Years: 20.00     Pack years: 40.00     Last attempt to quit:      Years since quittin.5    Smokeless tobacco: Current User   Substance Use Topics    Alcohol use: No       Prior to Admission medications    Medication Sig Start Date End Date Taking? Authorizing Provider   pregabalin (Lyrica) 150 mg capsule Take 150 mg by mouth two (2) times a day. Yes Provider, Historical   diazePAM (Valium) 2 mg tablet Take 1 Tab by mouth every twelve (12) hours as needed (spasm). Max Daily Amount: 4 mg. Indications: muscle spasm 3/22/20  Yes June Rolon MD   cloNIDine (Catapres-TTS-3) 0.3 mg/24 hr 1 Patch by TransDERmal route every seven (7) days. Change every thursday   Yes Mar Salmeron NP   glucose 4 gram chewable tablet Take 15 g by mouth as needed for Other (low blood sugar). Yes Provider, Historical   traMADol (ULTRAM) 50 mg tablet Take 50 mg by mouth every six (6) hours as needed for Pain. Yes Provider, Historical   torsemide (DEMADEX) 20 mg tablet Take 40 mg by mouth daily as needed for Other (swelling). Take 2 tablets by mouth in the morning as needed for swelling   Yes Provider, Historical   insulin glargine (LANTUS,BASAGLAR) 100 unit/mL (3 mL) inpn 17 Units by SubCUTAneous route daily (with breakfast).    Yes Provider, Historical   oxymetazoline (AFRIN) 0.05 % nasal spray 2 Sprays by Both Nostrils route once as needed for Congestion. 11/13/19  Yes Provider, Historical   Lactoperoxi/Gluc Oxid/Pot Thio (BIOTENE DRY MOUTH MM) Take 2 Sprays by mouth as needed for Other (dry mouth). Yes Provider, Historical   lipase-protease-amylase (CREON) 36,000-114,000- 180,000 unit cpDR capsule Take 3,600 Units by mouth See Admin Instructions. Take 2 capsules by mouth with each meal, and 1 capsule by mouth with each snack. Yes Ava Badillo MD   dicyclomine (BENTYL) 10 mg capsule Take 10 mg by mouth two (2) times a day. Yes Provider, Historical   ondansetron (ZOFRAN ODT) 4 mg disintegrating tablet Take 1 Tab by mouth every eight (8) hours as needed for Nausea. 11/9/19  Yes Lorraine Russell MD   pantothenic ac-min oil-pet,hyd (AQUAPHOR) 41 % ointment Apply  to affected area daily. Apply over legs 11/10/19  Yes Lorraine Russell MD   colestipol (COLESTID) 1 gram tablet Take 1 g by mouth two (2) times a day. Yes Provider, Historical   sodium bicarbonate 650 mg tablet Take 650 mg by mouth three (3) times daily. Yes Provider, Historical   lidocaine 4 % patch Cut to appropriate size. Apply to intact skin for 12 hours, remove for 12 hours. Patient taking differently: 1 Patch by TransDERmal route as needed. Cut to appropriate size. Apply to intact skin for 12 hours, remove for 12 hours. States uses only occasionally and not on today 9/11/2019  Do not take morning of procedure. 7/12/19  Yes JAMARCUS Chu   hydrALAZINE (APRESOLINE) 100 mg tablet Take 1 Tab by mouth three (3) times daily. 4/19/18  Yes Colton Torres MD   pravastatin (PRAVACHOL) 40 mg tablet Take 1 Tab by mouth nightly. 4/4/18  Yes Yuniel Lazo MD   [facility] 100 unit/mL kwikpen 5 units three times a day with meals plus correction scale, 2 units/50 > 150, max daily dose 25 units 2/28/18  Yes Yvonne Flores PA-C   calcifediol (RAYALDEE) 30 mcg Cs24 Take 30 mcg by mouth nightly.    Yes Provider, Historical   amLODIPine (NORVASC) 10 mg tablet Take 10 mg by mouth every morning. Yes Provider, Historical   metoprolol succinate (TOPROL-XL) 50 mg XL tablet Take 50 mg by mouth every morning. Yes Provider, Historical   omeprazole (PRILOSEC) 20 mg capsule Take 20 mg by mouth every morning. Yes Provider, Historical   acetaminophen (TYLENOL) 325 mg tablet Take 2 Tabs by mouth every four (4) hours as needed for Pain. 11/9/19   Ashvin Torres MD     No Known Allergies     Review of Systems:  A comprehensive review of systems was negative except for that written in the History of Present Illness. Lab Results   Component Value Date/Time    Hemoglobin A1c 8.4 (H) 11/03/2019 09:25 PM    Hemoglobin A1c (POC) 6.5 03/14/2018 11:20 AM        Immunization History   Administered Date(s) Administered    Pneumococcal Polysaccharide (PPSV-23) 02/07/2012    TB Skin Test (PPD) Intradermal 04/02/2018, 04/16/2018, 04/30/2018, 11/03/2019       Body mass index is 27.67 kg/m². Counseling regarding nutrition done: No     Current medications:  Current Outpatient Medications   Medication Sig Dispense Refill    pregabalin (Lyrica) 150 mg capsule Take 150 mg by mouth two (2) times a day.  diazePAM (Valium) 2 mg tablet Take 1 Tab by mouth every twelve (12) hours as needed (spasm). Max Daily Amount: 4 mg. Indications: muscle spasm 10 Tab 0    cloNIDine (Catapres-TTS-3) 0.3 mg/24 hr 1 Patch by TransDERmal route every seven (7) days. Change every thursday      glucose 4 gram chewable tablet Take 15 g by mouth as needed for Other (low blood sugar).  traMADol (ULTRAM) 50 mg tablet Take 50 mg by mouth every six (6) hours as needed for Pain.  torsemide (DEMADEX) 20 mg tablet Take 40 mg by mouth daily as needed for Other (swelling). Take 2 tablets by mouth in the morning as needed for swelling      insulin glargine (LANTUS,BASAGLAR) 100 unit/mL (3 mL) inpn 17 Units by SubCUTAneous route daily (with breakfast).       oxymetazoline (AFRIN) 0.05 % nasal spray 2 Sprays by Both Nostrils route once as needed for Congestion.  Lactoperoxi/Gluc Oxid/Pot Thio (BIOTENE DRY MOUTH MM) Take 2 Sprays by mouth as needed for Other (dry mouth).  lipase-protease-amylase (CREON) 36,000-114,000- 180,000 unit cpDR capsule Take 3,600 Units by mouth See Admin Instructions. Take 2 capsules by mouth with each meal, and 1 capsule by mouth with each snack.  dicyclomine (BENTYL) 10 mg capsule Take 10 mg by mouth two (2) times a day.  ondansetron (ZOFRAN ODT) 4 mg disintegrating tablet Take 1 Tab by mouth every eight (8) hours as needed for Nausea. 20 Tab 0    pantothenic ac-min oil-pet,hyd (AQUAPHOR) 41 % ointment Apply  to affected area daily. Apply over legs 53 g 0    colestipol (COLESTID) 1 gram tablet Take 1 g by mouth two (2) times a day.  sodium bicarbonate 650 mg tablet Take 650 mg by mouth three (3) times daily.  lidocaine 4 % patch Cut to appropriate size. Apply to intact skin for 12 hours, remove for 12 hours. (Patient taking differently: 1 Patch by TransDERmal route as needed. Cut to appropriate size. Apply to intact skin for 12 hours, remove for 12 hours. States uses only occasionally and not on today 9/11/2019  Do not take morning of procedure.) 14 Patch 0    hydrALAZINE (APRESOLINE) 100 mg tablet Take 1 Tab by mouth three (3) times daily. 90 Tab 2    pravastatin (PRAVACHOL) 40 mg tablet Take 1 Tab by mouth nightly. 30 Tab 0    HUMALOG KWIKPEN INSULIN 100 unit/mL kwikpen 5 units three times a day with meals plus correction scale, 2 units/50 > 150, max daily dose 25 units 5 Pen 11    calcifediol (RAYALDEE) 30 mcg Cs24 Take 30 mcg by mouth nightly.  amLODIPine (NORVASC) 10 mg tablet Take 10 mg by mouth every morning.  metoprolol succinate (TOPROL-XL) 50 mg XL tablet Take 50 mg by mouth every morning.  omeprazole (PRILOSEC) 20 mg capsule Take 20 mg by mouth every morning.       acetaminophen (TYLENOL) 325 mg tablet Take 2 Tabs by mouth every four (4) hours as needed for Pain. 20 Tab 0         Objective:     Physical Exam:     Visit Vitals  /73 (BP 1 Location: Right arm)   Pulse 67   Temp 98.3 °F (36.8 °C)   Resp 18   Ht 6' (1.829 m)   Wt 92.5 kg (204 lb)   SpO2 96%   BMI 27.67 kg/m²       General: well developed, well nourished, pleasant , NAD. Hygiene good  Psych: cooperative. Pleasant. No anxiety or depression. Normal mood and affect. Neuro: alert and oriented to person/place/situation. Otherwise nonfocal.  Derm: Normal turgor for age, dry skin  HEENT: Normocephalic, atraumatic. EOMI. Conjunctiva clear. No scleral icterus. Neck: Normal range of motion. No masses. Chest: Good air entry bilaterally. Respirations nonlabored  Cardio: Normal heart sounds,no rubs, murmurs or gallops  Abdomen: Soft, nontender, nondistended, normoactive bowel sounds  Lower extremities: color normal; temperature normal. Hair growth is not present. Calves are supple, nontender, approximately equally sized in comparison. Capillary refill <3 sec            Ulcer Description:   Wound Pretibial Left #1 left lateral leg 07/14/20 (Active)   Number of days: 0       [REMOVED] Wound Leg Left; Anterior (Removed)   Number of days: 375       [REMOVED] Wound Arm Left (Removed)   Number of days: 167       [REMOVED] Wound Leg Anterior;Mid;Right (Removed)   Number of days: 435       [REMOVED] Wound Left (Removed)   Number of days: 30       [REMOVED] Wound Pretibial Right;Lateral (Removed)   Number of days: 998       [REMOVED] Wound Leg lower Right;Lateral (Removed)   Dressing Status Clean, dry, and intact 01/29/20 0949   Non-staged Wound Description Full thickness 12/30/19 1034   Wound Length (cm) 0 cm 01/29/20 0949   Wound Width (cm) 0 cm 01/29/20 0949   Wound Depth (cm) 0 cm 01/29/20 0949   Wound Surface Area (cm^2) 0 cm^2 01/29/20 0949   Wound Volume (cm^3) 0 cm^3 01/29/20 0949   Condition of Base Epithelializing 01/29/20 0949 Epithelialization (%) 100 01/29/20 0949   Tissue Type Percent Black 20 % 12/17/19 1131   Tissue Type Percent Pink 100 12/30/19 1034   Tissue Type Percent Red 10 12/17/19 1131   Tissue Type Percent Yellow 40 12/03/19 1404   Drainage Amount None 01/29/20 0949   Drainage Color Serosanguinous 12/30/19 1034   Wound Odor None 01/29/20 0949   Tammy-wound Assessment Edema 12/30/19 1034   Margins Attached edges 12/10/19 1027   Cleansing and Cleansing Agents  Soap and water 01/29/20 0949   Dressing Changed Changed/New 12/30/19 1034   Procedure Tolerated Well 12/17/19 1131   Number of days: 57       [REMOVED] Wound Leg lower Left;Lateral (Removed)   Dressing Status Clean, dry, and intact 12/17/19 1131   Wound Length (cm) 3 cm 12/17/19 1131   Wound Width (cm) 5.5 cm 12/17/19 1131   Wound Depth (cm) 0 cm 12/17/19 1131   Wound Surface Area (cm^2) 16.5 cm^2 12/17/19 1131   Wound Volume (cm^3) 0 cm^3 12/17/19 1131   Condition of Base Other (comment) 12/17/19 1131   Drainage Amount Small 12/17/19 1131   Drainage Color Serous 12/17/19 1131   Wound Odor None 12/17/19 1131   Tammy-wound Assessment Intact 12/17/19 1131   Cleansing and Cleansing Agents  Soap and water 12/17/19 1131   Dressing Changed Changed/New 12/17/19 1131   Procedure Tolerated Well 12/17/19 1131   Number of days: 24       [REMOVED] Wound Leg lower Right;Lateral Wound #1 03/10/20 (Removed)   Dressing Status Clean, dry, and intact 04/01/20 1033   Non-staged Wound Description Partial thickness 04/01/20 1033   Wound Length (cm) 10.5 cm 04/01/20 1033   Wound Width (cm) 1 cm 04/01/20 1033   Wound Depth (cm) 0.1 cm 04/01/20 1033   Wound Surface Area (cm^2) 10.5 cm^2 04/01/20 1033   Wound Volume (cm^3) 1.05 cm^3 04/01/20 1033   Change in Wound Size % 88.33 04/01/20 1033   Condition of Base Granulation;Epithelializing 04/01/20 1033   Tissue Type Percent Pink 100 04/01/20 1033   Tissue Type Percent Red 50 03/18/20 1401   Drainage Amount Small 04/01/20 1033   Drainage Color Serosanguinous 04/01/20 1033   Wound Odor None 04/01/20 1033   Tammy-wound Assessment Edema 04/01/20 1033   Cleansing and Cleansing Agents  Soap and water 04/01/20 1033   Dressing Changed Changed/New 04/01/20 1033   Number of days: 27       [REMOVED] Wound Pretibial Proximal;Right #2 (Removed)   Dressing Status Clean, dry, and intact 04/27/20 1321   Dressing Type Open to air 04/01/20 1035   Non-staged Wound Description Partial thickness 04/13/20 1324   Shape circular 04/13/20 1324   Wound Length (cm) 0 cm 04/27/20 1321   Wound Width (cm) 0 cm 04/27/20 1321   Wound Depth (cm) 0 cm 04/27/20 1321   Wound Surface Area (cm^2) 0 cm^2 04/27/20 1321   Wound Volume (cm^3) 0 cm^3 04/27/20 1321   Change in Wound Size % 100 04/27/20 1321   Condition of Base Epithelializing 04/27/20 1321   Condition of Edges Open 04/13/20 1324   Epithelialization (%) 100 04/27/20 1321   Tissue Type Percent Pink 100 04/13/20 1324   Drainage Amount None 04/27/20 1321   Drainage Color Serosanguinous 04/13/20 1324   Wound Odor None 04/27/20 1321   Tammy-wound Assessment Edema 04/13/20 1324   Cleansing and Cleansing Agents  Soap and water 04/27/20 1321   Procedure Tolerated Well 04/27/20 1321   Number of days: 26       [REMOVED] Wound Pretibial Left;Proximal #3 (Removed)   Dressing Status Clean, dry, and intact 04/27/20 1321   Dressing Type Open to air 04/01/20 1035   Non-staged Wound Description Partial thickness 04/13/20 1324   Shape circular 04/13/20 1324   Wound Length (cm) 0 cm 04/27/20 1321   Wound Width (cm) 0 cm 04/27/20 1321   Wound Depth (cm) 0 cm 04/27/20 1321   Wound Surface Area (cm^2) 0 cm^2 04/27/20 1321   Wound Volume (cm^3) 0 cm^3 04/27/20 1321   Change in Wound Size % 100 04/27/20 1321   Condition of Base Epithelializing 04/27/20 1321   Epithelialization (%) 100 04/27/20 1321   Tissue Type Percent Pink 100 04/13/20 1324   Drainage Amount None 04/27/20 1321   Drainage Color Serous 04/01/20 1035   Wound Odor None 04/27/20 1321 Tammy-wound Assessment Edema 04/13/20 1324   Cleansing and Cleansing Agents  Soap and water 04/27/20 1321   Dressing Changed Changed/New 04/13/20 1324   Procedure Tolerated Well 04/27/20 1321   Number of days: 26         Data Review:   Recent Results (from the past 24 hour(s))   HEMOGLOBIN    Collection Time: 07/14/20  1:11 PM   Result Value Ref Range    HGB 7.5 (L) 13.6 - 17.2 g/dL       Assessment:     67 y.o. male with both lower extremities below the knees combined ulcer. Problem List  Date Reviewed: 7/2/2020          Codes Class Noted    Cervical radiculopathy ICD-10-CM: M54.12  ICD-9-CM: 723.4  7/2/2020        Neuropathic pain ICD-10-CM: M79.2  ICD-9-CM: 729.2  7/2/2020        Encounter for medication management ICD-10-CM: Z79.899  ICD-9-CM: V58.69  7/2/2020        Postural imbalance ICD-10-CM: R29.3  ICD-9-CM: 729.90  7/2/2020        ESRD (end stage renal disease) ([facility] ICD-10-CM: N18.6  ICD-9-CM: 585.6  11/3/2019        Open wound of skin ICD-10-CM: T14. 8XXA  ICD-9-CM: 879.8  11/3/2019        DVT (deep venous thrombosis) ([facility] ICD-10-CM: I82.409  ICD-9-CM: 453.40  11/3/2019        Rhabdomyolysis ICD-10-CM: M62.82  ICD-9-CM: 728.88  5/01/0542        Metabolic acidosis UAG-42-OJ: E87.2  ICD-9-CM: 276.2  4/30/2018        Renal failure (ARF), acute on chronic (HCC) ICD-10-CM: N17.9, N18.9  ICD-9-CM: 584.9, 585.9  4/29/2018        Dementia without behavioral disturbance (HCC) (Chronic) ICD-10-CM: F03.90  ICD-9-CM: 294.20  4/19/2018        Hypernatremia ICD-10-CM: E87.0  ICD-9-CM: 276.0  4/18/2018        Seizure ([address] ICD-10-CM: R56.9  ICD-9-CM: 780.39  4/18/2018        Volume overload ICD-10-CM: E87.70  ICD-9-CM: 276.69  4/4/2018        Chest pain ICD-10-CM: R07.9  ICD-9-CM: 786.50  4/4/2018        Cauda equina compression (HCC) ICD-10-CM: G83.4  ICD-9-CM: 344.60  4/2/2018        Hypercholesterolemia ICD-10-CM: E78.00  ICD-9-CM: 272.0  Unknown        Uncontrolled diabetes mellitus, with long-term current use of insulin ([facility] (Chronic) ICD-10-CM: E11.65, Z79.4  ICD-9-CM: 250.02, V58.67  2/28/2018        Carotid bruit ICD-10-CM: R09.89  ICD-9-CM: 785.9  2/28/2018        Arteriovenous fistula ([facility] ICD-10-CM: I77.0  ICD-9-CM: 447.0  10/31/2017    Overview Signed 10/31/2017  3:13 PM by Delicia Montano NP     10/26/17 (Dr. Sera Bosch) Creation of left brachiobasilic fistula.              Onychomycosis ICD-10-CM: B35.1  ICD-9-CM: 110.1  9/13/2017        Controlled type 2 diabetes mellitus with complication, with long-term current use of insulin (HCC) (Chronic) ICD-10-CM: E11.8, Z79.4  ICD-9-CM: 250.90, V58.67  9/13/2017        Stage 4 chronic kidney disease (HCC) (Chronic) ICD-10-CM: N18.4  ICD-9-CM: 585.4  4/4/2017        Stasis edema of both lower extremities (Chronic) ICD-10-CM: I87.303  ICD-9-CM: 459.30  4/4/2017        Cellulitis of left lower leg ICD-10-CM: L03.116  ICD-9-CM: 682.6  4/4/2017        Venous stasis ulcer of left lower extremity (HCC) ICD-10-CM: V14.129, L97.929  ICD-9-CM: 454.0  4/1/2017        Venous insufficiency (Chronic) ICD-10-CM: T82.0  ICD-9-CM: 459.81  12/6/2016        Chronic systolic congestive heart failure (HCC) (Chronic) ICD-10-CM: I50.22  ICD-9-CM: 428.22, 428.0  9/23/2016        Septicemia due to Klebsiella pneumoniae [facility]) ICD-10-CM: A41.4  ICD-9-CM: 038.3  9/22/2016        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  ICD-9-CM: 250.40, 583.81  9/22/2016        Essential hypertension ICD-10-CM: I10  ICD-9-CM: 401.9  9/22/2016        GERD (gastroesophageal reflux disease) ICD-10-CM: K21.9  ICD-9-CM: 530.81  9/22/2016        Diabetic neuropathy ([facility] [facility] ICD-10-CM: E11.40  ICD-9-CM: 250.60, 357.2  9/22/2016        Chronic pancreatitis (HCC) (Chronic) ICD-10-CM: K86.1  ICD-9-CM: 577.1  9/22/2016        Iron (Fe) deficiency anemia ICD-10-CM: D50.9  ICD-9-CM: 280.9  9/22/2016        Acute renal failure superimposed on stage 3 chronic kidney disease ([facility] ICD-10-CM: N17.9, N18.3  ICD-9-CM: 584.9, 585.3  9/21/2016               Plan:     No orders of the defined types were placed in this encounter. Patient understood and agrees with plan. Questions answered. Follow-up Information    None            Any procedures done today in the [address] [facility] are documented in a separate note in [address] [place] [address] and made part of this record by reference.      Dictated using voice recognition software; proofread, but unrecognized errors may exist.    Signed By: Lamonte Montalvo MD     July 14, 2020

## 2020-07-21 NOTE — WOUND CARE
[address]  [address], [address]  Phone: [phone]  Fax: [phone]    Patient: Shabana Red MRN: [id number]  SSN: [id number]    YOB: 1940  Age: 67 y.o. Sex: male       Return Appointment: 1 week with Ayo Dejesus MD       Instructions:   Left lateral leg  Cleanse wound and periwound with wound cleanser or normal saline. Xeroform- apply to wound bed. Secure with abd. Bilateral 2 layer compression with wound and lower extremity assessment. If there is any problem with the dressing (too tight, slides down, etc.) Patient to return to wound clinic to have re-wrapped by clinician. Change 2 times a week, Fridays by [address] and Tuesdays at wound center. [facility] to change dressing on Friday. Should you experience increased redness, swelling, pain, foul odor, size of wound(s), or have a temperature over 101 degrees please contact the [address] [place] [address] Road at [phone] or if after hours contact your primary care physician or go to the hospital emergency department.     Signed By: Tracy Moreno RN     July 21, 2020

## 2020-07-21 NOTE — WOUND CARE
07/21/20 1332   Wound Pretibial Left #1 left lateral leg 07/14/20   Date First Assessed/Time First Assessed: 07/14/20 1506   Present on Hospital Admission: Yes  Wound Approximate Age at First Assessment (Weeks): 1 weeks  Primary Wound Type: Venous  Location: Pretibial  Wound Location Orientation: Left  Wound Descripti. .. Dressing Status Old drainage   Dressing Type Xeroform;ABD pad;Compression Wrap/Venous Stasis   Non-staged Wound Description Full thickness   Wound Length (cm) 0.4 cm   Wound Width (cm) 0.3 cm   Wound Depth (cm) 0.1 cm   Wound Surface Area (cm^2) 0.12 cm^2   Wound Volume (cm^3) 0.01 cm^3   Change in Wound Size % 95.2   Condition of Base Pink   Assessment Red   Tissue Type Percent Red 100   Drainage Amount Moderate   Drainage Color Serosanguinous   Wound Odor None   Tammy-wound Assessment Edema   Cleansing and Cleansing Agents  Soap and water   Dressing Changed Changed/New   Dressing Type Applied Xeroform;ABD pad;Compression Wrap/Venous Stasis  (bilateral compression wrap)       Patient is not taking an anticoagulant.

## 2020-07-21 NOTE — WOUND CARE
Multilayer Compression Wrap   (Not Unna) Below the Knee    NAME:  SILVIO Raman OF BIRTH:  1940  MEDICAL RECORD NUMBER:  [id number]  DATE:  7/21/2020    Removed old Multilayer wrap if indicated and wash leg with mild soap/water. Applied moisturizing agent to dry skin as needed. Applied primary and secondary dressing as ordered. Applied multilayered dressing below the knee to right lower leg. Applied multilayered dressing below the knee to left lower leg. Instructed patient/caregiver not to remove dressing and to keep it clean and dry. Instructed patient/caregiver on complications to report to provider, such as pain, numbness in toes, heavy drainage, and slippage of dressing. Instructed patient on purpose of compression dressing and on activity and exercise recommendations.       Electronically signed by Hector Clarke RN on 7/21/2020 at 1:38 PM

## 2020-07-22 NOTE — PROGRESS NOTES
Continues to have small wounds which drain. We will cleanse the wound with wound cleanser and apply Xeroform to the wound bed and 2 layer compression. He will change this twice a week by home health on Fridays and Tuesdays at the wound center. Wound Center Progress Note    Patient: Mayela Alcala MRN: [id number]  SSN: [id number]    YOB: 1940  Age: 67 y.o. Sex: male      Subjective:     Chief Complaint:  A SHERRY Alcala is a 67 y.o. BLACK OR  male who presents with left lower leg posterior wound of 2 months duration. History of Present Illness:     See above  Wound Caused By: chronic pressure/irritation due to venous insufficiency and lymphedema  Associated Signs and Symptoms mild pain and drainage timing: Intermittent  Quality: Aching  Severity: 2/10  Modifying Factors: Time on his feet  Current Wound Care see nurses notes    Past Medical History:   Diagnosis Date    Acute renal failure superimposed on stage 3 chronic kidney disease ([address] 9/21/2016    Anemia     pt states he receives iron infusions    Arthritis     OA generalized    Chronic kidney disease     not on HD- surgery done for access and fistula being removed.  Per patient, no plans to proceed with dialysis    Chronic pancreatitis ([address] 9/22/2016    Dermatophytosis of nail 12/6/2016    Diabetic neuropathy (HCC) 9/22/2016    insulin sliding scale only- no oral meds- avg fastings avg fasting \"low 200's;  hypo @ 80 A1C 7.1 9/2019 per patient    Essential hypertension 9/22/2016    medication    GERD (gastroesophageal reflux disease)     controlled with med    Hypercholesterolemia     Iron (Fe) deficiency anemia 9/22/2016    Septicemia due to Klebsiella pneumoniae ([address] 2/42/6018    Systolic CHF, chronic ([address] 9/23/2016    ECHO 4/2018 EF- 60-65%    Type II diabetes mellitus with nephropathy ([address] 09/22/2016    Venous insufficiency 12/6/2016    Xerosis cutis 12/6/2016      Past Surgical History:   Procedure Laterality Date    HX COLONOSCOPY      HX HERNIA REPAIR Left 2010    HX PROSTATECTOMY  2012    HX TURP  2012    FOR BPH    VASCULAR SURGERY PROCEDURE UNLIST Left 10/26/2017    AVF    VASCULAR SURGERY PROCEDURE UNLIST Left 2019     Ligation of left brachiobasilic fistula     Family History   Problem Relation Age of Onset    Diabetes Mother     Stroke Mother     Hypertension Mother     No Known Problems Father     Diabetes Sister     Diabetes Brother     Diabetes Sister     Diabetes Sister     Other Sister         fibromyalgia      Social History     Tobacco Use    Smoking status: Former Smoker     Packs/day: 2.00     Years: 20.00     Pack years: 40.00     Last attempt to quit:      Years since quittin.5    Smokeless tobacco: Current User   Substance Use Topics    Alcohol use: No       Prior to Admission medications    Medication Sig Start Date End Date Taking? Authorizing Provider   pregabalin (Lyrica) 150 mg capsule Take 150 mg by mouth two (2) times a day. Provider, Historical   diazePAM (Valium) 2 mg tablet Take 1 Tab by mouth every twelve (12) hours as needed (spasm). Max Daily Amount: 4 mg. Indications: muscle spasm 3/22/20   Macy Nash MD   cloNIDine (Catapres-TTS-3) 0.3 mg/24 hr 1 Patch by TransDERmal route every seven (7) days. Change every thursday    Loly Card NP   glucose 4 gram chewable tablet Take 15 g by mouth as needed for Other (low blood sugar). Provider, Historical   traMADol (ULTRAM) 50 mg tablet Take 50 mg by mouth every six (6) hours as needed for Pain. Provider, Historical   torsemide (DEMADEX) 20 mg tablet Take 40 mg by mouth daily as needed for Other (swelling). Take 2 tablets by mouth in the morning as needed for swelling    Provider, Historical   insulin glargine (LANTUS,BASAGLAR) 100 unit/mL (3 mL) inpn 17 Units by SubCUTAneous route daily (with breakfast).     Provider, Historical   oxymetazoline (AFRIN) 0.05 % nasal spray 2 Sprays by Both Nostrils route once as needed for Congestion. 11/13/19   Provider, Historical   Lactoperoxi/Gluc Oxid/Pot Thio (BIOTENE DRY MOUTH MM) Take 2 Sprays by mouth as needed for Other (dry mouth). Provider, Historical   lipase-protease-amylase (CREON) 36,000-114,000- 180,000 unit cpDR capsule Take 3,600 Units by mouth See Admin Instructions. Take 2 capsules by mouth with each meal, and 1 capsule by mouth with each snack. Shravan Mcintosh MD   dicyclomine (BENTYL) 10 mg capsule Take 10 mg by mouth two (2) times a day. Provider, Historical   acetaminophen (TYLENOL) 325 mg tablet Take 2 Tabs by mouth every four (4) hours as needed for Pain. 11/9/19   Sharon Elam MD   ondansetron (ZOFRAN ODT) 4 mg disintegrating tablet Take 1 Tab by mouth every eight (8) hours as needed for Nausea. 11/9/19   Sharon Elam MD   pantothenic ac-min oil-pet,hyd (AQUAPHOR) 41 % ointment Apply  to affected area daily. Apply over legs 11/10/19   Sharon Elam MD   colestipol (COLESTID) 1 gram tablet Take 1 g by mouth two (2) times a day. Provider, Historical   sodium bicarbonate 650 mg tablet Take 650 mg by mouth three (3) times daily. Provider, Historical   lidocaine 4 % patch Cut to appropriate size. Apply to intact skin for 12 hours, remove for 12 hours. Patient taking differently: 1 Patch by TransDERmal route as needed. Cut to appropriate size. Apply to intact skin for 12 hours, remove for 12 hours. States uses only occasionally and not on today 9/11/2019  Do not take morning of procedure. 7/12/19   Clayborn Lundborg, PA   hydrALAZINE (APRESOLINE) 100 mg tablet Take 1 Tab by mouth three (3) times daily. 4/19/18   Barbara Mtz MD   pravastatin (PRAVACHOL) 40 mg tablet Take 1 Tab by mouth nightly.  4/4/18   Jordana Farrar MD   [facility] unit/mL kwikpen 5 units three times a day with meals plus correction scale, 2 units/50 > 150, max daily dose 25 units 2/28/18 Suzy Preston PA-C   calcifediol (RAYALDEE) 30 mcg Cs24 Take 30 mcg by mouth nightly. Provider, Historical   amLODIPine (NORVASC) 10 mg tablet Take 10 mg by mouth every morning. Provider, Historical   metoprolol succinate (TOPROL-XL) 50 mg XL tablet Take 50 mg by mouth every morning. Provider, Historical   omeprazole (PRILOSEC) 20 mg capsule Take 20 mg by mouth every morning. Provider, Historical     No Known Allergies     Review of Systems:  A comprehensive review of systems was negative except for that written in the History of Present Illness. Lab Results   Component Value Date/Time    Hemoglobin A1c 8.4 (H) 11/03/2019 09:25 PM    Hemoglobin A1c (POC) 6.5 03/14/2018 11:20 AM        Immunization History   Administered Date(s) Administered    Pneumococcal Polysaccharide (PPSV-23) 02/07/2012    TB Skin Test (PPD) Intradermal 04/02/2018, 04/16/2018, 04/30/2018, 11/03/2019       Body mass index is 26.64 kg/m². Counseling regarding nutrition done: No     Current medications:  Current Outpatient Medications   Medication Sig Dispense Refill    pregabalin (Lyrica) 150 mg capsule Take 150 mg by mouth two (2) times a day.  diazePAM (Valium) 2 mg tablet Take 1 Tab by mouth every twelve (12) hours as needed (spasm). Max Daily Amount: 4 mg. Indications: muscle spasm 10 Tab 0    cloNIDine (Catapres-TTS-3) 0.3 mg/24 hr 1 Patch by TransDERmal route every seven (7) days. Change every thursday      glucose 4 gram chewable tablet Take 15 g by mouth as needed for Other (low blood sugar).  traMADol (ULTRAM) 50 mg tablet Take 50 mg by mouth every six (6) hours as needed for Pain.  torsemide (DEMADEX) 20 mg tablet Take 40 mg by mouth daily as needed for Other (swelling). Take 2 tablets by mouth in the morning as needed for swelling      insulin glargine (LANTUS,BASAGLAR) 100 unit/mL (3 mL) inpn 17 Units by SubCUTAneous route daily (with breakfast).       oxymetazoline (AFRIN) 0.05 % nasal spray 2 Sprays by Both Nostrils route once as needed for Congestion.  Lactoperoxi/Gluc Oxid/Pot Thio (BIOTENE DRY MOUTH MM) Take 2 Sprays by mouth as needed for Other (dry mouth).  lipase-protease-amylase (CREON) 36,000-114,000- 180,000 unit cpDR capsule Take 3,600 Units by mouth See Admin Instructions. Take 2 capsules by mouth with each meal, and 1 capsule by mouth with each snack.  dicyclomine (BENTYL) 10 mg capsule Take 10 mg by mouth two (2) times a day.  acetaminophen (TYLENOL) 325 mg tablet Take 2 Tabs by mouth every four (4) hours as needed for Pain. 20 Tab 0    ondansetron (ZOFRAN ODT) 4 mg disintegrating tablet Take 1 Tab by mouth every eight (8) hours as needed for Nausea. 20 Tab 0    pantothenic ac-min oil-pet,hyd (AQUAPHOR) 41 % ointment Apply  to affected area daily. Apply over legs 53 g 0    colestipol (COLESTID) 1 gram tablet Take 1 g by mouth two (2) times a day.  sodium bicarbonate 650 mg tablet Take 650 mg by mouth three (3) times daily.  lidocaine 4 % patch Cut to appropriate size. Apply to intact skin for 12 hours, remove for 12 hours. (Patient taking differently: 1 Patch by TransDERmal route as needed. Cut to appropriate size. Apply to intact skin for 12 hours, remove for 12 hours. States uses only occasionally and not on today 9/11/2019  Do not take morning of procedure.) 14 Patch 0    hydrALAZINE (APRESOLINE) 100 mg tablet Take 1 Tab by mouth three (3) times daily. 90 Tab 2    pravastatin (PRAVACHOL) 40 mg tablet Take 1 Tab by mouth nightly. 30 Tab 0    HUMALOG KWIKPEN INSULIN 100 unit/mL kwikpen 5 units three times a day with meals plus correction scale, 2 units/50 > 150, max daily dose 25 units 5 Pen 11    calcifediol (RAYALDEE) 30 mcg Cs24 Take 30 mcg by mouth nightly.  amLODIPine (NORVASC) 10 mg tablet Take 10 mg by mouth every morning.  metoprolol succinate (TOPROL-XL) 50 mg XL tablet Take 50 mg by mouth every morning.       omeprazole (PRILOSEC) 20 mg capsule Take 20 mg by mouth every morning. Objective:     Physical Exam:     Visit Vitals  /69 (BP 1 Location: Right arm, BP Patient Position: At rest;Sitting)   Pulse (!) 58   Temp 97.8 °F (36.6 °C)   Resp 18   Ht 6' (1.829 m)   Wt 89.1 kg (196 lb 6.4 oz)   SpO2 100%   BMI 26.64 kg/m²       General: well developed, well nourished, pleasant , NAD. Hygiene good  Psych: cooperative. Pleasant. No anxiety or depression. Normal mood and affect. Neuro: alert and oriented to person/place/situation. Otherwise nonfocal.  Derm: Normal turgor for age, dry skin  HEENT: Normocephalic, atraumatic. EOMI. Conjunctiva clear. No scleral icterus. Neck: Normal range of motion. No masses. Chest: Good air entry bilaterally. Respirations nonlabored  Cardio: Normal heart sounds,no rubs, murmurs or gallops  Abdomen: Soft, nontender, nondistended, normoactive bowel sounds  Lower extremities: color normal; temperature normal. Hair growth is not present. Calves are supple, nontender, approximately equally sized in comparison.  Capillary refill <3 sec            Ulcer Description:   Wound Pretibial Left #1 left lateral leg 07/14/20 (Active)   Dressing Status Old drainage 07/21/20 1332   Dressing Type Xeroform;ABD pad;Compression Wrap/Venous Stasis 07/21/20 1332   Non-staged Wound Description Full thickness 07/21/20 1332   Wound Length (cm) 0.4 cm 07/21/20 1332   Wound Width (cm) 0.3 cm 07/21/20 1332   Wound Depth (cm) 0.1 cm 07/21/20 1332   Wound Surface Area (cm^2) 0.12 cm^2 07/21/20 1332   Wound Volume (cm^3) 0.01 cm^3 07/21/20 1332   Change in Wound Size % 95.2 07/21/20 1332   Condition of Base [place] 07/21/20 1332   Assessment Red 07/21/20 1332   Tissue Type Percent Red 100 07/21/20 1332   Drainage Amount Moderate 07/21/20 1332   Drainage Color Serosanguinous 07/21/20 1332   Wound Odor None 07/21/20 1332   Tammy-wound Assessment Edema 07/21/20 1332   Cleansing and Cleansing Agents  Soap and water 07/21/20 10 [facility] Changed/New 07/21/20 1332   Dressing Type Applied Xeroform;ABD pad;Compression Wrap/Venous Stasis 07/21/20 1332   Number of days: 8       [REMOVED] Wound Leg Anterior;Mid;Right (Removed)   Number of days: 699       [REMOVED] Wound Pretibial Right;Lateral (Removed)   Number of days: 127       [REMOVED] Wound Leg lower Right;Lateral (Removed)   Dressing Status Clean, dry, and intact 01/29/20 0949   Non-staged Wound Description Full thickness 12/30/19 1034   Wound Length (cm) 0 cm 01/29/20 0949   Wound Width (cm) 0 cm 01/29/20 0949   Wound Depth (cm) 0 cm 01/29/20 0949   Wound Surface Area (cm^2) 0 cm^2 01/29/20 0949   Wound Volume (cm^3) 0 cm^3 01/29/20 0949   Condition of Base Epithelializing 01/29/20 0949   Epithelialization (%) 100 01/29/20 0949   Tissue Type Percent Black 20 % 12/17/19 1131   Tissue Type Percent Pink 100 12/30/19 1034   Tissue Type Percent Red 10 12/17/19 1131   Tissue Type Percent Yellow 40 12/03/19 1404   Drainage Amount None 01/29/20 0949   Drainage Color Serosanguinous 12/30/19 1034   Wound Odor None 01/29/20 0949   Tammy-wound Assessment Edema 12/30/19 1034   Margins Attached edges 12/10/19 1027   Cleansing and Cleansing Agents  Soap and water 01/29/20 0949   Dressing Changed Changed/New 12/30/19 1034   Procedure Tolerated Well 12/17/19 1131   Number of days: 57       [REMOVED] Wound Leg lower Left;Lateral (Removed)   Dressing Status Clean, dry, and intact 12/17/19 1131   Wound Length (cm) 3 cm 12/17/19 1131   Wound Width (cm) 5.5 cm 12/17/19 1131   Wound Depth (cm) 0 cm 12/17/19 1131   Wound Surface Area (cm^2) 16.5 cm^2 12/17/19 1131   Wound Volume (cm^3) 0 cm^3 12/17/19 1131   Condition of Base Other (comment) 12/17/19 1131   Drainage Amount Small 12/17/19 1131   Drainage Color Serous 12/17/19 1131   Wound Odor None 12/17/19 1131   Tammy-wound Assessment Intact 12/17/19 1131   Cleansing and Cleansing Agents  Soap and water 12/17/19 1131   Dressing Changed Changed/New 12/17/19 1131   Procedure Tolerated Well 12/17/19 1131   Number of days: 24       [REMOVED] Wound Leg lower Right;Lateral Wound #1 03/10/20 (Removed)   Dressing Status Clean, dry, and intact 04/01/20 1033   Non-staged Wound Description Partial thickness 04/01/20 1033   Wound Length (cm) 10.5 cm 04/01/20 1033   Wound Width (cm) 1 cm 04/01/20 1033   Wound Depth (cm) 0.1 cm 04/01/20 1033   Wound Surface Area (cm^2) 10.5 cm^2 04/01/20 1033   Wound Volume (cm^3) 1.05 cm^3 04/01/20 1033   Change in Wound Size % 88.33 04/01/20 1033   Condition of Base Granulation;Epithelializing 04/01/20 1033   Tissue Type Percent Pink 100 04/01/20 1033   Tissue Type Percent Red 50 03/18/20 1401   Drainage Amount Small 04/01/20 1033   Drainage Color Serosanguinous 04/01/20 1033   Wound Odor None 04/01/20 1033   Tammy-wound Assessment Edema 04/01/20 1033   Cleansing and Cleansing Agents  Soap and water 04/01/20 1033   Dressing Changed Changed/New 04/01/20 1033   Number of days: 27       [REMOVED] Wound Pretibial Proximal;Right #2 (Removed)   Dressing Status Clean, dry, and intact 04/27/20 1321   Dressing Type Open to air 04/01/20 1035   Non-staged Wound Description Partial thickness 04/13/20 1324   Shape circular 04/13/20 1324   Wound Length (cm) 0 cm 04/27/20 1321   Wound Width (cm) 0 cm 04/27/20 1321   Wound Depth (cm) 0 cm 04/27/20 1321   Wound Surface Area (cm^2) 0 cm^2 04/27/20 1321   Wound Volume (cm^3) 0 cm^3 04/27/20 1321   Change in Wound Size % 100 04/27/20 1321   Condition of Base Epithelializing 04/27/20 1321   Condition of Edges Open 04/13/20 1324   Epithelialization (%) 100 04/27/20 1321   Tissue Type Percent Pink 100 04/13/20 1324   Drainage Amount None 04/27/20 1321   Drainage Color Serosanguinous 04/13/20 1324   Wound Odor None 04/27/20 1321   Tammy-wound Assessment Edema 04/13/20 1324   Cleansing and Cleansing Agents  Soap and water 04/27/20 1321   Procedure Tolerated Well 04/27/20 1321   Number of days: 26       [REMOVED] Wound Pretibial Left;Proximal #3 (Removed)   Dressing Status Clean, dry, and intact 04/27/20 1321   Dressing Type Open to air 04/01/20 1035   Non-staged Wound Description Partial thickness 04/13/20 1324   Shape circular 04/13/20 1324   Wound Length (cm) 0 cm 04/27/20 1321   Wound Width (cm) 0 cm 04/27/20 1321   Wound Depth (cm) 0 cm 04/27/20 1321   Wound Surface Area (cm^2) 0 cm^2 04/27/20 1321   Wound Volume (cm^3) 0 cm^3 04/27/20 1321   Change in Wound Size % 100 04/27/20 1321   Condition of Base Epithelializing 04/27/20 1321   Epithelialization (%) 100 04/27/20 1321   Tissue Type Percent Pink 100 04/13/20 1324   Drainage Amount None 04/27/20 1321   Drainage Color Serous 04/01/20 1035   Wound Odor None 04/27/20 1321   Tammy-wound Assessment Edema 04/13/20 1324   Cleansing and Cleansing Agents  Soap and water 04/27/20 1321   Dressing Changed Changed/New 04/13/20 1324   Procedure Tolerated Well 04/27/20 1321   Number of days: 26         Data Review:   No results found for this or any previous visit (from the past 24 hour(s)). Assessment:     67 y.o. male with L lower leg posterior venous stasis ulcer. Problem List  Date Reviewed: 7/2/2020          Codes Class Noted    Cervical radiculopathy ICD-10-CM: M54.12  ICD-9-CM: 723.4  7/2/2020        Neuropathic pain ICD-10-CM: M79.2  ICD-9-CM: 729.2  7/2/2020        Encounter for medication management ICD-10-CM: Z79.899  ICD-9-CM: V58.69  7/2/2020        Postural imbalance ICD-10-CM: R29.3  ICD-9-CM: 729.90  7/2/2020        ESRD (end stage renal disease) ([facility] ICD-10-CM: N18.6  ICD-9-CM: 585.6  11/3/2019        Open wound of skin ICD-10-CM: T14. 8XXA  ICD-9-CM: 879.8  11/3/2019        DVT (deep venous thrombosis) ([facility] ICD-10-CM: I82.409  ICD-9-CM: 453.40  11/3/2019        Rhabdomyolysis ICD-10-CM: R17.79  ICD-9-CM: 728.88  2/78/2276        Metabolic acidosis RCB-23-EJ: E87.2  ICD-9-CM: 276.2  4/30/2018        Renal failure (ARF), acute on chronic ([facility] ICD-10-CM: N17.9, N18.9  ICD-9-CM: 584.9, 585.9  4/29/2018        Dementia without behavioral disturbance (HCC) (Chronic) ICD-10-CM: F03.90  ICD-9-CM: 294.20  4/19/2018        Hypernatremia ICD-10-CM: E87.0  ICD-9-CM: 276.0  4/18/2018        Seizure ([facility] ICD-10-CM: R56.9  ICD-9-CM: 780.39  4/18/2018        Volume overload ICD-10-CM: E87.70  ICD-9-CM: 276.69  4/4/2018        Chest pain ICD-10-CM: R07.9  ICD-9-CM: 786.50  4/4/2018        Cauda equina compression ([facility] ICD-10-CM: G83.4  ICD-9-CM: 344.60  4/2/2018        Hypercholesterolemia ICD-10-CM: E78.00  ICD-9-CM: 272.0  Unknown        Uncontrolled diabetes mellitus, with long-term current use of insulin (HCC) (Chronic) ICD-10-CM: E11.65, Z79.4  ICD-9-CM: 250.02, V58.67  2/28/2018        Carotid bruit ICD-10-CM: R09.89  ICD-9-CM: 785.9  2/28/2018        Arteriovenous fistula ([facility] ICD-10-CM: I77.0  ICD-9-CM: 447.0  10/31/2017    Overview Signed 10/31/2017  3:13 PM by Selam Mehta NP     10/26/17 (Dr. Yamile Flynn) Creation of left brachiobasilic fistula.              Onychomycosis ICD-10-CM: B35.1  ICD-9-CM: 110.1  9/13/2017        Controlled type 2 diabetes mellitus with complication, with long-term current use of insulin (HCC) (Chronic) ICD-10-CM: E11.8, Z79.4  ICD-9-CM: 250.90, V58.67  9/13/2017        Stage 4 chronic kidney disease (HCC) (Chronic) ICD-10-CM: N18.4  ICD-9-CM: 585.4  4/4/2017        Stasis edema of both lower extremities (Chronic) ICD-10-CM: I87.303  ICD-9-CM: 459.30  4/4/2017        Cellulitis of left lower leg ICD-10-CM: L03.116  ICD-9-CM: 682.6  4/4/2017        Venous stasis ulcer of left lower extremity (HCC) ICD-10-CM: D30.071, L97.929  ICD-9-CM: 454.0  4/1/2017        Venous insufficiency (Chronic) ICD-10-CM: R84.8  ICD-9-CM: 459.81  12/6/2016        Chronic systolic congestive heart failure (HCC) (Chronic) ICD-10-CM: P81.88  ICD-9-CM: 428.22, 428.0  9/23/2016        Septicemia due to Klebsiella pneumoniae (HCC) ICD-10-CM: A41.4  ICD-9-CM: 038.3 9/22/2016        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  ICD-9-CM: 250.40, 583.81  9/22/2016        Essential hypertension ICD-10-CM: I10  ICD-9-CM: 401.9  9/22/2016        GERD (gastroesophageal reflux disease) ICD-10-CM: K21.9  ICD-9-CM: 530.81  9/22/2016        Diabetic neuropathy ([facility] ICD-10-CM: E11.40  ICD-9-CM: 250.60, 357.2  9/22/2016        Chronic pancreatitis (HCC) (Chronic) ICD-10-CM: K86.1  ICD-9-CM: 577.1  9/22/2016        Iron (Fe) deficiency anemia ICD-10-CM: D50.9  ICD-9-CM: 280.9  9/22/2016        Acute renal failure superimposed on stage 3 chronic kidney disease ([facility] ICD-10-CM: N17.9, N18.3  ICD-9-CM: 584.9, 585.3  9/21/2016               Plan:     Orders Placed This Encounter   [id number]     Referral Priority:   Routine     Referral Type:   Home Health Evaluation     Referral Reason:   Continuity of Care     Number of Visits Requested:   1    WOUND CARE, DRESSING CHANGE     Left lateral leg  Cleanse wound and periwound with wound cleanser or normal saline. Xeroform- apply to wound bed. Secure with abd. Bilateral 2 layer compression with wound and lower extremity assessment. If there is any problem with the dressing (too tight, slides down, etc.) Patient to return to wound clinic to have re-wrapped by clinician. Change in one week at wound center or prn if needed with clinician. Standing Status:   Standing     Number of Occurrences:   1        Patient understood and agrees with plan. Questions answered. Follow-up Information     Follow up With Specialties Details Why Contact Info    [address] In 1 week  [place] [id number] [phone]             Any procedures done today in the [address] [facility],[address] are documented in a separate note in [facility] and made part of this record by reference.      Dictated using voice recognition software; proofread, but unrecognized errors may exist.    Signed By: Ted Negron MD     July 22, 2020

## 2020-07-23 PROBLEM — M54.2 NECK PAIN: Status: ACTIVE | Noted: 2020-01-01

## 2020-07-23 PROBLEM — M50.30 DDD (DEGENERATIVE DISC DISEASE), CERVICAL: Status: ACTIVE | Noted: 2020-01-01

## 2020-07-23 PROBLEM — S16.1XXA CERVICAL STRAIN: Status: ACTIVE | Noted: 2020-01-01

## 2020-07-26 PROBLEM — E83.51 HYPOCALCEMIA: Status: ACTIVE | Noted: 2020-01-01

## 2020-07-26 PROBLEM — N17.9 AKI (ACUTE KIDNEY INJURY) (HCC): Status: ACTIVE | Noted: 2020-01-01

## 2020-07-26 PROBLEM — M25.512 LEFT SHOULDER PAIN: Status: ACTIVE | Noted: 2020-01-01

## 2020-07-26 PROBLEM — R53.2 FUNCTIONAL QUADRIPLEGIA (HCC): Status: ACTIVE | Noted: 2020-01-01

## 2020-07-26 PROBLEM — D64.9 ANEMIA: Status: ACTIVE | Noted: 2020-01-01

## 2020-07-26 PROBLEM — R52 ACUTE PAIN: Status: ACTIVE | Noted: 2020-01-01

## 2020-07-26 PROBLEM — E87.5 HYPERKALEMIA: Status: ACTIVE | Noted: 2020-01-01

## 2020-07-26 NOTE — ED NOTES
TRANSFER - OUT REPORT: 
 
Verbal report given to Janeth Aceves (name) on Margret Shanks  being transferred to [facility](unit) for routine progression of care Report consisted of patients Situation, Background, Assessment and  
Recommendations(SBAR). Information from the following report(s) SBAR, ED Summary, [facility] and Recent Results was reviewed with the receiving nurse. Lines:  
Peripheral IV 07/26/20 Right Wrist (Active) Site Assessment Clean, dry, & intact 07/26/20 1126 Phlebitis Assessment 0 07/26/20 1126 Infiltration Assessment 0 07/26/20 1126 Dressing Status Clean, dry, & intact 07/26/20 1126 Hub Color/Line Status Pink 07/26/20 1126 Opportunity for questions and clarification was provided. Patient transported with: 
 transport

## 2020-07-26 NOTE — ED PROVIDER NOTES
Here with some global weakness. Saturday in the early morning hours he was a chair and slid out into the floor. Report given to me was that he cannot get back into the chair EMS was contacted they evaluated him and helped him up. He comes in today with generalized weakness he complains of pain to his left shoulder and left neck that is chronic. He was seen by Dr. Erna Marie for left neck pain several days ago. There is not an acute component to this. Again presents with global weakness. He was unable to get out of the chair this morning and was brought to our department. About a week or week and a half ago he receive blood at an office. In reviewing his blood work he had hemoglobin below 7 that went to 10 feel that most likely this is related to this. Has a sense that he needs to void today but is unable to void of any significance. He is known to have end-stage renal disease and on questioning he states that he has made decision not to go on hemodialysis/dialysis. The history is provided by the patient. Fatigue This is a chronic problem. The current episode started more than 1 week ago. The problem has not changed since onset. There was no focality noted. Pertinent negatives include no mental status change. There has been no fever. Pertinent negatives include no altered mental status, no confusion and no bladder incontinence. Past Medical History:  
Diagnosis Date  Acute renal failure superimposed on stage 3 chronic kidney disease ([address] 9/21/2016  Anemia   
 pt states he receives iron infusions  Arthritis OA generalized  Chronic kidney disease   
 not on HD- surgery done for access and fistula being removed. Per patient, no plans to proceed with dialysis  Chronic pancreatitis ([address] 9/22/2016  Dermatophytosis of nail 12/6/2016  Diabetic neuropathy ([address] 9/22/2016  
 insulin sliding scale only- no oral meds- avg fastings avg fasting \"low 200's;  hypo @ 80 A1C 7.1 2019 per patient  Essential hypertension 2016  
 medication  GERD (gastroesophageal reflux disease)   
 controlled with med  Hypercholesterolemia  Iron (Fe) deficiency anemia 2016  Septicemia due to Klebsiella pneumoniae ([facility] 2016  Systolic CHF, chronic ([facility] 2016 ECHO 2018 EF- 60-65%  Type II diabetes mellitus with nephropathy ([facility] 2016  Venous insufficiency 2016  Xerosis cutis 2016 Past Surgical History:  
Procedure Laterality Date  HX COLONOSCOPY    
 HX HERNIA REPAIR Left 2010  HX PROSTATECTOMY  2012  HX TURP   FOR BPH  VASCULAR SURGERY PROCEDURE UNLIST Left 10/26/2017 AVF  VASCULAR SURGERY PROCEDURE UNLIST Left 2019 Ligation of left brachiobasilic fistula Family History:  
Problem Relation Age of Onset  Diabetes Mother  Stroke Mother  Hypertension Mother  No Known Problems Father  Diabetes Sister  Diabetes Brother  Diabetes Sister  Diabetes Sister  Other Sister   
     fibromyalgia Social History Socioeconomic History  Marital status:  Spouse name: Not on file  Number of children: Not on file  Years of education: Not on file  Highest education level: Not on file Occupational History  Not on file Social Needs  Financial resource strain: Not on file  Food insecurity Worry: Not on file Inability: Not on file  Transportation needs Medical: Not on file Non-medical: Not on file Tobacco Use  Smoking status: Former Smoker Packs/day: 2.00 Years: 20.00 Pack years: 40.00 Last attempt to quit:  Years since quittin.5  Smokeless tobacco: Current User Substance and Sexual Activity  Alcohol use: No  
 Drug use: Never  Sexual activity: Not on file Lifestyle  Physical activity Days per week: Not on file Minutes per session: Not on file  Stress: Not on file Relationships  Social connections Talks on phone: Not on file Gets together: Not on file Attends Mandaeism service: Not on file Active member of club or organization: Not on file Attends meetings of clubs or organizations: Not on file Relationship status: Not on file  Intimate partner violence Fear of current or ex partner: Not on file Emotionally abused: Not on file Physically abused: Not on file Forced sexual activity: Not on file Other Topics Concern  Not on file Social History Narrative  Not on file ALLERGIES: Patient has no known allergies. Review of Systems Constitutional: Positive for fatigue. HENT: Negative. Gastrointestinal: Negative. Genitourinary: Positive for difficulty urinating. Negative for bladder incontinence. Musculoskeletal: Negative. Psychiatric/Behavioral: Negative for confusion. All other systems reviewed and are negative. Vitals:  
 07/26/20 1124 BP: 176/84 Pulse: 64 Resp: 16 Temp: 98.9 °F (37.2 °C) SpO2: 100% Weight: 90.7 kg (200 lb) Height: 6' (1.829 m) Physical Exam 
Vitals signs and nursing note reviewed. Constitutional:   
   Appearance: He is ill-appearing. He is not toxic-appearing or diaphoretic. HENT:  
   Head: Atraumatic. Right Ear: External ear normal.  
   Left Ear: External ear normal.  
   Nose: Nose normal.  
   Mouth/Throat:  
   Mouth: Mucous membranes are dry. Pharynx: Oropharynx is clear. Eyes:  
   General: Scleral icterus present. Neck: Musculoskeletal: Normal range of motion. Cardiovascular:  
   Rate and Rhythm: Normal rate. Pulmonary:  
   Effort: Pulmonary effort is normal.  
   Breath sounds: Normal breath sounds. Abdominal:  
   Palpations: Abdomen is soft. Tenderness: There is no abdominal tenderness. Musculoskeletal:     
   General: No tenderness.   
Skin: 
 Coloration: Skin is not jaundiced. Neurological:  
   Mental Status: He is alert. Mental status is at baseline. Psychiatric:     
   Mood and Affect: Mood normal.     
   Thought Content: Thought content normal.  
 
  
 
MDM Number of Diagnoses or Management Options Diagnosis management comments: Progressive decline. He has been found in the floor both yesterday morning and today. Patient has end-stage renal disease but at present does not wish consideration of hemodialysis. Feels though he has worsening renal function is probably paralleling his worsening general function. Have discussed with caretaker who does not feel as though the patient is safe another night without further evaluation. Have asked whether there is any family they can stay with him and caretaker states that there is not. Amount and/or Complexity of Data Reviewed Clinical lab tests: ordered and reviewed Obtain history from someone other than the patient: yes Review and summarize past medical records: yes Risk of Complications, Morbidity, and/or Mortality Presenting problems: moderate Diagnostic procedures: low Management options: moderate Patient Progress Patient progress: stable Procedures

## 2020-07-26 NOTE — PROGRESS NOTES
07/26/20 1924 Dual Skin Pressure Injury Assessment Dual Skin Pressure Injury Assessment X Second Care Provider (Based on [address] [place] [address]) [facility] Skin Integumentary Skin Integumentary (WDL) X Pressure  Injury Documentation Pressure Injury Noted-See Wound LDA to Document Skin Color Appropriate for ethnicity Skin Condition/Temp Warm;Cool Skin Integrity Abrasion;Scars (comment); Tattoos (comment); Wound (add Wound LDA) (Tatoo to R arm; scar to back and Bilat knees ) Hair Growth Sparce Nails X Turgor Epidermis thin w/ loss of subcut tissue Varicosities Absent Exceptions to WDL Thick Wound Prevention and Protection Methods Orientation of Wound Prevention Posterior Location of Wound Prevention Sacrum/Coccyx Dressing Present  No  
Wound Offloading (Prevention Methods) Bed, pressure redistribution/air Wounds to scrotum and BLE; Skin is tight with scars, bruises, and tattoo to R arm.

## 2020-07-26 NOTE — H&P
Hospitalist H&P Note Admit Date:  2020 11:20 AM  
Name:  Taiwo Mckeon Age:  67 y.o. 
:  1940 MRN:  [id number] PCP:  Opal Paul MD 
Treatment Team: Attending Provider: Artur Barillas DO; Primary Nurse: Randi Altamirano HPI:  
Chief complaintweakness ambulatory dysfunction, malaise and acute on chronic neck and shoulder pain 61-year-old male lives alone and has home health with history of late stage renal disease, stage IV and had previous left arm fistula but this was performed  and  according to available records he had fistula ligated. He also has longstanding diabetes with nephropathy, chronic pancreatitis unclear etiology, hyperlipidemia, diabetic peripheral sensory neuropathy, significant hypertension, GERD past history of Klebsiella pneumonia and sepsis and heart failure with apparently preserved LVEF. He apparently slipped from his lazy boy chair sometime early Saturday morning around 4:30 AM and could not get up. He was found by home health aide sometime later in the morning after 9 AM Saturday. Helped back to chair but he notified home health aide today to bring him to the hospital because he could not get up or care for self. He has profound diffuse weakness. A recent outpatient hemoglobin was around 7.5. ER record states patient received blood and in office about a week and one half ago but patient denies this --he claims that he received IV iron in his family [de-identified] office, which I expect his nephrology office. He follows with Dr. Yury Varela nephrology. His outpatient medications include Demadex and potassium chloride. His hemoglobin is now 10 and I suspect based on clinical exam and 2.5 g rising hemoglobin that patient is vastly volume depleted his BUN is 85 and he has symptoms of azotemia. His creatinine is 7.3.   His serum potassium is 5 bicarbonate 12 and serum calcium 5.5 with an albumin of 2.5. He has prescriptions for Rocaltrol equivalent as well as bicarbonate and unclear of his compliance. His blood sugars uncontrolled at 266. He has no fevers or markers of sirs. He does have chronic lower extremity venous stasis ulcerations and wounds for which she is following with wound care and general surgery. He has chronic left-sided neck pain and degenerative disc disease and is to receive an injection by neurosurgery and has had a recent outpatient visit to Dr. Dandy Nails. He currently has functional quadriplegia and cannot ambulate. I suspect he has acute kidney injury on chronic stage IV kidney disease and he is aware he is high risk for need for renal replacement therapy even this hospital stay. Hopefully he will respond to gentle IV hydration. He has multiple electrolyte abnormalities including hyperkalemia and he will be treated with acute Kayexalate and IV calcium gluconate. Avoiding IV Lasix and glucose with insulin regarding current metabolic and vascular volume state. He will require wound care, nephrology consultation. Evaluation and correction of hypocalcemia and possible more urgent intervention regarding hyperkalemia following EKG and initial replacement. He had prolonged immobility and has had rhabdomyolysis in the past and a CPK will be checked this evening and again tomorrow. We will not be able to aggressively hydrate this patient as he has significantly impaired renal function and will need Higuera catheter for accurate I's and O's. His blood pressure is uncontrolled as are his blood sugars. He will need physical and occupational therapy eval's and will place PPD because he may require at least short-term placement regarding his current status and what appears to be prolonged deconditioning. He has no history of alcohol use or abuse. 10 systems reviewed and negative except as noted in HPI. Past Medical History:  
Diagnosis Date  Acute renal failure superimposed on stage 3 chronic kidney disease ([facility] 2016  Anemia   
 pt states he receives iron infusions  Arthritis OA generalized  Chronic kidney disease   
 not on HD- surgery done for access and fistula being removed. Per patient, no plans to proceed with dialysis  Chronic pancreatitis ([facility] 2016  Dermatophytosis of nail 2016  Diabetic neuropathy ([facility] 2016  
 insulin sliding scale only- no oral meds- avg fastings avg fasting \"low 200's;  hypo @ 80 A1C 7.1 2019 per patient  Essential hypertension 2016  
 medication  GERD (gastroesophageal reflux disease)   
 controlled with med  Hypercholesterolemia  Iron (Fe) deficiency anemia 2016  Septic[facility] due to Klebsiella [facility] 2016  Systolic CHF, chronic ([facility] 2016 ECHO 2018 EF- 60-65%  Type II diabetes mellitus with nephropathy ([facility] 2016  Venous insufficiency 2016  Xerosis cutis 2016 Past Surgical History:  
Procedure Laterality Date  HX COLONOSCOPY    
 HX HERNIA REPAIR Left 2010  HX PROSTATECTOMY  2012  HX TURP   FOR BPH  VASCULAR SURGERY PROCEDURE UNLIST Left 10/26/2017 AVF  VASCULAR SURGERY PROCEDURE UNLIST Left 2019 Ligation of left brachiobasilic fistula No Known Allergies Social History Tobacco Use  Smoking status: Former Smoker Packs/day: 2.00 Years: 20.00 Pack years: 40.00 Last attempt to quit:  Years since quittin.5  Smokeless tobacco: Current User Substance Use Topics  Alcohol use: No  
  
Family History Problem Relation Age of Onset  Diabetes Mother  Stroke Mother  Hypertension Mother  No Known Problems Father  Diabetes Sister  Diabetes Brother  Diabetes Sister  Diabetes Sister  Other Sister   
     fibromyalgia Immunization History Administered Date(s) Administered  Pneumococcal Polysaccharide (PPSV-23) 2012  TB Skin Test (PPD) Intradermal 2018, 2018, 2018, 2019 PTA Medications: 
Prior to Admission Medications Prescriptions Last Dose Informant Patient Reported? Taking? HUMALOG KWIKPEN INSULIN 100 unit/mL kwikpen   No No  
Si units three times a day with meals plus correction scale, 2 units/50 > 150, max daily dose 25 units Lactoperoxi/Gluc Oxid/Pot Thio (BIOTENE DRY MOUTH MM)   Yes No  
Sig: Take 2 Sprays by mouth as needed for Other (dry mouth). POTASSIUM PO   Yes No  
Sig: Take 60 mL by mouth every Monday and Friday. acetaminophen (TYLENOL) 325 mg tablet   No No  
Sig: Take 2 Tabs by mouth every four (4) hours as needed for Pain. amLODIPine (NORVASC) 10 mg tablet   Yes No  
Sig: Take 10 mg by mouth every morning. calcifediol (RAYALDEE) 30 mcg Cs24   Yes No  
Sig: Take 30 mcg by mouth nightly. cloNIDine (Catapres-TTS-3) 0.3 mg/24 hr   Yes No  
Si Patch by TransDERmal route every seven (7) days. Change every thursday  
colestipol (COLESTID) 1 gram tablet   Yes No  
Sig: Take 1 g by mouth two (2) times a day. diazePAM (Valium) 2 mg tablet   No No  
Sig: Take 1 Tab by mouth every twelve (12) hours as needed (spasm). Max Daily Amount: 4 mg. Indications: muscle spasm  
dicyclomine (BENTYL) 10 mg capsule   Yes No  
Sig: Take 10 mg by mouth two (2) times a day. glucose 4 gram chewable tablet   Yes No  
Sig: Take 15 g by mouth as needed for Other (low blood sugar). hydrALAZINE (APRESOLINE) 100 mg tablet   No No  
Sig: Take 1 Tab by mouth three (3) times daily. insulin glargine (LANTUS,BASAGLAR) 100 unit/mL (3 mL) inpn   Yes No  
Si Units by SubCUTAneous route daily (with breakfast). lidocaine 4 % patch   No No  
Sig: Cut to appropriate size. Apply to intact skin for 12 hours, remove for 12 hours. Patient taking differently: 1 Patch by TransDERmal route as needed.  Cut to appropriate size. Apply to intact skin for 12 hours, remove for 12 hours. States uses only occasionally and not on today 2019 Do not take morning of procedure. lipase-protease-amylase (CREON) 36,000-114,000- 180,000 unit cpDR capsule   Yes No  
Sig: Take 3,600 Units by mouth See Admin Instructions. Take 2 capsules by mouth with each meal, and 1 capsule by mouth with each snack. metoprolol succinate (TOPROL-XL) 50 mg XL tablet   Yes No  
Sig: Take 50 mg by mouth every morning.  
multivit-minerals/folic acid (SPECTRAVITE ADULT PO)   Yes No  
Sig: Take 1 Tab by mouth daily. omeprazole (PRILOSEC) 20 mg capsule   Yes No  
Sig: Take 20 mg by mouth every morning. ondansetron (ZOFRAN ODT) 4 mg disintegrating tablet   No No  
Sig: Take 1 Tab by mouth every eight (8) hours as needed for Nausea. oxymetazoline (AFRIN) 0.05 % nasal spray   Yes No  
Si Sprays by Both Nostrils route once as needed for Congestion. pantothenic ac-min oil-pet,hyd (AQUAPHOR) 41 % ointment   No No  
Sig: Apply  to affected area daily. Apply over legs  
pravastatin (PRAVACHOL) 40 mg tablet   No No  
Sig: Take 1 Tab by mouth nightly. pregabalin (Lyrica) 150 mg capsule   Yes No  
Sig: Take 150 mg by mouth two (2) times a day. sodium bicarbonate 650 mg tablet   Yes No  
Sig: Take 650 mg by mouth three (3) times daily. torsemide (DEMADEX) 20 mg tablet   Yes No  
Sig: Take 40 mg by mouth daily as needed for Other (swelling). Take 2 tablets by mouth in the morning as needed for swelling  
traMADol (ULTRAM) 50 mg tablet   Yes No  
Sig: Take 50 mg by mouth every six (6) hours as needed for Pain. Facility-Administered Medications: None Objective:  
 
Patient Vitals for the past 24 hrs: 
 Temp Pulse Resp BP SpO2  
20 1648  68 16 (!) 195/96 95 %  
20 1604 97.8 °F (36.6 °C)      
20 1600    195/85 95 %  
20 1555  64 16 175/80 95 %  
20 1512  64 16 178/79 96 % 07/26/20 1353  65 16 178/85 99 % 07/26/20 1308  64 16  99 % 07/26/20 1124 98.9 °F (37.2 °C) 64 16 176/84 100 % Oxygen Therapy O2 Sat (%): 95 % (07/26/20 1648) Pulse via Oximetry: 66 beats per minute (07/26/20 1600) O2 Device: Room air (07/26/20 1648) Intake/Output Summary (Last 24 hours) at 7/26/2020 1745 Last data filed at 7/26/2020 1555 Gross per 24 hour Intake 500 ml Output 500 ml Net 0 ml Physical Exam: 
General:    Lean deconditioned lethargic male, symptoms of azotemia with irritability denies itching Eyes:   Arcus senilis. Extraocular movements intact. No scleral icterus ENT:  Normocephalic, atraumatic. Dry mucous membranes Neck:  Supple, no lymphadenopathy or JVD 
CV:   RRR. No m/r/g. Lower extremities are wrapped with chronic venous insufficiency. Evaluated toes and intertriginous region no obvious ulcerations or breakdown. Lungs:  CTAB. No wheezing, rhonchi, or rales. Abdomen: Soft, nontender, nondistended. Extremities: Chronic stasis lower extremitiessee outpatient notes regarding recent photodocumentation stasis ulcerations. Current lower extremities are wrapped and not particularly malodorous. Neurovascular bundle intact of toes bilateral 
Neurologic: CN II-XII grossly intact. No focal motor weakness grossly. Psych:  Irritable but azotemic I reviewed the labs, imaging, EKGs, telemetry, and other studies done this admission. Data Review:  
Recent Results (from the past 24 hour(s)) EKG, 12 LEAD, INITIAL Collection Time: 07/26/20 11:25 AM  
Result Value Ref Range Ventricular Rate 63 BPM  
 Atrial Rate 129 BPM  
 QRS Duration 88 ms Q-T Interval 496 ms QTC Calculation (Bezet) 507 ms Calculated R Axis -23 degrees Calculated T Axis 80 degrees Diagnosis    
  !! AGE AND GENDER SPECIFIC ECG ANALYSIS !! 
Junctional rhythm Possible Anterior infarct , age undetermined Prolonged QT Abnormal ECG 
 When compared with ECG of 13-APR-2018 18:35, 
Junctional rhythm has replaced Sinus rhythm QT has lengthened CBC WITH AUTOMATED DIFF Collection Time: 07/26/20 11:27 AM  
Result Value Ref Range WBC 4.7 4.3 - 11.1 K/uL  
 RBC 3.82 (L) 4.23 - 5.6 M/uL  
 HGB 10.0 (L) 13.6 - 17.2 g/dL HCT 34.5 (L) 41.1 - 50.3 % MCV 90.3 79.6 - 97.8 FL  
 MCH 26.2 26.1 - 32.9 PG  
 MCHC 29.0 (L) 31.4 - 35.0 g/dL  
 RDW 19.8 (H) 11.9 - 14.6 % PLATELET 835 (L) 739 - 450 K/uL MPV Unable to calculate. Recommend adding IPF. 9.4 - 12.3 FL ABSOLUTE NRBC 0.00 0.0 - 0.2 K/uL  
 DF AUTOMATED NEUTROPHILS 66 43 - 78 % LYMPHOCYTES 20 13 - 44 % MONOCYTES 12 4.0 - 12.0 % EOSINOPHILS 1 0.5 - 7.8 % BASOPHILS 0 0.0 - 2.0 % IMMATURE GRANULOCYTES 0 0.0 - 5.0 %  
 ABS. NEUTROPHILS 3.1 1.7 - 8.2 K/UL  
 ABS. LYMPHOCYTES 1.0 0.5 - 4.6 K/UL  
 ABS. MONOCYTES 0.5 0.1 - 1.3 K/UL  
 ABS. EOSINOPHILS 0.1 0.0 - 0.8 K/UL  
 ABS. BASOPHILS 0.0 0.0 - 0.2 K/UL  
 ABS. IMM. GRANS. 0.0 0.0 - 0.5 K/UL METABOLIC PANEL, COMPREHENSIVE Collection Time: 07/26/20 11:27 AM  
Result Value Ref Range Sodium 152 (H) 136 - 145 mmol/L Potassium 5.0 3.5 - 5.1 mmol/L Chloride 124 (H) 98 - 107 mmol/L  
 CO2 12 (L) 21 - 32 mmol/L Anion gap 16 7 - 16 mmol/L Glucose 266 (H) 65 - 100 mg/dL BUN 85 (H) 8 - 23 MG/DL Creatinine 7.63 (H) 0.8 - 1.5 MG/DL  
 GFR est AA 9 (L) >60 ml/min/1.73m2 GFR est non-AA 7 (L) >60 ml/min/1.73m2 Calcium 5.5 (LL) 8.3 - 10.4 MG/DL Bilirubin, total 0.5 0.2 - 1.1 MG/DL  
 ALT (SGPT) 31 12 - 65 U/L  
 AST (SGOT) 45 (H) 15 - 37 U/L Alk. phosphatase 70 50 - 136 U/L Protein, total 6.7 6.3 - 8.2 g/dL Albumin 2.5 (L) 3.2 - 4.6 g/dL Globulin 4.2 (H) 2.3 - 3.5 g/dL A-G Ratio 0.6 (L) 1.2 - 3.5 URINE MICROSCOPIC Collection Time: 07/26/20  1:49 PM  
Result Value Ref Range WBC 0-3 0 /hpf  
 RBC 3-5 0 /hpf Epithelial cells 0-3 0 /hpf  Bacteria 0 0 /hpf  
 Casts 0-3 0 /lpf PHOSPHORUS Collection Time: 07/26/20  3:43 PM  
Result Value Ref Range Phosphorus 8.4 (H) 2.3 - 3.7 MG/DL All Micro Results None Other Studies: No results found. Assessment and Plan:  
 
Hospital Problems as of 7/26/2020 Date Reviewed: 7/23/2020 Codes Class Noted - Resolved POA * (Principal) ALCIJA (acute kidney injury) ([facility] ICD-10-CM: N17.9 ICD-9-CM: 584.9  7/26/2020 - Present Unknown Functional quadriplegia (HCC) ICD-10-CM: R53.2 ICD-9-CM: 780.72  7/26/2020 - Present Unknown Anemia ICD-10-CM: D64.9 ICD-9-CM: 285.9  7/26/2020 - Present Unknown Hypocalcemia ICD-10-CM: E83.51 
ICD-9-CM: 275.41  7/26/2020 - Present Unknown Acute pain ICD-10-CM: W05 ICD-9-CM: 338.19  7/26/2020 - Present Unknown Left shoulder pain ICD-10-CM: M25.512 ICD-9-CM: 719.41  7/26/2020 - Present Unknown Hyperkalemia ICD-10-CM: E87.5 ICD-9-CM: 276.7  7/26/2020 - Present Unknown Degenerative disc disease, cervical ICD-10-CM: M50.30 ICD-9-CM: 722.4  7/23/2020 - Present Unknown Metabolic acidosis OA-93-LC: E87.2 ICD-9-CM: 276.2  4/30/2018 - Present Yes  
   
 CKD (chronic kidney disease), stage IV (HCC) ICD-10-CM: N18.4 ICD-9-CM: 585.4  4/4/2017 - Present Unknown Venous stasis ulcer of left lower extremity ([facility] ICD-10-CM: P03.156, B07.107 ICD-9-CM: 454.0  4/1/2017 - Present Yes Venous insufficiency (Chronic) ICD-10-CM: R20.4 ICD-9-CM: 459.81  12/6/2016 - Present Yes 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 
ICD-9-CM: 250.40, 583.81  9/22/2016 - Present Yes HTN (hypertension) ICD-10-CM: I10 
ICD-9-CM: 401.9  9/22/2016 - Present Unknown PLAN: 
--Acute kidney injury on stage IV chronic kidney disease. Most recent available serum creatinine is from 2019  Gentle IV hydration regarding outpatient diuretic, clinical exam suggesting volume depletion and recent rise in hemoglobin from 7.5-10.0. Monitor closely as wish to avoid volume overload. May need renal replacement therapy. Nephrology consult. --Metabolic acidosis related to acute and chronic kidney disease above. Start his bicarbonate tonight. --Hypocalcemia related to above very likely. Check 25 hydroxy vitamin D and PTH level. Start Rocaltrol equivalent. Calcium gluconate nowsee order. --Hyperkalemia related to above. Check EKG but given his renal status likely tolerant. 1 dose Kayexalate. Lennice Navi may be appropriate given his hypocalcemia. Await nephrology opinion and follow-up basic metabolic panel. IV hydration. Calcium gluconate as above --Madelyn Shallow likely anemia of chronic disease recent outpatient iron studies noted. Follow with IV hydration. Received recent outpatient IV iron infusion by patient history. --Functional quadriplegiavery likely related to dehydration and azotemia. Will need monitoring as clinically improves. PPD PT OT eval and may need placement. Timing of any coronavirus screening based on clinical course and possible need for placement. --Acute and chronic neck pain with known degenerative disc disease. Also chronic musculoskeletal spasmcontinue home meds. May need to reschedule neurosurgery follow-up for C-spine injection scheduled per patient for Tuesday, July 28. See pain meds. --Left shoulder pain. X-ray 2 views. Recent \"fall\" 
 
--Uncontrolled hypertension replace Catapres patch, start home meds tonight. PRN hydralazine IV in addition to current meds. --Uncontrolled diabetes II requiring insulin home medication regimen according to list is 17 units of long-acting insulin every morning and 5 units short acting prandial insulin 3 times daily. Will utilize lower dose long-acting insulin regarding worsened renal function and sliding scale prandial coverage only. Hypoglycemia protocol. Discharge planning: Home health versus short-term rehab placement DVT ppx: SCDs. Code status: Full code Decision Maker: Selfcontacts on file Nishant Mantilla  [phone] Kirt Shadow  Caregiver    [phone] Admit status: Inpatient Estimated LOS: Greater than 2 midnights Risk:  high Signed: 
Sylvie Schilder, DO

## 2020-07-26 NOTE — ED TRIAGE NOTES
Pt arrives via EMS from home. Called out for weakness all over. EMS reports family unable to get patient out of recliner this morning. Reports hx of kidney failure not on dialysis. Denies nvd. Denies fevers. Denies cough shob. Pt states slight trouble with urination and some incontinence. Repots chronic head and neck stiffness and was going to be started with pain management. Pt did slide out of recliner yesterday morning but was put back in. No unilateral weakness, no speech difficulty. States has been having this weakness since 4AM yesterday morning. States \"its not nothing bad its just he couldn't get up off the floor. \"

## 2020-07-26 NOTE — PROGRESS NOTES
TRANSFER - IN REPORT: 
 
Verbal report received from Gwyn and Heather Combs RN on [address]  being received from ED for routine progression of care Report consisted of patients Situation, Background, Assessment and  
Recommendations(SBAR). Information from the following report(s) SBAR was reviewed with the receiving nurse. Opportunity for questions and clarification was provided. Assessment completed upon patients arrival to unit and care assumed.

## 2020-07-27 NOTE — PROGRESS NOTES
Interdisciplinary Rounds completed. Nursing, Case Management, and Physician  present. Plan of care reviewed and updated. Pt only agreeable to home health although IRC recommended.

## 2020-07-27 NOTE — PROGRESS NOTES
Pt is resting comfortably in bed. Pt is alert and oriented x4. Pt denies pain, will continue to monitor care.

## 2020-07-27 NOTE — PROGRESS NOTES
Problem: General Medical Care Plan Goal: *Absence of infection signs and symptoms Outcome: Progressing Towards Goal 
Goal: *Optimal pain control at patient's stated goal 
7/27/2020 0246 by Kimberlee Warren Outcome: Progressing Towards Goal 
7/26/2020 2023 by Kimberlee Warren Outcome: Progressing Towards Goal 
Goal: *Anxiety reduced or absent 7/27/2020 0246 by Kimberlee Warren Outcome: Progressing Towards Goal 
7/26/2020 2023 by Kimberlee Warren Outcome: Progressing Towards Goal

## 2020-07-27 NOTE — CONSULTS
Nephrology consult Admission Date: 
7/26/2020 Admission Diagnosis ALICJA (acute kidney injury) ([address] [N17.9] We are asked by Dr. Lanier Coil History of Present Illness: Mr. Earl Tran is a 67 y.o male with PMH significant for CKD V, arthritis, chronic pancreatitis, type II DM, HLD, GERD, systolic CHF, neuropathy and venous insufficiency admitted with complaints of generalized weakness, loose stools and slid out of electric recliner chair and reportedly could not get up. SNa 151->157, CK 1461->1273,  hypocalcemia 5.2->5.1->5.7,  Mg 1.4 s/p calcium gluconate, started on Rocaltrol and Mag being repleted IV. We are consulted for ALICJA/CKD. From a renal standpoint his creatinine on admission was 7.23->7.53, baseline Cr in the 5s CKD stage V last seen by Kimberly Raines DNP outpt nephrology. HTN on admission 176/84, afebrile, non oliguric. Started on gentle IVF, home meds significant for torsemide, PO K, and PPI, not on ACEi or ARB. Patient seen and examined on rounds reports has had diarrhea for the last 2-3 months, with worsening generalized weakness, and chronic left neck and shoulder pain, soiled on himself when he slid out of his electric recliner chair. Lives alone with his dog and care givers, denies CP, SOB, N/V, fever, chills, dizziness or NSAID use. No change in uop, dysuria, or urinary hesitancy. Discussed dialysis, pt at this point does not want dialysis if it became imminent, discussed code status, goals of care, and palliative for goals of care, he does not have a living will or POA/  
 
Past Medical History:  
Diagnosis Date  Acute renal failure superimposed on stage 3 chronic kidney disease ([address] 9/21/2016  Anemia   
 pt states he receives iron infusions  Arthritis OA generalized  Chronic kidney disease   
 not on HD- surgery done for access and fistula being removed. Per patient, no plans to proceed with dialysis  Chronic pancreatitis ([facility] 9/22/2016  Dermatophytosis of nail 12/6/2016  Diabetic neuropathy ([facility] 9/22/2016  
 insulin sliding scale only- no oral meds- avg fastings avg fasting \"low 200's;  hypo @ 80 A1C 7.1 9/2019 per patient  Essential hypertension 9/22/2016  
 medication  GERD (gastroesophageal reflux disease)   
 controlled with med  Hypercholesterolemia  Iron (Fe) deficiency anemia 9/22/2016  Septicemia due to Klebsiella pneumoniae ([facility] 9/22/2016  Systolic CHF, chronic ([facility] 9/23/2016 ECHO 4/2018 EF- 60-65%  Type II diabetes mellitus with nephropathy ([facility] 09/22/2016  Venous insufficiency 12/6/2016  Xerosis cutis 12/6/2016 Past Surgical History:  
Procedure Laterality Date  HX COLONOSCOPY    
 HX HERNIA REPAIR Left 2010  HX PROSTATECTOMY  2012  HX TURP  2012 FOR BPH  VASCULAR SURGERY PROCEDURE UNLIST Left 10/26/2017 AVF  VASCULAR SURGERY PROCEDURE UNLIST Left 09/18/2019 Ligation of left brachiobasilic fistula Current Facility-Administered Medications Medication Dose Route Frequency  lipase-protease-amylase (CREON 36,000) capsule 1 Cap (Patient Supplied)  1 Cap Oral BID PRN  
 calcium gluconate 2 g in 0.9% sodium chloride 100 mL IVPB  2 g IntraVENous ONCE  
 magnesium sulfate 4 g/100 mL IVPB  4 g IntraVENous ONCE  
 sodium chloride (NS) flush 5-40 mL  5-40 mL IntraVENous Q8H  
 sodium chloride (NS) flush 5-40 mL  5-40 mL IntraVENous PRN  
 acetaminophen (TYLENOL) tablet 650 mg  650 mg Oral Q6H PRN Or  
 acetaminophen (TYLENOL) suppository 650 mg  650 mg Rectal Q6H PRN  
 0.9% sodium chloride infusion  75 mL/hr IntraVENous CONTINUOUS  
 polyethylene glycol (MIRALAX) packet 17 g  17 g Oral DAILY PRN  
 ondansetron (ZOFRAN) injection 4 mg  4 mg IntraVENous Q6H PRN  
 insulin lispro (HUMALOG) injection   SubCUTAneous AC&HS  RAYALDEE  calcifediol 30 mcg  (Patient Supplied)  30 mcg Oral QHS  dicyclomine (BENTYL) capsule 10 mg  10 mg Oral BID  
  cloNIDine (CATAPRES) 0.3 mg/24 hr patch 1 Patch  1 Patch TransDERmal Q7D  
 diazePAM (VALIUM) tablet 2 mg  2 mg Oral Q12H PRN  
 lidocaine 4 % patch 1 Patch  1 Patch TransDERmal PRN  
 lipase-protease-amylase (CREON 36,000) capsule 2 Cap (Patient Supplied)  2 Cap Oral TID WITH MEALS  metoprolol succinate (TOPROL-XL) XL tablet 50 mg  50 mg Oral 7am  
 pantoprazole (PROTONIX) tablet 40 mg  40 mg Oral ACB  pregabalin (LYRICA) capsule 150 mg  150 mg Oral Q12H  
 sodium bicarbonate tablet 650 mg  650 mg Oral TID  traMADoL (ULTRAM) tablet 50 mg  50 mg Oral Q6H PRN  
 dextrose 40% (GLUTOSE) oral gel 1 Tube  15 g Oral PRN  
 glucagon (GLUCAGEN) injection 1 mg  1 mg IntraMUSCular PRN  
 dextrose (D50W) injection syrg 12.5-25 g  25-50 mL IntraVENous PRN  
 insulin glargine (LANTUS) injection 15 Units  15 Units SubCUTAneous QHS  amLODIPine (NORVASC) tablet 10 mg  10 mg Oral 7am  
 hydrALAZINE (APRESOLINE) tablet 100 mg  100 mg Oral TID  hydrALAZINE (APRESOLINE) 20 mg/mL injection 20 mg  20 mg IntraVENous Q6H PRN No Known Allergies Social History Tobacco Use  Smoking status: Former Smoker Packs/day: 2.00 Years: 20.00 Pack years: 40.00 Last attempt to quit:  Years since quittin.5  Smokeless tobacco: Current User Substance Use Topics  Alcohol use: No  
  
Family History Problem Relation Age of Onset  Diabetes Mother  Stroke Mother  Hypertension Mother  No Known Problems Father  Diabetes Sister  Diabetes Brother  Diabetes Sister  Diabetes Sister  Other Sister   
     fibromyalgia Review of Systems Gen - no fever, no chills, appetite okay, + generalized weakness HEENT - no sore throat, no decreased vision, no hearing loss CV - no chest pain, no palpitation, no orthopnea Lung - no shortness of breath, no cough, no hemoptysis Abd - no tenderness, no nausea/vomiting, no bloody stool, + diarrhea Ext - + BLE edema, no clubbing, no cyanosis Musculoskeletal - + left neck and shoulder joint pain, no back pain Neurologic - no headaches, no dizziness, no seizures Psychiatric - no anxiety, no depression Skin - no rashes, no pupura Genitourinary - no decreased urine output, no hematuria, no dysuria Objective:  
 
Vitals:  
 07/26/20 1933 07/26/20 2044 07/26/20 2328 07/27/20 0340 BP: 159/70 175/82 179/77 176/85 Pulse:  76 79 78 Resp:  18 17 19 Temp:  98.5 °F (36.9 °C) 98.2 °F (36.8 °C) 98.5 °F (36.9 °C) SpO2:  95% 96% 99% Weight:      
Height:      
 
 
Intake/Output Summary (Last 24 hours) at 7/27/2020 [facility] Last data filed at 7/26/2020 1555 Gross per 24 hour Intake 500 ml Output 500 ml Net 0 ml Physical Exam 
GEN :in no distress, alert and oriented HEENT: anicteric sclerae, Mucous membranes are moist. 
Neck - supple without JVD,  No lymphadenopathy. CV - regular rate, S1, S2, no Rub Lung - clear bilaterally, lungs expand symmetrically Abd - soft, nontender, bowel sounds present, no hepatosplenomegaly Ext - no clubbing, no cyanosis, 1+ BLE edema with dressing wraps in place Neurologic - chronic LUE weakness Genitourinary - bladder nonpalpable, chan Skin - no rashes, no purpura Psychiatric: Normal mood and affect. Data Review:  
Recent Labs  
  07/27/20 
3373 07/26/20 
1127 WBC 4.0* 4.7 HGB 8.9* 10.0* HCT 30.6* 34.5* PLT 82* 101* Recent Labs  
  07/27/20 
5214 07/26/20 
1928 07/26/20 
1543 07/26/20 
1127 * 151*  --  152* K 3.5 4.2  --  5.0  
* 122*  --  124* CO2 18* 13*  --  12* BUN 82* 83*  --  85* CREA 7.53* 7.23*  --  7.63* * 263*  --  266* CA 5.7* 5.1*  5.2*  --  5.5* MG 1.4*  --   --   --   
PHOS  --   --  8.4*  -- No results for input(s): PH, PCO2, PO2, PCO2 in the last 72 hours. Problem List:  
 
Patient Active Problem List  
 Diagnosis Date Noted  ALICJA (acute kidney injury) ([facility] 07/26/2020  Functional quadriplegia ([address] 07/26/2020  Anemia 07/26/2020  Hypocalcemia 07/26/2020  Acute pain 07/26/2020  Left shoulder pain 07/26/2020  Hyperkalemia 07/26/2020  Neck pain 07/23/2020  Cervical strain 07/23/2020  Degenerative disc disease, cervical 07/23/2020  Cervical radiculopathy 07/02/2020  Neuropathic pain 07/02/2020  Encounter for medication management 07/02/2020  Postural imbalance 07/02/2020  ESRD (end stage renal disease) ([address] 11/03/2019  Open wound of skin 11/03/2019  DVT (deep venous thrombosis) ([address] 11/03/2019  Rhabdomyolysis 04/30/2018  Metabolic acidosis 37/38/5622  Renal failure (ARF), acute on chronic (HCC) 04/29/2018  Dementia without behavioral disturbance ([address] 04/19/2018  Hypernatremia 04/18/2018  Seizure ([address] 04/18/2018  Volume overload 04/04/2018  Chest pain 04/04/2018  Cauda equina compression ([address] 04/02/2018  Uncontrolled diabetes mellitus, with long-term current use of insulin ([address] 02/28/2018  Carotid bruit 02/28/2018  Hypercholesterolemia  Arteriovenous fistula ([address] 10/31/2017  Onychomycosis 09/13/2017  Controlled type 2 diabetes mellitus with complication, with long-term current use of insulin ([address] 09/13/2017  CKD (chronic kidney disease), stage IV ([address] 04/04/2017  Stasis edema of both lower extremities 04/04/2017  Cellulitis of left lower leg 04/04/2017  Venous stasis ulcer of left lower extremity ([address] 04/01/2017  Venous insufficiency 12/06/2016  Chronic systolic congestive heart failure ([address] 09/23/2016  Septicemia due to Klebsiella pneumoniae ([address] 09/22/2016  Type II diabetes mellitus with nephropathy ([address] 09/22/2016  
 HTN (hypertension) 09/22/2016  GERD (gastroesophageal reflux disease) 09/22/2016  Diabetic neuropathy ([address] 09/22/2016  Chronic pancreatitis ([facility] 09/22/2016  Iron (Fe) deficiency anemia 09/22/2016  Acute renal failure superimposed on stage 3 chronic kidney disease ([facility] 09/21/2016 Impression: 
 
Plan: 1. ALICJA/CKD V ? Component of pre renal azotemia vs progression of CKD in the setting of diarrhea. Baseline Cr in the 5s, non oliguric 
-obtain renal US, UCr, Cordelia, protein/creatinine ratio 
-discussed dialysis options, pt does not want HD if it became imminent, no indication for acute RRT at this time. 
-consult palliative care for goals of care, pt does not have POA and living will 
-continue gentle IVF, hold diuretic and trend renal function with strict I7O 
 
2. Metabolic acidosis- NaHCO2 PO 
 
3. Hypernatremia change IVF to D5W 
 
4. HypoMg and hypoCa- replete per hosp 5. DM type II SSI per hosp 6. HTN- antihypertensives titrate prn 7. Debility/functional quadriplegia 8. Anemia check Fe studies 9. Hyperphosphatemia add PhosLo

## 2020-07-27 NOTE — PROGRESS NOTES
CM met with patient to complete assessment. Patient presented alert and oriented. Demographic information confirmed. The patient lives alone in a mobile home with a ramp at the entrance. Prior to this hospital admission, the patient states he is independent with completing ADL's. Per Tata Dowell- patient's [facility] manager, the patient has aide services M-F 7 hours per day. Staff sometimes provides 7 1/2 hours, based upon need, and 4 hours on Saturday. Tata Dowell informed CM, there are mobility concerns regarding the patient and the patient typically requires 2 person assist. Tata Magalys states the patient has a life alert and recently fell 7/25/2020, at 4:00am. CM was receptive to this information. Diabetes: Yes Patient obtains his medications from [employer] on [address]. Patient voiced no difficulty with obtaining medications in the community. Discharge planning: PT/OT has been consulted. PPD has been placed. The patient is current with [facility], with SN to assist with wound care. CM asked the patient, if he willing to complete STR. Patient declined at this time. Patient is only agreeable to [facility] services. CM was receptive to this information. CM continues to follow. Care Management Interventions PCP Verified by CM: Yes Mode of Transport at Discharge: Other (see comment)(TBD: Based upon need. ) Discharge Durable Medical Equipment: No(cane, walker, rollator walker, and shower chair. ) Physical Therapy Consult: Yes Occupational Therapy Consult: Yes Speech Therapy Consult: No 
Current Support Network: Lives Alone, Own Home Confirm Follow Up Transport: Other (see comment)(Caregiver [employer]. [phone]) Name of the Patient Representative Who was Provided with a Choice of Provider and Agrees with the Discharge Plan: Patient. [employer] Information Provided?: No 
Discharge Location Discharge Placement: Unable to determine at this time

## 2020-07-27 NOTE — PROGRESS NOTES
Problem: Self Care Deficits Care Plan (Adult) Goal: *Acute Goals and Plan of Care (Insert Text) 7/27/2020 1320 by Haley Hart OT Outcome: Progressing Towards Goal 
Note: 1. Patient will complete upper body bathing and dressing with SBA and adaptive equipment as needed. 2. Patient will complete toileting with minimal assistance. 3. Patient will tolerate 30 minutes of OT treatment with 1-2 rest breaks to increase activity tolerance for ADLs. 4. Patient will complete functional transfers with minimal assistance and adaptive equipment as needed. 5. Patient will complete standing balance with CGA while completing ADL tasks to decrease risk for falls. 6. Patient will complete functional mobility for household distances with minimal assistance and appropriate safety awareness. Timeframe: 7 visits 7/27/2020 1319 by Haley Hart OT Outcome: Progressing Towards Goal 
  
OCCUPATIONAL THERAPY: Initial Assessment, Daily Note, and AM 7/27/2020 INPATIENT: OT Visit Days: 1 Payor: Barney Montanez / Plan: [address] / Product Type: Managed Care Medicare /  
  
NAME/AGE/GENDER: Andrez Moreira is a 67 y.o. male PRIMARY DIAGNOSIS:  ALICJA (acute kidney injury) ([facility] [N17.9] ALICJA (acute kidney injury) ([facility] ALICJA (acute kidney injury) ([facility] ICD-10: Treatment Diagnosis:  
 · Pain in Left Shoulder (M25.512) · Stiffness of Left Shoulder, Not elsewhere classified (M25.612) · Generalized Muscle Weakness (M62.81) · Other lack of cordination (R27.8) · History of falling (Z91.81) · Cervicalgia (M54.2) · Abnormal posture (R29.3) Precautions/Allergies: 
   Patient has no known allergies. ASSESSMENT:  
 
Mr. Piter Cedeño presents to the hospital with ALICJA. Pt had incident at home wear he slid from his recliner onto the floor and was not able to get back up.  Pt lives alone and is typically using a cane/rollator for functional mobility. Pt does admit to several falls at home. Pt states he has an aide that comes for 7 hours for 4 days/wk. Patient states she helps him to clean, grocery shop, shave, and with bathing. Pt is supine in the bed upon arrival. Pt with shoulder shrug on the L side and some lateral neck flexion to the L. Pt reports 10/10 pain in his neck and L shoulder. Pt is generally weak in all his extremities. Pt's L arm presents with significant shoulder ROM and pt reports that he has a rotator cuff tear. Pt required minimal assistance x 2 for bed mobility. Pt required some time to gain balance in unsupported sitting with pt cued for more forward flexion at the trunk and extension at his neck. ROM in pt's neck is also significantly limited with increased pain with flexion/extension motion. Patient required minimal to moderate assistance x 2 with standing. Pt initially having difficulty due to significant pain in his L shoulder/neck. Pt reports he needs to have a bowel movement once in standing. Pt wants to go into the bathroom but not safe today due to weakness in his extremities. A BSC was brought into the room and pt needed maximal assistance for brief management. Pt did attempt hygiene but overall required moderate assistance. Pt stood and was able to tolerate standing for new brief placed on patient. Pt then took steps from the [facility] over to the recliner chair with moderate assistance. Pt set-up in chair with all needs at bedside and alarm in place. Pt demonstrated good effort throughout session and appeared eager to work with therapy. Pt is currently functioning below his baseline and will benefit from OT services to address stated goals and plan of care.   
 
At this time, patient is appropriate for Co-treatment with physical  therapy due to patient's clinical complexity, decreased overall endurance/tolerance levels, as well as need for high level assistance and cues and intervention to complete functional transfers/mobility and functional tasks in safe manner. SILVIO Perez appropriate for a multidisciplinary co-treatment of PT and OT to address goals of both disciplines. This section established at most recent assessment PROBLEM LIST (Impairments causing functional limitations): 1. Decreased Strength 2. Decreased ADL/Functional Activities 3. Decreased Transfer Abilities 4. Decreased Ambulation Ability/Technique 5. Decreased Balance 6. Increased Pain 7. Decreased Activity Tolerance 8. Increased Fatigue 9. Decreased Flexibility/Joint Mobility 10. Edema/Girth 11. Decreased Saline with Home Exercise Program 
 INTERVENTIONS PLANNED: (Benefits and precautions of occupational therapy have been discussed with the patient.) 1. Activities of daily living training 2. Adaptive equipment training 3. Balance training 4. Clothing management 5. Cognitive training 6. Donning&doffing training 7. Neuromuscular re-eduation 8. Therapeutic activity 9. Therapeutic exercise TREATMENT PLAN: Frequency/Duration: Follow patient 3 times per week to address above goals. Rehabilitation Potential For Stated Goals: Excellent REHAB RECOMMENDATIONS (at time of discharge pending progress):   
Placement: It is my opinion, based on this patient's performance to date, that Mr. Jimmy Mujica may benefit from intensive therapy at an [address] [place] [address] after discharge due to potential to make ongoing and sustainable functional improvement that is of practical value. [employer] Equipment: ? TBD  
    
 
 
 
OCCUPATIONAL PROFILE AND HISTORY:  
History of Present Injury/Illness (Reason for Referral): 
See H&P Past Medical History/Comorbidities:  
Mr. Jimmy Mujica  has a past medical history of Acute renal failure superimposed on stage 3 chronic kidney disease ([facility] (9/21/2016), Anemia, Arthritis, Chronic kidney disease, Chronic pancreatitis ([facility] (9/22/2016), Dermatophytosis of nail (12/6/2016), Diabetic neuropathy ([facility] (9/22/2016), Essential hypertension (9/22/2016), GERD (gastroesophageal reflux disease), Hypercholesterolemia, Iron (Fe) deficiency anemia (9/22/2016), Septicemia due to Klebsiella pneumoniae [facility] (9/04/1595), Systolic CHF, chronic ([facility] (9/23/2016), Type II diabetes mellitus with nephropathy ([facility] (09/22/2016), Venous insufficiency (12/6/2016), and Xerosis cutis (12/6/2016). He also has no past medical history of Congestive heart failure, unspecified. Mr. Reza Flores  has a past surgical history that includes hx hernia repair (Left, 2010); hx colonoscopy; hx turp (2012); hx prostatectomy (2012); vascular surgery procedure unlist (Left, 10/26/2017); and vascular surgery procedure unlist (Left, 09/18/2019). Social History/Living Environment:  
Home Environment: Trailer/mobile home # Steps to Enter: (ramp) Wheelchair Ramp: Yes One/Two Story Residence: One story Living Alone: Yes Support Systems: Friends \ neighbors, Family member(s) Patient Expects to be Discharged to[de-identified] Private residence Current DME Used/Available at Home: Cane, straight, Grab bars, Shower chair, Walker, rollator Tub or Shower Type: Tub/Shower combination Prior Level of Function/Work/Activity: 
Pt lives alone and is typically using a cane/rollator for functional mobility. Pt does admit to several falls at home. Pt states he has an aide that comes for 7 hours for 4 days/wk. Patient states she helps him to clean, grocery shop, shave, and with bathing. Personal Factors:   
      Social Background: Lives alone Past/Current Experience:  Hx of falls Other factors that influence how disability is experienced by the patient:  Multiple co-morbidities Number of Personal Factors/Comorbidities that affect the Plan of Care: Extensive review of physical, cognitive, and psychosocial performance (3+):  HIGH COMPLEXITY ASSESSMENT OF OCCUPATIONAL PERFORMANCE[de-identified]  
 Activities of Daily Living:  
Basic ADLs (From Assessment) Complex ADLs (From Assessment) Feeding: Stand-by assistance Oral Facial Hygiene/Grooming: Moderate assistance Bathing: Moderate assistance Upper Body Dressing: Moderate assistance Lower Body Dressing: Maximum assistance Toileting: Maximum assistance Instrumental ADL Meal Preparation: Total assistance Homemaking: Total assistance Grooming/Bathing/Dressing Activities of Daily Living Grooming Washing Hands: Stand-by assistance Cognitive Retraining Safety/Judgement: Fall prevention Toileting Toileting Assistance: Maximum assistance Bowel Hygiene: Moderate assistance;Maximum assistance Clothing Management: Maximum assistance Cues: Tactile cues provided;Verbal cues provided;Visual cues provided Functional Transfers Toilet Transfer : Moderate assistance Bed/Mat Mobility Supine to Sit: Minimum assistance;Assist x2 Sit to Stand: Minimum assistance; Moderate assistance;Assist x2 Stand to Sit: Minimum assistance; Moderate assistance;Assist x2 Bed to Chair: Moderate assistance;Assist x2 Scooting: Minimum assistance;Assist x2 Most Recent Physical Functioning:  
Gross Assessment: 
AROM: Generally decreased, functional(significant lmitation in L shoulder) PROM: Generally decreased, functional 
Strength: Generally decreased, functional 
Coordination: Generally decreased, functional 
Sensation: Intact Posture: 
Posture (WDL): Exceptions to [facility] Posture Assessment: Forward head, Trunk flexion Balance: 
Sitting: Impaired Sitting - Static: Fair (occasional) Sitting - Dynamic: Fair (occasional) Standing: Impaired Standing - Static: Fair;Poor Standing - Dynamic : Fair;Poor Bed Mobility: 
Supine to Sit: Minimum assistance;Assist x2 Scooting: Minimum assistance;Assist x2 Duration: 40 Minutes Wheelchair Mobility: 
  
Transfers: 
Sit to Stand: Minimum assistance; Moderate assistance;Assist x2 
 Stand to Sit: Minimum assistance; Moderate assistance;Assist x2 Bed to Chair: Moderate assistance;Assist x2 Patient Vitals for the past 6 hrs: 
 BP SpO2 Pulse  
20 0845 170/78 100 % 70  
20 1226 167/88 98 % 74 Mental Status Neurologic State: Alert Orientation Level: Oriented X4 Cognition: Appropriate decision making, Appropriate for age attention/concentration, Follows commands Perception: Appears intact Perseveration: No perseveration noted Safety/Judgement: Fall prevention Physical Skills Involved: 1. Range of Motion 2. Balance 3. Strength 4. Activity Tolerance 5. Pain (acute) 6. Pain (Chronic) Cognitive Skills Affected (resulting in the inability to perform in a timely and safe manner): 1. Executive Function Psychosocial Skills Affected: 1. Habits/Routines 2. Self-Awareness Number of elements that affect the Plan of Care: 5+:  HIGH COMPLEXITY CLINICAL DECISION MAKIN[address] [place] [id number] AM-PAC 6 Clicks Daily Activity Inpatient Short Form How much help from another person does the patient currently need. .. Total A Lot A Little None 1. Putting on and taking off regular lower body clothing? [] 1   [x] 2   [] 3   [] 4  
2. Bathing (including washing, rinsing, drying)? [] 1   [x] 2   [] 3   [] 4  
3. Toileting, which includes using toilet, bedpan or urinal?   [] 1   [x] 2   [] 3   [] 4  
4. Putting on and taking off regular upper body clothing? [] 1   [x] 2   [] 3   [] 4  
5. Taking care of personal grooming such as brushing teeth? [] 1   [x] 2   [] 3   [] 4  
6. Eating meals? [] 1   [] 2   [x] 3   [] 4  
© , Trustees of [address] [place] [id number], under license to [employer]. All rights reserved Score:  Initial: 13 Most Recent: X (Date: -- ) Interpretation of Tool:  Represents activities that are increasingly more difficult (i.e. Bed mobility, Transfers, Gait). Medical Necessity: · Patient demonstrates excellent rehab potential due to higher previous functional level. Reason for Services/Other Comments: 
· Patient continues to require skilled intervention due to decreased independence with ADL/functional transfers. Use of outcome tool(s) and clinical judgement create a POC that gives a: MODERATE COMPLEXITY  
 
 
 
TREATMENT:  
(In addition to Assessment/Re-Assessment sessions the following treatments were rendered) Pre-treatment Symptoms/Complaints:   
Pain: Initial:  
Pain Intensity 1: 10 
Pain Location 1: Neck, Shoulder Pain Intervention(s) 1: Repositioned  Post Session:  10 Today's treatment session addressed Decreased Strength, Decreased ADL/Functional Activities, Decreased Transfer Abilities, Decreased Ambulation Ability/Technique, Decreased Balance, Increased Pain, Decreased Activity Tolerance, Increased Fatigue, Decreased Flexibility/Joint Mobility and Decreased [place] with Home Exercise Program to progress towards achieving goal(s) stated above. During this session,  Physical Therapy addressed  functional transfers and ambulation to progress towards their discipline specific goal(s). Co-treatment was necessary to improve patient's ability to increase activity demands and ability to return to normal functional activity. Self Care: (20): Procedure(s) (per grid) utilized to improve and/or restore self-care/home management as related to toileting and grooming. Required moderate visual, verbal, manual and tactile cueing to facilitate activities of daily living skills and compensatory activities. Neuromuscular Re-education: ( 25):  Exercise/activities were completed to improve balance, coordination and posture. Required moderate visual, verbal, manual and tactile cues to promote static and dynamic balance in standing and promote coordination of bilateral, upper extremity(s).  Pt worked on upright posture and balance in sitting with pt working on bringing shoulders over hips for improved sitting balance. Pt also worked on standing posture and balance requiring moderate assistance. Pt needed cueing for coordinating hand placement with transfers. Braces/Orthotics/Lines/Etc:  
· chan catheter · O2 Device: Room air Treatment/Session Assessment:   
· Response to Treatment:  Pt tolerated it with limitations due to pain in L shoulder/neck · Interdisciplinary Collaboration:  
o Physical Therapist 
o Occupational Therapist 
· After treatment position/precautions:  
o Up in chair 
o Bed alarm/tab alert on 
o Bed/Chair-wheels locked 
o Call light within reach 
o RN notified · Compliance with Program/Exercises: Will assess as treatment progresses. · Recommendations/Intent for next treatment session: \"Next visit will focus on advancements to more challenging activities and reduction in assistance provided\". Total Treatment Duration: OT Patient Time In/Time Out Time In: 0229 Time Out: 1013 Ria Gunter OT

## 2020-07-27 NOTE — PROGRESS NOTES
Hourly rounds complete. Bed is in a low and locked position with personal items within reach. Pt complained of pain once today, prn pain medication given. Patient worked with PT/OT today. Pt son came to visit today. The  was called for the pt. Bedside shift report will be given to the oncoming nurse.

## 2020-07-27 NOTE — PROGRESS NOTES
Problem: Mobility Impaired (Adult and Pediatric) Goal: *Acute Goals and Plan of Care (Insert Text) Outcome: Progressing Towards Goal 
Note: LTG: 
(1.)Mr. Cy Randhawa will move from supine to sit and sit to supine , scoot up and down, and roll side to side in bed with INDEPENDENT within 7 treatment day(s). (2.)Mr. Cy Randhawa will transfer from bed to chair and chair to bed with STAND BY ASSIST using the least restrictive device within 7 treatment day(s). (3.)Mr. Cy Randhawa will ambulate with MINIMAL ASSIST for 30 feet with the least restrictive device within 7 treatment day(s). (4.)Mr. Cy Randhawa will tolerate at least 8 min of dynamic standing activity to assist standing ADLs with the least restrictive device within 7 treatment days. PHYSICAL THERAPY: Initial Assessment, Daily Note, and AM 7/27/2020 INPATIENT: PT Visit Days : 1 Payor: Lm Archuleta / Plan: [employer] [address] / Product Type: Managed Care Medicare /   
  
NAME/AGE/GENDER: Rodney Donald is a 67 y.o. male PRIMARY DIAGNOSIS: ALICJA (acute kidney injury) ([facility] [N17.9] ALICJA (acute kidney injury) ([facility] ALICJA (acute kidney injury) ([facility] ICD-10: Treatment Diagnosis:  
 · Generalized Muscle Weakness (M62.81) · Other lack of cordination (R27.8) · Difficulty in walking, Not elsewhere classified (R26.2) · Dizziness and Giddiness (R42) · Unspecified Lack of Coordination (R27.9) Precaution/Allergies: 
Patient has no known allergies. ASSESSMENT:  
 
Mr. Cy Randhawa is a 67year old M who presents for ALICJA. Prior to hospital admission pt lives alone in 1 story home with a ramp to enter. Pt endorses 1-2 falls in past 6 months. Prior to admission Mr. Cy Randhawa uses a cane and a rollator for mobility. Upon entering, pt supine, agreeable to PT evaluation.   he reports 10/10 pain in his head/L neck sided at rest. BLE assessment indicates sensation to light touch reduced in lateral aspect of L LE, AROM WFL, and strength 3+/5 grossly in B LE. Pt performed supine > sit with min Ax2, sitting EOB with fair sitting balance control (required semi frequent cueing to avoid a posterior LOB seated at EOB) While OT focused on self-care, PT focused on functional mobility. Sit > stand with min-mod Ax2 using RW, followed by stand and pivot transfer onto bedside commode, followed by  Gait x 6 ft with mod Ax2, cues for safety, posture, step clearance, and walker use. At end of session pt up in bedside chair with all needs within reach, alarm activated for safety, RN notified. Pt presents as functioning below his baseline, with deficits in mobility including transfers, gait, balance, and activity tolerance. Pt will benefit from skilled therapy services to address stated deficits to promote return to highest level of function, independence, and safety. Will continue to follow. This section established at most recent assessment PROBLEM LIST (Impairments causing functional limitations): 1. Decreased Strength 2. Decreased ADL/Functional Activities 3. Decreased Transfer Abilities 4. Decreased Ambulation Ability/Technique 5. Decreased Balance 6. Increased Pain 7. Decreased Activity Tolerance 8. Decreased Cognition INTERVENTIONS PLANNED: (Benefits and precautions of physical therapy have been discussed with the patient.) 1. Balance Exercise 2. Bed Mobility 3. Family Education 4. Gait Training 5. Manual Therapy 6. Neuromuscular Re-education/Strengthening 7. Range of Motion (ROM) 8. Therapeutic Activites 9. Therapeutic Exercise/Strengthening 10. Transfer Training TREATMENT PLAN: Frequency/Duration: 3 times a week for duration of hospital stay Rehabilitation Potential For Stated Goals: Good REHAB RECOMMENDATIONS (at time of discharge pending progress):   
Placement:  
It is my opinion, based on this patient's performance to date, that  Dariela Hercules may benefit from intensive therapy at an [address] [place] [address] after discharge due to potential to make ongoing and sustainable functional improvement that is of practical value. [facility] Equipment:  
? None at this time HISTORY:  
History of Present Injury/Illness (Reason for Referral): 
See H&P Past Medical History/Comorbidities:  
Mr. Dariela Hercules  has a past medical history of Acute renal failure superimposed on stage 3 chronic kidney disease ([facility] (9/21/2016), Anemia, Arthritis, Chronic kidney disease, Chronic pancreatitis ([address] (9/22/2016), Dermatophytosis of nail (12/6/2016), Diabetic neuropathy ([address] (9/22/2016), Essential hypertension (9/22/2016), GERD (gastroesophageal reflux disease), Hypercholesterolemia, Iron (Fe) deficiency anemia (9/22/2016), Septicemia due to Klebsiella pneumoniae ([facility] (2/96/6812), Systolic CHF, chronic ([facility] (9/23/2016), Type II diabetes mellitus with nephropathy ([facility] (09/22/2016), Venous insufficiency (12/6/2016), and Xerosis cutis (12/6/2016). He also has no past medical history of Congestive heart failure, unspecified. Mr. Dariela Hercules  has a past surgical history that includes hx hernia repair (Left, 2010); hx colonoscopy; hx turp (2012); hx prostatectomy (2012); vascular surgery procedure unlist (Left, 10/26/2017); and vascular surgery procedure unlist (Left, 09/18/2019). Social History/Living Environment:  
Home Environment: Trailer/mobile home # Steps to Enter: (ramp) Wheelchair Ramp: Yes One/Two Story Residence: One story Living Alone: Yes Support Systems: Friends \ neighbors, Family member(s) Patient Expects to be Discharged to[de-identified] Private residence Current DME Used/Available at Home: Shower chair, Walker, rollator Tub or Shower Type: Tub 
Prior Level of Function/Work/Activity: 
Household ambulator w/ cane and rollator Number of Personal Factors/Comorbidities that affect the Plan of Care: 1-2: MODERATE COMPLEXITY EXAMINATION:  
 Most Recent Physical Functioning:  
Gross Assessment: 
AROM: Generally decreased, functional 
PROM: Generally decreased, functional 
Strength: Generally decreased, functional 
Coordination: Generally decreased, functional 
Sensation: (decreased in lateral aspect L LE) Posture: 
Posture (WDL): Exceptions to [facility] Posture Assessment: Forward head, Trunk flexion Balance: 
Sitting: Impaired Sitting - Static: Fair (occasional) Sitting - Dynamic: Fair (occasional) Standing: Impaired Standing - Static: Fair;Poor Standing - Dynamic : Fair;Poor Bed Mobility: 
Supine to Sit: Minimum assistance;Assist x2 Scooting: Minimum assistance;Assist x2 Duration: 40 Minutes Wheelchair Mobility: 
  
Transfers: 
Sit to Stand: Minimum assistance; Moderate assistance;Assist x2 Stand to Sit: Minimum assistance; Moderate assistance;Assist x2 Gait: 
  
Base of Support: Widened Speed/Melva: Slow;Shuffled;Pace decreased (<100 feet/min) Step Length: Right shortened;Left shortened Stance: Time Gait Abnormalities: Shuffling gait; Steppage gait; Step to gait;Trunk sway increased;Decreased step clearance Distance (ft): 6 Feet (ft) Assistive Device: Walker, rolling;Gait belt Ambulation - Level of Assistance: Moderate assistance;Assist x2 Interventions: Manual cues; Tactile cues; Safety awareness training;Verbal cues; Visual/Demos Body Structures Involved: 1. Bones 2. Joints 3. Muscles Body Functions Affected: 1. Neuromusculoskeletal 
2. Movement Related 3. Skin Related Activities and Participation Affected: 1. Learning and Applying Knowledge 2. General Tasks and Demands 3. Communication 4. Mobility 5. Self Care 6. Domestic Life 7. Interpersonal Interactions and Relationships 8. Community, Social and [place] Number of elements that affect the Plan of Care: 4+: HIGH COMPLEXITY CLINICAL PRESENTATION:  
Presentation: Stable and uncomplicated: LOW COMPLEXITY CLINICAL DECISION MAKING:  
 [facility] Basic Mobility Inpatient Short Form How much difficulty does the patient currently have. .. Unable A Lot A Little None 1. Turning over in bed (including adjusting bedclothes, sheets and blankets)? [] 1   [] 2   [x] 3   [] 4  
2. Sitting down on and standing up from a chair with arms ( e.g., wheelchair, bedside commode, etc.)   [] 1   [] 2   [x] 3   [] 4  
3. Moving from lying on back to sitting on the side of the bed? [] 1   [] 2   [x] 3   [] 4 How much help from another person does the patient currently need. .. Total A Lot A Little None 4. Moving to and from a bed to a chair (including a wheelchair)? [] 1   [x] 2   [] 3   [] 4  
5. Need to walk in hospital room? [] 1   [x] 2   [] 3   [] 4  
6. Climbing 3-5 steps with a railing? [] 1   [x] 2   [] 3   [] 4  
© 2007, Trustees of [address] [place] [address], under license to [employer]. All rights reserved Score:  Initial: 15 Most Recent: X (Date: -- ) Interpretation of Tool:  Represents activities that are increasingly more difficult (i.e. Bed mobility, Transfers, Gait). Medical Necessity:    
· Skilled intervention continues to be required due to poor functional mobility. Reason for Services/Other Comments: · Use of outcome tool(s) and clinical judgement create a POC that gives a: Clear prediction of patient's progress: LOW COMPLEXITY  
  
 
 
 
TREATMENT:  
(In addition to Assessment/Re-Assessment sessions the following treatments were rendered) Pre-treatment Symptoms/Complaints:  Nice to meet you Pain: 10/10 Post Session:  10/10 Today's treatment session addressed Decreased Strength, Decreased ADL/Functional Activities, Decreased Transfer Abilities, Decreased Ambulation Ability/Technique, Decreased Balance, Increased Pain, Decreased Activity Tolerance and Decreased Cognition to progress towards achieving goal(s) all.  During this session, Occupational Therapy addressed ADLs to progress towards their discipline specific goal(s). Co-treatment was necessary to improve patient's cognitive participation, ability to follow higher level commands and ability to increase activity demands. Therapeutic Activity: (40 Minutes   ):  Therapeutic activities including Bed transfers, Chair transfers, Toilet transfers and Ambulation on level ground to improve mobility, strength, balance and coordination. Required moderate Manual cues; Tactile cues; Safety awareness training;Verbal cues; Visual/Demos to promote dynamic balance in standing. Braces/Orthotics/Lines/Etc:  
· chan catheter · O2 Device: Room air Treatment/Session Assessment:   
· Response to Treatment:  fatigue · Interdisciplinary Collaboration:  
o Physical Therapist 
o Occupational Therapist 
o Registered Nurse · After treatment position/precautions:  
o Up in chair 
o Bed alarm/tab alert on 
o Bed/Chair-wheels locked 
o Bed in low position 
o Call light within reach 
o RN notified 
o Nurse at bedside · Compliance with Program/Exercises: Will assess as treatment progresses · Recommendations/Intent for next treatment session: \"Next visit will focus on advancements to more challenging activities\". Total Treatment Duration: PT Patient Time In/Time Out Time In: 9405 Time Out: 1013 Juli Lawler

## 2020-07-27 NOTE — PROGRESS NOTES
Problem: General Medical Care Plan Goal: *Optimal pain control at patient's stated goal 
Outcome: Progressing Towards Goal 
Goal: *Anxiety reduced or absent Outcome: Progressing Towards Goal

## 2020-07-27 NOTE — CONSULTS
Palliative Care Patient: Rodney Donald MRN: [id number]  SSN: [id number] YOB: 1940  Age: 67 y.o. Sex: male Date of Request: 7/27/2020 Date of Consult:  7/27/2020 Reason for Consult:  advanced care plan planning/end of life Requesting Physician: MANJULA Lawrence NP Assessment/Plan:  
 
Principal Diagnosis:   
Debility, Unspecified  R53.81 Additional Diagnoses: · Advance Care Planning Counseling X11.28 · Fatigue, Lethargy  R53.83 
· Counseling, Encounter for Medical Advice  Z71.9 
· Encounter for Palliative Care  Z51.5 Palliative Performance Scale (PPS): PPS: 60 Medical Decision Making:  
Reviewed and summarized notes from admission to present Discussed case with appropriate providers Reviewed laboratory and x-ray data from admission to present Pt sitting in recliner, no distress noted. Introduced role of PC and reviewed events. Pt states he slipped from his chair, and did not fall. He also reports his kidney's \"aren't doing good. \"  We reviewed his CKD, and likely need for dialysis in the future. Pt stated he had lived a good, long life, and was not ever going to start dialysis. He is able to verbalize that he will die without dialysis if his kidneys worsen. He stated \"Im okay with that. \"  Pt is , and has 2 adult sons- one lives locally, and the other lives in [place]. Pt does not have an Advance Directive. He is hesitant to complete a HCPOA, stating he isn't sure who he would list as a decision maker. Encouraged him to give it some thought, but advised that his sons would have equal decision making rights if he were unable to speak for himself. We discussed his wishes regarding life support/resuscitation and artificial nutrition. Pt states he is willing to \"try\" these things. Counseled that having such aggressive interventions while refusing dialysis did not make much sense.   He agreed, and states he will give it more thought. He stated \"I certainly don't want to suffer. \"  Pt is considering STR post discharge, and then will return home with his CLTC aide. He would have to forfeit his aid in order to have home hospice, and he is not willing to do that. No further PC needs- we will sign off. Will discuss findings with members of the interdisciplinary team.   
 
Thank you for this referral.    
 
  
. 
 
Subjective:  
 
History obtained from:  Patient and Chart Chief Complaint: CKD History of Present Illness:  Mr Piter Cedeño is a 66 yo AA male with PMH of CKD, OA, HTN, GERD, DM, and other conditions listed below, who presented to the ER from home on 7/26/2020 with c/o generalized weakness. He apparently slid from his recliner and was unable to stand. His CLTC aide found him several hours later. Pt denies LOC, chest pain, fevers, cough. Work up revealed elevated Creatinine and azotemia. Pt was admitted for further management. Pt has been evaluated by nephrology, and has declined dialysis should his renal function worsen. Advance Directive: No      
Code Status:  Full Code Health Care Power of : No - Patient does not have a [address] Past Medical History:  
Diagnosis Date  Acute renal failure superimposed on stage 3 chronic kidney disease ([address] 9/21/2016  Anemia   
 pt states he receives iron infusions  Arthritis OA generalized  Chronic kidney disease   
 not on HD- surgery done for access and fistula being removed. Per patient, no plans to proceed with dialysis  Chronic pancreatitis ([address] 9/22/2016  Dermatophytosis of nail 12/6/2016  Diabetic neuropathy ([facility] 9/22/2016  
 insulin sliding scale only- no oral meds- avg fastings avg fasting \"low 200's;  hypo @ 80 A1C 7.1 9/2019 per patient  Essential hypertension 9/22/2016  
 medication  GERD (gastroesophageal reflux disease)   
 controlled with med  Hypercholesterolemia  Iron (Fe) deficiency anemia 2016  Septicemia due to Klebsiella pneumoniae ([facility] 2016  Systolic CHF, chronic ([facility] 2016 ECHO 2018 EF- 60-65%  Type II diabetes mellitus with nephropathy ([facility] 2016  Venous insufficiency 2016  Xerosis cutis 2016 Past Surgical History:  
Procedure Laterality Date  HX COLONOSCOPY    
 HX HERNIA REPAIR Left 2010  HX PROSTATECTOMY  2012  HX TURP  2012 FOR BPH  VASCULAR SURGERY PROCEDURE UNLIST Left 10/26/2017 [facility]  VASCULAR SURGERY PROCEDURE UNLIST Left 2019 Ligation of left brachiobasilic fistula Family History Problem Relation Age of Onset  Diabetes Mother  Stroke Mother  Hypertension Mother  No Known Problems Father  Diabetes Sister  Diabetes Brother  Diabetes Sister  Diabetes Sister  Other Sister   
     fibromyalgia Social History Tobacco Use  Smoking status: Former Smoker Packs/day: 2.00 Years: 20.00 Pack years: 40.00 Last attempt to quit:  Years since quittin.5  Smokeless tobacco: Current User Substance Use Topics  Alcohol use: No  
 
Prior to Admission medications Medication Sig Start Date End Date Taking? Authorizing Provider POTASSIUM PO Take 60 mL by mouth every Monday and Friday. Yes Provider, Historical  
pregabalin (Lyrica) 150 mg capsule Take 150 mg by mouth two (2) times a day. Yes Provider, Historical  
cloNIDine (Catapres-TTS-3) 0.3 mg/24 hr 1 Patch by TransDERmal route every seven (7) days. Change every thursday   Yes Esther Rothman NP  
traMADol (ULTRAM) 50 mg tablet Take 50 mg by mouth every six (6) hours as needed for Pain. Yes Provider, Historical  
torsemide (DEMADEX) 20 mg tablet Take 40 mg by mouth daily as needed for Other (swelling).  Take 2 tablets by mouth in the morning as needed for swelling   Yes Provider, Historical  
 insulin glargine (LANTUS,BASAGLAR) 100 unit/mL (3 mL) inpn 17 Units by SubCUTAneous route daily (with breakfast). Yes Provider, Historical  
Lactoperoxi/Gluc Oxid/Pot Thio (BIOTENE DRY MOUTH MM) Take 2 Sprays by mouth as needed for Other (dry mouth). Yes Provider, Historical  
lipase-protease-amylase (CREON) 36,000-114,000- 180,000 unit cpDR capsule Take 3,600 Units by mouth See Admin Instructions. Take 2 capsules by mouth with each meal, and 1 capsule by mouth with each snack. Yes Tiny Phipps MD  
pantothenic ac-min oil-pet,hyd (AQUAPHOR) 41 % ointment Apply  to affected area daily. Apply over legs 11/10/19  Yes Roderick Wright MD  
sodium bicarbonate 650 mg tablet Take 650 mg by mouth three (3) times daily. Yes Provider, Historical  
lidocaine 4 % patch Cut to appropriate size. Apply to intact skin for 12 hours, remove for 12 hours. Patient taking differently: 1 Patch by TransDERmal route as needed. Cut to appropriate size. Apply to intact skin for 12 hours, remove for 12 hours. States uses only occasionally and not on today 9/11/2019 Do not take morning of procedure. 7/12/19  Yes JAMARCUS Paez  
hydrALAZINE (APRESOLINE) 100 mg tablet Take 1 Tab by mouth three (3) times daily. 4/19/18  Yes Princess Rodrigo MD  
pravastatin (PRAVACHOL) 40 mg tablet Take 1 Tab by mouth nightly. 4/4/18  Yes Kaitlynn Knight MD  
[facility] unit/mL kwikpen 5 units three times a day with meals plus correction scale, 2 units/50 > 150, max daily dose 25 units 2/28/18  Yes Yvonen Flores PA-C  
calcifediol (RAYALDEE) 30 mcg Cs24 Take 30 mcg by mouth nightly. Yes Provider, Historical  
amLODIPine (NORVASC) 10 mg tablet Take 10 mg by mouth every morning. Yes Provider, Historical  
metoprolol succinate (TOPROL-XL) 50 mg XL tablet Take 50 mg by mouth every morning. Yes Provider, Historical  
omeprazole (PRILOSEC) 20 mg capsule Take 20 mg by mouth every morning. Yes Provider, Historical  
multivit-minerals/folic acid (SPECTRAVITE ADULT PO) Take 1 Tab by mouth daily. Provider, Historical  
diazePAM (Valium) 2 mg tablet Take 1 Tab by mouth every twelve (12) hours as needed (spasm). Max Daily Amount: 4 mg. Indications: muscle spasm 3/22/20   Matheus Hooker MD  
glucose 4 gram chewable tablet Take 15 g by mouth as needed for Other (low blood sugar). Provider, Historical  
oxymetazoline (AFRIN) 0.05 % nasal spray 2 Sprays by Both Nostrils route once as needed for Congestion. 11/13/19   Provider, Historical  
dicyclomine (BENTYL) 10 mg capsule Take 10 mg by mouth two (2) times a day. Provider, Historical  
acetaminophen (TYLENOL) 325 mg tablet Take 2 Tabs by mouth every four (4) hours as needed for Pain. 11/9/19   Yasmin Lundberg MD  
ondansetron (ZOFRAN ODT) 4 mg disintegrating tablet Take 1 Tab by mouth every eight (8) hours as needed for Nausea. 11/9/19   Yasmin Lundberg MD  
colestipol (COLESTID) 1 gram tablet Take 1 g by mouth two (2) times a day. Provider, Historical  
 
 
No Known Allergies Review of Systems: A comprehensive review of systems was negative except for: Constitutional: Positive for fatigue, weakness . Objective:  
 
Visit Vitals /88 Pulse 74 Temp 97.8 °F (36.6 °C) Resp 18 Ht 6' (1.829 m) Wt 200 lb (90.7 kg) SpO2 98% BMI 27.12 kg/m² Physical Exam: 
 
General:  Cooperative. No acute distress. Eyes:  Conjunctivae/corneas clear Nose: Nares normal. Septum midline. Neck: Supple, symmetrical, trachea midline Lungs:   Clear to auscultation bilaterally, unlabored Heart:  Regular rate and rhythm Abdomen:   Soft, non-tender, non-distended Extremities: Normal, atraumatic, no cyanosis. BLE edema Skin: Skin color, texture, turgor normal.  
Neurologic: Nonfocal  
Psych: Alert and oriented Assessment:  
 
Hospital Problems  Date Reviewed: 7/23/2020 Codes Class Noted POA * (Principal) ALICJA (acute kidney injury) ([facility] ICD-10-CM: N17.9 ICD-9-CM: 584.9  7/26/2020 Unknown Functional quadriplegia (HCC) ICD-10-CM: R53.2 ICD-9-CM: 780.72  7/26/2020 Unknown Anemia ICD-10-CM: D64.9 ICD-9-CM: 285.9  7/26/2020 Unknown Hypocalcemia ICD-10-CM: E83.51 
ICD-9-CM: 275.41  7/26/2020 Unknown Acute pain ICD-10-CM: N44 ICD-9-CM: 338.19  7/26/2020 Unknown Left shoulder pain ICD-10-CM: M25.512 ICD-9-CM: 719.41  7/26/2020 Unknown Hyperkalemia ICD-10-CM: E87.5 ICD-9-CM: 276.7  7/26/2020 Unknown Degenerative disc disease, cervical ICD-10-CM: M50.30 ICD-9-CM: 722.4  7/23/2020 Unknown Metabolic acidosis GVJ-19-DT: E87.2 ICD-9-CM: 276.2  4/30/2018 Yes CKD (chronic kidney disease), stage IV ([facility] ICD-10-CM: N18.4 ICD-9-CM: 585.4  4/4/2017 Unknown Venous stasis ulcer of left lower extremity ([facility] ICD-10-CM: D14.899, I03.262 ICD-9-CM: 454.0  4/1/2017 Yes Venous insufficiency (Chronic) ICD-10-CM: G40.7 ICD-9-CM: 459.81  12/6/2016 Yes 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 
ICD-9-CM: 250.40, 583.81  9/22/2016 Yes HTN (hypertension) ICD-10-CM: I10 
ICD-9-CM: 401.9  9/22/2016 Unknown Signed By: Amy Dennison NP   
 July 27, 2020

## 2020-07-27 NOTE — PROGRESS NOTES
Hourly rounding was performed on pt. No complaints of pain, nausea,vomiting. Pt had a large watery stool that was yellow in color. Bed is low, locked, call bell within reach. All needs met this shift. Will continue to monitor until next RN comes on. Date 07/26/20 0700 - 07/27/20 0558 07/27/20 0700 - 07/28/20 9650 Shift 0700-1859 1900-0659 24 Hour Total 0700-1859 1900-0659 24 Hour Total  
INTAKE  
I.V.(mL/kg/hr) 500(0.5)  500 Volume (lactated ringers bolus infusion 500 mL) 500  500 Shift Total(mL/kg) 500(5.5)  500(5.5) OUTPUT Urine(mL/kg/hr) 500(0.5)  500 Urine Voided 500  500 Stool Stool Occurrence(s)  1 x 1 x Shift Total(mL/kg) 500(5.5)  500(5.5) NET 0  0 Weight (kg) 90.7 90.7 90.7 90.7 90.7 90.7

## 2020-07-27 NOTE — H&P
Hospitalist H&P Note Admit Date:  2020 11:20 AM  
Name:  Shin Kelsey Age:  67 y.o. 
:  1940 MRN:  [id number] PCP:  Cyril Smith MD 
Treatment Team: Attending Provider: Lady Karma DO; Consulting Provider: Marysol Barraza MD; Primary Nurse: Ava Horton; Utilization Review: Lelo Gomez RN; Occupational Therapist: Ondina Douglas OT; Physical Therapist: Misael Siddiqui; Care Manager: Aura Veloz HPI:  
 
Hpi from hand p: 
Chief complaintweakness ambulatory dysfunction, malaise and acute on chronic neck and shoulder pain 67-year-old male lives alone and has home health with history of late stage renal disease, stage IV and had previous left arm fistula but this was performed  and  according to available records he had fistula ligated. He also has longstanding diabetes with nephropathy, chronic pancreatitis unclear etiology, hyperlipidemia, diabetic peripheral sensory neuropathy, significant hypertension, GERD past history of Klebsiella pneumonia and sepsis and heart failure with apparently preserved LVEF. He apparently slipped from his lazy boy chair sometime early Saturday morning around 4:30 AM and could not get up. He was found by home health aide sometime later in the morning after 9 AM Saturday. Helped back to chair but he notified home health aide today to bring him to the hospital because he could not get up or care for self. He has profound diffuse weakness. A recent outpatient hemoglobin was around 7.5. ER record states patient received blood and in office about a week and one half ago but patient denies this --he claims that he received IV iron in his family [phone] [id number] office, which I expect his nephrology office. He follows with Dr. Frank Poe nephrology. His outpatient medications include Demadex and potassium chloride.   His hemoglobin is now 10 and I suspect based on clinical exam and 2.5 g rising hemoglobin that patient is vastly volume depleted his BUN is 85 and he has symptoms of azotemia. His creatinine is 7.3. His serum potassium is 5 bicarbonate 12 and serum calcium 5.5 with an albumin of 2.5. He has prescriptions for Rocaltrol equivalent as well as bicarbonate and unclear of his compliance. His blood sugars uncontrolled at 266. He has no fevers or markers of sirs. He does have chronic lower extremity venous stasis ulcerations and wounds for which she is following with wound care and general surgery. He has chronic left-sided neck pain and degenerative disc disease and is to receive an injection by neurosurgery and has had a recent outpatient visit to Dr. Surendra Craig. He currently has functional quadriplegia and cannot ambulate. I suspect he has acute kidney injury on chronic stage IV kidney disease and he is aware he is high risk for need for renal replacement therapy even this hospital stay. Hopefully he will respond to gentle IV hydration. He has multiple electrolyte abnormalities including hyperkalemia and he will be treated with acute Kayexalate and IV calcium gluconate. Avoiding IV Lasix and glucose with insulin regarding current metabolic and vascular volume state. He will require wound care, nephrology consultation. Evaluation and correction of hypocalcemia and possible more urgent intervention regarding hyperkalemia following EKG and initial replacement. He had prolonged immobility and has had rhabdomyolysis in the past and a CPK will be checked this evening and again tomorrow. We will not be able to aggressively hydrate this patient as he has significantly impaired renal function and will need Higuera catheter for accurate I's and O's. His blood pressure is uncontrolled as are his blood sugars.   He will need physical and occupational therapy eval's and will place PPD because he may require at least short-term placement regarding his current status and what appears to be prolonged deconditioning. He has no history of alcohol use or abuse. 10 systems reviewed and negative except as noted in HPI. 
 
7/27/20: 
Cc: esrd He has long hx loose stool controlled with uk outpt med, which he ran out of and he thinks it may be bentyl. He would refused HD and palliative care consult appropriate. Still hope that his renal fxn will improve with IVF gentle hydration. Serum calcium is improving. Past Medical History:  
Diagnosis Date  Acute renal failure superimposed on stage 3 chronic kidney disease ([address] 9/21/2016  Anemia   
 pt states he receives iron infusions  Arthritis OA generalized  Chronic kidney disease   
 not on HD- surgery done for access and fistula being removed. Per patient, no plans to proceed with dialysis  Chronic pancreatitis ([address] 9/22/2016  Dermatophytosis of nail 12/6/2016  Diabetic neuropathy ([facility] 9/22/2016  
 insulin sliding scale only- no oral meds- avg fastings avg fasting \"low 200's;  hypo @ 80 A1C 7.1 9/2019 per patient  Essential hypertension 9/22/2016  
 medication  GERD (gastroesophageal reflux disease)   
 controlled with med  Hypercholesterolemia  Iron (Fe) deficiency anemia 9/22/2016  Septicemia due to Klebsiella pneumoniae ([address] 9/22/2016  Systolic CHF, chronic ([address] 9/23/2016 ECHO 4/2018 EF- 60-65%  Type II diabetes mellitus with nephropathy ([address] 09/22/2016  Venous insufficiency 12/6/2016  Xerosis cutis 12/6/2016 Past Surgical History:  
Procedure Laterality Date  HX COLONOSCOPY    
 HX HERNIA REPAIR Left 2010  HX PROSTATECTOMY  2012  HX TURP  2012 FOR BPH  VASCULAR SURGERY PROCEDURE UNLIST Left 10/26/2017 AVF  VASCULAR SURGERY PROCEDURE UNLIST Left 09/18/2019 Ligation of left brachiobasilic fistula No Known Allergies Social History Tobacco Use  Smoking status: Former Smoker Packs/day: 2.00 Years: 20.00 Pack years: 40.00 Last attempt to quit:  Years since quittin.5  Smokeless tobacco: Current User Substance Use Topics  Alcohol use: No  
  
Family History Problem Relation Age of Onset  Diabetes Mother  Stroke Mother  Hypertension Mother  No Known Problems Father  Diabetes Sister  Diabetes Brother  Diabetes Sister  Diabetes Sister  Other Sister   
     fibromyalgia Immunization History Administered Date(s) Administered  Pneumococcal Polysaccharide (PPSV-23) 2012  TB Skin Test (PPD) Intradermal 2018, 2018, 2018, 2019, 2020 PTA Medications: 
Prior to Admission Medications Prescriptions Last Dose Informant Patient Reported? Taking? HUMALOG KWIKPEN INSULIN 100 unit/mL kwikpen   No No  
Si units three times a day with meals plus correction scale, 2 units/50 > 150, max daily dose 25 units Lactoperoxi/Gluc Oxid/Pot Thio (BIOTENE DRY MOUTH MM)   Yes No  
Sig: Take 2 Sprays by mouth as needed for Other (dry mouth). POTASSIUM PO   Yes No  
Sig: Take 60 mL by mouth every Monday and Friday. acetaminophen (TYLENOL) 325 mg tablet   No No  
Sig: Take 2 Tabs by mouth every four (4) hours as needed for Pain. amLODIPine (NORVASC) 10 mg tablet   Yes No  
Sig: Take 10 mg by mouth every morning. calcifediol (RAYALDEE) 30 mcg Cs24   Yes No  
Sig: Take 30 mcg by mouth nightly. cloNIDine (Catapres-TTS-3) 0.3 mg/24 hr   Yes No  
Si Patch by TransDERmal route every seven (7) days. Change every thursday  
colestipol (COLESTID) 1 gram tablet   Yes No  
Sig: Take 1 g by mouth two (2) times a day. diazePAM (Valium) 2 mg tablet   No No  
Sig: Take 1 Tab by mouth every twelve (12) hours as needed (spasm). Max Daily Amount: 4 mg. Indications: muscle spasm  
dicyclomine (BENTYL) 10 mg capsule   Yes No  
Sig: Take 10 mg by mouth two (2) times a day. glucose 4 gram chewable tablet   Yes No  
Sig: Take 15 g by mouth as needed for Other (low blood sugar). hydrALAZINE (APRESOLINE) 100 mg tablet   No No  
Sig: Take 1 Tab by mouth three (3) times daily. insulin glargine (LANTUS,BASAGLAR) 100 unit/mL (3 mL) inpn   Yes No  
Si Units by SubCUTAneous route daily (with breakfast). lidocaine 4 % patch   No No  
Sig: Cut to appropriate size. Apply to intact skin for 12 hours, remove for 12 hours. Patient taking differently: 1 Patch by TransDERmal route as needed. Cut to appropriate size. Apply to intact skin for 12 hours, remove for 12 hours. States uses only occasionally and not on today 2019 Do not take morning of procedure. lipase-protease-amylase (CREON) 36,000-114,000- 180,000 unit cpDR capsule   Yes No  
Sig: Take 3,600 Units by mouth See Admin Instructions. Take 2 capsules by mouth with each meal, and 1 capsule by mouth with each snack. metoprolol succinate (TOPROL-XL) 50 mg XL tablet   Yes No  
Sig: Take 50 mg by mouth every morning.  
multivit-minerals/folic acid (SPECTRAVITE ADULT PO)   Yes No  
Sig: Take 1 Tab by mouth daily. omeprazole (PRILOSEC) 20 mg capsule   Yes No  
Sig: Take 20 mg by mouth every morning. ondansetron (ZOFRAN ODT) 4 mg disintegrating tablet   No No  
Sig: Take 1 Tab by mouth every eight (8) hours as needed for Nausea. oxymetazoline (AFRIN) 0.05 % nasal spray   Yes No  
Si Sprays by Both Nostrils route once as needed for Congestion. pantothenic ac-min oil-pet,hyd (AQUAPHOR) 41 % ointment   No No  
Sig: Apply  to affected area daily. Apply over legs  
pravastatin (PRAVACHOL) 40 mg tablet   No No  
Sig: Take 1 Tab by mouth nightly. pregabalin (Lyrica) 150 mg capsule   Yes No  
Sig: Take 150 mg by mouth two (2) times a day. sodium bicarbonate 650 mg tablet   Yes No  
Sig: Take 650 mg by mouth three (3) times daily.   
torsemide (DEMADEX) 20 mg tablet   Yes No  
 Sig: Take 40 mg by mouth daily as needed for Other (swelling). Take 2 tablets by mouth in the morning as needed for swelling  
traMADol (ULTRAM) 50 mg tablet   Yes No  
Sig: Take 50 mg by mouth every six (6) hours as needed for Pain. Facility-Administered Medications: None Objective:  
 
Patient Vitals for the past 24 hrs: 
 Temp Pulse Resp BP SpO2  
07/27/20 1226 97.8 °F (36.6 °C) 74 18 167/88 98 %  
07/27/20 0845 97.8 °F (36.6 °C) 70 20 170/78 100 % 07/27/20 0340 98.5 °F (36.9 °C) 78 19 176/85 99 % 07/26/20 2328 98.2 °F (36.8 °C) 79 17 179/77 96 %  
07/26/20 2044 98.5 °F (36.9 °C) 76 18 175/82 95 %  
07/26/20 1933    159/70   
07/26/20 1841 98.2 °F (36.8 °C) 67 16 186/84 100 % 07/26/20 1759 98 °F (36.7 °C) 67 16 195/90 96 %  
07/26/20 1758     96 %  
07/26/20 1740    (!) 197/91 95 %  
07/26/20 1648  68 16 (!) 195/96 95 %  
07/26/20 1604 97.8 °F (36.6 °C)      
07/26/20 1600    195/85 95 %  
07/26/20 1555  64 16 175/80 95 % Oxygen Therapy O2 Sat (%): 98 % (07/27/20 1226) Pulse via Oximetry: 68 beats per minute (07/26/20 1758) O2 Device: Room air (07/26/20 1759) Intake/Output Summary (Last 24 hours) at 7/27/2020 1520 Last data filed at 7/27/2020 1323 Gross per 24 hour Intake 860 ml Output 800 ml Net 60 ml Physical Exam: 
General:    Lean , feels better no acute distress Eyes:   Arcus senilis. conjuntivae clear ENT:  No gross jvd or hjr Neck:  Supple, no lymphadenopathy or JVD 
CV:   Regular no bradycardia, no gross gallop Lungs:  CTAB. No wheezing, rhonchi, or rales. Abdomen: nontender , bs x 4q. Extremities: Chronic venous insuff bilateral le Neurologic: CN II-XII grossly intact. No focal motor weakness grossly. Psych:  Less irritable. I reviewed the labs, imaging, EKGs, telemetry, and other studies done this admission. Data Review:  
Recent Results (from the past 24 hour(s)) PHOSPHORUS  Collection Time: 07/26/20  3:43 PM  
 Result Value Ref Range Phosphorus 8.4 (H) 2.3 - 3.7 MG/DL  
CK Collection Time: 07/26/20  7:28 PM  
Result Value Ref Range CK 1,461 (H) 21 - 215 U/L  
PTH INTACT Collection Time: 07/26/20  7:28 PM  
Result Value Ref Range Calcium 5.1 (LL) 8.3 - 10.4 MG/DL  
 PTH, Intact 206.1 (H) 18.5 - 88.0 pg/mL VITAMIN D, 25 HYDROXY Collection Time: 07/26/20  7:28 PM  
Result Value Ref Range Vitamin D 25-Hydroxy 27.6 (L) 30.0 - 100.0 ng/mL METABOLIC PANEL, BASIC Collection Time: 07/26/20  7:28 PM  
Result Value Ref Range Sodium 151 (H) 136 - 145 mmol/L Potassium 4.2 3.5 - 5.1 mmol/L Chloride 122 (H) 98 - 107 mmol/L  
 CO2 13 (L) 21 - 32 mmol/L Anion gap 16 7 - 16 mmol/L Glucose 263 (H) 65 - 100 mg/dL BUN 83 (H) 8 - 23 MG/DL Creatinine 7.23 (H) 0.8 - 1.5 MG/DL  
 GFR est AA 9 (L) >60 ml/min/1.73m2 GFR est non-AA 8 (L) >60 ml/min/1.73m2 Calcium 5.2 (LL) 8.3 - 10.4 MG/DL  
GLUCOSE, POC Collection Time: 07/26/20  8:49 PM  
Result Value Ref Range Glucose (POC) 242 (H) 65 - 100 mg/dL CALCIUM, IONIZED Collection Time: 07/26/20  9:17 PM  
Result Value Ref Range Calcium, ionized 3.20 (L) 4.0 - 5.2 mg/dL METABOLIC PANEL, COMPREHENSIVE Collection Time: 07/27/20  6:28 AM  
Result Value Ref Range Sodium 157 (H) 136 - 145 mmol/L Potassium 3.5 3.5 - 5.1 mmol/L Chloride 130 (H) 98 - 107 mmol/L  
 CO2 18 (L) 21 - 32 mmol/L Anion gap 9 7 - 16 mmol/L Glucose 183 (H) 65 - 100 mg/dL BUN 82 (H) 8 - 23 MG/DL Creatinine 7.53 (H) 0.8 - 1.5 MG/DL  
 GFR est AA 9 (L) >60 ml/min/1.73m2 GFR est non-AA 7 (L) >60 ml/min/1.73m2 Calcium 5.7 (LL) 8.3 - 10.4 MG/DL Bilirubin, total 0.4 0.2 - 1.1 MG/DL  
 ALT (SGPT) 24 12 - 65 U/L  
 AST (SGOT) 26 15 - 37 U/L Alk. phosphatase 62 50 - 136 U/L Protein, total 5.5 (L) 6.3 - 8.2 g/dL Albumin 2.3 (L) 3.2 - 4.6 g/dL Globulin 3.2 2.3 - 3.5 g/dL A-G Ratio 0.7 (L) 1.2 - 3.5 MAGNESIUM  
 Collection Time: 07/27/20  6:28 AM  
Result Value Ref Range Magnesium 1.4 (LL) 1.8 - 2.4 mg/dL CBC WITH AUTOMATED DIFF Collection Time: 07/27/20  6:28 AM  
Result Value Ref Range WBC 4.0 (L) 4.3 - 11.1 K/uL  
 RBC 3.58 (L) 4.23 - 5.6 M/uL HGB 8.9 (L) 13.6 - 17.2 g/dL HCT 30.6 (L) 41.1 - 50.3 % MCV 85.5 79.6 - 97.8 FL  
 MCH 24.9 (L) 26.1 - 32.9 PG  
 MCHC 29.1 (L) 31.4 - 35.0 g/dL  
 RDW 19.2 (H) 11.9 - 14.6 % PLATELET 82 (L) 354 - 450 K/uL MPV Unable to calculate. Recommend adding IPF. 9.4 - 12.3 FL ABSOLUTE NRBC 0.00 0.0 - 0.2 K/uL  
 DF AUTOMATED NEUTROPHILS 59 43 - 78 % LYMPHOCYTES 26 13 - 44 % MONOCYTES 12 4.0 - 12.0 % EOSINOPHILS 2 0.5 - 7.8 % BASOPHILS 0 0.0 - 2.0 % IMMATURE GRANULOCYTES 0 0.0 - 5.0 %  
 ABS. NEUTROPHILS 2.4 1.7 - 8.2 K/UL  
 ABS. LYMPHOCYTES 1.0 0.5 - 4.6 K/UL  
 ABS. MONOCYTES 0.5 0.1 - 1.3 K/UL  
 ABS. EOSINOPHILS 0.1 0.0 - 0.8 K/UL  
 ABS. BASOPHILS 0.0 0.0 - 0.2 K/UL  
 ABS. IMM. GRANS. 0.0 0.0 - 0.5 K/UL  
CK Collection Time: 07/27/20  6:28 AM  
Result Value Ref Range CK 1,273 (H) 21 - 215 U/L  
TRANSFERRIN Collection Time: 07/27/20  6:28 AM  
Result Value Ref Range Transferrin 121 (L) 202 - 364 mg/dL TRANSFERRIN SATURATION Collection Time: 07/27/20  6:28 AM  
Result Value Ref Range Iron 26 (L) 35 - 150 ug/dL TIBC 162 (L) 250 - 450 ug/dL Transferrin Saturation 16 (L) >20 % FERRITIN Collection Time: 07/27/20  6:28 AM  
Result Value Ref Range Ferritin 402 (H) 8 - 388 NG/ML  
GLUCOSE, POC Collection Time: 07/27/20  8:33 AM  
Result Value Ref Range Glucose (POC) 156 (H) 65 - 100 mg/dL GLUCOSE, POC Collection Time: 07/27/20 12:19 PM  
Result Value Ref Range Glucose (POC) 132 (H) 65 - 100 mg/dL All Micro Results None Other Studies: 
Xr Shoulder Lt Ap/lat Min 2 V Result Date: 7/26/2020 LEFT SHOULDER RADIOGRAPHS 7/26/2020. CLINICAL HISTORY: Fall with pain. FINDINGS: Frontal, and scapular Y views of the left shoulder are submitted for evaluation. The visualized osseous structures are normal in alignment. The acromioclavicular, and glenohumeral joints are intact. No fractures are seen of the left clavicle, scapula, or proximal left humerus. Only chronic appearing changes are seen with moderate hypertrophic degenerative changes of the acromioclavicular joint with an inferior hook like osteophyte. Some calcification adjacent to the humeral head felt to represent sequela of calcific tendinitis. In addition, moderate joint space loss is seen of the glenohumeral joint. The adjacent ribs are intact. No acute process is noted of the partially visualized left hemithorax. IMPRESSION: 1. No acute osseous abnormality of the left shoulder evident by plain film imaging. Only chronic appearing changes are seen as described above. Us Retroperitoneum Comp Result Date: 7/27/2020 EXAMINATION: RENAL ULTRASOUND 7/27/2020 11:48 AM ACCESSION NUMBER:  [id number] INDICATION: ALICJA/CKD V COMPARISON: CT urogram 12/25/2019 TECHNIQUE: Multiple real-time sonographic images were obtained of the kidneys utilizing a multihertz transducer. FINDINGS: RIGHT KIDNEY: Size: 8.6 cm Cortex: Normal renal cortical echogenicity. Collecting system: No evidence of hydronephrosis. LEFT KIDNEY: Size: 9.5 cm Cortex: Normal renal cortical echogenicity. Collecting system: No evidence of hydronephrosis. URINARY BLADDER: Decompressed with a Higuera catheter in place. Diffuse bladder wall thickening. Incidental gallstones. IMPRESSION: 1. No evidence of obstructive uropathy. 2. Concentric bladder wall thickening. While this may be related to incomplete distention, correlation for other causes of bladder wall thickening is recommended. 3. Incidental cholelithiasis. VOICE DICTATED BY: Dr. Juaquin Reed Xr [facility] Result Date: 7/26/2020 CHEST X-RAY, single portable view  7/26/2020 History: Volume overload. Technique: Single frontal view of the chest. Comparison: Chest x-ray 7/12/2019 Findings: Worsening aeration is seen of the lungs. The cardiac silhouette is moderately enlarged although appears decreased in size from the prior examination especially when the worsening aeration and AP technique are considered. The lungs are expanded without evidence for pneumothorax. No consolidative airspace process or pleural effusion is seen. IMPRESSION: 1. Worsening aeration of the lungs likely due to the patient's respiratory effort. Assessment and Plan:  
 
Hospital Problems as of 7/27/2020 Date Reviewed: 7/23/2020 Codes Class Noted - Resolved POA * (Principal) ALICJA (acute kidney injury) ([facility] [address] ICD-10-CM: N17.9 ICD-9-CM: 584.9  7/26/2020 - Present Unknown Functional quadriplegia (HCC) ICD-10-CM: R53.2 ICD-9-CM: 780.72  7/26/2020 - Present Unknown Anemia ICD-10-CM: D64.9 ICD-9-CM: 285.9  7/26/2020 - Present Unknown Hypocalcemia ICD-10-CM: E83.51 
ICD-9-CM: 275.41  7/26/2020 - Present Unknown Acute pain ICD-10-CM: A34 ICD-9-CM: 338.19  7/26/2020 - Present Unknown Left shoulder pain ICD-10-CM: M25.512 ICD-9-CM: 719.41  7/26/2020 - Present Unknown Hyperkalemia ICD-10-CM: E87.5 ICD-9-CM: 276.7  7/26/2020 - Present Unknown Degenerative disc disease, cervical ICD-10-CM: M50.30 ICD-9-CM: 722.4  7/23/2020 - Present Unknown Metabolic acidosis [id number]: E87.2 ICD-9-CM: 276.2  4/30/2018 - Present Yes  
   
 CKD (chronic kidney disease), stage IV (HCC) ICD-10-CM: N18.4 ICD-9-CM: 585.4  4/4/2017 - Present Unknown Venous stasis ulcer of left lower extremity ([facility] [address] ICD-10-CM: C29.878, A23.171 ICD-9-CM: 454.0  4/1/2017 - Present Yes Venous insufficiency (Chronic) ICD-10-CM: S44.6 ICD-9-CM: 459.81  12/6/2016 - Present Yes 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 
ICD-9-CM: 250.40, 583.81  9/22/2016 - Present Yes HTN (hypertension) ICD-10-CM: I10 
ICD-9-CM: 401.9  9/22/2016 - Present Unknown PLAN: 
--Acute kidney injury on stage IV chronic kidney disease. Most recent available serum creatinine is from 2019 Gentle IV hydration regarding outpatient diuretic, clinical exam suggesting volume depletion and recent rise in hemoglobin from 7.5-10.0. minimal improvement since admit STAGE V ckd and refuses hd--palliative care consult ordered 
 
 
--Metabolic acidosis related to acute and chronic kidney disease above. Improved with po bicarb and ivf. Gi loose stool loss likey contributing.  
 
--Hypocalcemia related to above very likely. Check 25 hydroxy vitamin D and PTH level pending Continue iv calcium gluconoate prn. rocaltrol and follow. Ionized calcium pending. 
 
--Hyperkalemia related to above. Improved. Follow closely --Gamaliel Kelly likely anemia of chronic disease recent outpatient iron studies noted. Follow with IV hydration. Received recent outpatient IV iron infusion by patient history. --Functional quadriplegiavery likely related to dehydration and azotemia. Will need monitoring as clinically improves. PPD PT OT eval and may need placement. Timing of any coronavirus screening based on clinical course and possible need for placement. --Acute and chronic neck pain with known degenerative disc disease. Also chronic musculoskeletal spasmcontinue home meds. May need to reschedule neurosurgery follow-up for C-spine injection scheduled per patient for Tuesday, July 28. See pain meds. --Left shoulder pain. X-ray 2 views.   Chronic changes only 
 
--Uncontrolled hypertensionimproved titrate meds as need 
 
--Uncontrolled diabetes II requiring insulin home medication regimen according to list is 17 units of long-acting insulin every morning and 5 units short acting prandial insulin 3 times daily. Sugars controlled with current regimine. Renal us ordered - pend. Discharge planning: Home health versus short-term rehab placement DVT ppx: SCDs. Code status: Full code Decision Maker: Selfcontacts on file Nishant Mantilla  [phone]   [phone] Alexis Dominguez  Caregiver    [phone] Admit status: Inpatient Estimated LOS: Greater than 2 midnights Risk:  high Signed: 
Socrates Dinh DO

## 2020-07-28 NOTE — PROGRESS NOTES
Subjective:  
Daily Progress Note: 7/28/2020 2:44 PM 
 
No complaints Comfortable No CP No SOB No Abd pain MS - 
 
 
Current Facility-Administered Medications Medication Dose Route Frequency  lipase-protease-amylase (ZENPEP 20,000) capsule 4 Cap  4 Cap Oral TID WITH MEALS And  
 lipase-protease-amylase (ZENPEP 20,000) capsule 2 Cap  2 Cap Oral PRN  
 dextrose 5% infusion  75 mL/hr IntraVENous CONTINUOUS  
 calcium acetate (phosphate binder) (PHOSLO) tablet 1,334 mg  2 Tab Oral TID WITH MEALS  sodium chloride (NS) flush 5-40 mL  5-40 mL IntraVENous Q8H  
 sodium chloride (NS) flush 5-40 mL  5-40 mL IntraVENous PRN  
 acetaminophen (TYLENOL) tablet 650 mg  650 mg Oral Q6H PRN Or  
 acetaminophen (TYLENOL) suppository 650 mg  650 mg Rectal Q6H PRN  polyethylene glycol (MIRALAX) packet 17 g  17 g Oral DAILY PRN  
 ondansetron (ZOFRAN) injection 4 mg  4 mg IntraVENous Q6H PRN  
 insulin lispro (HUMALOG) injection   SubCUTAneous AC&HS  RAYALDEE  calcifediol 30 mcg  (Patient Supplied)  30 mcg Oral QHS  dicyclomine (BENTYL) capsule 10 mg  10 mg Oral BID  cloNIDine (CATAPRES) 0.3 mg/24 hr patch 1 Patch  1 Patch TransDERmal Q7D  
 diazePAM (VALIUM) tablet 2 mg  2 mg Oral Q12H PRN  
 lidocaine 4 % patch 1 Patch  1 Patch TransDERmal PRN  
 metoprolol succinate (TOPROL-XL) XL tablet 50 mg  50 mg Oral 7am  
 pantoprazole (PROTONIX) tablet 40 mg  40 mg Oral ACB  pregabalin (LYRICA) capsule 150 mg  150 mg Oral Q12H  
 sodium bicarbonate tablet 650 mg  650 mg Oral TID  traMADoL (ULTRAM) tablet 50 mg  50 mg Oral Q6H PRN  
 dextrose 40% (GLUTOSE) oral gel 1 Tube  15 g Oral PRN  
 glucagon (GLUCAGEN) injection 1 mg  1 mg IntraMUSCular PRN  
 dextrose (D50W) injection syrg 12.5-25 g  25-50 mL IntraVENous PRN  
 insulin glargine (LANTUS) injection 15 Units  15 Units SubCUTAneous QHS  amLODIPine (NORVASC) tablet 10 mg  10 mg Oral 7am  
  hydrALAZINE (APRESOLINE) tablet 100 mg  100 mg Oral TID  hydrALAZINE (APRESOLINE) 20 mg/mL injection 20 mg  20 mg IntraVENous Q6H PRN Objective:  
 
Visit Vitals /78 Pulse 65 Temp 97.9 °F (36.6 °C) Resp 18 Ht 6' (1.829 m) Wt 85.3 kg (188 lb) SpO2 95% BMI 25.50 kg/m² O2 Device: Room air Temp (24hrs), Av.7 °F (36.5 °C), Min:97.1 °F (36.2 °C), Max:98 °F (36.7 °C) 
 
 
701 - 1900 In: 352 [P.O.:352] Out: -  
1901 -  07 In: 600 [P.O.:600] Out: 1950 [[id number]] Visit Vitals /78 Pulse 65 Temp 97.9 °F (36.6 °C) Resp 18 Ht 6' (1.829 m) Wt 85.3 kg (188 lb) SpO2 95% BMI 25.50 kg/m² Head: Normocephalic, without obvious abnormality Neck: no JVD Lungs: clear to auscultation bilaterally Heart: regular rate and rhythm Abdomen: soft, non-tender. Extremities: no edema Data Review Recent Results (from the past 48 hour(s)) PHOSPHORUS Collection Time: 20  3:43 PM  
Result Value Ref Range Phosphorus 8.4 (H) 2.3 - 3.7 MG/DL  
CK Collection Time: 20  7:28 PM  
Result Value Ref Range CK 1,461 (H) 21 - 215 U/L  
PTH INTACT Collection Time: 20  7:28 PM  
Result Value Ref Range Calcium 5.1 (LL) 8.3 - 10.4 MG/DL  
 PTH, Intact 206.1 (H) 18.5 - 88.0 pg/mL VITAMIN D, 25 HYDROXY Collection Time: 20  7:28 PM  
Result Value Ref Range Vitamin D 25-Hydroxy 27.6 (L) 30.0 - 100.0 ng/mL METABOLIC PANEL, BASIC Collection Time: 20  7:28 PM  
Result Value Ref Range Sodium 151 (H) 136 - 145 mmol/L Potassium 4.2 3.5 - 5.1 mmol/L Chloride 122 (H) 98 - 107 mmol/L  
 CO2 13 (L) 21 - 32 mmol/L Anion gap 16 7 - 16 mmol/L Glucose 263 (H) 65 - 100 mg/dL BUN 83 (H) 8 - 23 MG/DL Creatinine 7.23 (H) 0.8 - 1.5 MG/DL  
 GFR est AA 9 (L) >60 ml/min/1.73m2 GFR est non-AA 8 (L) >60 ml/min/1.73m2  Calcium 5.2 (LL) 8.3 - 10.4 MG/DL  
GLUCOSE, POC  
 Collection Time: 07/26/20  8:49 PM  
Result Value Ref Range Glucose (POC) 242 (H) 65 - 100 mg/dL CALCIUM, IONIZED Collection Time: 07/26/20  9:17 PM  
Result Value Ref Range Calcium, ionized 3.20 (L) 4.0 - 5.2 mg/dL METABOLIC PANEL, COMPREHENSIVE Collection Time: 07/27/20  6:28 AM  
Result Value Ref Range Sodium 157 (H) 136 - 145 mmol/L Potassium 3.5 3.5 - 5.1 mmol/L Chloride 130 (H) 98 - 107 mmol/L  
 CO2 18 (L) 21 - 32 mmol/L Anion gap 9 7 - 16 mmol/L Glucose 183 (H) 65 - 100 mg/dL BUN 82 (H) 8 - 23 MG/DL Creatinine 7.53 (H) 0.8 - 1.5 MG/DL  
 GFR est AA 9 (L) >60 ml/min/1.73m2 GFR est non-AA 7 (L) >60 ml/min/1.73m2 Calcium 5.7 (LL) 8.3 - 10.4 MG/DL Bilirubin, total 0.4 0.2 - 1.1 MG/DL  
 ALT (SGPT) 24 12 - 65 U/L  
 AST (SGOT) 26 15 - 37 U/L Alk. phosphatase 62 50 - 136 U/L Protein, total 5.5 (L) 6.3 - 8.2 g/dL Albumin 2.3 (L) 3.2 - 4.6 g/dL Globulin 3.2 2.3 - 3.5 g/dL A-G Ratio 0.7 (L) 1.2 - 3.5 MAGNESIUM Collection Time: 07/27/20  6:28 AM  
Result Value Ref Range Magnesium 1.4 (LL) 1.8 - 2.4 mg/dL CBC WITH AUTOMATED DIFF Collection Time: 07/27/20  6:28 AM  
Result Value Ref Range WBC 4.0 (L) 4.3 - 11.1 K/uL  
 RBC 3.58 (L) 4.23 - 5.6 M/uL HGB 8.9 (L) 13.6 - 17.2 g/dL HCT 30.6 (L) 41.1 - 50.3 % MCV 85.5 79.6 - 97.8 FL  
 MCH 24.9 (L) 26.1 - 32.9 PG  
 MCHC 29.1 (L) 31.4 - 35.0 g/dL  
 RDW 19.2 (H) 11.9 - 14.6 % PLATELET 82 (L) 686 - 450 K/uL MPV Unable to calculate. Recommend adding IPF. 9.4 - 12.3 FL ABSOLUTE NRBC 0.00 0.0 - 0.2 K/uL  
 DF AUTOMATED NEUTROPHILS 59 43 - 78 % LYMPHOCYTES 26 13 - 44 % MONOCYTES 12 4.0 - 12.0 % EOSINOPHILS 2 0.5 - 7.8 % BASOPHILS 0 0.0 - 2.0 % IMMATURE GRANULOCYTES 0 0.0 - 5.0 %  
 ABS. NEUTROPHILS 2.4 1.7 - 8.2 K/UL  
 ABS. LYMPHOCYTES 1.0 0.5 - 4.6 K/UL  
 ABS. MONOCYTES 0.5 0.1 - 1.3 K/UL  
 ABS. EOSINOPHILS 0.1 0.0 - 0.8 K/UL ABS. BASOPHILS 0.0 0.0 - 0.2 K/UL  
 ABS. IMM. GRANS. 0.0 0.0 - 0.5 K/UL  
CK Collection Time: 07/27/20  6:28 AM  
Result Value Ref Range CK 1,273 (H) 21 - 215 U/L  
TRANSFERRIN Collection Time: 07/27/20  6:28 AM  
Result Value Ref Range Transferrin 121 (L) 202 - 364 mg/dL TRANSFERRIN SATURATION Collection Time: 07/27/20  6:28 AM  
Result Value Ref Range Iron 26 (L) 35 - 150 ug/dL TIBC 162 (L) 250 - 450 ug/dL Transferrin Saturation 16 (L) >20 % FERRITIN Collection Time: 07/27/20  6:28 AM  
Result Value Ref Range Ferritin 402 (H) 8 - 388 NG/ML  
GLUCOSE, POC Collection Time: 07/27/20  8:33 AM  
Result Value Ref Range Glucose (POC) 156 (H) 65 - 100 mg/dL GLUCOSE, POC Collection Time: 07/27/20 12:19 PM  
Result Value Ref Range Glucose (POC) 132 (H) 65 - 100 mg/dL GLUCOSE, POC Collection Time: 07/27/20  5:28 PM  
Result Value Ref Range Glucose (POC) 217 (H) 65 - 100 mg/dL GLUCOSE, POC Collection Time: 07/27/20  9:13 PM  
Result Value Ref Range Glucose (POC) 244 (H) 65 - 100 mg/dL PROTEIN/CREATININE RATIO, URINE Collection Time: 07/28/20  2:22 AM  
Result Value Ref Range Protein, urine random 223 (H) <11.9 mg/dL Creatinine, urine 46.90 mg/dL Protein/Creat. urine Ratio 4.8 SODIUM, UR, RANDOM Collection Time: 07/28/20  2:22 AM  
Result Value Ref Range Sodium,urine random 101 MMOL/L  
CK Collection Time: 07/28/20  6:42 AM  
Result Value Ref Range CK 1,039 (H) 21 - 215 U/L  
MAGNESIUM Collection Time: 07/28/20  6:42 AM  
Result Value Ref Range Magnesium 2.1 1.8 - 2.4 mg/dL METABOLIC PANEL, COMPREHENSIVE Collection Time: 07/28/20  6:42 AM  
Result Value Ref Range Sodium 152 (H) 136 - 145 mmol/L Potassium 3.5 3.5 - 5.1 mmol/L Chloride 122 (H) 98 - 107 mmol/L  
 CO2 17 (L) 21 - 32 mmol/L Anion gap 13 7 - 16 mmol/L Glucose 212 (H) 65 - 100 mg/dL  BUN 77 (H) 8 - 23 MG/DL  
 Creatinine 7.16 (H) 0.8 - 1.5 MG/DL  
 GFR est AA 10 (L) >60 ml/min/1.73m2 GFR est non-AA 8 (L) >60 ml/min/1.73m2 Calcium 6.3 (L) 8.3 - 10.4 MG/DL Bilirubin, total 0.2 0.2 - 1.1 MG/DL  
 ALT (SGPT) 21 12 - 65 U/L  
 AST (SGOT) 26 15 - 37 U/L Alk. phosphatase 60 50 - 136 U/L Protein, total 5.9 (L) 6.3 - 8.2 g/dL Albumin 2.1 (L) 3.2 - 4.6 g/dL Globulin 3.8 (H) 2.3 - 3.5 g/dL A-G Ratio 0.6 (L) 1.2 - 3.5    
CBC WITH AUTOMATED DIFF Collection Time: 07/28/20  6:42 AM  
Result Value Ref Range WBC 4.8 4.3 - 11.1 K/uL  
 RBC 3.64 (L) 4.23 - 5.6 M/uL HGB 9.5 (L) 13.6 - 17.2 g/dL HCT 31.4 (L) 41.1 - 50.3 % MCV 86.3 79.6 - 97.8 FL  
 MCH 26.1 26.1 - 32.9 PG  
 MCHC 30.3 (L) 31.4 - 35.0 g/dL  
 RDW 19.4 (H) 11.9 - 14.6 % PLATELET 83 (L) 863 - 450 K/uL MPV Unable to calculate. Recommend adding IPF. 9.4 - 12.3 FL ABSOLUTE NRBC 0.00 0.0 - 0.2 K/uL  
 DF AUTOMATED NEUTROPHILS 61 43 - 78 % LYMPHOCYTES 27 13 - 44 % MONOCYTES 10 4.0 - 12.0 % EOSINOPHILS 2 0.5 - 7.8 % BASOPHILS 0 0.0 - 2.0 % IMMATURE GRANULOCYTES 0 0.0 - 5.0 %  
 ABS. NEUTROPHILS 2.9 1.7 - 8.2 K/UL  
 ABS. LYMPHOCYTES 1.3 0.5 - 4.6 K/UL  
 ABS. MONOCYTES 0.5 0.1 - 1.3 K/UL  
 ABS. EOSINOPHILS 0.1 0.0 - 0.8 K/UL  
 ABS. BASOPHILS 0.0 0.0 - 0.2 K/UL  
 ABS. IMM. GRANS. 0.0 0.0 - 0.5 K/UL GLUCOSE, POC Collection Time: 07/28/20  7:29 AM  
Result Value Ref Range Glucose (POC) 188 (H) 65 - 100 mg/dL GLUCOSE, POC Collection Time: 07/28/20 11:24 AM  
Result Value Ref Range Glucose (POC) 180 (H) 65 - 100 mg/dL PLEASE READ & DOCUMENT PPD TEST IN 24 HRS Collection Time: 07/28/20 12:10 PM  
Result Value Ref Range PPD Negative Negative  
 mm Induration 0 0 - 5 mm Assessment Patient Active Problem List  
 Diagnosis Date Noted  ALICJA (acute kidney injury) ([facility] 07/26/2020  Functional quadriplegia ([facility] 07/26/2020  Anemia 07/26/2020  Hypocalcemia 07/26/2020  Acute pain 07/26/2020  Left shoulder pain 07/26/2020  Hyperkalemia 07/26/2020  Neck pain 07/23/2020  Cervical strain 07/23/2020  Degenerative disc disease, cervical 07/23/2020  Cervical radiculopathy 07/02/2020  Neuropathic pain 07/02/2020  Encounter for medication management 07/02/2020  Postural imbalance 07/02/2020  ESRD (end stage renal disease) ([address] 11/03/2019  Open wound of skin 11/03/2019  DVT (deep venous thrombosis) ([address] 11/03/2019  Rhabdomyolysis 04/30/2018  Metabolic acidosis 40/20/7798  Renal failure (ARF), acute on chronic (HCC) 04/29/2018  Dementia without behavioral disturbance ([address] 04/19/2018  Hypernatremia 04/18/2018  Seizure ([address] 04/18/2018  Volume overload 04/04/2018  Chest pain 04/04/2018  Cauda equina compression ([address] 04/02/2018  Uncontrolled diabetes mellitus, with long-term current use of insulin ([address] 02/28/2018  Carotid bruit 02/28/2018  Hypercholesterolemia  Arteriovenous fistula ([address] 10/31/2017  Onychomycosis 09/13/2017  Controlled type 2 diabetes mellitus with complication, with long-term current use of insulin ([address] 09/13/2017  CKD (chronic kidney disease), stage IV ([address] 04/04/2017  Stasis edema of both lower extremities 04/04/2017  Cellulitis of left lower leg 04/04/2017  Venous stasis ulcer of left lower extremity ([address] 04/01/2017  Venous insufficiency 12/06/2016  Chronic systolic congestive heart failure ([address] 09/23/2016  Septicemia due to Klebsiella pneumoniae ([address] 09/22/2016  Type II diabetes mellitus with nephropathy ([address] 09/22/2016  
 HTN (hypertension) 09/22/2016  GERD (gastroesophageal reflux disease) 09/22/2016  Diabetic neuropathy ([address] 09/22/2016  Chronic pancreatitis ([address] 09/22/2016  Iron (Fe) deficiency anemia 09/22/2016  Acute renal failure superimposed on stage 3 chronic kidney disease [facility] 09/21/2016 Problems Addressed by Nephrology CKD 5 ALICJA - prerenal 
Hypernatremia Plan Doing a little better. Continue D5W.

## 2020-07-28 NOTE — PROGRESS NOTES
Problem: General Medical Care Plan Goal: *Vital signs within specified parameters Outcome: Progressing Towards Goal 
Goal: *Labs within defined limits Outcome: Progressing Towards Goal 
Goal: *Absence of infection signs and symptoms Outcome: Progressing Towards Goal 
Goal: *Optimal pain control at patient's stated goal 
Outcome: Progressing Towards Goal 
Goal: *Skin integrity maintained Outcome: Progressing Towards Goal 
Goal: *Fluid volume balance Outcome: Progressing Towards Goal 
Goal: *Optimize nutritional status Outcome: Progressing Towards Goal 
Goal: *Anxiety reduced or absent Outcome: Progressing Towards Goal 
Goal: *Progressive mobility and function (eg: ADL's) Outcome: Progressing Towards Goal 
  
Problem: Patient Education: Go to Patient Education Activity Goal: Patient/Family Education Outcome: Progressing Towards Goal 
  
Problem: Falls - Risk of 
Goal: *Absence of Falls Description: Document Gregbessy Segun Fall Risk and appropriate interventions in the flowsheet. Outcome: Progressing Towards Goal 
Note: Fall Risk Interventions: 
Mobility Interventions: Communicate number of staff needed for ambulation/transfer, OT consult for ADLs, Patient to call before getting OOB, PT Consult for mobility concerns, PT Consult for assist device competence Medication Interventions: Evaluate medications/consider consulting pharmacy, Patient to call before getting OOB, Teach patient to arise slowly Elimination Interventions: Call light in reach, Patient to call for help with toileting needs, Stay With Me (per policy), Toilet paper/wipes in reach, Toileting schedule/hourly rounds History of Falls Interventions: Bed/chair exit alarm, Consult care management for discharge planning, Door open when patient unattended, Evaluate medications/consider consulting pharmacy Problem: Patient Education: Go to Patient Education Activity Goal: Patient/Family Education Outcome: Progressing Towards Goal 
  
 Problem: Pressure Injury - Risk of 
Goal: *Prevention of pressure injury Description: Document Santy Scale and appropriate interventions in the flowsheet. Outcome: Progressing Towards Goal 
Note: Pressure Injury Interventions: 
Sensory Interventions: Assess changes in LOC, Assess need for specialty bed, Avoid rigorous massage over bony prominences, Check visual cues for pain, Discuss PT/OT consult with provider, Keep linens dry and wrinkle-free, Maintain/enhance activity level, Minimize linen layers Moisture Interventions: Absorbent underpads, Apply protective barrier, creams and emollients, Assess need for specialty bed, Internal/External urinary devices, Limit adult briefs, Maintain skin hydration (lotion/cream), Minimize layers, Moisture barrier, Offer toileting Q_hr Activity Interventions: Assess need for specialty bed, Increase time out of bed, Pressure redistribution bed/mattress(bed type), PT/OT evaluation Mobility Interventions: Assess need for specialty bed, HOB 30 degrees or less, Pressure redistribution bed/mattress (bed type), PT/OT evaluation Nutrition Interventions: Document food/fluid/supplement intake Problem: Patient Education: Go to Patient Education Activity Goal: Patient/Family Education Outcome: Progressing Towards Goal 
  
Problem: Patient Education: Go to Patient Education Activity Goal: Patient/Family Education Outcome: Progressing Towards Goal 
  
Problem: Patient Education: Go to Patient Education Activity Goal: Patient/Family Education Outcome: Progressing Towards Goal

## 2020-07-28 NOTE — PROGRESS NOTES
This nurse called and talked with pt son regarding testing for coronavirus and the pt not being allowed to have visitors at this time.

## 2020-07-28 NOTE — PROGRESS NOTES
Pt blood pressure elevated, hospitalist informed. Hospitalist asked this nurse to ask nephrology what PO meds this patient can take in addition to his home meds for hypertension.

## 2020-07-28 NOTE — PROGRESS NOTES
Interdisciplinary Rounds completed. Nursing, Case Management, and Physician  present. Plan of care reviewed and updated. Refusing dialysis at this time.

## 2020-07-28 NOTE — PROGRESS NOTES
Request from RN on patient's behalf requesting a  visit. Request received and  to see today. Mary Kate Sánchez M.Div.

## 2020-07-28 NOTE — PROGRESS NOTES
CM met with patient to discuss discharge planning. Patient states he is agreeable to completing STR for one week. Patient requested CM to send referrals to [facility], [facility], and [facility]. CM was receptive to the patient's request. Referrals sent. Per NP Binh Stringer, \"discussed dialysis options. Patient does not want HD if it became imminent. \"CM was receptive to this information. CM will continue to follow plan of care.

## 2020-07-28 NOTE — PROGRESS NOTES
Hospitalist Progress Note 2020 Admit Date: 2020 11:20 AM  
NAME: SILVIO Matthew :  1940 MRN:  [id number] Attending: Gin Martin DO 
PCP:  Zack Ho MD 
 
SUBJECTIVE:  
27Q hx of CKD stage 5, DM, chronic pancreatitis, HLP, neuropathy, HTN, GERD, HFpEF slipped from his recliner at home and could not get up. Found by his aide later that day and was brought to the hospital for profound weakness. ED workup notable for BUN 85, Cr 73, K 5, Bicarb 12, albumin 2.5. Pt admitted for ALICJA on CKD and debility. Pt has been offered HD but declines with understanding that he will most likely pass from complications of renal failure. He does not want hospice at this time as he would need to forfeit his TC aide but recognizes he may need hospice at later date for symptom mgmt. He expresses desire to go to [facility] to get stronger so that his aides have an easier time assisting him. - conversation regarding goals of care and plans as above. No acute concerns today. Review of Systems negative with exception of pertinent positives noted above PHYSICAL EXAM  
 
Visit Vitals /84 Pulse 60 Temp 97.6 °F (36.4 °C) Resp 18 Ht 6' (1.829 m) Wt 85.3 kg (188 lb) SpO2 96% BMI 25.50 kg/m² Temp (24hrs), Av.7 °F (36.5 °C), Min:97.1 °F (36.2 °C), Max:98 °F (36.7 °C) Oxygen Therapy O2 Sat (%): 96 % (20 1623) Pulse via Oximetry: 68 beats per minute (20 1758) O2 Device: Room air (20 1759) Intake/Output Summary (Last 24 hours) at 2020 Last data filed at 2020 1519 Gross per 24 hour Intake 352 ml Output 1350 ml Net -998 ml General: No acute distress   
Lungs:  CTA Bilaterally. Heart:  Regular rate and rhythm,  No murmur, rub, or gallop Abdomen: Soft, Non distended, Non tender, Positive bowel sounds Extremities: No cyanosis, clubbing or edema Neurologic:  No focal deficits ASSESSMENT Active Hospital Problems Diagnosis Date Noted  ALICJA (acute kidney injury) ([facility] 07/26/2020  Functional quadriplegia ([facility] 07/26/2020  Anemia 07/26/2020  Hypocalcemia 07/26/2020  Acute pain 07/26/2020  Left shoulder pain 07/26/2020  Hyperkalemia 07/26/2020  Degenerative disc disease, cervical 07/23/2020  Metabolic acidosis 56/66/6103  CKD (chronic kidney disease), stage IV ([facility] 04/04/2017  Venous stasis ulcer of left lower extremity ([facility] 04/01/2017  Venous insufficiency 12/06/2016  Type II diabetes mellitus with nephropathy ([facility] 09/22/2016  
 HTN (hypertension) 09/22/2016 A/P 
 
- ALICJA on CKD stage 5 - continue D5 as per nephro. Refuses HD and expresses understanding of nature of this decision.  
 
- Metabolic acidosis- continue po bicarb, improved - hyperphosphatemia - continue phoslo - Hypocalcemia - rocaltrol and prn IV calcium.  
 
- Anemia - appears mixed of CHARLIE and ACD. S/p outpatient iron transfusion per pt. hgb 9.5 stable since admission - Dm2 - continue prandial and basal coverage, will improve once off d5 infusion 
 
- HTN - suboptimal control despite catapress 0.3mg patch, hydralazine 100mg tid, toprol xl 50mg daily, norvasc 10mg. Cont prn hydralazine. Will be difficult to manage in setting of ckd stage 5.  
 
- Functional quadriplegia - PT/OT, PPD ordered pt requesting STR. Will f/u PT recs. DVT Prophylaxis: scds Dispo - ? STR vs Home with Gabi Allen in next 1-2 days Signed By: Annalise Vieyra,    
 July 28, 2020

## 2020-07-28 NOTE — PROGRESS NOTES
Hourly rounds complete. Bed is in a low and locked position with personal items within reach. Pt had an uneventful day. Pt transferred to chair for meals. Pt complained of pain once during the shift. Bedside shift report will be given to the oncoming nurse.

## 2020-07-28 NOTE — PROGRESS NOTES
Pt is resting comfortably in bed. Pt is alert and oriented x4. Pt rates pain 4/10, pain medication will be given as ordered. Will continue to monitor care.

## 2020-07-28 NOTE — PROGRESS NOTES
Hourly rounds completed this shift. All needs met at this time. Bed low/locked. Call light within reach. Will continue to monitor and give bedside report to oncoming nurse. Pt had no complaints throughout the night.

## 2020-07-29 NOTE — PROGRESS NOTES
All hourly rounds completed and all needs have been met. Patient is resting in bed. Patient had an uneventful night. Will continue to monitor and report to the oncoming nurse.

## 2020-07-29 NOTE — CDMP QUERY
Patient admitted with ALICJA. Noted documentation of functional quadriplegia in H&P and progress notes. Please provide clinical indicators/treatment to support this diagnosis or if it is ruled out. The medical record reflects the following:   
   Risk Factors: Debility, CKD5, DM w/nephropathy Clinical Indicators: Per PT note: PTA \"uses a cane and a rollator for mobility\", minimal assist to sit up on side of bed, sit to stand with min-mod assist x2, walked 6 feet with walker and assist x 2. Per OT note: pt needs a lot of assistance with dressing, bathing, and toileting, and is able to eat meals with little assistance Treatment: PT/OT, ADL assistance Thanks, Danielle Smith RN, BSN, CDS Clinical Documentation Improvement 
[phone]

## 2020-07-29 NOTE — PROGRESS NOTES
Subjective:  
Daily Progress Note: 7/29/2020 2:44 PM 
 
Not seen directly due to COVID precautions and need to conserve PPE. Chart is reviewed. Current Facility-Administered Medications Medication Dose Route Frequency  lipase-protease-amylase (ZENPEP 20,000) capsule 4 Cap  4 Cap Oral TID WITH MEALS And  
 lipase-protease-amylase (ZENPEP 20,000) capsule 2 Cap  2 Cap Oral PRN  
 dextrose 5% infusion  75 mL/hr IntraVENous CONTINUOUS  
 calcium acetate (phosphate binder) (PHOSLO) tablet 1,334 mg  2 Tab Oral TID WITH MEALS  sodium chloride (NS) flush 5-40 mL  5-40 mL IntraVENous Q8H  
 sodium chloride (NS) flush 5-40 mL  5-40 mL IntraVENous PRN  
 acetaminophen (TYLENOL) tablet 650 mg  650 mg Oral Q6H PRN Or  
 acetaminophen (TYLENOL) suppository 650 mg  650 mg Rectal Q6H PRN  polyethylene glycol (MIRALAX) packet 17 g  17 g Oral DAILY PRN  
 ondansetron (ZOFRAN) injection 4 mg  4 mg IntraVENous Q6H PRN  
 insulin lispro (HUMALOG) injection   SubCUTAneous AC&HS  RAYALDEE  calcifediol 30 mcg  (Patient Supplied)  30 mcg Oral QHS  dicyclomine (BENTYL) capsule 10 mg  10 mg Oral BID  cloNIDine (CATAPRES) 0.3 mg/24 hr patch 1 Patch  1 Patch TransDERmal Q7D  
 diazePAM (VALIUM) tablet 2 mg  2 mg Oral Q12H PRN  
 lidocaine 4 % patch 1 Patch  1 Patch TransDERmal PRN  
 metoprolol succinate (TOPROL-XL) XL tablet 50 mg  50 mg Oral 7am  
 pantoprazole (PROTONIX) tablet 40 mg  40 mg Oral ACB  pregabalin (LYRICA) capsule 150 mg  150 mg Oral Q12H  
 sodium bicarbonate tablet 650 mg  650 mg Oral TID  traMADoL (ULTRAM) tablet 50 mg  50 mg Oral Q6H PRN  
 dextrose 40% (GLUTOSE) oral gel 1 Tube  15 g Oral PRN  
 glucagon (GLUCAGEN) injection 1 mg  1 mg IntraMUSCular PRN  
 dextrose (D50W) injection syrg 12.5-25 g  25-50 mL IntraVENous PRN  
 insulin glargine (LANTUS) injection 15 Units  15 Units SubCUTAneous QHS  amLODIPine (NORVASC) tablet 10 mg  10 mg Oral 7am  
  hydrALAZINE (APRESOLINE) tablet 100 mg  100 mg Oral TID  hydrALAZINE (APRESOLINE) 20 mg/mL injection 20 mg  20 mg IntraVENous Q6H PRN Objective:  
 
Visit Vitals /82 Pulse 67 Temp 97.8 °F (36.6 °C) Resp 18 Ht 6' (1.829 m) Wt 85.3 kg (188 lb) SpO2 100% BMI 25.50 kg/m² O2 Device: Room air Temp (24hrs), Av.9 °F (36.6 °C), Min:97.6 °F (36.4 °C), Max:98.2 °F (36.8 °C) No intake/output data recorded. 1 -  0700 In: 352 [P.O.:352] Out:  [Urine:] Visit Vitals /82 Pulse 67 Temp 97.8 °F (36.6 °C) Resp 18 Ht 6' (1.829 m) Wt 85.3 kg (188 lb) SpO2 100% BMI 25.50 kg/m² From Dr Franky Lewis General:          No acute distress   
Lungs:             CTA Bilaterally. Heart:              Regular rate and rhythm,  No murmur, rub, or gallop Abdomen:        Soft, Non distended, Non tender, Positive bowel sounds Extremities:     No cyanosis, clubbing or edema Data Review Recent Results (from the past 48 hour(s)) GLUCOSE, POC Collection Time: 20 12:19 PM  
Result Value Ref Range Glucose (POC) 132 (H) 65 - 100 mg/dL GLUCOSE, POC Collection Time: 20  5:28 PM  
Result Value Ref Range Glucose (POC) 217 (H) 65 - 100 mg/dL GLUCOSE, POC Collection Time: 20  9:13 PM  
Result Value Ref Range Glucose (POC) 244 (H) 65 - 100 mg/dL PROTEIN/CREATININE RATIO, URINE Collection Time: 20  2:22 AM  
Result Value Ref Range Protein, urine random 223 (H) <11.9 mg/dL Creatinine, urine 46.90 mg/dL Protein/Creat. urine Ratio 4.8 SODIUM, UR, RANDOM Collection Time: 20  2:22 AM  
Result Value Ref Range Sodium,urine random 101 MMOL/L  
CK Collection Time: 20  6:42 AM  
Result Value Ref Range CK 1,039 (H) 21 - 215 U/L  
MAGNESIUM Collection Time: 20  6:42 AM  
Result Value Ref Range Magnesium 2.1 1.8 - 2.4 mg/dL METABOLIC PANEL, COMPREHENSIVE  
 Collection Time: 07/28/20  6:42 AM  
Result Value Ref Range Sodium 152 (H) 136 - 145 mmol/L Potassium 3.5 3.5 - 5.1 mmol/L Chloride 122 (H) 98 - 107 mmol/L  
 CO2 17 (L) 21 - 32 mmol/L Anion gap 13 7 - 16 mmol/L Glucose 212 (H) 65 - 100 mg/dL BUN 77 (H) 8 - 23 MG/DL Creatinine 7.16 (H) 0.8 - 1.5 MG/DL  
 GFR est AA 10 (L) >60 ml/min/1.73m2 GFR est non-AA 8 (L) >60 ml/min/1.73m2 Calcium 6.3 (L) 8.3 - 10.4 MG/DL Bilirubin, total 0.2 0.2 - 1.1 MG/DL  
 ALT (SGPT) 21 12 - 65 U/L  
 AST (SGOT) 26 15 - 37 U/L Alk. phosphatase 60 50 - 136 U/L Protein, total 5.9 (L) 6.3 - 8.2 g/dL Albumin 2.1 (L) 3.2 - 4.6 g/dL Globulin 3.8 (H) 2.3 - 3.5 g/dL A-G Ratio 0.6 (L) 1.2 - 3.5    
CBC WITH AUTOMATED DIFF Collection Time: 07/28/20  6:42 AM  
Result Value Ref Range WBC 4.8 4.3 - 11.1 K/uL  
 RBC 3.64 (L) 4.23 - 5.6 M/uL HGB 9.5 (L) 13.6 - 17.2 g/dL HCT 31.4 (L) 41.1 - 50.3 % MCV 86.3 79.6 - 97.8 FL  
 MCH 26.1 26.1 - 32.9 PG  
 MCHC 30.3 (L) 31.4 - 35.0 g/dL  
 RDW 19.4 (H) 11.9 - 14.6 % PLATELET 83 (L) 648 - 450 K/uL MPV Unable to calculate. Recommend adding IPF. 9.4 - 12.3 FL ABSOLUTE NRBC 0.00 0.0 - 0.2 K/uL  
 DF AUTOMATED NEUTROPHILS 61 43 - 78 % LYMPHOCYTES 27 13 - 44 % MONOCYTES 10 4.0 - 12.0 % EOSINOPHILS 2 0.5 - 7.8 % BASOPHILS 0 0.0 - 2.0 % IMMATURE GRANULOCYTES 0 0.0 - 5.0 %  
 ABS. NEUTROPHILS 2.9 1.7 - 8.2 K/UL  
 ABS. LYMPHOCYTES 1.3 0.5 - 4.6 K/UL  
 ABS. MONOCYTES 0.5 0.1 - 1.3 K/UL  
 ABS. EOSINOPHILS 0.1 0.0 - 0.8 K/UL  
 ABS. BASOPHILS 0.0 0.0 - 0.2 K/UL  
 ABS. IMM. GRANS. 0.0 0.0 - 0.5 K/UL GLUCOSE, POC Collection Time: 07/28/20  7:29 AM  
Result Value Ref Range Glucose (POC) 188 (H) 65 - 100 mg/dL GLUCOSE, POC Collection Time: 07/28/20 11:24 AM  
Result Value Ref Range Glucose (POC) 180 (H) 65 - 100 mg/dL PLEASE READ & DOCUMENT PPD TEST IN 24 HRS  Collection Time: 07/28/20 12:10 PM  
 Result Value Ref Range PPD Negative Negative  
 mm Induration 0 0 - 5 mm GLUCOSE, POC Collection Time: 07/28/20  4:24 PM  
Result Value Ref Range Glucose (POC) 212 (H) 65 - 100 mg/dL SARS-COV-2 Collection Time: 07/28/20  4:30 PM  
Result Value Ref Range Specimen source Nasopharyngeal    
 COVID-19 rapid test Not detected NOTD    
 SARS CoV-2 PENDING   
GLUCOSE, POC Collection Time: 07/28/20  8:26 PM  
Result Value Ref Range Glucose (POC) 218 (H) 65 - 100 mg/dL CK Collection Time: 07/29/20  4:14 AM  
Result Value Ref Range  (H) 21 - 215 U/L  
CBC W/O DIFF Collection Time: 07/29/20  4:14 AM  
Result Value Ref Range WBC 4.7 4.3 - 11.1 K/uL  
 RBC 3.47 (L) 4.23 - 5.6 M/uL HGB 9.0 (L) 13.6 - 17.2 g/dL HCT 29.2 (L) 41.1 - 50.3 % MCV 84.1 79.6 - 97.8 FL  
 MCH 25.9 (L) 26.1 - 32.9 PG  
 MCHC 30.8 (L) 31.4 - 35.0 g/dL  
 RDW 19.0 (H) 11.9 - 14.6 % PLATELET 95 (L) 400 - 450 K/uL MPV Unable to calculate. Recommend adding IPF. 9.4 - 12.3 FL ABSOLUTE NRBC 0.00 0.0 - 0.2 K/uL METABOLIC PANEL, BASIC Collection Time: 07/29/20  4:14 AM  
Result Value Ref Range Sodium 149 (H) 136 - 145 mmol/L Potassium 3.8 3.5 - 5.1 mmol/L Chloride 119 (H) 98 - 107 mmol/L  
 CO2 17 (L) 21 - 32 mmol/L Anion gap 13 7 - 16 mmol/L Glucose 189 (H) 65 - 100 mg/dL BUN 79 (H) 8 - 23 MG/DL Creatinine 6.94 (H) 0.8 - 1.5 MG/DL  
 GFR est AA 10 (L) >60 ml/min/1.73m2 GFR est non-AA 8 (L) >60 ml/min/1.73m2 Calcium 6.2 (L) 8.3 - 10.4 MG/DL  
GLUCOSE, POC Collection Time: 07/29/20  6:30 AM  
Result Value Ref Range Glucose (POC) 143 (H) 65 - 100 mg/dL GLUCOSE, POC Collection Time: 07/29/20 10:13 AM  
Result Value Ref Range Glucose (POC) 209 (H) 65 - 100 mg/dL PLEASE READ & DOCUMENT PPD TEST IN 48 HRS Collection Time: 07/29/20 11:36 AM  
Result Value Ref Range PPD Negative Negative  
 mm Induration 0 0 - 5 mm Assessment Patient Active Problem List  
 Diagnosis Date Noted  ALICJA (acute kidney injury) ([address] 07/26/2020  Functional quadriplegia ([address] 07/26/2020  Anemia 07/26/2020  Hypocalcemia 07/26/2020  Acute pain 07/26/2020  Left shoulder pain 07/26/2020  Hyperkalemia 07/26/2020  Neck pain 07/23/2020  Cervical strain 07/23/2020  Degenerative disc disease, cervical 07/23/2020  Cervical radiculopathy 07/02/2020  Neuropathic pain 07/02/2020  Encounter for medication management 07/02/2020  Postural imbalance 07/02/2020  ESRD (end stage renal disease) ([address] 11/03/2019  Open wound of skin 11/03/2019  DVT (deep venous thrombosis) ([address] 11/03/2019  Rhabdomyolysis 04/30/2018  Metabolic acidosis 70/04/1766  Renal failure (ARF), acute on chronic (HCC) 04/29/2018  Dementia without behavioral disturbance ([address] 04/19/2018  Hypernatremia 04/18/2018  Seizure ([address] 04/18/2018  Volume overload 04/04/2018  Chest pain 04/04/2018  Cauda equina compression ([address] 04/02/2018  Uncontrolled diabetes mellitus, with long-term current use of insulin ([address] 02/28/2018  Carotid bruit 02/28/2018  Hypercholesterolemia  Arteriovenous fistula ([address] 10/31/2017  Onychomycosis 09/13/2017  Controlled type 2 diabetes mellitus with complication, with long-term current use of insulin ([address] 09/13/2017  CKD (chronic kidney disease), stage IV ([address] 04/04/2017  Stasis edema of both lower extremities 04/04/2017  Cellulitis of left lower leg 04/04/2017  Venous stasis ulcer of left lower extremity ([address] 04/01/2017  Venous insufficiency 12/06/2016  Chronic systolic congestive heart failure ([address] 09/23/2016  Septicemia due to Klebsiella pneumoniae ([address] 09/22/2016  Type II diabetes mellitus with nephropathy ([address] 09/22/2016  
 HTN (hypertension) 09/22/2016  GERD (gastroesophageal reflux disease) 09/22/2016  Diabetic neuropathy ([facility] 09/22/2016  Chronic pancreatitis ([facility] 09/22/2016  Iron (Fe) deficiency anemia 09/22/2016  Acute renal failure superimposed on stage 3 chronic kidney disease ([facility] 09/21/2016 Problems Addressed by Nephrology CKD 5 ALICJA - prerenal 
Hypernatremia Plan Renal function and electrolytes stable to a little better. No dialysis. Hospice would be helpful but there are current limitations which are understandable. I would not be too aggressive treating HTN given his overall life expectancy and focus on quality. Will s/o. Please recall as needed, thanks.

## 2020-07-29 NOTE — PROGRESS NOTES
Interdisciplinary Rounds completed. Nursing, Case Management, and Physician  present. Plan of care reviewed and updated. PT/OT/PPD/COVID ordered for SNF placement.

## 2020-07-29 NOTE — PROGRESS NOTES
Problem: Mobility Impaired (Adult and Pediatric) Goal: *Acute Goals and Plan of Care (Insert Text) Outcome: Progressing Towards Goal 
Note: LTG: 
(1.)Mr. Addison Tolentino will move from supine to sit and sit to supine , scoot up and down, and roll side to side in bed with INDEPENDENT within 7 treatment day(s). (2.)Mr. Addison Tolentino will transfer from bed to chair and chair to bed with STAND BY ASSIST using the least restrictive device within 7 treatment day(s). (3.)Mr. Addison Tolentino will ambulate with MINIMAL ASSIST for 30 feet with the least restrictive device within 7 treatment day(s). (4.)Mr. Addison Tolentino will tolerate at least 8 min of dynamic standing activity to assist standing ADLs with the least restrictive device within 7 treatment days. PHYSICAL THERAPY: Daily Note and PM 7/29/2020 INPATIENT: PT Visit Days : 2 Payor: [address] [place] [id number],9D / Plan: [employer] [address] / Product Type: Managed Care Medicare /   
  
NAME/AGE/GENDER: Jasson Gage is a 67 y.o. male PRIMARY DIAGNOSIS: ALICJA (acute kidney injury) ([facility] [N17.9] ALICJA (acute kidney injury) ([facility] ALICJA (acute kidney injury) ([facility] ICD-10: Treatment Diagnosis:  
 · Generalized Muscle Weakness (M62.81) · Other lack of cordination (R27.8) · Difficulty in walking, Not elsewhere classified (R26.2) · Dizziness and Giddiness (R42) · Unspecified Lack of Coordination (R27.9) Precaution/Allergies: 
Patient has no known allergies. ASSESSMENT:  
 
Mr. Addison Tolentino is a 67year old M who presents for ALICJA. Prior to hospital admission pt lives alone in 1 story home with a ramp to enter. Pt endorses 1-2 falls in past 6 months. Prior to admission Mr. Addison Tolentino uses a cane and a rollator for mobility. 7/29:  
Upon entering, Pnt is up in chair and  agreeable to PT treatment. he reports 0/10 pain in his shoulder at rest. Pnt performed Sit > stand with min Ax2 using RW.  Gait x 5 ft with min A, cues for safety, walker use and turning, and posture. Gait is noted to be unsteady as patient is mildly impulsive on his feet. Stand > sit with min a, followed by Stabilization via gait belt by therapist in short sitting at EOB to avoid a posterior trunk LOB. Pnt is noted to still present with poor trunk control in sitting. At end of session pt supine in bed with all needs within reach, alarm activated for safety, RN notified. Overall, slow progress today as pnt stil requires significant safety cueing. Pnt continues to present as functioning below his baseline, with deficits in mobility including transfers, gait, balance, and activity tolerance. Pt will continue to benefit from skilled therapy services to address stated deficits to promote return to highest level of function, independence, and safety. Will continue to follow. This section established at most recent assessment PROBLEM LIST (Impairments causing functional limitations): 1. Decreased Strength 2. Decreased ADL/Functional Activities 3. Decreased Transfer Abilities 4. Decreased Ambulation Ability/Technique 5. Decreased Balance 6. Increased Pain 7. Decreased Activity Tolerance 8. Decreased Cognition INTERVENTIONS PLANNED: (Benefits and precautions of physical therapy have been discussed with the patient.) 1. Balance Exercise 2. Bed Mobility 3. Family Education 4. Gait Training 5. Manual Therapy 6. Neuromuscular Re-education/Strengthening 7. Range of Motion (ROM) 8. Therapeutic Activites 9. Therapeutic Exercise/Strengthening 10. Transfer Training TREATMENT PLAN: Frequency/Duration: 3 times a week for duration of hospital stay Rehabilitation Potential For Stated Goals: Good REHAB RECOMMENDATIONS (at time of discharge pending progress):   
Placement: It is my opinion, based on this patient's performance to date, that [facility] may benefit from intensive therapy at an [address] [facility] after discharge due to potential to make ongoing and sustainable functional improvement that is of practical value. Domi Ask Equipment:  
? None at this time HISTORY:  
History of Present Injury/Illness (Reason for Referral): 
See H&P Past Medical History/Comorbidities:  
Mr. Derick Castellano  has a past medical history of Acute renal failure superimposed on stage 3 chronic kidney disease ([facility] (9/21/2016), Anemia, Arthritis, Chronic kidney disease, Chronic pancreatitis ([facility] (9/22/2016), Dermatophytosis of nail (12/6/2016), Diabetic neuropathy ([facility] (9/22/2016), Essential hypertension (9/22/2016), GERD (gastroesophageal reflux disease), Hypercholesterolemia, Iron (Fe) deficiency anemia (9/22/2016), Septicemia due to Klebsiella pneumoniae ([facility] (3/00/2149), Systolic CHF, chronic ([facility] (9/23/2016), Type II diabetes mellitus with nephropathy ([facility] (09/22/2016), Venous insufficiency (12/6/2016), and Xerosis cutis (12/6/2016). He also has no past medical history of Congestive heart failure, unspecified. Mr. Derick Castellano  has a past surgical history that includes hx hernia repair (Left, 2010); hx colonoscopy; hx turp (2012); hx prostatectomy (2012); vascular surgery procedure unlist (Left, 10/26/2017); and vascular surgery procedure unlist (Left, 09/18/2019). Social History/Living Environment:  
Home Environment: Trailer/mobile home # Steps to Enter: (ramp) Wheelchair Ramp: Yes One/Two Story Residence: One story Living Alone: Yes Support Systems: Friends \ neighbors, Family member(s) Patient Expects to be Discharged to[de-identified] Private residence Current DME Used/Available at Home: Cane, straight, Grab bars, Shower chair, Walker, rollator Tub or Shower Type: Tub/Shower combination Prior Level of Function/Work/Activity: 
Household ambulator w/ cane and rollator Number of Personal Factors/Comorbidities that affect the Plan of Care: 1-2: MODERATE COMPLEXITY EXAMINATION:  
Most Recent Physical Functioning: Gross Assessment: 
AROM: Generally decreased, functional 
PROM: Generally decreased, functional 
Strength: Generally decreased, functional 
Coordination: Generally decreased, functional 
Sensation: (decreased in lateral aspect L LE) Posture: 
Posture (WDL): Exceptions to [facility] Posture Assessment: Forward head, Trunk flexion Balance: 
Sitting: Impaired Sitting - Static: Fair (occasional) Sitting - Dynamic: Fair (occasional) Standing: Impaired Standing - Static: Fair;Poor Standing - Dynamic : Fair;Poor Bed Mobility: 
Sit to Supine: Minimum assistance;Assist x2 Scooting: Minimum assistance;Assist x2 Duration: 10 Minutes Wheelchair Mobility: 
  
Transfers: 
Sit to Stand: Minimum assistance;Assist x2 Stand to Sit: Minimum assistance;Assist x2 Gait: 
  
Base of Support: Widened Speed/Melva: Slow;Shuffled;Pace decreased (<100 feet/min) Step Length: Right shortened;Left shortened Stance: Time Gait Abnormalities: Shuffling gait Distance (ft): 5 Feet (ft) Assistive Device: Walker, rolling;Gait belt Ambulation - Level of Assistance: Minimal assistance;Assist x2 Interventions: Manual cues; Safety awareness training; Tactile cues; Verbal cues; Visual/Demos Body Structures Involved: 1. Bones 2. Joints 3. Muscles Body Functions Affected: 1. Neuromusculoskeletal 
2. Movement Related 3. Skin Related Activities and Participation Affected: 1. Learning and Applying Knowledge 2. General Tasks and Demands 3. Communication 4. Mobility 5. Self Care 6. Domestic Life 7. Interpersonal Interactions and Relationships 8. Community, Social and [place] Number of elements that affect the Plan of Care: 4+: HIGH COMPLEXITY CLINICAL PRESENTATION:  
Presentation: Stable and uncomplicated: LOW COMPLEXITY CLINICAL DECISION MAKIN [facility] [address] [id number] AM-PAC 6 Clicks Basic Mobility Inpatient Short Form How much difficulty does the patient currently have. .. Unable A Lot A Little None 1.  Turning over in bed (including adjusting bedclothes, sheets and blankets)? [] 1   [] 2   [x] 3   [] 4  
2. Sitting down on and standing up from a chair with arms ( e.g., wheelchair, bedside commode, etc.)   [] 1   [] 2   [x] 3   [] 4  
3. Moving from lying on back to sitting on the side of the bed? [] 1   [] 2   [x] 3   [] 4 How much help from another person does the patient currently need. .. Total A Lot A Little None 4. Moving to and from a bed to a chair (including a wheelchair)? [] 1   [x] 2   [] 3   [] 4  
5. Need to walk in hospital room? [] 1   [x] 2   [] 3   [] 4  
6. Climbing 3-5 steps with a railing? [] 1   [x] 2   [] 3   [] 4  
© 2007, Trustees of [address] [place] [address], under license to [employer]. All rights reserved Score:  Initial: 15 Most Recent: X (Date: -- ) Interpretation of Tool:  Represents activities that are increasingly more difficult (i.e. Bed mobility, Transfers, Gait). Medical Necessity:    
· Skilled intervention continues to be required due to poor functional mobility. Reason for Services/Other Comments: · Use of outcome tool(s) and clinical judgement create a POC that gives a: Clear prediction of patient's progress: LOW COMPLEXITY  
  
 
 
 
TREATMENT:  
(In addition to Assessment/Re-Assessment sessions the following treatments were rendered) Pre-treatment Symptoms/Complaints:  \"I want to go back to bed\" Pain: 10/10 Post Session:  10/10 Today's treatment session addressed Decreased Strength, Decreased ADL/Functional Activities, Decreased Transfer Abilities, Decreased Ambulation Ability/Technique, Decreased Balance, Increased Pain, Decreased Activity Tolerance and Decreased Cognition to progress towards achieving goal(s) all. During this session, Occupational Therapy addressed ADLs to progress towards their discipline specific goal(s).   Co-treatment was necessary to improve patient's cognitive participation, ability to follow higher level commands and ability to increase activity demands. Therapeutic Activity: (10 Minutes   ):  Therapeutic activities including Bed transfers, Chair transfers, Toilet transfers and Ambulation on level ground to improve mobility, strength, balance and coordination. Required moderate Manual cues; Safety awareness training; Tactile cues; Verbal cues; Visual/Demos to promote dynamic balance in standing. Braces/Orthotics/Lines/Etc:  
· chan catheter · O2 Device: Room air Treatment/Session Assessment:   
· Response to Treatment:  fatigue · Interdisciplinary Collaboration:  
o Physical Therapist 
o Registered Nurse 
o Certified Nursing Assistant/Patient Care Technician · After treatment position/precautions:  
o Supine in bed 
o Bed alarm/tab alert on 
o Bed/Chair-wheels locked 
o Bed in low position 
o Call light within reach 
o RN notified · Compliance with Program/Exercises: Will assess as treatment progresses · Recommendations/Intent for next treatment session: \"Next visit will focus on advancements to more challenging activities\". Total Treatment Duration: PT Patient Time In/Time Out Time In: 3443 Time Out: 1434 Ingrid Harvey

## 2020-07-29 NOTE — WOUND CARE
Bilateral lower legs wraps changed, all wounds have healed some pink scars, will continue wraps as his compression stockings are not available at this time, he is ready for compression socks. He states he will try to have them brought in. Will continue 2- 3 times per week compression until his socks are available.

## 2020-07-29 NOTE — PROGRESS NOTES
Hospitalist Progress Note 2020 Admit Date: 2020 11:20 AM  
NAME: SILVIO Palm :  1940 MRN:  [id number] Attending: Romeo Tucker DO 
PCP:  Lance Shone, MD 
 
SUBJECTIVE:  
24V hx of CKD stage 5, DM, chronic pancreatitis, HLP, neuropathy, HTN, GERD, HFpEF slipped from his recliner at home and could not get up. Found by his aide later that day and was brought to the hospital for profound weakness. ED workup notable for BUN 85, Cr 73, K 5, Bicarb 12, albumin 2.5. Pt admitted for ALICJA on CKD and debility. Pt has been offered HD but declines with understanding that he will most likely pass from complications of renal failure. He does not want hospice at this time as he would need to forfeit his CLTC aide but recognizes he may need hospice at later date for symptom mgmt. He expresses desire to go to [facility] to get stronger so that his aides have an easier time assisting him.  - sitting in bedside chair. No acute concerns Review of Systems negative with exception of pertinent positives noted above PHYSICAL EXAM  
 
Visit Vitals /83 Pulse 67 Temp 97.9 °F (36.6 °C) Resp 18 Ht 6' (1.829 m) Wt 85.3 kg (188 lb) SpO2 96% BMI 25.50 kg/m² Temp (24hrs), Av.9 °F (36.6 °C), Min:97.7 °F (36.5 °C), Max:98.2 °F (36.8 °C) Oxygen Therapy O2 Sat (%): 96 % (20 1434) Pulse via Oximetry: 68 beats per minute (20 1758) O2 Device: Room air (20 1759) Intake/Output Summary (Last 24 hours) at 2020 1644 Last data filed at 2020 1435 Gross per 24 hour Intake  Output 1500 ml Net -1500 ml General: No acute distress   
Lungs:  CTA Bilaterally. Heart:  Regular rate and rhythm,  No murmur, rub, or gallop Abdomen: Soft, Non distended, Non tender, Positive bowel sounds Extremities: No cyanosis, clubbing or edema Neurologic:  No focal deficits ASSESSMENT Active Hospital Problems Diagnosis Date Noted  ALICJA (acute kidney injury) ([facility] 07/26/2020  Functional quadriplegia ([facility] 07/26/2020  Anemia 07/26/2020  Hypocalcemia 07/26/2020  Acute pain 07/26/2020  Left shoulder pain 07/26/2020  Hyperkalemia 07/26/2020  Degenerative disc disease, cervical 07/23/2020  Metabolic acidosis 94/28/7310  CKD (chronic kidney disease), stage IV ([facility] 04/04/2017  Venous stasis ulcer of left lower extremity ([facility] 04/01/2017  Venous insufficiency 12/06/2016  Type II diabetes mellitus with nephropathy ([facility] 09/22/2016  
 HTN (hypertension) 09/22/2016 A/P 
 
- ALICJA on CKD stage 5 - continue D5 as per nephro. Refuses HD and expresses understanding of nature of this decision.  
 
- Metabolic acidosis- continue po bicarb, improved - hyperphosphatemia - continue phoslo - Hypocalcemia - rocaltrol and prn IV calcium.  
 
- Anemia - appears mixed of CHARLIE and ACD. S/p outpatient iron transfusion per pt. hgb 9.5 stable since admission - Dm2 - continue prandial and basal coverage, will improve once off d5 infusion 
 
- HTN - suboptimal control despite catapress 0.3mg patch, hydralazine 100mg tid, toprol xl 50mg daily, norvasc 10mg. Cont prn hydralazine. Will be difficult to manage in setting of ckd stage 5. Discussed with nephro - will not advance mgmt as to avoid deleterious s/e's in his condition - Functional quadriplegia - PT/OT, PPD ordered pt requesting STR. Will f/u PT recs. DVT Prophylaxis: scds Dispo - pending STR placement Signed By: Ethel Duarte DO   
 July 29, 2020

## 2020-07-29 NOTE — PROGRESS NOTES
The patient has been accepted to [address] [place] [address] for [employer]. Patient would like to accept bed offer. CM notified SNF liaison to begin pre-cert.   CM awaiting insurance approval.

## 2020-07-30 NOTE — PROGRESS NOTES
Hospitalist Progress Note 2020 Admit Date: 2020 11:20 AM  
NAME: SILVIO Tello :  1940 MRN:  [id number] Attending: Misael Troncoso DO 
PCP:  Modesto Hennessy MD 
 
SUBJECTIVE:  
57B hx of CKD stage 5, DM, chronic pancreatitis, HLP, neuropathy, HTN, GERD, HFpEF slipped from his recliner at home and could not get up. Found by his aide later that day and was brought to the hospital for profound weakness. ED workup notable for BUN 85, Cr 73, K 5, Bicarb 12, albumin 2.5. Pt admitted for ALICJA on CKD and debility. Pt has been offered HD but declines with understanding that he will most likely pass from complications of renal failure. He does not want hospice at this time as he would need to forfeit his CLTC aide but recognizes he may need hospice at later date for symptom mgmt. He expresses desire to go to [facility] to get stronger so that his aides have an easier time assisting him.  - sitting in bedside chair. No acute concerns  - RUE swelling ? iv line infiltration. Also complains of right side neck pain (chronic but worse today) with sharp pain radiation up side of neck and pain with head rotation Review of Systems negative with exception of pertinent positives noted above PHYSICAL EXAM  
 
Visit Vitals /80 (BP 1 Location: Right arm, BP Patient Position: At rest) Pulse 75 Temp 98.1 °F (36.7 °C) Resp 17 Ht 6' (1.829 m) Wt 84.8 kg (186 lb 14.4 oz) SpO2 98% BMI 25.35 kg/m² Temp (24hrs), Av.2 °F (36.8 °C), Min:97.4 °F (36.3 °C), Max:99.4 °F (37.4 °C) Oxygen Therapy O2 Sat (%): 98 % (20 1515) Pulse via Oximetry: 68 beats per minute (20 1758) O2 Device: Room air (20 1759) Intake/Output Summary (Last 24 hours) at 2020 6803 Last data filed at 2020 8036 Gross per 24 hour Intake  Output 1000 ml Net -1000 ml General: No acute distress   
Lungs:  CTA Bilaterally. Heart:  Regular rate and rhythm,  No murmur, rub, or gallop Abdomen: Soft, Non distended, Non tender, Positive bowel sounds Extremities: RUE swelling from hand to mid upper arm Neurologic:  No focal deficits ASSESSMENT Active Hospital Problems Diagnosis Date Noted  ALICJA (acute kidney injury) ([facility] 07/26/2020  Functional quadriplegia ([facility] 07/26/2020  Anemia 07/26/2020  Hypocalcemia 07/26/2020  Acute pain 07/26/2020  Left shoulder pain 07/26/2020  Hyperkalemia 07/26/2020  Degenerative disc disease, cervical 07/23/2020  Metabolic acidosis 93/06/5355  CKD (chronic kidney disease), stage IV ([facility] 04/04/2017  Venous stasis ulcer of left lower extremity ([facility] 04/01/2017  Venous insufficiency 12/06/2016  Type II diabetes mellitus with nephropathy ([facility] 09/22/2016  
 HTN (hypertension) 09/22/2016 A/P 
 
- ALICJA on CKD stage 5 - continue D5 as per nephro. Refuses HD and expresses understanding of nature of this decision.  
 
- Metabolic acidosis- continue po bicarb, improved - hyperphosphatemia - continue phoslo - Hypocalcemia - rocaltrol and prn IV calcium.  
 
- Anemia/thrombocytopenia - chronic, appears mixed of CHARLIE and ACD. S/p outpatient iron transfusion per pt. hgb 7.8 this morning. No overt bleeding. Add iron supplement - Dm2 - continue prandial and basal coverage, will improve once off d5 infusion 
 
- HTN - suboptimal control despite catapress 0.3mg patch, hydralazine 100mg tid, toprol xl 50mg daily, norvasc 10mg. Cont prn hydralazine. Will be difficult to manage in setting of ckd stage 5. Discussed with nephro - will not advance mgmt as to avoid deleterious s/e's in his condition - Functional quadriplegia - PT/OT, PPD ordered pt requesting STR. Will f/u PT recs. - RUE swelling - check duplex r/o dvt  
 
- cervical neck pain/neuropathic- chronic but worse today. Add lidocaine patch and short course of steroid DVT Prophylaxis: scds Dispo - pending STR placement Signed By: Kota Penny,    
 July 30, 2020

## 2020-07-30 NOTE — PROGRESS NOTES
FELICITA has received insurance approval for the patient to obtain STR. CM informed MD Jalen Alexandre of this information. The patient is not medically stable at this time. However, MD Jalen Alexandre will plan to discharge the patient tomorrow, if medically cleared. FELICITA also received call from patient's [facility] manager Evankevin SigalaMerida [phone], requesting updates in reference to the patient. FELICITA informed Stacey Merida, the patient will discharge to [facility] [place], when medically stable. Stacey Merida was receptive to this information. FELICITA continues to follow.

## 2020-07-30 NOTE — PROGRESS NOTES
All hourly rounds completed and patient is resting in bed. All needs have been met. PRN pain medication given per MAR. Will continue to monitor and report to the oncoming nurse.

## 2020-07-30 NOTE — PROGRESS NOTES
Higuera removed as ordered. Pt tolerated well. Pt IV access lost as IV infiltrated. Dr. Hill 193 aware and will leave IV out. Hourly rounding completed on this shift. All needs met. PRN pain medication given per MAR. Pt is currently resting in bed. Will continue to monitor and give report to oncoming nurse.

## 2020-07-31 NOTE — DISCHARGE SUMMARY
Hospitalist Discharge Summary Patient ID: Lara Persaud 
[id number] 
86 y.o. 
1940 Admit date: 7/26/2020 11:20 AM 
Discharge date and time: 7/31/2020 Attending: No att. providers found PCP:  Maria Antonia Kimbrough MD 
Treatment Team: Consulting Provider: Joyceann Merlin, MD; Care Manager: Jose Gupta; Primary Nurse: Yo Max, RN; Physical Therapist: John Higginbotham Charge Nurse: Ady Boone RN 
 
Principal Diagnosis ALICJA (acute kidney injury) [facility]) Principal Problem: 
  ALICJA (acute kidney injury) ([facility] (7/26/2020) Active Problems: 
  Type II diabetes mellitus with nephropathy ([facility] (9/22/2016) HTN (hypertension) (9/22/2016) Venous insufficiency (12/6/2016) Venous stasis ulcer of left lower extremity ([facility] (4/1/2017) CKD (chronic kidney disease), stage IV ([facility] (4/4/2017) Metabolic acidosis (3/30/6007) Degenerative disc disease, cervical (7/23/2020) Anemia (7/26/2020) Hypocalcemia (7/26/2020) Acute pain (7/26/2020) Left shoulder pain (7/26/2020) Hyperkalemia (7/26/2020) Functional quadriplegia ruled out Hospital Course: 
Please refer to the admission H&P for details of presentation. In summary, the patient is 79M hx of CKD stage 5, DM, chronic pancreatitis, HLP, neuropathy, HTN, GERD, HFpEF slipped from his recliner at home and could not get up. Found by his aide later that day and was brought to the hospital for profound weakness. ED workup notable for BUN 85, Cr 73, K 5, Bicarb 12, albumin 2.5. Pt admitted for ALICJA on CKD and debility. Pt has been offered HD but declines with understanding that he will most likely pass from complications of renal failure. He does not want hospice at this time as he would need to forfeit his CLTC aide but recognizes he may need hospice at later date for symptom mgmt.  He expresses desire to go to STR to get stronger so that his aides have an easier time assisting him at home. Pt developed RUE swelling secondary to iv line infiltration. Also reported left sided cervical neck radiculopathy. Added short course of steroids and lidocaine patch. Pt is medically stable for discharge to [facility].  
  
 
Significant Diagnostic Studies:  
Final [99]  7/30/2020  21:08  7/30/2020  21:28 Study Result Clinical history: Evaluate for DVT. Swelling. 
  
TECHNIQUE: Grayscale and duplex ultrasound of the right upper extremity. 
  
FINDINGS: 
  
The right internal jugular and subclavian veins are patent and demonstrate color Doppler flow. There is normal spectral waveform of the subclavian vein. The 
axillary and brachial veins are patent and compressible. There is color Doppler 
flow. The basilic and the cephalic veins are patent. The radial and ulnar veins 
are patent. No evidence of DVT. 
  
There is edema. 
  
  
IMPRESSION IMPRESSION: 
  
1. No evidence of deep vein thrombosis. Final [99]  7/27/2020  11:23  7/27/2020  11:48 Study Result EXAMINATION: RENAL ULTRASOUND 7/27/2020 11:48 AM 
  
ACCESSION NUMBER:  [id number] 
  
INDICATION: ALICJA/CKD V 
  
COMPARISON: CT urogram 12/25/2019 
  
TECHNIQUE: Multiple real-time sonographic images were obtained of the kidneys 
utilizing a multihertz transducer. 
  
FINDINGS:  
  
RIGHT KIDNEY: 
Size: 8.6 cm Cortex: Normal renal cortical echogenicity. Collecting system: No evidence of hydronephrosis. 
  
LEFT KIDNEY: 
Size: 9.5 cm Cortex: Normal renal cortical echogenicity. Collecting system: No evidence of hydronephrosis. 
  
URINARY BLADDER: 
Decompressed with a Higuera catheter in place. Diffuse bladder wall thickening. 
  
Incidental gallstones. 
  
IMPRESSION IMPRESSION: 
  
1. No evidence of obstructive uropathy. 
  
2. Concentric bladder wall thickening. While this may be related to incomplete distention, correlation for other causes of bladder wall thickening is 
recommended. 
  
3. Incidental cholelithiasis. 
  
VOICE DICTATED BY: Dr. Nader Huber Final [99]  7/26/2020  18:05  7/26/2020  18:17 Study Result LEFT SHOULDER RADIOGRAPHS 7/26/2020. 
  
CLINICAL HISTORY: Fall with pain. 
  
FINDINGS: 
  
Frontal, and scapular Y views of the left shoulder are submitted for evaluation. The visualized osseous structures are normal in alignment. The 
acromioclavicular, and glenohumeral joints are intact. No fractures are seen of 
the left clavicle, scapula, or proximal left humerus. Only chronic appearing 
changes are seen with moderate hypertrophic degenerative changes of the 
acromioclavicular joint with an inferior hook like osteophyte. Some 
calcification adjacent to the humeral head felt to represent sequela of calcific 
tendinitis. In addition, moderate joint space loss is seen of the glenohumeral 
joint. The adjacent ribs are intact. No acute process is noted of the partially 
visualized left hemithorax. 
  
IMPRESSION IMPRESSION: 
1. No acute osseous abnormality of the left shoulder evident by plain film 
imaging. Only chronic appearing changes are seen as described above. 
  
   
 
 
 
Labs: Results:  
   
Chemistry Recent Labs  
  07/31/20 
0714 07/30/20 
1769 07/29/20 
0414 * 104* 189* * 149* 149*  
K 4.6 3.6 3.8 * 119* 119* CO2 16* 18* 17* BUN 84* 80* 79* CREA 7.04* 6.90* 6.94* CA 6.1* 5.9* 6.2* AGAP 17* 12 13 CBC w/Diff Recent Labs  
  07/31/20 
0714 07/30/20 
5973 07/29/20 
0414 WBC 3.7* 3.7* 4.7  
RBC 3.28* 3.03 3.47* HGB 8.6* 7.8* 9.0*  
HCT 28.1* 25.4* 29.2*  
* 88 95* GRANS 77  --   --   
LYMPH 18  --   --   
EOS 0*  --   --   
  
Cardiac Enzymes Recent Labs  
  07/30/20 
0647 07/29/20 
0414 * 868* Coagulation No results for input(s): PTP, INR, APTT, INREXT, INREXT in the last 72 hours. Lipid Panel Lab Results Component Value Date/Time Cholesterol, total 99 04/03/2018 06:16 AM  
 HDL Cholesterol 65 (H) 04/03/2018 06:16 AM  
 LDL, calculated 23 04/03/2018 06:16 AM  
 VLDL, calculated 11 04/03/2018 06:16 AM  
 Triglyceride 55 04/03/2018 06:16 AM  
 CHOL/HDL Ratio 1.5 04/03/2018 06:16 AM  
  
BNP No results for input(s): BNPP in the last 72 hours. Liver Enzymes No results for input(s): TP, ALB, TBIL, AP in the last 72 hours. No lab exists for component: SGOT, GPT, DBIL Thyroid Studies Lab Results Component Value Date/Time TSH 2.500 04/13/2018 06:37 PM  
    
 
 
Discharge Exam: 
Visit Boyd Face /77 Pulse 70 Temp 97.9 °F (36.6 °C) Resp 18 Ht 6' (1.829 m) Wt 90.7 kg (200 lb) SpO2 97% BMI 27.12 kg/m² General appearance: alert, cooperative, no distress, appears stated age Lungs: slightly diminished Heart: regular rate and rhythm, S1, S2 normal, no murmur, click, rub or gallop Abdomen: soft, non-tender. Bowel sounds normal. No masses,  no organomegaly Extremities: no cyanosis or edema Neurologic: Grossly normal 
 
Disposition: STR Discharge Condition: stable Patient Instructions:  
Discharge Medication List as of 7/31/2020  4:13 PM  
  
START taking these medications Details  
insulin lispro (HUMALOG) 100 unit/mL injection INITIATE INSULIN CORRECTIVE PROTOCOL: 
INITIATE INSULIN CORRECTIVE PROTOCOL: 
Normal Insulin Sensitivity For Blood Sugar (mg/dL) of:    
Less than 150 =   0 units 150 -199 =   2 units 200 -249 =   4 units 250 -299 =   6 units 300 -349 =    8 units 350 and above = 10 units and Call Physician Initiate Hypoglycemia protocol if blood glucose is <70 mg/dL Fast Acting - Administer Immediately - or within 15 minutes of start of meal, if mealtime coverage., No Print, Disp-1 Vial,R-0  
  
insulin glargine (LANTUS) 100 unit/mL injection 15 Units by SubCUTAneous route nightly., No Print, Disp-1 Vial,R-0  
  
 calcium acetate, phosphate binder, (PHOSLO) 667 mg tab tablet Take 2 Tabs by mouth three (3) times daily (with meals). , No Print, Disp-1 Tab,R-0  
  
ferrous sulfate 325 mg (65 mg iron) tablet Take 1 Tab by mouth two (2) times daily (with meals). , No Print, Disp-1 Tab,R-0  
  
predniSONE (DELTASONE) 20 mg tablet Take 40 mg by mouth daily (with breakfast) for 4 days. , No Print, Disp-1 Tab,R-0 CONTINUE these medications which have CHANGED Details  
diazePAM (Valium) 2 mg tablet Take 1 Tab by mouth every twelve (12) hours as needed (spasm) for up to 3 days. Max Daily Amount: 4 mg. Indications: muscle spasm, Print, Disp-6 Tab,R-0  
  
traMADoL (ULTRAM) 50 mg tablet Take 1 Tab by mouth every six (6) hours as needed for Pain for up to 3 days. Max Daily Amount: 200 mg., Print, Disp-12 Tab,R-0  
  
pregabalin (Lyrica) 150 mg capsule Take 1 Cap by mouth two (2) times a day for 3 days. Max Daily Amount: 300 mg., Print, Disp-6 Cap,R-0 CONTINUE these medications which have NOT CHANGED Details  
cloNIDine (Catapres-TTS-3) 0.3 mg/24 hr 1 Patch by TransDERmal route every seven (7) days. Change every thursday, Historical Med Lactoperoxi/Gluc Oxid/Pot Thio (BIOTENE DRY MOUTH MM) Take 2 Sprays by mouth as needed for Other (dry mouth). , Historical Med  
  
lipase-protease-amylase (CREON) 36,000-114,000- 180,000 unit cpDR capsule Take 3,600 Units by mouth See Admin Instructions. Take 2 capsules by mouth with each meal, and 1 capsule by mouth with each snack., Historical Med  
  
pantothenic ac-min oil-pet,hyd (AQUAPHOR) 41 % ointment Apply  to affected area daily. Apply over legs, Print, Disp-53 g, R-0  
  
sodium bicarbonate 650 mg tablet Take 650 mg by mouth three (3) times daily. , Historical Med  
  
lidocaine 4 % patch Cut to appropriate size. Apply to intact skin for 12 hours, remove for 12 hours. , Normal, Disp-14 Patch, R-0  
  
hydrALAZINE (APRESOLINE) 100 mg tablet Take 1 Tab by mouth three (3) times daily., Print, Disp-90 Tab, R-2  
  
pravastatin (PRAVACHOL) 40 mg tablet Take 1 Tab by mouth nightly. , Print, Disp-30 Tab, R-0  
  
calcifediol (RAYALDEE) 30 mcg Cs24 Take 30 mcg by mouth nightly., Historical Med  
  
amLODIPine (NORVASC) 10 mg tablet Take 10 mg by mouth every morning., Historical Med  
  
metoprolol succinate (TOPROL-XL) 50 mg XL tablet Take 50 mg by mouth every morning., Historical Med  
  
omeprazole (PRILOSEC) 20 mg capsule Take 20 mg by mouth every morning., Historical Med  
  
multivit-minerals/folic acid (SPECTRAVITE ADULT PO) Take 1 Tab by mouth daily. , Historical Med  
  
glucose 4 gram chewable tablet Take 15 g by mouth as needed for Other (low blood sugar). , Historical Med  
  
oxymetazoline (AFRIN) 0.05 % nasal spray 2 Sprays by Both Nostrils route once as needed for Congestion. , Historical Med  
  
dicyclomine (BENTYL) 10 mg capsule Take 10 mg by mouth two (2) times a day., Historical Med  
  
acetaminophen (TYLENOL) 325 mg tablet Take 2 Tabs by mouth every four (4) hours as needed for Pain., Print, Disp-20 Tab, R-0  
  
ondansetron (ZOFRAN ODT) 4 mg disintegrating tablet Take 1 Tab by mouth every eight (8) hours as needed for Nausea. , Print, Disp-20 Tab, R-0  
  
colestipol (COLESTID) 1 gram tablet Take 1 g by mouth two (2) times a day., Historical Med  
  
  
STOP taking these medications POTASSIUM PO Comments:  
Reason for Stopping:   
   
 torsemide (DEMADEX) 20 mg tablet Comments:  
Reason for Stopping:   
   
 insulin glargine (LANTUS,BASAGLAR) 100 unit/mL (3 mL) inpn Comments:  
Reason for Stopping:   
   
 HUMALOG KWIKPEN INSULIN 100 unit/mL kwikpen Comments:  
Reason for Stopping:   
   
  
 
 
Activity:as tolerated Diet:renal/diabetic Follow-up · PCP, nephrology 1-2 weeks Time spent to discharge patient 35 minutes Signed: 
Kael Mulligan DO 
7/31/2020 
8:51 AM

## 2020-07-31 NOTE — PROGRESS NOTES
All hourly rounds completed and all needs have been met. Patient is resting in bed. Will continue to monitor and report to the oncoming nurse.

## 2020-07-31 NOTE — PROGRESS NOTES
Brief visit with patient to encourage Calm Patient was singing Spent time listening and talking Alphonso Ba, staff

## 2020-07-31 NOTE — PROGRESS NOTES
TRANSFER - OUT REPORT: 
 
Verbal report given to Bright Harrison at [phone] on A B Adiel Carol Anderson  being transferred to [facility] for routine progression of care Report consisted of patients Situation, Background, Assessment and  
Recommendations(SBAR). Information from the following report(s) SBAR was reviewed with the receiving nurse. Opportunity for questions and clarification was provided. Patient transported with: 
Michael Ellison

## 2020-07-31 NOTE — PROGRESS NOTES
Pt pulled tape and gauze off his RUE and created a skin tear. It did not bleed and was covered with a small gentle border gauze.

## 2020-07-31 NOTE — PROGRESS NOTES
Patient to discharge to [address] [place] [address] for STR. Patient's room number is 106 D and report line is [phone]. CM provided this information to floor RN Yfn Kc. CM arranged Jazmin Wren, per son request. As the son, is unable to transfer the patient, in his vehicle. Transport will arrive at 3:00pm this day, as the patient has been requested by SNF, to arrive after 2:00pm. CM also informed [facility] manager Merritt oak of discharge. Son is aware of transport. No additional needs voiced at this time. Please consult or notify CM if any needs shall arise. CM remains available. Care Management Interventions PCP Verified by CM: Yes Mode of Transport at Discharge: BLS Transition of Care Consult (CM Consult): SNF Partner SNF: Yes Discharge Durable Medical Equipment: No(cane, walker, rollator walker, and shower chair. ) Physical Therapy Consult: Yes Occupational Therapy Consult: Yes Speech Therapy Consult: No 
Current Support Network: Lives Alone, Own Home Confirm Follow Up Transport: Other (see comment)(Caregiver Euf[employer]. [phone]) The Plan for Transition of Care is Related to the Following Treatment Goals : Patient to obtain care to become medically stable and to discharge to SNF, to strengthen physical mobility. The Patient and/or Patient Representative was Provided with a Choice of Provider and Agrees with the Discharge Plan?: Yes Name of the Patient Representative Who was Provided with a Choice of Provider and Agrees with the Discharge Plan: Patient. Freedom of Choice List was Provided with Basic Dialogue that Supports the Patient's Individualized Plan of Care/Goals, Treatment Preferences and Shares the Quality Data Associated with the Providers?: Yes [employer] Information Provided?: No 
Discharge Location Discharge Placement: Skilled nursing facility

## 2020-07-31 NOTE — PROGRESS NOTES
Patient requested family transport him to SNF, at time of discharge. SNF liaison Misha Gibbs confirmed family is able to transport the patient, however, the patient and family cannot make any stops in between. Misha Gibbs also informed CM, the patient cannot arrive to SNF, until after 2:00pm, due to bed being available at this time. CM will notify the patient and family. CM also will make MD Carlyn Goldberg aware. CM remains available.

## 2020-08-03 NOTE — PROGRESS NOTES
Patient discharged to a SNF Preferred Provider Network facility [facility]. Patient will be included in weekly care coordination calls. Message sent to Elizabeth Durbin RN SNF Preferred Provider Josee Thomas.

## 2020-08-06 NOTE — DISCHARGE INSTRUCTIONS
As we discussed, with your kidney function where it is this does have the potential to be life-threatening. Please return with any fevers, vomiting, worsening symptoms, or additional concerns.

## 2020-08-06 NOTE — ED NOTES
I have reviewed discharge instructions with the patient. The patient verbalized understanding. Patient left ED via Discharge Method: stretcher to Home with (insert name of family/friend, self, transport transport). Opportunity for questions and clarification provided. Patient given 0 scripts. To continue your aftercare when you leave the hospital, you may receive an automated call from our care team to check in on how you are doing. This is a free service and part of our promise to provide the best care and service to meet your aftercare needs.  If you have questions, or wish to unsubscribe from this service please call [phone]. Thank you for Choosing our [facility] Emergency Department.

## 2020-08-06 NOTE — ED PROVIDER NOTES
71-year-old gentleman with a history of chronic renal insufficiency and hypocalcemia was sent to us from his skilled short-term rehab facility related to renal insufficiency and hypocalcemia. I am not entirely certain what was different and why they sent him here. Patient says he feels fine and he wants to go home. Patient denies any chest pain or shortness of breath. He has no fevers or chills. He says he does not want any medical care. No other associated symptoms. Elements of this note were created using speech recognition software. As such, errors of speech recognition may be present. Past Medical History:  
Diagnosis Date  Acute renal failure superimposed on stage 3 chronic kidney disease ([address] 9/21/2016  Anemia   
 pt states he receives iron infusions  Arthritis OA generalized  Chronic kidney disease   
 not on HD- surgery done for access and fistula being removed. Per patient, no plans to proceed with dialysis  Chronic pancreatitis ([address] 9/22/2016  Dermatophytosis of nail 12/6/2016  Diabetic neuropathy ([address] 9/22/2016  
 insulin sliding scale only- no oral meds- avg fastings avg fasting \"low 200's;  hypo @ 80 A1C 7.1 9/2019 per patient  Essential hypertension 9/22/2016  
 medication  GERD (gastroesophageal reflux disease)   
 controlled with med  Hypercholesterolemia  Iron (Fe) deficiency anemia 9/22/2016  Septicemia due to Klebsiella pneumoniae ([address] 9/22/2016  Systolic CHF, chronic ([address] 9/23/2016 ECHO 4/2018 EF- 60-65%  Type II diabetes mellitus with nephropathy ([address] 09/22/2016  Venous insufficiency 12/6/2016  Xerosis cutis 12/6/2016 Past Surgical History:  
Procedure Laterality Date  HX COLONOSCOPY    
 HX HERNIA REPAIR Left 2010  HX PROSTATECTOMY  2012  HX TURP  2012 FOR BPH  VASCULAR SURGERY PROCEDURE UNLIST Left 10/26/2017 AVF  VASCULAR SURGERY PROCEDURE UNLIST Left 09/18/2019 Ligation of left brachiobasilic fistula Family History:  
Problem Relation Age of Onset  Diabetes Mother  Stroke Mother  Hypertension Mother  No Known Problems Father  Diabetes Sister  Diabetes Brother  Diabetes Sister  Diabetes Sister  Other Sister   
     fibromyalgia Social History Socioeconomic History  Marital status:  Spouse name: Not on file  Number of children: Not on file  Years of education: Not on file  Highest education level: Not on file Occupational History  Not on file Social Needs  Financial resource strain: Not on file  Food insecurity Worry: Not on file Inability: Not on file  Transportation needs Medical: Not on file Non-medical: Not on file Tobacco Use  Smoking status: Former Smoker Packs/day: 2.00 Years: 20.00 Pack years: 40.00 Last attempt to quit:  Years since quittin.6  Smokeless tobacco: Current User Substance and Sexual Activity  Alcohol use: No  
 Drug use: Never  Sexual activity: Not on file Lifestyle  Physical activity Days per week: Not on file Minutes per session: Not on file  Stress: Not on file Relationships  Social connections Talks on phone: Not on file Gets together: Not on file Attends Faith service: Not on file Active member of club or organization: Not on file Attends meetings of clubs or organizations: Not on file Relationship status: Not on file  Intimate partner violence Fear of current or ex partner: Not on file Emotionally abused: Not on file Physically abused: Not on file Forced sexual activity: Not on file Other Topics Concern  Not on file Social History Narrative  Not on file ALLERGIES: Patient has no known allergies. Review of Systems Constitutional: Negative for chills and fever. Gastrointestinal: Negative for nausea and vomiting. Psychiatric/Behavioral: Negative for agitation and confusion. Vitals:  
 08/05/20 2033 08/05/20 2125 BP: 164/78 170/82 Pulse: 64 63 Resp: 16 Temp: 98.2 °F (36.8 °C) SpO2: 95% 100% Weight: 90.7 kg (200 lb) Height: 6' (1.829 m) Physical Exam 
Vitals signs and nursing note reviewed. Constitutional:   
   Appearance: Normal appearance. Pulmonary:  
   Effort: Pulmonary effort is normal.  
   Breath sounds: Normal breath sounds. Neurological:  
   General: No focal deficit present. Mental Status: He is alert and oriented to person, place, and time. Comments: Patient is fully awake and oriented. He was able to recall his sons and daughter-in-law's phone numbers without difficulty. MDM Number of Diagnoses or Management Options Diagnosis management comments: Please see the medical decision making as documented under the ED course notes. Procedures

## 2020-08-11 NOTE — PROGRESS NOTES
Community Care Team documentation for patient in [facility]    The information below provided by:Eb    PT Update:  Mod A bed mob, Max A for t/fs, UB self care Min A and LB Max A, Max A toileting        Nursing Update:compression wraps to BLE, has consult for our wound MD to assess LEs      Discharge Date:TBD      Assign to [address] include 234 beds at the [facility] [address]

## 2020-08-16 PROBLEM — T14.8XXA WOUND INFECTION: Status: ACTIVE | Noted: 2020-01-01

## 2020-08-16 PROBLEM — L08.9 WOUND INFECTION: Status: ACTIVE | Noted: 2020-01-01

## 2020-08-16 PROBLEM — N39.0 UTI (URINARY TRACT INFECTION): Status: ACTIVE | Noted: 2020-01-01

## 2020-08-16 NOTE — DISCHARGE INSTRUCTIONS
Patient Education     Patient Education        Urinary Tract Infections in Men: Care Instructions  Your Care Instructions     A urinary tract infection, or UTI, is a general term for an infection anywhere between the kidneys and the tip of the penis. UTIs can also be a result of a prostate problem. Most cause pain or burning when you urinate. Most UTIs are caused by bacteria and can be cured with antibiotics. It is important to complete your treatment so that the infection does not get worse. Follow-up care is a key part of your treatment and safety. Be sure to make and go to all appointments, and call your doctor if you are having problems. It's also a good idea to know your test results and keep a list of the medicines you take. How can you care for yourself at home? · Take your antibiotics as prescribed. Do not stop taking them just because you feel better. You need to take the full course of antibiotics. · Take your medicines exactly as prescribed. Your doctor may have prescribed a medicine, such as phenazopyridine (Pyridium), to help relieve pain when you urinate. This turns your urine orange. You may stop taking it when your symptoms get better. But be sure to take all of your antibiotics, which treat the infection. · Drink extra water for the next day or two. This will help make the urine less concentrated and help wash out the bacteria causing the infection. (If you have kidney, heart, or liver disease and have to limit your fluids, talk with your doctor before you increase your fluid intake.)  · Avoid drinks that are carbonated or have caffeine. They can irritate the bladder. · Urinate often. Try to empty your bladder each time. · To relieve pain, take a hot bath or lay a heating pad (set on low) over your lower belly or genital area. Never go to sleep with a heating pad in place. To help prevent UTIs  · Drink plenty of fluids, enough so that your urine is light yellow or clear like water.  If you have kidney, heart, or liver disease and have to limit fluids, talk with your doctor before you increase the amount of fluids you drink. · Urinate when you have the urge. Do not hold your urine for a long time. Urinate before you go to sleep. · Keep your penis clean. Catheter care  If you have a drainage tube (catheter) in place, the following steps will help you care for it. · Always wash your hands before and after touching your catheter. · Check the area around the urethra for inflammation or signs of infection. Signs of infection include irritated, swollen, red, or tender skin, or pus around the catheter. · Clean the area around the catheter with soap and water two times a day. Dry with a clean towel afterward. · Do not apply powder or lotion to the skin around the catheter. To empty the urine collection bag   · Wash your hands with soap and water. · Without touching the drain spout, remove the spout from its sleeve at the bottom of the collection bag. Open the valve on the spout. · Let the urine flow out of the bag and into the toilet or a container. Do not let the tubing or drain spout touch anything. · After you empty the bag, clean the end of the drain spout with tissue and water. Close the valve and put the drain spout back into its sleeve at the bottom of the collection bag. · Wash your hands with soap and water. When should you call for help? Call your doctor now or seek immediate medical care if:  · Symptoms such as a fever, chills, nausea, or vomiting get worse or happen for the first time. · You have new pain in your back just below your rib cage. This is called flank pain. · There is new blood or pus in your urine. · You are not able to take or keep down your antibiotics. Watch closely for changes in your health, and be sure to contact your doctor if:  · You are not getting better after taking an antibiotic for 2 days. · Your symptoms go away but then come back.   Where can you learn more? Go to http://svetlana-glenis.info/  Enter N068 in the search box to learn more about \"Urinary Tract Infections in Men: Care Instructions. \"  Current as of: August 22, 2019               Content Version: 12.5  © 9292-8386 [employer]. Care instructions adapted under license by [employer] (which disclaims liability or warranty for this information). If you have questions about a medical condition or this instruction, always ask your healthcare professional. [address] any warranty or liability for your use of this information. Wound Care: After Your Visit  Your Care Instructions  Taking good care of your wound at home will help it heal quickly and reduce your chance of infection. The doctor has checked you carefully, but problems can develop later. If you notice any problems or new symptoms, get medical treatment right away. Follow-up care is a key part of your treatment and safety. Be sure to make and go to all appointments, and call your doctor if you are having problems. It's also a good idea to know your test results and keep a list of the medicines you take. How can you care for yourself at home? · Clean the area with soap and water 2 times a day unless your doctor gives you different instructions. Don't use hydrogen peroxide or alcohol, which can slow healing. ¨ You may cover the wound with a thin layer of antibiotic ointment, such as bacitracin, and a nonstick bandage. ¨ Apply more ointment and replace the bandage as needed. · Take pain medicines exactly as directed. Some pain is normal with a wound, but do not ignore pain that is getting worse instead of better. You could have an infection. ¨ If the doctor gave you a prescription medicine for pain, take it as prescribed. ¨ If you are not taking a prescription pain medicine, ask your doctor if you can take an over-the-counter medicine.   · Your doctor may have closed your wound with stitches (sutures), staples, or skin glue. ¨ If you have stitches, your doctor may remove them after several days to 2 weeks. Or you may have stitches that dissolve on their own. ¨ If you have staples, your doctor may remove them after 7 to 10 days. ¨ If your wound was closed with skin glue, the glue will wear off in a few days to 2 weeks. When should you call for help? Call your doctor now or seek immediate medical care if:  · You have signs of infection, such as:  ¨ Increased pain, swelling, warmth, or redness near the wound. ¨ Red streaks leading from the wound. ¨ Pus draining from the wound. ¨ A fever. · You bleed so much from your incision that you soak one or more bandages over 2 to 4 hours. Watch closely for changes in your health, and be sure to contact your doctor if:  · The wound is not getting better each day. Where can you learn more? Go to [employer]Renew.be  Enter M973 in the search box to learn more about \"Wound Care: After Your Visit. \"   © 6968-8586 [employer]. Care instructions adapted under license by [employer] (which disclaims liability or warranty for this information). This care instruction is for use with your licensed healthcare professional. If you have questions about a medical condition or this instruction, always ask your healthcare professional. [place] [address] any warranty or liability for your use of this information. Content Version: 01.7.689945;  Last Revised: April 23, 2012

## 2020-08-16 NOTE — ED PROVIDER NOTES
Per nurse's notes: \"Pt arrives via EMS. EMS called out for High BGL by home health RN. BGL over 600, for fire dept and EMS . Home health reports slightly AMS, and a large skin tear from left knee to ankle that happened today when RN took pants off. Sterile burn sheet wrapped around wound by EMS reports large amount of drainage. Pt  warm to touch, oral and axillary temp 98. Pt smells of foul urine. \" The history is provided by the patient and the EMS personnel. The history is limited by the condition of the patient. Altered mental status This is a recurrent problem. The current episode started 6 to 12 hours ago. The problem has not changed since onset. Associated symptoms include weakness. Pertinent negatives include no somnolence and no unresponsiveness. Mental status baseline is normal.  His past medical history is significant for diabetes, hypertension and heart disease. His past medical history does not include seizures, dementia or psychotropic medication treatment. Skin Problem Past Medical History:  
Diagnosis Date  Acute renal failure superimposed on stage 3 chronic kidney disease ([facility] 9/21/2016  Anemia   
 pt states he receives iron infusions  Arthritis OA generalized  Chronic kidney disease   
 not on HD- surgery done for access and fistula being removed. Per patient, no plans to proceed with dialysis  Chronic pancreatitis ([address] 9/22/2016  Dermatophytosis of nail 12/6/2016  Diabetic neuropathy ([facility] 9/22/2016  
 insulin sliding scale only- no oral meds- avg fastings avg fasting \"low 200's;  hypo @ 80 A1C 7.1 9/2019 per patient  Essential hypertension 9/22/2016  
 medication  GERD (gastroesophageal reflux disease)   
 controlled with med  Hypercholesterolemia  Iron (Fe) deficiency anemia 9/22/2016  Septicemia due to Klebsiella pneumoniae ([address] 9/22/2016  Systolic CHF, chronic ([facility] 9/23/2016 ECHO 4/2018 EF- 60-65%  Type II diabetes mellitus with nephropathy ([facility] 2016  Venous insufficiency 2016  Xerosis cutis 2016 Past Surgical History:  
Procedure Laterality Date  HX COLONOSCOPY    
 HX HERNIA REPAIR Left 2010  HX PROSTATECTOMY  2012  HX TURP  2012 FOR BPH  VASCULAR SURGERY PROCEDURE UNLIST Left 10/26/2017 AVF  VASCULAR SURGERY PROCEDURE UNLIST Left 2019 Ligation of left brachiobasilic fistula Family History:  
Problem Relation Age of Onset  Diabetes Mother  Stroke Mother  Hypertension Mother  No Known Problems Father  Diabetes Sister  Diabetes Brother  Diabetes Sister  Diabetes Sister  Other Sister   
     fibromyalgia Social History Socioeconomic History  Marital status:  Spouse name: Not on file  Number of children: Not on file  Years of education: Not on file  Highest education level: Not on file Occupational History  Not on file Social Needs  Financial resource strain: Not on file  Food insecurity Worry: Not on file Inability: Not on file  Transportation needs Medical: Not on file Non-medical: Not on file Tobacco Use  Smoking status: Former Smoker Packs/day: 2.00 Years: 20.00 Pack years: 40.00 Last attempt to quit:  Years since quittin.6  Smokeless tobacco: Current User Substance and Sexual Activity  Alcohol use: No  
 Drug use: Never  Sexual activity: Not on file Lifestyle  Physical activity Days per week: Not on file Minutes per session: Not on file  Stress: Not on file Relationships  Social connections Talks on phone: Not on file Gets together: Not on file Attends [employer] service: Not on file Active member of club or organization: Not on file Attends meetings of clubs or organizations: Not on file Relationship status: Not on file  Intimate partner violence Fear of current or ex partner: Not on file Emotionally abused: Not on file Physically abused: Not on file Forced sexual activity: Not on file Other Topics Concern  Not on file Social History Narrative  Not on file ALLERGIES: Patient has no known allergies. Review of Systems Constitutional: Negative for chills and fever. Gastrointestinal: Negative for abdominal pain. Skin: Positive for wound. Neurological: Positive for weakness. All other systems reviewed and are negative. Vitals:  
 08/16/20 1303 BP: 161/76 Pulse: 97 Resp: 18 Temp: 98.7 °F (37.1 °C) SpO2: 100% Weight: 90.7 kg (200 lb) Height: 6' (1.829 m) Physical Exam 
Vitals signs and nursing note reviewed. Constitutional:   
   General: He is not in acute distress. Appearance: He is well-developed. HENT:  
   Head: Normocephalic and atraumatic. Right Ear: External ear normal.  
   Left Ear: External ear normal.  
Eyes:  
   Extraocular Movements: Extraocular movements intact. Conjunctiva/sclera: Conjunctivae normal.  
   Pupils: Pupils are equal, round, and reactive to light. Neck: Musculoskeletal: Normal range of motion and neck supple. Cardiovascular:  
   Rate and Rhythm: Normal rate and regular rhythm. Heart sounds: Normal heart sounds. No murmur. Pulmonary:  
   Effort: Pulmonary effort is normal.  
   Breath sounds: Normal breath sounds. No wheezing, rhonchi or rales. Abdominal:  
   General: Bowel sounds are normal.  
   Palpations: Abdomen is soft. Tenderness: There is no abdominal tenderness. Musculoskeletal: Normal range of motion. Skin: 
   General: Skin is warm and dry. Capillary Refill: Capillary refill takes less than 2 seconds. Neurological:  
   Mental Status: He is alert and oriented to person, place, and time. Sensory: Sensation is intact. Motor: Motor function is intact. Psychiatric:     
   Mood and Affect: Mood and affect normal.     
   Speech: Speech normal.     
   Behavior: Behavior normal.     
   Cognition and Memory: Cognition and memory normal.  
 
  
 
 
 
MDM Number of Diagnoses or Management Options Acute kidney injury superimposed on chronic kidney disease [facility]): new and requires workup Avulsion of skin of lower leg, initial encounter: new and requires workup Chronic venous stasis dermatitis: new and requires workup Urinary tract infection without hematuria, site unspecified: new and requires workup Amount and/or Complexity of Data Reviewed Clinical lab tests: ordered and reviewed Review and summarize past medical records: yes Discuss the patient with other providers: yes Independent visualization of images, tracings, or specimens: yes Risk of Complications, Morbidity, and/or Mortality Presenting problems: high Diagnostic procedures: moderate Management options: moderate Patient Progress Patient progress: stable ED Course as of Aug 16 1642 Sun Aug 16, 2020  
1639 Patient is alert and oriented x3. I asked him specifically whether he would like us to dialyze him to make him better which he adamantly refused. I asked him if he wanted us to give him antibiotics for his infection which he was fine with. I warned the patient that refusing dialysis will most likely lead to his death in the near future, and he stated he was fine with that. Will discuss the case with the hospitalist.  
 [BB] ED Course User Index [BB] Macy Delgado MD  
 
 
Procedures The patient was observed in the ED. the patient's condition including his renal failure, urinary tract infection, and concern over the large area of avulsed skin were discussed with the hospitalist as well as the patient. Patient on my initial conversations stated that he would be treated for the infection but he did not want dialysis.   When the hospitalist came to see the patient he refused all care and wanted to return home. I discussed these findings with the patient's daughter who stated he could be quite stubborn at times and wanted to agree with his decision not to pursue further care. I explained to the patient that his refusal of care would lead to his death, which he stated he was fine with. Patient be discharged [address] with the understanding that his refusal of care could lead to severe infection, loss of life or limb. Results Reviewed: 
 
 
Recent Results (from the past 24 hour(s)) CBC WITH AUTOMATED DIFF Collection Time: 08/16/20  1:22 PM  
Result Value Ref Range WBC 8.8 4.3 - 11.1 K/uL  
 RBC 3.14 (L) 4.23 - 5.6 M/uL HGB 8.0 (L) 13.6 - 17.2 g/dL HCT 26.3 (L) 41.1 - 50.3 % MCV 83.8 79.6 - 97.8 FL  
 MCH 25.5 (L) 26.1 - 32.9 PG  
 MCHC 30.4 (L) 31.4 - 35.0 g/dL  
 RDW 17.2 (H) 11.9 - 14.6 % PLATELET 254 173 - 592 K/uL MPV 13.7 (H) 9.4 - 12.3 FL ABSOLUTE NRBC 0.00 0.0 - 0.2 K/uL  
 DF AUTOMATED NEUTROPHILS 80 (H) 43 - 78 % LYMPHOCYTES 11 (L) 13 - 44 % MONOCYTES 8 4.0 - 12.0 % EOSINOPHILS 1 0.5 - 7.8 % BASOPHILS 0 0.0 - 2.0 % IMMATURE GRANULOCYTES 0 0.0 - 5.0 %  
 ABS. NEUTROPHILS 7.1 1.7 - 8.2 K/UL  
 ABS. LYMPHOCYTES 1.0 0.5 - 4.6 K/UL  
 ABS. MONOCYTES 0.7 0.1 - 1.3 K/UL  
 ABS. EOSINOPHILS 0.1 0.0 - 0.8 K/UL  
 ABS. BASOPHILS 0.0 0.0 - 0.2 K/UL  
 ABS. IMM. GRANS. 0.0 0.0 - 0.5 K/UL METABOLIC PANEL, COMPREHENSIVE Collection Time: 08/16/20  1:22 PM  
Result Value Ref Range Sodium 154 (H) 136 - 145 mmol/L Potassium 6.0 (H) 3.5 - 5.1 mmol/L Chloride 124 (H) 98 - 107 mmol/L  
 CO2 16 (L) 21 - 32 mmol/L Anion gap 14 7 - 16 mmol/L Glucose 244 (H) 65 - 100 mg/dL  (H) 8 - 23 MG/DL Creatinine 10.40 (H) 0.8 - 1.5 MG/DL  
 GFR est AA 6 (L) >60 ml/min/1.73m2 GFR est non-AA 5 (L) >60 ml/min/1.73m2 Calcium 6.4 (L) 8.3 - 10.4 MG/DL  Bilirubin, total 0.6 0.2 - 1.1 MG/DL  
 ALT (SGPT) 17 12 - 65 U/L  
 AST (SGOT) 60 (H) 15 - 37 U/L Alk. phosphatase 81 50 - 136 U/L Protein, total 7.3 6.3 - 8.2 g/dL Albumin 2.3 (L) 3.2 - 4.6 g/dL Globulin 5.0 (H) 2.3 - 3.5 g/dL A-G Ratio 0.5 (L) 1.2 - 3.5 URINALYSIS W/ RFLX MICROSCOPIC Collection Time: 08/16/20  2:17 PM  
Result Value Ref Range Color YELLOW Appearance TURBID Specific gravity 1.011 1.001 - 1.023    
 pH (UA) 5.0 5.0 - 9.0 Protein 100 (A) NEG mg/dL Glucose 100 mg/dL Ketone Negative NEG mg/dL Bilirubin Negative NEG Blood MODERATE (A) NEG Urobilinogen 0.2 0.2 - 1.0 EU/dL Nitrites Negative NEG Leukocyte Esterase LARGE (A) NEG    
 WBC >100 0 /hpf  
 RBC 5-10 0 /hpf Epithelial cells 0-3 0 /hpf Bacteria 4+ (H) 0 /hpf METABOLIC PANEL, COMPREHENSIVE Collection Time: 08/16/20  2:50 PM  
Result Value Ref Range Sodium 158 (H) 136 - 145 mmol/L Potassium 4.0 3.5 - 5.1 mmol/L Chloride 127 (H) 98 - 107 mmol/L  
 CO2 15 (L) 21 - 32 mmol/L Anion gap 16 7 - 16 mmol/L Glucose 217 (H) 65 - 100 mg/dL  (H) 8 - 23 MG/DL Creatinine 10.10 (H) 0.8 - 1.5 MG/DL  
 GFR est AA 6 (L) >60 ml/min/1.73m2 GFR est non-AA 5 (L) >60 ml/min/1.73m2 Calcium 6.4 (L) 8.3 - 10.4 MG/DL Bilirubin, total 0.3 0.2 - 1.1 MG/DL  
 ALT (SGPT) 9 (L) 12 - 65 U/L  
 AST (SGOT) 16 15 - 37 U/L Alk. phosphatase 78 50 - 136 U/L Protein, total 6.5 6.3 - 8.2 g/dL Albumin 2.3 (L) 3.2 - 4.6 g/dL Globulin 4.2 (H) 2.3 - 3.5 g/dL A-G Ratio 0.5 (L) 1.2 - 3.5

## 2020-08-16 NOTE — ED TRIAGE NOTES
Pt arrives via EMS. EMS called out for High BGL by home health RN. BGL over 600, for fire dept and EMS . Home health reports slightly AMS, and a large skin tear from left knee to ankle that happened today when RN took pants off. Sterile burn sheet wrapped around wound by EMS reports large amount of drainage. Pt  warm to touch, oral and axillary temp 98. Pt smells of foul urine.

## 2020-08-17 NOTE — PROGRESS NOTES
TRANSFER - IN REPORT: 
 
Verbal report received from Leanna Barbour RN on [address]  being received from ER  for routine progression of care Report consisted of patients Situation, Background, Assessment and  
Recommendations(SBAR). Information from the following report(s) SBAR, ED Summary and MAR was reviewed with the receiving nurse. Opportunity for questions and clarification was provided. Assessment completed upon patients arrival to unit and care assumed.

## 2020-08-17 NOTE — CONSULTS
Palliative Care Patient: Renetta Heath MRN: [id number]  SSN: [id number] YOB: 1940  Age: 79 y.o. Sex: male Date of Request: 8/17/2020 Date of Consult:  8/17/2020 Reason for Consult:  goals of care and medical decision making Requesting Physician: Dr. Trudy James Assessment/Plan:  
 
Principal Diagnosis:   
Debility, Unspecified  R53.81 Additional Diagnoses: · Frailty  R54 
· Pain, limb  M79.609 Palliative Performance Scale (PPS): 
  
 
Medical Decision Making:  
Reviewed and summarized labs and imaging. Met with pt at bedside. Asked  Pt to explain everything he has heard since admission. Pt notes his kidneys are not doing well and he feels fatigued. I asked about dialysis and pt stated he does not wish to pursue dialysis. He understands he kidneys are in bad shape but states he has heard this before and pulled through. I discussed code status with pt. Explained that with DNI he would not be intubated however would undergo chest compressions, shock, and medications that would not likely be helpful given the state of his renal function. Pt stated that this made since however he wished to consider this more. Pt notes ongoing chronic left shoulder pain. Will start dilaudid 0.2 mg iv q 4hr prn pain. Has ultram prn currently. Will follow and readdress goals after pt has time to consider. Will discuss findings with members of the interdisciplinary team.   
 
Thank you for this referral.    
 
 
Subjective:  
 
History obtained from:  Patient and Chart Chief Complaint: shoulder pain History of Present Illness:  \"79year-old  can male patient who lives alone at home chronic debility -says walks with the help of a cane, chronic right lower extremity wounds, end-stage renal disorder does not want to be on dialysis, hypertension, hyperlipidemia, acid reflux, diabetic neuropathy, osteoarthritis, anemia of chronic disease and type 2 diabetes mellitus on insulin. 
  
When home health nurse went to see the patient found to have sugars of 600, fire department and EMS was called, home health nurse also noticed that patient was likely having altered mental state, also noticed large skin tear from left knee and left ankle which is new as per triage note. \" Advance Directive: No      
Code Status:  Partial Code Health Care Power of : No - Patient does not have a [address]. Past Medical History:  
Diagnosis Date  Acute renal failure superimposed on stage 3 chronic kidney disease ([address] 9/21/2016  Anemia   
 pt states he receives iron infusions  Arthritis OA generalized  Chronic kidney disease   
 not on HD- surgery done for access and fistula being removed. Per patient, no plans to proceed with dialysis  Chronic pancreatitis ([address] 9/22/2016  Dermatophytosis of nail 12/6/2016  Diabetic neuropathy ([facility] 9/22/2016  
 insulin sliding scale only- no oral meds- avg fastings avg fasting \"low 200's;  hypo @ 80 A1C 7.1 9/2019 per patient  Essential hypertension 9/22/2016  
 medication  GERD (gastroesophageal reflux disease)   
 controlled with med  Hypercholesterolemia  Iron (Fe) deficiency anemia 9/22/2016  Septicemia due to Klebsiella pneumoniae ([address] 9/22/2016  Systolic CHF, chronic ([address] 9/23/2016 ECHO 4/2018 EF- 60-65%  Type II diabetes mellitus with nephropathy ([address] 09/22/2016  Venous insufficiency 12/6/2016  Xerosis cutis 12/6/2016 Past Surgical History:  
Procedure Laterality Date  HX COLONOSCOPY    
 HX HERNIA REPAIR Left 2010  HX PROSTATECTOMY  2012  HX TURP  2012 FOR BPH  VASCULAR SURGERY PROCEDURE UNLIST Left 10/26/2017 AVF  VASCULAR SURGERY PROCEDURE UNLIST Left 09/18/2019 Ligation of left brachiobasilic fistula Family History Problem Relation Age of Onset  Diabetes Mother  Stroke Mother  Hypertension Mother  No Known Problems Father  Diabetes Sister  Diabetes Brother  Diabetes Sister  Diabetes Sister  Other Sister   
     fibromyalgia Social History Tobacco Use  Smoking status: Former Smoker Packs/day: 2.00 Years: 20.00 Pack years: 40.00 Last attempt to quit:  Years since quittin.6  Smokeless tobacco: Current User Substance Use Topics  Alcohol use: No  
 
Prior to Admission medications Medication Sig Start Date End Date Taking? Authorizing Provider  
pregabalin (Lyrica) 150 mg capsule Take 150 mg by mouth two (2) times a day. Yes Provider, Historical  
ciprofloxacin HCl (CIPRO) 250 mg tablet Take 1 Tab by mouth two (2) times a day for 10 days. 20 Yes Ricky Bland MD  
insulin lispro (HUMALOG) 100 unit/mL injection INITIATE INSULIN CORRECTIVE PROTOCOL: 
INITIATE INSULIN CORRECTIVE PROTOCOL: 
Normal Insulin Sensitivity For Blood Sugar (mg/dL) of:    
Less than 150 =   0 units 150 -199 =   2 units 200 -249 =   4 units 250 -299 =   6 units 300 -349 =   8 units 350 and above = 10 units and Call Physician Initiate Hypoglycemia protocol if blood glucose is <70 mg/dL Fast Acting - Administer Immediately - or within 15 minutes of start of meal, if mealtime coverage. 20  Yes Gwen Burns, DO  
insulin glargine (LANTUS) 100 unit/mL injection 15 Units by SubCUTAneous route nightly. 20  Yes Gwen Burns, DO  
calcium acetate, phosphate binder, (PHOSLO) 667 mg tab tablet Take 2 Tabs by mouth three (3) times daily (with meals). 20  Yes Gwen Burns, DO  
ferrous sulfate 325 mg (65 mg iron) tablet Take 1 Tab by mouth two (2) times daily (with meals). 20  Yes Vy Burns, DO  
multivit-minerals/folic acid (SPECTRAVITE ADULT PO) Take 1 Tab by mouth daily.    Yes Provider, Historical  
cloNIDine (Catapres-TTS-3) 0.3 mg/24 hr 1 Patch by TransDERmal route every seven (7) days. Change every thursday   Yes Rosana Felix NP  
glucose 4 gram chewable tablet Take 15 g by mouth as needed for Other (low blood sugar). Yes Provider, Historical  
Lactoperoxi/Gluc Oxid/Pot Thio (BIOTENE DRY MOUTH MM) Take 2 Sprays by mouth as needed for Other (dry mouth). Yes Provider, Historical  
dicyclomine (BENTYL) 10 mg capsule Take 10 mg by mouth two (2) times a day. Yes Provider, Historical  
ondansetron (ZOFRAN ODT) 4 mg disintegrating tablet Take 1 Tab by mouth every eight (8) hours as needed for Nausea. 11/9/19  Yes Marissa Duque MD  
colestipol (COLESTID) 1 gram tablet Take 1 g by mouth two (2) times a day. Yes Provider, Historical  
sodium bicarbonate 650 mg tablet Take 650 mg by mouth three (3) times daily. Yes Provider, Historical  
lidocaine 4 % patch Cut to appropriate size. Apply to intact skin for 12 hours, remove for 12 hours. Patient taking differently: 1 Patch by TransDERmal route as needed. Cut to appropriate size. Apply to intact skin for 12 hours, remove for 12 hours. States uses only occasionally and not on today 9/11/2019 Do not take morning of procedure. 7/12/19  Yes JAMARCUS Mckenzie  
hydrALAZINE (APRESOLINE) 100 mg tablet Take 1 Tab by mouth three (3) times daily. 4/19/18  Yes Zenaida Joy MD  
pravastatin (PRAVACHOL) 40 mg tablet Take 1 Tab by mouth nightly. 4/4/18  Yes Agnes Nielsen MD  
calcifediol (RAYALDEE) 30 mcg Cs24 Take 30 mcg by mouth nightly. Yes Provider, Historical  
amLODIPine (NORVASC) 10 mg tablet Take 10 mg by mouth every morning. Yes Provider, Historical  
metoprolol succinate (TOPROL-XL) 50 mg XL tablet Take 50 mg by mouth every morning. Yes Provider, Historical  
omeprazole (PRILOSEC) 20 mg capsule Take 20 mg by mouth every morning. Yes Provider, Historical  
oxymetazoline (AFRIN) 0.05 % nasal spray 2 Sprays by Both Nostrils route once as needed for Congestion.  11/13/19   Provider, Historical  
 lipase-protease-amylase (CREON) 36,000-114,000- 180,000 unit cpDR capsule Take 3,600 Units by mouth See Admin Instructions. Take 2 capsules by mouth with each meal, and 1 capsule by mouth with each snack. Nesha Cui MD  
acetaminophen (TYLENOL) 325 mg tablet Take 2 Tabs by mouth every four (4) hours as needed for Pain. 11/9/19   Alo Salvador MD  
pantothenic ac-min oil-pet,hyd (AQUAPHOR) 41 % ointment Apply  to affected area daily. Apply over legs 11/10/19   Alo Salvador MD  
 
 
No Known Allergies Review of systems negative with exception of noted above Objective:  
 
Visit Vitals /70 Pulse (!) 104 Temp 97.8 °F (36.6 °C) Resp 18 Ht 6' (1.829 m) Wt 153 lb 4.8 oz (69.5 kg) SpO2 99% BMI 20.79 kg/m² Physical Exam: 
 
General:  Cooperative. No acute distress. Eyes:  Conjunctivae/corneas clear Nose: Nares normal. Septum midline. Neck: Supple, symmetrical, trachea midline, no JVD Lungs:   Clear to auscultation bilaterally, unlabored Heart:  Regular rate and rhythm, no murmur Abdomen:   Soft, non-tender, non-distended. Positive bowel sounds Extremities: Normal, atraumatic, no cyanosis or edema Skin: Skin color, texture, turgor normal. No rash or lesions. Neurologic: Nonfocal  
Psych: Alert and oriented Assessment:  
 
Hospital Problems  Date Reviewed: 7/23/2020 Codes Class Noted POA  
 UTI (urinary tract infection) ICD-10-CM: N39.0 ICD-9-CM: 599.0  8/16/2020 Unknown Wound infection ICD-10-CM: T14. 8XXA, L08.9 ICD-9-CM: 958.3  8/16/2020 Unknown Overview Signed 8/16/2020  8:07 PM by Elías Che MD  
  Rt leg ESRD (end stage renal disease) ([facility] ICD-10-CM: N18.6 ICD-9-CM: 585.6  11/3/2019 Yes Hypernatremia ICD-10-CM: E87.0 ICD-9-CM: 276.0  4/18/2018 Yes * (Principal) Acute metabolic encephalopathy KIP-[id number]: G93.41 
ICD-9-CM: 348.31  4/13/2018 Unknown Uncontrolled diabetes mellitus, with long-term current use of insulin (HCC) (Chronic) ICD-10-CM: E11.65, Z79.4 ICD-9-CM: 250.02, V58.67  2/28/2018 Yes Venous stasis ulcer of left lower extremity ([facility] ICD-10-CM: Y50.501, R52.747 ICD-9-CM: 454.0  4/1/2017 Yes HTN (hypertension) ICD-10-CM: I10 
ICD-9-CM: 401.9  9/22/2016 Yes Chronic pancreatitis (HCC) (Chronic) ICD-10-CM: K86.1 ICD-9-CM: 822.7  9/22/2016 Yes Signed By: Alber Thompson MD   
 August 17, 2020

## 2020-08-17 NOTE — ED NOTES
TRANSFER - OUT REPORT: 
 
Verbal report given to Heriberto Hernandez (name) on Nico Patterson  being transferred to [id number] (unit) for routine progression of care Report consisted of patients Situation, Background, Assessment and  
Recommendations(SBAR). Information from the following report(s) SBAR, ED Summary, [facility] and Recent Results was reviewed with the receiving nurse. Lines:  
Peripheral IV 08/16/20 Right Wrist (Active) Site Assessment Clean, dry, & intact 08/16/20 1325 Phlebitis Assessment 0 08/16/20 1325 Infiltration Assessment 0 08/16/20 1325 Dressing Status Clean, dry, & intact 08/16/20 1325 Hub Color/Line Status Pink 08/16/20 1325 Action Taken Blood drawn 08/16/20 1325 Opportunity for questions and clarification was provided. Patient transported with: 
 transport

## 2020-08-17 NOTE — PROGRESS NOTES
Order received, chart reviewed, evaluation attempted; MD in with pt. Will follow up as schedule/ pt's status allows.  
 
Echo Woodson, OT

## 2020-08-17 NOTE — PROGRESS NOTES
08/16/20 2028 LUE Peripheral Vascular Patency Assessment Absent thrill;Bruit Site Assessment Clean Arteriovenous Access Yes Dual Skin Pressure Injury Assessment Dual Skin Pressure Injury Assessment X Second Care Provider (Based on [address] [place] [address]) Jessica Pope, MARTINE Skin Integumentary Skin Integrity Tear;Abrasion; Wound (add Wound LDA); Blister 
(LLE, RUE, Abd x2, RLE) LLE wound dressed in ED, RLE kerlex dressing already in place. R knee blisters x2, allevyn placed. R Abd tears x2, allevyn placed. RUE tear, foam dressing/kerlex placed. Prophylactic allevyn placed on sacrum.
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
## 2020-08-17 NOTE — PROGRESS NOTES
Problem: Mobility Impaired (Adult and Pediatric) Goal: *Acute Goals and Plan of Care (Insert Text) Outcome: Progressing Towards Goal 
Note:  
 Mr. Wilberto Ring will perform supine to sit and sit to supine with supervision in 7 days. Mr. Wilberto Ring will perform sit to stand and bed to chair with supervision and rolling walker in 7 days. Mr. Wilberto Ring will perform gait with rolling walker 50 ft with cg in 7 days. PHYSICAL THERAPY: Initial Assessment and Daily Note 8/17/2020 INPATIENT: PT Visit Days : 1 Payor: Zahra Hagan / Plan: [employer] / Product Type: [employer] Medicare /   
  
NAME/AGE/GENDER: Kem Solorio is a 79 y.o. male PRIMARY DIAGNOSIS: UTI (urinary tract infection) [Y72.9] Acute metabolic encephalopathy Acute metabolic encephalopathy ICD-10: Treatment Diagnosis:  
 Generalized Muscle Weakness (M62.81) Difficulty in walking, Not elsewhere classified (R26.2) Repeated Falls (R29.6) History of falling (Z91.81) Precaution/Allergies: 
Patient has no known allergies. ASSESSMENT:  
 
Mr. Wilberto Ring presents in supine. Both his legs are wrapped and a strong odor present. He tells me he is here because he has decided to end his life. Mr. Wilberto Ring is aware his kidneys are not working well. Mr. Wilberto Ring tells me he was living by himself and admits to repeated falls. He states he had caregivers 5 days a week but could not recall how many hours. He lives in a trailer so he used a cane as a walker didn't fit. Today supine to sit with mod assist.  Rolling left and right for cleaning of bm with mod assist.  Sit to stand with mod assist of 2. Gait with rolling walker 5 ft with min assist of 2. All movement was slow and he was a bit stiff. Ms. Wilberto Ring is functioning below his baseline but unclear what the goals of care are as his Cr. Is above 10. He has said he is not going to do dialysis.   If this is the case he will continue to get weaker. Not sure if PT is appropriate at this time but until plan of care is clear goals were set and he is appropriate for skilled PT for now. He will not be able to return home alone. He will need 24 hour care. This section established at most recent assessment PROBLEM LIST (Impairments causing functional limitations): 
Decreased Strength Decreased Transfer Abilities Decreased Ambulation Ability/Technique INTERVENTIONS PLANNED: (Benefits and precautions of physical therapy have been discussed with the patient.) Bed Mobility Gait Training Therapeutic Activites Transfer Training TREATMENT PLAN: Frequency/Duration: 3 times a week for duration of hospital stay Rehabilitation Potential For Stated Goals: Fair REHAB RECOMMENDATIONS (at time of discharge pending progress):   
Placement: It is my opinion, based on this patient's performance to date, that Mr. Rosia Meigs may benefit from a skilled nursing facility if he opts to do dialysis. If he doesn't home or facility with 24 hour care. Equipment:  
None at this time HISTORY:  
History of Present Injury/Illness (Reason for Referral): 
69-year-old  can male patient who lives alone at home chronic debility -says walks with the help of a cane, chronic right lower extremity wounds, end-stage renal disorder does not want to be on dialysis, hypertension, hyperlipidemia, acid reflux, diabetic neuropathy, osteoarthritis, anemia of chronic disease and type 2 diabetes mellitus on insulin. When home health nurse went to see the patient found to have sugars of 600, fire department and EMS was called, home health nurse also noticed that patient was likely having altered mental state, also noticed large skin tear from left knee and left ankle which is new as per triage note. Patient otherwise does not complain of any headache or any dizziness or nausea vomiting or any abdominal pain. Does complain of generalized weakness mild to moderate intensity. In the emergency room he could not tell his son's name. When he looked at his daughter-in-law and said that he never met her. Discussed about dialysis and said he was 79 and at this point he does not want to be on dialysis. Discussed about end-of-life care in presence of son and daughter-in-law, says does not want to be put on a life support. Hemoglobin of 8, sodium of 158, CO2 of 15, glucose of 217, creatinine of 10, urinalysis shows large amount of leukocyte esterase WBCs greater than 100, bacteria 4+. Dressing to bilateral lower extremity legs foul-smelling odor. Patient would be admitted for acute metabolic encephalopathy probably secondary to UTI, bilateral lower extremity wounds, acute on chronic kidney disease stage V and generalized weakness. Past Medical History/Comorbidities:  
Mr. Ariadna Kulkarni  has a past medical history of Acute renal failure superimposed on stage 3 chronic kidney disease ([facility] [address] (9/21/2016), Anemia, Arthritis, Chronic kidney disease, Chronic pancreatitis ([facility] [address] (9/22/2016), Dermatophytosis of nail (12/6/2016), Diabetic neuropathy ([facility] [address] (9/22/2016), Essential hypertension (9/22/2016), GERD (gastroesophageal reflux disease), Hypercholesterolemia, Iron (Fe) deficiency anemia (9/22/2016), Septicemia due to Klebsiella pneumoniae ([facility] [address] (6/65/7495), Systolic CHF, chronic ([facility] [address] (9/23/2016), Type II diabetes mellitus with nephropathy ([facility] [address] (09/22/2016), Venous insufficiency (12/6/2016), and Xerosis cutis (12/6/2016). He also has no past medical history of Congestive heart failure, unspecified. Mr. Ariadna Kulkarni  has a past surgical history that includes hx hernia repair (Left, 2010); hx colonoscopy; hx turp (2012); hx prostatectomy (2012); vascular surgery procedure unlist (Left, 10/26/2017); and vascular surgery procedure unlist (Left, 09/18/2019). Social History/Living Environment:  
Home Environment: Trailer/mobile home # Steps to Enter: 0 One/Two Story Residence: One story Living Alone: Yes Support Systems: Child(sebastien) Patient Expects to be Discharged to[de-identified] Unknown Current DME Used/Available at Home: Cane, straight Prior Level of Function/Work/Activity: 
Used a cane. Lived alone with caregivers 5 days a week Age, CKD. Stage V Number of Personal Factors/Comorbidities that affect the Plan of Care: 3+: HIGH COMPLEXITY EXAMINATION:  
Most Recent Physical Functioning:  
Gross Assessment: 
AROM: Generally decreased, functional 
PROM: Generally decreased, functional 
Strength: Generally decreased, functional 
Sensation: Impaired Posture: 
Posture (WDL): Exceptions to [facility] Posture Assessment: Rounded shoulders, Forward head Balance: 
Sitting: Impaired Sitting - Static: Fair (occasional) Sitting - Dynamic: Poor (constant support) Standing: Impaired; With support Standing - Static: Fair Standing - Dynamic : Fair;Constant support Bed Mobility: 
Rolling: Moderate assistance Supine to Sit: Moderate assistance Scooting: Moderate assistance Wheelchair Mobility: 
  
Transfers: 
Sit to Stand: Minimum assistance Stand to Sit: Minimum assistance; Moderate assistance Gait: 
  
Base of Support: Narrowed Speed/Melva: Slow Step Length: Right shortened;Left shortened Distance (ft): 5 Feet (ft) Assistive Device: Walker, rolling;Gait belt Ambulation - Level of Assistance: Minimal assistance Body Structures Involved: Muscles Body Functions Affected: Movement Related Activities and Participation Affected: Mobility Number of elements that affect the Plan of Care: 3: MODERATE COMPLEXITY CLINICAL PRESENTATION:  
Presentation: Evolving clinical presentation with changing clinical characteristics: MODERATE COMPLEXITY CLINICAL DECISION MAKIN [facility] [address] [id number] AM-PAC 6 Clicks Basic Mobility Inpatient Short Form How much difficulty does the patient currently have. .. Unable A Lot A Little None 1.  Turning over in bed (including adjusting bedclothes, sheets and blankets)? [] 1   [x] 2   [] 3   [] 4  
2. Sitting down on and standing up from a chair with arms ( e.g., wheelchair, bedside commode, etc.)   [] 1   [x] 2   [] 3   [] 4  
3. Moving from lying on back to sitting on the side of the bed? [] 1   [x] 2   [] 3   [] 4 How much help from another person does the patient currently need. .. Total A Lot A Little None 4. Moving to and from a bed to a chair (including a wheelchair)? [] 1   [x] 2   [] 3   [] 4  
5. Need to walk in hospital room? [] 1   [x] 2   [] 3   [] 4  
6. Climbing 3-5 steps with a railing? [] 1   [x] 2   [] 3   [] 4  
© 2007, Trustees of [address] [place] [address], under license to [employer]. All rights reserved Score:  Initial: 12 Most Recent: X (Date: -- ) Interpretation of Tool:  Represents activities that are increasingly more difficult (i.e. Bed mobility, Transfers, Gait). Medical Necessity:    
Patient is expected to demonstrate progress in  
functional technique 
 to  
decrease assistance required with mobility and gait. . 
Reason for Services/Other Comments: 
Patient  
continues to require present interventions due to patient's inability to function at baseline. .  
Use of outcome tool(s) and clinical judgement create a POC that gives a: Questionable prediction of patient's progress: MODERATE COMPLEXITY  
  
 
 
 
TREATMENT:  
(In addition to Assessment/Re-Assessment sessions the following treatments were rendered) Pre-treatment Symptoms/Complaints:  none Pain: Initial:  
Pain Intensity 1: 4 Pain Location 1: Face Pain Intervention(s) 1: Emotional support  Post Session:  none Therapeutic Activity: (    15): Therapeutic activities including Bed transfers, Chair transfers, and Ambulation on level ground to improve mobility. Required moderate   to promote motor control of bilateral, upper extremity(s), lower extremity(s). Braces/Orthotics/Lines/Etc:  
O2 Device: Room air Treatment/Session Assessment:   
Response to Treatment:  fair Interdisciplinary Collaboration:  
Registered Nurse After treatment position/precautions:  
Up in chair Call light within reach RN notified Compliance with Program/Exercises: Will assess as treatment progresses Recommendations/Intent for next treatment session: \"Next visit will focus on advancements to more challenging activities and reduction in assistance provided\". Total Treatment Duration: PT Patient Time In/Time Out Time In: 1020 Time Out: 1041 Dat Vazquez, PT

## 2020-08-17 NOTE — H&P
Hospitalist H&P Note Admit Date:  2020 12:52 PM  
Name:  Lysbeth Hatchet Age:  79 y.o. 
:  1940 MRN:  [id number] PCP:  Krista Graves MD 
Treatment Team: Attending Provider: Gunner Perez MD; Primary Nurse: Elana Banks RN Elevated blood sugars, bilateral lower extremity wounds, confusion. HPI:  
26-year-old  can male patient who lives alone at home chronic debility -says walks with the help of a cane, chronic right lower extremity wounds, end-stage renal disorder does not want to be on dialysis, hypertension, hyperlipidemia, acid reflux, diabetic neuropathy, osteoarthritis, anemia of chronic disease and type 2 diabetes mellitus on insulin. When home health nurse went to see the patient found to have sugars of 600, fire department and EMS was called, home health nurse also noticed that patient was likely having altered mental state, also noticed large skin tear from left knee and left ankle which is new as per triage note. Patient otherwise does not complain of any headache or any dizziness or nausea vomiting or any abdominal pain. Does complain of generalized weakness mild to moderate intensity. In the emergency room he could not tell his son's name. When he looked at his daughter-in-law and said that he never met her. Discussed about dialysis and said he was 79 and at this point he does not want to be on dialysis. Discussed about end-of-life care in presence of son and daughter-in-law, says does not want to be put on a life support. Hemoglobin of 8, sodium of 158, CO2 of 15, glucose of 217, creatinine of 10, urinalysis shows large amount of leukocyte esterase WBCs greater than 100, bacteria 4+. Dressing to bilateral lower extremity legs foul-smelling odor.  
 
Patient would be admitted for acute metabolic encephalopathy probably secondary to UTI, bilateral lower extremity wounds, acute on chronic kidney disease stage V and generalized weakness. 10 systems reviewed and negative except as noted in HPI. Past Medical History:  
Diagnosis Date  Acute renal failure superimposed on stage 3 chronic kidney disease ([address] 2016  Anemia   
 pt states he receives iron infusions  Arthritis OA generalized  Chronic kidney disease   
 not on HD- surgery done for access and fistula being removed. Per patient, no plans to proceed with dialysis  Chronic pancreatitis ([facility] 2016  Dermatophytosis of nail 2016  Diabetic neuropathy ([facility] 2016  
 insulin sliding scale only- no oral meds- avg fastings avg fasting \"low 200's;  hypo @ 80 A1C 7.1 2019 per patient  Essential hypertension 2016  
 medication  GERD (gastroesophageal reflux disease)   
 controlled with med  Hypercholesterolemia  Iron (Fe) deficiency anemia 2016  Septicemia due to Klebsiella pneumoniae ([facility] 2016  Systolic CHF, chronic ([facility] 2016 ECHO 2018 EF- 60-65%  Type II diabetes mellitus with nephropathy ([facility] 2016  Venous insufficiency 2016  Xerosis cutis 2016 Past Surgical History:  
Procedure Laterality Date  HX COLONOSCOPY    
 HX HERNIA REPAIR Left 2010  HX PROSTATECTOMY  2012  HX TURP  2012 FOR BPH  VASCULAR SURGERY PROCEDURE UNLIST Left 10/26/2017 AVF  VASCULAR SURGERY PROCEDURE UNLIST Left 2019 Ligation of left brachiobasilic fistula No Known Allergies Social History Tobacco Use  Smoking status: Former Smoker Packs/day: 2.00 Years: 20.00 Pack years: 40.00 Last attempt to quit:  Years since quittin.6  Smokeless tobacco: Current User Substance Use Topics  Alcohol use: No  
  
Family History Problem Relation Age of Onset  Diabetes Mother  Stroke Mother  Hypertension Mother  No Known Problems Father  Diabetes Sister  Diabetes Brother  Diabetes Sister  Diabetes Sister  Other Sister   
     fibromyalgia Immunization History Administered Date(s) Administered  Pneumococcal Polysaccharide (PPSV-23) 2012  TB Skin Test (PPD) Intradermal 2018, 2018, 2018, 2019, 2020 PTA Medications: 
Prior to Admission Medications Prescriptions Last Dose Informant Patient Reported? Taking? Lactoperoxi/Gluc Oxid/Pot Thio (BIOTENE DRY MOUTH MM)   Yes No  
Sig: Take 2 Sprays by mouth as needed for Other (dry mouth). acetaminophen (TYLENOL) 325 mg tablet   No No  
Sig: Take 2 Tabs by mouth every four (4) hours as needed for Pain. amLODIPine (NORVASC) 10 mg tablet   Yes No  
Sig: Take 10 mg by mouth every morning. calcifediol (RAYALDEE) 30 mcg Cs24   Yes No  
Sig: Take 30 mcg by mouth nightly. calcium acetate, phosphate binder, (PHOSLO) 667 mg tab tablet   No No  
Sig: Take 2 Tabs by mouth three (3) times daily (with meals). cloNIDine (Catapres-TTS-3) 0.3 mg/24 hr   Yes No  
Si Patch by TransDERmal route every seven (7) days. Change every thursday  
colestipol (COLESTID) 1 gram tablet   Yes No  
Sig: Take 1 g by mouth two (2) times a day. dicyclomine (BENTYL) 10 mg capsule   Yes No  
Sig: Take 10 mg by mouth two (2) times a day. ferrous sulfate 325 mg (65 mg iron) tablet   No No  
Sig: Take 1 Tab by mouth two (2) times daily (with meals). glucose 4 gram chewable tablet   Yes No  
Sig: Take 15 g by mouth as needed for Other (low blood sugar). hydrALAZINE (APRESOLINE) 100 mg tablet   No No  
Sig: Take 1 Tab by mouth three (3) times daily. insulin glargine (LANTUS) 100 unit/mL injection   No No  
Sig: 15 Units by SubCUTAneous route nightly. insulin lispro (HUMALOG) 100 unit/mL injection   No No  
Sig: INITIATE INSULIN CORRECTIVE PROTOCOL: 
INITIATE INSULIN CORRECTIVE PROTOCOL: 
Normal Insulin Sensitivity For Blood Sugar (mg/dL) of:    
Less than 150 =   0 units 150 -199 =   2 units 200 -249 =   4 units 250 -299 =   6 units 300 -349 =   8 units 350 and above = 10 units and Call Physician Initiate Hypoglycemia protocol if blood glucose is <70 mg/dL Fast Acting - Administer Immediately - or within 15 minutes of start of meal, if mealtime coverage. lidocaine 4 % patch   No No  
Sig: Cut to appropriate size. Apply to intact skin for 12 hours, remove for 12 hours. Patient taking differently: 1 Patch by TransDERmal route as needed. Cut to appropriate size. Apply to intact skin for 12 hours, remove for 12 hours. States uses only occasionally and not on today 2019 Do not take morning of procedure. lipase-protease-amylase (CREON) 36,000-114,000- 180,000 unit cpDR capsule   Yes No  
Sig: Take 3,600 Units by mouth See Admin Instructions. Take 2 capsules by mouth with each meal, and 1 capsule by mouth with each snack. metoprolol succinate (TOPROL-XL) 50 mg XL tablet   Yes No  
Sig: Take 50 mg by mouth every morning.  
multivit-minerals/folic acid (SPECTRAVITE ADULT PO)   Yes No  
Sig: Take 1 Tab by mouth daily. omeprazole (PRILOSEC) 20 mg capsule   Yes No  
Sig: Take 20 mg by mouth every morning. ondansetron (ZOFRAN ODT) 4 mg disintegrating tablet   No No  
Sig: Take 1 Tab by mouth every eight (8) hours as needed for Nausea. oxymetazoline (AFRIN) 0.05 % nasal spray   Yes No  
Si Sprays by Both Nostrils route once as needed for Congestion. pantothenic ac-min oil-pet,hyd (AQUAPHOR) 41 % ointment   No No  
Sig: Apply  to affected area daily. Apply over legs  
pravastatin (PRAVACHOL) 40 mg tablet   No No  
Sig: Take 1 Tab by mouth nightly.  
sodium bicarbonate 650 mg tablet   Yes No  
Sig: Take 650 mg by mouth three (3) times daily. Facility-Administered Medications: None Objective:  
 
Patient Vitals for the past 24 hrs: 
 Temp Pulse Resp BP SpO2  
20 98.9 °F (37.2 °C) 90 16 165/78 100 % 20 1740    177/77  08/16/20 1457 98.5 °F (36.9 °C)      
08/16/20 1303 98.7 °F (37.1 °C) 97 18 161/76 100 % Oxygen Therapy O2 Sat (%): 100 % (08/16/20 2001) Pulse via Oximetry: 91 beats per minute (08/16/20 2001) O2 Device: Room air (08/16/20 2001) No intake or output data in the 24 hours ending 08/16/20 2010 Physical Exam: 
General:    Well nourished. Alert. Oriented in place Eyes:   Normal sclera. Extraocular movements intact. ENT:  Normocephalic, atraumatic. Dry mucous membrane CV:   RRR. No murmur, rub, or gallop. Lungs:  CTAB. No wheezing, rhonchi, or rales. Abdomen: Soft, nontender, nondistended. Bowel sounds normal.  
Extremities: Moves all extremities 
neurologic: CN II-XII grossly intact. Moves all extremities Skin:       Bilateral lower extremity wounds presently wrapped with a dressing, foul-smelling odor Psych:  Normal mood and affect. I reviewed the labs Data Review:  
Recent Results (from the past 24 hour(s)) CBC WITH AUTOMATED DIFF Collection Time: 08/16/20  1:22 PM  
Result Value Ref Range WBC 8.8 4.3 - 11.1 K/uL  
 RBC 3.14 (L) 4.23 - 5.6 M/uL HGB 8.0 (L) 13.6 - 17.2 g/dL HCT 26.3 (L) 41.1 - 50.3 % MCV 83.8 79.6 - 97.8 FL  
 MCH 25.5 (L) 26.1 - 32.9 PG  
 MCHC 30.4 (L) 31.4 - 35.0 g/dL  
 RDW 17.2 (H) 11.9 - 14.6 % PLATELET 061 644 - 448 K/uL MPV 13.7 (H) 9.4 - 12.3 FL ABSOLUTE NRBC 0.00 0.0 - 0.2 K/uL  
 DF AUTOMATED NEUTROPHILS 80 (H) 43 - 78 % LYMPHOCYTES 11 (L) 13 - 44 % MONOCYTES 8 4.0 - 12.0 % EOSINOPHILS 1 0.5 - 7.8 % BASOPHILS 0 0.0 - 2.0 % IMMATURE GRANULOCYTES 0 0.0 - 5.0 %  
 ABS. NEUTROPHILS 7.1 1.7 - 8.2 K/UL  
 ABS. LYMPHOCYTES 1.0 0.5 - 4.6 K/UL  
 ABS. MONOCYTES 0.7 0.1 - 1.3 K/UL  
 ABS. EOSINOPHILS 0.1 0.0 - 0.8 K/UL  
 ABS. BASOPHILS 0.0 0.0 - 0.2 K/UL  
 ABS. IMM. GRANS. 0.0 0.0 - 0.5 K/UL METABOLIC PANEL, COMPREHENSIVE Collection Time: 08/16/20  1:22 PM  
Result Value Ref Range Sodium 154 (H) 136 - 145 mmol/L Potassium 6.0 (H) 3.5 - 5.1 mmol/L Chloride 124 (H) 98 - 107 mmol/L  
 CO2 16 (L) 21 - 32 mmol/L Anion gap 14 7 - 16 mmol/L Glucose 244 (H) 65 - 100 mg/dL  (H) 8 - 23 MG/DL Creatinine 10.40 (H) 0.8 - 1.5 MG/DL  
 GFR est AA 6 (L) >60 ml/min/1.73m2 GFR est non-AA 5 (L) >60 ml/min/1.73m2 Calcium 6.4 (L) 8.3 - 10.4 MG/DL Bilirubin, total 0.6 0.2 - 1.1 MG/DL  
 ALT (SGPT) 17 12 - 65 U/L  
 AST (SGOT) 60 (H) 15 - 37 U/L Alk. phosphatase 81 50 - 136 U/L Protein, total 7.3 6.3 - 8.2 g/dL Albumin 2.3 (L) 3.2 - 4.6 g/dL Globulin 5.0 (H) 2.3 - 3.5 g/dL A-G Ratio 0.5 (L) 1.2 - 3.5 URINALYSIS W/ RFLX MICROSCOPIC Collection Time: 08/16/20  2:17 PM  
Result Value Ref Range Color YELLOW Appearance TURBID Specific gravity 1.011 1.001 - 1.023    
 pH (UA) 5.0 5.0 - 9.0 Protein 100 (A) NEG mg/dL Glucose 100 mg/dL Ketone Negative NEG mg/dL Bilirubin Negative NEG Blood MODERATE (A) NEG Urobilinogen 0.2 0.2 - 1.0 EU/dL Nitrites Negative NEG Leukocyte Esterase LARGE (A) NEG    
 WBC >100 0 /hpf  
 RBC 5-10 0 /hpf Epithelial cells 0-3 0 /hpf Bacteria 4+ (H) 0 /hpf METABOLIC PANEL, COMPREHENSIVE Collection Time: 08/16/20  2:50 PM  
Result Value Ref Range Sodium 158 (H) 136 - 145 mmol/L Potassium 4.0 3.5 - 5.1 mmol/L Chloride 127 (H) 98 - 107 mmol/L  
 CO2 15 (L) 21 - 32 mmol/L Anion gap 16 7 - 16 mmol/L Glucose 217 (H) 65 - 100 mg/dL  (H) 8 - 23 MG/DL Creatinine 10.10 (H) 0.8 - 1.5 MG/DL  
 GFR est AA 6 (L) >60 ml/min/1.73m2 GFR est non-AA 5 (L) >60 ml/min/1.73m2 Calcium 6.4 (L) 8.3 - 10.4 MG/DL Bilirubin, total 0.3 0.2 - 1.1 MG/DL  
 ALT (SGPT) 9 (L) 12 - 65 U/L  
 AST (SGOT) 16 15 - 37 U/L Alk. phosphatase 78 50 - 136 U/L Protein, total 6.5 6.3 - 8.2 g/dL Albumin 2.3 (L) 3.2 - 4.6 g/dL Globulin 4.2 (H) 2.3 - 3.5 g/dL A-G Ratio 0.5 (L) 1.2 - 3.5 All Micro Results Procedure Component Value Units Date/Time CULTURE, URINE [[id number]] Collected:  08/16/20 1417 Order Status:  Completed Specimen:  Cath Urine Updated:  08/16/20 1445 Other Studies: No results found. Assessment and Plan:  
 
Hospital Problems as of 8/16/2020 Date Reviewed: 7/23/2020 Codes Class Noted - Resolved POA  
 UTI (urinary tract infection) ICD-10-CM: N39.0 ICD-9-CM: 599.0  8/16/2020 - Present Unknown Wound infection ICD-10-CM: T14. 8XXA, L08.9 ICD-9-CM: 958.3  8/16/2020 - Present Unknown Overview Signed 8/16/2020  8:07 PM by Vaibhav Tang MD  
  Rt leg Functional quadriplegia (HCC) ICD-10-CM: R53.2 ICD-9-CM: 780.72  7/26/2020 - Present ESRD (end stage renal disease) ([facility] ICD-10-CM: N18.6 ICD-9-CM: 585.6  11/3/2019 - Present Yes Hypernatremia ICD-10-CM: E87.0 ICD-9-CM: 276.0  4/18/2018 - Present Yes Acute metabolic encephalopathy OAO-34-OZ: G93.41 
ICD-9-CM: 348.31  4/13/2018 - Present Unknown Uncontrolled diabetes mellitus, with long-term current use of insulin (HCC) (Chronic) ICD-10-CM: E11.65, Z79.4 ICD-9-CM: 250.02, V58.67  2/28/2018 - Present Yes Venous stasis ulcer of left lower extremity ([facility] ICD-10-CM: N06.049, K65.542 ICD-9-CM: 454.0  4/1/2017 - Present Yes Chronic systolic congestive heart failure (HCC) (Chronic) ICD-10-CM: U64.03 ICD-9-CM: 428.22, 428.0  9/23/2016 - Present HTN (hypertension) ICD-10-CM: I10 
ICD-9-CM: 401.9  9/22/2016 - Present Yes Chronic pancreatitis (HCC) (Chronic) ICD-10-CM: K86.1 ICD-9-CM: 267.6  9/22/2016 - Present Yes PLAN: 
-Acute metabolic encephalopathy probably secondary to UTI 
-UTI- 
-bilateral lower extremity wounds are presently dressing in place, foul-smelling odor-wound care consult placed, continue on clindamycin and cefepime. -End-stage renal disorder acute on chronic kidney disease presently stage V-does not want to be on dialysis, continue IV fluids -Hypernatremia 
-Chronic pancreatitis 
-Chronic debility Spoke to patient in presence of her son and daughter-in-law, patient is DNI. DVT ppx: Heparin Anticipated DC needs:   
Code status: DNI Estimated LOS:  Greater than 2 midnights Risk:  high Signed: 
Aubrey Mandujano MD

## 2020-08-17 NOTE — PROGRESS NOTES
Progress Note Patient: Julián Lackey MRN: [id number]  SSN: [id number] YOB: 1940  Age: 79 y.o. Sex: male Admit Date: 8/16/2020 LOS: 1 day Subjective: F/U metabolic encephalopathy, UTI, ALICJA 
 
\"79year-old  can male patient who lives alone at home chronic debility -says walks with the help of a cane, chronic right lower extremity wounds, end-stage renal disorder does not want to be on dialysis, hypertension, hyperlipidemia, acid reflux, diabetic neuropathy, osteoarthritis, anemia of chronic disease and type 2 diabetes mellitus on insulin. 
  
When home health nurse went to see the patient found to have sugars of 600, fire department and EMS was called, home health nurse also noticed that patient was likely having altered mental state, also noticed large skin tear from left knee and left ankle which is new as per triage note. \" 
 
UTI found. Urine culture growing gram negative rods. BP elevated. Hg 7.6, baseline HG 9.5. Na 158. CO2 14. NO chest pain or SOB. Feeling better today but having headache. Asking for his Tramadol. Current Facility-Administered Medications Medication Dose Route Frequency  amLODIPine (NORVASC) tablet 10 mg  10 mg Oral 7am  
 calcium acetate (phosphate binder) (PHOSLO) tablet 1,334 mg  1,334 mg Oral TID WITH MEALS  cloNIDine (CATAPRES) 0.3 mg/24 hr patch 1 Patch  1 Patch TransDERmal Q7D  
 hydrALAZINE (APRESOLINE) tablet 100 mg  100 mg Oral TID  lipase-protease-amylase (CREON 36,000) capsule 2 Cap (Patient Supplied)  2 Cap Oral TID WITH MEALS  metoprolol succinate (TOPROL-XL) XL tablet 50 mg  50 mg Oral 7am  
 pravastatin (PRAVACHOL) tablet 40 mg  40 mg Oral QHS  sodium bicarbonate tablet 650 mg  650 mg Oral TID  ferrous sulfate tablet 325 mg  325 mg Oral BID WITH MEALS  sodium chloride (NS) flush 5-40 mL  5-40 mL IntraVENous Q8H  
 sodium chloride (NS) flush 5-40 mL  5-40 mL IntraVENous PRN  
  acetaminophen (TYLENOL) tablet 650 mg  650 mg Oral Q6H PRN Or  
 acetaminophen (TYLENOL) suppository 650 mg  650 mg Rectal Q6H PRN  polyethylene glycol (MIRALAX) packet 17 g  17 g Oral DAILY PRN  promethazine (PHENERGAN) tablet 12.5 mg  12.5 mg Oral Q6H PRN Or  
 ondansetron (ZOFRAN) injection 4 mg  4 mg IntraVENous Q6H PRN  
 heparin (porcine) injection 5,000 Units  5,000 Units SubCUTAneous Q8H  
 clindamycin (CLEOCIN) 600mg D5W 50mL IVPB (premix)  600 mg IntraVENous Q8H  
 insulin lispro (HUMALOG) injection   SubCUTAneous AC&HS  
 dextrose 40% (GLUTOSE) oral gel 1 Tube  15 g Oral PRN  
 glucagon (GLUCAGEN) injection 1 mg  1 mg IntraMUSCular PRN  
 dextrose (D50W) injection syrg 12.5-25 g  25-50 mL IntraVENous PRN  
 0.45% sodium chloride infusion  100 mL/hr IntraVENous CONTINUOUS  
 cefepime (MAXIPIME) 0.5 g in 0.9% sodium chloride 100 mL IVPB  0.5 g IntraVENous Q24H  
 alcohol 62% (NOZIN) nasal  1 Ampule  1 Ampule Topical Q12H Objective:  
 
Vitals:  
 08/16/20 2350 08/17/20 0430 08/17/20 0431 08/17/20 1025 BP: 170/79 167/73  150/65 Pulse: 94 92  87 Resp: 18 18  19 Temp: 98.3 °F (36.8 °C) 98.6 °F (37 °C)  98.2 °F (36.8 °C) SpO2: 96% 100%  98% Weight:   69.5 kg (153 lb 4.8 oz) Height:      
  
  
Intake and Output: 
Current Shift: 08/17 0701 - 08/17 1900 In: 120 [P.O.:120] Out: - Last three shifts: 08/15 1901 - 08/17 0700 In: 815 [I.V.:815] Out: - Physical Exam:  
General:  Alert, cooperative, no distress, frail appearing. Eyes:  Conjunctivae/corneas clear. Ears:  Normal TMs and external ear canals both ears. Nose: Nares normal. Septum midline. Mouth/Throat: Poor dentition Neck:  no JVD. Back:   deferred. Lungs:   Clear to auscultation bilaterally. Heart:  Regular rate and rhythm, S1, S2 normal  
Abdomen:   Soft, non-tender. Bowel sounds normal.   
Extremities: Both legs are wrapped and bandages are clean.  2+ edema to right LE but cannot assess edema to Left LE due to wrapping. Pulses: 2+ and symmetric all extremities. Skin: Blister to left 2nd digit Lymph nodes: Cervical, supraclavicular, and axillary nodes normal.  
Neurologic: CNII-XII intact. Lab/Data Review: 
 
Recent Results (from the past 24 hour(s)) CBC WITH AUTOMATED DIFF Collection Time: 08/16/20  1:22 PM  
Result Value Ref Range WBC 8.8 4.3 - 11.1 K/uL  
 RBC 3.14 (L) 4.23 - 5.6 M/uL HGB 8.0 (L) 13.6 - 17.2 g/dL HCT 26.3 (L) 41.1 - 50.3 % MCV 83.8 79.6 - 97.8 FL  
 MCH 25.5 (L) 26.1 - 32.9 PG  
 MCHC 30.4 (L) 31.4 - 35.0 g/dL  
 RDW 17.2 (H) 11.9 - 14.6 % PLATELET 811 618 - 435 K/uL MPV 13.7 (H) 9.4 - 12.3 FL ABSOLUTE NRBC 0.00 0.0 - 0.2 K/uL  
 DF AUTOMATED NEUTROPHILS 80 (H) 43 - 78 % LYMPHOCYTES 11 (L) 13 - 44 % MONOCYTES 8 4.0 - 12.0 % EOSINOPHILS 1 0.5 - 7.8 % BASOPHILS 0 0.0 - 2.0 % IMMATURE GRANULOCYTES 0 0.0 - 5.0 %  
 ABS. NEUTROPHILS 7.1 1.7 - 8.2 K/UL  
 ABS. LYMPHOCYTES 1.0 0.5 - 4.6 K/UL  
 ABS. MONOCYTES 0.7 0.1 - 1.3 K/UL  
 ABS. EOSINOPHILS 0.1 0.0 - 0.8 K/UL  
 ABS. BASOPHILS 0.0 0.0 - 0.2 K/UL  
 ABS. IMM. GRANS. 0.0 0.0 - 0.5 K/UL METABOLIC PANEL, COMPREHENSIVE Collection Time: 08/16/20  1:22 PM  
Result Value Ref Range Sodium 154 (H) 136 - 145 mmol/L Potassium 6.0 (H) 3.5 - 5.1 mmol/L Chloride 124 (H) 98 - 107 mmol/L  
 CO2 16 (L) 21 - 32 mmol/L Anion gap 14 7 - 16 mmol/L Glucose 244 (H) 65 - 100 mg/dL  (H) 8 - 23 MG/DL Creatinine 10.40 (H) 0.8 - 1.5 MG/DL  
 GFR est AA 6 (L) >60 ml/min/1.73m2 GFR est non-AA 5 (L) >60 ml/min/1.73m2 Calcium 6.4 (L) 8.3 - 10.4 MG/DL Bilirubin, total 0.6 0.2 - 1.1 MG/DL  
 ALT (SGPT) 17 12 - 65 U/L  
 AST (SGOT) 60 (H) 15 - 37 U/L Alk. phosphatase 81 50 - 136 U/L Protein, total 7.3 6.3 - 8.2 g/dL Albumin 2.3 (L) 3.2 - 4.6 g/dL Globulin 5.0 (H) 2.3 - 3.5 g/dL A-G Ratio 0.5 (L) 1.2 - 3.5 HEMOGLOBIN A1C WITH EAG Collection Time: 08/16/20  1:22 PM  
Result Value Ref Range Hemoglobin A1c 9.4 (H) 4.8 - 6.0 % Est. average glucose 223 mg/dL URINALYSIS W/ RFLX MICROSCOPIC Collection Time: 08/16/20  2:17 PM  
Result Value Ref Range Color YELLOW Appearance TURBID Specific gravity 1.011 1.001 - 1.023    
 pH (UA) 5.0 5.0 - 9.0 Protein 100 (A) NEG mg/dL Glucose 100 mg/dL Ketone Negative NEG mg/dL Bilirubin Negative NEG Blood MODERATE (A) NEG Urobilinogen 0.2 0.2 - 1.0 EU/dL Nitrites Negative NEG Leukocyte Esterase LARGE (A) NEG    
 WBC >100 0 /hpf  
 RBC 5-10 0 /hpf Epithelial cells 0-3 0 /hpf Bacteria 4+ (H) 0 /hpf CULTURE, URINE Collection Time: 08/16/20  2:17 PM  
 Specimen: Cath Urine Result Value Ref Range Special Requests: NO SPECIAL REQUESTS Culture result: (A)    
  >100,000 COLONIES/mL GRAM NEGATIVE RODS SUBCULTURE IN PROGRESS Culture result: CULTURE IN PROGRESS,FURTHER UPDATES TO FOLLOW METABOLIC PANEL, COMPREHENSIVE Collection Time: 08/16/20  2:50 PM  
Result Value Ref Range Sodium 158 (H) 136 - 145 mmol/L Potassium 4.0 3.5 - 5.1 mmol/L Chloride 127 (H) 98 - 107 mmol/L  
 CO2 15 (L) 21 - 32 mmol/L Anion gap 16 7 - 16 mmol/L Glucose 217 (H) 65 - 100 mg/dL  (H) 8 - 23 MG/DL Creatinine 10.10 (H) 0.8 - 1.5 MG/DL  
 GFR est AA 6 (L) >60 ml/min/1.73m2 GFR est non-AA 5 (L) >60 ml/min/1.73m2 Calcium 6.4 (L) 8.3 - 10.4 MG/DL Bilirubin, total 0.3 0.2 - 1.1 MG/DL  
 ALT (SGPT) 9 (L) 12 - 65 U/L  
 AST (SGOT) 16 15 - 37 U/L Alk. phosphatase 78 50 - 136 U/L Protein, total 6.5 6.3 - 8.2 g/dL Albumin 2.3 (L) 3.2 - 4.6 g/dL Globulin 4.2 (H) 2.3 - 3.5 g/dL A-G Ratio 0.5 (L) 1.2 - 3.5 GLUCOSE, POC Collection Time: 08/16/20  8:51 PM  
Result Value Ref Range Glucose (POC) 136 (H) 65 - 100 mg/dL METABOLIC PANEL, BASIC  Collection Time: 08/17/20  3:09 AM  
 Result Value Ref Range Sodium 158 (H) 136 - 145 mmol/L Potassium 4.1 3.5 - 5.1 mmol/L Chloride 128 (H) 98 - 107 mmol/L  
 CO2 14 (L) 21 - 32 mmol/L Anion gap 16 7 - 16 mmol/L Glucose 155 (H) 65 - 100 mg/dL  (H) 8 - 23 MG/DL Creatinine 10.20 (H) 0.8 - 1.5 MG/DL  
 GFR est AA 6 (L) >60 ml/min/1.73m2 GFR est non-AA 5 (L) >60 ml/min/1.73m2 Calcium 6.4 (L) 8.3 - 10.4 MG/DL  
CBC WITH AUTOMATED DIFF Collection Time: 08/17/20  3:09 AM  
Result Value Ref Range WBC 7.3 4.3 - 11.1 K/uL  
 RBC 2.99 (L) 4.23 - 5.6 M/uL HGB 7.6 (L) 13.6 - 17.2 g/dL HCT 26.0 (L) 41.1 - 50.3 % MCV 87.0 79.6 - 97.8 FL  
 MCH 25.4 (L) 26.1 - 32.9 PG  
 MCHC 29.2 (L) 31.4 - 35.0 g/dL  
 RDW 17.0 (H) 11.9 - 14.6 % PLATELET 196 (L) 643 - 450 K/uL MPV 12.0 9.4 - 12.3 FL ABSOLUTE NRBC 0.00 0.0 - 0.2 K/uL  
 DF AUTOMATED NEUTROPHILS 66 43 - 78 % LYMPHOCYTES 23 13 - 44 % MONOCYTES 9 4.0 - 12.0 % EOSINOPHILS 1 0.5 - 7.8 % BASOPHILS 0 0.0 - 2.0 % IMMATURE GRANULOCYTES 1 0.0 - 5.0 %  
 ABS. NEUTROPHILS 4.8 1.7 - 8.2 K/UL  
 ABS. LYMPHOCYTES 1.7 0.5 - 4.6 K/UL  
 ABS. MONOCYTES 0.6 0.1 - 1.3 K/UL  
 ABS. EOSINOPHILS 0.1 0.0 - 0.8 K/UL  
 ABS. BASOPHILS 0.0 0.0 - 0.2 K/UL  
 ABS. IMM. GRANS. 0.0 0.0 - 0.5 K/UL GLUCOSE, POC Collection Time: 08/17/20  7:08 AM  
Result Value Ref Range Glucose (POC) 202 (H) 65 - 100 mg/dL Assessment/ Plan:  
 
Principal Problem: 
  Acute metabolic encephalopathy (7/00/0005) Active Problems: 
  HTN (hypertension) (9/22/2016) Chronic pancreatitis ([address] (9/22/2016) Venous stasis ulcer of left lower extremity ([address] (4/1/2017) Uncontrolled diabetes mellitus, with long-term current use of insulin ([address] (2/28/2018) Hypernatremia (4/18/2018) ESRD (end stage renal disease) ([address] (11/3/2019) UTI (urinary tract infection) (8/16/2020) Wound infection (8/16/2020) Overview: Rt leg Metabolic encephalopathy - Resolved. Likely from UTI. UTI - Currently on Cefepime. Urine culture with gram negative rods. ALICJA on CKD stage 5- Consult nephrology for further recommendations since tx options limited. Strict Is and Os. Anemia of chronic disease- If Hg less than 7, will need transfusion. Dc heparin. No obvious bleeding on exam. Iron supplements Hypernatremia - not improving. Change IV fluids to D5. Consulted nephrology due to still wanting aggressive care except no dialysis. Metabolic acidosis - Likely from worsening renal failure. Consult nephrology for further recommendations. I appreciate their input. On Na bicarb tablets. Chronic leg wounds with skin tearing to Left LE - Consult wound care. Reviewed imaging from yesterday of left lower extremity and significant skin tearing. No signs of active infection but will leave on Clindamycin today. Likely can dc Clindamycin tomorrow HTN- Resistant due to renal failure. Clonidine 0.3mg patch weekly. Hydralazine. BB 
 
DM type II - SS Chronic debility- Consult PT/OT Headache - Add back Tramadol but at q 8 hr PRN Chronic pancreatitis - Creon Patient states he is not ready for hospice but is willing to discuss care with palliative care as a transition. Patient would like everything except to be intubated. DNI 
 
DVT prophylaxis - SCDs Signed By: Reyes Delong DO August 17, 2020

## 2020-08-17 NOTE — WOUND CARE
Pt seen for BLE wounds present on admission, pt has been seen by home health for BLE wraps. Upon assessment noted RLE still wrapped with 2 layer compression, LLE with ace and kerlix. Removed BLE wraps RLE with intact blisters and two circular wounds present to lateral posterior aspect of lower leg. Wounds with pink base and moderate amount of serous drainage. Wounds most consistent with venous stasis ulcers. Fungal towers present to surrounding skin. On LLE dressing completely saturated entire LLE skin macerated and grey because it is so wet. Entire anterior aspect of LLE is a blister with openings at different sections, open areas pink and healthy tissue present. Fungal towers also present to posterior aspect of the leg. Intact blister present to superior aspect of LLE. Cleansed BLE with hibiclens and applied anti fungal cream to affected areas and covered each open blister and venous stasis ulcer with heavy drainage pack ABD and guaze wrapped with kerlix and ace. Recommend elevation as much as tolerated and dressing change 2 times daily and as needed. Will follow up later this week for possible tighter compression depending on patient condition. Bilat knee wounds as well recommend foam dressings daily. Pt aware of plan  And in agreement will continue to follow while in acute care setting.

## 2020-08-17 NOTE — PROGRESS NOTES
Pharmacy consulted for cefepime dosing Indication: skin and structure infection Dose: Cefepime 500mg q24hr Abx day # 1 Labs:  scr 10.1, CrCl 6.5, WBC8.8 , Tmax 98.9 Micro: UA pending Other abx: clindamycin A/P: Pharmacy to monitor and adjust cefepime dosing per protocol. Thank you for this consult, Lor Floyd, PharmD

## 2020-08-17 NOTE — PROGRESS NOTES
Comprehensive Nutrition Assessment Type and Reason for Visit: Initial, Positive nutrition screen Best Practice Alert for Malnutrition Screening Tool: Recently Lost Weight Without Trying: Unsure,  , Eating Poorly Due to Decreased Appetite: No 
 
Nutrition Assessment:  Patient admitted after being found by [facility] with AMS and hyperglycemia. He has a PMH of chronic debility, chronic lower extremity wounds, ESRD, HTN, HLD, acid refux, DM2, CHF. It is noted that he declines HD. Palliative care following for goals. Patient seen today. He is alert and oriented, however slow to respond. He states that he has lost ~10# in the last ~1 month. He is unsure as to why he has been losing weight. He states that he has been eating at his baseline and denies any changes. He states that he ate 100% of breakfast and lunch today. States he has been drinking Glucerna with meals. Labs remarkable: Na 158, Glucose 155, K+ on admission 6, A1c 9.4 Estimated Daily Nutrient Needs: 
Energy (kcal):  5352-5066(37-07 kcal/kg (69.5kg)) Protein (g):  56-70(0.8-1 g/kg (69.5kg)) Nutrition Related Findings:  patient thin appearing, but did not note any wasting. However, full assessment unable to be completed due to patient being under blankets Current Nutrition Therapies: DIET NUTRITIONAL SUPPLEMENTS All Meals; Glucerna Shake DIET DIABETIC CONSISTENT CARB Mechanical Soft Anthropometric Measures: 
· Height:  6' (182.9 cm) · Current Body Wt:  69.5 kg (153 lb 3.5 oz)(bed scale) Body mass index is 20.79 kg/m². · BMI Category:  Underweight (BMI less than 22) age over 72 Nutrition Diagnosis:  
· Unintended weight loss related to (catabolic illness vs hyperglycemia) as evidenced by (patient with ESRD, wt loss despite usual intake, A1c 9.4) Nutrition Interventions:  
Food and/or Nutrient Delivery: Continue current diet, Modify oral nutrition supplement - decrease supplement to 1 x per day and change to [place]ro Goals: Continue to meet at least 75% nutrition needs Nutrition Monitoring and Evaluation:  
Food/Nutrient Intake Outcomes: Food and nutrient intake Discharge Planning: Too soon to determine [address], LD on 8/17/2020 at 2:52 PM 
Contact: [phone]

## 2020-08-17 NOTE — PROGRESS NOTES
Called son, Liana Cooper, with updates w/ permission via Pt. Time provided for questions/concerns.  To bring home meds tomorrow am.

## 2020-08-17 NOTE — PROGRESS NOTES
08/16/20 9892 Family/Physician Communication Do you want a physician to be notified of this admission? (Other than treatment team) Yes Physician Name & Phone Number [place], [phone] Physician Notification Completed No

## 2020-08-17 NOTE — CONSULTS
GEN GENERIC H&P/CONSULT Subjective:  
 
Patient is aF/U   
\"79year-old  can male admitted for encephalopathy, UTI, ALICJA on CKD IV  
H/o CKD IV, has been refusing dialysis, hypertension,type 2 diabetes mellitus, Patient found to have sugars of 600 with AMS  
Urine culture growing gram negative rods. ATB: Maxipine and clinda Getting IVF with D 5w for hypernatremia Creat is further up Ref. Range 8/17/2020 03:09 Sodium Latest Ref Range: 136 - 145 mmol/L 158 (H) Potassium Latest Ref Range: 3.5 - 5.1 mmol/L 4.1 Chloride Latest Ref Range: 98 - 107 mmol/L 128 (H) CO2 Latest Ref Range: 21 - 32 mmol/L 14 (L) Anion gap Latest Ref Range: 7 - 16 mmol/L 16 Glucose Latest Ref Range: 65 - 100 mg/dL 155 (H) BUN Latest Ref Range: 8 - 23 MG/ (H) Creatinine Latest Ref Range: 0.8 - 1.5 MG/DL 10.20 (H) Calcium Latest Ref Range: 8.3 - 10.4 MG/DL 6.4 (L)  
GFR est non-AA Latest Ref Range: >60 ml/min/1.73m2 5 (L)  
GFR est AA Latest Ref Range: >60 ml/min/1.73m2 6 (L) Ref. Range 7/31/2020 07:14 8/16/2020 13:22 8/16/2020 14:50 8/17/2020 03:09 Creatinine Latest Ref Range: 0.8 - 1.5 MG/DL 7.04 (H) 10.40 (H) 10.10 (H) 10.20 (H) Ref. Range 8/16/2020 14:17 Color Latest Units:   YELLOW Appearance Latest Units:   TURBID Specific gravity Latest Ref Range: 1.001 - 1.023   1.011  
pH (UA) Latest Ref Range: 5.0 - 9.0   5.0 Protein Latest Ref Range: NEG mg/dL 100 (A) Glucose Latest Units: mg/dL 100 Ketone Latest Ref Range: NEG mg/dL Negative Blood Latest Ref Range: NEG   MODERATE (A) Bilirubin Latest Ref Range: NEG   Negative Urobilinogen Latest Ref Range: 0.2 - 1.0 EU/dL 0.2 Nitrites Latest Ref Range: NEG   Negative Leukocyte Esterase Latest Ref Range: NEG   LARGE (A) Epithelial cells Latest Ref Range: 0 /hpf 0-3 WBC Latest Ref Range: 0 /hpf >100 RBC Latest Ref Range: 0 /hpf 5-10 Bacteria Latest Ref Range: 0 /hpf 4+ (H) Urine Cx: GNR Past Medical History: Diagnosis Date  Acute renal failure superimposed on stage 3 chronic kidney disease ([facility] 9/21/2016  Anemia   
 pt states he receives iron infusions  Arthritis OA generalized  Chronic kidney disease   
 not on HD- surgery done for access and fistula being removed. Per patient, no plans to proceed with dialysis  Chronic pancreatitis ([facility] 9/22/2016  Dermatophytosis of nail 12/6/2016  Diabetic neuropathy ([facility] 9/22/2016  
 insulin sliding scale only- no oral meds- avg fastings avg fasting \"low 200's;  hypo @ 80 A1C 7.1 9/2019 per patient  Essential hypertension 9/22/2016  
 medication  GERD (gastroesophageal reflux disease)   
 controlled with med  Hypercholesterolemia  Iron (Fe) deficiency anemia 9/22/2016  Septicemia due to Klebsiella pneumoniae ([facility] 9/22/2016  Systolic CHF, chronic ([facility] 9/23/2016 ECHO 4/2018 EF- 60-65%  Type II diabetes mellitus with nephropathy ([facility] 09/22/2016  Venous insufficiency 12/6/2016  Xerosis cutis 12/6/2016 Past Surgical History:  
Procedure Laterality Date  HX COLONOSCOPY    
 HX HERNIA REPAIR Left 2010  HX PROSTATECTOMY  2012  HX TURP  2012 FOR BPH  VASCULAR SURGERY PROCEDURE UNLIST Left 10/26/2017 AVF  VASCULAR SURGERY PROCEDURE UNLIST Left 09/18/2019 Ligation of left brachiobasilic fistula Prior to Admission medications Medication Sig Start Date End Date Taking? Authorizing Provider  
pregabalin (Lyrica) 150 mg capsule Take 150 mg by mouth two (2) times a day. Yes Provider, Historical  
ciprofloxacin HCl (CIPRO) 250 mg tablet Take 1 Tab by mouth two (2) times a day for 10 days. 8/16/20 8/26/20 Yes Ricky Bland MD  
insulin lispro (HUMALOG) 100 unit/mL injection INITIATE INSULIN CORRECTIVE PROTOCOL: 
INITIATE INSULIN CORRECTIVE PROTOCOL: 
Normal Insulin Sensitivity For Blood Sugar (mg/dL) of:    
Less than 150 =   0 units 150 -199 =   2 units 200 -249 =   4 units 250 -299 =   6 units 300 -349 =   8 units 350 and above = 10 units and Call Physician Initiate Hypoglycemia protocol if blood glucose is <70 mg/dL Fast Acting - Administer Immediately - or within 15 minutes of start of meal, if mealtime coverage. 7/31/20  Yes Gwen Burns, DO  
insulin glargine (LANTUS) 100 unit/mL injection 15 Units by SubCUTAneous route nightly. 7/31/20  Yes Gwen Burns, DO  
calcium acetate, phosphate binder, (PHOSLO) 667 mg tab tablet Take 2 Tabs by mouth three (3) times daily (with meals). 7/31/20  Yes Gwen Burns, DO  
ferrous sulfate 325 mg (65 mg iron) tablet Take 1 Tab by mouth two (2) times daily (with meals). 7/31/20  Yes Elkin Burns, DO  
multivit-minerals/folic acid (SPECTRAVITE ADULT PO) Take 1 Tab by mouth daily. Yes Provider, Historical  
cloNIDine (Catapres-TTS-3) 0.3 mg/24 hr 1 Patch by TransDERmal route every seven (7) days. Change every thursday   Yes Jesse Andrade NP  
glucose 4 gram chewable tablet Take 15 g by mouth as needed for Other (low blood sugar). Yes Provider, Historical  
Lactoperoxi/Gluc Oxid/Pot Thio (BIOTENE DRY MOUTH MM) Take 2 Sprays by mouth as needed for Other (dry mouth). Yes Provider, Historical  
dicyclomine (BENTYL) 10 mg capsule Take 10 mg by mouth two (2) times a day. Yes Provider, Historical  
ondansetron (ZOFRAN ODT) 4 mg disintegrating tablet Take 1 Tab by mouth every eight (8) hours as needed for Nausea. 11/9/19  Yes Michael Espinoza MD  
colestipol (COLESTID) 1 gram tablet Take 1 g by mouth two (2) times a day. Yes Provider, Historical  
sodium bicarbonate 650 mg tablet Take 650 mg by mouth three (3) times daily. Yes Provider, Historical  
lidocaine 4 % patch Cut to appropriate size. Apply to intact skin for 12 hours, remove for 12 hours. Patient taking differently: 1 Patch by TransDERmal route as needed. Cut to appropriate size. Apply to intact skin for 12 hours, remove for 12 hours. States uses only occasionally and not on today 2019 Do not take morning of procedure. 19  Yes JAMARCUS Childers  
hydrALAZINE (APRESOLINE) 100 mg tablet Take 1 Tab by mouth three (3) times daily. 18  Yes Corby Banda MD  
pravastatin (PRAVACHOL) 40 mg tablet Take 1 Tab by mouth nightly. 18  Yes Nelida Eckert MD  
calcifediol (RAYALDEE) 30 mcg Cs24 Take 30 mcg by mouth nightly. Yes Provider, Historical  
amLODIPine (NORVASC) 10 mg tablet Take 10 mg by mouth every morning. Yes Provider, Historical  
metoprolol succinate (TOPROL-XL) 50 mg XL tablet Take 50 mg by mouth every morning. Yes Provider, Historical  
omeprazole (PRILOSEC) 20 mg capsule Take 20 mg by mouth every morning. Yes Provider, Historical  
oxymetazoline (AFRIN) 0.05 % nasal spray 2 Sprays by Both Nostrils route once as needed for Congestion. 19   Provider, Historical  
lipase-protease-amylase (CREON) 36,000-114,000- 180,000 unit cpDR capsule Take 3,600 Units by mouth See Admin Instructions. Take 2 capsules by mouth with each meal, and 1 capsule by mouth with each snack. Nesha Cui MD  
acetaminophen (TYLENOL) 325 mg tablet Take 2 Tabs by mouth every four (4) hours as needed for Pain. 19   Alo Salvador MD  
pantothenic ac-min oil-pet,hyd (AQUAPHOR) 41 % ointment Apply  to affected area daily. Apply over legs 11/10/19   Alo Salvador MD  
 
No Known Allergies Social History Tobacco Use  Smoking status: Former Smoker Packs/day: 2.00 Years: 20.00 Pack years: 40.00 Last attempt to quit:  Years since quittin.6  Smokeless tobacco: Current User Substance Use Topics  Alcohol use: No  
  
Family History Problem Relation Age of Onset  Diabetes Mother  Stroke Mother  Hypertension Mother  No Known Problems Father  Diabetes Sister  Diabetes Brother  Diabetes Sister  Diabetes Sister  Other Sister fibromyalgia Review of Systems As above Objective:  
 
 
Visit Vitals /70 Pulse (!) 104 Temp 97.8 °F (36.6 °C) Resp 18 Ht 6' (1.829 m) Wt 69.5 kg (153 lb 4.8 oz) SpO2 99% BMI 20.79 kg/m² 08/17 0701 - 08/17 1900 In: 120 [P.O.:120] Out: -  
08/15 1901 - 08/17 0700 In: 815 [I.V.:815] Out: -  
 
PE:  
Alert Not in EDEL Lung: decreased BS 
CV; RR, no rub Abd: soft, not tender Lower Ext: dressed, edema+ Data Review:  
 
Recent Results (from the past 24 hour(s)) GLUCOSE, POC Collection Time: 08/16/20  8:51 PM  
Result Value Ref Range Glucose (POC) 136 (H) 65 - 100 mg/dL METABOLIC PANEL, BASIC Collection Time: 08/17/20  3:09 AM  
Result Value Ref Range Sodium 158 (H) 136 - 145 mmol/L Potassium 4.1 3.5 - 5.1 mmol/L Chloride 128 (H) 98 - 107 mmol/L  
 CO2 14 (L) 21 - 32 mmol/L Anion gap 16 7 - 16 mmol/L Glucose 155 (H) 65 - 100 mg/dL  (H) 8 - 23 MG/DL Creatinine 10.20 (H) 0.8 - 1.5 MG/DL  
 GFR est AA 6 (L) >60 ml/min/1.73m2 GFR est non-AA 5 (L) >60 ml/min/1.73m2 Calcium 6.4 (L) 8.3 - 10.4 MG/DL  
CBC WITH AUTOMATED DIFF Collection Time: 08/17/20  3:09 AM  
Result Value Ref Range WBC 7.3 4.3 - 11.1 K/uL  
 RBC 2.99 (L) 4.23 - 5.6 M/uL HGB 7.6 (L) 13.6 - 17.2 g/dL HCT 26.0 (L) 41.1 - 50.3 % MCV 87.0 79.6 - 97.8 FL  
 MCH 25.4 (L) 26.1 - 32.9 PG  
 MCHC 29.2 (L) 31.4 - 35.0 g/dL  
 RDW 17.0 (H) 11.9 - 14.6 % PLATELET 509 (L) 499 - 450 K/uL MPV 12.0 9.4 - 12.3 FL ABSOLUTE NRBC 0.00 0.0 - 0.2 K/uL  
 DF AUTOMATED NEUTROPHILS 66 43 - 78 % LYMPHOCYTES 23 13 - 44 % MONOCYTES 9 4.0 - 12.0 % EOSINOPHILS 1 0.5 - 7.8 % BASOPHILS 0 0.0 - 2.0 % IMMATURE GRANULOCYTES 1 0.0 - 5.0 %  
 ABS. NEUTROPHILS 4.8 1.7 - 8.2 K/UL  
 ABS. LYMPHOCYTES 1.7 0.5 - 4.6 K/UL  
 ABS. MONOCYTES 0.6 0.1 - 1.3 K/UL  
 ABS. EOSINOPHILS 0.1 0.0 - 0.8 K/UL  
 ABS. BASOPHILS 0.0 0.0 - 0.2 K/UL ABS. IMM. GRANS. 0.0 0.0 - 0.5 K/UL GLUCOSE, POC Collection Time: 08/17/20  7:08 AM  
Result Value Ref Range Glucose (POC) 202 (H) 65 - 100 mg/dL GLUCOSE, POC Collection Time: 08/17/20 12:00 PM  
Result Value Ref Range Glucose (POC) 406 (H) 65 - 100 mg/dL Assessment:  
 
Principal Problem: 
  Acute metabolic encephalopathy (7/86/1092) Active Problems: 
  HTN (hypertension) (9/22/2016) Chronic pancreatitis ([address] (9/22/2016) Venous stasis ulcer of left lower extremity ([address] (4/1/2017) Uncontrolled diabetes mellitus, with long-term current use of insulin ([address] (2/28/2018) Hypernatremia (4/18/2018) ESRD (end stage renal disease) ([address] (11/3/2019) UTI (urinary tract infection) (8/16/2020) Wound infection (8/16/2020) Overview: Rt leg Plan:  
 
Unfortunately, CKD is progressed to ESRD. Pt is still refusing dialysis. Continue with IVF ( free water ) to correct hypernatremia Add Na HCO3 Check iron study, Phos, vit D and PTHi to manage anemia and metabolic kidney disease. F/u Thanks

## 2020-08-17 NOTE — PROGRESS NOTES
Hourly rounds done. Pt denies pain, nausea, vomiting. Cardiac monitor running NSR. All needs met at this time.

## 2020-08-18 NOTE — PROGRESS NOTES
Problem: Mobility Impaired (Adult and Pediatric) Goal: *Acute Goals and Plan of Care (Insert Text) Outcome: Progressing Towards Goal 
Note: 1. Mr. Carol Anderson will perform supine to sit and sit to supine with supervision in 7 days. 2.  Mr. Carol Anderson will perform sit to stand and bed to chair with supervision and rolling walker in 7 days. 3.  Mr. Carol Anderson will perform gait with rolling walker 50 ft with cg in 7 days. PHYSICAL THERAPY: Daily Note and AM 8/18/2020 INPATIENT: PT Visit Days : 2 Payor: Ashia Jcakson / Plan: [address] / Product Type: [employer] /   
  
NAME/AGE/GENDER: Lencho Orozco is a 79 y.o. male PRIMARY DIAGNOSIS: UTI (urinary tract infection) [G23.6] Acute metabolic encephalopathy Acute metabolic encephalopathy ICD-10: Treatment Diagnosis:  
 · Generalized Muscle Weakness (M62.81) · Difficulty in walking, Not elsewhere classified (R26.2) · Repeated Falls (R29.6) · History of falling (Z91.81) Precaution/Allergies: 
Patient has no known allergies. ASSESSMENT:  
 
Mr. Carol Anderson was a little reluctant for PT today. Decided since OT had to evaluate that maybe we could see him together. Its a good thing because today he required 2 people. He was weaker today. He demonstrated a jerking motion with movement today that was not present yesterday. Cotx allowed Mr. Mantilla to perform higher level activity demands in more complex situation. Its hard to believe he was living on his own. Even if he had 7 hours of care 5 days a week. No way he could even get out of bed without 2 person assist today. Yesterday he only needed 1 person. This is a decline. Spoke to Dr. Ash Singer regarding goals of care. Hospice would not give him the support he has now. But really needs 24/7 care. This section established at most recent assessment PROBLEM LIST (Impairments causing functional limitations): 1. Decreased Strength 2. Decreased Transfer Abilities 3. Decreased Ambulation Ability/Technique INTERVENTIONS PLANNED: (Benefits and precautions of physical therapy have been discussed with the patient.) 1. Bed Mobility 2. Gait Training 3. Therapeutic Activites 4. Transfer Training TREATMENT PLAN: Frequency/Duration: 3 times a week for duration of hospital stay Rehabilitation Potential For Stated Goals: Fair REHAB RECOMMENDATIONS (at time of discharge pending progress):   
Placement: It is my opinion, based on this patient's performance to date, that Mr. Delmi Ibarra may benefit from a skilled nursing facility if he opts to do dialysis. If he doesn't home or facility with 24 hour care. Equipment:  
? None at this time HISTORY:  
History of Present Injury/Illness (Reason for Referral): 
60-year-old  can male patient who lives alone at home chronic debility -says walks with the help of a cane, chronic right lower extremity wounds, end-stage renal disorder does not want to be on dialysis, hypertension, hyperlipidemia, acid reflux, diabetic neuropathy, osteoarthritis, anemia of chronic disease and type 2 diabetes mellitus on insulin. When home health nurse went to see the patient found to have sugars of 600, fire department and EMS was called, home health nurse also noticed that patient was likely having altered mental state, also noticed large skin tear from left knee and left ankle which is new as per triage note. Patient otherwise does not complain of any headache or any dizziness or nausea vomiting or any abdominal pain. Does complain of generalized weakness mild to moderate intensity. In the emergency room he could not tell his son's name. When he looked at his daughter-in-law and said that he never met her. Discussed about dialysis and said he was 79 and at this point he does not want to be on dialysis.  
Discussed about end-of-life care in presence of son and daughter-in-law, says does not want to be put on a life support. Hemoglobin of 8, sodium of 158, CO2 of 15, glucose of 217, creatinine of 10, urinalysis shows large amount of leukocyte esterase WBCs greater than 100, bacteria 4+. Dressing to bilateral lower extremity legs foul-smelling odor. Patient would be admitted for acute metabolic encephalopathy probably secondary to UTI, bilateral lower extremity wounds, acute on chronic kidney disease stage V and generalized weakness. Past Medical History/Comorbidities:  
Mr. Payton Delacruz  has a past medical history of Acute renal failure superimposed on stage 3 chronic kidney disease ([facility] (9/21/2016), Anemia, Arthritis, Chronic kidney disease, Chronic pancreatitis ([facility] (9/22/2016), Dermatophytosis of nail (12/6/2016), Diabetic neuropathy ([facility] (9/22/2016), Essential hypertension (9/22/2016), GERD (gastroesophageal reflux disease), Hypercholesterolemia, Iron (Fe) deficiency anemia (9/22/2016), Septicemia due to Klebsiella pneumoniae ([facility] (2/56/9859), Systolic CHF, chronic ([facility] (9/23/2016), Type II diabetes mellitus with nephropathy ([facility] (09/22/2016), Venous insufficiency (12/6/2016), and Xerosis cutis (12/6/2016). He also has no past medical history of Congestive heart failure, unspecified. Mr. Payton Delacruz  has a past surgical history that includes hx hernia repair (Left, 2010); hx colonoscopy; hx turp (2012); hx prostatectomy (2012); vascular surgery procedure unlist (Left, 10/26/2017); and vascular surgery procedure unlist (Left, 09/18/2019). Social History/Living Environment:  
Home Environment: Trailer/mobile home # Steps to Enter: 0 One/Two Story Residence: One story Living Alone: Yes Support Systems: Child(sebastien) Patient Expects to be Discharged to[de-identified] Unknown Current DME Used/Available at Home: Cane, straight Prior Level of Function/Work/Activity: 
Used a cane. Lived alone with caregivers 5 days a week Age, CKD.  Stage V  
 Number of Personal Factors/Comorbidities that affect the Plan of Care: 3+: HIGH COMPLEXITY EXAMINATION:  
Most Recent Physical Functioning:  
Gross Assessment: 
AROM: Generally decreased, functional 
PROM: Generally decreased, functional 
Strength: Generally decreased, functional 
Sensation: Impaired Posture: 
Posture (WDL): Exceptions to [facility] Posture Assessment: Rounded shoulders, Forward head Balance: 
Sitting: Impaired Sitting - Static: Fair (occasional) Sitting - Dynamic: Fair (occasional) Standing: Impaired; With support Standing - Static: Fair;Constant support Standing - Dynamic : Fair;Poor;Constant support(got worse the more tired he got.) Bed Mobility: 
Rolling: Moderate assistance Supine to Sit: Moderate assistance Scooting: Minimum assistance Wheelchair Mobility: 
  
Transfers: 
Sit to Stand: Moderate assistance;Assist x2 Stand to Sit: Moderate assistance;Assist x2 Gait: 
  
Base of Support: Narrowed Speed/Melva: Slow;Shuffled Step Length: Right shortened;Left shortened Distance (ft): 10 Feet (ft)(to the bathroom and then 5 ft out of the bathroom to a chair.) Assistive Device: Walker, rolling;Gait belt(needed to have him just hold to grab bar in the bathroom as it was too small for the walker and he needed too much help) Ambulation - Level of Assistance: Moderate assistance;Assist x2 Body Structures Involved: 1. Muscles Body Functions Affected: 1. Movement Related Activities and Participation Affected: 1. Mobility Number of elements that affect the Plan of Care: 3: MODERATE COMPLEXITY CLINICAL PRESENTATION:  
Presentation: Evolving clinical presentation with changing clinical characteristics: MODERATE COMPLEXITY CLINICAL DECISION MAKING:  
[employer] AM-PAC 6 Clicks Basic Mobility Inpatient Short Form How much difficulty does the patient currently have. .. Unable A Lot A Little None 1.  Turning over in bed (including adjusting bedclothes, sheets and blankets)? [] 1   [x] 2   [] 3   [] 4  
2. Sitting down on and standing up from a chair with arms ( e.g., wheelchair, bedside commode, etc.)   [] 1   [x] 2   [] 3   [] 4  
3. Moving from lying on back to sitting on the side of the bed? [] 1   [x] 2   [] 3   [] 4 How much help from another person does the patient currently need. .. Total A Lot A Little None 4. Moving to and from a bed to a chair (including a wheelchair)? [] 1   [x] 2   [] 3   [] 4  
5. Need to walk in hospital room? [] 1   [x] 2   [] 3   [] 4  
6. Climbing 3-5 steps with a railing? [] 1   [x] 2   [] 3   [] 4  
© 2007, Trustees of [address] [place] [address], under license to [employer]. All rights reserved Score:  Initial: 12 Most Recent: X (Date: -- ) Interpretation of Tool:  Represents activities that are increasingly more difficult (i.e. Bed mobility, Transfers, Gait). Medical Necessity:    
· Patient is expected to demonstrate progress in  
· functional technique ·  to  
· decrease assistance required with mobility and gait. · . Reason for Services/Other Comments: 
· Patient · continues to require present interventions due to patient's inability to function at baseline. · . Use of outcome tool(s) and clinical judgement create a POC that gives a: Questionable prediction of patient's progress: MODERATE COMPLEXITY  
  
 
 
 
TREATMENT:  
(In addition to Assessment/Re-Assessment sessions the following treatments were rendered) Pre-treatment Symptoms/Complaints:  Not really feeling well but finally agreeable Pain: Initial:  
Pain Intensity 1: 4 Pain Location 1: Leg 
Pain Orientation 1: Left, Right Pain Intervention(s) 1: Emotional support, Repositioned, Elevation  Post Session:  Settled in the chair. Therapeutic Activity: (    30):   Therapeutic activities including Bed transfers, Chair transfers, and Ambulation on level ground to improve mobility. Required moderate   to promote motor control of bilateral, upper extremity(s), lower extremity(s). Braces/Orthotics/Lines/Etc:  
· O2 Device: Room air Treatment/Session Assessment:   
· Response to Treatment:  fair · Interdisciplinary Collaboration:  
o Registered Nurse · After treatment position/precautions:  
o Up in chair 
o Call light within reach 
o RN notified · Compliance with Program/Exercises: Will assess as treatment progresses · Recommendations/Intent for next treatment session: \"Next visit will focus on advancements to more challenging activities and reduction in assistance provided\". Total Treatment Duration: PT Patient Time In/Time Out Time In: 0223 Time Out: 1051 Sun Vazquez, PT

## 2020-08-18 NOTE — PROGRESS NOTES
Problem: Patient Education: Go to Patient Education Activity Goal: Patient/Family Education Description: 1. Patient will complete upper body bathing and dressing with min A and adaptive equipment as needed. 2. Patient will complete toileting with mod A.  
3. Patient will tolerate 25 minutes of OT treatment with 1-2 rest breaks to increase activity tolerance for ADLs. 4. Patient will complete functional transfers with mod A and adaptive equipment as needed. 5. Patient will complete functional activity while seated edge of bed with min A and adaptive equipment as needed. 6. Patient will complete self-grooming with supervision/set-up and adaptive equipment as needed. Timeframe: 7 visits Outcome: Progressing Towards Goal 
  
 
OCCUPATIONAL THERAPY: Initial Assessment, Daily Note, and AM 8/18/2020 INPATIENT: OT Visit Days: 1 Payor: [address] [place] / Plan: [address] / Product Type: Man[employer] Medicare /  
  
NAME/AGE/GENDER: Maria Ines Amato is a 79 y.o. male PRIMARY DIAGNOSIS:  UTI (urinary tract infection) [U44.4] Acute metabolic encephalopathy Acute metabolic encephalopathy ICD-10: Treatment Diagnosis:  
 Generalized Muscle Weakness (M62.81) Other lack of cordination (R27.8) Difficulty in walking, Not elsewhere classified (R26.2) Precautions/Allergies: 
  Fall precautions Patient has no known allergies. ASSESSMENT:  
 
Mr. Nidia Alcala presents for the above diagnoses. Upon arrival, pt supine in bed and agreeable to OT evaluation. Pt is alert and oriented x 4. Pt reports living alone with w/c ramp entry. At baseline, pt notes use of cane for functional mobility and has CLTC aide 5 days week/7 hrs a day to assist with ADLs and IADLs. Today, pt presents with deficits in overall strength, activity tolerance, ADL performance, and functional mobility.  Co-treatment completed with PT today d/t pt's increased need for physical assistance during ADLs and functional mobility. Pt required mod A x 2 for transition to sitting edge of bed. Static sitting balance is fair with close SBA provided. Pt noted need to use restroom. Pt stood with mod A x 2 and ambulated to bathroom with HHA x 2 with max verbal cues for facilitation. Required mod A x 2 for toilet transfer. Pt stood to perform bowel hygiene, however required to be placed back on toilet for continue BM. Upon completion, pt stood with mod A x 2 and total A provided for bowel hygiene and place of new brief. Pt proceeded to ambulate out of bathroom with HHA x 2 with max verbal cues and was subsequently placed sitting upright in bedside chair. Pt left with needs met, call light within reach, posey alarm on, and RN notified. At this time, Miguel Ángel Choudhary is functioning below baseline for ADLs and functional mobility. Pt would benefit from skilled OT services to address OT goals and plan of care. This section established at most recent assessment PROBLEM LIST (Impairments causing functional limitations): 
Decreased Strength Decreased ADL/Functional Activities Decreased Transfer Abilities Decreased Ambulation Ability/Technique Decreased Balance Decreased Activity Tolerance INTERVENTIONS PLANNED: (Benefits and precautions of occupational therapy have been discussed with the patient.) Activities of daily living training Adaptive equipment training Balance training Clothing management Community reintergration Donning&doffing training Neuromuscular re-eduation Re-evaluation Therapeutic activity Therapeutic exercise TREATMENT PLAN: Frequency/Duration: Follow patient 3x/week to address above goals. Rehabilitation Potential For Stated Goals: Fair REHAB RECOMMENDATIONS (at time of discharge pending progress):   
Placement:  
It is my opinion, based on this patient's performance to date, that  Joan Persaud may benefit from intensive therapy at [address] after discharge due to the functional deficits listed above that are likely to improve with skilled rehabilitation and concerns that he/she may be unsafe to be unsupervised at home due to decline in ability to safely perform ADLs and functional mobility. . 
Equipment: TBD OCCUPATIONAL PROFILE AND HISTORY:  
History of Present Injury/Illness (Reason for Referral): 
See H&P Past Medical History/Comorbidities:  
Mr. Joan Persaud  has a past medical history of Acute renal failure superimposed on stage 3 chronic kidney disease ([address] (9/21/2016), Anemia, Arthritis, Chronic kidney disease, Chronic pancreatitis ([address] (9/22/2016), Dermatophytosis of nail (12/6/2016), Diabetic neuropathy ([address] (9/22/2016), Essential hypertension (9/22/2016), GERD (gastroesophageal reflux disease), Hypercholesterolemia, Iron (Fe) deficiency anemia (9/22/2016), Septicemia due to Klebsiella pneumoniae ([facility] (0/94/2033), Systolic CHF, chronic ([address] (9/23/2016), Type II diabetes mellitus with nephropathy ([facility] (09/22/2016), Venous insufficiency (12/6/2016), and Xerosis cutis (12/6/2016). He also has no past medical history of Congestive heart failure, unspecified. Mr. Joan Persaud  has a past surgical history that includes hx hernia repair (Left, 2010); hx colonoscopy; hx turp (2012); hx prostatectomy (2012); vascular surgery procedure unlist (Left, 10/26/2017); and vascular surgery procedure unlist (Left, 09/18/2019). Social History/Living Environment:  
Home Environment: Trailer/mobile home # Steps to Enter: 0 One/Two Story Residence: One story Living Alone: Yes Support Systems: Child(sebastien) Patient Expects to be Discharged to[de-identified] Unknown Current DME Used/Available at Home: Cane, straight Prior Level of Function/Work/Activity: 
Has CLTC aide 5 days/week; 7 hrs a day; lives alone. Personal Factors:   
      Sex:  male Age:  79 y.o. Other factors that influence how disability is experienced by the patient:  multiple co-morbidities Number of Personal Factors/Comorbidities that affect the Plan of Care: Brief history (0):  LOW COMPLEXITY ASSESSMENT OF OCCUPATIONAL PERFORMANCE[de-identified]  
Activities of Daily Living:  
Basic ADLs (From Assessment) Complex ADLs (From Assessment) Feeding: Minimum assistance Oral Facial Hygiene/Grooming: Minimum assistance Bathing: Maximum assistance Upper Body Dressing: Moderate assistance Lower Body Dressing: Maximum assistance Toileting: Maximum assistance Instrumental ADL Meal Preparation: Total assistance Homemaking: Total assistance Grooming/Bathing/Dressing Activities of Daily Living Cognitive Retraining Safety/Judgement: Fall prevention;Decreased insight into deficits Functional Transfers Bathroom Mobility: Moderate assistance;Maximum assistance Toilet Transfer : Moderate assistance;Assist x2 Bed/Mat Mobility Rolling: Moderate assistance;Assist x2 Supine to Sit: Moderate assistance;Assist x2 Sit to Stand: Moderate assistance;Assist x2 Stand to Sit: Moderate assistance;Assist x2 Scooting: Moderate assistance Most Recent Physical Functioning:  
Gross Assessment: 
AROM: Generally decreased, functional 
PROM: Generally decreased, functional 
Strength: Generally decreased, functional 
Sensation: Impaired Posture: 
Posture (WDL): Exceptions to [facility] Posture Assessment: Rounded shoulders, Forward head Balance: 
Sitting: Impaired Sitting - Static: Fair (occasional) Sitting - Dynamic: Fair (occasional) Standing: Impaired Standing - Static: Fair;Constant support Standing - Dynamic : Fair;Poor;Constant support Bed Mobility: 
Rolling: Moderate assistance;Assist x2 Supine to Sit: Moderate assistance;Assist x2 Scooting: Moderate assistance Wheelchair Mobility: 
  
Transfers: 
Sit to Stand:  Moderate assistance;Assist x2 
 Stand to Sit: Moderate assistance;Assist x2 Patient Vitals for the past 6 hrs: 
 BP BP Patient Position SpO2 Pulse 20 1138 144/61 Sitting 98 % 81 Mental Status Neurologic State: Alert Orientation Level: Oriented X4 Cognition: Follows commands Perception: Appears intact Perseveration: No perseveration noted Safety/Judgement: Fall prevention, Decreased insight into deficits Physical Skills Involved: 
Balance Strength Activity Tolerance Gross Motor Control Cognitive Skills Affected (resulting in the inability to perform in a timely and safe manner): 
Perception Executive Function Psychosocial Skills Affected: 
Habits/Routines Environmental Adaptation Number of elements that affect the Plan of Care: 5+:  HIGH COMPLEXITY CLINICAL DECISION MAKIN[address] [place] [id number] AM-PAC 6 Clicks Daily Activity Inpatient Short Form How much help from another person does the patient currently need. .. Total A Lot A Little None 1. Putting on and taking off regular lower body clothing? [] 1   [x] 2   [] 3   [] 4  
2. Bathing (including washing, rinsing, drying)? [] 1   [x] 2   [] 3   [] 4  
3. Toileting, which includes using toilet, bedpan or urinal?   [] 1   [x] 2   [] 3   [] 4  
4. Putting on and taking off regular upper body clothing? [] 1   [x] 2   [] 3   [] 4  
5. Taking care of personal grooming such as brushing teeth? [] 1   [] 2   [x] 3   [] 4  
6. Eating meals? [] 1   [] 2   [x] 3   [] 4  
© , Trustees of [address] [place] [id number], under license to [email or web address]. All rights reserved Score:  Initial: 14 Most Recent: X (Date: -- ) Interpretation of Tool:  Represents activities that are increasingly more difficult (i.e. Bed mobility, Transfers, Gait). Medical Necessity:    
Patient demonstrates  
fair 
 rehab potential due to higher previous functional level. Reason for Services/Other Comments: Patient continues to require skilled intervention due to  
medical complications and patient unable to attend/participate in therapy as expected Ethel Smith Use of outcome tool(s) and clinical judgement create a POC that gives a: LOW COMPLEXITY  
 
 
 
TREATMENT:  
(In addition to Assessment/Re-Assessment sessions the following treatments were rendered) Pre-treatment Symptoms/Complaints:   
Pain: Initial:  
Pain Intensity 1: 0 /10 Post Session:  same Self Care: (15 minutes): Procedure(s) (per grid) utilized to improve and/or restore self-care/home management as related to toileting and grooming. Required moderate verbal and   cueing to facilitate activities of daily living skills. Neuromuscular Re-education: ( 12):  Exercise/activities per grid below to improve balance, coordination, and posture. Required maximal verbal and tactile cues to promote static and dynamic balance in sitting and standing. Braces/Orthotics/Lines/Etc:  
IV 
O2 Device: Room air Treatment/Session Assessment:   
Response to Treatment:  tolerated well with no issues noted. Interdisciplinary Collaboration:  
Physical Therapist 
Occupational Therapist 
Registered Nurse After treatment position/precautions:  
Up in chair Bed alarm/tab alert on Bed/Chair-wheels locked Call light within reach RN notified Compliance with Program/Exercises: Will assess as treatment progresses. Recommendations/Intent for next treatment session: \"Next visit will focus on advancements to more challenging activities and reduction in assistance provided\". Total Treatment Duration: OT Patient Time In/Time Out Time In: 9997 Time Out: 1051 Luis Alberto Shah OT

## 2020-08-18 NOTE — PROGRESS NOTES
Interdisciplinary Rounds completed. Nursing, Case Management, and Physician  present. Plan of care reviewed and updated. Will discharge home with long term care aids. D/C tele.

## 2020-08-18 NOTE — PROGRESS NOTES
Visited, as requested, to address [facility]. Reviewed chart and confirmed with staff that pt is capable of making health care decisions. Pt took a copy of the HCPOA form, but he did not want to complete it at this time. He will review with family. Provided 's call phone number so patient can call, if needed, for further assistance. Listened, as patient talked about his multiple children. Per pt, he had 16 children- 13 of which are living. No family was present at this time. Pt hopeful to go home in 1-2 days. Assured pt of this 's prayers. Follow-up as needed. Millicent Griggs MDiv, [address] [facility]

## 2020-08-18 NOTE — PROGRESS NOTES
[facility] [place] [facility] Admission Date: 8/16/2020 Renal Daily Progress Note: 8/18/2020 The patient's chart is reviewed and the patient is discussed with the staff. Follow up CKD IV/V Subjective:  
Patient seen and examined on rounds, sitting up with PT reports weakness with upper extremity tremors with poor appetite. Pt states he does not want to talk about dialysis. LE wound pain controlled. Labs and chart reviewed- Cr/ BUN down but still high 9.89/114 ROS: 
General: no fever/chills CV: no CP Lung: no SOB, no cough GI: no N/V/D Ext: no edema, + right shoulder chronic pain Current Facility-Administered Medications Medication Dose Route Frequency  phenol throat spray (CHLORASEPTIC) 1 Spray  1 Spray Oral PRN  
 dextrose 5% infusion  75 mL/hr IntraVENous CONTINUOUS  
 traMADoL (ULTRAM) tablet 50 mg  50 mg Oral Q8H PRN  
 HYDROmorphone (PF) (DILAUDID) injection 0.2 mg  0.2 mg IntraVENous Q4H PRN  
 insulin glargine (LANTUS) injection 15 Units  15 Units SubCUTAneous DAILY  amLODIPine (NORVASC) tablet 10 mg  10 mg Oral 7am  
 calcium acetate (phosphate binder) (PHOSLO) tablet 1,334 mg  1,334 mg Oral TID WITH MEALS  cloNIDine (CATAPRES) 0.3 mg/24 hr patch 1 Patch  1 Patch TransDERmal Q7D  
 hydrALAZINE (APRESOLINE) tablet 100 mg  100 mg Oral TID  lipase-protease-amylase (CREON 36,000) capsule 2 Cap (Patient Supplied)  2 Cap Oral TID WITH MEALS  metoprolol succinate (TOPROL-XL) XL tablet 50 mg  50 mg Oral 7am  
 pravastatin (PRAVACHOL) tablet 40 mg  40 mg Oral QHS  sodium bicarbonate tablet 650 mg  650 mg Oral TID  ferrous sulfate tablet 325 mg  325 mg Oral BID WITH MEALS  sodium chloride (NS) flush 5-40 mL  5-40 mL IntraVENous Q8H  
 sodium chloride (NS) flush 5-40 mL  5-40 mL IntraVENous PRN  
 acetaminophen (TYLENOL) tablet 650 mg  650 mg Oral Q6H PRN  Or  
 acetaminophen (TYLENOL) suppository 650 mg  650 mg Rectal Q6H PRN  
  polyethylene glycol (MIRALAX) packet 17 g  17 g Oral DAILY PRN  promethazine (PHENERGAN) tablet 12.5 mg  12.5 mg Oral Q6H PRN Or  
 ondansetron (ZOFRAN) injection 4 mg  4 mg IntraVENous Q6H PRN  
 clindamycin (CLEOCIN) 600mg D5W 50mL IVPB (premix)  600 mg IntraVENous Q8H  
 insulin lispro (HUMALOG) injection   SubCUTAneous AC&HS  
 dextrose 40% (GLUTOSE) oral gel 1 Tube  15 g Oral PRN  
 glucagon (GLUCAGEN) injection 1 mg  1 mg IntraMUSCular PRN  
 dextrose (D50W) injection syrg 12.5-25 g  25-50 mL IntraVENous PRN  
 cefepime (MAXIPIME) 0.5 g in 0.9% sodium chloride 100 mL IVPB  0.5 g IntraVENous Q24H  
 alcohol 62% (NOZIN) nasal  1 Ampule  1 Ampule Topical Q12H Objective:  
 
Vitals:  
 08/17/20 2239 08/18/20 6028 08/18/20 3787 08/18/20 3515 BP: 129/62 144/65  156/68 Pulse: 80 83  77 Resp: 19 18  20 Temp: 98.7 °F (37.1 °C) 98.2 °F (36.8 °C)  98.1 °F (36.7 °C) SpO2: 100% 100%  96% Weight:   68.9 kg (152 lb) Height:      
 
Intake and Output:  
08/16 1901 - 08/18 0700 In: 7703 [P.O.:360; I.V.:815] Out: 0 No intake/output data recorded. Physical Exam:  
Constitutional:  the patient is well developed and in no acute distress HEENT:  Sclera clear, pupils equal, oral mucosa moist 
Lungs: clear bilaterally Cardiovascular:  Regular rate, S1, S2, no Rub Abd/GI: soft and non-tender; with positive bowel sounds. Ext: warm without cyanosis. There is no lower leg edema. Skin:  no jaundice or rashes, LLE wound, dressing intact Neuro: + asterixis and upper extremity jerking Psychiatric: Calm. LAB Recent Labs 08/18/20 
0547 08/17/20 
0309 08/16/20 
1322 WBC 8.1 7.3 8.8 HGB 7.0* 7.6* 8.0*  
HCT 23.0* 26.0* 26.3*  
* 144* 188 Recent Labs 08/18/20 
0547 08/17/20 
1547 08/17/20 
0309 08/16/20 
1450 08/16/20 
1322 *  --  158* 158* 154* K 4.1  --  4.1 4.0 6.0*  
*  --  128* 127* 124* CO2 18*  --  14* 15* 16*  
 GLU 74  --  155* 217* 244* *  --  117* 115* 119* CREA 9.89*  --  10.20* 10.10* 10.40* PHOS  --  6.2*  --   --   --   
ALB  --   --   --  2.3* 2.3* No results for input(s): PH, PCO2, PO2, HCO3 in the last 72 hours. Assessment:  (Medical Decision Making) Hospital Problems  Date Reviewed: 7/23/2020 Codes Class Noted POA  
 UTI (urinary tract infection) ICD-10-CM: N39.0 ICD-9-CM: 599.0  8/16/2020 Unknown Wound infection ICD-10-CM: T14. 8XXA, L08.9 ICD-9-CM: 958.3  8/16/2020 Unknown Overview Signed 8/16/2020  8:07 PM by Shubham Tabares MD  
  Rt leg ESRD (end stage renal disease) ([facility] ICD-10-CM: N18.6 ICD-9-CM: 585.6  11/3/2019 Yes Hypernatremia ICD-10-CM: E87.0 ICD-9-CM: 276.0  4/18/2018 Yes * (Principal) Acute metabolic encephalopathy [id number]: G93.41 
ICD-9-CM: 348.31  4/13/2018 Unknown Uncontrolled diabetes mellitus, with long-term current use of insulin (HCC) (Chronic) ICD-10-CM: E11.65, Z79.4 ICD-9-CM: 250.02, V58.67  2/28/2018 Yes Venous stasis ulcer of left lower extremity ([facility].) ICD-10-CM: X14.286, W62.096 ICD-9-CM: 454.0  4/1/2017 Yes HTN (hypertension) ICD-10-CM: I10 
ICD-9-CM: 401.9  9/22/2016 Yes Chronic pancreatitis (HCC) (Chronic) ICD-10-CM: K86.1 ICD-9-CM: 021.8  9/22/2016 Yes Plan:  (Medical Decision Making) 1. CKD V now ESRD with azotemia and uremic symptoms pt refuses dialysis at this time. Appreciate Palliative for goals of care 2. Hypernatremia continue  D5W 
 
3. Chronic leg wound LLE - wound care, abx per hosp 4. HTN- continue antihypertensives 5. DM type II- SSI 6. Anemia transfuse < 7.0,  Fe studies adequate, add yuni 7. Debility Freda Robledo, NP

## 2020-08-18 NOTE — ACP (ADVANCE CARE PLANNING)
Visited, as requested, to address [address] [facility]. Reviewed chart and confirmed with staff that pt is capable of making health care decisions. Pt took a copy of the HCPOA form, but he did not want to complete it at this time. He will review with family. Provided 's call phone number so patient can call, if needed, for further assistance. Follow-up as needed. Kayleigh Licona MDiv, [facility]

## 2020-08-18 NOTE — PROGRESS NOTES
Hospitalist Progress Note Admit Date:  2020 12:52 PM  
Name:  Theodore Ruiz Age:  79 y.o. 
:  1940 MRN:  [id number] PCP:  Chelsy Solis MD 
Treatment Team: Attending Provider: Rosalina Lujan MD; Charge Nurse: Abdiarshid Reilly; Consulting Provider: Tyrell Augustin MD; Care Manager: Thuy Troncoso, RN; Consulting Provider: Corona Palmer MD; Consulting Provider: Rosalina Lujan MD; Utilization Review: Kenneth Palacios RN; Primary Nurse: Severa Llanos, RN; Physical Therapist: Brenda Baker PT; Occupational Therapist: Skylar Galindo OT Subjective: F/U metabolic encephalopathy, UTI, ALICJA 
  
\"79year-old  can male patient who lives alone at home chronic debility -says walks with the help of a cane, chronic right lower extremity wounds, end-stage renal disorder does not want to be on dialysis, hypertension, hyperlipidemia, acid reflux, diabetic neuropathy, osteoarthritis, anemia of chronic disease and type 2 diabetes mellitus on insulin. 
  
When home health nurse went to see the patient found to have sugars of 600, fire department and EMS was called, home health nurse also noticed that patient was likely having altered mental state, also noticed large skin tear from left knee and left ankle which is new as per triage note. \" 
  
UTI found. Urine culture growing gram negative rods.  patient seen and examined at bedside Complaining of a sore throat. Will give phenol spray Hgb 7 today, no transfusion yet GNR growing in urine - on cefepime - follow up cultures Palliative care following - DNI only at this time Nephrology following - IVF as tolerated. Bicarb PO. Refusing dialysis because he is \"70 years old and does not want to\" and \"I am too old\" Wound care following for bilateral lower extremity wounds. He is complaining of bilateral leg burning, worse with dressing changes. Nursing notes and chart reviewed. Review of Systems negative with exception of pertinent positives noted above. Objective:  
 
Patient Vitals for the past 24 hrs: 
 Temp Pulse Resp BP SpO2  
08/18/20 0717 98.1 °F (36.7 °C) 77 20 156/68 96 % 08/18/20 0314 98.2 °F (36.8 °C) 83 18 144/65 100 % 08/17/20 2239 98.7 °F (37.1 °C) 80 19 129/62 100 % 08/17/20 1905 98 °F (36.7 °C) 86 18 123/59 100 % 08/17/20 1522 97.8 °F (36.6 °C) 87 19 150/68 97 % 08/17/20 1202 97.8 °F (36.6 °C) (!) 104 18 169/70 99 % Oxygen Therapy O2 Sat (%): 96 % (08/18/20 0717) Pulse via Oximetry: 91 beats per minute (08/16/20 2001) O2 Device: Room air (08/16/20 2001) Intake/Output Summary (Last 24 hours) at 8/18/2020 3728 Last data filed at 8/17/2020 1816 Gross per 24 hour Intake 240 ml Output 0 ml Net 240 ml General:    Frail. Alert. Speaks slowly but coherently. Not on oxygen CV:   RRR. No murmur, rub, or gallop. Lungs:   CTAB. No wheezing, rhonchi, or rales. Abdomen:   Soft, nontender, nondistended. Extremities: Bilateral lower extremities wrapped in ace bandages. Will not unwrap as I saw the pictures. Skin:     No rashes or jaundice. Data Review: 
I have reviewed all labs, meds, telemetry events, and studies from the last 24 hours. Recent Results (from the past 24 hour(s)) GLUCOSE, POC Collection Time: 08/17/20 12:00 PM  
Result Value Ref Range Glucose (POC) 406 (H) 65 - 100 mg/dL GLUCOSE, POC Collection Time: 08/17/20  3:21 PM  
Result Value Ref Range Glucose (POC) 346 (H) 65 - 100 mg/dL PHOSPHORUS Collection Time: 08/17/20  3:47 PM  
Result Value Ref Range Phosphorus 6.2 (H) 2.3 - 3.7 MG/DL  
VITAMIN D, 25 HYDROXY Collection Time: 08/17/20  3:47 PM  
Result Value Ref Range Vitamin D 25-Hydroxy 17.2 (L) 30.0 - 100.0 ng/mL PTH INTACT Collection Time: 08/17/20  3:47 PM  
Result Value Ref Range  Calcium 6.3 (L) 8.3 - 10.4 MG/DL  
 PTH, Intact 200.5 (H) 18.5 - 88.0 pg/mL FERRITIN Collection Time: 08/17/20  3:47 PM  
Result Value Ref Range Ferritin 563 (H) 8 - 388 NG/ML  
TRANSFERRIN SATURATION Collection Time: 08/17/20  3:47 PM  
Result Value Ref Range Iron 41 35 - 150 ug/dL TIBC 130 (L) 250 - 450 ug/dL Transferrin Saturation 32 >20 % GLUCOSE, POC Collection Time: 08/17/20  8:55 PM  
Result Value Ref Range Glucose (POC) 184 (H) 65 - 100 mg/dL METABOLIC PANEL, BASIC Collection Time: 08/18/20  5:47 AM  
Result Value Ref Range Sodium 156 (H) 136 - 145 mmol/L Potassium 4.1 3.5 - 5.1 mmol/L Chloride 126 (H) 98 - 107 mmol/L  
 CO2 18 (L) 21 - 32 mmol/L Anion gap 12 7 - 16 mmol/L Glucose 74 65 - 100 mg/dL  (H) 8 - 23 MG/DL Creatinine 9.89 (H) 0.8 - 1.5 MG/DL  
 GFR est AA 7 (L) >60 ml/min/1.73m2 GFR est non-AA 5 (L) >60 ml/min/1.73m2 Calcium 6.5 (L) 8.3 - 10.4 MG/DL  
CBC W/O DIFF Collection Time: 08/18/20  5:47 AM  
Result Value Ref Range WBC 8.1 4.3 - 11.1 K/uL  
 RBC 2.72 (L) 4.23 - 5.6 M/uL HGB 7.0 (L) 13.6 - 17.2 g/dL HCT 23.0 (L) 41.1 - 50.3 % MCV 84.6 79.6 - 97.8 FL  
 MCH 25.7 (L) 26.1 - 32.9 PG  
 MCHC 30.4 (L) 31.4 - 35.0 g/dL  
 RDW 16.8 (H) 11.9 - 14.6 % PLATELET 055 (L) 549 - 450 K/uL MPV 12.7 (H) 9.4 - 12.3 FL ABSOLUTE NRBC 0.00 0.0 - 0.2 K/uL GLUCOSE, POC Collection Time: 08/18/20  7:14 AM  
Result Value Ref Range Glucose (POC) 87 65 - 100 mg/dL All Micro Results Procedure Component Value Units Date/Time CULTURE, URINE [[id number]]  (Abnormal) Collected:  08/16/20 1417 Order Status:  Completed Specimen:  Cath Urine Updated:  08/18/20 5606 Special Requests: NO SPECIAL REQUESTS Culture result:    
  >100,000 COLONIES/mL GRAM NEGATIVE RODS IDENTIFICATION AND SUSCEPTIBILITY TO FOLLOW CULTURE IN PROGRESS,FURTHER UPDATES TO FOLLOW Current Meds: Current Facility-Administered Medications Medication Dose Route Frequency  dextrose 5% infusion  75 mL/hr IntraVENous CONTINUOUS  
 traMADoL (ULTRAM) tablet 50 mg  50 mg Oral Q8H PRN  
 HYDROmorphone (PF) (DILAUDID) injection 0.2 mg  0.2 mg IntraVENous Q4H PRN  
 insulin glargine (LANTUS) injection 15 Units  15 Units SubCUTAneous DAILY  amLODIPine (NORVASC) tablet 10 mg  10 mg Oral 7am  
 calcium acetate (phosphate binder) (PHOSLO) tablet 1,334 mg  1,334 mg Oral TID WITH MEALS  cloNIDine (CATAPRES) 0.3 mg/24 hr patch 1 Patch  1 Patch TransDERmal Q7D  
 hydrALAZINE (APRESOLINE) tablet 100 mg  100 mg Oral TID  lipase-protease-amylase (CREON 36,000) capsule 2 Cap (Patient Supplied)  2 Cap Oral TID WITH MEALS  metoprolol succinate (TOPROL-XL) XL tablet 50 mg  50 mg Oral 7am  
 pravastatin (PRAVACHOL) tablet 40 mg  40 mg Oral QHS  sodium bicarbonate tablet 650 mg  650 mg Oral TID  ferrous sulfate tablet 325 mg  325 mg Oral BID WITH MEALS  sodium chloride (NS) flush 5-40 mL  5-40 mL IntraVENous Q8H  
 sodium chloride (NS) flush 5-40 mL  5-40 mL IntraVENous PRN  
 acetaminophen (TYLENOL) tablet 650 mg  650 mg Oral Q6H PRN Or  
 acetaminophen (TYLENOL) suppository 650 mg  650 mg Rectal Q6H PRN  polyethylene glycol (MIRALAX) packet 17 g  17 g Oral DAILY PRN  promethazine (PHENERGAN) tablet 12.5 mg  12.5 mg Oral Q6H PRN Or  
 ondansetron (ZOFRAN) injection 4 mg  4 mg IntraVENous Q6H PRN  
 clindamycin (CLEOCIN) 600mg D5W 50mL IVPB (premix)  600 mg IntraVENous Q8H  
 insulin lispro (HUMALOG) injection   SubCUTAneous AC&HS  
 dextrose 40% (GLUTOSE) oral gel 1 Tube  15 g Oral PRN  
 glucagon (GLUCAGEN) injection 1 mg  1 mg IntraMUSCular PRN  
 dextrose (D50W) injection syrg 12.5-25 g  25-50 mL IntraVENous PRN  
 cefepime (MAXIPIME) 0.5 g in 0.9% sodium chloride 100 mL IVPB  0.5 g IntraVENous Q24H  
 alcohol 62% (NOZIN) nasal  1 Ampule  1 Ampule Topical Q12H Other Studies (last 24 hours): No results found. Assessment and Plan:  
 
Hospital Problems as of 8/18/2020 Date Reviewed: 7/23/2020 Codes Class Noted - Resolved POA  
 UTI (urinary tract infection) ICD-10-CM: N39.0 ICD-9-CM: 599.0  8/16/2020 - Present Unknown Wound infection ICD-10-CM: T14. 8XXA, L08.9 ICD-9-CM: 958.3  8/16/2020 - Present Unknown Overview Signed 8/16/2020  8:07 PM by Gunner Perez MD  
  Rt leg Functional quadriplegia (HCC) ICD-10-CM: R53.2 ICD-9-CM: 780.72  7/26/2020 - Present ESRD (end stage renal disease) ([facility] ICD-10-CM: N18.6 ICD-9-CM: 585.6  11/3/2019 - Present Yes Hypernatremia ICD-10-CM: E87.0 ICD-9-CM: 276.0  4/18/2018 - Present Yes * (Principal) Acute metabolic encephalopathy [id number]: G93.41 
ICD-9-CM: 348.31  4/13/2018 - Present Unknown Uncontrolled diabetes mellitus, with long-term current use of insulin (HCC) (Chronic) ICD-10-CM: E11.65, Z79.4 ICD-9-CM: 250.02, V58.67  2/28/2018 - Present Yes Venous stasis ulcer of left lower extremity ([facility] ICD-10-CM: E18.167, C50.739 ICD-9-CM: 454.0  4/1/2017 - Present Yes Chronic systolic congestive heart failure (HCC) (Chronic) ICD-10-CM: M68.29 ICD-9-CM: 428.22, 428.0  9/23/2016 - Present HTN (hypertension) ICD-10-CM: I10 
ICD-9-CM: 401.9  9/22/2016 - Present Yes Chronic pancreatitis (HCC) (Chronic) ICD-10-CM: K86.1 ICD-9-CM: 080.3  9/22/2016 - Present Yes PLAN:   
#Metabolic encephalopathy, Resolved. Likely from UTI.  
  
#UTI - Currently on Cefepime. Urine culture with gram negative rods. Follow up cultures/sensitivity 
  
#ALICJA on CKD stage 5- nephrology for further recommendations since tx options limited. Strict Is and Os.  
  
#Anemia of chronic disease- If Hg less than 7, will need transfusion. Dc heparin. No obvious bleeding on exam. Iron supplements  
  #Hypernatremia, slow improving improving. D5 IV fluids. ~3.9L free water defecit 
nephrology following Check BMP daily for Na and replace as needed 
  #Metabolic acidosis - Likely from worsening renal failure. nephrology put on Na bicarb tablets.  
  
#Chronic leg wounds with skin tearing to Left LE  
-wound care following.  
-image of LLE in chart review, media  
-No signs of active infection but will leave on Clindamycin today.  
  
HTN- Resistant due to renal failure. Clonidine 0.3mg patch weekly. Hydralazine. BB 
  
DM type II - SS 
  
Chronic debility- PT recommends SNF if he wants dialysis, otherwise home health/24hour care 
  
Headache -  Tramadol but at q 8 hr PRN 
  
Chronic pancreatitis - Creon  
  
Patient states he is not ready for hospice but is willing to discuss care with palliative care as a transition.  
  
Patient would like everything except to be intubated. DNI 
 
DC planning/Dispo:  Home as he does not want dialysis? DVT ppx:  Cannot due hepain due to anemia. Cannot due SCD due to lower extremity wounds.   
 
Signed: 
Nik Ventura MD

## 2020-08-18 NOTE — PROGRESS NOTES
Palliative Care Progress Note Patient: Faustina Ward MRN: [id number]  SSN: [id number] YOB: 1940  Age: 79 y.o. Sex: male Assessment/Plan: Chief Complaint/Interval History: notes bilateral LE burning pain. Chronic. Notes general fatigue as well Principal Diagnosis:   
· Pain, limb  M79.609 Additional Diagnoses: · Debility, Unspecified  R53.81 
· Frailty  R54 · Counseling, Encounter for Medical Advice  Z71.9 
· Encounter for Palliative Care  Z51.5 Palliative Performance Scale (PPS) Medical Decision Making:  
Reviewed and summarized labs and imaging. Met with pt at bedside. Pt notes ongoing LE burning pain that is chronic. Treated with leg wraps at home. We discussed home situation. Pt currently has aide at home 5 days a week for 7 hours a day. He has discussed hospice however hospice would not provide the same level of care as he currently receives. We discussed his progressive renal failure. No urine output documented however pt notes he is producing urine as he wet the bed this am.   
I reviewed code status again and explained that should he survive a cardiac arrest then he would likely require intubation to be stabilized. Pt remains undecided at this time regarding cpr, shock. Start gabapentin 100 mg po tid for leg pain Will call and update pt son per his request.   
Will follow. Will discuss findings with members of the interdisciplinary team.   
 
More than 50% of this 25 minute visit was spent counseling and coordination of care as outlined above. Subjective:  
 
Review of Systems negative with the exception of as noted above Objective:  
 
Visit Vitals /68 (BP 1 Location: Right arm, BP Patient Position: Head of bed elevated (Comment degrees)) Pulse 77 Temp 98.1 °F (36.7 °C) Resp 20 Ht 6' (1.829 m) Wt 152 lb (68.9 kg) SpO2 96% BMI 20.61 kg/m² Physical Exam: 
 
General:  Cooperative. No acute distress. Eyes:  Conjunctivae/corneas clear Nose: Nares normal. Septum midline. Neck: Supple, symmetrical, trachea midline, no JVD Lungs:   Clear to auscultation bilaterally, unlabored Heart:  Regular rate and rhythm, no murmur Abdomen:   Soft, non-tender, non-distended Extremities: Normal, atraumatic, no cyanosis or edema Skin: Skin color, texture, turgor normal. No rash or lesions. Neurologic: Nonfocal  
Psych: Alert and oriented Signed By: Kaylene Siddiqui MD   
 August 18, 2020

## 2020-08-19 NOTE — PROGRESS NOTES
Palliative Care Progress Note Patient: Lysbeth Hatchet MRN: [id number]  SSN: [id number] YOB: 1940  Age: 79 y.o. Sex: male Assessment/Plan: Chief Complaint/Interval History: notes bilateral LE burning pain. Chronic. Notes general fatigue as well Principal Diagnosis:   
· Pain, limb  M79.609 Additional Diagnoses: · Debility, Unspecified  R53.81 
· Frailty  R54 · Counseling, Encounter for Medical Advice  Z71.9 
· Encounter for Palliative Care  Z51.5 Palliative Performance Scale (PPS) Medical Decision Making:  
Reviewed and summarized labs and imaging. Met with pt at bedside. Feels pain meds have provided some relief. He states he is anxious to get home as he has some business to take care of. Discussed we would like to get him home safely and with any resources he may need. Pt states he has aides in his home for 7 hours during the weekdays and 3-4 hours on weekends. DNI is on file and confirmed. I have discussed full DNR which would be appropriate for pt given chronic diseases however he does not wish to change status at this time. Will discuss findings with members of the interdisciplinary team.   
 
More than 50% of this 25 minute visit was spent counseling and coordination of care as outlined above. Subjective:  
 
Review of Systems negative with the exception of as noted above Objective:  
 
Visit Vitals /65 (BP 1 Location: Right arm, BP Patient Position: At rest;Head of bed elevated (Comment degrees)) Pulse 75 Temp 97.8 °F (36.6 °C) Resp 18 Ht 6' (1.829 m) Wt 148 lb 11.4 oz (67.5 kg) SpO2 99% BMI 20.17 kg/m² Physical Exam: 
 
General:  Cooperative. No acute distress. Eyes:  Conjunctivae/corneas clear Nose: Nares normal. Septum midline. Neck: Supple, symmetrical, trachea midline, no JVD Lungs:   Clear to auscultation bilaterally, unlabored Heart:  Regular rate and rhythm, no murmur Abdomen:   Soft, non-tender, non-distended Extremities: Normal, atraumatic, no cyanosis or edema Skin: Skin color, texture, turgor normal. No rash or lesions. Neurologic: Nonfocal  
Psych: Alert and oriented Signed By: Galen Murcia MD   
 August 19, 2020

## 2020-08-19 NOTE — PROGRESS NOTES
Care Management Interventions PCP Verified by CM: Yes(Dr. Marsa Cheadle) Mode of Transport at Discharge: BLS Transition of Care Consult (CM Consult): Discharge Planning Discharge Durable Medical Equipment: No(Cane) Physical Therapy Consult: Yes Occupational Therapy Consult: Yes Speech Therapy Consult: No 
Current Support Network: Own Home, Lives Alone Confirm Follow Up Transport: Family The Patient and/or Patient Representative was Provided with a Choice of Provider and Agrees with the Discharge Plan?: Yes Name of the Patient Representative Who was Provided with a Choice of Provider and Agrees with the Discharge Plan: Patient and son Rita lAlison Freedom of Choice List was Provided with Basic Dialogue that Supports the Patient's Individualized Plan of Care/Goals, Treatment Preferences and Shares the Quality Data Associated with the Providers?: Yes [employer] Information Provided?: No 
Discharge Location Discharge Placement: Home with home health Patient to discharge home today with [facility]. Patient refuses STR and wants to only return home. Patient refuses HD and says he will never agree to that. CM spoke with patient and his son regarding patient wishes. Patient stated and informed his son he does not want to come back to the hospital.  CM and Dr. Melanie Middleton was present for this conversation. Patient will be transported home via [facility] at 1600 per patient request.  Patient notified of transport time and verbalized understanding. All milestones for discharge have been met.

## 2020-08-19 NOTE — PROGRESS NOTES
A [place] D4P [facility] Admission Date: 8/16/2020 Renal Daily Progress Note: 8/19/2020 The patient's chart is reviewed and the patient is discussed with the staff. Follow up CKD IV/V Subjective:  
Patient seen and examined on rounds, sitting up with PT reports weakness with upper extremity tremors with poor appetite. Pt states he does not want to talk about dialysis. LE wound pain controlled. Labs and chart reviewed-  
 
ROS: 
General: no fever/chills CV: no CP Lung: no SOB, no cough GI: no N/V/D Ext: no edema, + right shoulder chronic pain Current Facility-Administered Medications Medication Dose Route Frequency  HYDROmorphone (DILAUDID) tablet 0.5 mg  0.5 mg Oral Q4H PRN  phenol throat spray (CHLORASEPTIC) 1 Spray  1 Spray Oral PRN  
 gabapentin (NEURONTIN) capsule 300 mg  300 mg Oral DAILY  epoetin laura-epbx (RETACRIT) 12,000 Units combo injection  12,000 Units SubCUTAneous Q7D  
 clindamycin (CLEOCIN) capsule 600 mg  600 mg Oral Q8H  
 dextrose 5% infusion  75 mL/hr IntraVENous CONTINUOUS  
 [Held by provider] traMADoL (ULTRAM) tablet 50 mg  50 mg Oral Q8H PRN  
 insulin glargine (LANTUS) injection 15 Units  15 Units SubCUTAneous DAILY  amLODIPine (NORVASC) tablet 10 mg  10 mg Oral 7am  
 calcium acetate (phosphate binder) (PHOSLO) tablet 1,334 mg  1,334 mg Oral TID WITH MEALS  cloNIDine (CATAPRES) 0.3 mg/24 hr patch 1 Patch  1 Patch TransDERmal Q7D  
 hydrALAZINE (APRESOLINE) tablet 100 mg  100 mg Oral TID  lipase-protease-amylase (CREON 36,000) capsule 2 Cap (Patient Supplied)  2 Cap Oral TID WITH MEALS  metoprolol succinate (TOPROL-XL) XL tablet 50 mg  50 mg Oral 7am  
 pravastatin (PRAVACHOL) tablet 40 mg  40 mg Oral QHS  sodium bicarbonate tablet 650 mg  650 mg Oral TID  ferrous sulfate tablet 325 mg  325 mg Oral BID WITH MEALS  sodium chloride (NS) flush 5-40 mL  5-40 mL IntraVENous Q8H  
  sodium chloride (NS) flush 5-40 mL  5-40 mL IntraVENous PRN  
 acetaminophen (TYLENOL) tablet 650 mg  650 mg Oral Q6H PRN Or  
 acetaminophen (TYLENOL) suppository 650 mg  650 mg Rectal Q6H PRN  polyethylene glycol (MIRALAX) packet 17 g  17 g Oral DAILY PRN  promethazine (PHENERGAN) tablet 12.5 mg  12.5 mg Oral Q6H PRN Or  
 ondansetron (ZOFRAN) injection 4 mg  4 mg IntraVENous Q6H PRN  
 insulin lispro (HUMALOG) injection   SubCUTAneous AC&HS  
 dextrose 40% (GLUTOSE) oral gel 1 Tube  15 g Oral PRN  
 glucagon (GLUCAGEN) injection 1 mg  1 mg IntraMUSCular PRN  
 dextrose (D50W) injection syrg 12.5-25 g  25-50 mL IntraVENous PRN  
 cefepime (MAXIPIME) 0.5 g in 0.9% sodium chloride 100 mL IVPB  0.5 g IntraVENous Q24H  
 alcohol 62% (NOZIN) nasal  1 Ampule  1 Ampule Topical Q12H Objective:  
 
Vitals:  
 08/19/20 0035 08/19/20 0543 08/19/20 0710 08/19/20 1100 BP: 156/73 147/76 134/65 140/67 Pulse: 64 77 75 77 Resp: 18 18 18 16 Temp: 98 °F (36.7 °C) 98.2 °F (36.8 °C) 97.8 °F (36.6 °C) 97.8 °F (36.6 °C) SpO2: 100% 99% 99% 99% Weight:  67.5 kg (148 lb 11.4 oz) Height:      
 
Intake and Output:  
No intake/output data recorded. 08/19 0701 - 08/19 1900 In: 699 [P.O.:480; I.V.:219] Out: 400 [Urine:400] Physical Exam:  
Constitutional:  the patient is well developed and in no acute distress HEENT:  Sclera clear, pupils equal, oral mucosa moist 
Lungs: clear bilaterally Cardiovascular:  Regular rate, S1, S2, no Rub Abd/GI: soft and non-tender; with positive bowel sounds. Ext: warm without cyanosis. There is no lower leg edema. Skin:  no jaundice or rashes, LLE wound, dressing intact Neuro: + asterixis and upper extremity jerking Psychiatric: Calm. LAB Recent Labs 08/19/20 
1456 08/18/20 
6433 08/17/20 
0309 WBC 7.4 8.1 7.3 HGB 7.5* 7.0* 7.6* HCT 24.7* 23.0* 26.0*  
 143* 144* Recent Labs 08/19/20 
0524 08/18/20 3462 08/17/20 
1547 08/17/20 
0309 * 156*  --  158* K 4.1 4.1  --  4.1 * 126*  --  128* CO2 17* 18*  --  14* * 74  --  155* * 114*  --  117* CREA 9.69* 9.89*  --  10.20* MG 1.5*  --   --   --   
PHOS  --   --  6.2*  -- No results for input(s): PH, PCO2, PO2, HCO3 in the last 72 hours. Assessment:  (Medical Decision Making) Hospital Problems  Date Reviewed: 7/23/2020 Codes Class Noted POA  
 UTI (urinary tract infection) ICD-10-CM: N39.0 ICD-9-CM: 599.0  8/16/2020 Unknown Wound infection ICD-10-CM: T14. 8XXA, L08.9 ICD-9-CM: 958.3  8/16/2020 Unknown Overview Signed 8/16/2020  8:07 PM by Kaitlin Samano MD  
  Rt leg ESRD (end stage renal disease) ([facility] ICD-10-CM: N18.6 ICD-9-CM: 585.6  11/3/2019 Yes Hypernatremia ICD-10-CM: E87.0 ICD-9-CM: 276.0  4/18/2018 Yes * (Principal) Acute metabolic encephalopathy [id number]: G93.41 
ICD-9-CM: 348.31  4/13/2018 Unknown Uncontrolled diabetes mellitus, with long-term current use of insulin (HCC) (Chronic) ICD-10-CM: E11.65, Z79.4 ICD-9-CM: 250.02, V58.67  2/28/2018 Yes Venous stasis ulcer of left lower extremity ([facility] ICD-10-CM: Z73.453, K34.253 ICD-9-CM: 454.0  4/1/2017 Yes HTN (hypertension) ICD-10-CM: I10 
ICD-9-CM: 401.9  9/22/2016 Yes Chronic pancreatitis (HCC) (Chronic) ICD-10-CM: K86.1 ICD-9-CM: 876.3  9/22/2016 Yes Plan:  (Medical Decision Making) 1. CKD V now ESRD with azotemia and uremic symptoms pt refuses dialysis at this time. Appreciate Palliative for goals of care 2. Hypernatremia free water 3. Chronic leg wound LLE - w 
 
4. HTN- co 
 
5. DM type II- SSI 6. Anemia transfuse < 7.0,  Fe studies adequate, add yuni 7. Debility Ok to discharge Laila Walker MD

## 2020-08-19 NOTE — PROGRESS NOTES
Hospitalist Progress Note Admit Date:  2020 12:52 PM  
Name:  Rommel Castillo Age:  79 y.o. 
:  1940 MRN:  [id number] PCP:  Edis Daly MD 
Treatment Team: Attending Provider: Ria Jaime MD; Charge Nurse: Kashmir Garces; Consulting Provider: Ayse Jaimes MD; Care Manager: Gabriella Dennis RN; Consulting Provider: Bryson Franklin MD; Consulting Provider: Ria Jaime MD; Utilization Review: Gregorio Valdovinos RN Subjective: F/U metabolic encephalopathy, UTI, ALICJA 
  
\"79year-old  can male patient who lives alone at home chronic debility -says walks with the help of a cane, chronic right lower extremity wounds, end-stage renal disorder does not want to be on dialysis, hypertension, hyperlipidemia, acid reflux, diabetic neuropathy, osteoarthritis, anemia of chronic disease and type 2 diabetes mellitus on insulin. 
  
When home health nurse went to see the patient found to have sugars of 600, fire department and EMS was called, home health nurse also noticed that patient was likely having altered mental state, also noticed large skin tear from left knee and left ankle which is new as per triage note. \" 
  
UTI found. Urine culture growing gram negative rods.  
  
 patient seen and examined at bedside 
  
Complaining of a sore throat. Will give phenol spray Hgb 7 today, no transfusion yet GNR growing in urine - on cefepime - follow up cultures Palliative care following - DNI only at this time Nephrology following - IVF as tolerated. Bicarb PO. Refusing dialysis because he is \"70 years old and does not want to\" and \"I am too old\" Wound care following for bilateral lower extremity wounds. He is complaining of bilateral leg burning, worse with dressing changes.  
  
 patient seen and examined at bedside Urine growing e coli sensitive to cefepime - continue Mag low, replace IV 
 Hypernatremia improving slowly - continue D5W Complains of neck pain due to left rotator cuff injury? Xray shoulder shows chronic changes only. He thinks his \"neck is broken\". He takes tramadol for pain control - not ideal given his kidney function. Switched to dilaudid PO. Stopped dilaudid IV. He is requesting to go home as has to [place] care of his house\". He required 2 person assist yesterday. He states he will be fine as he has a cane, walker, and rolling walker. He has CTLC at least 5 days a week at home. May be able to get home health. I do not feel he is a safe discharge at this time and is a high risk for readmission. Nursing notes and chart reviewed. Review of Systems negative with exception of pertinent positives noted above. Objective:  
 
Patient Vitals for the past 24 hrs: 
 Temp Pulse Resp BP SpO2  
08/19/20 0710 97.8 °F (36.6 °C) 75 18 134/65 99 % 08/19/20 0543 98.2 °F (36.8 °C) 77 18 147/76 99 % 08/19/20 0035 98 °F (36.7 °C) 64 18 156/73 100 % 08/18/20 2025 97.2 °F (36.2 °C) 66 18 146/70 97 % 08/18/20 1657 98 °F (36.7 °C) 80 18 134/68 98 % 08/18/20 1138 98.3 °F (36.8 °C) 81 18 144/61 98 % Oxygen Therapy O2 Sat (%): 99 % (08/19/20 0710) Pulse via Oximetry: 91 beats per minute (08/16/20 2001) O2 Device: Room air (08/16/20 2001) Intake/Output Summary (Last 24 hours) at 8/19/2020 8544 Last data filed at 8/19/2020 4012 Gross per 24 hour Intake 219 ml Output 400 ml Net -181 ml General:    frail. Alert. Not on oxygen CV:   RRR. No murmur, rub, or gallop. Lungs:   CTAB. No wheezing, rhonchi, or rales. Abdomen:   Soft, nontender, nondistended. Extremities: Tender bilateral lower extremities. Wrapped in ACE bandages. Skin:     As noted by wound care Data Review: 
I have reviewed all labs, meds, telemetry events, and studies from the last 24 hours. Recent Results (from the past 24 hour(s)) GLUCOSE, POC  Collection Time: 08/18/20 11:37 AM  
 Result Value Ref Range Glucose (POC) 171 (H) 65 - 100 mg/dL GLUCOSE, POC Collection Time: 08/18/20  4:56 PM  
Result Value Ref Range Glucose (POC) 286 (H) 65 - 100 mg/dL GLUCOSE, POC Collection Time: 08/18/20  9:15 PM  
Result Value Ref Range Glucose (POC) 402 (H) 65 - 100 mg/dL CBC WITH AUTOMATED DIFF Collection Time: 08/19/20  5:24 AM  
Result Value Ref Range WBC 7.4 4.3 - 11.1 K/uL  
 RBC 2.92 (L) 4.23 - 5.6 M/uL HGB 7.5 (L) 13.6 - 17.2 g/dL HCT 24.7 (L) 41.1 - 50.3 % MCV 84.6 79.6 - 97.8 FL  
 MCH 25.7 (L) 26.1 - 32.9 PG  
 MCHC 30.4 (L) 31.4 - 35.0 g/dL  
 RDW 16.8 (H) 11.9 - 14.6 % PLATELET 702 001 - 124 K/uL MPV 12.2 9.4 - 12.3 FL ABSOLUTE NRBC 0.03 0.0 - 0.2 K/uL  
 DF AUTOMATED NEUTROPHILS 66 43 - 78 % LYMPHOCYTES 21 13 - 44 % MONOCYTES 9 4.0 - 12.0 % EOSINOPHILS 2 0.5 - 7.8 % BASOPHILS 0 0.0 - 2.0 % IMMATURE GRANULOCYTES 1 0.0 - 5.0 %  
 ABS. NEUTROPHILS 4.9 1.7 - 8.2 K/UL  
 ABS. LYMPHOCYTES 1.6 0.5 - 4.6 K/UL  
 ABS. MONOCYTES 0.7 0.1 - 1.3 K/UL  
 ABS. EOSINOPHILS 0.2 0.0 - 0.8 K/UL  
 ABS. BASOPHILS 0.0 0.0 - 0.2 K/UL  
 ABS. IMM. GRANS. 0.1 0.0 - 0.5 K/UL METABOLIC PANEL, BASIC Collection Time: 08/19/20  5:24 AM  
Result Value Ref Range Sodium 155 (H) 136 - 145 mmol/L Potassium 4.1 3.5 - 5.1 mmol/L Chloride 125 (H) 98 - 107 mmol/L  
 CO2 17 (L) 21 - 32 mmol/L Anion gap 13 7 - 16 mmol/L Glucose 151 (H) 65 - 100 mg/dL  (H) 8 - 23 MG/DL Creatinine 9.69 (H) 0.8 - 1.5 MG/DL  
 GFR est AA 7 (L) >60 ml/min/1.73m2 GFR est non-AA 6 (L) >60 ml/min/1.73m2 Calcium 6.1 (L) 8.3 - 10.4 MG/DL MAGNESIUM Collection Time: 08/19/20  5:24 AM  
Result Value Ref Range Magnesium 1.5 (L) 1.8 - 2.4 mg/dL GLUCOSE, POC Collection Time: 08/19/20  7:16 AM  
Result Value Ref Range Glucose (POC) 167 (H) 65 - 100 mg/dL All Micro Results Procedure Component Value Units Date/Time CULTURE, URINE [[id number]]  (Abnormal)  (Susceptibility) Collected:  08/16/20 1413 Order Status:  Completed Specimen:  Cath Urine Updated:  08/19/20 2131 Special Requests: NO SPECIAL REQUESTS Culture result:    
  >100,000 COLONIES/mL ESCHERICHIA COLI  
     
      
  >100,000 COLONIES/mL ESCHERICHIA COLI (2ND COLONY TYPE/STRAIN) Current Meds: 
Current Facility-Administered Medications Medication Dose Route Frequency  magnesium sulfate 2 g/50 ml IVPB (premix or compounded)  2 g IntraVENous ONCE  phenol throat spray (CHLORASEPTIC) 1 Spray  1 Spray Oral PRN  
 gabapentin (NEURONTIN) capsule 300 mg  300 mg Oral DAILY  epoetin laura-epbx (RETACRIT) 12,000 Units combo injection  12,000 Units SubCUTAneous Q7D  
 clindamycin (CLEOCIN) capsule 600 mg  600 mg Oral Q8H  
 dextrose 5% infusion  75 mL/hr IntraVENous CONTINUOUS  
 traMADoL (ULTRAM) tablet 50 mg  50 mg Oral Q8H PRN  
 HYDROmorphone (PF) (DILAUDID) injection 0.2 mg  0.2 mg IntraVENous Q4H PRN  
 insulin glargine (LANTUS) injection 15 Units  15 Units SubCUTAneous DAILY  amLODIPine (NORVASC) tablet 10 mg  10 mg Oral 7am  
 calcium acetate (phosphate binder) (PHOSLO) tablet 1,334 mg  1,334 mg Oral TID WITH MEALS  cloNIDine (CATAPRES) 0.3 mg/24 hr patch 1 Patch  1 Patch TransDERmal Q7D  
 hydrALAZINE (APRESOLINE) tablet 100 mg  100 mg Oral TID  lipase-protease-amylase (CREON 36,000) capsule 2 Cap (Patient Supplied)  2 Cap Oral TID WITH MEALS  metoprolol succinate (TOPROL-XL) XL tablet 50 mg  50 mg Oral 7am  
 pravastatin (PRAVACHOL) tablet 40 mg  40 mg Oral QHS  sodium bicarbonate tablet 650 mg  650 mg Oral TID  ferrous sulfate tablet 325 mg  325 mg Oral BID WITH MEALS  sodium chloride (NS) flush 5-40 mL  5-40 mL IntraVENous Q8H  
 sodium chloride (NS) flush 5-40 mL  5-40 mL IntraVENous PRN  
 acetaminophen (TYLENOL) tablet 650 mg  650 mg Oral Q6H PRN  Or  
  acetaminophen (TYLENOL) suppository 650 mg  650 mg Rectal Q6H PRN  polyethylene glycol (MIRALAX) packet 17 g  17 g Oral DAILY PRN  promethazine (PHENERGAN) tablet 12.5 mg  12.5 mg Oral Q6H PRN Or  
 ondansetron (ZOFRAN) injection 4 mg  4 mg IntraVENous Q6H PRN  
 insulin lispro (HUMALOG) injection   SubCUTAneous AC&HS  
 dextrose 40% (GLUTOSE) oral gel 1 Tube  15 g Oral PRN  
 glucagon (GLUCAGEN) injection 1 mg  1 mg IntraMUSCular PRN  
 dextrose (D50W) injection syrg 12.5-25 g  25-50 mL IntraVENous PRN  
 cefepime (MAXIPIME) 0.5 g in 0.9% sodium chloride 100 mL IVPB  0.5 g IntraVENous Q24H  
 alcohol 62% (NOZIN) nasal  1 Ampule  1 Ampule Topical Q12H Other Studies (last 24 hours): No results found. Assessment and Plan:  
 
Hospital Problems as of 8/19/2020 Date Reviewed: 7/23/2020 Codes Class Noted - Resolved POA  
 UTI (urinary tract infection) ICD-10-CM: N39.0 ICD-9-CM: 599.0  8/16/2020 - Present Unknown Wound infection ICD-10-CM: T14. 8XXA, L08.9 ICD-9-CM: 958.3  8/16/2020 - Present Unknown Overview Signed 8/16/2020  8:07 PM by Lamin Hoover MD  
  Rt leg Functional quadriplegia (HCC) ICD-10-CM: R53.2 ICD-9-CM: 780.72  7/26/2020 - Present ESRD (end stage renal disease) ([facility] ICD-10-CM: N18.6 ICD-9-CM: 585.6  11/3/2019 - Present Yes Hypernatremia ICD-10-CM: E87.0 ICD-9-CM: 276.0  4/18/2018 - Present Yes * (Principal) Acute metabolic encephalopathy SHE-[id number]: G93.41 
ICD-9-CM: 348.31  4/13/2018 - Present Unknown Uncontrolled diabetes mellitus, with long-term current use of insulin (HCC) (Chronic) ICD-10-CM: E11.65, Z79.4 ICD-9-CM: 250.02, V58.67  2/28/2018 - Present Yes Venous stasis ulcer of left lower extremity ([facility] [address] [facility] ICD-10-CM: T88.646, P44.665 ICD-9-CM: 454.0  4/1/2017 - Present Yes  Chronic systolic congestive heart failure (HCC) (Chronic) ICD-10-CM: D42.66 
 ICD-9-CM: 428.22, 428.0  9/23/2016 - Present HTN (hypertension) ICD-10-CM: I10 
ICD-9-CM: 401.9  9/22/2016 - Present Yes Chronic pancreatitis (HCC) (Chronic) ICD-10-CM: K86.1 ICD-9-CM: 464.6  9/22/2016 - Present Yes PLAN:   
#Metabolic encephalopathy, Resolved. Likely from UTI.  
  
#UTI - Currently on Cefepime. Urine culture with gram negative rods. Follow up cultures/sensitivity 
  
#ALICJA on CKD stage 5- nephrology for further recommendations since tx options limited. Strict Is and Os.  
  
#Anemia of chronic disease- If Hg less than 7, will need transfusion. Dc heparin.  No obvious bleeding on exam. Iron supplements  
  #Hypernatremia, slow improving improving. D5 IV fluids. ~3.9L free water defecit 
nephrology following Check BMP daily for Na and replace as needed 
  #Metabolic acidosis - Likely from worsening renal failure. nephrology put on Na bicarb tablets.  
  
#Chronic leg wounds with skin tearing to Left LE  
-wound care following.  
-image of LLE in chart review, media  
-No signs of active infection but will leave on Clindamycin today.  
  
HTN- Resistant due to renal failure. Clonidine 0.3mg patch weekly. Hydralazine. BB 
  
DM type II - SS 
  
Chronic debility- PT recommends SNF if he wants dialysis, otherwise home health/24hour care 
  
Headache - stop tramadol due to kidney function. Dilaudid PO (8/19) Chronic pancreatitis - Creon  
  
Patient states he is not ready for hospice but is willing to discuss care with palliative care as a transition.  
  
Patient would like everything except to be intubated. DNI 
  
DC planning/Dispo:  Home as he does not want dialysis? PT/OT recommends SNF as he is unsafe to be unsupervised at home and prone to decline. DVT ppx:  Cannot due hepain due to anemia. Cannot due SCD due to lower extremity wounds.  
  
Will try to reach out to the son later today about goals of care.  
 
Signed: 
Nik Ventura MD

## 2020-08-20 NOTE — PROGRESS NOTES
Patient was admitted to [facility] on  and discharged on  for encephalopathy, CKD5. Patient was contacted within 1 business days of discharge. Top Discharge Challenges to be reviewed by the provider Additional needs identified to be addressed with provider no 
none Discussed COVID-19 related testing which was not done at this time. Test results were not done. Patient informed of results, if available? na  
Method of communication with provider : none Advance Care Planning:  
Does patient have an Advance Directive:  not on file; education provided. PC consult and homar stated he had Hospice consult over the phone while in hospital 
 
Inpatient Readmission Risk score: 96% Was this a readmission? yes Patient stated reason for the admission: \"kidneys\" Patients top risk factors for readmission: medical condition Interventions to address risk factors: HH, JACQUELIN, follow up appt Care Transition Nurse (CTN) contacted the patient by telephone to perform post hospital discharge assessment. Verified name and  with patient as identifiers. Provided introduction to self, and explanation of the CTN role. CTN reviewed discharge instructions, medical action plan and red flags with patient who verbalized understanding. Patient given an opportunity to ask questions and does not have any further questions or concerns at this time. The patient agrees to contact the PCP office for questions related to their healthcare. Medication reconciliation was performed with patient, who verbalizes understanding of administration of home medications. Advised obtaining a 90-day supply of all daily and as-needed medications. Referral to Pharm D needed: yes Home Health/Outpatient orders at discharge: home health care Home Health company: [employer] Date of initial visit: awaiting [facility] Durable Medical Equipment ordered at discharge: [address]: na 
 Durable Medical Equipment received: nna Covid Risk Education Patient has following risk factors of: heart failure, diabetes and chronic kidney disease. Education provided regarding infection prevention, and signs and symptoms of COVID-19 and when to seek medical attention with patient who verbalized understanding. Discussed exposure protocols and quarantine From CDC: Are you at higher risk for severe illness?  and given an opportunity for questions and concerns. The patient agrees to contact the COVID-19 hotline [phone] or PCP office for questions related to COVID-19. For more information on steps you can take to protect yourself, see CDC's How to Protect Yourself Patient/family/caregiver given information for [employer] and agrees to enroll no Patient's preferred e-mail: declines Patient's preferred phone number: declines Discussed follow-up appointments. If no appointment was previously scheduled, appointment scheduling offered: yes [facility] follow up appointment(s):  
Future Appointments Date Time Provider Eileen Sandoval 8/24/2020  1:30 PM Justice Guerrero MD [facility]  
8/25/2020  2:00 PM SS [facility]  
9/8/2020  1:00 PM [address] [place] [address] [employer] [facility] GVL [address] [place] [address]  
9/8/2020  1:30 PM  [facility] follow up appointment(s): na 
Plan for follow-up call in 7-10 days based on severity of symptoms and risk factors. CTN provided contact information for future needs. Goals Addressed This Visit's Progress  Patient/Family verbalizes understanding of self-management of chronic disease. Assess barriers to safe and effective d/c AEB Patient able to obtain medicine after d/c Patient able to verbalize medicine changes Patient is aware and attends follow up appointments s/p d/c. Patient with ESRD declining HD.  Patient awaiting SOC with [place] and stated he had consult with hospice while in hospital but he has not heard from them since d/c. Follow up on [place]. Contact information given for any questions/concerns he may have. Patient obtained all medications as prescribed

## 2020-08-27 PROBLEM — E87.29 HIGH ANION GAP METABOLIC ACIDOSIS: Status: ACTIVE | Noted: 2020-01-01

## 2020-08-27 PROBLEM — E83.39 HYPERPHOSPHATEMIA: Status: ACTIVE | Noted: 2020-01-01

## 2020-08-27 PROBLEM — E11.10 DKA (DIABETIC KETOACIDOSES): Status: ACTIVE | Noted: 2020-01-01

## 2020-08-27 NOTE — H&P
History of Present Illness: 
45-year-old male brought to [address] [place] [address] emergency department on 8/27/2020 from home via EMS after home health aide had patient sent to emergency department for wound check of his legs after she noted maggots in his wound 5 days ago. Patient was recently admitted and discharged from the hospital 8 days ago after being treated for a urinary tract infection with sepsis and metabolic encephalopathy. Patient has known venous stasis ulcers and was seen by wound care during prior hospitalization. When asked the patient states that he feels well. Patient appears confused and cannot tell me the last time he took his antihypertensive or insulin. Patient is disoriented to month and day of the week. In the emergency department the patient was noted to be markedly hypertensive with a BP of 216/94. Patient noted to be in DKA with a blood sugar of 772, serum CO2 of 9, anion gap of 23. VBG 7.16/21/1949 8/7.6. Patient also noted to be hyponatremic with a corrected serum sodium of 164. Hemoglobin noted to be 7.7 which is essentially stable from prior. X-ray of left lower extremity without gas or signs of osteomyelitis. Patient refused to answer many of my questions repeatedly stating \"I just want to go home\". When I told the patient that he has a life-threatening disease process and would likely die if he returned home he stated \"that is fine\". The patient told palliative care physician during prior admission that he wanted to be DNI but wants chest compressions/cardiac meds/defibrillation if need be. The patient told my partner that he never wanted to return to the hospital again even if he was found unresponsive. I discussed this with the patient's son, Adelina King, who stated that immediately following his discussion with my partner he told his son that if he became ill he wanted to be brought to the hospital for aggressive treatment. Comprehensive review of systems not able to be obtained secondary to the patient's confusion and refusal to answer questions. Past Medical History: 
CKD 5, refusing hemodialysis HFpEF Chronic pancreatitis Functional quadriplegia T2DM with neuropathy HTN 
HLD Chronic venous stasis ulcer Anemia of chronic disease Past Surgical History: 
AV fistula repair TURP Prostatectomy Left inguinal hernia repair Colonoscopy Family History: Mother: CVA, HTN, DM Sister: DM Sister: DM Sister: DM Sister: Fibromyalgia Brother: DM Social History: 
Retired, , lives alone, CLTC aid available daily 40-pack-year smoking history, quit in 1995, denies heavy alcohol use or illicit drug use Physical Exam: 
General: Frail-appearing elderly black male, lying in bed, no acute distress HEENT: NCAT, profound bitemporal wasting, very dry mucous membranes Skin: Denuded skin on anterior and medial aspect of left lower extremity, denuded skin on lateral aspect of right lower extremity, no surrounding erythema/edema/warmth/purulent drainage, no tenderness Cardio: RRR, normal S1/S2, no rubs, no gallops, no murmurs Pulm: Non labored respirations on room air, LCAB, no wheezing, no rales, no rhonchi GI: Soft, Nt, Nd, Nml bowel sounds, no masses noted Extremity: Skin exam as above atraumatic, no deformities, no edema Neuro: Alert, disoriented, moving all extremities, no focal deficits noted Psych: Pleasant, cooperative, normal range of affect I have personally reviewed all current data for this patient. Assessment and Plan: 
 
#DKA, high anion gap metabolic acidosis: Unknown last dose of insulin. 
-Admit to ICU 
-Insulin gtt. 
-1/2 NS IVF at 150 given HFpEF and CKD 5, adjust as necessary 
-BMP and magnesium every 4 hours 
-Daily labs #CKD 5, hyperphosphatemia, hyperkalemia: Has refused hemodialysis in the past. 
-Consult nephrology 
-Consult palliative care 
-Sevelamer, high dose -Await EKG to evaluate for peaked T waves 
-Trend electrolytes #Hypernatremia: Marked dehydration. History of hyponatremia in the past. 
-1/2 NS IVF as above 
-Trend sodium #Anemia of chronic disease: 
-Trend hemoglobin #Infected venous stasis ulcer?:  Unclear if infected. History of Proteus mirabilis and pansensitive enterococcus in prior wound cultures. 
-Linezolid IV 
-Piperacillin/tazobactam IV 
-Blood cultures 
-Trend lactic acid #Malignant hypertension: Unknown if patient has taken antihypertensives today. 
-Continue home amlodipine, clonidine patch, hydralazine 
-Labetalol IV as needed #Dementia with behavioral disturbance: The patient does not currently have decision-making capacity based on my evaluation. 
-Palliative care consulted as above for further guidance of goals of care 
-We will collaborate with son, Adelina King, to establish goals of care #HFpEF: Continue metoprolol #Chronic pancreatitis: Continue home supplementation #HLD: Continue on pravastatin #Diabetic neuropathy: Continue home pregabalin, gabapentin DO NOT INTUBATE, chest compressions, cardiac meds, defibrillation Inpatient for above, admit to ICU Heparin for VTE prevention Please call [phone] for questions or concerns

## 2020-08-27 NOTE — ED TRIAGE NOTES
Pt from home via EMS, [facility] RN called for transport as patient has worsening bilateral lower extremities wounds, elevated BGL , and Hypertension.  Per EMS  Prior to arrival.

## 2020-08-27 NOTE — ED NOTES
Pt repositioned in bed, IVF infusing, legs wrapped and appear clean, dry and intact, VSS, resp easy on RA. Labs drawn,  . Will infuse other abx after first abx is complete

## 2020-08-27 NOTE — ED PROVIDER NOTES
Patient sent for evaluation of left lower leg wound. Also found with high blood sugar and blood pressure. Patient poor historian. Recent admission for uti, kraig (refuses dialysis) and metabolic encephalopathy. The history is provided by the patient and the EMS personnel. High Blood Sugar This is a new problem. The current episode started yesterday. The problem occurs constantly. The problem has not changed since onset. The patient is experiencing no pain. Pertinent negatives include no fever, no diarrhea, no hematochezia, no melena, no nausea, no vomiting, no constipation, no dysuria, no frequency, no chest pain and no back pain. Associated symptoms comments: Left lower leg wound looks worse per home health Geofm Dowse The pain is relieved by nothing. Past Medical History:  
Diagnosis Date  Acute renal failure superimposed on stage 3 chronic kidney disease ([address] 9/21/2016  Anemia   
 pt states he receives iron infusions  Arthritis OA generalized  Chronic kidney disease   
 not on HD- surgery done for access and fistula being removed. Per patient, no plans to proceed with dialysis  Chronic pancreatitis ([facility] 9/22/2016  Dermatophytosis of nail 12/6/2016  Diabetic neuropathy ([facility] 9/22/2016  
 insulin sliding scale only- no oral meds- avg fastings avg fasting \"low 200's;  hypo @ 80 A1C 7.1 9/2019 per patient  Essential hypertension 9/22/2016  
 medication  GERD (gastroesophageal reflux disease)   
 controlled with med  Hypercholesterolemia  Iron (Fe) deficiency anemia 9/22/2016  Septicemia due to Klebsiella pneumoniae ([address] 9/22/2016  Systolic CHF, chronic ([address] 9/23/2016 ECHO 4/2018 EF- 60-65%  Type II diabetes mellitus with nephropathy ([address] 09/22/2016  Venous insufficiency 12/6/2016  Xerosis cutis 12/6/2016 Past Surgical History:  
Procedure Laterality Date  HX COLONOSCOPY    
 HX HERNIA REPAIR Left 2010  HX PROSTATECTOMY  2012  HX TURP   FOR BPH  VASCULAR SURGERY PROCEDURE UNLIST Left 10/26/2017 AVF  VASCULAR SURGERY PROCEDURE UNLIST Left 2019 Ligation of left brachiobasilic fistula Family History:  
Problem Relation Age of Onset  Diabetes Mother  Stroke Mother  Hypertension Mother  No Known Problems Father  Diabetes Sister  Diabetes Brother  Diabetes Sister  Diabetes Sister  Other Sister   
     fibromyalgia Social History Socioeconomic History  Marital status:  Spouse name: Not on file  Number of children: Not on file  Years of education: Not on file  Highest education level: Not on file Occupational History  Not on file Social Needs  Financial resource strain: Not on file  Food insecurity Worry: Not on file Inability: Not on file  Transportation needs Medical: Not on file Non-medical: Not on file Tobacco Use  Smoking status: Former Smoker Packs/day: 2.00 Years: 20.00 Pack years: 40.00 Last attempt to quit:  Years since quittin.6  Smokeless tobacco: Current User Substance and Sexual Activity  Alcohol use: No  
 Drug use: Never  Sexual activity: Not on file Lifestyle  Physical activity Days per week: Not on file Minutes per session: Not on file  Stress: Not on file Relationships  Social connections Talks on phone: Not on file Gets together: Not on file Attends [employer] service: Not on file Active member of club or organization: Not on file Attends meetings of clubs or organizations: Not on file Relationship status: Not on file  Intimate partner violence Fear of current or ex partner: Not on file Emotionally abused: Not on file Physically abused: Not on file Forced sexual activity: Not on file Other Topics Concern  Not on file Social History Narrative  Not on file ALLERGIES: Patient has no known allergies. Review of Systems Constitutional: Negative for fever. HENT: Negative for congestion and rhinorrhea. Respiratory: Negative for cough and shortness of breath. Cardiovascular: Negative for chest pain. Gastrointestinal: Negative for constipation, diarrhea, hematochezia, melena, nausea and vomiting. Endocrine: Negative for polyuria. Genitourinary: Negative for dysuria and frequency. Musculoskeletal: Positive for neck pain. Negative for back pain. Skin: Positive for wound. Neurological: Negative for weakness and numbness. Vitals:  
 08/27/20 1420 BP: (!) 216/94 Pulse: 85 Resp: 18 Temp: 98.2 °F (36.8 °C) SpO2: 100% Weight: 67.1 kg (148 lb) Height: 6' (1.829 m) Physical Exam 
Vitals signs and nursing note reviewed. Constitutional:   
   Appearance: He is well-developed. HENT:  
   Mouth/Throat:  
   Pharynx: No oropharyngeal exudate. Eyes:  
   Conjunctiva/sclera: Conjunctivae normal.  
   Pupils: Pupils are equal, round, and reactive to light. Neck: Musculoskeletal: Neck supple. Cardiovascular:  
   Rate and Rhythm: Normal rate and regular rhythm. Heart sounds: Normal heart sounds. Pulmonary:  
   Effort: Pulmonary effort is normal.  
   Breath sounds: Normal breath sounds. Abdominal:  
   General: Bowel sounds are normal. There is no distension. Palpations: Abdomen is soft. Tenderness: There is no abdominal tenderness. There is no guarding or rebound. Musculoskeletal: Normal range of motion. General: No tenderness. Lymphadenopathy:  
   Cervical: No cervical adenopathy. Skin: 
   General: Skin is warm and dry. Findings: Lesion ( ) present. Comments: Wounds on the right lower leg are dressed with Xeroform gauze and are superficial and appear well taken care of.  1+ dorsalis pedis pulse. Dopplerable posterior tibial pulse on the left. Left lower extremity wound was dressed with an ABD pad that stuck to the skin and patient has a large anterior area of skin excoriation with denudation and well as large area of skin sloughing off. Underneath this area there are maggots. There is mild diffuse warmth. No obvious erythema however -American skin makes this difficult to tell. The area is diffusely tender but without purulent drainage. There are few areas of deeper wound with some mild bleeding. Absent pulses on the left but I can easily get a signal with Doppler. Neurological:  
   Mental Status: He is alert and oriented to person, place, and time. MDM Number of Diagnoses or Management Options Diagnosis management comments: X-ray of the left leg to exclude gas or osteo-. Insulin and fluids for blood sugar. Check labs and electrolytes. Blood pressure a little improved on recheck with a systolic of 519. No chest pain or trouble breathing to suggest ischemia. 3:35 PM 
DKA present. VBG ordered. Additional fluid bolus ordered. Hospitalist consulted for admission and insulin drip. May need antibiotics for wounds, however there was no warmth erythema or obvious purulence. I will allow the hospitalist to determine need and preference for antibiotic. Amount and/or Complexity of Data Reviewed Clinical lab tests: ordered and reviewed Tests in the radiology section of CPT®: ordered and reviewed Review and summarize past medical records: yes Procedures

## 2020-08-28 NOTE — PROGRESS NOTES
History of Present Illness/Hospital Course: 
71-year-old male brought to [address] [place] [address] emergency department on 8/27/2020 from home via EMS after home health aide had patient sent to emergency department for wound check of his legs after she noted maggots in his wound 5 days ago. Patient was recently admitted and discharged from the hospital 8 days ago after being treated for a urinary tract infection with sepsis and metabolic encephalopathy. Patient has known venous stasis ulcers and was seen by wound care during prior hospitalization. When asked the patient states that he feels well. Patient appears confused and cannot tell me the last time he took his antihypertensive or insulin. Patient is disoriented to month and day of the week. In the emergency department the patient was noted to be markedly hypertensive with a BP of 216/94. Patient noted to be in DKA with a blood sugar of 772, serum CO2 of 9, anion gap of 23. VBG 7.16/[birth year]/1950 8/7.6. Patient also noted to be hyponatremic with a corrected serum sodium of 164. Hemoglobin noted to be 7.7 which is essentially stable from prior. X-ray of left lower extremity without gas or signs of osteomyelitis. Patient refused to answer many of my questions repeatedly stating \"I just want to go home\". When I told the patient that he has a life-threatening disease process and would likely die if he returned home he stated \"that is fine\". The patient told palliative care physician during prior admission that he wanted to be DNI but wants chest compressions/cardiac meds/defibrillation if need be. The patient told my partner that he never wanted to return to the hospital again even if he was found unresponsive. I discussed this with the patient's son, Audra Hernandez, who stated that immediately following his discussion with my partner he told his son that if he became ill he wanted to be brought to the hospital for aggressive treatment. Today: Patient obtunded and difficult to arouse. Received a dose of lorazepam for unclear reason last night which is almost certainly contributing. Multiple other metabolic reasons for this. Patient was also significantly altered last week when he was here with UTI. DKA resolved. Transitioning off of insulin gtt. K is low, replacement ordered and will follow closely. Patient not candidate for HD per nephrology. Palliative care discussions pending. Plan to transfer out of ICU once patient is off insulin gtt. Comprehensive review of systems not able to be obtained secondary to the patient's encephalopathy. Past Medical History: 
CKD 5, refusing hemodialysis HFpEF Chronic pancreatitis Functional quadriplegia T2DM with neuropathy HTN 
HLD Chronic venous stasis ulcer Anemia of chronic disease Past Surgical History: 
AV fistula repair TURP Prostatectomy Left inguinal hernia repair Colonoscopy Family History: Mother: CVA, HTN, DM Sister: DM Sister: DM Sister: DM Sister: Fibromyalgia Brother: DM Social History: 
Retired, , lives alone, CLTC aid available daily 40-pack-year smoking history, quit in 1995, denies heavy alcohol use or illicit drug use Physical Exam: 
General: Frail-appearing elderly black male, lying in bed, obtunded, no acute distress HEENT: NCAT, profound bitemporal wasting, very dry mucous membranes Skin: Lower extremities bandaged Cardio: RRR, normal S1/S2, no rubs, no gallops, no murmurs Pulm: Non labored respirations on room air, LCAB, no wheezing, no rales, no rhonchi GI: Soft, Nt, Nd, Nml bowel sounds, no masses noted Extremity: Atraumatic, no deformities, no edema Neuro: Obtunded, awakes to sternal rub but does not open eyes, squeezed hands bilaterally when commanded, bilateral lower extremities withdrawal to Babinski with normal reflex, no focal deficits noted Psych: Unable to assess I have personally reviewed all current data for this patient. Assessment and Plan: 
 
#DKA, high anion gap metabolic acidosis: Resolved. Unknown last dose of insulin. 
-Admit to ICU 
-Insulin gtt. -D5 with 1/2 NS IVF and 40mEq KCL at 150, adjust at necessary 
-BMP and magnesium every 4 hours 
-Daily labs #Hypokalemia:  
-Monitor and replace #CKD 5, hyperphosphatemia: Has refused hemodialysis in the past. Not candidate for HD per nephrology. Hyperphosphatemia improving. 
-Nephrology following 
-Consult palliative care 
-Sevelamer, high dose 
-Trend electrolytes #Hypernatremia: Marked dehydration. History of hyponatremia in the past. 
-1/2 NS IVF as above 
-Trend sodium #Anemia of chronic disease: Stable 
-Trend hemoglobin #Infected venous stasis ulcer?:  Unclear if infected. History of Proteus mirabilis and pansensitive enterococcus in prior wound cultures. 
-Linezolid IV 
-Piperacillin/tazobactam IV 
-Blood cultures 
-Trend lactic acid #Malignant hypertension: Unknown if patient has taken antihypertensives today. 
-Continue home amlodipine, clonidine patch, hydralazine 
-Labetalol IV as needed, hold for HR<70 
-Hydralazine on backorder per pharmacy -May need nitroglycerine paste if HR allows #Dementia with behavioral disturbance: The patient does not currently have decision-making capacity based on my evaluation. 
-Palliative care consulted as above for further guidance of goals of care 
-We will collaborate with son, Pranav Badillo, to establish goals of care #HFpEF: Continue metoprolol with hold parameters #Chronic pancreatitis: Continue home supplementation #HLD: Continue on pravastatin #Diabetic neuropathy: Continue home pregabalin, gabapentin DO NOT INTUBATE, chest compressions, cardiac meds, defibrillation Inpatient for above, hopefully transfer to medical floor today, son wants to pursue hospice if patient has no improvement Heparin for VTE prevention Please call [phone] for questions or concerns

## 2020-08-28 NOTE — ED NOTES
TRANSFER - OUT REPORT: 
 
Verbal report given to Dontae(name) on A B Adiel Addison Tolentino  being transferred to [facility](unit) for routine progression of care Report consisted of patients Situation, Background, Assessment and  
Recommendations(SBAR). Information from the following report(s) SBAR was reviewed with the receiving nurse. Lines:  
Peripheral IV 08/27/20 Right Forearm (Active) Site Assessment Clean, dry, & intact 08/27/20 1430 Phlebitis Assessment 0 08/27/20 1430 Infiltration Assessment 0 08/27/20 1430 Peripheral IV 08/27/20 Right Wrist (Active) Opportunity for questions and clarification was provided. Patient transported with: 
 Registered Nurse

## 2020-08-28 NOTE — CONSULTS
Palliative Care Patient: Ad Still MRN: [id number]  SSN: [id number] YOB: 1940  Age: 79 y.o. Sex: male Date of Request: 8/28/2020 Date of Consult:  8/28/2020 Reason for Consult:  family support and education and goals of care Requesting Physician: Dr. Yuki Swann Assessment/Plan:  
 
Principal Diagnosis:   
Altered Mental Status R41.82 Additional Diagnoses: · Debility, Unspecified  R53.81 · Dyspnea  R06.00 
· Frailty  R54 · Counseling, Encounter for Medical Advice  Z71.9 
· Encounter for Palliative Care  Z51.5 Palliative Performance Scale (PPS): 
  
 
Medical Decision Making:  
Reviewed and summarized labs and imaging. Pt lethargic but mentation improving some. I spoke with pt son Matti Goldberg. He is out of town and will not be back until Sunday. PT currently has CLTA assistance set up in home which provides more assistance than hospice would outside of a hospice house. Son is seeking further care for pt as he probably requires 24 hour assistance however pt has refused additional care in the past.  Pt prior wishes documented DNI agreeable to chest compressions, shock, medications. Pt son confirms these wishes. Will follow loosely. Will discuss findings with members of the interdisciplinary team.   
 
Thank you for this referral.    
 
 
Subjective:  
 
History obtained from:  Family and Chart Chief Complaint: altered mental status History of Present Illness:  60-year-old male brought to [facility] emergency department on 8/27/2020 from home via EMS after home health aide had patient sent to emergency department for wound check of his legs after she noted maggots in his wound 5 days ago. Patient was recently admitted and discharged from the hospital 8 days ago after being treated for a urinary tract infection with sepsis and metabolic encephalopathy.   Patient has known venous stasis ulcers and was seen by wound care during prior hospitalization. When asked the patient states that he feels well. Patient appears confused and cannot tell me the last time he took his antihypertensive or insulin. Patient is disoriented to month and day of the week. In the emergency department the patient was noted to be markedly hypertensive with a BP of 216/94. Patient noted to be in DKA with a blood sugar of 772, serum CO2 of 9, anion gap of 23. VBG 7.16/21/1949 8/7.6. Patient also noted to be hyponatremic with a corrected serum sodium of 164. Hemoglobin noted to be 7.7 which is essentially stable from prior. X-ray of left lower extremity without gas or signs of osteomyelitis Lethargic this am after ativan dosing overnight Advance Directive: Yes Code Status:  Partial Code Health Care Power of : Yes - Copy of [address] on file. Past Medical History:  
Diagnosis Date  Acute renal failure superimposed on stage 3 chronic kidney disease ([address] 9/21/2016  Anemia   
 pt states he receives iron infusions  Arthritis OA generalized  Chronic kidney disease   
 not on HD- surgery done for access and fistula being removed. Per patient, no plans to proceed with dialysis  Chronic pancreatitis ([address] 9/22/2016  Dermatophytosis of nail 12/6/2016  Diabetic neuropathy ([address] 9/22/2016  
 insulin sliding scale only- no oral meds- avg fastings avg fasting \"low 200's;  hypo @ 80 A1C 7.1 9/2019 per patient  Essential hypertension 9/22/2016  
 medication  GERD (gastroesophageal reflux disease)   
 controlled with med  Hypercholesterolemia  Iron (Fe) deficiency anemia 9/22/2016  Septicemia due to Klebsiella pneumoniae ([address] 9/22/2016  Systolic CHF, chronic ([address] 9/23/2016 ECHO 4/2018 EF- 60-65%  Type II diabetes mellitus with nephropathy ([address] 09/22/2016  Venous insufficiency 12/6/2016  Xerosis cutis 12/6/2016 Past Surgical History: Procedure Laterality Date  HX COLONOSCOPY    
 HX HERNIA REPAIR Left 2010  HX PROSTATECTOMY  2012  HX TURP  2012 FOR BPH  VASCULAR SURGERY PROCEDURE UNLIST Left 10/26/2017 AVF  VASCULAR SURGERY PROCEDURE UNLIST Left 2019 Ligation of left brachiobasilic fistula Family History Problem Relation Age of Onset  Diabetes Mother  Stroke Mother  Hypertension Mother  No Known Problems Father  Diabetes Sister  Diabetes Brother  Diabetes Sister  Diabetes Sister  Other Sister   
     fibromyalgia Social History Tobacco Use  Smoking status: Former Smoker Packs/day: 2.00 Years: 20.00 Pack years: 40.00 Last attempt to quit:  Years since quittin.6  Smokeless tobacco: Current User Substance Use Topics  Alcohol use: No  
 
Prior to Admission medications Medication Sig Start Date End Date Taking? Authorizing Provider HYDROmorphone (DILAUDID) 2 mg tablet Take 0.25 Tabs by mouth every four (4) hours as needed for Pain for up to 30 days. Max Daily Amount: 3 mg. 20  Monie Foster MD  
gabapentin (NEURONTIN) 300 mg capsule Take 1 Cap by mouth daily for 30 days. 20  Monie Foster MD  
pregabalin (Lyrica) 150 mg capsule Take 150 mg by mouth two (2) times a day. Provider, Historical  
insulin lispro (HUMALOG) 100 unit/mL injection INITIATE INSULIN CORRECTIVE PROTOCOL: 
INITIATE INSULIN CORRECTIVE PROTOCOL: 
Normal Insulin Sensitivity For Blood Sugar (mg/dL) of:    
Less than 150 =   0 units 150 -199 =   2 units 200 -249 =   4 units 250 -299 =   6 units 300 -349 =   8 units 350 and above = 10 units and Call Physician Initiate Hypoglycemia protocol if blood glucose is <70 mg/dL Fast Acting - Administer Immediately - or within 15 minutes of start of meal, if mealtime coverage.  20   Miguel Ángel Foster, DO  
 insulin glargine (LANTUS) 100 unit/mL injection 15 Units by SubCUTAneous route nightly. 7/31/20   Fortino BARTLETT, DO  
calcium acetate, phosphate binder, (PHOSLO) 667 mg tab tablet Take 2 Tabs by mouth three (3) times daily (with meals). 7/31/20   Fortino Salmeron C, DO  
ferrous sulfate 325 mg (65 mg iron) tablet Take 1 Tab by mouth two (2) times daily (with meals). 7/31/20   Eulogio Bailey,   
multivit-minerals/folic acid (SPECTRAVITE ADULT PO) Take 1 Tab by mouth daily. Provider, Historical  
cloNIDine (Catapres-TTS-3) 0.3 mg/24 hr 1 Patch by TransDERmal route every seven (7) days. Change every thursday    Mar Salmeron NP  
glucose 4 gram chewable tablet Take 15 g by mouth as needed for Other (low blood sugar). Provider, Historical  
oxymetazoline (AFRIN) 0.05 % nasal spray 2 Sprays by Both Nostrils route once as needed for Congestion. 11/13/19   Provider, Historical  
Lactoperoxi/Gluc Oxid/Pot Thio (BIOTENE DRY MOUTH MM) Take 2 Sprays by mouth as needed for Other (dry mouth). Provider, Historical  
lipase-protease-amylase (CREON) 36,000-114,000- 180,000 unit cpDR capsule Take 3,600 Units by mouth See Admin Instructions. Take 2 capsules by mouth with each meal, and 1 capsule by mouth with each snack. Shuana Cohen MD  
dicyclomine (BENTYL) 10 mg capsule Take 10 mg by mouth two (2) times a day. Provider, Historical  
acetaminophen (TYLENOL) 325 mg tablet Take 2 Tabs by mouth every four (4) hours as needed for Pain. 11/9/19   West Crowley MD  
ondansetron (ZOFRAN ODT) 4 mg disintegrating tablet Take 1 Tab by mouth every eight (8) hours as needed for Nausea. 11/9/19   West Crowley MD  
pantothenic ac-min oil-pet,hyd (AQUAPHOR) 41 % ointment Apply  to affected area daily. Apply over legs 11/10/19   West Crowley MD  
colestipol (COLESTID) 1 gram tablet Take 1 g by mouth two (2) times a day.     Provider, Sujatha  
 sodium bicarbonate 650 mg tablet Take 650 mg by mouth three (3) times daily. Provider, Historical  
lidocaine 4 % patch Cut to appropriate size. Apply to intact skin for 12 hours, remove for 12 hours. Patient taking differently: 1 Patch by TransDERmal route as needed. Cut to appropriate size. Apply to intact skin for 12 hours, remove for 12 hours. States uses only occasionally and not on today 9/11/2019 Do not take morning of procedure. 7/12/19   Rojean Barthel, PA  
hydrALAZINE (APRESOLINE) 100 mg tablet Take 1 Tab by mouth three (3) times daily. 4/19/18   Theresa Chaves MD  
pravastatin (PRAVACHOL) 40 mg tablet Take 1 Tab by mouth nightly. 4/4/18   Pat Angulo MD  
calcifediol (RAYALDEE) 30 mcg Cs24 Take 30 mcg by mouth nightly. Provider, Historical  
amLODIPine (NORVASC) 10 mg tablet Take 10 mg by mouth every morning. Provider, Historical  
metoprolol succinate (TOPROL-XL) 50 mg XL tablet Take 50 mg by mouth every morning. Provider, Historical  
omeprazole (PRILOSEC) 20 mg capsule Take 20 mg by mouth every morning. Provider, Historical  
 
 
No Known Allergies Review of systems unable to obtain due to mentation Objective:  
 
Visit Vitals /58 Pulse 66 Temp 99.3 °F (37.4 °C) Resp 16 Ht 6' (1.829 m) Wt 147 lb 14.9 oz (67.1 kg) SpO2 99% BMI 20.06 kg/m² Physical Exam: 
 
General:  Lethargic. No acute distress. Eyes:  Conjunctivae/corneas clear Nose: Nares normal. Septum midline. Neck: Supple, symmetrical, trachea midline, no JVD Lungs:   Clear to auscultation bilaterally, unlabored Heart:  Regular rate and rhythm, no murmur Abdomen:   Soft, non-tender, non-distended. Positive bowel sounds Extremities: Normal, atraumatic, no cyanosis or edema Skin: Skin color, texture, turgor normal. No rash or lesions. Neurologic: Nonfocal  
Psych: Lethargic. Assessment:  
 
Hospital Problems  Date Reviewed: 7/23/2020 Codes Class Noted POA * (Principal) DKA (diabetic ketoacidoses) ([facility] ICD-10-CM: E11.10 ICD-9-CM: 250.12  8/27/2020 Unknown Hyperphosphatemia ICD-10-CM: E83.39 
ICD-9-CM: 275.3  8/27/2020 Unknown High anion gap metabolic acidosis ATE-14-ZA: E87.2 ICD-9-CM: 276.2  8/27/2020 Unknown Wound infection ICD-10-CM: T14. 8XXA, L08.9 ICD-9-CM: 958.3  8/16/2020 Yes Overview Signed 8/16/2020  8:07 PM by Carlos Gonzalez MD  
  Rt leg Functional quadriplegia (HCC) ICD-10-CM: R53.2 ICD-9-CM: 780.72  7/26/2020 Yes Hyperkalemia ICD-10-CM: E87.5 ICD-9-CM: 276.7  7/26/2020 Yes Dementia without behavioral disturbance (HCC) (Chronic) ICD-10-CM: F03.90 ICD-9-CM: 294.20  4/19/2018 Yes Hypernatremia ICD-10-CM: E87.0 ICD-9-CM: 276.0  4/18/2018 Yes Seizure ([facility] ICD-10-CM: R56.9 ICD-9-CM: 780.39  4/18/2018 Yes Hypercholesterolemia ICD-10-CM: E78.00 ICD-9-CM: 272.0  Unknown Yes Venous stasis ulcer of left lower extremity ([facility] ICD-10-CM: P55.706, U74.778 ICD-9-CM: 454.0  4/1/2017 Yes Venous insufficiency (Chronic) ICD-10-CM: H67.0 ICD-9-CM: 459.81  12/6/2016 Yes Chronic systolic congestive heart failure (HCC) (Chronic) ICD-10-CM: T54.66 ICD-9-CM: 428.22, 428.0  9/23/2016 Yes 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 
ICD-9-CM: 250.40, 583.81  9/22/2016 Yes HTN (hypertension) ICD-10-CM: I10 
ICD-9-CM: 401.9  9/22/2016 Yes GERD (gastroesophageal reflux disease) ICD-10-CM: K21.9 ICD-9-CM: 530.81  9/22/2016 Yes Diabetic neuropathy (HCC) ICD-10-CM: E11.40 ICD-9-CM: 250.60, 357.2  9/22/2016 Yes Chronic pancreatitis (HCC) (Chronic) ICD-10-CM: K86.1 ICD-9-CM: 413.6  9/22/2016 Yes Anemia of chronic disease ICD-10-CM: D63.8 ICD-9-CM: 285.29  9/22/2016 Yes CKD (chronic kidney disease) stage 5, GFR less than 15 ml/min (HCC) ICD-10-CM: N18.5 ICD-9-CM: 585.5  9/21/2016 Yes Signed By: Josefa Johnston MD   
 August 28, 2020

## 2020-08-28 NOTE — WOUND CARE
Bilateral lowe legs with chronic venous wounds. Currently no edema. Left leg wound is defuse [id number] with islands of new skin. Right leg is mostly scar lateral area 4x2cm pink open with surrounding fragile scar. Bilateral heels with DTI. Left is 3x5cm Right is 2x4cm recommend heel offloading boots.

## 2020-08-28 NOTE — PROGRESS NOTES
Chart reviewed and pt discussed in am IDR s/p admission DKA. Attempted to see pt, but unresponsive to verbal stimuli at present. Pt known to CM dept from previous admissions. He has been adamant he did not want HD per MD/CM notes. Son, Perez Rossi, [phone],ZKL MD states that if pt does not improve, he would like hospice (most likely hospice house, as pt lives alone) options. CM following for any assist and d/c POC. Spoke with Terri Ordonez, states pt had not wanted home hospice in past, as he has CLTC care and they provide more than home hospice could offer. Can not have CLTC and home hospice at same time. Aware CM following to assist with d/c POC. Care Management Interventions PCP Verified by CM: Preet Fuchs) Mode of Transport at Discharge: Other (see comment) Transition of Care Consult (CM Consult): Discharge Planning(CLTC aid 7 hours day/5 days a week/Honey CM with CLTC) Discharge Durable Medical Equipment: (ramp, cane, Rolator, life alert) Current Support Network: Lives Alone, Own Home Confirm Follow Up Transport: Other (see comment) Freedom of Choice List was Provided with Basic Dialogue that Supports the Patient's Individualized Plan of Care/Goals, Treatment Preferences and Shares the Quality Data Associated with the Providers?: Yes  Resource Information Provided?: [facility]) Discharge Location Discharge Placement: Unable to determine at this time

## 2020-08-28 NOTE — CONSULTS
[address] Nephrology Consultation Admission Date: 
8/27/2020 Admission Diagnosis: 
DKA (diabetic ketoacidoses) ([facility] [E11.10] History of Present Illness: 
Pt is a 80 yo man known stage 5 CKD who presented with AMS, DKA. He has had similar hospitalizations in the past and has always refused HD. DKA improving, Cr 9's. Past Medical History:  
Diagnosis Date  Acute renal failure superimposed on stage 3 chronic kidney disease ([address] 9/21/2016  Anemia   
 pt states he receives iron infusions  Arthritis OA generalized  Chronic kidney disease   
 not on HD- surgery done for access and fistula being removed. Per patient, no plans to proceed with dialysis  Chronic pancreatitis ([facility] 9/22/2016  Dermatophytosis of nail 12/6/2016  Diabetic neuropathy ([facility] 9/22/2016  
 insulin sliding scale only- no oral meds- avg fastings avg fasting \"low 200's;  hypo @ 80 A1C 7.1 9/2019 per patient  Essential hypertension 9/22/2016  
 medication  GERD (gastroesophageal reflux disease)   
 controlled with med  Hypercholesterolemia  Iron (Fe) deficiency anemia 9/22/2016  Septicemia due to Klebsiella pneumoniae ([address] 9/22/2016  Systolic CHF, chronic ([facility] 9/23/2016 ECHO 4/2018 EF- 60-65%  Type II diabetes mellitus with nephropathy ([facility] 09/22/2016  Venous insufficiency 12/6/2016  Xerosis cutis 12/6/2016 Past Surgical History:  
Procedure Laterality Date  HX COLONOSCOPY    
 HX HERNIA REPAIR Left 2010  HX PROSTATECTOMY  2012  HX TURP  2012 FOR BPH  VASCULAR SURGERY PROCEDURE UNLIST Left 10/26/2017 AVF  VASCULAR SURGERY PROCEDURE UNLIST Left 09/18/2019 Ligation of left brachiobasilic fistula Current Facility-Administered Medications Medication Dose Route Frequency  linezolid (ZYVOX) tablet 600 mg  600 mg Oral Q12H  
 dextrose 5% - 0.45% NaCl with KCl 40 mEq/L infusion   IntraVENous CONTINUOUS  
  insulin glargine (LANTUS) injection 15 Units  15 Units SubCUTAneous DAILY  insulin regular (NOVOLIN R, HUMULIN R) injection 0-10 Units  0-10 Units SubCUTAneous Q4H  potassium chloride 20 mEq in 100 ml IVPB  20 mEq IntraVENous ONCE  
 insulin regular (NOVOLIN R, HUMULIN R) 100 Units in 0.9% sodium chloride 100 mL infusion  0-50 Units/hr IntraVENous TITRATE  dextrose 40% (GLUTOSE) oral gel 1 Tube  15 g Oral PRN  
 glucagon (GLUCAGEN) injection 1 mg  1 mg IntraMUSCular PRN  
 dextrose (D50W) injection syrg 12.5-25 g  25-50 mL IntraVENous PRN  
 sodium chloride (NS) flush 5-40 mL  5-40 mL IntraVENous Q8H  
 sodium chloride (NS) flush 5-40 mL  5-40 mL IntraVENous PRN  
 acetaminophen (TYLENOL) tablet 650 mg  650 mg Oral Q6H PRN Or  
 acetaminophen (TYLENOL) suppository 650 mg  650 mg Rectal Q6H PRN  polyethylene glycol (MIRALAX) packet 17 g  17 g Oral DAILY PRN  promethazine (PHENERGAN) tablet 12.5 mg  12.5 mg Oral Q6H PRN Or  
 ondansetron (ZOFRAN) injection 4 mg  4 mg IntraVENous Q6H PRN  
 heparin (porcine) injection 5,000 Units  5,000 Units SubCUTAneous Q8H  
 labetaloL (NORMODYNE;TRANDATE) injection 20 mg  20 mg IntraVENous Q2H PRN  
 sevelamer carbonate (RENVELA) tab 1,600 mg  1,600 mg Oral TID WITH MEALS  famotidine (PEPCID) tablet 20 mg  20 mg Oral DAILY  piperacillin-tazobactam (ZOSYN) 4.5 g in 0.9% sodium chloride (MBP/ADV) 100 mL  4.5 g IntraVENous Q12H  
 amLODIPine (NORVASC) tablet 10 mg  10 mg Oral 7am  
 cloNIDine (CATAPRES) 0.3 mg/24 hr patch 1 Patch  1 Patch TransDERmal Q7D  
 ferrous sulfate tablet 325 mg  325 mg Oral BID WITH MEALS  gabapentin (NEURONTIN) capsule 300 mg  300 mg Oral DAILY  hydrALAZINE (APRESOLINE) tablet 100 mg  100 mg Oral TID  
 HYDROmorphone (DILAUDID) tablet 0.5 mg  0.5 mg Oral Q4H PRN  
 metoprolol succinate (TOPROL-XL) XL tablet 50 mg  50 mg Oral 7am  
 lipase-protease-amylase (ZENPEP 20,000) capsule 3 Cap  3 Cap Oral TID WITH MEALS  pravastatin (PRAVACHOL) tablet 40 mg  40 mg Oral QHS  sodium bicarbonate tablet 650 mg  650 mg Oral TID  calcifediol (RAYALDEE) Cs24 30 mcg (Patient Supplied)  30 mcg Oral QHS No Known Allergies Social History Tobacco Use  Smoking status: Former Smoker Packs/day: 2.00 Years: 20.00 Pack years: 40.00 Last attempt to quit:  Years since quittin.6  Smokeless tobacco: Current User Substance Use Topics  Alcohol use: No  
  
Family History Problem Relation Age of Onset  Diabetes Mother  Stroke Mother  Hypertension Mother  No Known Problems Father  Diabetes Sister  Diabetes Brother  Diabetes Sister  Diabetes Sister  Other Sister   
     fibromyalgia Review of Systems: 
Unresponsive Objective: 
Vitals:  
 20 3411 20 2607 20 0630 20 4009 BP: 134/62 145/72 153/75 179/82 Pulse: 69 79 68 68 Resp: 16 14 17 16 Temp:      
SpO2: 100% 100% 97% 99% Weight:      
Height:      
 
 
Intake/Output Summary (Last 24 hours) at 2020 0907 Last data filed at 2020 3526 Gross per 24 hour Intake 3181.7 ml Output 800 ml Net 2381.7 ml Wt Readings from Last 3 Encounters:  
20 67.1 kg (147 lb 14.9 oz) 20 67.5 kg (148 lb 11.4 oz) 20 90.7 kg (200 lb) GEN - in no distress, not arousable Neck - no JVD 
CV - regular, no murmur, no rub Lung - clear bilaterally, lungs expand symmetrically Chest wall - normal appearance Abd - soft, nontender, bowel sounds present Ext - no edema Genitourinary - bladder nonpalpable Skin - no rashes, no purpura Data Review:  
 
Recent Labs  
  20 
0311 20 
2330 20 
1434 WBC 4.2*  --  5.5 HGB 7.4* 7.4* 7.7* HCT 24.8*  --  28.1*  
  --  233 Recent Labs  
  20 
0311 20 
2330 20 
1732 20 
1434 * 158* 156* 153* K 3.4* 3.7 4.4 5.2*  
 * 126* 123* 121* CO2 20* 19* 11* 9* * 105* 108* 115* CREA 9.14* 9.59* 9.64* 9.76* CA 7.1* 7.3* 7.9* 7.3*  
* 332* 644* 772* MG 1.9  --   --  2.4 PHOS 5.9*  --   --  8.8* Assessment:  
 
Principal Problem: 
  DKA (diabetic ketoacidoses) ([address] (8/27/2020) Active Problems: 
  CKD (chronic kidney disease) stage 5, GFR less than 15 ml/min ([facility]) (9/21/2016) Type II diabetes mellitus with nephropathy ([address] (9/22/2016) HTN (hypertension) (9/22/2016) GERD (gastroesophageal reflux disease) (9/22/2016) Diabetic neuropathy ([address] (9/22/2016) Chronic pancreatitis ([address] (9/22/2016) Anemia of chronic disease (9/22/2016) Chronic systolic congestive heart failure ([address] (9/23/2016) Venous insufficiency (12/6/2016) Venous stasis ulcer of left lower extremity ([address] (4/1/2017) Hypercholesterolemia () Hypernatremia (4/18/2018) Seizure ([address] (4/18/2018) Dementia without behavioral disturbance ([address] (4/19/2018) Functional quadriplegia ([address] (7/26/2020) Hyperkalemia (7/26/2020) Wound infection (8/16/2020) Overview: Rt leg Hyperphosphatemia (8/27/2020) High anion gap metabolic acidosis (4/86/4689) Plan: 1. CKD stage 5 - I had detailed discussion with son Nicole Blackwood this AM.  Patient has adamantly refused HD in the past.  His son also states that he is certain his dad will not commit to going to HD treatments if started so no reason to consider starting HD. He is in agreement and want to proceed with palliative consult. I will sign off for now but be on standby should you need further assistance. Thank you

## 2020-08-28 NOTE — PROGRESS NOTES
Notified Dr. Shameka Montez of patient's decreased LOC and AMS. Patient has very delayed responses and is only able to state name. Will squeeze hands when prompted. Orders received to give 1mg Ativan IV now.

## 2020-08-28 NOTE — PROGRESS NOTES
Patient to [id number] [phone] at this time. Insulin gtt currently infusing at 11.9 units/hr. Patient placed on monitor showing NSR in 80s and HTN with SBP 190s. O2 sat % on room air. Head to toe assessment to be completed. Dual skin assessment completed with MARTINE Leon. Bilateral lower leg wounds are wrapped. Wound care consult to be placed. Blisters noted on both heels bilaterally, L>R. L blister 4.25cm x 8cm and R blister 2cm x 4cm. Excoriation/healing wounds across chest and on RUE. Previous fistula on LUE noted. Scars from previous wounds scattered on back. Bilateral buttocks has healing wounds. Jah Forth in place for prophylaxis.

## 2020-08-28 NOTE — PROGRESS NOTES
Physician Progress Note Jsoefa Collins 
DENISHA #:                  [id number] :                       1940 ADMIT DATE:       2020 2:14 PM 
DISCH DATE: 
RESPONDING 
PROVIDER #:        Lolis Mead MD 
 
 
 
 
QUERY TEXT: 
 
Pt admitted with DKA and has encephalopathy documented. If possible, please document in progress notes and discharge summary further specificity regarding the type of encephalopathy: 
 
The medical record reflects the following: 
Risk Factors: DKA, Hypernatremia, HTN Clinical Indicators: Metabolic derangements, DKA, Na 162, , , Cr 9.59, /94, AOx1, decreased LOC Treatment: Insulin gtt, D51/2NS c 40K, Not dialysis candidate, frequent nursing rounds, bed alarm, Daily labs, Home antihypertensives continued, PRN labetalol Options provided: 
-- Metabolic encephalopathy -- Hypertensive encephalopathy 
-- Other - I will add my own diagnosis -- Disagree - Not applicable / Not valid -- Disagree - Clinically unable to determine / Unknown 
-- Refer to Clinical Documentation Reviewer PROVIDER RESPONSE TEXT: 
 
This patient has metabolic encephalopathy.  
 
Query created by: Darrion Reneer on 2020 11:12 AM 
 
 
Electronically signed by:  Lolis Mead MD 2020 12:07 PM

## 2020-08-28 NOTE — PROGRESS NOTES
CTN attempting JACQUELIN follow up. Patient currently admitted at time of call. Patient had PC consult while IP discussing goals of care. CTN to remove self from care team and patient will be reassigned after d/c.

## 2020-08-28 NOTE — PROGRESS NOTES
LEAPFROG PROTOCOL NOTE A B Junior Carol Anderson 8/28/2020 The patient is currently in the critical care setting managed by Dr. Marko Saldana with DKA. The patient's chart is reviewed and the patient is discussed with the staff. Patient is currently hemodynamically stable. Patient has no needs identified for Intensivist management in the critical care setting at this time. Please notify us if can be of assistance. No charge billed to the patient. Thank you.  
 
Siri Key NP

## 2020-08-28 NOTE — PROGRESS NOTES
TRANSFER - IN REPORT: 
 
Verbal report received from Helen Phillips RN (name) on Pedro Carroll  being received from ER (unit) for routine progression of care Report consisted of patients Situation, Background, Assessment and  
Recommendations(SBAR). Information from the following report(s) SBAR, ED Summary, MAR, Recent Results, Med Rec Status and Cardiac Rhythm SR was reviewed with the receiving nurse. Opportunity for questions and clarification was provided. Assessment completed upon patients arrival to unit and care assumed.

## 2020-08-28 NOTE — PROGRESS NOTES
Notified Dr. Mahin West of no improvement of patient condition. Dr. Mahin West to bedside to assess patient. Patient's . Orders received to straight cath patient and send UA.

## 2020-08-28 NOTE — ED NOTES
Per pharmacy, Cary Whyte has to be taken with food and pt has not been able to eat at this time. Pt states that he placed the new clonidine patch on his arm today. Pt is a poor historian The patient is a 72y Female complaining of

## 2020-08-28 NOTE — PROGRESS NOTES
Called patient's son Ninfa David at [phone]. Confirmed patient's wishes for DNI status and critical care consent signed with second RN witness. Son expressed concerns about patient's discharge and ability to care for himself. Per son, patient had [place] aides during the day but is alone at night. Patient often falls at night and refuses to use his emergency alert device. Will express son's concerns to day shift team and case management.

## 2020-08-29 NOTE — PROGRESS NOTES
TRANSFER - IN REPORT: 
 
Verbal report received from [facility] on Leirivera Ripper  being received from 610(unit) for routine progression of care Report consisted of patients Situation, Background, Assessment and  
Recommendations(SBAR). Information from the following report(s) SBAR, Kardex, ED Summary, [facility] and Recent Results was reviewed with the receiving nurse. Opportunity for questions and clarification was provided. Assessment completed upon patients arrival to unit and care assumed.

## 2020-08-29 NOTE — PROGRESS NOTES
Pt incontinent of loose brown stool. Tammy care done. Bed pad changed and gown. Repositioned in bed. Insulin coverage given foe [id number]. Pt states he wants to eat lunch here before going to new room. Pt being fed lunch. Diet tolerated. Magnesium and calcium boluses completed.

## 2020-08-29 NOTE — PROGRESS NOTES
Pt c/o pain in general. States he takes ultram at home. Medication not on MAR. Pt given tylenol for temp 99.9 and pain. MD made aware of pt's request for pain medication.

## 2020-08-29 NOTE — PROGRESS NOTES
Bedside, Verbal and Written shift change report given to Saulo Vera RN (oncoming nurse) by Glenn Dewitt RN (offgoing nurse). Report included the following information SBAR, Kardex, Intake/Output, MAR, Recent Results, Med Rec Status, Cardiac Rhythm SR, Alarm Parameters  and Quality Measures.

## 2020-08-29 NOTE — PROGRESS NOTES
Pt's son called. Update given. Made aware of new room, 610. Pt's wallet is not at bedside. Son states he does not have it and is out of town til tomorrow pm, but will look at [employer] when he gets back.

## 2020-08-29 NOTE — PROGRESS NOTES
Pt had loose brown stool x 2. Second time pt was incontinent. Bed lines changed, gown changed, cecilia care done. Foreskin to penis was noted to be swollen and irritated. Asked pt if he was circumcised and pt states he is not. Foreskin pulled back over head of penis with some difficulty. Pt still voiding via catheter. Repositioned in bed.

## 2020-08-29 NOTE — PROGRESS NOTES
Notified Dr. Jolynn Buenrostro of patient's SBP remaining in 170s while maxed on nitro gtt. Current SBP goal < 160. Orders to be placed and new SBP goal to be < 180. Patient now more alert and able to take PO meds. Will give 2200 hydralazine dose early.

## 2020-08-29 NOTE — PROGRESS NOTES
Problem: Diabetes Self-Management Goal: *Disease process and treatment process Description: Define diabetes and identify own type of diabetes; list 3 options for treating diabetes. Outcome: Progressing Towards Goal 
Goal: *Incorporating nutritional management into lifestyle Description: Describe effect of type, amount and timing of food on blood glucose; list 3 methods for planning meals. Outcome: Progressing Towards Goal 
Goal: *Incorporating physical activity into lifestyle Description: State effect of exercise on blood glucose levels. Outcome: Progressing Towards Goal 
Goal: *Developing strategies to promote health/change behavior Description: Define the ABC's of diabetes; identify appropriate screenings, schedule and personal plan for screenings. Outcome: Progressing Towards Goal 
Goal: *Using medications safely Description: State effect of diabetes medications on diabetes; name diabetes medication taking, action and side effects. Outcome: Progressing Towards Goal 
Goal: *Monitoring blood glucose, interpreting and using results Description: Identify recommended blood glucose targets  and personal targets. Outcome: Progressing Towards Goal 
Goal: *Prevention, detection, treatment of acute complications Description: List symptoms of hyper- and hypoglycemia; describe how to treat low blood sugar and actions for lowering  high blood glucose level. Outcome: Progressing Towards Goal 
Goal: *Prevention, detection and treatment of chronic complications Description: Define the natural course of diabetes and describe the relationship of blood glucose levels to long term complications of diabetes. Outcome: Progressing Towards Goal 
Goal: *Developing strategies to address psychosocial issues Description: Describe feelings about living with diabetes; identify support needed and support network Outcome: Progressing Towards Goal 
Goal: *Insulin pump training Outcome: Progressing Towards Goal 
 Goal: *Sick day guidelines Outcome: Progressing Towards Goal 
Goal: *Patient Specific Goal (EDIT GOAL, INSERT TEXT) Outcome: Progressing Towards Goal 
  
Problem: Patient Education: Go to Patient Education Activity Goal: Patient/Family Education Outcome: Progressing Towards Goal 
  
Problem: Falls - Risk of 
Goal: *Absence of Falls Description: Document Sylwia Christine Fall Risk and appropriate interventions in the flowsheet. Outcome: Progressing Towards Goal 
Note: Fall Risk Interventions: 
  
 
Mentation Interventions: Door open when patient unattended, More frequent rounding, Reorient patient Medication Interventions: Patient to call before getting OOB, Teach patient to arise slowly Elimination Interventions: Call light in reach History of Falls Interventions: Room close to nurse's station, Investigate reason for fall Problem: Patient Education: Go to Patient Education Activity Goal: Patient/Family Education Outcome: Progressing Towards Goal 
  
Problem: Pressure Injury - Risk of 
Goal: *Prevention of pressure injury Description: Document Santy Scale and appropriate interventions in the flowsheet. Outcome: Progressing Towards Goal 
Note: Pressure Injury Interventions: 
Sensory Interventions: Assess changes in LOC, Float heels, Keep linens dry and wrinkle-free, Minimize linen layers, Pad between skin to skin, Pressure redistribution bed/mattress (bed type) Moisture Interventions: Absorbent underpads, Check for incontinence Q2 hours and as needed Activity Interventions: Increase time out of bed, Pressure redistribution bed/mattress(bed type) Mobility Interventions: HOB 30 degrees or less, Pressure redistribution bed/mattress (bed type), PT/OT evaluation Nutrition Interventions: Document food/fluid/supplement intake Friction and Shear Interventions: Lift team/patient mobility team, Minimize layers, Lift sheet, Apply protective barrier, creams and emollients Problem: Patient Education: Go to Patient Education Activity Goal: Patient/Family Education Outcome: Progressing Towards Goal 
  
Problem: Delirium Treatment Goal: *Level of consciousness restored to baseline Outcome: Progressing Towards Goal 
Goal: *Level of environmental perceptions restored to baseline Outcome: Progressing Towards Goal 
Goal: *Sensory perception restored to baseline Outcome: Progressing Towards Goal 
Goal: *Emotional stability restored to baseline Outcome: Progressing Towards Goal 
Goal: *Functional assessment restored to baseline Outcome: Progressing Towards Goal 
Goal: *Absence of falls Outcome: Progressing Towards Goal 
Goal: *Will remain free of delirium, CAM Score negative Outcome: Progressing Towards Goal 
Goal: *Cognitive status will be restored to baseline Outcome: Progressing Towards Goal 
Goal: Interventions Outcome: Progressing Towards Goal 
  
Problem: Patient Education: Go to Patient Education Activity Goal: Patient/Family Education Outcome: Progressing Towards Goal

## 2020-08-29 NOTE — PROGRESS NOTES
TRANSFER - OUT REPORT: 
 
Verbal report given to Kayley Oreilly RN(name) on Kem Solorio  being transferred to 610(unit) for routine progression of care Report consisted of patients Situation, Background, Assessment and  
Recommendations(SBAR). Information from the following report(s) ED Summary, MAR and Cardiac Rhythm NSR was reviewed with the receiving nurse. Lines:  
Peripheral IV 08/27/20 Right Forearm (Active) Site Assessment Clean, dry, & intact 08/29/20 0800 Phlebitis Assessment 0 08/29/20 0800 Infiltration Assessment 0 08/29/20 0800 Dressing Status Clean, dry, & intact 08/29/20 0800 Dressing Type Tape;Transparent 08/29/20 0800 Hub Color/Line Status Pink; Infusing 08/29/20 0800 Action Taken Dressing reinforced 08/28/20 0730 Alcohol Cap Used No 08/29/20 0345 Peripheral IV 08/27/20 Right Wrist (Active) Site Assessment Clean, dry, & intact 08/29/20 0800 Phlebitis Assessment 0 08/29/20 0800 Infiltration Assessment 0 08/29/20 0800 Dressing Status Clean, dry, & intact 08/29/20 0800 Dressing Type Tape;Transparent 08/29/20 0800 Hub Color/Line Status Blue; Infusing 08/29/20 0800 Action Taken Dressing reinforced 08/28/20 0730 Alcohol Cap Used No 08/29/20 0345 Opportunity for questions and clarification was provided. Patient transported with: 
 Registered Nurse Tele box

## 2020-08-29 NOTE — PROGRESS NOTES
History of Present Illness/Hospital Course: 
67-year-old male brought to [address] [place] [address] emergency department on 8/27/2020 from home via EMS after home health aide had patient sent to emergency department for wound check of his legs after she noted maggots in his wound 5 days ago. Patient was recently admitted and discharged from the hospital 8 days ago after being treated for a urinary tract infection with sepsis and metabolic encephalopathy. Patient has known venous stasis ulcers and was seen by wound care during prior hospitalization. When asked the patient states that he feels well. Patient appears confused and cannot tell me the last time he took his antihypertensive or insulin. Patient is disoriented to month and day of the week. In the emergency department the patient was noted to be markedly hypertensive with a BP of 216/94. Patient noted to be in DKA with a blood sugar of 772, serum CO2 of 9, anion gap of 23. VBG 7.16/21/1949 8/7.6. Patient also noted to be hyponatremic with a corrected serum sodium of 164. Hemoglobin noted to be 7.7 which is essentially stable from prior. X-ray of left lower extremity without gas or signs of osteomyelitis. Patient refused to answer many of my questions repeatedly stating \"I just want to go home\". When I told the patient that he has a life-threatening disease process and would likely die if he returned home he stated \"that is fine\". The patient told palliative care physician during prior admission that he wanted to be DNI but wants chest compressions/cardiac meds/defibrillation if need be. The patient told my partner that he never wanted to return to the hospital again even if he was found unresponsive.   I discussed this with the patient's son, Rigoberto Segal, who stated that immediately following his discussion with my partner he told his son that if he became ill he wanted to be brought to the hospital for aggressive treatment. Patient obtunded and difficult to arouse. Received a dose of lorazepam for unclear reason last night which is almost certainly contributing. Multiple other metabolic reasons for this. Patient was also significantly altered last week when he was here with UTI. DKA resolved. Transitioning off of insulin gtt. K is low, replacement ordered and will follow closely. Patient not candidate for HD per nephrology. Palliative care will continue to follow. Today: Patient is much more awake and alert today. Patient is able to take oral antihypertensives today and blood pressure is much improved. Electrolytes are stabilized. Transfer out of intensive care unit today. Comprehensive review of systems not able to be obtained secondary to the patient's encephalopathy. Past Medical History: 
CKD 5, refusing hemodialysis HFpEF Chronic pancreatitis Functional quadriplegia T2DM with neuropathy HTN 
HLD Chronic venous stasis ulcer Anemia of chronic disease Past Surgical History: 
AV fistula repair TURP Prostatectomy Left inguinal hernia repair Colonoscopy Family History: Mother: CVA, HTN, DM Sister: DM Sister: DM Sister: DM Sister: Fibromyalgia Brother: DM Social History: 
Retired, , lives alone, [employer] aid available daily 40-pack-year smoking history, quit in 1995, denies heavy alcohol use or illicit drug use Physical Exam: 
General: Frail-appearing elderly black male, lying in bed, obtunded, no acute distress HEENT: NCAT, profound bitemporal wasting, very dry mucous membranes Skin: Lower extremities bandaged Cardio: RRR, normal S1/S2, no rubs, no gallops, no murmurs Pulm: Non labored respirations on room air, LCAB, no wheezing, no rales, no rhonchi GI: Soft, Nt, Nd, Nml bowel sounds, no masses noted Extremity: Atraumatic, no deformities, no edema Neuro: Obtunded, awakes to sternal rub but does not open eyes, squeezed hands bilaterally when commanded, bilateral lower extremities withdrawal to Babinski with normal reflex, no focal deficits noted Psych: Unable to assess I have personally reviewed all current data for this patient. Assessment and Plan: 
 
#DKA, high anion gap metabolic acidosis, uncontrolled DM: Resolved. Unknown last dose of insulin. 
-Admit to ICU 
-Long-acting insulin with sliding scale insulin protocol 
-D5 with 1/2 NS IVF  
-BMP and magnesium every 4 hours 
-Daily labs #Hypokalemia:  
-Monitor and replace #CKD 5, hyperphosphatemia: Has refused hemodialysis in the past. Not candidate for HD per nephrology. Hyperphosphatemia improving. 
-Nephrology following 
-Palliative care following 
-Sevelamer, high dose 
-Trend electrolytes #Hypernatremia: Marked dehydration. History of hyponatremia in the past. 
-D5 1/2 NS IVF as above 
-Trend sodium #Anemia of chronic disease: Stable 
-Trend hemoglobin #Infected venous stasis ulcer?:  Unclear if infected. History of Proteus mirabilis and pansensitive enterococcus in prior wound cultures. 
-Linezolid IV 
-Piperacillin/tazobactam IV 
-Follow blood cultures #Malignant hypertension: Unknown if patient has taken antihypertensives today. 
-Continue home amlodipine, clonidine patch, hydralazine 
-Labetalol IV as needed, hold for HR<70 
-Discontinue Nitropaste #Dementia with behavioral disturbance: The patient does not currently have decision-making capacity based on my evaluation. 
-We will collaborate with son, Liana Cooper, to establish goals of care #HFpEF: Continue metoprolol with hold parameters #Chronic pancreatitis: Continue home supplementation #HLD: Continue on pravastatin #Diabetic neuropathy: Continue home pregabalin, gabapentin DO NOT INTUBATE, will perform chest compressions, cardiac meds, defibrillation Inpatient for above, transfer to medical floor today, patient improved and is not a candidate for inpatient hospice, patient will need around-the-clock care after discharge, will coordinate with case management Heparin for VTE prevention Please call [phone] for questions or concerns

## 2020-08-29 NOTE — PROGRESS NOTES
[facility] OUTREACH NURSE PROGRESS REPORT SUBJECTIVE: Called to assess patient secondary to critical care outreach protocol. MEWS Score: 1 (08/29/20 0800) Vitals:  
 08/29/20 1130 08/29/20 1145 08/29/20 1200 08/29/20 1214 BP: 133/57 132/61 144/67 146/63 Pulse: 63 64 66 67 Resp: 17 11  16 Temp:   99 °F (37.2 °C) SpO2: 97% 100% 99% 100% Weight:      
Height:      
  
EKG: normal EKG, normal sinus rhythm, unchanged from previous tracings. LAB DATA: 
 
Recent Labs  
  08/29/20 
0153 08/28/20 
1729 08/28/20 
1144  08/28/20 
0311  08/27/20 
1434 * 153* 156*   < > 157*   < > 153* K 4.0 4.2 4.0   < > 3.4*   < > 5.2*  
* 123* 127*   < > 125*   < > 121* CO2 16* 17* 19*   < > 20*   < > 9* AGAP 13 13 10   < > 12   < > 23* * 278* 243*   < > 176*   < > 772* BUN 86* 90* 95*   < > 101*   < > 115* CREA 7.99* 8.34* 8.78*   < > 9.14*   < > 9.76* GFRAA 8* 8* 8*   < > 7*   < > 7* GFRNA 7* 7* 6*   < > 6*   < > 6*  
CA 6.8* 7.3* 6.7*   < > 7.1*   < > 7.3*  
MG 1.7*  --   --   --  1.9  --  2.4 PHOS 5.3*  --   --   --  5.9*  --  8.8* ALB  --   --   --   --   --   --  2.2* TP  --   --   --   --   --   --  6.6 GLOB  --   --   --   --   --   --  4.4* AGRAT  --   --   --   --   --   --  0.5* ALT  --   --   --   --   --   --  14  
 < > = values in this interval not displayed. Recent Labs  
  08/29/20 
0153 08/28/20 
1729 08/28/20 
0930 08/28/20 
0311  08/27/20 
1434 WBC 4.9  --   --  4.2*  --  5.5 HGB 8.2* 8.5* 6.4* 7.4*   < > 7.7* HCT 27.1*  --   --  24.8*  --  28.1*  
  --   --  205  --  233  
 < > = values in this interval not displayed. OBJECTIVE: On arrival to room, I found patient to be in bed resting quietly. Pain Assessment Pain Intensity 1: 0 (08/29/20 1200) Pain Location 1: Generalized Pain Intervention(s) 1: Medication (see MAR) Patient Stated Pain Goal: 0 
 
  
  
  
  
 
 
 ASSESSMENT:  Pt A/O x 4. Denies pain. Respirations even and unlabored. O2 sat 100% on RA. Lung sounds clear. NSR on remote tele. Abdomen soft, non tender. Foam dressings to bilateral heels/knees. BLE wrapped in kerlex. Prevalon boots in place. No needs expressed. PLAN:  Will continue to follow per outreach protocol.

## 2020-08-29 NOTE — PROGRESS NOTES
Dr Apple Novel here to see pt. New orders noted. Ok to transfer pt to floor. Calcium and magnesium boluses in progress.

## 2020-08-29 NOTE — PROGRESS NOTES
Wounds on BLE cleaned. Small amount of serosanguinous drainage noted on LLE dressings. 2 packs of xeroform applied after cleaning wounds. abd pads placed on top. Wrapped in kerlix. Foam dressings intact on knees. Pt tolerated well.

## 2020-08-29 NOTE — PROGRESS NOTES
Pt transferred to floor per bed with nurses, tele box, personal belongings( t-shirt and life alert necklace. Bedside report given to [facility].

## 2020-08-29 NOTE — PROGRESS NOTES
08/29/20 1233 Dual Skin Pressure Injury Assessment Dual Skin Pressure Injury Assessment X 
(wounds on legs present on admission) Second Care Provider (Based on [address] [place] [address]) Brandie Mccloud Buttocks/Ishium  Bilateral 
(sacrum; allevyn in place) Heel  Bilateral 
(skin breakdown) Skin Integumentary Skin Integumentary (WDL) X Skin Color Appropriate for ethnicity Skin Condition/Temp Dry;Fragile Skin Integrity Wound (add Wound LDA) (BLE, R side abdomen; ) Turgor Epidermis thin w/ loss of subcut tissue Hair Growth Present Pressure  Injury Documentation No Pressure Injury Noted-Pressure Ulcer Prevention Initiated Pt is a&ox4. Pt has skin breakdown on shins and calves. Wounds present on admission. Legs wrapped in dressing. Foam dressings on sacrum, heels and knees. Call light within reach. Bed low and locked.

## 2020-08-29 NOTE — PROGRESS NOTES
Date of Outreach Update: A B Adiel Lala Tello was seen and assessed. Previous Outreach assessment has been reviewed. There have been no significant clinical changes since the completion of the last dated Outreach assessment. Assisted primary RN in BLE dressing changes. Patient resting, no needs expressed. Signed By: Artur Morgan RN  August 29, 2020 4:41 PM

## 2020-08-30 NOTE — PROGRESS NOTES
Hourly rounds completed. All needs met. Pt c/o pain. Interventions per MAR. Gave report to the oncoming day shift nurse.

## 2020-08-30 NOTE — PROGRESS NOTES
Hospitalist Progress Note Admit Date:  2020  2:14 PM  
Name:  Marco Antonio Middleton Age:  79 y.o. 
:  1940 MRN:  [id number] PCP:  Олег Garcia MD 
Treatment Team: Attending Provider: Vaibhav Tang MD; Care Manager: Nikole Melgoza RN; Consulting Provider: Wilbert Mark MD 
 
Subjective:  
71-year-old male brought to [address] [place] [address] emergency department on 2020 from home via EMS after home health aide had patient sent to emergency department for wound check of his legs after she noted maggots in his wound 5 days ago. Patient was recently admitted and discharged from the hospital 8 days ago after being treated for a urinary tract infection with sepsis and metabolic encephalopathy. Patient has known venous stasis ulcers and was seen by wound care during prior hospitalization. When asked the patient states that he feels well. Patient appears confused and cannot tell me the last time he took his antihypertensive or insulin. Patient is disoriented to month and day of the week. In the emergency department the patient was noted to be markedly hypertensive with a BP of 216/94. Patient noted to be in DKA with a blood sugar of 772, serum CO2 of 9, anion gap of 23. VBG 7. 8/7.6. Patient also noted to be hyponatremic with a corrected serum sodium of 164. Hemoglobin noted to be 7.7 which is essentially stable from prior. X-ray of left lower extremity without gas or signs of osteomyelitis. Patient refused to answer many of my questions repeatedly stating \"I just want to go home\". When I told the patient that he has a life-threatening disease process and would likely die if he returned home he stated \"that is fine\". The patient told palliative care physician during prior admission that he wanted to be DNI but wants chest compressions/cardiac meds/defibrillation if need be.   The patient told my partner that he never wanted to return to the hospital again even if he was found unresponsive. I discussed this with the patient's son, Deanna De La Torre, who stated that immediately following his discussion with my partner he told his son that if he became ill he wanted to be brought to the hospital for aggressive treatment. Patient obtunded and difficult to arouse. Received a dose of lorazepam for unclear reason last night which is almost certainly contributing. Multiple other metabolic reasons for this. Patient was also significantly altered last week when he was here with UTI. DKA resolved. Transitioning off of insulin gtt. K is low, replacement ordered and will follow closely. Patient not candidate for HD per nephrology. Palliative care will continue to follow. 
  
8/29/2020: Patient is much more awake and alert today. Patient is able to take oral antihypertensives today and blood pressure is much improved. Electrolytes are stabilized. Transfer out of intensive care unit today. 8/30/2020 Says doing fine Objective:  
 
Patient Vitals for the past 24 hrs: 
 Temp Pulse Resp BP SpO2  
08/30/20 1204 98.1 °F (36.7 °C) 64 17 140/68 100 % 08/30/20 0745 98 °F (36.7 °C) 61 18 179/84 99 % 08/30/20 0633  (!) 59  194/83   
08/30/20 0335 98.1 °F (36.7 °C) (!) 58 16 151/79 99 % 08/30/20 0019  (!) 56     
08/29/20 2335 98.2 °F (36.8 °C) (!) 59 16 140/72 98 % 08/29/20 1859 97.9 °F (36.6 °C) 63 16 152/76 99 % 08/29/20 1553 97.7 °F (36.5 °C) 61 18 128/62 98 % 08/29/20 1241 98.3 °F (36.8 °C) 67 18 154/60 100 % Oxygen Therapy O2 Sat (%): 100 % (08/30/20 1204) Pulse via Oximetry: 68 beats per minute (08/29/20 1214) O2 Device: Room air (08/29/20 1200) Intake/Output Summary (Last 24 hours) at 8/30/2020 1223 Last data filed at 8/30/2020 0281 Gross per 24 hour Intake 240 ml Output 500 ml Net -260 ml General:    Well nourished.   Alert.   
heent- normal 
 CV: RRR. No murmur, rub, or gallop. Lungs:   Clear to auscultation bilaterally. No wheezing, rhonchi, or rales. Abdomen:   Soft, nontender, nondistended. Cns- no focal neurological deficit Extremities: Chronic left upper extremity abduction, says secondary to nerve problem Skin:     Bilateral lower leg dressing Data Review: 
I have reviewed all labs, meds, telemetry events, and studies from the last 24 hours. Recent Results (from the past 24 hour(s)) METABOLIC PANEL, BASIC Collection Time: 08/29/20  3:40 PM  
Result Value Ref Range Sodium 148 (H) 136 - 145 mmol/L Potassium 3.4 (L) 3.5 - 5.1 mmol/L Chloride 118 (H) 98 - 107 mmol/L  
 CO2 16 (L) 21 - 32 mmol/L Anion gap 14 7 - 16 mmol/L Glucose 190 (H) 65 - 100 mg/dL BUN 82 (H) 8 - 23 MG/DL Creatinine 7.40 (H) 0.8 - 1.5 MG/DL  
 GFR est AA 9 (L) >60 ml/min/1.73m2 GFR est non-AA 8 (L) >60 ml/min/1.73m2 Calcium 7.3 (L) 8.3 - 10.4 MG/DL  
GLUCOSE, POC Collection Time: 08/29/20  3:52 PM  
Result Value Ref Range Glucose (POC) 193 (H) 65 - 100 mg/dL HEMOGLOBIN Collection Time: 08/29/20  6:20 PM  
Result Value Ref Range HGB 9.5 (L) 13.6 - 17.2 g/dL GLUCOSE, POC Collection Time: 08/29/20  7:59 PM  
Result Value Ref Range Glucose (POC) 161 (H) 65 - 100 mg/dL GLUCOSE, POC Collection Time: 08/29/20 11:27 PM  
Result Value Ref Range Glucose (POC) 94 65 - 100 mg/dL GLUCOSE, POC Collection Time: 08/30/20  3:40 AM  
Result Value Ref Range Glucose (POC) 120 (H) 65 - 100 mg/dL MAGNESIUM Collection Time: 08/30/20  5:45 AM  
Result Value Ref Range Magnesium 2.4 1.8 - 2.4 mg/dL PHOSPHORUS Collection Time: 08/30/20  5:45 AM  
Result Value Ref Range Phosphorus 6.0 (H) 2.3 - 3.7 MG/DL  
CBC WITH AUTOMATED DIFF Collection Time: 08/30/20  5:45 AM  
Result Value Ref Range WBC 5.3 4.3 - 11.1 K/uL  
 RBC 3.85 (L) 4.23 - 5.6 M/uL HGB 9.8 (L) 13.6 - 17.2 g/dL HCT 32.2 (L) 41.1 - 50.3 % MCV 83.6 79.6 - 97.8 FL  
 MCH 25.5 (L) 26.1 - 32.9 PG  
 MCHC 30.4 (L) 31.4 - 35.0 g/dL  
 RDW 17.3 (H) 11.9 - 14.6 % PLATELET 933 791 - 099 K/uL MPV 10.6 9.4 - 12.3 FL ABSOLUTE NRBC 0.03 0.0 - 0.2 K/uL  
 DF AUTOMATED NEUTROPHILS 54 43 - 78 % LYMPHOCYTES 26 13 - 44 % MONOCYTES 15 (H) 4.0 - 12.0 % EOSINOPHILS 4 0.5 - 7.8 % BASOPHILS 1 0.0 - 2.0 % IMMATURE GRANULOCYTES 1 0.0 - 5.0 %  
 ABS. NEUTROPHILS 2.8 1.7 - 8.2 K/UL  
 ABS. LYMPHOCYTES 1.4 0.5 - 4.6 K/UL  
 ABS. MONOCYTES 0.8 0.1 - 1.3 K/UL  
 ABS. EOSINOPHILS 0.2 0.0 - 0.8 K/UL  
 ABS. BASOPHILS 0.0 0.0 - 0.2 K/UL  
 ABS. IMM. GRANS. 0.0 0.0 - 0.5 K/UL  
CALCIUM, IONIZED Collection Time: 08/30/20  5:45 AM  
Result Value Ref Range Calcium, ionized 3.88 (L) 4.0 - 5.2 mg/dL METABOLIC PANEL, BASIC Collection Time: 08/30/20  5:45 AM  
Result Value Ref Range Sodium 146 (H) 136 - 145 mmol/L Potassium 4.1 3.5 - 5.1 mmol/L Chloride 117 (H) 98 - 107 mmol/L  
 CO2 16 (L) 21 - 32 mmol/L Anion gap 13 7 - 16 mmol/L Glucose 199 (H) 65 - 100 mg/dL BUN 78 (H) 8 - 23 MG/DL Creatinine 7.45 (H) 0.8 - 1.5 MG/DL  
 GFR est AA 9 (L) >60 ml/min/1.73m2 GFR est non-AA 8 (L) >60 ml/min/1.73m2 Calcium 7.2 (L) 8.3 - 10.4 MG/DL  
GLUCOSE, POC Collection Time: 08/30/20  7:41 AM  
Result Value Ref Range Glucose (POC) 192 (H) 65 - 100 mg/dL GLUCOSE, POC Collection Time: 08/30/20 11:33 AM  
Result Value Ref Range Glucose (POC) 258 (H) 65 - 100 mg/dL All Micro Results Procedure Component Value Units Date/Time CULTURE, BLOOD [[id number]] Collected:  08/27/20 1732 Order Status:  Completed Specimen:  Blood Updated:  08/30/20 5728 Special Requests: --     
  RIGHT 
HAND Culture result: NO GROWTH 3 DAYS     
 CULTURE, BLOOD [[id number]] Collected:  08/27/20 1713 Order Status:  Completed Specimen:  Blood Updated:  08/30/20 3163 Special Requests: RIGHT HAND Culture result: NO GROWTH 3 DAYS Current Meds: 
Current Facility-Administered Medications Medication Dose Route Frequency  NIFEdipine ER (PROCARDIA XL) tablet 30 mg  30 mg Oral DAILY  hydrALAZINE (APRESOLINE) 20 mg/mL injection 20 mg  20 mg IntraVENous Q2H PRN  
 traMADoL (ULTRAM) tablet 50 mg  50 mg Oral Q6H PRN  
 insulin glargine (LANTUS) injection 30 Units  30 Units SubCUTAneous DAILY  insulin regular (NOVOLIN R, HUMULIN R) injection   SubCUTAneous AC&HS  insulin regular (NOVOLIN R, HUMULIN R) injection 10 Units  10 Units SubCUTAneous TIDAC  
 0.9% sodium chloride infusion 250 mL  250 mL IntraVENous PRN  
 dextrose 5 % - 0.45% NaCl infusion  150 mL/hr IntraVENous CONTINUOUS  
 linezolid (ZYVOX) tablet 600 mg  600 mg Oral Q12H  
 dextrose 40% (GLUTOSE) oral gel 1 Tube  15 g Oral PRN  
 glucagon (GLUCAGEN) injection 1 mg  1 mg IntraMUSCular PRN  
 dextrose (D50W) injection syrg 12.5-25 g  25-50 mL IntraVENous PRN  
 sodium chloride (NS) flush 5-40 mL  5-40 mL IntraVENous Q8H  
 sodium chloride (NS) flush 5-40 mL  5-40 mL IntraVENous PRN  
 acetaminophen (TYLENOL) tablet 650 mg  650 mg Oral Q6H PRN Or  
 acetaminophen (TYLENOL) suppository 650 mg  650 mg Rectal Q6H PRN  polyethylene glycol (MIRALAX) packet 17 g  17 g Oral DAILY PRN  promethazine (PHENERGAN) tablet 12.5 mg  12.5 mg Oral Q6H PRN Or  
 ondansetron (ZOFRAN) injection 4 mg  4 mg IntraVENous Q6H PRN  
 heparin (porcine) injection 5,000 Units  5,000 Units SubCUTAneous Q8H  
 labetaloL (NORMODYNE;TRANDATE) injection 20 mg  20 mg IntraVENous Q2H PRN  
 sevelamer carbonate (RENVELA) tab 1,600 mg  1,600 mg Oral TID WITH MEALS  famotidine (PEPCID) tablet 20 mg  20 mg Oral DAILY  piperacillin-tazobactam (ZOSYN) 4.5 g in 0.9% sodium chloride (MBP/ADV) 100 mL  4.5 g IntraVENous Q12H  cloNIDine (CATAPRES) 0.3 mg/24 hr patch 1 Patch  1 Patch TransDERmal Q7D  
  ferrous sulfate tablet 325 mg  325 mg Oral BID WITH MEALS  gabapentin (NEURONTIN) capsule 300 mg  300 mg Oral DAILY  hydrALAZINE (APRESOLINE) tablet 100 mg  100 mg Oral TID  
 HYDROmorphone (DILAUDID) tablet 0.5 mg  0.5 mg Oral Q4H PRN  
 metoprolol succinate (TOPROL-XL) XL tablet 50 mg  50 mg Oral 7am  
 lipase-protease-amylase (ZENPEP 20,000) capsule 3 Cap  3 Cap Oral TID WITH MEALS  pravastatin (PRAVACHOL) tablet 40 mg  40 mg Oral QHS  sodium bicarbonate tablet 650 mg  650 mg Oral TID  calcifediol (RAYALDEE) Cs24 30 mcg (Patient Supplied)  30 mcg Oral QHS Other Studies (last 24 hours): No results found. Assessment and Plan:  
 
Hospital Problems as of 8/30/2020 Date Reviewed: 7/23/2020 Codes Class Noted - Resolved POA * (Principal) DKA (diabetic ketoacidoses) ([facility] ICD-10-CM: E11.10 ICD-9-CM: 250.12  8/27/2020 - Present Unknown Hyperphosphatemia ICD-10-CM: E83.39 
ICD-9-CM: 275.3  8/27/2020 - Present Unknown High anion gap metabolic acidosis NVL-56-HM: E87.2 ICD-9-CM: 276.2  8/27/2020 - Present Unknown Wound infection ICD-10-CM: T14. 8XXA, L08.9 ICD-9-CM: 958.3  8/16/2020 - Present Yes Overview Signed 8/16/2020  8:07 PM by Kim Ferguson MD  
  Rt leg Functional quadriplegia (HCC) ICD-10-CM: R53.2 ICD-9-CM: 780.72  7/26/2020 - Present Yes Hyperkalemia ICD-10-CM: E87.5 ICD-9-CM: 276.7  7/26/2020 - Present Yes Dementia without behavioral disturbance (HCC) (Chronic) ICD-10-CM: F03.90 ICD-9-CM: 294.20  4/19/2018 - Present Yes Hypernatremia ICD-10-CM: E87.0 ICD-9-CM: 276.0  4/18/2018 - Present Yes Seizure ([address] ICD-10-CM: R56.9 ICD-9-CM: 780.39  4/18/2018 - Present Yes Hypercholesterolemia ICD-10-CM: E78.00 ICD-9-CM: 272.0  Unknown - Present Yes Venous stasis ulcer of left lower extremity ([facility] ICD-10-CM: F87.052, F76.543 ICD-9-CM: 454.0  4/1/2017 - Present Yes Venous insufficiency (Chronic) ICD-10-CM: U18.7 ICD-9-CM: 459.81  12/6/2016 - Present Yes Chronic systolic congestive heart failure (HCC) (Chronic) ICD-10-CM: I41.98 ICD-9-CM: 428.22, 428.0  9/23/2016 - Present Yes 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 
ICD-9-CM: 250.40, 583.81  9/22/2016 - Present Yes HTN (hypertension) ICD-10-CM: I10 
ICD-9-CM: 401.9  9/22/2016 - Present Yes GERD (gastroesophageal reflux disease) ICD-10-CM: K21.9 ICD-9-CM: 530.81  9/22/2016 - Present Yes Diabetic neuropathy (HCC) ICD-10-CM: E11.40 ICD-9-CM: 250.60, 357.2  9/22/2016 - Present Yes Chronic pancreatitis (HCC) (Chronic) ICD-10-CM: K86.1 ICD-9-CM: 929.4  9/22/2016 - Present Yes Anemia of chronic disease ICD-10-CM: D63.8 ICD-9-CM: 285.29  9/22/2016 - Present Yes  
   
 CKD (chronic kidney disease) stage 5, GFR less than 15 ml/min (HCC) ICD-10-CM: N18.5 ICD-9-CM: 585.5  9/21/2016 - Present Yes PLAN:   
#DKA- resolved, on sliding scale, lantus, with meals insulin 
  
#Hypokalemia:  
resolved 
  
#CKD 5, hyperphosphatemia: Has refused hemodialysis in the past. Not candidate for HD per nephrology. Hyperphosphatemia improving. 
-Nephrology following 
-Palliative care following 
-Sevelamer, high dose 
-Trend electrolytes 
  #Hypernatremia: Marked dehydration. History of hyponatremia in the past. 
-D5 1/2 NS IVF as above 
-Trend sodium 
  
#Anemia of chronic disease: Stable 
-Trend hemoglobin 
  #Infected venous stasis ulcer?:  Unclear if infected. History of Proteus mirabilis and pansensitive enterococcus in prior wound cultures. 
-Linezolid IV 
-Piperacillin/tazobactam IV 
-Follow blood cultures 
  #Malignant hypertension: Unknown if patient has taken antihypertensives today. 
-Continue home amlodipine, clonidine patch, hydralazine 
-Labetalol IV as needed, hold for HR<70 
-Discontinue Nitropaste 
- add nifedipine 
  
 #Dementia with behavioral disturbance:  
 son, Marisol Settler, to establish goals of care 
  #HFpEF: Continue metoprolol with hold parameters #Chronic pancreatitis: Continue home supplementation #HLD: Continue on pravastatin #Diabetic neuropathy: Continue home pregabalin, gabapentin 
  
DO NOT INTUBATE, will perform chest compressions, cardiac meds, defibrillation 
  
 
Heparin for VTE prevention 
  
 
Signed: 
Micaela Barker MD

## 2020-08-30 NOTE — PROGRESS NOTES
[facility] OUTREACH NURSE PROGRESS REPORT SUBJECTIVE: Called to assess patient secondary to critical care outreach protocol. MEWS Score: 1 (08/30/20 0745) Vitals:  
 08/30/20 4371 08/30/20 3381 08/30/20 0745 08/30/20 1204 BP: 151/79 194/83 179/84 140/68 Pulse: (!) 58 (!) 59 61 64 Resp: 16  18 17 Temp: 98.1 °F (36.7 °C)  98 °F (36.7 °C) 98.1 °F (36.7 °C) SpO2: 99%  99% 100% Weight: 76.1 kg (167 lb 11.2 oz) Height:      
  
 
 
LAB DATA: 
 
Recent Labs 08/30/20 
0545 08/29/20 
1540 08/29/20 
0153  08/28/20 
0311  08/27/20 
1434 * 148* 150*   < > 157*   < > 153* K 4.1 3.4* 4.0   < > 3.4*   < > 5.2*  
* 118* 121*   < > 125*   < > 121* CO2 16* 16* 16*   < > 20*   < > 9* AGAP 13 14 13   < > 12   < > 23* * 190* 277*   < > 176*   < > 772* BUN 78* 82* 86*   < > 101*   < > 115* CREA 7.45* 7.40* 7.99*   < > 9.14*   < > 9.76* GFRAA 9* 9* 8*   < > 7*   < > 7* GFRNA 8* 8* 7*   < > 6*   < > 6*  
CA 7.2* 7.3* 6.8*   < > 7.1*   < > 7.3*  
MG 2.4  --  1.7*  --  1.9  --  2.4 PHOS 6.0*  --  5.3*  --  5.9*  --  8.8* ALB  --   --   --   --   --   --  2.2* TP  --   --   --   --   --   --  6.6 GLOB  --   --   --   --   --   --  4.4* AGRAT  --   --   --   --   --   --  0.5* ALT  --   --   --   --   --   --  14  
 < > = values in this interval not displayed. Recent Labs 08/30/20 2054 08/29/20 
1820 08/29/20 
0153  08/28/20 
3191 WBC 5.3  --  4.9  --  4.2* HGB 9.8* 9.5* 8.2*   < > 7.4* HCT 32.2*  --  27.1*  --  24.8*  
  --  191  --  205  
 < > = values in this interval not displayed. OBJECTIVE: On arrival to room, I found patient to be in bed resting quietly. Pain Assessment Pain Intensity 1: 0 (08/30/20 0758) Pain Location 1: Shoulder Pain Intervention(s) 1: Medication (see MAR) Patient Stated Pain Goal: 0 
 
  
  
  
  
 
  
  
  
   
 
 ASSESSMENT:  Patient resting, responds to voice. Oriented x 4. Denies pain. O2 sat 99% on RA. Lung sounds clear/diminished bilaterally. Abdomen soft, non tender, active bowel sounds. No new complaints. PLAN:  Will continue to monitor per outreach protocol.

## 2020-08-30 NOTE — PROGRESS NOTES
Date of Outreach Update: SILVIO Hoover was seen and assessed. Previous Outreach assessment has been reviewed. There have been no significant clinical changes since the completion of the last dated Outreach assessment. Signed By: Christina Otoole RN  August 30, 2020 4:06 PM

## 2020-08-30 NOTE — PROGRESS NOTES
Problem: Diabetes Self-Management Goal: *Disease process and treatment process Description: Define diabetes and identify own type of diabetes; list 3 options for treating diabetes. Outcome: Progressing Towards Goal 
Goal: *Incorporating nutritional management into lifestyle Description: Describe effect of type, amount and timing of food on blood glucose; list 3 methods for planning meals. Outcome: Progressing Towards Goal 
Goal: *Incorporating physical activity into lifestyle Description: State effect of exercise on blood glucose levels. Outcome: Progressing Towards Goal 
Goal: *Developing strategies to promote health/change behavior Description: Define the ABC's of diabetes; identify appropriate screenings, schedule and personal plan for screenings. Outcome: Progressing Towards Goal 
Goal: *Using medications safely Description: State effect of diabetes medications on diabetes; name diabetes medication taking, action and side effects. Outcome: Progressing Towards Goal 
Goal: *Monitoring blood glucose, interpreting and using results Description: Identify recommended blood glucose targets  and personal targets. Outcome: Progressing Towards Goal 
Goal: *Prevention, detection, treatment of acute complications Description: List symptoms of hyper- and hypoglycemia; describe how to treat low blood sugar and actions for lowering  high blood glucose level. Outcome: Progressing Towards Goal 
Goal: *Prevention, detection and treatment of chronic complications Description: Define the natural course of diabetes and describe the relationship of blood glucose levels to long term complications of diabetes. Outcome: Progressing Towards Goal 
Goal: *Developing strategies to address psychosocial issues Description: Describe feelings about living with diabetes; identify support needed and support network Outcome: Progressing Towards Goal 
Goal: *Insulin pump training Outcome: Progressing Towards Goal 
 Goal: *Sick day guidelines Outcome: Progressing Towards Goal 
Goal: *Patient Specific Goal (EDIT GOAL, INSERT TEXT) Outcome: Progressing Towards Goal 
  
Problem: Patient Education: Go to Patient Education Activity Goal: Patient/Family Education Outcome: Progressing Towards Goal 
  
Problem: Falls - Risk of 
Goal: *Absence of Falls Description: Document Joaquinmahnaz Burton Fall Risk and appropriate interventions in the flowsheet. Outcome: Progressing Towards Goal 
Note: Fall Risk Interventions: 
  
 
Mentation Interventions: Increase mobility, More frequent rounding, Reorient patient Medication Interventions: Patient to call before getting OOB, Evaluate medications/consider consulting pharmacy Elimination Interventions: Call light in reach History of Falls Interventions: Evaluate medications/consider consulting pharmacy, Investigate reason for fall Problem: Patient Education: Go to Patient Education Activity Goal: Patient/Family Education Outcome: Progressing Towards Goal 
  
Problem: Pressure Injury - Risk of 
Goal: *Prevention of pressure injury Description: Document Santy Scale and appropriate interventions in the flowsheet. Outcome: Progressing Towards Goal 
Note: Pressure Injury Interventions: 
Sensory Interventions: Minimize linen layers, Maintain/enhance activity level, Keep linens dry and wrinkle-free Moisture Interventions: Absorbent underpads Activity Interventions: Increase time out of bed, Pressure redistribution bed/mattress(bed type) Mobility Interventions: HOB 30 degrees or less, Pressure redistribution bed/mattress (bed type), PT/OT evaluation Nutrition Interventions: Document food/fluid/supplement intake Friction and Shear Interventions: Lift sheet Problem: Patient Education: Go to Patient Education Activity Goal: Patient/Family Education Outcome: Progressing Towards Goal 
  
Problem: Delirium Treatment Goal: *Level of consciousness restored to baseline Outcome: Progressing Towards Goal 
Goal: *Level of environmental perceptions restored to baseline Outcome: Progressing Towards Goal 
Goal: *Sensory perception restored to baseline Outcome: Progressing Towards Goal 
Goal: *Emotional stability restored to baseline Outcome: Progressing Towards Goal 
Goal: *Functional assessment restored to baseline Outcome: Progressing Towards Goal 
Goal: *Absence of falls Outcome: Progressing Towards Goal 
Goal: *Will remain free of delirium, CAM Score negative Outcome: Progressing Towards Goal 
Goal: *Cognitive status will be restored to baseline Outcome: Progressing Towards Goal 
Goal: Interventions Outcome: Progressing Towards Goal 
  
Problem: Patient Education: Go to Patient Education Activity Goal: Patient/Family Education Outcome: Progressing Towards Goal

## 2020-08-30 NOTE — PROGRESS NOTES
Patient has no new complaints. All needs met at this time. Bed low and locked. Hourly rounds completed this shift.

## 2020-08-31 NOTE — PROGRESS NOTES
Alert,oriented x 3.   this am.  He says he feels bad this am and stomach upset. Ginger ale given. Medicated for generalized pain x 1 last pm with ultram.  SR on tele. BP stable. Hourly rounds completed. Resting in bed. Bed in low locked position. Call light and personal items within reach. Will continue to monitor and give report to oncoming day shift nurse.

## 2020-08-31 NOTE — PROGRESS NOTES
[facility] OUTREACH NURSE PROGRESS REPORT SUBJECTIVE: Called to assess patient secondary to transfer from ICU. MEWS Score: 1 (08/31/20 0438) Vitals:  
 08/30/20 1930 08/30/20 2340 08/31/20 3427 08/31/20 1980 BP: 133/66 148/70 144/73 141/67 Pulse: 63 64 64 (!) 58 Resp: 18 18 18 19 Temp: 98 °F (36.7 °C) 98.4 °F (36.9 °C) 98.7 °F (37.1 °C) 97.8 °F (36.6 °C) SpO2: 99% 99% 94% 98% Weight:   78.6 kg (173 lb 4.8 oz) Height:      
  
 
 
LAB DATA: 
 
Recent Labs 08/31/20 
7824 08/30/20 
1609 08/30/20 
0545  08/29/20 
0153  146* 146*   < > 150*  
K 3.8 3.5 4.1   < > 4.0  
* 118* 117*   < > 121* CO2 16* 17* 16*   < > 16* AGAP 14 11 13   < > 13 * 170* 199*   < > 277* BUN 78* 77* 78*   < > 86* CREA 7.11* 7.31* 7.45*   < > 7.99* GFRAA 10* 9* 9*   < > 8* GFRNA 8* 8* 8*   < > 7*  
CA 6.6* 7.0* 7.2*   < > 6.8*  
MG 2.2  --  2.4  --  1.7* PHOS 6.2*  --  6.0*  --  5.3* ALB 1.6*  --   --   --   --   
 < > = values in this interval not displayed. Recent Labs 08/31/20 
5699 08/30/20 
1609 08/30/20 
0545  08/29/20 
0153 WBC 6.1  --  5.3  --  4.9 HGB 8.8* 9.0* 9.8*   < > 8.2* HCT 29.0*  --  32.2*  --  27.1*  
  --  188  --  191  
 < > = values in this interval not displayed. OBJECTIVE: On arrival to room, I found patient to be resting in bed, watching TV. Pain Assessment Pain Intensity 1: 2 (08/31/20 0030) Pain Location 1: Generalized Pain Intervention(s) 1: Medication (see MAR) Patient Stated Pain Goal: 2 
 
 
ASSESSMENT:  Patient alert, no visible signs of distress. Breathing even and unlabored on room air. O2 sat 98%, HR 58. Assisted patient off bedpan and completed pericare. Patient states he dislike the heel boots, educated patient on importance of boots and he verbalizes understanding. Patient comfortable in bed. Chart and labs reviewed. PLAN:  Will continue to follow per outreach protocol.

## 2020-08-31 NOTE — PROGRESS NOTES
Date of Outreach Update: A B Junior Lala Tello was seen and assessed. MEWS Score: 1 (08/30/20 1930) Vitals:  
 08/30/20 1204 08/30/20 1419 08/30/20 1519 08/30/20 1930 BP: 140/68  146/74 133/66 Pulse: 64 67 68 63 Resp: 17  17 18 Temp: 98.1 °F (36.7 °C)  98.4 °F (36.9 °C) 98 °F (36.7 °C) SpO2: 100%  100% 99% Weight:      
Height:      
  
 
 Pain Assessment Pain Intensity 1: 0 (08/30/20 2251) Pain Location 1: Shoulder Pain Intervention(s) 1: Medication (see MAR) Patient Stated Pain Goal: 0 Previous Outreach assessment has been reviewed. There have been no significant clinical changes since the completion of the last dated Outreach assessment. Pt resting comfortably in bed. No complaints from patient. Informed Blood sugar had been <60. Pt currently having snack. Nurse medicated with D50. Repeat glucose >130. Will continue to follow up per outreach protocol. Signed By:   Shawn Kelly RN   August 30, 2020 11:11 PM

## 2020-08-31 NOTE — PROGRESS NOTES
BS 56; given snack of juice, ice cream, crachers then BS 55 so D50 pushed then . Asymptomatic and IVF infusing.

## 2020-08-31 NOTE — ACP (ADVANCE CARE PLANNING)
Patient asking for information about how to complete a [address] Brought patient copy of the form, answered questions. Patient wants to go over the form with his son when he visits tomorrow. Available for assistance as needed. Lolis STAPLETON 
[phone]

## 2020-08-31 NOTE — PROGRESS NOTES
Interdisciplinary Rounds completed. Nursing, Case Management, and Physician  present. Plan of care reviewed and updated.

## 2020-08-31 NOTE — PROGRESS NOTES
Hospitalist Progress Note Admit Date:  2020  2:14 PM  
Name:  Miguel Ángel Choudhary Age:  79 y.o. 
:  1940 MRN:  [id number] PCP:  Ildefonso Salomon MD 
Treatment Team: Attending Provider: Berna Pittman MD; Consulting Provider: Rich Roman MD; Physical Therapist: Jeevan Buenrostro DPT; Care Manager: Precious Gonzalez RN Subjective:  
73-year-old male brought to [facility] emergency department on 2020 from home via EMS after home health aide had patient sent to emergency department for wound check of his legs after she noted maggots in his wound 5 days ago. Patient was recently admitted and discharged from the hospital 8 days ago after being treated for a urinary tract infection with sepsis and metabolic encephalopathy. Patient has known venous stasis ulcers and was seen by wound care during prior hospitalization. When asked the patient states that he feels well. Patient appears confused and cannot tell me the last time he took his antihypertensive or insulin. Patient is disoriented to month and day of the week. In the emergency department the patient was noted to be markedly hypertensive with a BP of 216/94. Patient noted to be in DKA with a blood sugar of 772, serum CO2 of 9, anion gap of 23. VBG 7. 8/7.6. Patient also noted to be hyponatremic with a corrected serum sodium of 164. Hemoglobin noted to be 7.7 which is essentially stable from prior. X-ray of left lower extremity without gas or signs of osteomyelitis. Patient refused to answer many of my questions repeatedly stating \"I just want to go home\". When I told the patient that he has a life-threatening disease process and would likely die if he returned home he stated \"that is fine\". The patient told palliative care physician during prior admission that he wanted to be DNI but wants chest compressions/cardiac meds/defibrillation if need be.   The patient told my partner that he never wanted to return to the hospital again even if he was found unresponsive. I discussed this with the patient's son, Ninfa David, who stated that immediately following his discussion with my partner he told his son that if he became ill he wanted to be brought to the hospital for aggressive treatment. Patient obtunded and difficult to arouse. Received a dose of lorazepam for unclear reason last night which is almost certainly contributing. Multiple other metabolic reasons for this. Patient was also significantly altered last week when he was here with UTI. DKA resolved. Transitioning off of insulin gtt. K is low, replacement ordered and will follow closely. Patient not candidate for HD per nephrology. Palliative care will continue to follow. 
  
8/29/2020: Patient is much more awake and alert today. Patient is able to take oral antihypertensives today and blood pressure is much improved. Electrolytes are stabilized. Transfer out of intensive care unit today. 8/30/2020 Says doing fine 8/31/2020 Says doing fine. Trying to get his son Xiomara Murray as his healthcare power of . Eventually wants to go home. Objective:  
 
Patient Vitals for the past 24 hrs: 
 Temp Pulse Resp BP SpO2  
08/31/20 1112 97.6 °F (36.4 °C) 60 18 152/71 98 % 08/31/20 0723 97.8 °F (36.6 °C) (!) 58 19 141/67 98 % 08/31/20 0438 98.7 °F (37.1 °C) 64 18 144/73 94 % 08/30/20 2340 98.4 °F (36.9 °C) 64 18 148/70 99 % 08/30/20 1930 98 °F (36.7 °C) 63 18 133/66 99 % 08/30/20 1519 98.4 °F (36.9 °C) 68 17 146/74 100 % 08/30/20 1419  [address] Oxygen Therapy O2 Sat (%): 98 % (08/31/20 1112) Pulse via Oximetry: 68 beats per minute (08/29/20 1214) O2 Device: Room air (08/31/20 1354) Intake/Output Summary (Last 24 hours) at 8/31/2020 1416 Last data filed at 8/31/2020 9336 Gross per 24 hour Intake 1020 ml Output 350 ml Net 670 ml General:    Well nourished.   Alert.   
heent- normal 
 CV: RRR. No murmur, rub, or gallop. Lungs:   Clear to auscultation bilaterally. No wheezing, rhonchi, or rales. Abdomen:   Soft, nontender, nondistended. Cns- no focal neurological deficit Extremities: Chronic left upper extremity abduction, says secondary to nerve problem Skin:     Bilateral lower leg dressing Data Review: 
I have reviewed all labs, meds, telemetry events, and studies from the last 24 hours. Recent Results (from the past 24 hour(s)) GLUCOSE, POC Collection Time: 08/30/20  3:59 PM  
Result Value Ref Range Glucose (POC) 217 (H) 65 - 100 mg/dL HEMOGLOBIN Collection Time: 08/30/20  4:09 PM  
Result Value Ref Range HGB 9.0 (L) 13.6 - 17.2 g/dL METABOLIC PANEL, BASIC Collection Time: 08/30/20  4:09 PM  
Result Value Ref Range Sodium 146 (H) 136 - 145 mmol/L Potassium 3.5 3.5 - 5.1 mmol/L Chloride 118 (H) 98 - 107 mmol/L  
 CO2 17 (L) 21 - 32 mmol/L Anion gap 11 7 - 16 mmol/L Glucose 170 (H) 65 - 100 mg/dL BUN 77 (H) 8 - 23 MG/DL Creatinine 7.31 (H) 0.8 - 1.5 MG/DL  
 GFR est AA 9 (L) >60 ml/min/1.73m2 GFR est non-AA 8 (L) >60 ml/min/1.73m2 Calcium 7.0 (L) 8.3 - 10.4 MG/DL  
GLUCOSE, POC Collection Time: 08/30/20  8:05 PM  
Result Value Ref Range Glucose (POC) 56 (L) 65 - 100 mg/dL GLUCOSE, POC Collection Time: 08/30/20  8:42 PM  
Result Value Ref Range Glucose (POC) 55 (L) 65 - 100 mg/dL GLUCOSE, POC Collection Time: 08/30/20  9:08 PM  
Result Value Ref Range Glucose (POC) 132 (H) 65 - 100 mg/dL GLUCOSE, POC Collection Time: 08/30/20 11:47 PM  
Result Value Ref Range Glucose (POC) 150 (H) 65 - 100 mg/dL GLUCOSE, POC Collection Time: 08/31/20  4:42 AM  
Result Value Ref Range Glucose (POC) 194 (H) 65 - 100 mg/dL MAGNESIUM Collection Time: 08/31/20  5:53 AM  
Result Value Ref Range Magnesium 2.2 1.8 - 2.4 mg/dL PHOSPHORUS Collection Time: 08/31/20  5:53 AM  
Result Value Ref Range Phosphorus 6.2 (H) 2.3 - 3.7 MG/DL  
CBC WITH AUTOMATED DIFF Collection Time: 08/31/20  5:53 AM  
Result Value Ref Range WBC 6.1 4.3 - 11.1 K/uL  
 RBC 3.48 (L) 4.23 - 5.6 M/uL HGB 8.8 (L) 13.6 - 17.2 g/dL HCT 29.0 (L) 41.1 - 50.3 % MCV 83.3 79.6 - 97.8 FL  
 MCH 25.3 (L) 26.1 - 32.9 PG  
 MCHC 30.3 (L) 31.4 - 35.0 g/dL  
 RDW 17.2 (H) 11.9 - 14.6 % PLATELET 111 896 - 020 K/uL MPV 11.4 9.4 - 12.3 FL ABSOLUTE NRBC 0.02 0.0 - 0.2 K/uL  
 DF AUTOMATED NEUTROPHILS 51 43 - 78 % LYMPHOCYTES 33 13 - 44 % MONOCYTES 11 4.0 - 12.0 % EOSINOPHILS 5 0.5 - 7.8 % BASOPHILS 1 0.0 - 2.0 % IMMATURE GRANULOCYTES 0 0.0 - 5.0 %  
 ABS. NEUTROPHILS 3.1 1.7 - 8.2 K/UL  
 ABS. LYMPHOCYTES 2.0 0.5 - 4.6 K/UL  
 ABS. MONOCYTES 0.7 0.1 - 1.3 K/UL  
 ABS. EOSINOPHILS 0.3 0.0 - 0.8 K/UL  
 ABS. BASOPHILS 0.0 0.0 - 0.2 K/UL  
 ABS. IMM. GRANS. 0.0 0.0 - 0.5 K/UL  
CALCIUM, IONIZED Collection Time: 08/31/20  5:53 AM  
Result Value Ref Range Calcium, ionized 3.76 (L) 4.0 - 5.2 mg/dL METABOLIC PANEL, BASIC Collection Time: 08/31/20  5:53 AM  
Result Value Ref Range Sodium 144 136 - 145 mmol/L Potassium 3.8 3.5 - 5.1 mmol/L Chloride 114 (H) 98 - 107 mmol/L  
 CO2 16 (L) 21 - 32 mmol/L Anion gap 14 7 - 16 mmol/L Glucose 163 (H) 65 - 100 mg/dL BUN 78 (H) 8 - 23 MG/DL Creatinine 7.11 (H) 0.8 - 1.5 MG/DL  
 GFR est AA 10 (L) >60 ml/min/1.73m2 GFR est non-AA 8 (L) >60 ml/min/1.73m2 Calcium 6.6 (L) 8.3 - 10.4 MG/DL ALBUMIN Collection Time: 08/31/20  5:53 AM  
Result Value Ref Range Albumin 1.6 (L) 3.2 - 4.6 g/dL GLUCOSE, POC Collection Time: 08/31/20  6:43 AM  
Result Value Ref Range Glucose (POC) 152 (H) 65 - 100 mg/dL GLUCOSE, POC Collection Time: 08/31/20 11:11 AM  
Result Value Ref Range Glucose (POC) 159 (H) 65 - 100 mg/dL All Micro Results Procedure Component Value Units Date/Time CULTURE, BLOOD [[id number]] Collected:  08/27/20 1732 Order Status:  Completed Specimen:  Blood Updated:  08/31/20 1105 Special Requests: --     
  RIGHT 
HAND Culture result: NO GROWTH 4 DAYS     
 CULTURE, BLOOD [[id number]] Collected:  08/27/20 1713 Order Status:  Completed Specimen:  Blood Updated:  08/31/20 1105 Special Requests: RIGHT HAND Culture result: NO GROWTH 4 DAYS Current Meds: 
Current Facility-Administered Medications Medication Dose Route Frequency  insulin glargine (LANTUS) injection 20 Units  20 Units SubCUTAneous DAILY  NIFEdipine ER (PROCARDIA XL) tablet 30 mg  30 mg Oral DAILY  hydrALAZINE (APRESOLINE) 20 mg/mL injection 20 mg  20 mg IntraVENous Q2H PRN  
 traMADoL (ULTRAM) tablet 50 mg  50 mg Oral Q6H PRN  
 insulin regular (NOVOLIN R, HUMULIN R) injection   SubCUTAneous AC&HS  insulin regular (NOVOLIN R, HUMULIN R) injection 10 Units  10 Units SubCUTAneous TIDAC  
 0.9% sodium chloride infusion 250 mL  250 mL IntraVENous PRN  
 dextrose 5 % - 0.45% NaCl infusion  150 mL/hr IntraVENous CONTINUOUS  
 linezolid (ZYVOX) tablet 600 mg  600 mg Oral Q12H  
 dextrose 40% (GLUTOSE) oral gel 1 Tube  15 g Oral PRN  
 glucagon (GLUCAGEN) injection 1 mg  1 mg IntraMUSCular PRN  
 dextrose (D50W) injection syrg 12.5-25 g  25-50 mL IntraVENous PRN  
 sodium chloride (NS) flush 5-40 mL  5-40 mL IntraVENous Q8H  
 sodium chloride (NS) flush 5-40 mL  5-40 mL IntraVENous PRN  
 acetaminophen (TYLENOL) tablet 650 mg  650 mg Oral Q6H PRN Or  
 acetaminophen (TYLENOL) suppository 650 mg  650 mg Rectal Q6H PRN  polyethylene glycol (MIRALAX) packet 17 g  17 g Oral DAILY PRN  promethazine (PHENERGAN) tablet 12.5 mg  12.5 mg Oral Q6H PRN  Or  
 ondansetron (ZOFRAN) injection 4 mg  4 mg IntraVENous Q6H PRN  
 heparin (porcine) injection 5,000 Units  5,000 Units SubCUTAneous Q8H  
  labetaloL (NORMODYNE;TRANDATE) injection 20 mg  20 mg IntraVENous Q2H PRN  
 sevelamer carbonate (RENVELA) tab 1,600 mg  1,600 mg Oral TID WITH MEALS  famotidine (PEPCID) tablet 20 mg  20 mg Oral DAILY  piperacillin-tazobactam (ZOSYN) 4.5 g in 0.9% sodium chloride (MBP/ADV) 100 mL  4.5 g IntraVENous Q12H  cloNIDine (CATAPRES) 0.3 mg/24 hr patch 1 Patch  1 Patch TransDERmal Q7D  
 ferrous sulfate tablet 325 mg  325 mg Oral BID WITH MEALS  gabapentin (NEURONTIN) capsule 300 mg  300 mg Oral DAILY  hydrALAZINE (APRESOLINE) tablet 100 mg  100 mg Oral TID  
 HYDROmorphone (DILAUDID) tablet 0.5 mg  0.5 mg Oral Q4H PRN  
 metoprolol succinate (TOPROL-XL) XL tablet 50 mg  50 mg Oral 7am  
 lipase-protease-amylase (ZENPEP 20,000) capsule 3 Cap  3 Cap Oral TID WITH MEALS  pravastatin (PRAVACHOL) tablet 40 mg  40 mg Oral QHS  sodium bicarbonate tablet 650 mg  650 mg Oral TID  calcifediol (RAYALDEE) Cs24 30 mcg (Patient Supplied)  30 mcg Oral QHS Other Studies (last 24 hours): No results found. Assessment and Plan:  
 
Hospital Problems as of 8/31/2020 Date Reviewed: 7/23/2020 Codes Class Noted - Resolved POA * (Principal) DKA (diabetic k[facility]acidoses) ([facility] ICD-10-CM: E11.10 ICD-9-CM: 250.12  8/27/2020 - Present Unknown Hyperphosphatemia ICD-10-CM: E83.39 
ICD-9-CM: 275.3  8/27/2020 - Present Unknown High anion gap metabolic acidosis AKIKO-49-FM: E87.2 ICD-9-CM: 276.2  8/27/2020 - Present Unknown Wound infection ICD-10-CM: T14. 8XXA, L08.9 ICD-9-CM: 958.3  8/16/2020 - Present Yes Overview Signed 8/16/2020  8:07 PM by Emilee Galloway MD  
  Rt leg Functional quadriplegia (HCC) ICD-10-CM: R53.2 ICD-9-CM: 780.72  7/26/2020 - Present Yes Hyperkalemia ICD-10-CM: E87.5 ICD-9-CM: 276.7  7/26/2020 - Present Yes Dementia without behavioral disturbance (HCC) (Chronic) ICD-10-CM: F03.90 ICD-9-CM: 294.20  4/19/2018 - Present Yes Hypernatremia ICD-10-CM: E87.0 ICD-9-CM: 276.0  4/18/2018 - Present Yes Seizure ([facility] ICD-10-CM: R56.9 ICD-9-CM: 780.39  4/18/2018 - Present Yes Hypercholesterolemia ICD-10-CM: E78.00 ICD-9-CM: 272.0  Unknown - Present Yes Venous stasis ulcer of left lower extremity ([facility] ICD-10-CM: J63.477, R20.961 ICD-9-CM: 454.0  4/1/2017 - Present Yes Venous insufficiency (Chronic) ICD-10-CM: Q73.3 ICD-9-CM: 459.81  12/6/2016 - Present Yes Chronic systolic congestive heart failure (HCC) (Chronic) ICD-10-CM: J05.71 ICD-9-CM: 428.22, 428.0  9/23/2016 - Present Yes 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 
ICD-9-CM: 250.40, 583.81  9/22/2016 - Present Yes HTN (hypertension) ICD-10-CM: I10 
ICD-9-CM: 401.9  9/22/2016 - Present Yes GERD (gastroesophageal reflux disease) ICD-10-CM: K21.9 ICD-9-CM: 530.81  9/22/2016 - Present Yes Diabetic neuropathy (HCC) ICD-10-CM: E11.40 ICD-9-CM: 250.60, 357.2  9/22/2016 - Present Yes Chronic pancreatitis (HCC) (Chronic) ICD-10-CM: K86.1 ICD-9-CM: 620.8  9/22/2016 - Present Yes Anemia of chronic disease ICD-10-CM: D63.8 ICD-9-CM: 285.29  9/22/2016 - Present Yes  
   
 CKD (chronic kidney disease) stage 5, GFR less than 15 ml/min (HCC) ICD-10-CM: N18.5 ICD-9-CM: 585.5  9/21/2016 - Present Yes PLAN:   
#DKA- resolved, on sliding scale, lantus, with meals insulin. Episode of glucose last night. They do adjustments in Lantus and with meals insulin. 
  
#Hypokalemia:  
resolved 
  
#CKD 5, hyperphosphatemia: Has refused hemodialysis in the past. Not candidate for HD per nephrology. Hyperphosphatemia improving. 
-Nephrology following 
-Palliative care following 
-Sevelamer, high dose 
-Trend electrolytes 
  #Hypernatremia: Marked dehydration. History of hyponatremia in the past. 
-D5 1/2 NS IVF decreased from 150 cc to 50 cc/hr. Hypernatremia- resolved. -Trend sodium 
  
#Anemia of chronic disease: Stable 
-Trend hemoglobin 
  #Infected venous stasis ulcer?:  Unclear if infected. History of Proteus mirabilis and pansensitive enterococcus in prior wound cultures. -Piperacillin/tazobactam IV 
-Follow blood cultures 
  #Malignant hypertension: Unknown if patient has taken antihypertensives today. 
-Continue clonidine patch, hydralazine. Change patient amlodipine to nifedipine. 
-Labetalol IV as needed, hold for HR<70 
-Discontinue Nitropaste 
- add nifedipine 
  
#Dementia with behavioral disturbance:  
 son, Shekhar Valentin, to establish goals of care 
  #HFpEF: Continue metoprolol with hold parameters #Chronic pancreatitis: Continue home supplementation #HLD: Continue on pravastatin #Diabetic neuropathy: Continue home pregabalin, gabapentin 
  
DO NOT INTUBATE, will perform chest compressions, cardiac meds, defibrillation Physical therapy consulted.  
We will see if patient could go home and would require a rehab. 
  
 
Heparin for VTE prevention 
  
 
Signed: 
Nancy Vargas MD

## 2020-08-31 NOTE — PROGRESS NOTES
Problem: Mobility Impaired (Adult and Pediatric) Goal: *Acute Goals and Plan of Care (Insert Text) Outcome: Progressing Towards Goal 
Note: LTG: 
(1.)Mr. Dariela Hercules will move from supine to sit and sit to supine, scoot up and down, and roll side to side INDEPENDENTLY within 7 treatment day(s). (2.)Mr. Dariela Hercules will transfer from bed to chair and chair to bed with SUPERVISION using the least restrictive device within 7 treatment day(s). (3.)Mr. Dariela Hercules will ambulate with STAND BY ASSIST for 50+ feet with the least restrictive device within 7 treatment day(s). (4.)Mr. Dariela Hercules will perform exercises per HEP for 10+ minutes to improve strength and mobility within 7 days. ________________________________________________________________________________________________ PHYSICAL THERAPY: Initial Assessment and AM 8/31/2020 INPATIENT: PT Visit Days : 1 Payor: Lamont Shah / Plan: [email or web address] / Product Type: [employer] Medicare /   
  
NAME/AGE/GENDER: Marquis Andersen is a 79 y.o. male PRIMARY DIAGNOSIS: DKA (diabetic ketoacidoses) ([facility] [E11.10] [facility]diabetic k[facility]acidoses) ([facility] ICD-10: Treatment Diagnosis:  
 Generalized Muscle Weakness (M62.81) Difficulty in walking, Not elsewhere classified (R26.2) Other abnormalities of gait and mobility (R26.89) Precaution/Allergies: 
Patient has no known allergies. ASSESSMENT:  
 
Mr. Dariela Hercules is a 79year old male admitted from home with DKA. Has extensive wounds on bilateral lower legs. Pt reports modified independence with limited household mobility using cane at baseline; does have CLTC aide 6 days/wk to assist with ADLs and other needs. Occasional falls. Pt presents sitting up with son  at bedside, no complaints and is agreeable to therapy. Min-mod assist and additional time to transfer to sitting. Good to fair seated balance. Min assist for sit-stand with bed height elevated. Worked on standing static/dynamic and pre-gait activities at edge of bed. Min HHA for taking steps from bed to chair 5 ft with unsteady gait and significant generalized weakness. Practiced transfers x 2 additional trials from chair with heavy cueing for technique; difficulty due to weakness and low chair with pt needing max assist to stand. Pt positioned comfortably in recliner after activity with legs elevated and propped on pillows, needs in reach. A B Junior Joan Persaud appears to be much weaker than baseline at this time and will benefit from continued acute PT to address deficits/maximize independence with mobility. Dc needs TBD pending progress- currently recommending STR however pt wants to dc home and will need at least [facility] PT. This section established at most recent assessment PROBLEM LIST (Impairments causing functional limitations): 
Decreased Strength Decreased Transfer Abilities Decreased Ambulation Ability/Technique Decreased Balance Decreased Activity Tolerance INTERVENTIONS PLANNED: (Benefits and precautions of physical therapy have been discussed with the patient.) Balance Exercise Bed Mobility Gait Training Home Exercise Program (HEP) Therapeutic Activites Therapeutic Exercise/Strengthening Transfer Training TREATMENT PLAN: Frequency/Duration: 3 times a week for duration of hospital stay Rehabilitation Potential For Stated Goals: Good REHAB RECOMMENDATIONS (at time of discharge pending progress):   
Placement: It is my opinion, based on this patient's performance to date, that Mr. Joan Persaud may benefit from intensive therapy at a [address] [place] [address] after discharge due to the functional deficits listed above that are likely to improve with skilled rehabilitation and concerns that he/she may be unsafe to be unsupervised at home due to weakness, need for assistance, fall risk . Pt wants to dc home and will need at least [facility] PT Equipment:  
tbd HISTORY:  
History of Present Injury/Illness (Reason for Referral): 
Per H&P, \"79year-old male brought to [facility] emergency department on 8/27/2020 from home via EMS after home health aide had patient sent to emergency department for wound check of his legs after she noted maggots in his wound 5 days ago. Patient was recently admitted and discharged from the hospital 8 days ago after being treated for a urinary tract infection with sepsis and metabolic encephalopathy. Patient has known venous stasis ulcers and was seen by wound care during prior hospitalization. When asked the patient states that he feels well. Patient appears confused and cannot tell me the last time he took his antihypertensive or insulin. Patient is disoriented to month and day of the week. In the emergency department the patient was noted to be markedly hypertensive with a BP of 216/94. Patient noted to be in DKA with a blood sugar of 772, serum CO2 of 9, anion gap of 23. VBG 7.16/21/1949 8/7.6. Patient also noted to be hyponatremic with a corrected serum sodium of 164. Hemoglobin noted to be 7.7 which is essentially stable from prior. X-ray of left lower extremity without gas or signs of osteomyelitis. Patient refused to answer many of my questions repeatedly stating \"I just want to go home\". When I told the patient that he has a life-threatening disease process and would likely die if he returned home he stated \"that is fine\". The patient told palliative care physician during prior admission that he wanted to be DNI but wants chest compressions/cardiac meds/defibrillation if need be. The patient told my partner that he never wanted to return to the hospital again even if he was found unresponsive.   I discussed this with the patient's son, Win Pope, who stated that immediately following his discussion with my partner he told his son that if he became ill he wanted to be brought to the hospital for aggressive treatment\" Past Medical History/Comorbidities:  
Mr. Rhona Cassidy  has a past medical history of Acute renal failure superimposed on stage 3 chronic kidney disease ([facility] (9/21/2016), Anemia, Arthritis, Chronic kidney disease, Chronic pancreatitis ([facility] (9/22/2016), Dermatophytosis of nail (12/6/2016), Diabetic neuropathy ([facility] (9/22/2016), Essential hypertension (9/22/2016), GERD (gastroesophageal reflux disease), Hypercholesterolemia, Iron (Fe) deficiency anemia (9/22/2016), Septicemia due to Klebsiella pneumoniae ([facility] (2/73/6273), Systolic CHF, chronic ([facility] (9/23/2016), Type II diabetes mellitus with nephropathy ([facility] (09/22/2016), Venous insufficiency (12/6/2016), and Xerosis cutis (12/6/2016). He also has no past medical history of Congestive heart failure, unspecified. Mr. Rhona Cassidy  has a past surgical history that includes hx hernia repair (Left, 2010); hx colonoscopy; hx turp (2012); hx prostatectomy (2012); vascular surgery procedure unlist (Left, 10/26/2017); and vascular surgery procedure unlist (Left, 09/18/2019). Social History/Living Environment:  
Home Environment: Trailer/mobile home # Steps to Enter: 0 One/Two Story Residence: One story Living Alone: Yes Support Systems: Home care staff, Family member(s) Patient Expects to be Discharged to[de-identified] Unknown Current DME Used/Available at Home: Milo Flicker, rolling, Cane, straight, Walker, rollator, Shower chair Tub or Shower Type: Shower Prior Level of Function/Work/Activity: 
Lives alone. Has caregiver 6 days/wk. Reports amb household distance with cane. Few falls. Assist with ADLs Number of Personal Factors/Comorbidities that affect the Plan of Care: 1-2: MODERATE COMPLEXITY EXAMINATION:  
Most Recent Physical Functioning:  
Gross Assessment: 
AROM: Within functional limits Strength: Generally decreased, functional 
Coordination: Within functional limits Posture: 
Posture (WDL): Exceptions to [facility] Posture Assessment: Forward head, Rounded shoulders, Trunk flexion Balance: 
Sitting: Impaired Sitting - Static: Good (unsupported) Sitting - Dynamic: Fair (occasional) Standing: Impaired Standing - Static: Fair Standing - Dynamic : Fair;Poor Bed Mobility: 
Supine to Sit: Moderate assistance Scooting: Moderate assistance;Maximum assistance Wheelchair Mobility: 
  
Transfers: 
Sit to Stand: Minimum assistance;Maximum assistance Stand to Sit: Contact guard assistance Bed to Chair: Minimum assistance Interventions: Verbal cues; Safety awareness training; Tactile cues Duration: 15 Minutes Gait: 
  
Base of Support: Center of gravity altered Speed/Melva: Pace decreased (<100 feet/min); Slow Step Length: Left shortened;Right shortened Gait Abnormalities: Trunk sway increased;Decreased step clearance; Path deviations Distance (ft): 5 Feet (ft) Assistive Device: Other (comment)(handheld assist) Ambulation - Level of Assistance: Minimal assistance Interventions: Verbal cues; Visual/Demos; Safety awareness training; Tactile cues;Manual cues Body Structures Involved: Muscles Body Functions Affected: 
Sensory/Pain Movement Related Skin Related Activities and Participation Affected: 
General Tasks and Demands Mobility Domestic Life Community, Social and [place] Number of elements that affect the Plan of Care: 4+: HIGH COMPLEXITY CLINICAL PRESENTATION:  
Presentation: Evolving clinical presentation with changing clinical characteristics: MODERATE COMPLEXITY CLINICAL DECISION MAKING:  
[employer] 6 Clicks Basic Mobility Inpatient Short Form How much difficulty does the patient currently have. .. Unable A Lot A Little None 1. Turning over in bed (including adjusting bedclothes, sheets and blankets)?    [] 1   [] 2   [x] 3   [] 4  
 2.  Sitting down on and standing up from a chair with arms ( e.g., wheelchair, bedside commode, etc.)   [] 1   [x] 2   [] 3   [] 4  
3. Moving from lying on back to sitting on the side of the bed? [] 1   [x] 2   [] 3   [] 4 How much help from another person does the patient currently need. .. Total A Lot A Little None 4. Moving to and from a bed to a chair (including a wheelchair)? [] 1   [] 2   [x] 3   [] 4  
5. Need to walk in hospital room? [] 1   [x] 2   [] 3   [] 4  
6. Climbing 3-5 steps with a railing? [x] 1   [] 2   [] 3   [] 4  
© 2007, Trustees of [address] [place] [address] [address] [address], under license to [employer]. All rights reserved Score:  Initial: 13 Most Recent: X (Date: -- ) Interpretation of Tool:  Represents activities that are increasingly more difficult (i.e. Bed mobility, Transfers, Gait). Medical Necessity:    
Patient demonstrates  
good 
 rehab potential due to higher previous functional level. Reason for Services/Other Comments: 
Patient  
continues to demonstrate capacity to improve strength, mobility, balance, transfers, and activity tolerance which will  
increase independence, decrease amount of assistance required from caregiver, and increase safety Vic Dawson Use of outcome tool(s) and clinical judgement create a POC that gives a: Questionable prediction of patient's progress: MODERATE COMPLEXITY  
  
 
 
 
TREATMENT:  
(In addition to Assessment/Re-Assessment sessions the following treatments were rendered) Pre-treatment Symptoms/Complaints: \"My head hurts\" Pain: Initial:  
Pain Intensity 1: 5  Post Session:  5/10 RN notified Therapeutic Activity: (  15 Minutes ):  Therapeutic activities including Bed transfers, Chair transfers, Ambulation on level ground, and transfer training from bed and chair, standing static/dynamic activities to improve mobility, strength, balance, and coordination.   Required moderate Verbal cues;Visual/Demos; Safety awareness training; Tactile cues;Manual cues to promote static and dynamic balance in standing and promote motor control of bilateral, lower extremity(s). Braces/Orthotics/Lines/Etc:  
IV 
chan catheter O2 Device: Room air Treatment/Session Assessment:   
Response to Treatment:  pt requires min-mod A for mobility/transfers Interdisciplinary Collaboration:  
Physical Therapist 
Registered Nurse After treatment position/precautions:  
Up in chair Bed/Chair-wheels locked Bed in low position Call light within reach Family at bedside Compliance with Program/Exercises: Will assess as treatment progresses Recommendations/Intent for next treatment session: \"Next visit will focus on advancements to more challenging activities and reduction in assistance provided\". Total Treatment Duration: PT Patient Time In/Time Out Time In: 9020 Time Out: 7623 Dimitri Macias DPT

## 2020-08-31 NOTE — PROGRESS NOTES
Patient asking for information about how to complete a [address] Brought patient copy of the form, answered questions. Patient wants to go over the form with his son when he visits tomorrow. Available for assistance as needed. Gigi BOOKERN 
[phone]

## 2020-08-31 NOTE — PROGRESS NOTES
CM spoke with patient in room. Patient alert and orient. CM discussed discharge planning with patient who stated he will not go to STR. He wants to go back home with [facility] as he was before and the [facility] aides. CM tried to explain the benefits of STR for patient and he is adamant he is not going to STR. CM will continue to follow patient during hospitalization for discharge planning and needs.

## 2020-08-31 NOTE — PROGRESS NOTES
Palliative Care Progress Note Patient: Miguel Ángel Choudhary MRN: [id number]  SSN: [id number] YOB: 1940  Age: 79 y.o. Sex: male Assessment/Plan: Chief Complaint/Interval History: pt alert and oriented this am.  Denies any dyspnea, nausea, pain Principal Diagnosis: · Debility, Unspecified  R53.81 Additional Diagnoses: · Frailty  R54 · Counseling, Encounter for Medical Advice  Z71.9 
· Encounter for Palliative Care  Z51.5 Palliative Performance Scale (PPS) Medical Decision Making:  
Reviewed and summarized labs and imaging. Met with pt at bedside. Pt denies any current pain, dyspnea,nausea. He hopes to go home soon. Has CLTA set up at home. Pt arranging for his son, Gladys Del Real, to be HCPOA. Hospice would actually be a decreased level of care for pt as he would have to forfeit his CLTA assistance. Will sign off but remain available if needed. Will discuss findings with members of the interdisciplinary team.   
 
More than 50% of this 25 minute visit was spent counseling and coordination of care as outlined above. Subjective:  
 
Review of Systems negative with the exception of as noted above Objective:  
 
Visit Vitals /67 Pulse (!) 58 Temp 97.8 °F (36.6 °C) Resp 19 Ht 6' (1.829 m) Wt 173 lb 4.8 oz (78.6 kg) SpO2 98% BMI 23.50 kg/m² Physical Exam: 
 
General:  Cooperative. No acute distress. Eyes:  Conjunctivae/corneas clear Nose: Nares normal. Septum midline. Neck: Supple, symmetrical, trachea midline, no JVD Lungs:   Clear to auscultation bilaterally, unlabored Heart:  Regular rate and rhythm, no murmur Abdomen:   Soft, non-tender, non-distended Extremities: Normal, atraumatic, no cyanosis or edema Skin: Skin color, texture, turgor normal. Bilateral leg wounds Neurologic: Nonfocal  
Psych: Alert and oriented Signed By: Josep Cosby MD   
 August 31, 2020

## 2020-09-01 NOTE — PROGRESS NOTES
Problem: Patient Education: Go to Patient Education Activity Goal: Patient/Family Education Description: 1. Patient will complete lower body bathing and dressing with min A and adaptive equipment as needed. 2. Patient will complete toileting with CGA. 3. Patient will tolerate 25 minutes of OT treatment with 1-2 rest breaks to increase activity tolerance for ADLs. 4. Patient will complete functional transfers with CGA and adaptive equipment as needed. 5. Patient will complete functional activity while seated edge of bed with MOD I and adaptive equipment as needed. Timeframe: 7 visits Outcome: Progressing Towards Goal 
 
 
OCCUPATIONAL THERAPY: Initial Assessment, Daily Note, and AM 9/1/2020 INPATIENT: OT Visit Days: 1 Payor: Wade Joyner / Plan: [address] / Product Type: [employer] Medicare /  
  
NAME/AGE/GENDER: Madeline Luna is a 79 y.o. male PRIMARY DIAGNOSIS:  DKA (diabetic ketoacidoses) ([facility] [E11.10] DKA (diabetic ketoacidoses) ([facility] DKA (diabetic ketoacidoses) ([facility] ICD-10: Treatment Diagnosis:  
 Generalized Muscle Weakness (M62.81) Difficulty in walking, Not elsewhere classified (R26.2) Precautions/Allergies: 
   Patient has no known allergies. ASSESSMENT:  
 
Mr. Art Hoover presents for the above diagnoses. Upon arrival, pt supine in bed and agreeable to OT evaluation. Pt is alert and oriented x 4. Pt reports living alone in a 1-story home, however home care staff who come out to assist with ADLs and IADLs. Notes use of rollator for functional mobility. Today, pt presents with deficits in overall strength, activity tolerance, ADL performance, and functional mobility. Pt transitioned to sitting edge of bed with min A. Pt able to tolerate ~15 minutes unsupported static sitting balance, EOB, while social and functional background questions were asked. BUE AROM and strength (3+/5) are generally decreased but WFL. Pt stood with min A and ambulated ~5 steps to bedside chair with min A x RW with verbal cues for facilitation and manual cues for RW use. Pt placed sitting upright in bed and left with needs met, call light within reach, RN notified, and pillows placed for comfort. At this time, Velma Cagle is functioning below baseline for ADLs and functional mobility. Pt would benefit from skilled OT services to address OT goals and plan of care. This section established at most recent assessment PROBLEM LIST (Impairments causing functional limitations): 
Decreased Strength Decreased ADL/Functional Activities Decreased Transfer Abilities Decreased Ambulation Ability/Technique Decreased Balance Increased Pain Decreased Activity Tolerance INTERVENTIONS PLANNED: (Benefits and precautions of occupational therapy have been discussed with the patient.) Activities of daily living training Adaptive equipment training Balance training Clothing management Community reintergration Donning&doffing training Neuromuscular re-eduation Re-evaluation Therapeutic activity Therapeutic exercise TREATMENT PLAN: Frequency/Duration: Follow patient 3x/week to address above goals. Rehabilitation Potential For Stated Goals: Good REHAB RECOMMENDATIONS (at time of discharge pending progress):   
Placement: It is my opinion, based on this patient's performance to date, that Mr. Rhona Cassidy may benefit from [address] after discharge due to the functional deficits listed above that are likely to improve with skilled rehabilitation because he/she has multiple medical issues that affect his/her functional mobility in the community. Equipment: TBD OCCUPATIONAL PROFILE AND HISTORY:  
History of Present Injury/Illness (Reason for Referral): 
 See H&P Past Medical History/Comorbidities:  
Mr. Rhona Cassidy  has a past medical history of Acute renal failure superimposed on stage 3 chronic kidney disease ([facility] (9/21/2016), Anemia, Arthritis, Chronic kidney disease, Chronic pancreatitis ([facility] (9/22/2016), Dermatophytosis of nail (12/6/2016), Diabetic neuropathy ([facility] (9/22/2016), Essential hypertension (9/22/2016), GERD (gastroesophageal reflux disease), Hypercholesterolemia, Iron (Fe) deficiency anemia (9/22/2016), Septicemia due to Klebsiella pneumoniae ([facility] (5/78/7166), Systolic CHF, chronic ([facility] (9/23/2016), Type II diabetes mellitus with nephropathy ([facility] (09/22/2016), Venous insufficiency (12/6/2016), and Xerosis cutis (12/6/2016). He also has no past medical history of Congestive heart failure, unspecified. Mr. Payton Delacruz  has a past surgical history that includes hx hernia repair (Left, 2010); hx colonoscopy; hx turp (2012); hx prostatectomy (2012); vascular surgery procedure unlist (Left, 10/26/2017); and vascular surgery procedure unlist (Left, 09/18/2019). Social History/Living Environment:  
Home Environment: Trailer/mobile home # Steps to Enter: 0 One/Two Story Residence: One story Living Alone: Yes Support Systems: Home care staff Patient Expects to be Discharged to[de-identified] Private residence Current DME Used/Available at Home: [address], [place], [address], rollator, Wheelchair Tub or Shower Type: Shower Prior Level of Function/Work/Activity: 
Assist with bathing and dressing from home care staff; uses rollator for functional mobility. Personal Factors:   
      Sex:  male Age:  79 y.o. Other factors that influence how disability is experienced by the patient:  multiple co-morbidities Number of Personal Factors/Comorbidities that affect the Plan of Care: Brief history (0):  LOW COMPLEXITY ASSESSMENT OF OCCUPATIONAL PERFORMANCE[de-identified]  
Activities of Daily Living:  
Basic ADLs (From Assessment) Complex ADLs (From Assessment) Feeding: Minimum assistance Oral Facial Hygiene/Grooming: Minimum assistance Bathing: Moderate assistance Upper Body Dressing: Minimum assistance Lower Body Dressing: Moderate assistance Toileting: Moderate assistance Instrumental ADL Meal Preparation: Total assistance Homemaking: Total assistance Grooming/Bathing/Dressing Activities of Daily Living Cognitive Retraining Safety/Judgement: Awareness of environment Bed/Mat Mobility Supine to Sit: Minimum assistance Sit to Stand: Minimum assistance Stand to Sit: Minimum assistance Bed to Chair: Minimum assistance Scooting: Moderate assistance Most Recent Physical Functioning:  
Gross Assessment: 
AROM: Generally decreased, functional 
Strength: Generally decreased, functional 
Coordination: Generally decreased, functional 
         
  
Posture: 
Posture (WDL): Exceptions to [facility] Posture Assessment: Forward head, Rounded shoulders, Trunk flexion Balance: 
Sitting: Impaired Sitting - Static: Good (unsupported) Sitting - Dynamic: Fair (occasional) Standing: Impaired Standing - Static: Fair Standing - Dynamic : Fair;Poor Bed Mobility: 
Supine to Sit: Minimum assistance Scooting: Moderate assistance Wheelchair Mobility: 
  
Transfers: 
Sit to Stand: Minimum assistance Stand to Sit: Minimum assistance Bed to Chair: Minimum assistance Patient Vitals for the past 6 hrs: 
 BP SpO2 Pulse 09/01/20 1051 180/70 97 % (!) 58 Mental Status Neurologic State: Alert Orientation Level: Oriented X4 Cognition: Follows commands Perception: Appears intact Perseveration: No perseveration noted Safety/Judgement: Awareness of environment Physical Skills Involved: 
Balance Strength Activity Tolerance Gross Motor Control Pain (acute) Cognitive Skills Affected (resulting in the inability to perform in a timely and safe manner): 
Perception Executive Function Psychosocial Skills Affected: 
Habits/Routines Environmental Adaptation Number of elements that affect the Plan of Care: 5+:  HIGH COMPLEXITY CLINICAL DECISION MAKING:  
Tyree Martines AM-PAC 6 Clicks Daily Activity Inpatient Short Form How much help from another person does the patient currently need. .. Total A Lot A Little None 1. Putting on and taking off regular lower body clothing? [] 1   [x] 2   [] 3   [] 4  
2. Bathing (including washing, rinsing, drying)? [] 1   [x] 2   [] 3   [] 4  
3. Toileting, which includes using toilet, bedpan or urinal?   [] 1   [x] 2   [] 3   [] 4  
4. Putting on and taking off regular upper body clothing? [] 1   [] 2   [x] 3   [] 4  
5. Taking care of personal grooming such as brushing teeth? [] 1   [] 2   [x] 3   [] 4  
6. Eating meals? [] 1   [] 2   [x] 3   [] 4  
© 2007, Trustees of [employer]ree Martines, under license to [employer]. All rights reserved Score:  Initial: 15 Most Recent: X (Date: -- ) Interpretation of Tool:  Represents activities that are increasingly more difficult (i.e. Bed mobility, Transfers, Gait). Medical Necessity:    
Patient demonstrates  
good and fair 
 rehab potential due to higher previous functional level. Reason for Services/Other Comments: 
Patient continues to require skilled intervention due to  
medical complications and patient unable to attend/participate in therapy as expected Kike Angry Use of outcome tool(s) and clinical judgement create a POC that gives a: LOW COMPLEXITY  
 
 
 
TREATMENT:  
(In addition to Assessment/Re-Assessment sessions the following treatments were rendered) Pre-treatment Symptoms/Complaints:   
Pain: Initial:  
Pain Intensity 1: 0 /10 Post Session:  same Therapeutic Activity: (    23 minutes): Therapeutic activities including Bed transfers, Chair transfers, and Ambulation on level ground to improve mobility, strength, balance, and coordination. Required minimal   to promote static and dynamic balance in sitting and standing. Braces/Orthotics/Lines/Etc:  
IV 
chan catheter O2 Device: Room air Treatment/Session Assessment:   
Response to Treatment:  tolerated well with no issues noted Interdisciplinary Collaboration: Occupational Therapist 
Registered Nurse After treatment position/precautions:  
Up in chair Bed/Chair-wheels locked Call light within reach RN notified Compliance with Program/Exercises: Will assess as treatment progresses. Recommendations/Intent for next treatment session: \"Next visit will focus on advancements to more challenging activities and reduction in assistance provided\". Total Treatment Duration: OT Patient Time In/Time Out Time In: 0900 Time Out: 1530 Sindy Lee, OT Love Abel

## 2020-09-01 NOTE — PROGRESS NOTES
Did not eat dinner but had a HS snack. Still on D51/2NS 50ml/hr and BS still 78 at 0400; Gave him juice, coffee,and he ate a cookie left over from dinner. He was a little confused this am thinking I got the coffee from his kitchen. He has c/o neck and back pain;medicated with ultram.  He said he wants to get x rays. Hourly rounds completed. Resting in bed. Bed in low locked position. Call light and personal items within reach. Will continue to monitor and give report to oncoming day shift nurse.

## 2020-09-01 NOTE — PROGRESS NOTES
Problem: Diabetes Self-Management Goal: *Disease process and treatment process Description: Define diabetes and identify own type of diabetes; list 3 options for treating diabetes. Outcome: Not Progressing Towards Goal 
Goal: *Incorporating nutritional management into lifestyle Description: Describe effect of type, amount and timing of food on blood glucose; list 3 methods for planning meals. Outcome: Not Progressing Towards Goal 
Goal: *Incorporating physical activity into lifestyle Description: State effect of exercise on blood glucose levels. Outcome: Not Progressing Towards Goal 
Goal: *Developing strategies to promote health/change behavior Description: Define the ABC's of diabetes; identify appropriate screenings, schedule and personal plan for screenings. Outcome: Not Progressing Towards Goal 
Goal: *Using medications safely Description: State effect of diabetes medications on diabetes; name diabetes medication taking, action and side effects. Outcome: Not Progressing Towards Goal 
Goal: *Monitoring blood glucose, interpreting and using results Description: Identify recommended blood glucose targets  and personal targets. Outcome: Not Progressing Towards Goal 
Goal: *Prevention, detection, treatment of acute complications Description: List symptoms of hyper- and hypoglycemia; describe how to treat low blood sugar and actions for lowering  high blood glucose level. Outcome: Not Progressing Towards Goal 
Goal: *Prevention, detection and treatment of chronic complications Description: Define the natural course of diabetes and describe the relationship of blood glucose levels to long term complications of diabetes. Outcome: Not Progressing Towards Goal 
Goal: *Developing strategies to address psychosocial issues Description: Describe feelings about living with diabetes; identify support needed and support network Outcome: Not Progressing Towards Goal 
Goal: *Sick day guidelines Outcome: Not Progressing Towards Goal 
Goal: *Patient Specific Goal (EDIT GOAL, INSERT TEXT) Outcome: Not Progressing Towards Goal 
  
Problem: Patient Education: Go to Patient Education Activity Goal: Patient/Family Education Outcome: Not Progressing Towards Goal

## 2020-09-01 NOTE — PROGRESS NOTES
Name: Velma Cagle MRN: [id number] : 1940  Age:80 y.o.  male Admit Date:  2020 LOS: 5 Hospitalist Progress Note Velma Cagle is a 79 y.o. male with medical history significant for chronic right lower extremity venous stasis ulcers, ESRD not on dialysis, hypertension, hyperlipidemia, GERD, diabetes with diabetic neuropathy, osteoarthritis, anemia of chronic disease who was sent to the ED on   by his home health aide after finding maggots in his wound 5 days ago. Patient was recently admitted and discharged from hospital 8 days prior after being treated for sepsis due to urinary tract infection and metabolic encephalopathy. Patient has known venous stasis ulcer and has been following at wound care clinic. In the emergency department, the patient was noted to be markedly hypertensive with a BP of 216/94.  Patient noted to be in DKA with a blood sugar of 772, serum CO2 of 9, anion gap of 23.  VBG 7. 8/7.6.  Patient also noted to be hyponatremic with a corrected serum sodium of 164.  Hemoglobin noted to be 7.7 which is essentially stable from prior.  X-ray of left lower extremity without gas or signs of osteomyelitis. Patient was admitted to ICU for insulin gtt for DKA. DKA resolved and BP normalized. He has been transferred to ICU to general medical floor on . PT/OT has recommended patient to be discharged to skilled nursing facility but patient is adamantly refusing to go. Patient would like to return home with home physical therapy. Patient is fully aware of the recommendation and his medical condition. Palliative care has been consulted. Subjective (20) : Patient is seen and examined at bedside. No acute events reported overnight by nursing staff. Denies any acute complaints. States he is eating better but occasionally lacks appetite to eat. Patient denies fever, chills, chest pains, shortness of breath, n/v, abdominal pain. Tolerating diet and having BM. Reiterated recommendation for SNF at discharge but patient again refused and would like to go home with home health/PT. ROS: 10 point review of systems is otherwise negative with the exception of the elements mentioned above. Objective: 
 
Patient Vitals for the past 24 hrs: 
 Temp Pulse Resp BP SpO2  
09/01/20 1051 97.5 °F (36.4 °C) (!) 58 18 180/70 97 % 09/01/20 0739 97.8 °F (36.6 °C) 60 20 177/84 100 % 09/01/20 0409 98.2 °F (36.8 °C) 65 18 123/75 98 % 08/31/20 2313 97.9 °F (36.6 °C) 60 18 126/71 98 % 08/31/20 1931 98.9 °F (37.2 °C) 62 18 146/69 100 % 08/31/20 1550 97.7 °F (36.5 °C) 60 19 155/79 100 % Oxygen Therapy O2 Sat (%): 97 % (09/01/20 1051) Pulse via Oximetry: 68 beats per minute (08/29/20 1214) O2 Device: Room air (08/31/20 1354) Estimated body mass index is 23.38 kg/m² as calculated from the following: 
  Height as of this encounter: 6' (1.829 m). Weight as of this encounter: 78.2 kg (172 lb 6.4 oz). Intake/Output Summary (Last 24 hours) at 9/1/2020 1448 Last data filed at 9/1/2020 0652 Gross per 24 hour Intake 2110 ml Output 1300 ml Net 810 ml  
   
*Note that automatically entered I/Os may not be accurate; dependent on patient compliance with collection and accurate  by I Do Now I Don't. Physical Exam:  
General:     alert, awake, no acute distress. Well nourished Head:   normocephalic, atraumatic Eyes, Ears, nose: PERRL, EOMI. Normal conjunctiva Neck:    supple, non-tender. Trachea midline. Lungs:   CTAB, no wheezing, rhonchi, rales Cardiac:   RRR, Normal S1 and S2. Abdomen:   Soft, non distended, nontender, +BS, no guarding/rebound Extremities:   Warm, dry. No edema. B/l LE in dressing Skin:   No rashes, no jaundice Neuro:  AAOx3. No gross focal neurological deficit Psychiatric:  No anxiety, calm, cooperative Data Review: 
I have reviewed all labs, meds, and studies from the last 24 hours: Recent Results (from the past 24 hour(s)) GLUCOSE, POC Collection Time: 08/31/20  3:27 PM  
Result Value Ref Range Glucose (POC) 133 (H) 65 - 100 mg/dL METABOLIC PANEL, BASIC Collection Time: 08/31/20  3:41 PM  
Result Value Ref Range Sodium 144 136 - 145 mmol/L Potassium 3.8 3.5 - 5.1 mmol/L Chloride 114 (H) 98 - 107 mmol/L  
 CO2 15 (L) 21 - 32 mmol/L Anion gap 15 7 - 16 mmol/L Glucose 137 (H) 65 - 100 mg/dL BUN 74 (H) 8 - 23 MG/DL Creatinine 7.10 (H) 0.8 - 1.5 MG/DL  
 GFR est AA 10 (L) >60 ml/min/1.73m2 GFR est non-AA 8 (L) >60 ml/min/1.73m2 Calcium 6.5 (L) 8.3 - 10.4 MG/DL  
HEMOGLOBIN Collection Time: 08/31/20  5:59 PM  
Result Value Ref Range HGB 8.4 (L) 13.6 - 17.2 g/dL COLLECTION COMMENT Collection Time: 08/31/20  5:59 PM  
Result Value Ref Range Collection Comment Q12 RN [employer] HEMOGLOBIN Collection Time: 08/31/20  7:35 PM  
Result Value Ref Range HGB 9.4 (L) 13.6 - 17.2 g/dL GLUCOSE, POC Collection Time: 08/31/20  8:42 PM  
Result Value Ref Range Glucose (POC) 94 65 - 100 mg/dL GLUCOSE, POC Collection Time: 08/31/20 11:36 PM  
Result Value Ref Range Glucose (POC) 91 65 - 100 mg/dL GLUCOSE, POC Collection Time: 09/01/20  3:32 AM  
Result Value Ref Range Glucose (POC) 78 65 - 100 mg/dL MAGNESIUM Collection Time: 09/01/20  6:17 AM  
Result Value Ref Range Magnesium 2.2 1.8 - 2.4 mg/dL PHOSPHORUS Collection Time: 09/01/20  6:17 AM  
Result Value Ref Range Phosphorus 6.9 (H) 2.3 - 3.7 MG/DL  
CBC WITH AUTOMATED DIFF Collection Time: 09/01/20  6:17 AM  
Result Value Ref Range WBC 9.3 4.3 - 11.1 K/uL  
 RBC 3.53 (L) 4.23 - 5.6 M/uL HGB 9.0 (L) 13.6 - 17.2 g/dL HCT 29.1 (L) 41.1 - 50.3 % MCV 82.4 79.6 - 97.8 FL  
 MCH 25.5 (L) 26.1 - 32.9 PG  
 MCHC 30.9 (L) 31.4 - 35.0 g/dL  
 RDW 17.2 (H) 11.9 - 14.6 % PLATELET 512 647 - 218 K/uL  MPV 11.4 9.4 - 12.3 FL  
 ABSOLUTE NRBC 0.00 0.0 - 0.2 K/uL  
 DF AUTOMATED NEUTROPHILS 67 43 - 78 % LYMPHOCYTES 20 13 - 44 % MONOCYTES 9 4.0 - 12.0 % EOSINOPHILS 3 0.5 - 7.8 % BASOPHILS 0 0.0 - 2.0 % IMMATURE GRANULOCYTES 1 0.0 - 5.0 %  
 ABS. NEUTROPHILS 6.2 1.7 - 8.2 K/UL  
 ABS. LYMPHOCYTES 1.9 0.5 - 4.6 K/UL  
 ABS. MONOCYTES 0.8 0.1 - 1.3 K/UL  
 ABS. EOSINOPHILS 0.3 0.0 - 0.8 K/UL  
 ABS. BASOPHILS 0.0 0.0 - 0.2 K/UL  
 ABS. IMM. GRANS. 0.1 0.0 - 0.5 K/UL METABOLIC PANEL, BASIC Collection Time: 09/01/20  6:17 AM  
Result Value Ref Range Sodium 145 136 - 145 mmol/L Potassium 4.1 3.5 - 5.1 mmol/L Chloride 117 (H) 98 - 107 mmol/L  
 CO2 13 (L) 21 - 32 mmol/L Anion gap 15 7 - 16 mmol/L Glucose 109 (H) 65 - 100 mg/dL BUN 74 (H) 8 - 23 MG/DL Creatinine 7.17 (H) 0.8 - 1.5 MG/DL  
 GFR est AA 10 (L) >60 ml/min/1.73m2 GFR est non-AA 8 (L) >60 ml/min/1.73m2 Calcium 6.4 (L) 8.3 - 10.4 MG/DL  
GLUCOSE, POC Collection Time: 09/01/20  7:56 AM  
Result Value Ref Range Glucose (POC) 114 (H) 65 - 100 mg/dL CALCIUM, IONIZED Collection Time: 09/01/20  9:43 AM  
Result Value Ref Range Calcium, ionized 3.52 (L) 4.0 - 5.2 mg/dL GLUCOSE, POC Collection Time: 09/01/20 11:14 AM  
Result Value Ref Range Glucose (POC) 171 (H) 65 - 100 mg/dL All Micro Results Procedure Component Value Units Date/Time CULTURE, BLOOD [[id number]] Collected:  08/27/20 1373 Order Status:  Completed Specimen:  Blood Updated:  09/01/20 8793 Special Requests: --     
  RIGHT 
HAND Culture result: NO GROWTH 5 DAYS     
 CULTURE, BLOOD [[id number]] Collected:  08/27/20 1713 Order Status:  Completed Specimen:  Blood Updated:  09/01/20 7570 Special Requests: RIGHT HAND Culture result: NO GROWTH 5 DAYS Current Meds: 
Current Facility-Administered Medications Medication Dose Route Frequency  influenza vaccine 2020-21 (6 mos+)(PF) (FLUARIX/FLULAVAL/FLUZONE QUAD) injection 0.5 mL  0.5 mL IntraMUSCular PRIOR TO DISCHARGE  linezolid (ZYVOX) tablet 600 mg  600 mg Oral Q12H  
 [START ON 9/2/2020] insulin glargine (LANTUS) injection 15 Units  15 Units SubCUTAneous DAILY  calcium gluconate 2 g in 0.9% sodium chloride 100 mL IVPB  2 g IntraVENous ONCE  
 [Held by provider] insulin regular (NOVOLIN R, HUMULIN R) injection 3 Units  3 Units SubCUTAneous TIDAC  
 NIFEdipine ER (PROCARDIA XL) tablet 30 mg  30 mg Oral DAILY  hydrALAZINE (APRESOLINE) 20 mg/mL injection 20 mg  20 mg IntraVENous Q2H PRN  
 traMADoL (ULTRAM) tablet 50 mg  50 mg Oral Q6H PRN  
 insulin regular (NOVOLIN R, HUMULIN R) injection   SubCUTAneous AC&HS  
 0.9% sodium chloride infusion 250 mL  250 mL IntraVENous PRN  
 sodium chloride (NS) flush 5-40 mL  5-40 mL IntraVENous Q8H  
 sodium chloride (NS) flush 5-40 mL  5-40 mL IntraVENous PRN  
 acetaminophen (TYLENOL) tablet 650 mg  650 mg Oral Q6H PRN Or  
 acetaminophen (TYLENOL) suppository 650 mg  650 mg Rectal Q6H PRN  polyethylene glycol (MIRALAX) packet 17 g  17 g Oral DAILY PRN  promethazine (PHENERGAN) tablet 12.5 mg  12.5 mg Oral Q6H PRN Or  
 ondansetron (ZOFRAN) injection 4 mg  4 mg IntraVENous Q6H PRN  
 heparin (porcine) injection 5,000 Units  5,000 Units SubCUTAneous Q8H  
 labetaloL (NORMODYNE;TRANDATE) injection 20 mg  20 mg IntraVENous Q2H PRN  
 sevelamer carbonate (RENVELA) tab 1,600 mg  1,600 mg Oral TID WITH MEALS  famotidine (PEPCID) tablet 20 mg  20 mg Oral DAILY  piperacillin-tazobactam (ZOSYN) 4.5 g in 0.9% sodium chloride (MBP/ADV) 100 mL  4.5 g IntraVENous Q12H  cloNIDine (CATAPRES) 0.3 mg/24 hr patch 1 Patch  1 Patch TransDERmal Q7D  
 ferrous sulfate tablet 325 mg  325 mg Oral BID WITH MEALS  gabapentin (NEURONTIN) capsule 300 mg  300 mg Oral DAILY  hydrALAZINE (APRESOLINE) tablet 100 mg  100 mg Oral TID  
 HYDROmorphone (DILAUDID) tablet 0.5 mg  0.5 mg Oral Q4H PRN  
  metoprolol succinate (TOPROL-XL) XL tablet 50 mg  50 mg Oral 7am  
 lipase-protease-amylase (ZENPEP 20,000) capsule 3 Cap  3 Cap Oral TID WITH MEALS  pravastatin (PRAVACHOL) tablet 40 mg  40 mg Oral QHS  sodium bicarbonate tablet 650 mg  650 mg Oral TID  calcifediol (RAYALDEE) Cs24 30 mcg (Patient Supplied)  30 mcg Oral QHS Other Studies: 
Xr Tib/fib Lt Result Date: 8/27/2020 Left tibia fibula radiographs, 2 views INDICATION: lower leg wound-fx? Gas/osteo? COMPARISON: None. FINDINGS: No fracture is evident. No definite osseous destruction. Degenerative changes of the knee joint. Extensive arteriosclerotic ossifications. Diffuse soft tissue edema. No soft tissue gas is evident. IMPRESSION: 1. No fracture or destructive osseous lesion evident by x-ray. 2.  Diffuse soft tissue edema without soft tissue gas evident. Assessment: 
 
Active Hospital Problems Diagnosis Date Noted  DKA (diabetic ketoacidoses) ([address] 08/27/2020  Hyperphosphatemia 08/27/2020  High anion gap metabolic acidosis 65/73/9832  Wound infection 08/16/2020 Rt leg  Functional quadriplegia ([address] 07/26/2020  Hyperkalemia 07/26/2020  Dementia without behavioral disturbance ([address] 04/19/2018  Seizure ([address] 04/18/2018  Hypernatremia 04/18/2018  Hypercholesterolemia  Venous stasis ulcer of left lower extremity ([address] 04/01/2017  Venous insufficiency 12/06/2016  Chronic systolic congestive heart failure ([address] 09/23/2016  Type II diabetes mellitus with nephropathy ([address] 09/22/2016  
 HTN (hypertension) 09/22/2016  GERD (gastroesophageal reflux disease) 09/22/2016  Diabetic neuropathy ([address] 09/22/2016  Chronic pancreatitis ([address] 09/22/2016  Anemia of chronic disease 09/22/2016  CKD (chronic kidney disease) stage 5, GFR less than 15 ml/min ([facility]) 09/21/2016 Plan: 
 
Type 2 Diabetes with Diabetic Neuropathy DKA (resolved) A1c of 9.4 on 8/16/2020 - Insulin sliding scale, [id number] units daily 
- continue with gabapentin Chronic venous stasis ulcer with possible infection History of Proteus mirabilis and pansensitive enterococcus and. Wound cultured. Blood culture (8/27) is show no growth. No leukocytosis , no fever, no systemic signs of infection. - on zosyn and linezolid (8/27). - continue with wound care CKD Stage 5 Hyperphosphatemia Has refused hemodialysis in the past.  Patient not a candidate for hemodialysis per nephrology. 
- Nephrology following : Sevelamer , bicarb tabs  per nephrology. Hypernatremia (resolved) Likely due to dehydration. Na of 145 today. - dc D5 1/2 NS 
- encouraged PO intake Seizure disorder Hypertension  // HLD Chronic HFpEF 
- continue with home medication Chronic pancreatitis : continue with home medication Dementia with behavioral disturbance 
- Son Fatou will be HPOCA 
 
 
GERD: continue with pepcid Anemia of chronic disease: Hb stable. continue with iron Diet:  DIET RENAL 
DVT PPx: heparin sq Code: Partial Code. DO NOT INTUBATE, will perform chest compressions, cardiac meds, defibrillation Dispo: Home HA Estimated Discharge: 24hrs Labs/Imaging Reviewed. Patient is moderate risk due to current condition and comorbid conditions as well as requiring frequent monitoring and high risk of decline. Plan discussed with staff, patient/family and are in agreement. Time Spent: Greater than 45 minutes was spent in reviewing charts, physical exam, discussion with patient/family and answered any questions. Signed By: Lauren Patel MD   
 September 1, 2020

## 2020-09-01 NOTE — PROGRESS NOTES
Pt c/o h/a, tramadol given per MAR. Hourly rounds completed throughout this shift. Pt resting in bed; denies needs at this time. Will continue to monitor and report to oncoming night shift nurse.

## 2020-09-01 NOTE — WOUND CARE
Pt seen for follow up on BLE wounds. Removed dressings and noted on RLE wound has fully healed to lateral aspect of RLE. New skin has formed. Recommend a foam dressing to protect newly formed skin and to secure with kerlix. Change daily. On LLE noted entire anterior portion of leg with skin loss various healing at certain areas and open area still present to posterior lower portion of LLE. Cleansed with dermal wound cleanser and applied xeroform over open areas covered with ABD and secured with kerlix. Recommend to continue daily dressings changes with LLE. DTI's still present to bilat heels, continue offloading and foam dressings. Pt was a patient of wound clinic prior to admission, would recommend follow up at wound clinic after discharge from hospital and resuming home health for care of wounds at home. Wound team will continue to follow while in acute care setting.

## 2020-09-01 NOTE — PROGRESS NOTES
Problem: Mobility Impaired (Adult and Pediatric) Goal: *Acute Goals and Plan of Care (Insert Text) Outcome: Progressing Towards Goal 
Note: LTG: 
(1.)Mr. Cy Randhawa will move from supine to sit and sit to supine, scoot up and down, and roll side to side INDEPENDENTLY within 7 treatment day(s). (2.)Mr. Cy Randhawa will transfer from bed to chair and chair to bed with SUPERVISION using the least restrictive device within 7 treatment day(s). (3.)Mr. Cy Randhawa will ambulate with STAND BY ASSIST for 50+ feet with the least restrictive device within 7 treatment day(s). (4.)Mr. Cy Randhawa will perform exercises per HEP for 10+ minutes to improve strength and mobility within 7 days. ________________________________________________________________________________________________ PHYSICAL THERAPY: Daily Note and AM 9/1/2020 INPATIENT: PT Visit Days : 2 Payor: Carolyn Singh / Plan: [address] / Product Type: Managed Care Medicare /   
  
NAME/AGE/GENDER: Rodney Donald is a 79 y.o. male PRIMARY DIAGNOSIS: DKA (diabetic ketoacidoses) ([facility] [E11.10] [facility]diabetic k[facility]acid[facility] ICD-10: Treatment Diagnosis:  
 · Generalized Muscle Weakness (M62.81) · Difficulty in walking, Not elsewhere classified (R26.2) · Other abnormalities of gait and mobility (R26.89) Precaution/Allergies: 
Patient has no known allergies. ASSESSMENT:  
 
Mr. Cy Randhawa is a 79year old male admitted from home with DKA. Has extensive wounds on bilateral lower legs. Pt reports modified independence with limited household mobility using cane at baseline; does have [facility] aide 6 days/wk to assist with ADLs and other needs. Occasional falls. A B Junior Cy Randhawa appears to be much weaker than baseline at this time and will benefit from continued acute PT to address deficits/maximize independence with mobility.  Dc needs TBD pending progress- currently recommending STR however pt wants to dc home and will need at least [facility] PT. 
9/1/20:  Pt supine in bed on arrival. He declined getting OOB to the chair. He states he needs to have a BM. Talked with pt about getting up to the [facility]. Pt declined even after the nursing assistant found a BSC. Pt states his head is hurting and he does not want to do anything. He requested to use the bed pan. Pt rolled to left side to have brief doffed and bed pan put under him. He was able to bridge to adjust postioning on bed pan. Pt left with nursing assistant aware that pt was on the bed pan. This section established at most recent assessment PROBLEM LIST (Impairments causing functional limitations): 1. Decreased Strength 2. Decreased Transfer Abilities 3. Decreased Ambulation Ability/Technique 4. Decreased Balance 5. Decreased Activity Tolerance INTERVENTIONS PLANNED: (Benefits and precautions of physical therapy have been discussed with the patient.) 1. Balance Exercise 2. Bed Mobility 3. Gait Training 4. Home Exercise Program (HEP) 5. Therapeutic Activites 6. Therapeutic Exercise/Strengthening 7. Transfer Training TREATMENT PLAN: Frequency/Duration: 3 times a week for duration of hospital stay Rehabilitation Potential For Stated Goals: Good REHAB RECOMMENDATIONS (at time of discharge pending progress):   
Placement: It is my opinion, based on this patient's performance to date, that Mr. Siri Badillo may benefit from intensive therapy at a [address] [place] [address] after discharge due to the functional deficits listed above that are likely to improve with skilled rehabilitation and concerns that he/she may be unsafe to be unsupervised at home due to weakness, need for assistance, fall risk . Pt wants to dc home and will need at least [facility] PT Equipment:  
? tbd  
    
 
 
 
HISTORY:  
History of Present Injury/Illness (Reason for Referral): 
Per H&P, \"79year-old male brought to [facility] emergency department on 8/27/2020 from home via EMS after home health aide had patient sent to emergency department for wound check of his legs after she noted maggots in his wound 5 days ago. Patient was recently admitted and discharged from the hospital 8 days ago after being treated for a urinary tract infection with sepsis and metabolic encephalopathy. Patient has known venous stasis ulcers and was seen by wound care during prior hospitalization. When asked the patient states that he feels well. Patient appears confused and cannot tell me the last time he took his antihypertensive or insulin. Patient is disoriented to month and day of the week. In the emergency department the patient was noted to be markedly hypertensive with a BP of 216/94. Patient noted to be in DKA with a blood sugar of 772, serum CO2 of 9, anion gap of 23. VBG 7.16/21/1949 8/7.6. Patient also noted to be hyponatremic with a corrected serum sodium of 164. Hemoglobin noted to be 7.7 which is essentially stable from prior. X-ray of left lower extremity without gas or signs of osteomyelitis. Patient refused to answer many of my questions repeatedly stating \"I just want to go home\". When I told the patient that he has a life-threatening disease process and would likely die if he returned home he stated \"that is fine\". The patient told palliative care physician during prior admission that he wanted to be DNI but wants chest compressions/cardiac meds/defibrillation if need be. The patient told my partner that he never wanted to return to the hospital again even if he was found unresponsive. I discussed this with the patient's son, Jj Galo, who stated that immediately following his discussion with my partner he told his son that if he became ill he wanted to be brought to the hospital for aggressive treatment\" Past Medical History/Comorbidities:  
Mr. Artis Kelly  has a past medical history of Acute renal failure superimposed on stage 3 chronic kidney disease ([facility] (9/21/2016), Anemia, Arthritis, Chronic kidney disease, Chronic pancreatitis ([facility] (9/22/2016), Dermatophytosis of nail (12/6/2016), Diabetic neuropathy ([facility] (9/22/2016), Essential hypertension (9/22/2016), GERD (gastroesophageal reflux disease), Hypercholesterolemia, Iron (Fe) deficiency anemia (9/22/2016), Septicemia due to Klebsiella pneumoniae ([facility] (2/49/8590), Systolic CHF, chronic ([facility] (9/23/2016), Type II diabetes mellitus with nephropathy ([facility] (09/22/2016), Venous insufficiency (12/6/2016), and Xerosis cutis (12/6/2016). He also has no past medical history of Congestive heart failure, unspecified. Mr. Rosalee Lopez  has a past surgical history that includes hx hernia repair (Left, 2010); hx colonoscopy; hx turp (2012); hx prostatectomy (2012); vascular surgery procedure unlist (Left, 10/26/2017); and vascular surgery procedure unlist (Left, 09/18/2019). Social History/Living Environment:  
Home Environment: Trailer/mobile home # Steps to Enter: 0 One/Two Story Residence: One story Living Alone: Yes Support Systems: Home care staff Patient Expects to be Discharged to[de-identified] Private residence Current DME Used/Available at Home: Alden Eaton, Joshua Bain, Alden Eaton, rollator, Wheelchair Tub or Shower Type: Shower Prior Level of Function/Work/Activity: 
Lives alone. Has caregiver 6 days/wk. Reports amb household distance with cane. Few falls. Assist with ADLs Number of Personal Factors/Comorbidities that affect the Plan of Care: 1-2: MODERATE COMPLEXITY EXAMINATION:  
Most Recent Physical Functioning:  
Gross Assessment: 
  
         
  
Posture: 
  
Balance: 
  Bed Mobility: 
Rolling: Minimum assistance Wheelchair Mobility: 
  
Transfers: 
  
Gait: 
  
   
  
Body Structures Involved: 1. Muscles Body Functions Affected: 1. Sensory/Pain 2. Movement Related 3. Skin Related Activities and Participation Affected: 1. General Tasks and Demands 2. Mobility 3. Domestic Life 4. Community, Social and [place] Number of elements that affect the Plan of Care: 4+: HIGH COMPLEXITY CLINICAL PRESENTATION:  
Presentation: Evolving clinical presentation with changing clinical characteristics: MODERATE COMPLEXITY CLINICAL DECISION MAKIN [facility] [address] [id number] AM-PAC 6 Clicks Basic Mobility Inpatient Short Form How much difficulty does the patient currently have. .. Unable A Lot A Little None 1. Turning over in bed (including adjusting bedclothes, sheets and blankets)? [] 1   [] 2   [x] 3   [] 4  
2. Sitting down on and standing up from a chair with arms ( e.g., wheelchair, bedside commode, etc.)   [] 1   [x] 2   [] 3   [] 4  
3. Moving from lying on back to sitting on the side of the bed? [] 1   [x] 2   [] 3   [] 4 How much help from another person does the patient currently need. .. Total A Lot A Little None 4. Moving to and from a bed to a chair (including a wheelchair)? [] 1   [] 2   [x] 3   [] 4  
5. Need to walk in hospital room? [] 1   [x] 2   [] 3   [] 4  
6. Climbing 3-5 steps with a railing? [x] 1   [] 2   [] 3   [] 4  
© , Trustees of [address] [place] [address] [address] [id number], under license to [employer]. All rights reserved Score:  Initial: 13 Most Recent: X (Date: -- ) Interpretation of Tool:  Represents activities that are increasingly more difficult (i.e. Bed mobility, Transfers, Gait). Medical Necessity:    
· Patient demonstrates · good ·  rehab potential due to higher previous functional level. Reason for Services/Other Comments: 
· Patient · continues to demonstrate capacity to improve strength, mobility, balance, transfers, and activity tolerance which will · increase independence, decrease amount of assistance required from caregiver, and increase safety · . Use of outcome tool(s) and clinical judgement create a POC that gives a: Questionable prediction of patient's progress: MODERATE COMPLEXITY  
  
 
 
 
TREATMENT:  
 (In addition to Assessment/Re-Assessment sessions the following treatments were rendered) Pre-treatment Symptoms/Complaints:  \"Look at me, my head is hurting from here to here\" Pain: Initial:  Headache Post Session:  Headache- not rated Therapeutic Activity: (    10 minutes): Therapeutic activities including Bed transfers to improve mobility and strength. Braces/Orthotics/Lines/Etc:  
· IV 
· chan catheter · O2 Device: Room air Treatment/Session Assessment:   
· Response to Treatment:  Min assist for rolling. Needs encouragement · Interdisciplinary Collaboration:  
o Physical Therapy Assistant 
o Registered Nurse · After treatment position/precautions:  
o Supine in bed 
o Bed/Chair-wheels locked 
o Bed in low position 
o Call light within reach 
o on bed pan · Compliance with Program/Exercises: Will assess as treatment progresses · Recommendations/Intent for next treatment session: \"Next visit will focus on advancements to more challenging activities and reduction in assistance provided\". Total Treatment Duration: PT Patient Time In/Time Out Time In: 8068 Time Out: 1444 Jas aPrra PTA

## 2020-09-02 NOTE — DISCHARGE SUMMARY
Hospitalist Discharge Summary Admit Date:  2020  2:14 PM  
Name:  Vikas Mitchell Age:  79 y.o. 
:  1940 MRN:  [id number] PCP:  David Castellano MD 
Treatment Team: Attending Provider: Joselyn Dobbs MD; Consulting Provider: Agustina Uriostegui MD; Care Manager: Arnold Arana, MARTINE; Charge Nurse: Ce Camacho RN; Occupational Therapy Assistant: Paula Reyes Problem List for this Hospitalization: Active Hospital Problems Diagnosis Date Noted  DKA (diabetic ketoacidoses) ([address] 2020  Hyperphosphatemia 2020  High anion gap metabolic acidosis 6839  Wound infection 2020 Rt leg  Functional quadriplegia ([address] 2020  Hyperkalemia 2020  Dementia without behavioral disturbance ([address] 2018  Seizure ([address] 2018  Hypernatremia 2018  Hypercholesterolemia  Venous stasis ulcer of left lower extremity ([address] 2017  Venous insufficiency 2016  Chronic systolic congestive heart failure ([address] 2016  Type II diabetes mellitus with nephropathy ([address] 2016  
 HTN (hypertension) 2016  GERD (gastroesophageal reflux disease) 2016  Diabetic neuropathy ([address] 2016  Chronic pancreatitis ([address] 2016  Anemia of chronic disease 2016  CKD (chronic kidney disease) stage 5, GFR less than 15 ml/min ([facility]) 2016 Hospital Course : 
Please refer to the admission H&P for details of presentation.  In summary, Vikas Mitchell is a 79 y.o. male with past medical history significant for chronic right lower extremity venous stasis ulcers, ESRD not on dialysis, hypertension, hyperlipidemia, GERD, diabetes with diabetic neuropathy, osteoarthritis, anemia of chronic disease who was sent to the ED on   by his home health aide after finding maggots in his wound 5 days ago. Patient was recently admitted and discharged from hospital 8 days prior after being treated for sepsis due to urinary tract infection and metabolic encephalopathy. Patient has known venous stasis ulcer and has been following at wound care clinic. In the emergency department, the patient was noted to be markedly hypertensive with a BP of 216/94.  Patient noted to be in DKA with a blood sugar of 772, serum CO2 of 9, anion gap of 23.  VBG 7.16/21/1949 8/7.6.  Patient also noted to be hyponatremic with a corrected serum sodium of 164.  Hemoglobin noted to be 7.7 which is essentially stable from prior.  X-ray of left lower extremity without gas or signs of osteomyelitis. Patient was admitted to ICU for insulin gtt for DKA. DKA resolved and BP normalized. He has been transferred to ICU to general medical floor on 8/29. PT/OT has recommended patient to be discharged to skilled nursing facility but patient is adamantly refusing to go. Patient would like to return home with home physical therapy. Patient is fully aware of the recommendation and his medical condition. Palliative care has been consulted. Patient is medically stable for discharge. Patient reports that he has all his diabetic supplies and check his FS 4 times daily. He states he uses long acting insulin, Lantus, 15 to 17 units daily and uses Humalog for sliding scale as needed. Patient's son at bedside. Discussed that patient is recommended to go to SNF but has been refusing and wanted to go home. Patient is on is aware and states that his dad expressed to him that he will not go to rehab as well. CM will reach out to [facility]  to have someone to his house today and [id number]  aides have been notified of his discharged. [facility] is also in touch with the son. Wound care follow up at the [address] [place] [address] will be setup for continued management of his bilateral LE venous stasis ulcers. Disposition: Home with health aide Activity: Activity as tolerated Diet: DIET RENAL Regular; Consistent Carb 1800kcal 
Code Status: Partial Code Follow up instructions, discharge meds at bottom of this note. Plan was discussed with patient/family. All questions answered. Patient was stable at time of discharge. Patient will call a physician or return if any concerns. Diagnostic Imaging/Tests:  
Xr Tib/fib Lt Result Date: 8/27/2020 Left tibia fibula radiographs, 2 views INDICATION: lower leg wound-fx? Gas/osteo? COMPARISON: None. FINDINGS: No fracture is evident. No definite osseous destruction. Degenerative changes of the knee joint. Extensive arteriosclerotic ossifications. Diffuse soft tissue edema. No soft tissue gas is evident. IMPRESSION: 1. No fracture or destructive osseous lesion evident by x-ray. 2.  Diffuse soft tissue edema without soft tissue gas evident. Echocardiogram results: No results found for this visit on 08/27/20. Procedures done this admission: * No surgery found * All Micro Results Procedure Component Value Units Date/Time CULTURE, BLOOD [[id number]] Collected:  08/27/20 1732 Order Status:  Completed Specimen:  Blood Updated:  09/01/20 5145 Special Requests: --     
  RIGHT 
HAND Culture result: NO GROWTH 5 DAYS     
 CULTURE, BLOOD [[id number]] Collected:  08/27/20 1713 Order Status:  Completed Specimen:  Blood Updated:  09/01/20 0906 Special Requests: RIGHT HAND Culture result: NO GROWTH 5 DAYS Labs: Results:  
   
BMP, Mg, Phos Recent Labs  
  09/02/20 
2180 09/01/20 
0617 08/31/20 
1541 08/31/20 
8670  145 144 144  
K 4.0 4.1 3.8 3.8 * 117* 114* 114* CO2 15* 13* 15* 16* AGAP 12 15 15 14 BUN 74* 74* 74* 78* CREA 7.42* 7.17* 7.10* 7.11* CA 7.0* 6.4* 6.5* 6.6*  
GLU 98 109* 137* 163* MG  --  2.2  --  2.2 PHOS  --  6.9*  --  6.2*  
  
CBC Recent Labs  
  09/01/20 
0617 08/31/20 
1935 08/31/20 
1759 08/31/20 
5644 WBC 9.3  --   --  6.1  
RBC 3.53*  --   --  3.48* HGB 9.0* 9.4* 8.4* 8.8* HCT 29.1*  --   --  29.0*  
  --   --  187 GRANS 67  --   --  51  
LYMPH 20  --   --  33 EOS 3  --   --  5 MONOS 9  --   --  11  
BASOS 0  --   --  1 IG 1  --   --  0 ANEU 6.2  --   --  3.1 ABL 1.9  --   --  2.0 SERAFIN 0.3  --   --  0.3 ABM 0.8  --   --  0.7 ABB 0.0  --   --  0.0 AIG 0.1  --   --  0.0 LFT Recent Labs 08/31/20 
8389 ALB 1.6* Cardiac Testing Lab Results Component Value Date/Time  (H) 10/24/2019 11:39 AM  
  04/13/2018 06:38 PM  
  04/02/2018 08:07 AM  
  08/28/2020 09:30 AM  
  (H) 07/30/2020 06:47 AM  
  (H) 07/29/2020 04:14 AM  
 CK - MB 15.2 (H) 04/29/2018 06:26 PM  
 CK - MB 10.1 (H) 09/23/2016 03:05 AM  
 CK - MB 11.8 (H) 09/22/2016 09:37 PM  
 CK-MB Index 1.0 04/29/2018 06:26 PM  
 CK-MB Index 1.0 09/23/2016 03:05 AM  
 CK-MB Index CANNOT BE CALCULATED 09/22/2016 09:37 PM  
 Troponin-I, Qt. <0.02 (L) 04/03/2018 06:16 AM  
 Troponin-I, Qt. <0.02 (L) 04/02/2018 10:53 PM  
 Troponin-I, Qt. <0.02 (L) 04/02/2018 08:25 PM  
  
Coagulation Tests Lab Results Component Value Date/Time Prothrombin time 43.9 (H) 11/20/2019 12:00 PM  
 Prothrombin time 18.7 (H) 11/09/2019 03:52 AM  
 Prothrombin time 16.2 (H) 11/08/2019 05:23 AM  
 INR 4.5 (HH) 11/20/2019 12:00 PM  
 INR 1.5 11/09/2019 03:52 AM  
 INR 1.3 11/08/2019 05:23 AM  
 INR POC 16.7 (A) 11/25/2019 12:26 PM  
 INR POC 3.4 (A) 11/11/2019 01:15 PM  
 aPTT 112.8 (H) 11/09/2019 03:52 AM  
 aPTT 119.0 (H) 11/08/2019 08:56 PM  
 aPTT 64.6 (H) 11/08/2019 01:51 PM  
  
A1c Lab Results Component Value Date/Time Hemoglobin A1c 9.4 (H) 08/16/2020 01:22 PM  
 Hemoglobin A1c 8.4 (H) 11/03/2019 09:25 PM  
 Hemoglobin A1c 7.8 (H) 05/02/2018 06:06 AM  
  
Lipid Panel Lab Results Component Value Date/Time  Cholesterol, total 99 04/03/2018 06:16 AM  
 HDL Cholesterol 65 (H) 04/03/2018 06:16 AM  
 LDL, calculated 23 04/03/2018 06:16 AM  
 VLDL, calculated 11 04/03/2018 06:16 AM  
 Triglyceride 55 04/03/2018 06:16 AM  
 CHOL/HDL Ratio 1.5 04/03/2018 06:16 AM  
  
Thyroid Panel Lab Results Component Value Date/Time TSH 2.500 08/28/2020 09:30 AM  
 TSH 2.500 04/13/2018 06:37 PM  
 T4, Free 1.0 08/28/2020 09:30 AM  
    
Most Recent UA Lab Results Component Value Date/Time Color YELLOW 08/28/2020 05:36 AM  
 Appearance CLEAR 08/28/2020 05:36 AM  
 Specific gravity 1.016 08/28/2020 05:36 AM  
 pH (UA) 5.0 08/28/2020 05:36 AM  
 Protein 300 (A) 08/28/2020 05:36 AM  
 Glucose >1,000 08/28/2020 05:36 AM  
 Ketone TRACE (A) 08/28/2020 05:36 AM  
 Bilirubin Negative 08/28/2020 05:36 AM  
 Blood Negative 08/28/2020 05:36 AM  
 Urobilinogen 0.2 08/28/2020 05:36 AM  
 Nitrites Negative 08/28/2020 05:36 AM  
 Leukocyte Esterase Negative 08/28/2020 05:36 AM  
 WBC 0-3 08/28/2020 05:36 AM  
 RBC 3-5 08/28/2020 05:36 AM  
 Epithelial cells 0-3 08/28/2020 05:36 AM  
 Bacteria 0 08/28/2020 05:36 AM  
 Casts 0-3 08/28/2020 05:36 AM  
 Crystals, urine 0 09/22/2016 12:00 PM  
 Mucus 0 09/22/2016 12:00 PM  
 Other observations RESULTS VERIFIED MANUALLY 09/22/2016 12:00 PM  
  
 
No Known Allergies Immunization History Administered Date(s) Administered  Pneumococcal Polysaccharide (PPSV-23) 02/07/2012  TB Skin Test (PPD) Intradermal 04/02/2018, 04/16/2018, 04/30/2018, 11/03/2019, 07/27/2020 All Labs from Last 24 Hrs: 
Recent Results (from the past 24 hour(s)) GLUCOSE, POC Collection Time: 09/01/20  5:06 PM  
Result Value Ref Range Glucose (POC) 108 (H) 65 - 100 mg/dL GLUCOSE, POC Collection Time: 09/01/20  9:08 PM  
Result Value Ref Range Glucose (POC) 137 (H) 65 - 100 mg/dL GLUCOSE, POC Collection Time: 09/02/20 12:49 AM  
Result Value Ref Range Glucose (POC) 165 (H) 65 - 100 mg/dL GLUCOSE, POC Collection Time: 09/02/20  5:05 AM  
Result Value Ref Range Glucose (POC) 110 (H) 65 - 100 mg/dL CALCIUM, IONIZED Collection Time: 09/02/20  6:07 AM  
Result Value Ref Range Calcium, ionized 3.88 (L) 4.0 - 5.2 mg/dL METABOLIC PANEL, BASIC Collection Time: 09/02/20  6:07 AM  
Result Value Ref Range Sodium 143 136 - 145 mmol/L Potassium 4.0 3.5 - 5.1 mmol/L Chloride 116 (H) 98 - 107 mmol/L  
 CO2 15 (L) 21 - 32 mmol/L Anion gap 12 7 - 16 mmol/L Glucose 98 65 - 100 mg/dL BUN 74 (H) 8 - 23 MG/DL Creatinine 7.42 (H) 0.8 - 1.5 MG/DL  
 GFR est AA 9 (L) >60 ml/min/1.73m2 GFR est non-AA 8 (L) >60 ml/min/1.73m2 Calcium 7.0 (L) 8.3 - 10.4 MG/DL  
GLUCOSE, POC Collection Time: 09/02/20  7:18 AM  
Result Value Ref Range Glucose (POC) 113 (H) 65 - 100 mg/dL GLUCOSE, POC Collection Time: 09/02/20 10:58 AM  
Result Value Ref Range Glucose (POC) 153 (H) 65 - 100 mg/dL Current Med List in Hospital:  
Current Facility-Administered Medications Medication Dose Route Frequency  influenza vaccine 2020-21 (6 mos+)(PF) (FLUARIX/FLULAVAL/FLUZONE QUAD) injection 0.5 mL  0.5 mL IntraMUSCular PRIOR TO DISCHARGE  insulin glargine (LANTUS) injection 15 Units  15 Units SubCUTAneous DAILY  [Held by provider] insulin regular (NOVOLIN R, HUMULIN R) injection 3 Units  3 Units SubCUTAneous TIDAC  
 NIFEdipine ER (PROCARDIA XL) tablet 30 mg  30 mg Oral DAILY  hydrALAZINE (APRESOLINE) 20 mg/mL injection 20 mg  20 mg IntraVENous Q2H PRN  
 traMADoL (ULTRAM) tablet 50 mg  50 mg Oral Q6H PRN  
 insulin regular (NOVOLIN R, HUMULIN R) injection   SubCUTAneous AC&HS  
 0.9% sodium chloride infusion 250 mL  250 mL IntraVENous PRN  
 sodium chloride (NS) flush 5-40 mL  5-40 mL IntraVENous Q8H  
 sodium chloride (NS) flush 5-40 mL  5-40 mL IntraVENous PRN  
 acetaminophen (TYLENOL) tablet 650 mg  650 mg Oral Q6H PRN  Or  
 acetaminophen (TYLENOL) suppository 650 mg  650 mg Rectal Q6H PRN  
  polyethylene glycol (MIRALAX) packet 17 g  17 g Oral DAILY PRN  promethazine (PHENERGAN) tablet 12.5 mg  12.5 mg Oral Q6H PRN Or  
 ondansetron (ZOFRAN) injection 4 mg  4 mg IntraVENous Q6H PRN  
 heparin (porcine) injection 5,000 Units  5,000 Units SubCUTAneous Q8H  
 labetaloL (NORMODYNE;TRANDATE) injection 20 mg  20 mg IntraVENous Q2H PRN  
 sevelamer carbonate (RENVELA) tab 1,600 mg  1,600 mg Oral TID WITH MEALS  famotidine (PEPCID) tablet 20 mg  20 mg Oral DAILY  cloNIDine (CATAPRES) 0.3 mg/24 hr patch 1 Patch  1 Patch TransDERmal Q7D  
 ferrous sulfate tablet 325 mg  325 mg Oral BID WITH MEALS  gabapentin (NEURONTIN) capsule 300 mg  300 mg Oral DAILY  hydrALAZINE (APRESOLINE) tablet 100 mg  100 mg Oral TID  
 HYDROmorphone (DILAUDID) tablet 0.5 mg  0.5 mg Oral Q4H PRN  
 metoprolol succinate (TOPROL-XL) XL tablet 50 mg  50 mg Oral 7am  
 lipase-protease-amylase (ZENPEP 20,000) capsule 3 Cap  3 Cap Oral TID WITH MEALS  pravastatin (PRAVACHOL) tablet 40 mg  40 mg Oral QHS  sodium bicarbonate tablet 650 mg  650 mg Oral TID  calcifediol (RAYALDEE) Cs24 30 mcg (Patient Supplied)  30 mcg Oral QHS Discharge Exam: 
Patient Vitals for the past 24 hrs: 
 Temp Pulse Resp BP SpO2  
09/02/20 1102 97.8 °F (36.6 °C) 63 20 179/75 100 % 09/02/20 0720 98 °F (36.7 °C) (!) 59 18 177/71 99 % 09/02/20 0457 97.8 °F (36.6 °C) 60 17 181/78 100 % 09/02/20 0046 98.2 °F (36.8 °C) 72 17 158/71 97 % 09/01/20 1950 97.7 °F (36.5 °C) 61 16 138/71 100 % 09/01/20 1627 97.5 °F (36.4 °C) (!) 59 16 153/73 100 % Oxygen Therapy O2 Sat (%): 100 % (09/02/20 1102) Pulse via Oximetry: 68 beats per minute (08/29/20 1214) O2 Device: Room air (08/31/20 1354) Estimated body mass index is 23.76 kg/m² as calculated from the following: 
  Height as of this encounter: 6' (1.829 m). Weight as of this encounter: 79.5 kg (175 lb 3.2 oz). Intake/Output Summary (Last 24 hours) at 9/2/2020 7456 Last data filed at 9/2/2020 4872 Gross per 24 hour Intake 1150 ml Output 1500 ml Net -350 ml *Note that automatically entered I/Os may not be accurate; dependent on patient compliance with collection and accurate  by assistants. Physical Exam:  
 
General:                     alert, awake, no acute distress. Well nourished Head:                          normocephalic, atraumatic Eyes, Ears, nose:      PERRL, EOMI. Normal conjunctiva Neck:                          supple, non-tender. Trachea midline. Lungs:                        CTAB, no wheezing, rhonchi, rales Cardiac:                      RRR, Normal S1 and S2. Abdomen:                  Soft, non distended, nontender, +BS Extremities:               Warm, dry. No edema. B/l LE in dressing Skin:                           No rashes, no jaundice Neuro:              AAOx3. No gross focal neurological deficit Psychiatric:                No anxiety, calm, cooperative Discharge Info:  
Current Discharge Medication List  
  
START taking these medications Details NIFEdipine ER (PROCARDIA XL) 30 mg ER tablet Take 1 Tab by mouth daily. Qty: 30 Tab, Refills: 2  
  
sevelamer carbonate (RENVELA) 800 mg tab tab Take 2 Tabs by mouth three (3) times daily (with meals). Qty: 90 Tab, Refills: 2 CONTINUE these medications which have NOT CHANGED Details HYDROmorphone (DILAUDID) 2 mg tablet Take 0.25 Tabs by mouth every four (4) hours as needed for Pain for up to 30 days. Max Daily Amount: 3 mg. Qty: 45 Tab, Refills: 0 Associated Diagnoses: Chronic right shoulder pain; Neuropathy; Venous stasis ulcer of left lower extremity ([address] Cellulitis of left lower leg  
  
gabapentin (NEURONTIN) 300 mg capsule Take 1 Cap by mouth daily for 30 days. Qty: 30 Cap, Refills: 0  
  
pregabalin (Lyrica) 150 mg capsule Take 150 mg by mouth two (2) times a day. insulin lispro (HUMALOG) 100 unit/mL injection INITIATE INSULIN CORRECTIVE PROTOCOL: 
INITIATE INSULIN CORRECTIVE PROTOCOL: 
Normal Insulin Sensitivity For Blood Sugar (mg/dL) of:    
Less than 150 =   0 units 150 -199 =   2 units 200 -249 =   4 units 250 -299 =   6 units 300 -349 =   8 units 350 and above = 10 units and Call Physician Initiate Hypoglycemia protocol if blood glucose is <70 mg/dL Fast Acting - Administer Immediately - or within 15 minutes of start of meal, if mealtime coverage. Qty: 1 Vial, Refills: 0  
  
insulin glargine (LANTUS) 100 unit/mL injection 15 Units by SubCUTAneous route nightly. Qty: 1 Vial, Refills: 0  
  
ferrous sulfate 325 mg (65 mg iron) tablet Take 1 Tab by mouth two (2) times daily (with meals). Qty: 1 Tab, Refills: 0  
  
multivit-minerals/folic acid (SPECTRAVITE ADULT PO) Take 1 Tab by mouth daily. cloNIDine (Catapres-TTS-3) 0.3 mg/24 hr 1 Patch by TransDERmal route every seven (7) days. Change every thursday  
  
glucose 4 gram chewable tablet Take 15 g by mouth as needed for Other (low blood sugar). oxymetazoline (AFRIN) 0.05 % nasal spray 2 Sprays by Both Nostrils route once as needed for Congestion. Lactoperoxi/Gluc Oxid/Pot Thio (BIOTENE DRY MOUTH MM) Take 2 Sprays by mouth as needed for Other (dry mouth). lipase-protease-amylase (CREON) 36,000-114,000- 180,000 unit cpDR capsule Take 3,600 Units by mouth See Admin Instructions. Take 2 capsules by mouth with each meal, and 1 capsule by mouth with each snack. dicyclomine (BENTYL) 10 mg capsule Take 10 mg by mouth two (2) times a day. acetaminophen (TYLENOL) 325 mg tablet Take 2 Tabs by mouth every four (4) hours as needed for Pain. Qty: 20 Tab, Refills: 0  
  
ondansetron (ZOFRAN ODT) 4 mg disintegrating tablet Take 1 Tab by mouth every eight (8) hours as needed for Nausea. Qty: 20 Tab, Refills: 0 pantothenic ac-min oil-pet,hyd (AQUAPHOR) 41 % ointment Apply  to affected area daily. Apply over legs Qty: 53 g, Refills: 0  
  
colestipol (COLESTID) 1 gram tablet Take 1 g by mouth two (2) times a day. sodium bicarbonate 650 mg tablet Take 650 mg by mouth three (3) times daily. lidocaine 4 % patch Cut to appropriate size. Apply to intact skin for 12 hours, remove for 12 hours. Qty: 14 Patch, Refills: 0  
  
hydrALAZINE (APRESOLINE) 100 mg tablet Take 1 Tab by mouth three (3) times daily. Qty: 90 Tab, Refills: 2  
  
pravastatin (PRAVACHOL) 40 mg tablet Take 1 Tab by mouth nightly. Qty: 30 Tab, Refills: 0  
  
calcifediol (RAYALDEE) 30 mcg Cs24 Take 30 mcg by mouth nightly. metoprolol succinate (TOPROL-XL) 50 mg XL tablet Take 50 mg by mouth every morning. omeprazole (PRILOSEC) 20 mg capsule Take 20 mg by mouth every morning. STOP taking these medications  
  
 calcium acetate, phosphate binder, (PHOSLO) 667 mg tab tablet Comments:  
Reason for Stopping:   
   
 amLODIPine (NORVASC) 10 mg tablet Comments:  
Reason for Stopping: Follow Up Orders: No orders of the defined types were placed in this encounter. Follow-up Information Follow up With Specialties Details Why Contact Info David Castellano MD Internal Medicine In 2 weeks As needed [address] [facility] [address] [phone] Wound Care Clinic   For wound re-check Time spent in patient discharge planning and coordination 40 minutes.  
 
Signed: 
Matt Galicia MD

## 2020-09-02 NOTE — PROGRESS NOTES
Pt discharged and taken home via [facility] transport. Hourly rounds completed on pt. Pain medicine given per [facility]. 2 PIVs removed without bleeding. Pt's son suha called and notified pt has been d/c'd and coming home. Discharge paperwork and instructions with prescriptions given to pt. Opportunity for questions provided. Dressing to BLE C/D/I. Pt denies needs at this time. All needs met at this time.

## 2020-09-02 NOTE — PROGRESS NOTES
Care Management Interventions PCP Verified by CM: Dorota Ventura) Mode of Transport at Discharge: BLS Transition of Care Consult (CM Consult): Discharge Planning([facility] aid 7 hours day/5 days a week/[facility] with [facility]) Discharge Durable Medical Equipment: (ramp, cane, Rolator, life alert) Current Support Network: Own Home, Lives Alone, Has Personal Caregivers Confirm Follow Up Transport: Family Freedom of Choice List was Provided with Basic Dialogue that Supports the Patient's Individualized Plan of Care/Goals, Treatment Preferences and Shares the Quality Data Associated with the Providers?: Yes [employer] Information Provided?: No 
Discharge Location Discharge Placement: Home with home health Patient to discharge home with [facility], located within [facility]. CM contacted Fabiano Rojas with [facility] to inform her patient is discharging home today. Patient son requested a [facility] for patient. CM provided patient with a [facility] from [facility] .  Patient will transport home via [facility] per patient request. All milestones for discharge have been met. CM remains available should any needs arise.

## 2020-09-02 NOTE — PROGRESS NOTES
Patient wanda Pope requested a [facility] for patient. CM sent referral to [facility]. CM will follow up on order.

## 2020-09-02 NOTE — PROGRESS NOTES
Problem: Patient Education: Go to Patient Education Activity Goal: Patient/Family Education Description: 1. Patient will complete lower body bathing and dressing with min A and adaptive equipment as needed. 2. Patient will complete toileting with CGA. 3. Patient will tolerate 25 minutes of OT treatment with 1-2 rest breaks to increase activity tolerance for ADLs. 4. Patient will complete functional transfers with CGA and adaptive equipment as needed. 5. Patient will complete functional activity while seated edge of bed with MOD I and adaptive equipment as needed. Timeframe: 7 visits Outcome: Progressing Towards Goal 
 
 
OCCUPATIONAL THERAPY: Daily Note and AM  
 9/2/2020 INPATIENT: OT Visit Days: 1 Payor: Daljit Dozier / Plan: [address] / Product Type: Managed Care Medicare /  
  
NAME/AGE/GENDER: Marco Antonio Middleton is a 79 y.o. male PRIMARY DIAGNOSIS:  DKA (diabetic ketoacidoses) ([facility] [E11.10] DKA (diabetic ketoacidoses) ([facility] DKA (diabetic ketoacidoses) ([facility] ICD-10: Treatment Diagnosis:  
 · Generalized Muscle Weakness (M62.81) · Difficulty in walking, Not elsewhere classified (R26.2) Precautions/Allergies: 
   Patient has no known allergies. ASSESSMENT:  
 
Mr. Enrique Villalba presents for the above diagnoses. Upon arrival, pt supine in bed and agreeable to OT evaluation. Pt is alert and oriented x 4. Pt reports living alone in a 1-story home, however home care staff who come out to assist with ADLs and IADLs. Notes use of rollator for functional mobility. 9/2/2020 Pt presents in supine with the PCT at bedside. Pt was participating in UB bathing and dressing with min a. Pt required max/total assist for LB ADL's. Pt probably could have done more, but opted for the PCT to complete most of it. Pt completed rolling with min a and completed supine to sit transfer with CGA.  Pt completed LB exercises while seated edge of bed. Pt stood with min a, but stated he needed to lay back down due to a nerve in his jaw and head starting to hurt. Pt stated when he feels this pain, if he doesn't lay back down, he will be hurting the rest of the day. Pt was assisted back to supine with min a. Pt may be a little self limiting. Continue POC. This section established at most recent assessment PROBLEM LIST (Impairments causing functional limitations): 1. Decreased Strength 2. Decreased ADL/Functional Activities 3. Decreased Transfer Abilities 4. Decreased Ambulation Ability/Technique 5. Decreased Balance 6. Increased Pain 7. Decreased Activity Tolerance INTERVENTIONS PLANNED: (Benefits and precautions of occupational therapy have been discussed with the patient.) 1. Activities of daily living training 2. Adaptive equipment training 3. Balance training 4. Clothing management 5. Community reintergration 6. Donning&doffing training 7. Neuromuscular re-eduation 8. Re-evaluation 9. Therapeutic activity 10. Therapeutic exercise TREATMENT PLAN: Frequency/Duration: Follow patient 3x/week to address above goals. Rehabilitation Potential For Stated Goals: Good REHAB RECOMMENDATIONS (at time of discharge pending progress):   
Placement: It is my opinion, based on this patient's performance to date, that Mr. Robert Infante may benefit from [address] after discharge due to the functional deficits listed above that are likely to improve with skilled rehabilitation because he/she has multiple medical issues that affect his/her functional mobility in the community. Equipment: ? TBD  
    
 
 
 
OCCUPATIONAL PROFILE AND HISTORY:  
History of Present Injury/Illness (Reason for Referral): 
 See H&P Past Medical History/Comorbidities:  
Mr. Robert Infante  has a past medical history of Acute renal failure superimposed on stage 3 chronic kidney disease ([facility] (9/21/2016), Anemia, Arthritis, Chronic kidney disease, Chronic pancreatitis ([facility] (9/22/2016), Dermatophytosis of nail (12/6/2016), Diabetic neuropathy [facility] (9/22/2016), Essential hypertension (9/22/2016), GERD (gastroesophageal reflux disease), Hypercholesterolemia, Iron (Fe) deficiency anemia (9/22/2016), Septicemia due to Klebsiella pneumoniae ([facility] (1/84/2786), Systolic CHF, chronic ([facility] (9/23/2016), Type II diabetes mellitus with nephropathy ([facility] (09/22/2016), Venous insufficiency (12/6/2016), and Xerosis cutis (12/6/2016). He also has no past medical history of Congestive heart failure, unspecified. Mr. Joan Persaud  has a past surgical history that includes hx hernia repair (Left, 2010); hx colonoscopy; hx turp (2012); hx prostatectomy (2012); vascular surgery procedure unlist (Left, 10/26/2017); and vascular surgery procedure unlist (Left, 09/18/2019). Social History/Living Environment:  
Home Environment: Trailer/mobile home # Steps to Enter: 0 One/Two Story Residence: One story Living Alone: Yes Support Systems: Home care staff Patient Expects to be Discharged to[de-identified] Private residence Current DME Used/Available at Home: [address], [place], [address], rollator, Wheelchair Tub or Shower Type: Shower Prior Level of Function/Work/Activity: 
Assist with bathing and dressing from home care staff; uses rollator for functional mobility. Personal Factors:   
      Sex:  male Age:  79 y.o. Other factors that influence how disability is experienced by the patient:  multiple co-morbidities Number of Personal Factors/Comorbidities that affect the Plan of Care: Brief history (0):  LOW COMPLEXITY ASSESSMENT OF OCCUPATIONAL PERFORMANCE[de-identified]  
Activities of Daily Living:  
Basic ADLs (From Assessment) Complex ADLs (From Assessment) Feeding: Minimum assistance Oral Facial Hygiene/Grooming: Minimum assistance Bathing: Moderate assistance Upper Body Dressing: Minimum assistance Lower Body Dressing: Moderate assistance Toileting: Moderate assistance Instrumental ADL Meal Preparation: Total assistance Homemaking: Total assistance Grooming/Bathing/Dressing Activities of Daily Living Grooming Grooming Assistance: Supervision Position Performed: Other (comment)(supine) Washing Face: Stand-by assistance Washing Hands: Stand-by assistance Upper Body Bathing Bathing Assistance: Minimum assistance Position Performed: Other (comment)(supine) Lower Body Bathing Bathing Assistance: Maximum assistance; Total assistance(dependent) Perineal  : Maximum assistance; Total assistance (dependent) Position Performed: Supine Lower Body : Maximum assistance; Total assistance (dependent) Position Performed: Supine Upper Body Dressing Assistance Hospital w: Moderate assistance Lower Body Dressing Assistance Socks: Total assistance (dependent) Bed/Mat Mobility Rolling: Minimum assistance Supine to Sit: Contact guard assistance; Additional time Sit to Supine: Contact guard assistance Sit to Stand: Minimum assistance Stand to Sit: Minimum assistance Scooting: Moderate assistance Most Recent Physical Functioning:  
Gross Assessment: 
  
         
  
Posture: 
Posture (WDL): Exceptions to [facility] Posture Assessment: Forward head, Rounded shoulders, Trunk flexion Balance: 
Sitting: Impaired Sitting - Static: Good (unsupported) Sitting - Dynamic: Fair (occasional) Standing: Impaired Standing - Static: Fair Bed Mobility: 
Rolling: Minimum assistance Supine to Sit: Contact guard assistance; Additional time Sit to Supine: Contact guard assistance Scooting: Moderate assistance Wheelchair Mobility: 
  
Transfers: 
Sit to Stand: Minimum assistance Stand to Sit: Minimum assistance Patient Vitals for the past 6 hrs: 
 BP BP Patient Position SpO2 Pulse 09/02/20 0457 181/78 Head of bed elevated (Comment degrees); At rest 100 % 60  
09/02/20 0720 177/71 Head of bed elevated (Comment degrees) 99 % (!) 59 Mental Status Neurologic State: Alert Orientation Level: Oriented X4 Cognition: Follows commands Perception: Appears intact Perseveration: No perseveration noted Safety/Judgement: Awareness of environment Physical Skills Involved: 
1. Balance 2. Strength 3. Activity Tolerance 4. Gross Motor Control 5. Pain (acute) Cognitive Skills Affected (resulting in the inability to perform in a timely and safe manner): 1. Perception 2. Executive Function Psychosocial Skills Affected: 1. Habits/Routines 2. Environmental Adaptation Number of elements that affect the Plan of Care: 5+:  HIGH COMPLEXITY CLINICAL DECISION MAKIN[address] [place] [address] AM-PAC 6 Clicks Daily Activity Inpatient Short Form How much help from another person does the patient currently need. .. Total A Lot A Little None 1. Putting on and taking off regular lower body clothing? [] 1   [x] 2   [] 3   [] 4  
2. Bathing (including washing, rinsing, drying)? [] 1   [x] 2   [] 3   [] 4  
3. Toileting, which includes using toilet, bedpan or urinal?   [] 1   [x] 2   [] 3   [] 4  
4. Putting on and taking off regular upper body clothing? [] 1   [] 2   [x] 3   [] 4  
5. Taking care of personal grooming such as brushing teeth? [] 1   [] 2   [x] 3   [] 4  
6. Eating meals? [] 1   [] 2   [x] 3   [] 4  
© , Trustees of [address] [place] [address], under license to [employer]. All rights reserved Score:  Initial: 15 Most Recent: X (Date: -- ) Interpretation of Tool:  Represents activities that are increasingly more difficult (i.e. Bed mobility, Transfers, Gait). Medical Necessity:    
· Patient demonstrates · good and fair ·  rehab potential due to higher previous functional level. Reason for Services/Other Comments: 
· Patient continues to require skilled intervention due to · medical complications and patient unable to attend/participate in therapy as expected · . Use of outcome tool(s) and clinical judgement create a POC that gives a: LOW COMPLEXITY  
 
 
 
TREATMENT:  
(In addition to Assessment/Re-Assessment sessions the following treatments were rendered) Pre-treatment Symptoms/Complaints:   
Pain: Initial:  
Pain Location 1: Head 
Pain Intervention(s) 1: Repositioned /10 Post Session:  same Therapeutic Activity: (15 minutes): Therapeutic activities including Bed transfers, static/dynamic sitting and static standing to improve mobility, strength and balance. Required minimal assist to promote static balance in standing. Self Care: (15 minutes): Procedure(s) (per grid) utilized to improve and/or restore self-care/home management as related to dressing and bathing. Required moderate visual, verbal, manual and   cueing to facilitate activities of daily living skills and compensatory activities. Therapeutic Exercise: (8 minutes):  Exercises per grid below to improve mobility, strength and balance. Required minimal visual and verbal cues to promote proper body posture and promote proper body mechanics. Progressed range and repetitions as indicated. Date: 
9/2/2020 Date: 
 Date: Activity/Exercise Parameters Parameters Parameters Knee flexion/extension  10 reps Hip flexion/extension 10 reps Hip abb/abd 10 reps Braces/Orthotics/Lines/Etc:  
· IV 
· chan catheter · O2 Device: Room air Treatment/Session Assessment:   
· Response to Treatment:  tolerated well with no issues noted · Interdisciplinary Collaboration:  
o Certified Occupational Therapy Assistant 
o Registered Nurse · After treatment position/precautions:  
o Supine in bed 
o Bed/Chair-wheels locked 
o Call light within reach 
o RN notified 
o Side rails x 2  
· Compliance with Program/Exercises: Will assess as treatment progresses. · Recommendations/Intent for next treatment session:   \"Next visit will focus on advancements to more challenging activities and reduction in assistance provided\". Total Treatment Duration: OT Patient Time In/Time Out Time In: 3792 Time Out: [address]

## 2020-09-03 NOTE — PROGRESS NOTES
Patient contacted regarding recent discharge and COVID-19 risk. Discussed COVID-19 related testing which was not done at this time. Test results were not done. Patient informed of results, if available? n/a Care Transition Nurse/ Ambulatory Care Manager/ LPN Care Coordinator contacted the patient by telephone to perform post discharge assessment. Verified name and  with patient as identifiers. Patient has following risk factors of: heart failure, diabetes and chronic kidney disease. CTN/ACM/LPN reviewed discharge instructions, medical action plan and red flags related to discharge diagnosis. Reviewed and educated them on any new and changed medications related to discharge diagnosis. Advised obtaining a 90-day supply of all daily and as-needed medications. Advance Care Planning:  
Does patient have an Advance Directive: health care decision makers updated Education provided regarding infection prevention, and signs and symptoms of COVID-19 and when to seek medical attention with patient who verbalized understanding. Discussed exposure protocols and quarantine from [address] you at higher risk for severe illness  and given an opportunity for questions and concerns. The patient agrees to contact the COVID-19 hotline [phone] or PCP office for questions related to their healthcare. CTN/ACM/LPN provided contact information for future reference. From CDC: Are you at higher risk for severe illness?  Wash your hands often.  Avoid close contact (6 feet, which is about two arm lengths) with people who are sick.  Put distance between yourself and other people if COVID-19 is spreading in your community.  Clean and disinfect frequently touched surfaces.  Avoid all cruise travel and non-essential air travel.  Call your healthcare professional if you have concerns about COVID-19 and your underlying condition or if you are sick. For more information on steps you can take to protect yourself, see CDC's How to Protect Yourself Patient/family/caregiver given information for [employer] and agrees to enroll no Patient's preferred e-mail:  n/a Patient's preferred phone number: n/a Based on Loop alert triggers, patient will be contacted by nurse care manager for worsening symptoms. Plan for follow-up call in 7-14 days based on severity of symptoms and risk factors. Patient engaged with [facility] 
CLTC Aide-5 days weekly for 7 hours/day Patient reports he has contacted PCP  [facility] for follow up appointment.

## 2020-09-17 NOTE — PROGRESS NOTES
Patient resolved from Transition of Care episode on 9/17/2020  Discussed COVID-19 related testing which was not done at this time. Test results were not done. Patient informed of results, if available? n/a     Patient/family has been provided the following resources and education related to COVID-19:                         Signs, symptoms and red flags related to COVID-19            [facility] exposure and quarantine guidelines            [facility] exposure contact - [phone]            Contact for their local Department of Health                 Patient currently reports that the following symptoms have improved:  no new symptoms and no worsening symptoms. No further outreach scheduled with this CTN/ACM/LPN/HC/ MA. Episode of Care resolved. Patient has this CTN/ACM/LPN/HC/MA contact information if future needs arise.

## 2020-10-13 PROBLEM — E83.42 HYPOMAGNESEMIA: Status: ACTIVE | Noted: 2020-01-01

## 2020-10-13 PROBLEM — Z91.14 NON COMPLIANCE W MEDICATION REGIMEN: Status: ACTIVE | Noted: 2020-01-01

## 2020-10-13 PROBLEM — I45.81 LONG Q-T SYNDROME: Status: ACTIVE | Noted: 2020-01-01

## 2020-10-13 NOTE — ED NOTES
POC BGL HI, insulin drip readjusted, pt turned to right hip for comfort, labs redrawn, VSS, resp easy, belongings in bag at bedside, will continue to monitor

## 2020-10-13 NOTE — H&P
History of Present Illness: 
78-year-old male presenting [facility] Emergency Department from home after home CLTC aide noted the patient was hypertensive and hyperglycemic. Please review the patient's recent prior history and physical and discharge summaries as the patient has been admitted multiple times for multiple problems. Patient was recently admitted and discharged for DKA approximately 1 month ago. Patient is somnolent and a very poor historian secondary to advanced age and cognitive impairment. Patient does state that he ran out of his insulin approximately 2 days ago. Patient states that he took his home antihypertensives today. Patient denies fever, chills, nausea, vomiting, chest pain, shortness of breath, abdominal pain. Patient does have a history of cellulitis in bilateral lower extremities but states that he has no increased pain in his legs. In the emergency department the patient was noted to be markedly hypertensive to 234/95. Hemoglobin 7.3, corrected sodium 146, serum CO2 11, anion gap 19, , creatinine 12.6, blood sugar 1243, ionized calcium 3.12, magnesium 1.4, phosphorus 7.1. VBG 7.19/27/26/10. Patient was given 2 g of magnesium sulfate IV, 10 units of intravenous insulin and 20 mg of IV labetalol. Comprehensive review of systems negative unless stated above. Past Medical History: 
Medication noncompliance CKD 5, refuses hemodialysis HFpEF (last LVEF 60-65%) HTN 
HLD 
T2DM (last A1c 9.4 in 8/20) Functional quadriplegia Anemia chronic disease Past Surgical History: 
Prostatectomy TURP 
AV fistula formation Colonoscopy Family History: Mother: HTN, DM, CVA Social History: 
Retired,  Former smoker, 40-pack-year, quit 1995 Denies heavy alcohol use illicit drug use Physical Exam: 
General: Frail-appearing and confused elderly black male, sitting up in bed, no acute distress HEENT: NCAT, dry mucous membranes Skin: Sloughing skin on bilateral legs without erythema/induration/warmth/tenderness Cardio: RRR, normal S1/S2, no rubs, no gallops, no murmurs Pulm: Non labored respirations on room air, LCAB, no wheezing, no rales, no rhonchi GI: Soft, Nt, Nd, Nml bowel sounds, no masses noted Extremity: Atraumatic, no deformities, no edema Neuro: Alert, oriented, moving all extremities, no focal deficits noted Psych: Pleasant, cooperative, normal range of affect I have personally reviewed all current data for this patient. Assessment and Plan: 
 
#DKA, medication noncompliance: Second admission in 2 months for DKA. Medication noncompliance is the most likely etiology. 
-Admit to intensive care unit 
-Insulin drip per protocol 
-LR IVF at 150 given HFpEF as below 
-Every 4 hour BMP, magnesium and phosphorus 
-No indication for bicarbonate therapy 
-Daily labs -Urinalysis pending at the time of writing this note #Hypomagnesemia: 
-Replace and trend #Prolonged QT: Secondary to hypomagnesemia. #Hypocalcemia: Unclear etiology. -Replace and trend #Anemia on top of anemia of chronic disease: 
-Continue home ferrous sulfate 
-Trend hemoglobin, transfuse if less than 7 #Acute kidney injury on chronic kidney disease stage V, hyperphosphatemia: Refuses hemodialysis. -Avoid nephrotoxic medications 
-Trend creatinine 
-Continue home sevelamer, sodium bicarbonate #HFpEF, HTN: Continue home nifedipine, metoprolol, hydralazine, clonidine, as needed IV hydralazine #Chronic pancreatitis: Continue home pancreatic enzyme replacement Full Code Inpatient for above SCDs for VTE prevention Please call [phone] for questions or concerns Addendum 0449: Third BMP shows worsening anion gap and metabolic acidosis. I ordered a stat repeat BMP to ensure this is in the lab error as well as a stat lactic acid and VBG. If VBG shows a pH of 7 or less, 1 amp of sodium bicarb should be given.

## 2020-10-13 NOTE — CONSULTS
[facility] 
CONSULTATION Name:  Valeriy Boss 
MR#:  [id number] :  1940 ACCOUNT #:  [de-identified] DATE OF SERVICE:  10/13/2020 NEPHROLOGY CONSULTATION 
 
REASON FOR CONSULTATION:  We are seeing the patient at the request of Dr. Daysi Perez with regards to stage V CKD. HISTORY OF PRESENT ILLNESS:  The patient is a 81-year-old male who was brought to the [facility] Emergency Department last evening after he was found to be hypertensive and hyperglycemic. He was found to be very somnolent on presentation and was found to be in DKA with a blood sugar of 1243 and a CO2 of 11 and anion gap of 19. He has been admitted to the hospital and put on an insulin drip and his blood sugar this morning is down to 397. He is still acidotic with a CO2 of 8 and an anion gap of 22. We have been asked to see him with regards to his renal failure. The patient has known stage V chronic kidney disease and we have seen him on multiple hospital admissions over the past few months. He and his family have been told that his renal failure is irreversible and that the only long-term option for him was hemodialytic support. He has refused to consider dialysis every time we have discussed this with him. His labs today show he has a sodium of 138, potassium 3.0, chloride 108, CO2 is 8, anion gap 22, blood sugar 397, , creatinine 11.3 with a GFR of only 6. PAST MEDICAL HISTORY:  Significant for type 2 diabetes mellitus, stage V chronic kidney disease, chronic hypertension, gastroesophageal reflux disease, hyperlipidemia, chronic anemia, history of systolic congestive heart failure, and chronic venous insufficiency. ALLERGIES:  HE HAS NO KNOWN DRUG ALLERGIES: 
 
CURRENT MEDICATIONS: 
1. Catapres 0.3 mg 24-hour patch 1 every 7 days. 2.  Colestipol 1 g p.o. twice a day. 3.  Iron sulfate 325 mg p.o. twice a day. 4.  Apresoline 100 mg p.o. 3 times a day. 5.  Zenpep capsules 4 caps orally 3 times a day. 6.  Metoprolol 50 mg p.o. q.a.m. 
7.  Procardia XL 30 mg p.o. daily. 8.  Protonix 40 mg p.o. daily. 9.  Potassium chloride 10 mEq p.o. x1. 
10.  Pravachol 40 mg p.o. at bedtime. 11.  Lyrica 150 mg p.o. twice a day. 12.  Renvela 1600 mg p.o. 3 times a day with meals. 13.  Sodium bicarb 650 mg p.o. 3 times a day. 14.  He is currently on an insulin drip. SOCIAL HISTORY:  He is a former smoker. He does not drink any alcohol. FAMILY HISTORY:  Positive for diabetes and hypertension. REVIEW OF SYSTEMS:  He denies any fevers, chills. No headaches, dizzy spells, fainting spells. No shortness of breath, cough, wheezing. No chest pain or palpitations. No nausea, vomiting, heartburn, or abdominal pain. No constipation or diarrhea. No dysuria or polyuria. PHYSICAL EXAMINATION: 
GENERAL:  An elderly -American male in no acute distress. VITAL SIGNS:  He is afebrile, blood pressure is 182/86, pulse is 82, respirations are 18, they are not labored. HEENT:  His head is normocephalic. Eyes:  Pupils are equal and react to light and accommodation. Extraocular muscles are intact. Fundi were not visualized. LUNGS:  Clear. No rales or wheezes heard. Breath sounds equal bilaterally. HEART:  Regular rate and rhythm. ABDOMEN:  Soft. Bowel sounds are present. There is no hepatosplenomegaly. GENITAL/RECTAL:  Exam was deferred. EXTREMITIES:  He has no peripheral edema. IMPRESSION: 
1.  Stage V chronic kidney disease. 2.  Diabetic ketoacidosis. 3.  Anemia of chronic kidney disease. 4.  Chronic hypertension. 5.  History of chronic pancreatitis. 6.  History of seizure disorder. PLANS:  I called his son and discussed the situation with him and told him that his father is still refusing dialytic support.   I told him that I felt that his life expectancy without dialysis at this stage would be measured in the next few weeks to perhaps a few months. The patient's son said he will come and discuss the situation with his father again but he was fairly confident that his father would not agree with dialysis. Given that he has been very consistent in his feeling, it is probably a reasonable reflection of his intentions. For now, we will not provide him with dialysis and we would suggest we continue with just comfort palliative measures. Thank you very much for allowing us to see him and helping in his care. Melody Crocker MD 
 
 
[id number] 
D:  10/13/2020 10:52 
T:  10/13/2020 13:24 
JOB #:  [id number] CC:  MD Swathi Angeles MD

## 2020-10-13 NOTE — ED NOTES
TRANSFER - OUT REPORT: 
 
Verbal report given to Aurelia(name) on A B Junior Tl Moreno  being transferred to [facility](unit) for routine progression of care Report consisted of patients Situation, Background, Assessment and  
Recommendations(SBAR). Information from the following report(s) SBAR was reviewed with the receiving nurse. Lines:  
Peripheral IV Right Hand (Active) Peripheral IV 10/13/20 Right Forearm (Active) Site Assessment Clean, dry, & intact 10/13/20 0103 Phlebitis Assessment 0 10/13/20 0103 Infiltration Assessment 0 10/13/20 0103 Dressing Status Clean, dry, & intact 10/13/20 0103 Opportunity for questions and clarification was provided. Patient transported with: 
 Registered Nurse

## 2020-10-13 NOTE — DIABETES MGMT
Patient admitted with DKA. Patient has a past medical history of DM2, HTN, CKD 5 (refuses dialysis), DJD, anemia, neuropathy, GERD, former smoker, HLD, and functional quadriplegia. Per provider note patient was admitted with DKA about a month ago and provider suspects noncompliance. Patient has a positive family history of DM. A1c has ranged 6.5-9.4 since 2016. A1c was 9.4 (eA) in August however this was likely skewed lower than it's true value by patient's anemia- Hgb was 8 the day A1c was drawn. Blood glucose was 1243 on admission. Patient received regular 10 units and was placed on an insulin drip. Most recent blood glucose was 305. K+ 3. Gap 22. . Creatinine 11.30. GFR 6. Phos 4.7. Lactic acid 4.4. Will follow along and see patient as patient condition allows.

## 2020-10-13 NOTE — PROGRESS NOTES
Hospitalist Note Admit Date:  10/12/2020  9:14 PM  
Name:  Wes Stager Age:  79 y.o. 
:  1940 MRN:  [id number] PCP:  William Vaca MD 
Treatment Team: Attending Provider: Christine Serra MD; Consulting Provider: Juan Mckeon MD; Care Manager: Shahzad Peguero RN; Primary Nurse: Olga Noguera HPI/Subjective:  
 
Mr. Hedy Atwood is a 78 yo male with PMH of CKD5 refusing dialysis, DM2 admitted to ICU with DKA. He has been started on IV insulin drip and hydration. Nephrology consulted to discuss progressive renal failure and he continues to decline HD. UA pending. Discharge plans pending to home. 10-13-20 seen in ICU , tolerant to foods, lives alone , has chronic LE wounds Objective:  
 
Patient Vitals for the past 24 hrs: 
 Temp Pulse Resp BP SpO2  
10/13/20 1513  69  125/61 100 % 10/13/20 1458  65  136/65 100 % 10/13/20 1443  81  (!) 142/82 100 % 10/13/20 1429  84  (!) 155/80 100 % 10/13/20 1413  79  (!) 144/71 100 % 10/13/20 1358  80  (!) 153/81 100 % 10/13/20 1343  77  129/71 100 % 10/13/20 1329  78  132/70 100 % 10/13/20 1314  62  (!) 148/69 100 % 10/13/20 1258  62  133/60 100 % 10/13/20 1243  62  (!) 165/73 100 % 10/13/20 1229  65  (!) 156/73 100 % 10/13/20 1214  63  (!) 158/71 100 % 10/13/20 1200  62     
10/13/20 1158  66  136/65   
10/13/20 1143  63  (!) 147/67   
10/13/20 1129  66  126/62   
10/13/20 1113  68  135/65   
10/13/20 1058  80  129/68   
10/13/20 1043  80  (!) 149/75   
10/13/20 1029  82  (!) 152/76   
10/13/20 1015  76  (!) 173/97   
10/13/20 0958  90  (!) 153/68   
10/13/20 0952  83  (!) 182/86   
10/13/20 0943  83  (!) 182/86   
10/13/20 0929  82  (!) 166/84   
10/13/20 0913  81  (!) 172/89   
10/13/20 0858  81  (!) 172/88   
10/13/20 0843  76  (!) 168/80   
10/13/20 0829  76  (!) 160/83   
10/13/20 0813  85  (!) 157/77  10/13/20 0800  83     
10/13/20 0659  71 18 (!) 176/81 100 % 10/13/20 0629 97.8 °F (36.6 °C) 74 18 (!) 170/81 100 % 10/13/20 0558  71 15 (!) 195/93   
10/13/20 0529  71 16 (!) 178/81 100 % 10/13/20 0458  69 13 (!) 176/82 100 % 10/13/20 0429  69 14 (!) 160/75 100 % 10/13/20 0359  70 18 (!) 195/86 100 % 10/13/20 0347  65  (!) 191/86   
10/13/20 0329  68 19 (!) 191/86 100 % 10/13/20 0324 97.8 °F (36.6 °C) 67 17  100 % 10/13/20 0259  65 15 (!) 206/88   
10/13/20 0229  66 16 (!) 182/88   
10/13/20 0159  69 12 (!) 187/88 100 % 10/13/20 0132  65 11 (!) 178/81 100 % 10/13/20 0131  66  (!) 178/81   
10/13/20 0104     100 % 10/13/20 0054  65 14 (!) 216/90 100 % 10/13/20 0032  67  (!) 204/85 100 % 10/13/20 0012  67  (!) 195/85 100 % 10/12/20 2351  68  (!) 190/88 100 % 10/12/20 2332  71  (!) 188/86 100 % 10/12/20 2253  69  (!) 166/78   
10/12/20 2238  72  (!) 183/81   
10/12/20 2144  75  (!) 158/88 100 % 10/12/20 2121 98.1 °F (36.7 °C) 80 18 (!) 234/95 100 % Oxygen Therapy O2 Sat (%): 100 % (10/13/20 1513) Pulse via Oximetry: 69 beats per minute (10/13/20 1513) O2 Device: Room air (10/13/20 5141) Estimated body mass index is 23.73 kg/m² as calculated from the following: 
  Height as of this encounter: 6' (1.829 m). Weight as of this encounter: 79.4 kg (175 lb). No intake or output data in the 24 hours ending 10/13/20 9876 *Note that automatically entered I/Os may not be accurate; dependent on patient compliance with collection and accurate  by techs. General:    Elderly, no distress, Alert. CV:   RRR. No murmur, rub, or gallop. LE wrapped Lungs:   CTAB. No wheezing, rhonchi, or rales. anterior Abdomen:   Soft, nontender, nondistended. Neuro:  No gross focal deficits Data Reviewed: 
I have reviewed all labs, meds, and studies from the last 24 hours: 
Recent Results (from the past 24 hour(s)) METABOLIC PANEL, COMPREHENSIVE Collection Time: 10/12/20  9:27 PM  
Result Value Ref Range Sodium 128 (L) 136 - 145 mmol/L Potassium 4.7 3.5 - 5.1 mmol/L Chloride 98 98 - 107 mmol/L  
 CO2 11 (L) 21 - 32 mmol/L Anion gap 19 (H) 7 - 16 mmol/L Glucose 1,243 (HH) 65 - 100 mg/dL  (H) 8 - 23 MG/DL Creatinine 12.60 (H) 0.8 - 1.5 MG/DL  
 GFR est AA 5 (L) >60 ml/min/1.73m2 GFR est non-AA 4 (L) >60 ml/min/1.73m2 Calcium 5.2 (LL) 8.3 - 10.4 MG/DL Bilirubin, total 0.3 0.2 - 1.1 MG/DL  
 ALT (SGPT) 18 12 - 65 U/L  
 AST (SGOT) 43 (H) 15 - 37 U/L Alk. phosphatase 99 50 - 136 U/L Protein, total 6.6 6.3 - 8.2 g/dL Albumin 2.7 (L) 3.2 - 4.6 g/dL Globulin 3.9 (H) 2.3 - 3.5 g/dL A-G Ratio 0.7 (L) 1.2 - 3.5    
TROPONIN-HIGH SENSITIVITY Collection Time: 10/12/20  9:27 PM  
Result Value Ref Range Troponin-High Sensitivity 33.6 (H) 0 - 14 pg/mL MAGNESIUM Collection Time: 10/12/20  9:27 PM  
Result Value Ref Range Magnesium 1.4 (LL) 1.8 - 2.4 mg/dL PHOSPHORUS Collection Time: 10/12/20  9:27 PM  
Result Value Ref Range Phosphorus 7.1 (H) 2.3 - 3.7 MG/DL  
MISC. LAB TEST Collection Time: 10/12/20  9:27 PM  
Result Value Ref Range Test Description: SERUM OSMO Reference Lab: [facility] Results: RESULTS SCANNED IN [facility] CARE    
CBC WITH AUTOMATED DIFF Collection Time: 10/12/20  9:27 PM  
Result Value Ref Range WBC 5.9 4.3 - 11.1 K/uL  
 RBC 2.94 (L) 4.23 - 5.6 M/uL HGB 7.3 (L) 13.6 - 17.2 g/dL HCT 26.1 (L) 41.1 - 50.3 % MCV 88.8 79.6 - 97.8 FL  
 MCH 24.8 (L) 26.1 - 32.9 PG  
 MCHC 28.0 (L) 31.4 - 35.0 g/dL  
 RDW 17.1 (H) 11.9 - 14.6 % PLATELET 638 796 - 358 K/uL MPV 12.5 (H) 9.4 - 12.3 FL ABSOLUTE NRBC 0.00 0.0 - 0.2 K/uL  
 DF AUTOMATED NEUTROPHILS 74 43 - 78 % LYMPHOCYTES 16 13 - 44 % MONOCYTES 9 4.0 - 12.0 % EOSINOPHILS 0 (L) 0.5 - 7.8 % BASOPHILS 0 0.0 - 2.0 % IMMATURE GRANULOCYTES 1 0.0 - 5.0 % ABS. NEUTROPHILS 4.4 1.7 - 8.2 K/UL  
 ABS. LYMPHOCYTES 0.9 0.5 - 4.6 K/UL  
 ABS. MONOCYTES 0.5 0.1 - 1.3 K/UL  
 ABS. EOSINOPHILS 0.0 0.0 - 0.8 K/UL  
 ABS. BASOPHILS 0.0 0.0 - 0.2 K/UL  
 ABS. IMM. GRANS. 0.1 0.0 - 0.5 K/UL GLUCOSE, POC Collection Time: 10/12/20  9:34 PM  
Result Value Ref Range Glucose (POC) >600 (HH) 65 - 100 mg/dL EKG, 12 LEAD, INITIAL Collection Time: 10/12/20  9:34 PM  
Result Value Ref Range Ventricular Rate 80 BPM  
 Atrial Rate 80 BPM  
 P-R Interval 224 ms QRS Duration 102 ms Q-T Interval 444 ms QTC Calculation (Bezet) 512 ms Calculated P Axis 105 degrees Calculated R Axis -14 degrees Calculated T Axis 66 degrees Diagnosis Sinus rhythm with 1st degree A-V block Prolonged QT Abnormal ECG When compared with ECG of 29-AUG-2020 08:08, Nonspecific T wave abnormality no longer evident in Inferior leads Confirmed by Karthik Adorno ([id number]) on 10/13/2020 6:15:43 AM 
  
GLUCOSE, POC Collection Time: 10/12/20 10:05 PM  
Result Value Ref Range Glucose (POC) >600 (HH) 65 - 100 mg/dL POC VENOUS BLOOD GAS Collection Time: 10/12/20 10:30 PM  
Result Value Ref Range Device: ROOM AIR    
 pH, venous (POC) 7.19 (L) 7.32 - 7.42    
 pCO2, venous (POC) 26.9 (L) 41 - 51 MMHG  
 pO2, venous (POC) 26 (LL) mmHg HCO3, venous (POC) 10.3 (L) 23 - 28 MMOL/L  
 sO2, venous (POC) 37 (L) 65 - 88 % Base deficit, venous (POC) 16 mmol/L Allens test (POC) NOT APPLICABLE Site OTHER Specimen type (POC) VENOUS BLOOD Performed by [facility]   
 CO2, POC 11 MMOL/L Respiratory comment: PhysicianNotified COLLECT TIME 2,225 CALCIUM, IONIZED Collection Time: 10/12/20 10:34 PM  
Result Value Ref Range Calcium, ionized 3.12 (L) 4.0 - 5.2 mg/dL Ramy Bowles Collection Time: 10/12/20 11:12 PM  
Result Value Ref Range Glucose 601 mg/dL Insulin order 10.8 units/hour Insulin adminstered 10.8 units/hour Multiplier 0.020 Low target 150 mg/dL High target 250 mg/dL D50 order 0.0 ml  
 D50 administered 0.00 ml Minutes until next BG 60 min Order initials maura   
 Administered initials DARRELL   
 GLSCOM Comments GLUCOSE, POC Collection Time: 10/13/20 12:17 AM  
Result Value Ref Range Glucose (POC) >600 (HH) 65 - 100 mg/dL Rip Nafisa Collection Time: 10/13/20 12:18 AM  
Result Value Ref Range Glucose 601 mg/dL Insulin order 16.2 units/hour Insulin adminstered 16.2 units/hour Multiplier 0.030 Low target 150 mg/dL High target 250 mg/dL D50 order 0.0 ml  
 D50 administered 0.00 ml Minutes until next BG 60 min Order initials maura   
 Administered initials BF   
 GLSCNOEMI Comments EKG, 12 LEAD, SUBSEQUENT Collection Time: 10/13/20  1:14 AM  
Result Value Ref Range Ventricular Rate 63 BPM  
 Atrial Rate 86 BPM  
 P-R Interval 212 ms QRS Duration 100 ms Q-T Interval 444 ms QTC Calculation (Bezet) 454 ms Calculated P Axis 44 degrees Calculated R Axis -15 degrees Calculated T Axis 57 degrees Diagnosis    
  !! AGE AND GENDER SPECIFIC ECG ANALYSIS !! Sinus rhythm with 1st degree A-V block with Possible Premature atrial  
complexes with Aberrant conduction Otherwise normal ECG When compared with ECG of 12-OCT-2020 21:34, Aberrant conduction is now Present QT has shortened Confirmed by ST ANTONIO TORO MD (), MARY JANE HEAD ([id number]) on 10/13/2020 7:09:11 AM 
  
GLUCOSE, POC Collection Time: 10/13/20  1:26 AM  
Result Value Ref Range Glucose (POC) >600 (HH) 65 - 100 mg/dL Rip Nafisa Collection Time: 10/13/20  1:28 AM  
Result Value Ref Range Glucose 601 mg/dL Insulin order 21.6 units/hour Insulin adminstered 21.6 units/hour Multiplier 0.040 Low target 150 mg/dL High target 250 mg/dL D50 order 0.0 ml  
 D50 administered 0.00 ml Minutes until next BG 60 min  Order initials bf   
 Administered initials bf   
 GLSCOM Comments METABOLIC PANEL, BASIC Collection Time: 10/13/20  1:29 AM  
Result Value Ref Range Sodium 135 (L) 136 - 145 mmol/L Potassium 3.6 3.5 - 5.1 mmol/L Chloride 105 98 - 107 mmol/L  
 CO2 11 (L) 21 - 32 mmol/L Anion gap 19 (H) 7 - 16 mmol/L Glucose 926 (HH) 65 - 100 mg/dL  (H) 8 - 23 MG/DL Creatinine 11.80 (H) 0.8 - 1.5 MG/DL  
 GFR est AA 5 (L) >60 ml/min/1.73m2 GFR est non-AA 4 (L) >60 ml/min/1.73m2 Calcium 5.6 (LL) 8.3 - 10.4 MG/DL MAGNESIUM Collection Time: 10/13/20  1:29 AM  
Result Value Ref Range Magnesium 1.7 (L) 1.8 - 2.4 mg/dL PHOSPHORUS Collection Time: 10/13/20  1:29 AM  
Result Value Ref Range Phosphorus 5.9 (H) 2.3 - 3.7 MG/DL  
GLUCOSE, POC Collection Time: 10/13/20  2:31 AM  
Result Value Ref Range Glucose (POC) >600 (HH) 65 - 100 mg/dL Rannie Cast Collection Time: 10/13/20  2:34 AM  
Result Value Ref Range Glucose 601 mg/dL Insulin order 27.1 units/hour Insulin adminstered 27.1 units/hour Multiplier 0.050 Low target 150 mg/dL High target 250 mg/dL D50 order 0.0 ml  
 D50 administered 0.00 ml Minutes until next BG 60 min Order initials bf   
 Administered initials bf   
 GLSCOM Comments GLUCOSE, POC Collection Time: 10/13/20  3:50 AM  
Result Value Ref Range Glucose (POC) >600 (HH) 65 - 100 mg/dL Rannie Cast Collection Time: 10/13/20  3:52 AM  
Result Value Ref Range Glucose 601 mg/dL Insulin order 32.5 units/hour Insulin adminstered 32.5 units/hour Multiplier 0.060 Low target 150 mg/dL High target 250 mg/dL D50 order 0.0 ml  
 D50 administered 0.00 ml Minutes until next BG 60 min Order initials bf   
 Administered initials bf   
 GLSCOM Comments METABOLIC PANEL, COMPREHENSIVE Collection Time: 10/13/20  4:53 AM  
Result Value Ref Range Sodium 135 (L) 136 - 145 mmol/L  Potassium 3.3 (L) 3.5 - 5.1 mmol/L  
 Chloride 107 98 - 107 mmol/L  
 CO2 8 (LL) 21 - 32 mmol/L Anion gap 20 (H) 7 - 16 mmol/L Glucose 592 (HH) 65 - 100 mg/dL  (H) 8 - 23 MG/DL Creatinine 11.00 (H) 0.8 - 1.5 MG/DL  
 GFR est AA 6 (L) >60 ml/min/1.73m2 GFR est non-AA 5 (L) >60 ml/min/1.73m2 Calcium 6.4 (L) 8.3 - 10.4 MG/DL Bilirubin, total 0.2 0.2 - 1.1 MG/DL  
 ALT (SGPT) 14 12 - 65 U/L  
 AST (SGOT) 25 15 - 37 U/L Alk. phosphatase 84 50 - 136 U/L Protein, total 5.5 (L) 6.3 - 8.2 g/dL Albumin 2.2 (L) 3.2 - 4.6 g/dL Globulin 3.3 2.3 - 3.5 g/dL A-G Ratio 0.7 (L) 1.2 - 3.5 MAGNESIUM Collection Time: 10/13/20  4:53 AM  
Result Value Ref Range Magnesium 2.5 (H) 1.8 - 2.4 mg/dL PHOSPHORUS Collection Time: 10/13/20  4:53 AM  
Result Value Ref Range Phosphorus 4.8 (H) 2.3 - 3.7 MG/DL  
HEMOGLOBIN Collection Time: 10/13/20  4:54 AM  
Result Value Ref Range HGB 7.0 (L) 13.6 - 17.2 g/dL GLUCOSE, POC Collection Time: 10/13/20  4:56 AM  
Result Value Ref Range Glucose (POC) >600 (HH) 65 - 100 mg/dL [facility] Collection Time: 10/13/20  4:57 AM  
Result Value Ref Range Glucose 601 mg/dL Insulin order 37.9 units/hour Insulin adminstered 37.9 units/hour Multiplier 0.070 Low target 150 mg/dL High target 250 mg/dL D50 order 0.0 ml  
 D50 administered 0.00 ml Minutes until next BG 60 min Order initials jhb   
 Administered initials bf   
 GLSCOM Comments GLUCOSE, POC Collection Time: 10/13/20  6:15 AM  
Result Value Ref Range Glucose (POC) 524 (HH) 65 - 100 mg/dL [place] Collection Time: 10/13/20  6:23 AM  
Result Value Ref Range Glucose 524 mg/dL Insulin order 32.5 units/hour Insulin adminstered 32.5 units/hour Multiplier 0.070 Low target 150 mg/dL High target 250 mg/dL D50 order 0.0 ml  
 D50 administered 0.00 ml Minutes until next BG 60 min  Order initials bf   
 Administered initials bf   
 GLSCOM Comments POC VENOUS BLOOD GAS Collection Time: 10/13/20  6:46 AM  
Result Value Ref Range Device: ROOM AIR    
 pH, venous (POC) 7.23 (L) 7.32 - 7.42    
 pCO2, venous (POC) 17.2 (L) 41 - 51 MMHG  
 pO2, venous (POC) 47 mmHg HCO3, venous (POC) 7.2 (L) 23 - 28 MMOL/L  
 sO2, venous (POC) 76 65 - 88 % Base deficit, venous (POC) 19 mmol/L Allens test (POC) NOT APPLICABLE Site OTHER Specimen type (POC) VENOUS BLOOD Performed by [facility]   
 CO2, POC 8 MMOL/L  
 COLLECT TIME 645 METABOLIC PANEL, BASIC Collection Time: 10/13/20  6:47 AM  
Result Value Ref Range Sodium 138 136 - 145 mmol/L Potassium 3.0 (L) 3.5 - 5.1 mmol/L Chloride 108 (H) 98 - 107 mmol/L  
 CO2 8 (LL) 21 - 32 mmol/L Anion gap 22 (H) 7 - 16 mmol/L Glucose 397 (H) 65 - 100 mg/dL  (H) 8 - 23 MG/DL Creatinine 11.30 (H) 0.8 - 1.5 MG/DL  
 GFR est AA 6 (L) >60 ml/min/1.73m2 GFR est non-AA 5 (L) >60 ml/min/1.73m2 Calcium 8.9 8.3 - 10.4 MG/DL  
LACTIC ACID Collection Time: 10/13/20  6:47 AM  
Result Value Ref Range Lactic acid 4.4 (HH) 0.4 - 2.0 MMOL/L  
MAGNESIUM Collection Time: 10/13/20  6:47 AM  
Result Value Ref Range Magnesium 2.4 1.8 - 2.4 mg/dL PHOSPHORUS Collection Time: 10/13/20  6:47 AM  
Result Value Ref Range Phosphorus 4.7 (H) 2.3 - 3.7 MG/DL  
GLUCOSE, POC Collection Time: 10/13/20  8:06 AM  
Result Value Ref Range Glucose (POC) 305 (H) 65 - 100 mg/dL Doron Ajaybird Collection Time: 10/13/20  8:10 AM  
Result Value Ref Range Glucose 305 mg/dL Insulin order 17.2 units/hour Insulin adminstered 17.2 units/hour Multiplier 0.070 Low target 150 mg/dL High target 250 mg/dL D50 order 0.0 ml  
 D50 administered 0.00 ml Minutes until next BG 60 min Order initials BKS Administered initials BKS GLSCOM Comments GLUCOSE, POC Collection Time: 10/13/20  9:38 AM  
Result Value Ref Range Glucose (POC) 176 (H) 65 - 100 mg/dL [place] Fortune Collection Time: 10/13/20  9:43 AM  
Result Value Ref Range Glucose 176 mg/dL Insulin order 8.1 units/hour Insulin adminstered 8.1 units/hour Multiplier 0.070 Low target 150 mg/dL High target 250 mg/dL D50 order 0.0 ml  
 D50 administered 0.00 ml Minutes until next BG 60 min Order initials BKS Administered initials BKS GLSCOM Comments GLUCOSE, POC Collection Time: 10/13/20 10:53 AM  
Result Value Ref Range Glucose (POC) 89 65 - 100 mg/dL [place] Fortune Collection Time: 10/13/20 10:54 AM  
Result Value Ref Range Glucose 89 mg/dL Insulin order 1.6 units/hour Insulin adminstered 1.6 units/hour Multiplier 0.056 Low target 150 mg/dL High target 250 mg/dL D50 order 0.0 ml  
 D50 administered 0.00 ml Minutes until next BG 60 min Order initials BKS Administered initials BKS GLSCOM Comments METABOLIC PANEL, BASIC Collection Time: 10/13/20 11:22 AM  
Result Value Ref Range Sodium 140 136 - 145 mmol/L Potassium 3.1 (L) 3.5 - 5.1 mmol/L Chloride 110 (H) 98 - 107 mmol/L  
 CO2 12 (L) 21 - 32 mmol/L Anion gap 18 (H) 7 - 16 mmol/L Glucose 51 (L) 65 - 100 mg/dL  (H) 8 - 23 MG/DL Creatinine 11.90 (H) 0.8 - 1.5 MG/DL  
 GFR est AA 5 (L) >60 ml/min/1.73m2 GFR est non-AA 4 (L) >60 ml/min/1.73m2 Calcium 6.8 (L) 8.3 - 10.4 MG/DL  
HEMOGLOBIN Collection Time: 10/13/20 11:22 AM  
Result Value Ref Range HGB 8.1 (L) 13.6 - 17.2 g/dL PHOSPHORUS Collection Time: 10/13/20 11:22 AM  
Result Value Ref Range Phosphorus 4.6 (H) 2.3 - 3.7 MG/DL MAGNESIUM Collection Time: 10/13/20 11:22 AM  
Result Value Ref Range Magnesium 2.4 1.8 - 2.4 mg/dL GLUCOSE, POC Collection Time: 10/13/20 11:54 AM  
Result Value Ref Range Glucose (POC) 42 (L) 65 - 100 mg/dL Adelaida Soto Collection Time: 10/13/20 11:56 AM  
Result Value Ref Range Glucose 42 mg/dL Insulin order 0.0 units/hour Insulin adminstered 0.0 units/hour Multiplier 0.045 Low target 150 mg/dL High target 250 mg/dL D50 order 23.0 ml  
 D50 administered 23.00 ml Minutes until next BG 15 min Order initials LM Administered initials LM   
 GLSCOM Comments GLUCOSE, POC Collection Time: 10/13/20 12:13 PM  
Result Value Ref Range Glucose (POC) 68 65 - 100 mg/dL [place] Collection Time: 10/13/20 12:14 PM  
Result Value Ref Range Glucose 68 mg/dL Insulin order 0.0 units/hour Insulin adminstered 0.0 units/hour Multiplier 0.035 Low target 150 mg/dL High target 250 mg/dL D50 order 13.0 ml  
 D50 administered 13.00 ml Minutes until next BG 15 min Order initials LM Administered initials LM   
 GLSCOM Comments GLUCOSE, POC Collection Time: 10/13/20 12:34 PM  
Result Value Ref Range Glucose (POC) 98 65 - 100 mg/dL [place] Collection Time: 10/13/20 12:38 PM  
Result Value Ref Range Glucose 98 mg/dL Insulin order 1.0 units/hour Insulin adminstered 1.0 units/hour Multiplier 0.025 Low target 150 mg/dL High target 250 mg/dL D50 order 0.0 ml  
 D50 administered 0.00 ml Minutes until next BG 60 min Order initials BKS Administered initials BKS GLSCOM Comments Insulin held until MD responds with orders GLUCOSE, POC Collection Time: 10/13/20  1:50 PM  
Result Value Ref Range Glucose (POC) 60 (L) 65 - 100 mg/dL [place] Collection Time: 10/13/20  1:51 PM  
Result Value Ref Range Glucose 60 mg/dL Insulin order 0.0 units/hour Insulin adminstered 0.0 units/hour Multiplier 0.015 Low target 150 mg/dL High target 250 mg/dL D50 order 16.0 ml  
 D50 administered 16.00 ml Minutes until next BG 15 min Order initials sejal Administered initials sejal GLSCOM Comments GLUCOSE, POC  Collection Time: 10/13/20  2:10 PM  
 Result Value Ref Range Glucose (POC) 118 (H) 65 - 100 mg/dL Giovanna Calvin Collection Time: 10/13/20  2:10 PM  
Result Value Ref Range Glucose 118 mg/dL Insulin order 0.3 units/hour Insulin adminstered 0.3 units/hour Multiplier 0.005 Low target 150 mg/dL High target 250 mg/dL D50 order 0.0 ml  
 D50 administered 0.00 ml Minutes until next BG 60 min Order initials sejal Administered initials sejal GLSCOM Comments GLUCOSE, POC Collection Time: 10/13/20  2:44 PM  
Result Value Ref Range Glucose (POC) 101 (H) 65 - 100 mg/dL METABOLIC PANEL, BASIC Collection Time: 10/13/20  2:50 PM  
Result Value Ref Range Sodium 139 136 - 145 mmol/L Potassium 3.2 (L) 3.5 - 5.1 mmol/L Chloride 110 (H) 98 - 107 mmol/L  
 CO2 13 (L) 21 - 32 mmol/L Anion gap 16 7 - 16 mmol/L Glucose 89 65 - 100 mg/dL  (H) 8 - 23 MG/DL Creatinine 11.50 (H) 0.8 - 1.5 MG/DL  
 GFR est AA 6 (L) >60 ml/min/1.73m2 GFR est non-AA 5 (L) >60 ml/min/1.73m2 Calcium 6.5 (L) 8.3 - 10.4 MG/DL MAGNESIUM Collection Time: 10/13/20  2:50 PM  
Result Value Ref Range Magnesium 2.5 (H) 1.8 - 2.4 mg/dL PHOSPHORUS Collection Time: 10/13/20  2:50 PM  
Result Value Ref Range Phosphorus 4.7 (H) 2.3 - 3.7 MG/DL  
LACTIC ACID Collection Time: 10/13/20  2:56 PM  
Result Value Ref Range Lactic acid 1.7 0.4 - 2.0 MMOL/L  
GLUCOSTABILIZER Collection Time: 10/13/20  3:11 PM  
Result Value Ref Range Glucose 101 mg/dL Insulin order 0.0 units/hour Insulin adminstered 0.0 units/hour Multiplier 0.000 Low target 150 mg/dL High target 250 mg/dL D50 order 0.0 ml  
 D50 administered 0.00 ml Minutes until next BG 60 min Order initials BKS Administered initials BKS GLSCOM Comments Current Meds: 
Current Facility-Administered Medications Medication Dose Route Frequency  cloNIDine (CATAPRES) 0.3 mg/24 hr patch 1 Patch  1 Patch TransDERmal Q7D  ferrous sulfate tablet 325 mg  325 mg Oral BID WITH MEALS  
 hydrALAZINE (APRESOLINE) tablet 100 mg  100 mg Oral TID  metoprolol succinate (TOPROL-XL) XL tablet 50 mg  50 mg Oral 7am  
 NIFEdipine ER (PROCARDIA XL) tablet 30 mg  30 mg Oral DAILY  pantoprazole (PROTONIX) tablet 40 mg  40 mg Oral ACB  pregabalin (LYRICA) capsule 150 mg  150 mg Oral BID  pravastatin (PRAVACHOL) tablet 40 mg  40 mg Oral QHS  sevelamer carbonate (RENVELA) tab 1,600 mg  1,600 mg Oral TID WITH MEALS  sodium bicarbonate tablet 650 mg  650 mg Oral TID  lipase-protease-amylase (ZENPEP 20,000) capsule 4 Cap  4 Cap Oral TID WITH MEALS  colestipoL (COLESTID) tablet 1 g (Patient Supplied)  1 g Oral BID  hydrALAZINE (APRESOLINE) 20 mg/mL injection 20 mg  20 mg IntraVENous Q2H PRN  
 dextrose 5% and 0.9% NaCl infusion  125 mL/hr IntraVENous CONTINUOUS  
 potassium chloride (KLOR-CON) tablet 10 mEq  10 mEq Oral NOW  insulin regular (NOVOLIN R, HUMULIN R) 100 Units in 0.9% sodium chloride 100 mL infusion  0-50 Units/hr IntraVENous TITRATE  dextrose 40% (GLUTOSE) oral gel 1 Tube  15 g Oral PRN  
 glucagon (GLUCAGEN) injection 1 mg  1 mg IntraMUSCular PRN  
 dextrose (D50W) injection syrg 12.5-25 g  25-50 mL IntraVENous PRN  
 sodium chloride (NS) flush 5-40 mL  5-40 mL IntraVENous Q8H  
 sodium chloride (NS) flush 5-40 mL  5-40 mL IntraVENous PRN  
 acetaminophen (TYLENOL) tablet 650 mg  650 mg Oral Q6H PRN Or  
 acetaminophen (TYLENOL) suppository 650 mg  650 mg Rectal Q6H PRN  polyethylene glycol (MIRALAX) packet 17 g  17 g Oral DAILY PRN  
 ondansetron (ZOFRAN) injection 4 mg  4 mg IntraVENous Q6H PRN Other Studies: No results found for this visit on 10/12/20. Xr Chest Pa Lat Result Date: 10/13/2020 Frontal and lateral views of the chest COMPARISON: July 2020 CLINICAL HISTORY: Hyperglycemia. FINDINGS: Heart appears mildly enlarged. Mediastinal contour is within normal limits. Lungs are underinflated. There is no focal pulmonary consolidation, pneumothorax or pulmonary edema. There is no pleural effusion. Stable tiny radiopaque structures are seen overlying the right shoulder region. IMPRESSION: 1. No evidence of acute pulmonary disease. All Micro Results None SARS-CoV-2 Lab Results \"Novel Coronavirus\" Test: No results found for: COV2NT \"Emergent Disease\" Test: No results found for: EDPR \"SARS-COV-2\" Test: No results found for: XGCOVT Rapid Test:  
Lab Results Component Value Date/Time COVR Not detected 07/28/2020 04:30 PM  
 
  
 
 
Assessment and Plan:  
 
Hospital Problems as of 10/13/2020 Date Reviewed: 7/23/2020 Codes Class Noted - Resolved POA Long Q-T syndrome ICD-10-CM: I45.81 ICD-9-CM: 426.82  10/13/2020 - Present Unknown Hypomagnesemia ICD-10-CM: I82.33 
ICD-9-CM: 275.2  10/13/2020 - Present Unknown Non compliance w medication regimen ICD-10-CM: Z91.14 
ICD-9-CM: V15.81  10/13/2020 - Present Unknown * (Principal) DKA (diabetic ketoacidoses) ([facility] ICD-10-CM: E11.10 ICD-9-CM: 250.12  8/27/2020 - Present Unknown Hyperphosphatemia ICD-10-CM: E83.39 
ICD-9-CM: 275.3  8/27/2020 - Present Yes Metabolic acidosis, increased anion gap ICD-10-CM: E87.2 ICD-9-CM: 276.2  8/27/2020 - Present Yes ALICJA (acute kidney injury) ([facility] ICD-10-CM: N17.9 ICD-9-CM: 584.9  7/26/2020 - Present Yes Functional quadriplegia (HCC) ICD-10-CM: R53.2 ICD-9-CM: 780.72  7/26/2020 - Present Yes Hyperkalemia ICD-10-CM: E87.5 ICD-9-CM: 276.7  7/26/2020 - Present Yes Dementia without behavioral disturbance (HCC) (Chronic) ICD-10-CM: F03.90 ICD-9-CM: 294.20  4/19/2018 - Present Yes Seizure ([facility] [address] ICD-10-CM: R56.9 ICD-9-CM: 780.39  4/18/2018 - Present Yes Hypercholesterolemia ICD-10-CM: E78.00 ICD-9-CM: 272.0  Unknown - Present Yes Uncontrolled diabetes mellitus, with long-term current use of insulin (HCC) (Chronic) ICD-10-CM: E11.65, Z79.4 ICD-9-CM: 250.02, V58.67  2/28/2018 - Present Yes (HFpEF) heart failure with preserved ejection fraction (HCC) ICD-10-CM: I50.30 ICD-9-CM: 428.9  9/23/2016 - Present Yes 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 
ICD-9-CM: 250.40, 583.81  9/22/2016 - Present Yes HTN (hypertension) ICD-10-CM: I10 
ICD-9-CM: 401.9  9/22/2016 - Present Yes GERD (gastroesophageal reflux disease) ICD-10-CM: K21.9 ICD-9-CM: 530.81  9/22/2016 - Present Yes Chronic pancreatitis (HCC) (Chronic) ICD-10-CM: K86.1 ICD-9-CM: 460.6  9/22/2016 - Present Yes Anemia of chronic disease ICD-10-CM: D63.8 ICD-9-CM: 285.29  9/22/2016 - Present Yes  
   
 CKD (chronic kidney disease) stage 5, GFR less than 15 ml/min ([facility]) ICD-10-CM: N18.5 ICD-9-CM: 585.5  9/21/2016 - Present Yes Plan: · DM2, with DKA: 
· Continued insulin drip · Pending serial labs · Adjust IVF to dextrose · Has diet ordered · ALICJA on CKD5: 
· nephro consulted and continues to decline HD · Check UA · LE wounds: · Wound care · Hypokalemia: 
· Replace and repeat lab · Anemia: 
· Repeated as 8.1 · Trend labs · HTN: 
· Resumed clonidine, hydralazine, metoprolol, procardia DC planning/Dispo:  Pending to home Diet:  DIET CARDIAC 
DVT ppx:  heparin Signed: Casper Garcia MD

## 2020-10-13 NOTE — ED TRIAGE NOTES
Patient arrives via EMS from home with hyperglycemia, wounds on lower legs, possible UTI. EMS states they were called out to check his blood sugar. Patient has home health that comes out every other day. Family reported his speech was garbled which is a normal response when his blood sugar is high. Patient alert and oriented other wise. EMS BGL read HI. EMS gave ~450ml NS in route.

## 2020-10-13 NOTE — CONSULTS
[facility] Subjective: CKD5   Renal consult dictated # [id number] Review of Systems -  
General ROS: negative for - fever, chills Respiratory ROS: no SOB, cough, GARNICA Cardiovascular ROS: no CP, palpitations Gastrointestinal ROS: no N&V, abdominal pain, diarrhea Genito-Urinary ROS: no difficulty voiding, dysuria Neurological ROS: no seizures, focal weekness Objective: 
 
Vitals:  
 10/13/20 5822 10/13/20 0659 10/13/20 0800 10/13/20 8638 BP: (!) 170/81 (!) 176/81  (!) 182/86 Pulse: 74 71 83 83 Resp: 18 18 Temp: 97.8 °F (36.6 °C) SpO2: 100% 100% Weight:      
Height:      
 
 
PE 
Gen: in no acute distress CV:reg rate Chest:clear Abd: soft Ext/Access: no edema Tyesha Aburto LAB Recent Labs 10/13/20 
0454 10/12/20 
2127 WBC  --  5.9 HGB 7.0* 7.3* HCT  --  26.1*  
PLT  --  184 Recent Labs 10/13/20 
6388 10/13/20 
0453 10/13/20 
0129 10/12/20 
2127  135* 135* 128* K 3.0* 3.3* 3.6 4.7 * 107 105 98 CO2 8* 8* 11* 11* * 592* 926* 1,243* * 108* 115* 124* CREA 11.30* 11.00* 11.80* 12.60* MG 2.4 2.5* 1.7* 1.4* PHOS 4.7* 4.8* 5.9* 7.1*  
CA 8.9 6.4* 5.6* 5.2* ALB  --  2.2*  --  2.7* TBILI  --  0.2  --  0.3 ALT  --  14  --  18 Radiology A/P:  
Patient Active Problem List  
Diagnosis Code  CKD (chronic kidney disease) stage 5, GFR less than 15 ml/min ([facility]) N18.5  Septicemia due to Klebsiella pneumoniae ([facility] A41.4  Type II diabetes mellitus with nephropathy ([facility]) E11.21  
 HTN (hypertension) I10  
 GERD (gastroesophageal reflux disease) K21.9  Diabetic neuropathy (HCC) E11.40  Chronic pancreatitis ([facility] K86.1  Anemia of chronic disease D63.8  
 (HFpEF) heart failure with preserved ejection fraction (HCC) I50.30  Venous insufficiency I87.2  Venous stasis ulcer of left lower extremity (HCC) I83.029, L97.929  
 Stasis edema of both lower extremities I87.303  Cellulitis of left lower leg L03. 116  
 Onychomycosis B35.1  Controlled type 2 diabetes mellitus with complication, with long-term current use of insulin ([facility]) E11.8, Z79.4  Arteriovenous fistula ([facility]) I77.0  Hypercholesterolemia E78.00  
 Uncontrolled diabetes mellitus, with long-term current use of insulin ([facility]) E11.65, Z79.4  Carotid bruit R09.89  Cauda equina compression ([facility]) G83.4  Volume overload E87.70  Chest pain R07.9  Acute metabolic encephalopathy F76.79  
 Hypernatremia E87.0  Seizure ([facility] R56.9  Dementia without behavioral disturbance ([facility]) F03.90  Renal failure (ARF), acute on chronic ([facility]) N17.9, N18.9  Rhabdomyolysis L62.06  
 Metabolic acidosis W71.8  ESRD (end stage renal disease) ([facility] N18.6  Open wound of skin T14. Mario Limerick  DVT (deep venous thrombosis) ([facility]) I82.409  Cervical radiculopathy M54.12  Neuropathic pain M79.2  
 Encounter for medication management X49.653  Postural imbalance R29.3  Neck pain M54.2  Cervical strain S16. Tomales Robert  Degenerative disc disease, cervical M50.30  ALICJA (acute kidney injury) ([facility] N17.9  Functional quadriplegia ([facility]) R53.2  Anemia D64.9  Hypocalcemia E83.51  
 Acute pain R52  Left shoulder pain M25.512  
 Hyperkalemia E87.5  UTI (urinary tract infection) N39.0  Wound infection T14. 8XXA, L08.9  DKA (diabetic ketoacidoses) ([facility]) E11.10  Hyperphosphatemia B32.11  
 Metabolic acidosis, increased anion gap E87.2  Long Q-T syndrome I45.81  
 Hypomagnesemia E83.42  
 Non compliance w medication regimen Z91.14 CKD5 - I discussed his case with the pt and, on th phone, with his son Ethan Coelho. . Pt has consistenlty refused dialytic support for months. He is refusing it now. His son understands this. I will not plan on dialysis for now. Would continue with comfort measures only DKA Anemia CHF Nagi Haines MD

## 2020-10-13 NOTE — ED PROVIDER NOTES
The history is provided by the EMS personnel. High Blood Sugar This is a recurrent problem. The current episode started 6 to 12 hours ago. The problem occurs constantly. The problem has not changed since onset. Associated with: Slurred speech and foul odor to urine. The patient is experiencing no pain. Pertinent negatives include no fever, no diarrhea, no nausea and no vomiting. Nothing worsens the pain. The pain is relieved by nothing. His past medical history is significant for DM. Past Medical History:  
Diagnosis Date  Acute renal failure superimposed on stage 3 chronic kidney disease ([address] 9/21/2016  Anemia   
 pt states he receives iron infusions  Arthritis OA generalized  Chronic kidney disease   
 not on HD- surgery done for access and fistula being removed. Per patient, no plans to proceed with dialysis  Chronic pancreatitis ([address] 9/22/2016  Dermatophytosis of nail 12/6/2016  Diabetic neuropathy ([facility] 9/22/2016  
 insulin sliding scale only- no oral meds- avg fastings avg fasting \"low 200's;  hypo @ 80 A1C 7.1 9/2019 per patient  Essential hypertension 9/22/2016  
 medication  GERD (gastroesophageal reflux disease)   
 controlled with med  Hypercholesterolemia  Iron (Fe) deficiency anemia 9/22/2016  Septicemia due to Klebsiella pneumoniae ([address] 9/22/2016  Systolic CHF, chronic ([address] 9/23/2016 ECHO 4/2018 EF- 60-65%  Type II diabetes mellitus with nephropathy ([address] 09/22/2016  Venous insufficiency 12/6/2016  Xerosis cutis 12/6/2016 Past Surgical History:  
Procedure Laterality Date  HX COLONOSCOPY    
 HX HERNIA REPAIR Left 2010  HX PROSTATECTOMY  2012  HX TURP  2012 FOR BPH  VASCULAR SURGERY PROCEDURE UNLIST Left 10/26/2017 AVF  VASCULAR SURGERY PROCEDURE UNLIST Left 09/18/2019 Ligation of left brachiobasilic fistula Family History:  
Problem Relation Age of Onset  Diabetes Mother  Stroke Mother  Hypertension Mother  No Known Problems Father  Diabetes Sister  Diabetes Brother  Diabetes Sister  Diabetes Sister  Other Sister   
     fibromyalgia Social History Socioeconomic History  Marital status:  Spouse name: Not on file  Number of children: Not on file  Years of education: Not on file  Highest education level: Not on file Occupational History  Not on file Social Needs  Financial resource strain: Not on file  Food insecurity Worry: Not on file Inability: Not on file  Transportation needs Medical: Not on file Non-medical: Not on file Tobacco Use  Smoking status: Former Smoker Packs/day: 2.00 Years: 20.00 Pack years: 40.00 Last attempt to quit:  Years since quittin.7  Smokeless tobacco: Current User Substance and Sexual Activity  Alcohol use: No  
 Drug use: Never  Sexual activity: Not on file Lifestyle  Physical activity Days per week: Not on file Minutes per session: Not on file  Stress: Not on file Relationships  Social connections Talks on phone: Not on file Gets together: Not on file Attends [employer] service: Not on file Active member of club or organization: Not on file Attends meetings of clubs or organizations: Not on file Relationship status: Not on file  Intimate partner violence Fear of current or ex partner: Not on file Emotionally abused: Not on file Physically abused: Not on file Forced sexual activity: Not on file Other Topics Concern  Not on file Social History Narrative  Not on file ALLERGIES: Patient has no known allergies. Review of Systems Unable to perform ROS: Mental status change Constitutional: Negative for fever. Gastrointestinal: Negative for diarrhea, nausea and vomiting. Vitals:  
 10/12/20 2121 BP: (!) 234/95 Pulse: 80  
 Resp: 18 Temp: 98.1 °F (36.7 °C) SpO2: 100% Weight: 79.4 kg (175 lb) Height: 6' (1.829 m) Physical Exam 
Vitals signs and nursing note reviewed. Constitutional:   
   Appearance: He is well-developed. HENT:  
   Mouth/Throat:  
   Mouth: Mucous membranes are dry. Pharynx: No oropharyngeal exudate. Eyes:  
   Conjunctiva/sclera: Conjunctivae normal.  
   Pupils: Pupils are equal, round, and reactive to light. Neck: Musculoskeletal: Neck supple. Cardiovascular:  
   Rate and Rhythm: Normal rate and regular rhythm. Heart sounds: Normal heart sounds. Pulmonary:  
   Effort: Pulmonary effort is normal.  
   Breath sounds: Normal breath sounds. Abdominal:  
   General: Bowel sounds are normal. There is no distension. Palpations: Abdomen is soft. Tenderness: There is no abdominal tenderness. There is no guarding or rebound. Musculoskeletal: Normal range of motion. General: No tenderness. Right lower leg: Edema present. Left lower leg: Edema present. Lymphadenopathy:  
   Cervical: No cervical adenopathy. Skin: 
   General: Skin is warm and dry. Neurological:  
   Mental Status: He is alert and oriented to person, place, and time. MDM Number of Diagnoses or Management Options Diagnosis management comments: Per glycemia and evaluate for DKA. No unilateral neuro symptoms noted. No facial asymmetry. Doubt CVA. We will remove ankle and foot bandages to evaluate lower extremity skin breakdown and ulcerations. There is a palm sized area of skin breakdown to the fat layer on the right lateral lower leg. IV hydration insulin ordered. Check urine for infection. 10:49 PM 
DKA present. Less aggressive hydration given poor renal function. Insulin bolus given due to normal potassium. Glucose stabilizer ordered. Hospitalist consulted for admission. Magnesium replaced.   Ionized calcium ordered and hypo-Reagan C. Judy of uncertain etiology but appears chronic. Son has arrived and reports some noncompliance for a couple of days with patient being out of his insulin. The wounds of the lower extremities are limited to the 1 previously mentioned. Do not see any significant ulcerations of the feet or ankles. There is weeping from lymphedema. CRITICAL CARE Documentation: This patient is critically ill and there is a high probability of of imminent or life threatening deterioration in the patient's condition without immediate management. The nature of the patient's clinical problem is: Diabetic ketoacidosis I have spent 30 minutes in direct patient care, documentation, review of labs/xrays/old records, discussion with Staff, family . The time involved in the performance of separately reportable procedures was not counted toward critical care time. Chuck Coburn MD; 10/12/2020 @10:50 PM 
 
 
 
  
Amount and/or Complexity of Data Reviewed Clinical lab tests: ordered and reviewed Decide to obtain previous medical records or to obtain history from someone other than the patient: yes Obtain history from someone other than the patient: yes Review and summarize past medical records: yes Discuss the patient with other providers: yes Risk of Complications, Morbidity, and/or Mortality Presenting problems: moderate Diagnostic procedures: low Management options: high Patient Progress Patient progress: stable Procedures

## 2020-10-14 NOTE — PROGRESS NOTES
Chart reviewed s/p admission to [facility] ICU for DKA. Pt known to CM from previous admission. Lives alone with CLTC aid caregiver. [facility] has been current, attempted call to [facility], no return call presently. Pt has refused HD in past. Has not qualified for hospice at present, as he wants to continue CLTC at home. DME's listed below. CM will follow for any assist and d/c POC when stable. Following. Care Management Interventions PCP Verified by CM: Yes Mode of Transport at Discharge: Other (see comment) Transition of Care Consult (CM Consult): Discharge Planning, Other(CLTC with [facility]) Discharge Durable Medical Equipment: (ramp, rolator, cane, life alert) Current Support Network: Lives Alone(Son, Marietta Laura [phone] care giver Abi Pouch [phone]) Confirm Follow Up Transport: Other (see comment) Freedom of Choice List was Provided with Basic Dialogue that Supports the Patient's Individualized Plan of Care/Goals, Treatment Preferences and Shares the Quality Data Associated with the Providers?: Yes  Resource Information Provided?: [facility]) Discharge Location Discharge Placement: Unable to determine at this time

## 2020-10-14 NOTE — PROGRESS NOTES
Hospitalist Note Admit Date:  10/12/2020  9:14 PM  
Name:  Shawanda Gooden Age:  79 y.o. 
:  1940 MRN:  [id number] PCP:  Janiya Douglass MD 
Treatment Team: Attending Provider: Angelica Porter MD; Consulting Provider: Nisa Simmons MD; Care Manager: Win Ness, RN; Primary Nurse: Gordo Espana; Primary Nurse: Mesha Badillo; Physical Therapist: Linford Cooks, PT, DPT; Occupational Therapist: Jennifer Bosch OT 
 
HPI/Subjective:  
 
Mr. Yvan Tejada is a 78 yo male with PMH of CKD5 refusing dialysis, DM2 admitted to ICU with DKA. He was started on IV insulin drip and hydration. His glucoses have improved and he has resumed lantus and SSI. He still has an open anion gap that may be ALICJA induced. Nephrology consulted to discuss progressive renal failure and he continues to decline HD. He is on longterm oral bicarb replacement. Discharge plans pending to home. 10-14-20 called by RN as less responsive, wakes to sternal rub and is hungry, no BM change, no dyspnea, some chronic pain to legs,  
 
Objective:  
 
Patient Vitals for the past 24 hrs: 
 Temp Pulse Resp BP SpO2  
10/14/20 0743 97 °F (36.1 °C) 62 16 (!) 143/83 100 % 10/14/20 0727  64 16 134/72 99 % 10/14/20 0714  64  (!) 148/97 100 % 10/14/20 0658  63  (!) 159/80 100 % 10/14/20 0642  65  (!) 159/81 100 % 10/14/20 0615  63  (!) 146/64 99 % 10/14/20 0600  64  (!) 162/77 100 % 10/14/20 0545  65  (!) 173/84 99 % 10/14/20 0530  64  (!) 173/84 100 % 10/14/20 0515  65  (!) 143/76 100 % 10/14/20 0500  66  130/73 100 % 10/14/20 0445  68  (!) 144/69 100 % 10/14/20 0430     100 % 10/14/20 0415  67  (!) 145/75 100 % 10/14/20 0400 98 °F (36.7 °C) 68 18 (!) 141/73 100 % 10/14/20 0345  75  (!) 156/83 98 % 10/14/20 0330  77  118/78 100 % 10/14/20 0315  74  (!) 148/79 100 % 10/14/20 0300  73  (!) 145/67 100 % 10/14/20 0245  65   100 % 10/14/20 0230  81  129/76 100 % 10/14/20 0215  82  128/71 99 % 10/14/20 0200  82  134/68 100 % 10/14/20 0145  81  (!) 152/80 100 % 10/14/20 0130  83  129/76 100 % 10/14/20 0115  84  125/73 97 % 10/14/20 0100  83  136/80 100 % 10/14/20 0045  85   94 % 10/14/20 0030  82  (!) 146/75 100 % 10/14/20 0015  82  131/81 100 % 10/14/20 0000 98.1 °F (36.7 °C) 82 16 (!) 158/82 100 % 10/13/20 2345  83  (!) 150/76 100 % 10/13/20 2330  82  (!) 145/78 100 % 10/13/20 2315  84  (!) 145/83 100 % 10/13/20 2300  83  133/83 100 % 10/13/20 2245  83  (!) 144/77 98 % 10/13/20 2230  83     
10/13/20 2215  84     
10/13/20 2200  88   100 % 10/13/20 2145  86   100 % 10/13/20 2130  82   100 % 10/13/20 2115  81   100 % 10/13/20 2100  84   100 % 10/13/20 2045  82   100 % 10/13/20 2030  83  109/67 100 % 10/13/20 2015  83   100 % 10/13/20 2000 98 °F (36.7 °C) 82 16 109/67 100 % 10/13/20 1945  83   100 % 10/13/20 1930  83   100 % 10/13/20 1915  87  (!) 150/67 100 % 10/13/20 1900  89  (!) 161/73 99 % 10/13/20 1843  (!) 139  127/68 96 % 10/13/20 1830  88  (!) 148/68 100 % 10/13/20 1813  82  127/69 100 % 10/13/20 1758 97.9 °F (36.6 °C) 87 16 120/65 100 % 10/13/20 1743  81  117/66 100 % 10/13/20 1728  81  111/63 99 % 10/13/20 1714  84  126/70 100 % 10/13/20 1658  83  116/63 100 % 10/13/20 1643  81  117/64 100 % 10/13/20 1629  82  113/63 100 % 10/13/20 1613  80  121/66 100 % 10/13/20 1600  81     
10/13/20 1558  64  (!) 117/57 100 % 10/13/20 1543  65  (!) 119/58 100 % 10/13/20 1528  68  (!) 120/59 99 % 10/13/20 1513  69  125/61 100 % 10/13/20 1458  65  136/65 100 % 10/13/20 1443  81  (!) 142/82 100 % 10/13/20 1429  84  (!) 155/80 100 % 10/13/20 1413  79  (!) 144/71 100 % 10/13/20 1358 98.1 °F (36.7 °C) 80 16 (!) 153/81 100 % 10/13/20 1343  77  129/71 100 % 10/13/20 1329  78  132/70 100 % 10/13/20 1314  62  (!) 148/69 100 % 10/13/20 1258  62  133/60 100 % 10/13/20 1243  62  (!) 165/73 100 % 10/13/20 1229  65  (!) 156/73 100 % 10/13/20 1214  63  (!) 158/71 100 % 10/13/20 1200  62     
10/13/20 1158  66  136/65   
10/13/20 1143  63  (!) 147/67   
10/13/20 1129  66  126/62   
10/13/20 1113  68  135/65   
10/13/20 1058  80  129/68   
10/13/20 1043  80  (!) 149/75   
10/13/20 1029  82  (!) 152/76   
10/13/20 1015  76  (!) 173/97   
10/13/20 1000     100 % 10/13/20 0958 98 °F (36.7 °C) 90 20 (!) 153/68   
10/13/20 0952  83  (!) 182/86   
10/13/20 0943  83  (!) 182/86   
10/13/20 0929  82  (!) 166/84   
10/13/20 0913  81  (!) 172/89   
10/13/20 0858  81  (!) 172/88   
10/13/20 0843  76  (!) 168/80  Oxygen Therapy O2 Sat (%): 100 % (10/14/20 0743) Pulse via Oximetry: 63 beats per minute (10/14/20 0743) O2 Device: Room air (10/14/20 0743) Estimated body mass index is 21.59 kg/m² as calculated from the following: 
  Height as of this encounter: 6' (1.829 m). Weight as of this encounter: 72.2 kg (159 lb 2.8 oz). Intake/Output Summary (Last 24 hours) at 10/14/2020 1532 Last data filed at 10/14/2020 8347 Gross per 24 hour Intake 1623.05 ml Output 1825 ml Net -201.95 ml *Note that automatically entered I/Os may not be accurate; dependent on patient compliance with collection and accurate  by [employer]. General:    Elderly, no distress, Alert. CV:   RRR. No murmur, rub, or gallop. LE wrapped Lungs:   CTAB. No wheezing, rhonchi, or rales. anterior Abdomen:   Soft, nontender, nondistended. Present BS Neuro:  No gross focal deficits, alert, verbal 
 
Data Reviewed: 
I have reviewed all labs, meds, and studies from the last 24 hours: 
Recent Results (from the past 24 hour(s)) GLUCOSE, POC  
 Collection Time: 10/13/20  9:38 AM  
Result Value Ref Range Glucose (POC) 176 (H) 65 - 100 mg/dL Giovanna Calvin Collection Time: 10/13/20  9:43 AM  
Result Value Ref Range Glucose 176 mg/dL Insulin order 8.1 units/hour Insulin adminstered 8.1 units/hour Multiplier 0.070 Low target 150 mg/dL High target 250 mg/dL D50 order 0.0 ml  
 D50 administered 0.00 ml Minutes until next BG 60 min Order initials BKS Administered initials BKS GLSCOM Comments GLUCOSE, POC Collection Time: 10/13/20 10:53 AM  
Result Value Ref Range Glucose (POC) 89 65 - 100 mg/dL Giovanna Sand Collection Time: 10/13/20 10:54 AM  
Result Value Ref Range Glucose 89 mg/dL Insulin order 1.6 units/hour Insulin adminstered 1.6 units/hour Multiplier 0.056 Low target 150 mg/dL High target 250 mg/dL D50 order 0.0 ml  
 D50 administered 0.00 ml Minutes until next BG 60 min Order initials BKS Administered initials BKS GLSCOM Comments METABOLIC PANEL, BASIC Collection Time: 10/13/20 11:22 AM  
Result Value Ref Range Sodium 140 136 - 145 mmol/L Potassium 3.1 (L) 3.5 - 5.1 mmol/L Chloride 110 (H) 98 - 107 mmol/L  
 CO2 12 (L) 21 - 32 mmol/L Anion gap 18 (H) 7 - 16 mmol/L Glucose 51 (L) 65 - 100 mg/dL  (H) 8 - 23 MG/DL Creatinine 11.90 (H) 0.8 - 1.5 MG/DL  
 GFR est AA 5 (L) >60 ml/min/1.73m2 GFR est non-AA 4 (L) >60 ml/min/1.73m2 Calcium 6.8 (L) 8.3 - 10.4 MG/DL  
HEMOGLOBIN Collection Time: 10/13/20 11:22 AM  
Result Value Ref Range HGB 8.1 (L) 13.6 - 17.2 g/dL PHOSPHORUS Collection Time: 10/13/20 11:22 AM  
Result Value Ref Range Phosphorus 4.6 (H) 2.3 - 3.7 MG/DL MAGNESIUM Collection Time: 10/13/20 11:22 AM  
Result Value Ref Range Magnesium 2.4 1.8 - 2.4 mg/dL GLUCOSE, POC Collection Time: 10/13/20 11:54 AM  
Result Value Ref Range Glucose (POC) 42 (L) 65 - 100 mg/dL Giovanna Calvin  
 Collection Time: 10/13/20 11:56 AM  
Result Value Ref Range Glucose 42 mg/dL Insulin order 0.0 units/hour Insulin adminstered 0.0 units/hour Multiplier 0.045 Low target 150 mg/dL High target 250 mg/dL D50 order 23.0 ml  
 D50 administered 23.00 ml Minutes until next BG 15 min Order initials LM Administered initials LM   
 GLSCOM Comments GLUCOSE, POC Collection Time: 10/13/20 12:13 PM  
Result Value Ref Range Glucose (POC) 68 65 - 100 mg/dL [facility] Collection Time: 10/13/20 12:14 PM  
Result Value Ref Range Glucose 68 mg/dL Insulin order 0.0 units/hour Insulin adminstered 0.0 units/hour Multiplier 0.035 Low target 150 mg/dL High target 250 mg/dL D50 order 13.0 ml  
 D50 administered 13.00 ml Minutes until next BG 15 min Order initials LM Administered initials LM   
 GLSCOM Comments GLUCOSE, POC Collection Time: 10/13/20 12:34 PM  
Result Value Ref Range Glucose (POC) 98 65 - 100 mg/dL [facility] Collection Time: 10/13/20 12:38 PM  
Result Value Ref Range Glucose 98 mg/dL Insulin order 1.0 units/hour Insulin adminstered 1.0 units/hour Multiplier 0.025 Low target 150 mg/dL High target 250 mg/dL D50 order 0.0 ml  
 D50 administered 0.00 ml Minutes until next BG 60 min Order initials BKS Administered initials BKS GLSCOM Comments Insulin held until MD responds with orders GLUCOSE, POC Collection Time: 10/13/20  1:50 PM  
Result Value Ref Range Glucose (POC) 60 (L) 65 - 100 mg/dL [facility] Collection Time: 10/13/20  1:51 PM  
Result Value Ref Range Glucose 60 mg/dL Insulin order 0.0 units/hour Insulin adminstered 0.0 units/hour Multiplier 0.015 Low target 150 mg/dL High target 250 mg/dL D50 order 16.0 ml  
 D50 administered 16.00 ml Minutes until next BG 15 min Order initials sejal Administered initials sejal GLSCOM Comments GLUCOSE, POC Collection Time: 10/13/20  2:10 PM  
Result Value Ref Range Glucose (POC) 118 (H) 65 - 100 mg/dL María Armstrong Collection Time: 10/13/20  2:10 PM  
Result Value Ref Range Glucose 118 mg/dL Insulin order 0.3 units/hour Insulin adminstered 0.3 units/hour Multiplier 0.005 Low target 150 mg/dL High target 250 mg/dL D50 order 0.0 ml  
 D50 administered 0.00 ml Minutes until next BG 60 min Order initials sejal Administered initials sejal GLSCOM Comments GLUCOSE, POC Collection Time: 10/13/20  2:44 PM  
Result Value Ref Range Glucose (POC) 101 (H) 65 - 100 mg/dL URINALYSIS W/ RFLX MICROSCOPIC Collection Time: 10/13/20  2:50 PM  
Result Value Ref Range Color STRAW Appearance CLEAR Specific gravity 1.010 1.001 - 1.023    
 pH (UA) 5.5 5.0 - 9.0 Protein 100 (A) NEG mg/dL Glucose 100 (A) NEG mg/dL Ketone Negative NEG mg/dL Bilirubin Negative NEG Blood TRACE (A) NEG Urobilinogen 0.2 0.2 - 1.0 EU/dL Nitrites Positive (A) NEG Leukocyte Esterase SMALL (A) NEG    
 WBC 0-3 0 /hpf  
 RBC 0-3 0 /hpf Epithelial cells 0-3 0 /hpf Bacteria TRACE 0 /hpf Other observations RESULTS VERIFIED MANUALLY METABOLIC PANEL, BASIC Collection Time: 10/13/20  2:50 PM  
Result Value Ref Range Sodium 139 136 - 145 mmol/L Potassium 3.2 (L) 3.5 - 5.1 mmol/L Chloride 110 (H) 98 - 107 mmol/L  
 CO2 13 (L) 21 - 32 mmol/L Anion gap 16 7 - 16 mmol/L Glucose 89 65 - 100 mg/dL  (H) 8 - 23 MG/DL Creatinine 11.50 (H) 0.8 - 1.5 MG/DL  
 GFR est AA 6 (L) >60 ml/min/1.73m2 GFR est non-AA 5 (L) >60 ml/min/1.73m2 Calcium 6.5 (L) 8.3 - 10.4 MG/DL MAGNESIUM Collection Time: 10/13/20  2:50 PM  
Result Value Ref Range Magnesium 2.5 (H) 1.8 - 2.4 mg/dL PHOSPHORUS Collection Time: 10/13/20  2:50 PM  
Result Value Ref Range  Phosphorus 4.7 (H) 2.3 - 3.7 MG/DL  
LACTIC ACID  
 Collection Time: 10/13/20  2:56 PM  
Result Value Ref Range Lactic acid 1.7 0.4 - 2.0 MMOL/L  
GLUCOSTABILIZER Collection Time: 10/13/20  3:11 PM  
Result Value Ref Range Glucose 101 mg/dL Insulin order 0.0 units/hour Insulin adminstered 0.0 units/hour Multiplier 0.000 Low target 150 mg/dL High target 250 mg/dL D50 order 0.0 ml  
 D50 administered 0.00 ml Minutes until next BG 60 min Order initials BKS Administered initials BKS GLSCOM Comments GLUCOSE, POC Collection Time: 10/13/20  4:37 PM  
Result Value Ref Range Glucose (POC) 177 (H) 65 - 100 mg/dL GLUCOSE, POC Collection Time: 10/13/20  5:56 PM  
Result Value Ref Range Glucose (POC) 195 (H) 65 - 100 mg/dL HEMOGLOBIN Collection Time: 10/13/20  7:45 PM  
Result Value Ref Range HGB 8.5 (L) 13.6 - 17.2 g/dL METABOLIC PANEL, BASIC Collection Time: 10/13/20  7:45 PM  
Result Value Ref Range Sodium 138 136 - 145 mmol/L Potassium 3.6 3.5 - 5.1 mmol/L Chloride 110 (H) 98 - 107 mmol/L  
 CO2 11 (L) 21 - 32 mmol/L Anion gap 17 (H) 7 - 16 mmol/L Glucose 194 (H) 65 - 100 mg/dL  (H) 8 - 23 MG/DL Creatinine 11.40 (H) 0.8 - 1.5 MG/DL  
 GFR est AA 6 (L) >60 ml/min/1.73m2 GFR est non-AA 5 (L) >60 ml/min/1.73m2 Calcium 6.2 (L) 8.3 - 10.4 MG/DL MAGNESIUM Collection Time: 10/13/20  7:45 PM  
Result Value Ref Range Magnesium 2.1 1.8 - 2.4 mg/dL PHOSPHORUS Collection Time: 10/13/20  7:45 PM  
Result Value Ref Range Phosphorus 4.6 (H) 2.3 - 3.7 MG/DL  
GLUCOSE, POC Collection Time: 10/13/20  9:42 PM  
Result Value Ref Range Glucose (POC) 251 (H) 65 - 100 mg/dL METABOLIC PANEL, BASIC Collection Time: 10/13/20 10:40 PM  
Result Value Ref Range Sodium 138 136 - 145 mmol/L Potassium 3.8 3.5 - 5.1 mmol/L Chloride 112 (H) 98 - 107 mmol/L  
 CO2 10 (L) 21 - 32 mmol/L Anion gap 16 7 - 16 mmol/L Glucose 208 (H) 65 - 100 mg/dL  (H) 8 - 23 MG/DL Creatinine 11.20 (H) 0.8 - 1.5 MG/DL  
 GFR est AA 6 (L) >60 ml/min/1.73m2 GFR est non-AA 5 (L) >60 ml/min/1.73m2 Calcium 6.4 (L) 8.3 - 10.4 MG/DL MAGNESIUM Collection Time: 10/13/20 11:27 PM  
Result Value Ref Range Magnesium 2.4 1.8 - 2.4 mg/dL HEMOGLOBIN Collection Time: 10/13/20 11:27 PM  
Result Value Ref Range HGB 9.7 (L) 13.6 - 17.2 g/dL PHOSPHORUS Collection Time: 10/13/20 11:27 PM  
Result Value Ref Range Phosphorus 4.7 (H) 2.3 - 3.7 MG/DL  
METABOLIC PANEL, BASIC Collection Time: 10/14/20  2:43 AM  
Result Value Ref Range Sodium 140 136 - 145 mmol/L Potassium 3.7 3.5 - 5.1 mmol/L Chloride 111 (H) 98 - 107 mmol/L  
 CO2 12 (L) 21 - 32 mmol/L Anion gap 17 (H) 7 - 16 mmol/L Glucose 112 (H) 65 - 100 mg/dL  (H) 8 - 23 MG/DL Creatinine 11.40 (H) 0.8 - 1.5 MG/DL  
 GFR est AA 6 (L) >60 ml/min/1.73m2 GFR est non-AA 5 (L) >60 ml/min/1.73m2 Calcium 6.4 (L) 8.3 - 10.4 MG/DL  
CALCIUM, IONIZED Collection Time: 10/14/20  2:43 AM  
Result Value Ref Range Calcium, ionized 3.52 (L) 4.0 - 5.2 mg/dL MAGNESIUM Collection Time: 10/14/20  2:43 AM  
Result Value Ref Range Magnesium 2.2 1.8 - 2.4 mg/dL PHOSPHORUS Collection Time: 10/14/20  2:43 AM  
Result Value Ref Range Phosphorus 4.5 (H) 2.3 - 3.7 MG/DL  
GLUCOSE, POC Collection Time: 10/14/20  7:31 AM  
Result Value Ref Range Glucose (POC) 86 65 - 100 mg/dL Current Meds: 
Current Facility-Administered Medications Medication Dose Route Frequency  cloNIDine (CATAPRES) 0.3 mg/24 hr patch 1 Patch  1 Patch TransDERmal Q7D  
 ferrous sulfate tablet 325 mg  325 mg Oral BID WITH MEALS  
 hydrALAZINE (APRESOLINE) tablet 100 mg  100 mg Oral TID  metoprolol succinate (TOPROL-XL) XL tablet 50 mg  50 mg Oral 7am  
 NIFEdipine ER (PROCARDIA XL) tablet 30 mg  30 mg Oral DAILY  pantoprazole (PROTONIX) tablet 40 mg  40 mg Oral ACB  pregabalin (LYRICA) capsule 150 mg  150 mg Oral BID  pravastatin (PRAVACHOL) tablet 40 mg  40 mg Oral QHS  sevelamer carbonate (RENVELA) tab 1,600 mg  1,600 mg Oral TID WITH MEALS  sodium bicarbonate tablet 650 mg  650 mg Oral TID  lipase-protease-amylase (ZENPEP 20,000) capsule 4 Cap  4 Cap Oral TID WITH MEALS  colestipoL (COLESTID) tablet 1 g (Patient Supplied)  1 g Oral BID  hydrALAZINE (APRESOLINE) 20 mg/mL injection 20 mg  20 mg IntraVENous Q2H PRN  
 insulin lispro (HUMALOG) injection   SubCUTAneous AC&HS  heparin (porcine) injection 5,000 Units  5,000 Units SubCUTAneous Q12H  
 insulin glargine (LANTUS) injection 10 Units  10 Units SubCUTAneous DAILY  dextrose 40% (GLUTOSE) oral gel 1 Tube  15 g Oral PRN  
 glucagon (GLUCAGEN) injection 1 mg  1 mg IntraMUSCular PRN  
 dextrose (D50W) injection syrg 12.5-25 g  25-50 mL IntraVENous PRN  
 sodium chloride (NS) flush 5-40 mL  5-40 mL IntraVENous Q8H  
 sodium chloride (NS) flush 5-40 mL  5-40 mL IntraVENous PRN  
 acetaminophen (TYLENOL) tablet 650 mg  650 mg Oral Q6H PRN Or  
 acetaminophen (TYLENOL) suppository 650 mg  650 mg Rectal Q6H PRN  polyethylene glycol (MIRALAX) packet 17 g  17 g Oral DAILY PRN  
 ondansetron (ZOFRAN) injection 4 mg  4 mg IntraVENous Q6H PRN Other Studies: No results found for this visit on 10/12/20. No results found. All Micro Results None SARS-CoV-2 Lab Results \"Novel Coronavirus\" Test: No results found for: COV2NT \"Emergent Disease\" Test: No results found for: EDPR \"SARS-COV-2\" Test: No results found for: XGCOVT Rapid Test:  
Lab Results Component Value Date/Time COVR Not detected 07/28/2020 04:30 PM  
 
  
 
 
Assessment and Plan:  
 
Hospital Problems as of 10/14/2020 Date Reviewed: 7/23/2020 Codes Class Noted - Resolved POA Long Q-T syndrome ICD-10-CM: I45.81 ICD-9-CM: 426.82  10/13/2020 - Present Unknown Hypomagnesemia ICD-10-CM: F40.36 
ICD-9-CM: 275.2  10/13/2020 - Present Unknown Non compliance w medication regimen ICD-10-CM: Z91.14 
ICD-9-CM: V15.81  10/13/2020 - Present Unknown * (Principal) DKA (diabetic ketoacidoses) ([facility] ICD-10-CM: E11.10 ICD-9-CM: 250.12  8/27/2020 - Present Unknown Hyperphosphatemia ICD-10-CM: E83.39 
ICD-9-CM: 275.3  8/27/2020 - Present Yes Metabolic acidosis, increased anion gap ICD-10-CM: E87.2 ICD-9-CM: 276.2  8/27/2020 - Present Yes ALICJA (acute kidney injury) ([facility] ICD-10-CM: N17.9 ICD-9-CM: 584.9  7/26/2020 - Present Yes Functional quadriplegia (HCC) ICD-10-CM: R53.2 ICD-9-CM: 780.72  7/26/2020 - Present Yes Hyperkalemia ICD-10-CM: E87.5 ICD-9-CM: 276.7  7/26/2020 - Present Yes Dementia without behavioral disturbance (HCC) (Chronic) ICD-10-CM: F03.90 ICD-9-CM: 294.20  4/19/2018 - Present Yes Seizure ([facility] ICD-10-CM: R56.9 ICD-9-CM: 780.39  4/18/2018 - Present Yes Hypercholesterolemia ICD-10-CM: E78.00 ICD-9-CM: 272.0  Unknown - Present Yes Uncontrolled diabetes mellitus, with long-term current use of insulin (HCC) (Chronic) ICD-10-CM: E11.65, Z79.4 ICD-9-CM: 250.02, V58.67  2/28/2018 - Present Yes (HFpEF) heart failure with preserved ejection fraction (HCC) ICD-10-CM: I50.30 ICD-9-CM: 428.9  9/23/2016 - Present Yes 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 
ICD-9-CM: 250.40, 583.81  9/22/2016 - Present Yes HTN (hypertension) ICD-10-CM: I10 
ICD-9-CM: 401.9  9/22/2016 - Present Yes GERD (gastroesophageal reflux disease) ICD-10-CM: K21.9 ICD-9-CM: 530.81  9/22/2016 - Present Yes Chronic pancreatitis (HCC) (Chronic) ICD-10-CM: K86.1 ICD-9-CM: 956.1  9/22/2016 - Present Yes Anemia of chronic disease ICD-10-CM: D63.8 ICD-9-CM: 285.29  9/22/2016 - Present Yes CKD (chronic kidney disease) stage 5, GFR less than 15 ml/min ([facility]) ICD-10-CM: N18.5 ICD-9-CM: 585.5  9/21/2016 - Present Yes Plan: · DM2, with DKA and persistent metabolic acidosis: · Stopped insulin drip and resumed lantus and SSI · Plan was for when BMP showed a closed anion gap then would  transfer to floor, however he has a persistent metabolic acidosis and his bicarb is chronically low despite oral replacement, I discussed his acid -base balance with nephrology and there is a component of acidosis contributed by progressive renal failure, ABG today shows persistent metabolic acidosis. I discussed with his son Sean Chavez via phone who understands patients poor overall prognosis especially if he chooses to avoid dialysis. Sean Chavez states patient receives [employer] and has declined hospice in the past since he gets more assistance from [employer] than he could from hospice. Patient refuses to live with Sean Chavez and lives alone and is noncompliant and not in the best living situation. Sean Chavez is interested in placement for his father, though he feels patient would decline going to a SNF. He has been seen by palliative care 8,2020 and goals were established as a DNI. Will consult  Case management for discharge needs as this is complicated. I explained to Sean Chavez that patient will continue to decline due to progressive renal failure and that he likely would have further admits for acute declines and that hospice is appropriate at this time · ALICJA on CKD5: 
· nephro consulted and continues to decline HD 
· On chronic bicarb replacement · LE wounds: · Wound care · Hypokalemia: 
· Replaced and repeat lab · Anemia: 
· Repeated stable · Trend labs · HTN: 
· Resumed clonidine, hydralazine, metoprolol, procardia · Hypocalcemia: 
· followup labs · Metabolic encephalopathy: 
· Prior records notes some intermittent confusion DC planning/Dispo:  Pending to home Diet:  DIET CARDIAC 
DVT ppx:  heparin Signed: Bossman Longoria MD

## 2020-10-14 NOTE — PROGRESS NOTES
10/14/20 1827 Dual Skin Pressure Injury Assessment Dual Skin Pressure Injury Assessment WDL Second Care Provider (Based on [address] [place] [address]) [facility] RN Skin Integumentary Skin Integumentary (WDL) X Skin Color Appropriate for ethnicity Skin Condition/Temp Warm;Dry;Fragile Skin Integrity Wound (add Wound LDA); Scars (comment); Tattoos (comment) (bilateral lower legs, raquel knee scars/abd) Turgor Epidermis thin w/ loss of subcut tissue

## 2020-10-14 NOTE — PROGRESS NOTES
Problem: Patient Education: Go to Patient Education Activity Goal: Patient/Family Education Description: 1. Patient will complete lower body bathing and dressing with min A and adaptive equipment as needed. 2. Patient will complete toileting with min A.  
3. Patient will tolerate 25 minutes of OT treatment with 1-2 rest breaks to increase activity tolerance for ADLs. 4. Patient will complete functional transfers with min A and adaptive equipment as needed. 5. Patient will complete functional mobility for household distance with mod A and adaptive equipment as needed. 6. Patient will complete functional activity while sitting edge of bed with CGA and adaptive equipment as needed. Timeframe: 7 visits Outcome: Progressing Towards Goal 
 
 
OCCUPATIONAL THERAPY: Initial Assessment, Daily Note and AM 10/14/2020 INPATIENT: OT Visit Days: 1 Payor: [address] [place] [address],9D / Plan: [facility] HMO / Product Type: Managed Care Medicare /  
  
NAME/AGE/GENDER: Tristan Elizondo is a 79 y.o. male PRIMARY DIAGNOSIS:  DKA (diabetic ketoacidoses) ([facility] [E11.10] DKA (diabetic ketoacidoses) ([facility] DKA (diabetic ketoacidoses) ([facility] ICD-10: Treatment Diagnosis:  
 · Generalized Muscle Weakness (M62.81) · Difficulty in walking, Not elsewhere classified (R26.2) Precautions/Allergies: 
  fall precautions Patient has no known allergies. ASSESSMENT:  
 
Mr. Estela Castellano presents in the ICU for DKA. Upon arrival, pt supine in bed and agreeable to OT evaluation. Pt is alert and oriented x 4. Pt reports living alone with home care staff coming in for ADL and household assistance. Endorses use of rollator for functional mobility. Today, pt presents with deficits in overall strength, activity tolerance, ADL performance, and functional mobility.  Co-treatment completed with PT today d/t pt's increased need for physical assistance during ADLs and functional mobility. Prior to mobility, pt noted to have large bowel movement. Rolling completed with max A to facilitate bowel hygiene. Total A required for cecilia-care this session. Upon completion, pt transitioned to sitting edge of bed with mod A x 2. Static and dynamic sitting balance is fair with close CGA. Pt stood with min to mod A x 2 and completed functional mobility with min A x 2 x RW. Fair (-) dynamic standing balance with constant support required despite use of RW. Upon completion, pt ambulated back to bed and returned back to bed with mod A x 2. Pt left with needs met, call light within reach, and RN notified. At this time, Nely Ashley is functioning below baseline for ADLs and functional mobility. Pt would benefit from skilled OT services to address OT goals and plan of care. At this time, patient is appropriate for Co-treatment with physical  therapy due to patient's clinical complexity, decreased overall endurance/tolerance levels, as well as need for high level assistance and cues and intervention to complete functional transfers/mobility and functional tasks in safe manner. Nely Ashley is appropriate for a multidisciplinary co-treatment of PT and OT to address goals of both disciplines. This section established at most recent assessment PROBLEM LIST (Impairments causing functional limitations): 1. Decreased Strength 2. Decreased ADL/Functional Activities 3. Decreased Transfer Abilities 4. Decreased Ambulation Ability/Technique 5. Decreased Balance 6. Increased Pain 7. Decreased Activity Tolerance INTERVENTIONS PLANNED: (Benefits and precautions of occupational therapy have been discussed with the patient.) 1. Activities of daily living training 2. Adaptive equipment training 3. Balance training 4. Clothing management 5. Donning&doffing training 6. Neuromuscular re-eduation 7. Re-evaluation 8. Therapeutic activity 9. Therapeutic exercise TREATMENT PLAN: Frequency/Duration: Follow patient 3x/week to address above goals. Rehabilitation Potential For Stated Goals: Fair REHAB RECOMMENDATIONS (at time of discharge pending progress):   
Placement: It is my opinion, based on this patient's performance to date, that Mr. Inna Newman may benefit from intensive therapy at a [address] [place] [address] after discharge due to the functional deficits listed above that are likely to improve with skilled rehabilitation and concerns that he/she may be unsafe to be unsupervised at home due to decline in ability to safely perform ADLs and functional mobility. . 
Equipment: ? TBD  
    
 
 
 
OCCUPATIONAL PROFILE AND HISTORY:  
History of Present Injury/Illness (Reason for Referral): 
See H&P Past Medical History/Comorbidities:  
Mr. Inna Newman  has a past medical history of Acute renal failure superimposed on stage 3 chronic kidney disease ([facility] (9/21/2016), Anemia, Arthritis, Chronic kidney disease, Chronic pancreatitis ([facility] [facility] (9/22/2016), Dermatophytosis of nail (12/6/2016), Diabetic neuropathy ([address] (9/22/2016), Essential hypertension (9/22/2016), GERD (gastroesophageal reflux disease), Hypercholesterolemia, Iron (Fe) deficiency anemia (9/22/2016), Septicemia due to Klebsiella pneumoniae ([facility] [address] [facility] (3/13/1668), Systolic CHF, chronic ([address] (9/23/2016), Type II diabetes mellitus with nephropathy ([facility] (09/22/2016), Venous insufficiency (12/6/2016), and Xerosis cutis (12/6/2016). He also has no past medical history of Congestive heart failure, unspecified. Mr. Inna Newman  has a past surgical history that includes hx hernia repair (Left, 2010); hx colonoscopy; hx turp (2012); hx prostatectomy (2012); vascular surgery procedure unlist (Left, 10/26/2017); and vascular surgery procedure unlist (Left, 09/18/2019). Social History/Living Environment:  
Home Environment: Private residence One/Two Story Residence: One story Living Alone:  Yes 
 Support Systems: Home care staff, Family member(s) Patient Expects to be Discharged to[de-identified] Unknown Current DME Used/Available at Home: Billy Moreno, rollator Prior Level of Function/Work/Activity: 
Assist with ADLs; uses rollator for functional mobility; has home care staff to assist with ADLs and household tasks. Personal Factors:   
      Sex:  male Age:  79 y.o. Other factors that influence how disability is experienced by the patient:  Multiple co-morbidities Number of Personal Factors/Comorbidities that affect the Plan of Care: Brief history (0):  LOW COMPLEXITY ASSESSMENT OF OCCUPATIONAL PERFORMANCE[de-identified]  
Activities of Daily Living:  
Basic ADLs (From Assessment) Complex ADLs (From Assessment) Feeding: Minimum assistance Oral Facial Hygiene/Grooming: Minimum assistance Bathing: Maximum assistance Upper Body Dressing: Minimum assistance Lower Body Dressing: Total assistance Toileting: Total assistance Instrumental ADL Meal Preparation: Total assistance Homemaking: Total assistance Grooming/Bathing/Dressing Activities of Daily Living Cognitive Retraining Safety/Judgement: Fall prevention Bed/Mat Mobility Rolling: Moderate assistance;Maximum assistance Supine to Sit: Moderate assistance Sit to Supine: Moderate assistance;Assist x2 Sit to Stand: Minimum assistance;Assist x2; Moderate assistance Stand to Sit: Minimum assistance; Moderate assistance;Assist x2 Scooting: Moderate assistance Most Recent Physical Functioning:  
Gross Assessment: 
AROM: Generally decreased, functional 
PROM: Generally decreased, functional 
Strength: Generally decreased, functional 
Coordination: Generally decreased, functional 
Tone: Normal 
         
  
Posture: 
Posture (WDL): Exceptions to [facility] Posture Assessment: Forward head, Kyphosis, Rounded shoulders Balance: 
Sitting: Impaired Sitting - Static: Fair (occasional) Sitting - Dynamic: Fair (occasional) Standing: Impaired Standing - Static: Constant support Standing - Dynamic : Poor Bed Mobility: 
Rolling: Moderate assistance;Maximum assistance Supine to Sit: Moderate assistance Sit to Supine: Moderate assistance;Assist x2 Scooting: Moderate assistance Wheelchair Mobility: 
  
Transfers: 
Sit to Stand: Minimum assistance;Assist x2; Moderate assistance Stand to Sit: Minimum assistance; Moderate assistance;Assist x2 Patient Vitals for the past 6 hrs: 
 BP BP Patient Position SpO2 Pulse 10/14/20 0859 (!) 163/106  98 % 75  
10/14/20 0927 (!) 156/81  98 % 71  
10/14/20 0958 (!) 160/96  100 % 69  
10/14/20 1104 (!) 169/87  99 % 82  
10/14/20 1211 (!) 147/87 At rest 98 % 72  
10/14/20 1303 128/78  98 % 82  
10/14/20 1403 122/72  100 % 76 Mental Status Neurologic State: Alert Orientation Level: Oriented to person, Oriented to place Cognition: Follows commands, Decreased attention/concentration Perception: Appears intact Perseveration: No perseveration noted Safety/Judgement: Fall prevention Physical Skills Involved: 
1. Balance 2. Strength 3. Activity Tolerance 4. Gross Motor Control Cognitive Skills Affected (resulting in the inability to perform in a timely and safe manner): 1. Perception 2. Executive Function 3. Immediate Memory Psychosocial Skills Affected: 1. Habits/Routines 2. Environmental Adaptation Number of elements that affect the Plan of Care: 5+:  HIGH COMPLEXITY CLINICAL DECISION MAKING:  
[employer] AM-PAC 6 Clicks Daily Activity Inpatient Short Form How much help from another person does the patient currently need. .. Total A Lot A Little None 1. Putting on and taking off regular lower body clothing? [x] 1   [] 2   [] 3   [] 4  
2. Bathing (including washing, rinsing, drying)? [] 1   [x] 2   [] 3   [] 4  
3.   Toileting, which includes using toilet, bedpan or urinal?   [x] 1   [] 2   [] 3   [] 4  
 4.  Putting on and taking off regular upper body clothing? [] 1   [] 2   [x] 3   [] 4  
5. Taking care of personal grooming such as brushing teeth? [] 1   [] 2   [x] 3   [] 4  
6. Eating meals? [] 1   [] 2   [x] 3   [] 4  
© 2007, Trustees of [address] [place] [address] [address] [id number], under license to [employer]. All rights reserved Score:  Initial: 13 Most Recent: X (Date: -- ) Interpretation of Tool:  Represents activities that are increasingly more difficult (i.e. Bed mobility, Transfers, Gait). Medical Necessity:    
· Patient demonstrates fair rehab potential due to higher previous functional level. Reason for Services/Other Comments: 
· Patient continues to require skilled intervention due to medical complications and patient unable to attend/participate in therapy as expected. Use of outcome tool(s) and clinical judgement create a POC that gives a: LOW COMPLEXITY  
 
 
 
TREATMENT:  
(In addition to Assessment/Re-Assessment sessions the following treatments were rendered) Pre-treatment Symptoms/Complaints:  \"I hurt all over but my mouth is hurting the most right now. \" 
Pain: Initial:  
Pain Intensity 1: 6 Pain Location 1: Generalized Pain Intervention(s) 1: Emotional support  Post Session:  Same (RN aware) Self Care: (15 minutes): Procedure(s) (per grid) utilized to improve and/or restore self-care/home management as related to bathing, toileting and grooming. Required minimal verbal and   cueing to facilitate activities of daily living skills. Total A required for bowel hygiene. Min A for donning new gown. Neuromuscular Re-education: (  10 minutes):  Exercise/activities per grid below to improve balance, coordination and posture. Required moderate verbal and tactile cues to promote static and dynamic balance in sitting and standing. Mod A x 2 for functional mobility; min A x 2 x RW for functional mobility; fair (-) dynamic standing balance with verbal cues for facilitation. Braces/Orthotics/Lines/Etc:  
· ICU monitors · O2 Device: Room air Treatment/Session Assessment:   
· Response to Treatment:  Tolerated well with no issues noted. · Interdisciplinary Collaboration:  
o Physical Therapist 
o Occupational Therapist 
o Registered Nurse · After treatment position/precautions:  
o Supine in bed 
o Bed/Chair-wheels locked 
o Bed in low position 
o Call light within reach 
o RN notified · Compliance with Program/Exercises: Will assess as treatment progresses. · Recommendations/Intent for next treatment session: \"Next visit will focus on advancements to more challenging activities and reduction in assistance provided\". Total Treatment Duration: OT Patient Time In/Time Out Time In: 1030 Time Out: 4101 Cheryle Dykes, OT

## 2020-10-14 NOTE — PROGRESS NOTES
[facility] Subjective: CKD5  Pt resting comfortably in bed. Review of Systems -  
General ROS: negative for - fever, chills Respiratory ROS: no SOB, cough, GARNICA Cardiovascular ROS: no CP, palpitations Gastrointestinal ROS: no N&V, abdominal pain, diarrhea Genito-Urinary ROS: no difficulty voiding, dysuria Neurological ROS: no seizures, focal weekness Objective: 
 
Vitals:  
 10/14/20 0927 10/14/20 0958 10/14/20 1104 10/14/20 1211 BP: (!) 156/81 (!) 160/96 (!) 169/87 (!) 147/87 Pulse: 71 69 82 72 Resp:    20 Temp:    97.2 °F (36.2 °C) SpO2: 98% 100% 99% 98% Weight:      
Height:      
 
 
PE 
Gen: in no acute distress CV:reg rate Chest:clear Abd: soft Ext/Access: no edema Ricka Distad LAB Recent Labs 10/13/20 
2327 10/13/20 
1945 10/13/20 
1122  10/12/20 
2127 WBC  --   --   --   --  5.9 HGB 9.7* 8.5* 8.1*   < > 7.3* HCT  --   --   --   --  26.1*  
PLT  --   --   --   --  184  
 < > = values in this interval not displayed. Recent Labs 10/14/20 
0243 10/13/20 
2327 10/13/20 
2240 10/13/20 
1945  10/13/20 
0453  10/12/20 
2127   --  138 138   < > 135*   < > 128* K 3.7  --  3.8 3.6   < > 3.3*   < > 4.7 *  --  112* 110*   < > 107   < > 98  
CO2 12*  --  10* 11*   < > 8*   < > 11* *  --  208* 194*   < > 592*   < > 1,243* *  --  117* 121*   < > 108*   < > 124* CREA 11.40*  --  11.20* 11.40*   < > 11.00*   < > 12.60* MG 2.2 2.4  --  2.1   < > 2.5*   < > 1.4* PHOS 4.5* 4.7*  --  4.6*   < > 4.8*   < > 7.1*  
CA 6.4*  --  6.4* 6.2*   < > 6.4*   < > 5.2* ALB  --   --   --   --   --  2.2*  --  2.7* TBILI  --   --   --   --   --  0.2  --  0.3 ALT  --   --   --   --   --  14  --  18  
 < > = values in this interval not displayed. Radiology A/P:  
Patient Active Problem List  
Diagnosis Code  CKD (chronic kidney disease) stage 5, GFR less than 15 ml/min ([facility]) N18.5  Septicemia due to Klebsiella pneumoniae ([facility] A41.4  Type II diabetes mellitus with nephropathy ([facility]) E11.21  
 HTN (hypertension) I10  
 GERD (gastroesophageal reflux disease) K21.9  Diabetic neuropathy ([facility]) E11.40  Chronic pancreatitis ([facility] K86.1  Anemia of chronic disease D63.8  
 (HFpEF) heart failure with preserved ejection fraction ([facility]) I50.30  Venous insufficiency I87.2  Venous stasis ulcer of left lower extremity ([facility]) I83.029, L97.929  
 Stasis edema of both lower extremities I87.303  Cellulitis of left lower leg L03. 116  
 Onychomycosis B35.1  Controlled type 2 diabetes mellitus with complication, with long-term current use of insulin ([facility]) E11.8, Z79.4  Arteriovenous fistula ([facility]) I77.0  Hypercholesterolemia E78.00  
 Uncontrolled diabetes mellitus, with long-term current use of insulin ([facility]) E11.65, Z79.4  Carotid bruit R09.89  Cauda equina compression ([facility]) G83.4  Volume overload E87.70  Chest pain R07.9  Acute metabolic encephalopathy W88.09  
 Hypernatremia E87.0  Seizure ([facility] R56.9  Dementia without behavioral disturbance ([facility]) F03.90  Renal failure (ARF), acute on chronic ([facility]) N17.9, N18.9  Rhabdomyolysis A75.40  
 Metabolic acidosis Y42.4  ESRD (end stage renal disease) ([facility] N18.6  Open wound of skin T14. Jamila Deis  DVT (deep venous thrombosis) ([facility]) I82.409  Cervical radiculopathy M54.12  Neuropathic pain M79.2  
 Encounter for medication management S93.437  Postural imbalance R29.3  Neck pain M54.2  Cervical strain S16. Evelene Phillips  Degenerative disc disease, cervical M50.30  ALICJA (acute kidney injury) ([facility] N17.9  Functional quadriplegia ([facility]) R53.2  Anemia D64.9  Hypocalcemia E83.51  
 Acute pain R52  Left shoulder pain M25.512  
 Hyperkalemia E87.5  UTI (urinary tract infection) N39.0  Wound infection T14. 8XXA, L08.9  DKA (diabetic ketoacidoses) ([facility]) E11.10  Hyperphosphatemia K90.85  
 Metabolic acidosis, increased anion gap E87.2  Long Q-T syndrome I45.81  
 Hypomagnesemia E83.42  
 Non compliance w medication regimen Z91.14 CKD5 -  Pt still is refusing dialysis. I have nothing further to offer him. Will sign off. Please call if we can be of assistance. DKA - improved Anemia CHF Rupinder Betts MD

## 2020-10-14 NOTE — PROGRESS NOTES
Problem: Falls - Risk of 
Goal: *Absence of Falls Description: Document Arsh Barrazaccasin Fall Risk and appropriate interventions in the flowsheet. Outcome: Progressing Towards Goal 
Note: Fall Risk Interventions: 
Mobility Interventions: Bed/chair exit alarm, Communicate number of staff needed for ambulation/transfer, Patient to call before getting OOB, Strengthening exercises (ROM-active/passive) Medication Interventions: Assess postural VS orthostatic hypotension, Bed/chair exit alarm, Evaluate medications/consider consulting pharmacy, Patient to call before getting OOB, Teach patient to arise slowly, Utilize gait belt for transfers/ambulation Elimination Interventions: Bed/chair exit alarm, Call light in reach, Patient to call for help with toileting needs, Toileting schedule/hourly rounds History of Falls Interventions: Bed/chair exit alarm, Consult care management for discharge planning, Evaluate medications/consider consulting pharmacy, Room close to nurse's station, Utilize gait belt for transfer/ambulation Problem: Patient Education: Go to Patient Education Activity Goal: Patient/Family Education Outcome: Progressing Towards Goal 
  
Problem: Pressure Injury - Risk of 
Goal: *Prevention of pressure injury Description: Document Santy Scale and appropriate interventions in the flowsheet. Outcome: Progressing Towards Goal 
Note: Pressure Injury Interventions: 
Sensory Interventions: Assess changes in LOC, Assess need for specialty bed, Avoid rigorous massage over bony prominences, Check visual cues for pain, Keep linens dry and wrinkle-free, Maintain/enhance activity level, Minimize linen layers, Monitor skin under medical devices, Pressure redistribution bed/mattress (bed type), Turn and reposition approx. every two hours (pillows and wedges if needed) Activity Interventions: Assess need for specialty bed, Pressure redistribution bed/mattress(bed type), PT/OT evaluation Mobility Interventions: Assess need for specialty bed, HOB 30 degrees or less, PT/OT evaluation, Pressure redistribution bed/mattress (bed type) Nutrition Interventions: Document food/fluid/supplement intake, Discuss nutritional consult with provider, Offer support with meals,snacks and hydration Friction and Shear Interventions: Apply protective barrier, creams and emollients, Foam dressings/transparent film/skin sealants, Minimize layers Problem: Patient Education: Go to Patient Education Activity Goal: Patient/Family Education Outcome: Progressing Towards Goal

## 2020-10-14 NOTE — PROGRESS NOTES
Night coverage: Received a message from the nurse if patient can come out of ICU. Patient was admitted with blood sugar greater than 1200 in last 24 hours and was on insulin drip. Insulin drip. As patient gap closed. His gap is getting worse now. Patient is AOx3 and able to eat. We will give him Lantus and will check his blood sugar in 4 hours. If It is increasing then we will place him back on insulin drip. Plan discussed with the nurse will page me once his BMP is back in 4 hours.

## 2020-10-14 NOTE — PROGRESS NOTES
Problem: Mobility Impaired (Adult and Pediatric) Goal: *Acute Goals and Plan of Care (Insert Text) Outcome: Progressing Towards Goal 
Note: STG: 
(1.)Mr. Kameron Zurita will move from supine to sit and sit to supine , scoot up and down, and roll side to side with CONTACT GUARD ASSIST within 3 treatment day(s). (2.)Mr. Kameron Zurita will transfer from bed to chair and chair to bed with MINIMAL ASSIST using the least restrictive device within 3 treatment day(s). (3.)Mr. Kameron Zurita will ambulate with MINIMAL ASSIST for 50 feet with the least restrictive device within 3 treatment day(s). (4.) will perform standing static and dynamic balance activities x 15 minutes with MINIMAL ASSIST to improve safety within 3 day(s). LTG: 
(1.)Mr. Kameron Zurita will move from supine to sit and sit to supine , scoot up and down, and roll side to side in bed with STAND BY ASSIST within 7 treatment day(s). (2.)Mr. Kameron Zurita will transfer from bed to chair and chair to bed with CONTACT GUARD ASSIST using the least restrictive device within 7 treatment day(s). (3.)Mr. Kameron Zurita will ambulate with CONTACT GUARD ASSIST for 50 feet with the least restrictive device within 7 treatment day(s). (4.)Mr. Kameron Zurita will perform standing static and dynamic balance activities x 15 minutes with CONTACT GUARD ASSIST to improve safety within 7 day(s). ________________________________________________________________________________________________ PHYSICAL THERAPY: Initial Assessment, Daily Note, and AM 10/14/2020 INPATIENT: PT Visit Days : 1 Payor: Selam Chance / Plan: [id number] HMO / Product Type: Managed Care Medicare /   
  
NAME/AGE/GENDER: Stacey Elder is a 79 y.o. male PRIMARY DIAGNOSIS: DKA (diabetic ketoacidoses) ([facility] [E11.10] DKA (diabetic ketoacidoses) ([facility] DKA (diabetic ketoacidoses) ([facility] ICD-10: Treatment Diagnosis:  
 · Generalized Muscle Weakness (M62.81) · Difficulty in walking, Not elsewhere classified (R26.2) Precaution/Allergies: 
Patient has no known allergies. ASSESSMENT:  
 
Mr. Curry Zavala is a 79 y.o. male admitted for DKA. Has extensive wounds on bilateral lower legs. Pt reports modified independence with limited household mobility using cane at baseline; does have CLTC aide 6 days/wk to assist with ADLs and other needs. Occasional falls. He is supine on contact and agreeable to physical therapy evaluation and treatment. Currently requiring maximal assist for rolling in bed for cecilia care with OT assist. He transferred to sitting with moderate assist and exhibited initially impaired balance which improved to CGA with cues for postural corrections. Treatment initiated to include sit to stand transfer and gait training x15' total with rolling walker and moderate assist x2. He fatigued quickly during gait and required moderate assist x2 to transfer back to supine. Aga Sierra is currently functioning below his baseline and would benefit from skilled PT during acute care stay to maximize safety and independence with functional mobility. At this time, patient is appropriate for Co-treatment with occupational therapy due to patient's decreased overall endurance/tolerance levels, as well as need for high level skilled assistance to complete functional transfers/mobility and functional tasks. Aga Sierra is appropriate for a multidisciplinary co-treatment of PT and OT to address goals of both disciplines. This section established at most recent assessment PROBLEM LIST (Impairments causing functional limitations): 1. Decreased Strength 2. Decreased ADL/Functional Activities 3. Decreased Transfer Abilities 4. Decreased Ambulation Ability/Technique 5. Decreased Balance 6. Increased Pain 7. Decreased Activity Tolerance 8. Increased Fatigue 9. Decreased Knowledge of Precautions 10.  Decreased [place] with Home Exercise Program 
 INTERVENTIONS PLANNED: (Benefits and precautions of physical therapy have been discussed with the patient.) 1. Balance Exercise 2. Bed Mobility 3. Family Education 4. Gait Training 5. Home Exercise Program (HEP) 6. Neuromuscular Re-education/Strengthening 7. Therapeutic Activites 8. Therapeutic Exercise/Strengthening 9. Transfer Training TREATMENT PLAN: Frequency/Duration: 3 times a week for duration of hospital stay Rehabilitation Potential For Stated Goals: Fair REHAB RECOMMENDATIONS (at time of discharge pending progress):   
Placement: It is my opinion, based on this patient's performance to date, that Mr. Deirdre Garcia may benefit from intensive therapy at a [facility] after discharge due to the functional deficits listed above that are likely to improve with skilled rehabilitation and concerns that he/she may be unsafe to be unsupervised at home due to requiring significant assist with all mobility. Tonye Cogan Equipment:  
? None at this time HISTORY:  
History of Present Injury/Illness (Reason for Referral): 
50-year-old male presenting [facility] Emergency Department from home after home CLTC aide noted the patient was hypertensive and hyperglycemic. Please review the patient's recent prior history and physical and discharge summaries as the patient has been admitted multiple times for multiple problems. Patient was recently admitted and discharged for DKA approximately 1 month ago. Patient is somnolent and a very poor historian secondary to advanced age and cognitive impairment. Patient does state that he ran out of his insulin approximately 2 days ago. Patient states that he took his home antihypertensives today. Patient denies fever, chills, nausea, vomiting, chest pain, shortness of breath, abdominal pain. Patient does have a history of cellulitis in bilateral lower extremities but states that he has no increased pain in his legs. In the emergency department the patient was noted to be markedly hypertensive to 234/95. Hemoglobin 7.3, corrected sodium 146, serum CO2 11, anion gap 19, , creatinine 12.6, blood sugar 1243, ionized calcium 3.12, magnesium 1.4, phosphorus 7.1. VBG 7.19/27/26/10. Patient was given 2 g of magnesium sulfate IV, 10 units of intravenous insulin and 20 mg of IV labetalol. Past Medical History/Comorbidities:  
Mr. Hedy Atwood  has a past medical history of Acute renal failure superimposed on stage 3 chronic kidney disease ([facility] (9/21/2016), Anemia, Arthritis, Chronic kidney disease, Chronic pancreatitis ([facility] (9/22/2016), Dermatophytosis of nail (12/6/2016), Diabetic neuropathy ([facility] (9/22/2016), Essential hypertension (9/22/2016), GERD (gastroesophageal reflux disease), Hypercholesterolemia, Iron (Fe) deficiency anemia (9/22/2016), Septicemia due to Klebsiella pneumoniae ([facility] (7/62/0839), Systolic CHF, chronic ([facility] (9/23/2016), Type II diabetes mellitus with nephropathy ([facility] (09/22/2016), Venous insufficiency (12/6/2016), and Xerosis cutis (12/6/2016). He also has no past medical history of Congestive heart failure, unspecified. Mr. Hedy Atwood  has a past surgical history that includes hx hernia repair (Left, 2010); hx colonoscopy; hx turp (2012); hx prostatectomy (2012); vascular surgery procedure unlist (Left, 10/26/2017); and vascular surgery procedure unlist (Left, 09/18/2019). Social History/Living Environment:  
Home Environment: Private residence One/Two Story Residence: One story Living Alone: Yes Support Systems: Home care staff, Family member(s) Patient Expects to be Discharged to[de-identified] Unknown Current DME Used/Available at Home: jhony Ortiz Prior Level of Function/Work/Activity: 
 Has extensive wounds on bilateral lower legs. Pt reports modified independence with limited household mobility using cane at baseline; does have CLTC aide 6 days/wk to assist with ADLs and other needs.  Occasional falls.  
  
Number of Personal Factors/Comorbidities that affect the Plan of Care: 3+: HIGH COMPLEXITY EXAMINATION:  
Most Recent Physical Functioning:  
Gross Assessment: 
AROM: Generally decreased, functional 
PROM: Generally decreased, functional 
Strength: Generally decreased, functional 
Coordination: Generally decreased, functional 
Tone: Normal 
         
  
Posture: 
Posture (WDL): Exceptions to [facility] Posture Assessment: Forward head, Kyphosis, Rounded shoulders Balance: 
Sitting: Impaired Sitting - Static: Fair (occasional) Sitting - Dynamic: Fair (occasional) Standing: Impaired Standing - Static: Constant support Standing - Dynamic : Poor Bed Mobility: 
Rolling: Moderate assistance;Maximum assistance Supine to Sit: Moderate assistance Sit to Supine: Moderate assistance;Assist x2 Scooting: Moderate assistance Wheelchair Mobility: 
  
Transfers: 
Sit to Stand: Minimum assistance;Assist x2; Moderate assistance Stand to Sit: Minimum assistance; Moderate assistance;Assist x2 Gait: 
  
Base of Support: Center of gravity altered;Narrowed Speed/Melva: Slow;Shuffled;Pace decreased (<100 feet/min) Step Length: Right shortened;Left shortened Gait Abnormalities: Decreased step clearance;Shuffling gait; Path deviations;Trunk sway increased Distance (ft): 15 Feet (ft) Assistive Device: Gait belt;Walker, rolling Ambulation - Level of Assistance: Minimal assistance;Assist x2 Interventions: Manual cues; Safety awareness training;Visual/Demos; Verbal cues Body Structures Involved: 1. Nerves 2. Heart 3. Lungs 4. Metabolic 5. Endocrine 6. Muscles Body Functions Affected: 1. Sensory/Pain 2. Cardio 3. Neuromusculoskeletal 
4. Movement Related 5. Metobolic/Endocrine Activities and Participation Affected: 1. Mobility 2. Self Care 3. Domestic Life 4. Interpersonal Interactions and Relationships 5. Community, Social and [place] Number of elements that affect the Plan of Care: 4+: HIGH COMPLEXITY CLINICAL PRESENTATION:  
Presentation: Evolving clinical presentation with changing clinical characteristics: MODERATE COMPLEXITY CLINICAL DECISION MAKIN [facility] [place] [address] [address] AM-PAC 6 Clicks Basic Mobility Inpatient Short Form How much difficulty does the patient currently have. .. Unable A Lot A Little None 1. Turning over in bed (including adjusting bedclothes, sheets and blankets)? [] 1   [x] 2   [] 3   [] 4  
2. Sitting down on and standing up from a chair with arms ( e.g., wheelchair, bedside commode, etc.)   [] 1   [x] 2   [] 3   [] 4  
3. Moving from lying on back to sitting on the side of the bed? [] 1   [x] 2   [] 3   [] 4 How much help from another person does the patient currently need. .. Total A Lot A Little None 4. Moving to and from a bed to a chair (including a wheelchair)? [] 1   [x] 2   [] 3   [] 4  
5. Need to walk in hospital room? [] 1   [x] 2   [] 3   [] 4  
6. Climbing 3-5 steps with a railing? [x] 1   [] 2   [] 3   [] 4  
© , Trustees of [address] [facility] [place] [address] [address], under license to [employer]. All rights reserved Score:  Initial: 11 Most Recent: X (Date: -- ) Interpretation of Tool:  Represents activities that are increasingly more difficult (i.e. Bed mobility, Transfers, Gait). Medical Necessity:    
· Patient demonstrates fair rehab potential due to higher previous functional level. Reason for Services/Other Comments: 
· Patient continues to require modification of therapeutic interventions to increase complexity of exercises. Use of outcome tool(s) and clinical judgement create a POC that gives a: Questionable prediction of patient's progress: MODERATE COMPLEXITY  
  
 
 
 
TREATMENT:  
(In addition to Assessment/Re-Assessment sessions the following treatments were rendered) Pre-treatment Symptoms/Complaints:  none Pain: Initial:  
Pain Intensity 1: 6 Pain Location 1: Generalized  Post Session:  6 Today's treatment session addressed Decreased Strength, Decreased Ambulation Ability/Technique and Decreased Balance to progress towards achieving goal(s) 1, 2 and 3. During this session, Occupational Therapy addressed ADLs to progress towards their discipline specific goal(s). Co-treatment was necessary to improve patient's cognitive participation, ability to follow higher level commands, ability to increase activity demands and ability to return to normal functional activity. Gait Training (  8 minutes):  Gait training to improve and/or restore physical functioning as related to mobility, strength and coordination. Ambulated 15 Feet (ft) with Minimal assistance;Assist x2 using a Gait belt;Walker, rolling and moderate Manual cues; Safety awareness training;Visual/Demos; Verbal cues related to their stance phase and stride length to promote proper body alignment. Braces/Orthotics/Lines/Etc:  
· O2 Device: Room air Treatment/Session Assessment:   
· Response to Treatment:  Patient fatigued quickly. · Interdisciplinary Collaboration:  
o Physical Therapist 
o Occupational Therapist 
o Registered Nurse · After treatment position/precautions:  
o Supine in bed 
o Bed/Chair-wheels locked 
o Bed in low position 
o Call light within reach 
o RN notified · Compliance with Program/Exercises: Will assess as treatment progresses · Recommendations/Intent for next treatment session: \"Next visit will focus on advancements to more challenging activities and reduction in assistance provided\". Total Treatment Duration: PT Patient Time In/Time Out Time Out: 9568 Parvin Uriostegui, PT, DPT

## 2020-10-14 NOTE — PROGRESS NOTES
07:45 Call placed to Dr. Lacy Thomas, pt lethargic and unresponsive, withdraws to pain, BS 86, VSS, PERRLA. Kianna Clarks 0:747 Dr. Lacy Thomas at bedside, pt awake to sternal rub, A&OX3, answering questions appropriately. 09:23 Pt complaining of 10/10 headache and requesting pain medication. Call placed to Dr. Lacy Thomas. 11:09 Pt worked w/ PT at bedside, able to ambulate w/ walker.

## 2020-10-14 NOTE — PROGRESS NOTES
TRANSFER - OUT REPORT: 
 
Verbal report given to Felicia(name) on A B Adiel Estela Castellano  being transferred to [facility](unit) for routine progression of care Report consisted of patients Situation, Background, Assessment and  
Recommendations(SBAR). Information from the following report(s) SBAR, Intake/Output, MAR, Recent Results, Cardiac Rhythm Assessment and Alarm Parameters  was reviewed with the receiving nurse. Lines:  
Peripheral IV 10/13/20 Right Forearm (Active) Site Assessment Clean, dry, & intact 10/14/20 1603 Phlebitis Assessment 0 10/14/20 1603 Infiltration Assessment 0 10/14/20 1603 Dressing Status Clean, dry, & intact 10/14/20 1603 Dressing Type Tape;Transparent 10/14/20 1603 Hub Color/Line Status Patent; Flushed 10/14/20 1603 Opportunity for questions and clarification was provided. Patient transported with: 
 Registered Nurse

## 2020-10-15 NOTE — PROGRESS NOTES
Patient given all discharge instructions and explained all instructions. Patient understands. Patient being discharged with [facility] to home.

## 2020-10-15 NOTE — DIABETES MGMT
. Creatinine: 11. GFR: 6. HGB: 7.6. Attempted to see pt for diabetes education. Pt sleeping, respirations even. Did not disturb pt at this time. Plan is to continue diabetes education when pt is willing and able to participate.

## 2020-10-15 NOTE — PROGRESS NOTES
Pt is medically cleared for dc to home today and will resume [place] [facility] services through [facility]. Order and clinical information faxed to their office and SW alerted their liaison to the pt's dc. Pt/family requesting ambulance transport home which was scheduled for 1400 through [employer]. Son is at the bedside and is aware of pt's dc and transport time. SW remains available to assist as needed. Care Management Interventions PCP Verified by CM: Yes Mode of Transport at Discharge: BLS([facility]) Hospital Transport Time of Discharge: 1400 Transition of Care Consult (CM Consult): Home Health [address]: No 
Reason Outside [place]: Patient already serviced by other home care/hospice agency Discharge Durable Medical Equipment: No 
Physical Therapy Consult: Yes Occupational Therapy Consult: Yes Speech Therapy Consult: No 
Current Support Network: Own Home, Lives Alone, Family Lives Nearby, Has Personal Caregivers, Other([employer] [place] [facility] services) Confirm Follow Up Transport: Wheelchair Van(CLTC worker also can transport) The Plan for Transition of Care is Related to the Following Treatment Goals : Resume [place] [facility] services through [facility] The Patient and/or Patient Representative was Provided with a Choice of Provider and Agrees with the Discharge Plan?: Yes Freedom of Choice List was Provided with Basic Dialogue that Supports the Patient's Individualized Plan of Care/Goals, Treatment Preferences and Shares the Quality Data Associated with the Providers?: No(pt already serviced by a [employer] agency and requested to continue with them)  Resource Information Provided?: [facility]) Discharge Location Discharge Placement: [facility]

## 2020-10-15 NOTE — DISCHARGE INSTRUCTIONS
Patient Education        Diabetes Blood Sugar Emergencies: Your Action Plan  How can you prevent a blood sugar emergency? An important part of living with diabetes is keeping your blood sugar in your target range. You'll need to know what to do if it's too high or too low. Managing your blood sugar levels helps you avoid emergencies. This care sheet will teach you about the signs of high and low blood sugar. It will help you make an action plan with your doctor for when these signs occur. Low blood sugar is more likely to happen if you take certain medicines for diabetes. It can also happen if you skip a meal, drink alcohol, or exercise more than usual.  You may get high blood sugar if you eat differently than you normally do. One example is eating more carbohydrate than usual. Having a cold, the flu, or other sudden illness can also cause high blood sugar levels. Levels can also rise if you miss a dose of medicine. Any change in how you take your medicine may affect your blood sugar level. So it's important to work with your doctor before you make any changes. Check your blood sugar  Work with your doctor to fill in the blank spaces below that apply to you. Track your levels, know your target range, and write down ways you can get your blood sugar back in your target range. A log book can help you track your levels. Take the book to all of your medical appointments. · Check your blood sugar _____ times a day, at these times:________________________________________________. (For example: Before meals, at bedtime, before exercise, during exercise, other.)  · Your blood sugar target range before a meal is ___________________. Your blood sugar target range after a meal is _______________________. · Do this--___________________________________________________--to get your blood sugar back within your safe range if your blood sugar results are _________________________________________.  (For example: Less than 70 or above 250 mg/dL.)  Call your doctor when your blood sugar results are ___________________________________. (For example: Less than 70 or above 250 mg/dL.)  What are the symptoms of low and high blood sugar? Common symptoms of low blood sugar are sweating and feeling shaky, weak, hungry, or confused. Symptoms can start quickly. Common symptoms of high blood sugar are feeling very thirsty or very hungry. You may also pass urine more often than usual. You may have blurry vision and may lose weight without trying. But some people may have high or low blood sugar without having any symptoms. That's a good reason to check your blood sugar on a regular schedule. What should you do if you have symptoms? Work with your doctor to fill in the blank spaces below that apply to you. Low blood sugar  If you have symptoms of low blood sugar, check your blood sugar. If it's below _____ ( for example, below 70), eat or drink a quick-sugar food that has about 15 grams of carbohydrate. Your goal is to get your level back to your safe range. Check your blood sugar again 15 minutes later. If it's still not in your target range, take another 15 grams of carbohydrate and check your blood sugar again in 15 minutes. Repeat this until you reach your target. Then go back to your regular testing schedule. Children usually need less than 15 grams of carbohydrate. Check with your doctor or diabetes educator for the amount that is right for your child. When you have low blood sugar, it's best to stop or reduce any physical activity until your blood sugar is back in your target range and is stable. If you must stay active, eat or drink 30 grams of carbohydrate. Then check your blood sugar again in 15 minutes. If it's not in your target range, take another 30 grams of carbohydrates. Check your blood sugar again in 15 minutes. Keep doing this until you reach your target. You can then go back to your regular testing schedule.   If your symptoms or blood sugar levels are getting worse or have not improved after 15 minutes, seek medical care right away. Here are some examples of quick-sugar foods with 15 grams of carbohydrate:  · 3 or 4 glucose tablets  · 1 tablespoon (3 teaspoons) table sugar  · ½ cup to ¾ cup (4 to 6 ounces) of fruit juice or regular (not diet) soda  · Hard candy (such as 6 Life Savers)  High blood sugar  If you have symptoms of high blood sugar, check your blood sugar. Your goal is to get your level back to your target range. If it's above ______ ( for example, above 250), follow these steps:  · If you missed a dose of your diabetes medicine, take it now. Take only the amount of medicine that you have been prescribed. Do not take more or less medicine. · Give yourself insulin if your doctor has prescribed it for high blood sugar. · Test for ketones, if the doctor told you to do so. If the results of the ketone test show a moderate-to-large amount of ketones, call the doctor for advice. · Wait 30 minutes after you take the extra insulin or the missed medicine. Check your blood sugar again. If your symptoms or blood sugar levels are getting worse or have not improved after taking these steps, seek medical care right away. Follow-up care is a key part of your treatment and safety. Be sure to make and go to all appointments, and call your doctor if you are having problems. It's also a good idea to know your test results and keep a list of the medicines you take. Where can you learn more? Go to http://www.gray.com/  Enter O764 in the search box to learn more about \"Diabetes Blood Sugar Emergencies: Your Action Plan. \"  Current as of: December 20, 2019               Content Version: 12.6  © 7980-6863 [employer]. Care instructions adapted under license by [employer] (which disclaims liability or warranty for this information).  If you have questions about a medical condition or this instruction, always ask your healthcare professional. [address] any warranty or liability for your use of this information. Patient Education        Learning About Type 2 Diabetes  What is type 2 diabetes? Insulin is a hormone that helps your body use sugar from your food as energy. Type 2 diabetes happens when your body can't use insulin the right way. Over time, the pancreas can't make enough insulin. If you don't have enough insulin, too much sugar stays in your blood. If you are overweight, get little or no exercise, or have type 2 diabetes in your family, you are more likely to have problems with the way insulin works in your body.  Americans, Hispanics, Native Americans,  Americans, and Pacific Islanders have a higher risk for type 2 diabetes. Type 2 diabetes can be prevented or delayed with a healthy lifestyle, which includes staying at a healthy weight, making smart food choices, and getting regular exercise. What can you expect with type 2 diabetes? Corina Romero keep hearing about how important it is to keep your blood sugar within a target range. That's because over time, high blood sugar can lead to serious problems. It can:  · Harm your eyes, nerves, and kidneys. · Damage your blood vessels, leading to heart disease and stroke. · Reduce blood flow and cause nerve damage to parts of your body, especially your feet. This can cause slow healing and pain when you walk. · Make your immune system weak and less able to fight infections. When people hear the word \"diabetes,\" they often think of problems like these. But daily care and treatment can help prevent or delay these problems. The goal is to keep your blood sugar in a target range. That's the best way to reduce your chance of having more problems from diabetes. What are the symptoms? Some people who have type 2 diabetes may not have any symptoms early on.  Many people with the disease don't even know they have it at first. But with time, diabetes starts to cause symptoms. You experience most symptoms of type 2 diabetes when your blood sugar is either too high or too low. The most common symptoms of high blood sugar include:  · Thirst.  · Frequent urination. · Weight loss. · Blurry vision. The symptoms of low blood sugar include:  · Sweating. · Shakiness. · Weakness. · Hunger. · Confusion. How can you prevent type 2 diabetes? The best way to prevent or delay type 2 diabetes is to adopt healthy habits, which include:  · Staying at a healthy weight. · Exercising regularly. · Eating healthy foods. How is type 2 diabetes treated? If you have type 2 diabetes, here are the most important things you can do. · Take your diabetes medicines. · Check your blood sugar as often as your doctor recommends. Also, get a hemoglobin A1c test at least every 6 months. · Try to eat a variety of foods and to spread carbohydrate throughout the day. Carbohydrate raises blood sugar higher and more quickly than any other nutrient does. Carbohydrate is found in sugar, breads and cereals, fruit, starchy vegetables such as potatoes and corn, and milk and yogurt. · Get at least 30 minutes of exercise on most days of the week. Walking is a good choice. You also may want to do other activities, such as running, swimming, cycling, or playing tennis or team sports. If your doctor says it's okay, do muscle-strengthening exercises at least 2 times a week. · See your doctor for checkups and tests on a regular schedule. · If you have high blood pressure or high cholesterol, take the medicines as prescribed by your doctor. · Do not smoke. Smoking can make health problems worse. This includes problems you might have with type 2 diabetes. If you need help quitting, talk to your doctor about stop-smoking programs and medicines. These can increase your chances of quitting for good.   Follow-up care is a key part of your treatment and safety. Be sure to make and go to all appointments, and call your doctor if you are having problems. It's also a good idea to know your test results and keep a list of the medicines you take. Where can you learn more? Go to http://www.gray.com/  Enter [id number] in the search box to learn more about \"Learning About Type 2 Diabetes. \"  Current as of: December 20, 2019               Content Version: 12.6  © 4379-4385 [employer]. Care instructions adapted under license by [employer] (which disclaims liability or warranty for this information). If you have questions about a medical condition or this instruction, always ask your healthcare professional. [address] any warranty or liability for your use of this information.

## 2020-10-15 NOTE — DISCHARGE SUMMARY
Hospitalist Discharge Summary Patient ID: Sharee Lezama 
[id number] 
79 y.o. 
1940 Admit date: 10/12/2020  9:14 PM 
Discharge date and time: 10/15/2020 Attending: Carlo Ware MD 
PCP:  Ravinder Campbell MD 
Treatment Team: Attending Provider: Carlo Ware MD; Care Manager: Maddy Gandhi RN; Charge Nurse: Jose Carlos Card RN; Charge Nurse: Orestes Webb; Physical Therapy Assistant: Chayo Reyes PTA Principal Diagnosis DKA (diabetic ketoacidoses) ([address] Principal Problem: 
  DKA (diabetic ketoacidoses) ([address] (8/27/2020) Active Problems: 
  CKD (chronic kidney disease) stage 5, GFR less than 15 ml/min (HCC) (9/21/2016) Type II diabetes mellitus with nephropathy ([address] (9/22/2016) HTN (hypertension) (9/22/2016) GERD (gastroesophageal reflux disease) (9/22/2016) Chronic pancreatitis ([address] (9/22/2016) Anemia of chronic disease (9/22/2016) (HFpEF) heart failure with preserved ejection fraction ([address] (9/23/2016) Hypercholesterolemia () Uncontrolled diabetes mellitus, with long-term current use of insulin ([address] (2/28/2018) Seizure ([address] (4/18/2018) Dementia without behavioral disturbance ([address] (4/19/2018) ALICJA (acute kidney injury) ([address] (7/26/2020) Functional quadriplegia ([address] (7/26/2020) Hyperkalemia (7/26/2020) Hyperphosphatemia (8/27/2020) Metabolic acidosis, increased anion gap (8/27/2020) Long Q-T syndrome (10/13/2020) Hypomagnesemia (10/13/2020) Non compliance w medication regimen (10/13/2020) Hospital Course: 
Please refer to the admission H&P for details of presentation. In summary, Mr. Brian Lezama is a 78 yo male with PMH of CKD5 refusing dialysis, DM2 admitted to ICU with DKA. He was started on IV insulin drip and hydration. His glucoses have improved and he has resumed lantus and SSI.  He still has an open anion gap that may be ALICJA induced. Nephrology consulted to discuss progressive renal failure and he continues to decline HD. He is on longterm oral bicarb replacement. Hospice has been discussed at length but pt refuses. He still wishes for hospitalization and care should other issues arise, but he does not want HD. STR was also recommended but he refused and plans to return home. He has capacity to make these medical decisions. All was discussed with son Jeannett Primrose who is in agreement with his father's decisions. He will be discharged home with [facility]. HIGH RISK FOR READMISSION. DM2, with DKA and persistent metabolic acidosis: 
Off insulin gtt, now on Lantus. AG metabolic acidosis persists but feel this is more likely related to his renal failure at this pointt. - increase home Lantus 
 
 ALICJA on CKD5: 
· nephro consulted and continues to decline HD 
· On chronic bicarb replacement 
  
  
LE wounds: · Wound care 
  
 
· Anemia: 
· Repeated stable 
  
  
· HTN: 
· Resumed clonidine, hydralazine, metoprolol, procardia 
  
 
Significant Diagnostic Studies: XR CHEST PA LAT Final Result IMPRESSION:  
  
1. No evidence of acute pulmonary disease. Labs: Results:  
   
Chemistry Recent Labs 10/15/20 
0532 10/14/20 
1200 10/14/20 
0243  10/13/20 
0453  10/12/20 
2127 * 267* 112*   < > 592*   < > 1,243* * 138 140   < > 135*   < > 128* K 4.3 3.8 3.7   < > 3.3*   < > 4.7  109* 111*   < > 107   < > 98  
CO2 11* 12* 12*   < > 8*   < > 11* * 121* 123*   < > 108*   < > 124* CREA 11.00* 11.40* 11.40*   < > 11.00*   < > 12.60* CA 6.3* 6.4* 6.4*   < > 6.4*   < > 5.2* AGAP 18* 17* 17*   < > 20*   < > 19* AP  --   --   --   --  84  --  99  
TP  --   --   --   --  5.5*  --  6.6 ALB  --   --   --   --  2.2*  --  2.7*  
GLOB  --   --   --   --  3.3  --  3.9* AGRAT  --   --   --   --  0.7*  --  0.7*  
 < > = values in this interval not displayed. CBC w/Diff Recent Labs 10/15/20 
0532 10/13/20 
2327 10/13/20 
1945  10/12/20 
2127 WBC 6.1  --   --   --  5.9  
RBC 3.05*  --   --   --  2.94* HGB 7.6* 9.7* 8.5*   < > 7.3* HCT 23.5*  --   --   --  26.1*  
  --   --   --  184 GRANS 79*  --   --   --  74  
LYMPH 12*  --   --   --  16 EOS 1  --   --   --  0*  
 < > = values in this interval not displayed. Cardiac Enzymes No results for input(s): CPK, CKND1, PEDRO in the last 72 hours. No lab exists for component: Loyce Factor Coagulation No results for input(s): PTP, INR, APTT, INREXT in the last 72 hours. Lipid Panel Lab Results Component Value Date/Time Cholesterol, total 99 04/03/2018 06:16 AM  
 HDL Cholesterol 65 (H) 04/03/2018 06:16 AM  
 LDL, calculated 23 04/03/2018 06:16 AM  
 VLDL, calculated 11 04/03/2018 06:16 AM  
 Triglyceride 55 04/03/2018 06:16 AM  
 CHOL/HDL Ratio 1.5 04/03/2018 06:16 AM  
  
BNP No results for input(s): BNPP in the last 72 hours. Liver Enzymes Recent Labs 10/13/20 
0453 TP 5.5* ALB 2.2* AP 84 Thyroid Studies Lab Results Component Value Date/Time TSH 2.500 08/28/2020 09:30 AM  
    
 
 
Discharge Exam: 
Visit Vitals BP (!) 146/73 (BP 1 Location: Right arm, BP Patient Position: At rest) Pulse 61 Temp 98.1 °F (36.7 °C) Resp 16 Ht 6' (1.829 m) Wt 72.2 kg (159 lb 2.8 oz) SpO2 100% BMI 21.59 kg/m² General:          Elderly, no distress, Alert. CV:                  RRR. No murmur, rub, or gallop. LE wrapped Lungs:             CTAB. No wheezing, rhonchi, or rales. anterior Abdomen:        Soft, nontender, nondistended. Present BS Neuro:             No gross focal deficits, alert, verbal 
 
Disposition: home Discharge Condition: stable Patient Instructions:  
Current Discharge Medication List  
  
CONTINUE these medications which have CHANGED Details  
insulin glargine (LANTUS) 100 unit/mL injection 20 Units by SubCUTAneous route nightly. Qty: 1 Vial, Refills: 0 CONTINUE these medications which have NOT CHANGED Details NIFEdipine ER (PROCARDIA XL) 30 mg ER tablet Take 1 Tab by mouth daily. Qty: 30 Tab, Refills: 2  
  
sevelamer carbonate (RENVELA) 800 mg tab tab Take 2 Tabs by mouth three (3) times daily (with meals). Qty: 90 Tab, Refills: 2  
  
insulin lispro (HUMALOG) 100 unit/mL injection INITIATE INSULIN CORRECTIVE PROTOCOL: 
INITIATE INSULIN CORRECTIVE PROTOCOL: 
Normal Insulin Sensitivity For Blood Sugar (mg/dL) of:    
Less than 150 =   0 units 150 -199 =   2 units 200 -249 =   4 units 250 -299 =   6 units 300 -349 =   8 units 350 and above = 10 units and Call Physician Initiate Hypoglycemia protocol if blood glucose is <70 mg/dL Fast Acting - Administer Immediately - or within 15 minutes of start of meal, if mealtime coverage. Qty: 1 Vial, Refills: 0  
  
cloNIDine (Catapres-TTS-3) 0.3 mg/24 hr 1 Patch by TransDERmal route every seven (7) days. Change every thursday  
  
glucose 4 gram chewable tablet Take 15 g by mouth as needed for Other (low blood sugar). lipase-protease-amylase (CREON) 36,000-114,000- 180,000 unit cpDR capsule Take 3,600 Units by mouth See Admin Instructions. Take 2 capsules by mouth with each meal, and 1 capsule by mouth with each snack. acetaminophen (TYLENOL) 325 mg tablet Take 2 Tabs by mouth every four (4) hours as needed for Pain. Qty: 20 Tab, Refills: 0  
  
pantothenic ac-min oil-pet,hyd (AQUAPHOR) 41 % ointment Apply  to affected area daily. Apply over legs Qty: 53 g, Refills: 0  
  
sodium bicarbonate 650 mg tablet Take 650 mg by mouth three (3) times daily. hydrALAZINE (APRESOLINE) 100 mg tablet Take 1 Tab by mouth three (3) times daily. Qty: 90 Tab, Refills: 2  
  
calcifediol (RAYALDEE) 30 mcg Cs24 Take 30 mcg by mouth nightly. pregabalin (Lyrica) 150 mg capsule Take 150 mg by mouth two (2) times a day. ferrous sulfate 325 mg (65 mg iron) tablet Take 1 Tab by mouth two (2) times daily (with meals). Qty: 1 Tab, Refills: 0  
  
multivit-minerals/folic acid (SPECTRAVITE ADULT PO) Take 1 Tab by mouth daily. oxymetazoline (AFRIN) 0.05 % nasal spray 2 Sprays by Both Nostrils route once as needed for Congestion. Lactoperoxi/Gluc Oxid/Pot Thio (BIOTENE DRY MOUTH MM) Take 2 Sprays by mouth as needed for Other (dry mouth). dicyclomine (BENTYL) 10 mg capsule Take 10 mg by mouth two (2) times a day. ondansetron (ZOFRAN ODT) 4 mg disintegrating tablet Take 1 Tab by mouth every eight (8) hours as needed for Nausea. Qty: 20 Tab, Refills: 0  
  
colestipol (COLESTID) 1 gram tablet Take 1 g by mouth two (2) times a day. lidocaine 4 % patch Cut to appropriate size. Apply to intact skin for 12 hours, remove for 12 hours. Qty: 14 Patch, Refills: 0  
  
pravastatin (PRAVACHOL) 40 mg tablet Take 1 Tab by mouth nightly. Qty: 30 Tab, Refills: 0  
  
metoprolol succinate (TOPROL-XL) 50 mg XL tablet Take 50 mg by mouth every morning. omeprazole (PRILOSEC) 20 mg capsule Take 20 mg by mouth every morning. Activity: PT/OT per Home Health Diet: Diabetic Diet Wound Care: None needed Follow-up with PCP in 1 week · Time spent to discharge patient 28 minutes Signed: 
Rajni Faith MD 
10/15/2020 
11:41 AM

## 2020-10-15 NOTE — DIABETES MGMT
Son at bedside. Pt to discharge home with home health nursing and PT. Son states that he lives only 6 minutes away from his father and he also has DM. Pt states that he is very happy to be going home. Prior to admission medications per patient include Lantus pens 16 units daily and Humalog vials sliding scale coverage. Pt and son state that caregivers assist with drawing up the insulin. Pt states that he is able to see the insulin pen numbers with his reading glasses on, but sometimes the caregivers assist.  
Educated patient regarding current basal/bolus regimen of Lantus 15 units daily (which has been increased to Lantus 20 units at discharge), and Humalog sliding scale coverage 4x/day ac and hs, including type of insulin, timing with meals, onset, duration of effect, and peak of insulin dose. Instructed patient to always seek guidance from their primary care provider about adjusting their insulin doses and not to adjust them on their own as this could negatively impact their glycemic control or result in hypoglycemia. Patient verbalizes understanding. Discussed target goals for blood glucose. Reviewed signs, symptoms and treatments for hyper/hypoglycemia. Pt states that he doesn't usually go low, but is very aware when it does go low and keeps hard candy available. Discussed glucose tablets. Pt states that he has a working blood glucose meter at home and all necessary testing supplies. Pt states that he has a caregiver that can go to the store for him if he ever needs anything. Stressed the importance of follow up care for diabetes management with PCP. Pt and son have no further questions regarding diabetes management at this time.

## 2020-10-15 NOTE — PROGRESS NOTES
Patient discharged at this time with [facility]. All belongings and discharge paperwork went with patient. Ivs were removed.

## 2020-10-18 PROBLEM — R91.8 PULMONARY INFILTRATES: Status: ACTIVE | Noted: 2020-01-01

## 2020-10-18 PROBLEM — I46.9 CARDIAC ARREST (HCC): Status: ACTIVE | Noted: 2020-01-01

## 2020-10-18 NOTE — H&P
HISTORY AND PHYSICAL A SHERRY Ortiz 
 
10/18/2020 Date of Admission:  10/18/2020 The patient's chart is reviewed and the patient is discussed with the staff. Subjective:  
 
Patient is a 79 y.o.  male presents with post arrest with ROSC. He has h/o PMH of CKD5 refusing dialysis, DM2 admitted 10/13-10/15 to ICU with DKA. During that visit he again was seen by nephrology and again refused dialysis. Today he called his son as he was feeling poorly and son called EMS and once they arrived he was altered and short of breath and decision was made to intubate. At that time patient became bradycardic after ET tube was placed. Patient w/ cardiac arrest; ACLS guidelines followed per EMS. Patient reportedly received total of 4 epi, 1 bicarb, 1 calcium gluconate. ROSC after 4th epi. 7.0 ET tube 25 cm at the lip on arrival. He was brought to ED found to have profound acidosis, mild hypoglycemia, anemia, hypocalcemia, bilateral infiltrates on CXR, low WBC at 3.2, albumin 1.4, lactic acid of 10. The patient is unresponsive currently, not on ongoing sedation, on levophed of 7 and cannot participate in history or decision making. I called son and he is en route with wife to hospital and we briefly discussed goals of care and decision making by phone. Review of Systems Review of systems not obtained due to patient factors. Patient Active Problem List  
Diagnosis Code  CKD (chronic kidney disease) stage 5, GFR less than 15 ml/min ([facility]) N18.5  Septicemia due to Klebsiella pneumoniae ([facility] A41.4  Type II diabetes mellitus with nephropathy ([facility]) E11.21  
 HTN (hypertension) I10  
 GERD (gastroesophageal reflux disease) K21.9  Diabetic neuropathy ([facility]) E11.40  Chronic pancreatitis ([facility] K86.1  Anemia of chronic disease D63.8  
 (HFpEF) heart failure with preserved ejection fraction ([facility]) I50.30  Venous insufficiency I87.2  Venous stasis ulcer of left lower extremity ([facility]) I83.029, L97.929  
 Stasis edema of both lower extremities I87.303  Cellulitis of left lower leg L03. 116  
 Onychomycosis B35.1  Controlled type 2 diabetes mellitus with complication, with long-term current use of insulin ([facility]) E11.8, Z79.4  Arteriovenous fistula ([facility]) I77.0  Hypercholesterolemia E78.00  
 Uncontrolled diabetes mellitus, with long-term current use of insulin ([facility]) E11.65, Z79.4  Carotid bruit R09.89  Cauda equina compression ([facility]) G83.4  Volume overload E87.70  Chest pain R07.9  Acute metabolic encephalopathy D70.08  
 Hypernatremia E87.0  Seizure ([facility] R56.9  Dementia without behavioral disturbance ([facility]) F03.90  Renal failure (ARF), acute on chronic ([facility]) N17.9, N18.9  Rhabdomyolysis C25.76  
 Metabolic acidosis U09.3  ESRD (end stage renal disease) ([facility] N18.6  Open wound of skin T14. Mahi Sims  DVT (deep venous thrombosis) ([facility]) I82.409  Cervical radiculopathy M54.12  Neuropathic pain M79.2  
 Encounter for medication management Z96.326  Postural imbalance R29.3  Neck pain M54.2  Cervical strain S16. Queta Aj  Degenerative disc disease, cervical M50.30  ALICJA (acute kidney injury) ([facility] N17.9  Functional quadriplegia ([facility]) R53.2  Anemia D64.9  Hypocalcemia E83.51  
 Acute pain R52  Left shoulder pain M25.512  
 Hyperkalemia E87.5  UTI (urinary tract infection) N39.0  Wound infection T14. 8XXA, L08.9  DKA (diabetic ketoacidoses) ([facility]) E11.10  Hyperphosphatemia Y03.43  
 Metabolic acidosis, increased anion gap E87.2  Long Q-T syndrome I45.81  
 Hypomagnesemia E83.42  
 Non compliance w medication regimen Z91.14  
 Cardiac arrest ([facility]) I46.9  Pulmonary infiltrates R91.8 Prior to Admission Medications Prescriptions Last Dose Informant Patient Reported? Taking?   
Lactoperoxi/Gluc Oxid/Pot Thio (BIOTENE DRY MOUTH MM) 10/18/2020 at Unknown time  Yes Yes Sig: Take 2 Sprays by mouth as needed for Other (dry mouth). NIFEdipine ER (PROCARDIA XL) 30 mg ER tablet 10/18/2020 at Unknown time  No Yes Sig: Take 1 Tab by mouth daily. acetaminophen (TYLENOL) 325 mg tablet 10/18/2020 at Unknown time  No Yes Sig: Take 2 Tabs by mouth every four (4) hours as needed for Pain. calcifediol (RAYALDEE) 30 mcg Cs24 10/18/2020 at Unknown time  Yes Yes Sig: Take 30 mcg by mouth nightly. cloNIDine (Catapres-TTS-3) 0.3 mg/24 hr 10/18/2020 at Unknown time  Yes Yes Si Patch by TransDERmal route every seven (7) days. Change every thursday  
colestipol (COLESTID) 1 gram tablet 10/18/2020 at Unknown time  Yes Yes Sig: Take 1 g by mouth two (2) times a day. dicyclomine (BENTYL) 10 mg capsule 10/18/2020 at Unknown time  Yes Yes Sig: Take 10 mg by mouth two (2) times a day. ferrous sulfate 325 mg (65 mg iron) tablet 10/18/2020 at Unknown time  No Yes Sig: Take 1 Tab by mouth two (2) times daily (with meals). glucose 4 gram chewable tablet 10/18/2020 at Unknown time  Yes Yes Sig: Take 15 g by mouth as needed for Other (low blood sugar). hydrALAZINE (APRESOLINE) 100 mg tablet 10/18/2020 at Unknown time  No Yes Sig: Take 1 Tab by mouth three (3) times daily. insulin glargine (LANTUS) 100 unit/mL injection 10/18/2020 at Unknown time  No Yes Si Units by SubCUTAneous route nightly. insulin lispro (HUMALOG) 100 unit/mL injection 10/18/2020 at Unknown time  No Yes Sig: INITIATE INSULIN CORRECTIVE PROTOCOL: 
INITIATE INSULIN CORRECTIVE PROTOCOL: 
Normal Insulin Sensitivity For Blood Sugar (mg/dL) of:    
Less than 150 =   0 units 150 -199 =   2 units 200 -249 =   4 units 250 -299 =   6 units 300 -349 =   8 units 350 and above = 10 units and Call Physician Initiate Hypoglycemia protocol if blood glucose is <70 mg/dL Fast Acting - Administer Immediately - or within 15 minutes of start of meal, if mealtime coverage. lidocaine 4 % patch 10/18/2020 at Unknown time  No Yes Sig: Cut to appropriate size. Apply to intact skin for 12 hours, remove for 12 hours. Patient taking differently: 1 Patch by TransDERmal route as needed. Cut to appropriate size. Apply to intact skin for 12 hours, remove for 12 hours. States uses only occasionally and not on today 2019 Do not take morning of procedure. lipase-protease-amylase (CREON) 36,000-114,000- 180,000 unit cpDR capsule 10/18/2020 at Unknown time  Yes Yes Sig: Take 3,600 Units by mouth See Admin Instructions. Take 2 capsules by mouth with each meal, and 1 capsule by mouth with each snack. metoprolol succinate (TOPROL-XL) 50 mg XL tablet 10/18/2020 at Unknown time  Yes Yes Sig: Take 50 mg by mouth every morning.  
multivit-minerals/folic acid (SPECTRAVITE ADULT PO) 10/18/2020 at Unknown time  Yes Yes Sig: Take 1 Tab by mouth daily. omeprazole (PRILOSEC) 20 mg capsule 10/18/2020 at Unknown time  Yes Yes Sig: Take 20 mg by mouth every morning. ondansetron (ZOFRAN ODT) 4 mg disintegrating tablet 10/18/2020 at Unknown time  No Yes Sig: Take 1 Tab by mouth every eight (8) hours as needed for Nausea. oxymetazoline (AFRIN) 0.05 % nasal spray 10/18/2020 at Unknown time  Yes Yes Si Sprays by Both Nostrils route once as needed for Congestion. pantothenic ac-min oil-pet,hyd (AQUAPHOR) 41 % ointment 10/18/2020  No Yes Sig: Apply  to affected area daily. Apply over legs  
pravastatin (PRAVACHOL) 40 mg tablet 10/18/2020  No Yes Sig: Take 1 Tab by mouth nightly. pregabalin (Lyrica) 150 mg capsule 10/18/2020  Yes Yes Sig: Take 150 mg by mouth two (2) times a day. sevelamer carbonate (RENVELA) 800 mg tab tab 10/18/2020  No Yes Sig: Take 2 Tabs by mouth three (3) times daily (with meals). sodium bicarbonate 650 mg tablet 10/18/2020  Yes Yes Sig: Take 650 mg by mouth three (3) times daily. Facility-Administered Medications: None Past Medical History:  
Diagnosis Date  Acute renal failure superimposed on stage 3 chronic kidney disease ([facility] [address] 9/21/2016  Anemia   
 pt states he receives iron infusions  Arthritis OA generalized  Chronic kidney disease   
 not on HD- surgery done for access and fistula being removed. Per patient, no plans to proceed with dialysis  Chronic pancreatitis ([facility] [address] 9/22/2016  Dermatophytosis of nail 12/6/2016  Diabetic neuropathy ([facility] 9/22/2016  
 insulin sliding scale only- no oral meds- avg fastings avg fasting \"low 200's;  hypo @ 80 A1C 7.1 9/2019 per patient  Essential hypertension 9/22/2016  
 medication  GERD (gastroesophageal reflux disease)   
 controlled with med  Hypercholesterolemia  Iron (Fe) deficiency anemia 9/22/2016  Septicemia due to Klebsiella pneumoniae ([facility] 9/22/2016  Systolic CHF, chronic ([facility] 9/23/2016 ECHO 4/2018 EF- 60-65%  Type II diabetes mellitus with nephropathy ([facility] 09/22/2016  Venous insufficiency 12/6/2016  Xerosis cutis 12/6/2016 Past Surgical History:  
Procedure Laterality Date  HX COLONOSCOPY    
 HX HERNIA REPAIR Left 2010  HX PROSTATECTOMY  2012  HX TURP  2012 FOR BPH  VASCULAR SURGERY PROCEDURE UNLIST Left 10/26/2017 AVF  VASCULAR SURGERY PROCEDURE UNLIST Left 09/18/2019 Ligation of left brachiobasilic fistula Social History Socioeconomic History  Marital status:  Spouse name: Not on file  Number of children: Not on file  Years of education: Not on file  Highest education level: Not on file Occupational History  Not on file Social Needs  Financial resource strain: Not on file  Food insecurity Worry: Not on file Inability: Not on file  Transportation needs Medical: Not on file Non-medical: Not on file Tobacco Use  Smoking status: Former Smoker Packs/day: 2.00 Years: 20.00 Pack years: 40.00 Last attempt to quit:  Years since quittin.8  Smokeless tobacco: Current User Substance and Sexual Activity  Alcohol use: No  
 Drug use: Never  Sexual activity: Not on file Lifestyle  Physical activity Days per week: Not on file Minutes per session: Not on file  Stress: Not on file Relationships  Social connections Talks on phone: Not on file Gets together: Not on file Attends Shinto service: Not on file Active member of club or organization: Not on file Attends meetings of clubs or organizations: Not on file Relationship status: Not on file  Intimate partner violence Fear of current or ex partner: Not on file Emotionally abused: Not on file Physically abused: Not on file Forced sexual activity: Not on file Other Topics Concern  Not on file Social History Narrative  Not on file Family History Problem Relation Age of Onset  Diabetes Mother  Stroke Mother  Hypertension Mother  No Known Problems Father  Diabetes Sister  Diabetes Brother  Diabetes Sister  Diabetes Sister  Other Sister   
     fibromyalgia No Known Allergies Current Facility-Administered Medications Medication Dose Route Frequency  NOREPINephrine (LEVOPHED) 4 mg in 5% dextrose 250 mL infusion  0.5-16 mcg/min IntraVENous TITRATE  vancomycin (VANCOCIN) 1500 mg in  ml infusion  1,500 mg IntraVENous ONCE  
 calcium gluconate 1 g in 0.9% sodium chloride (MBP/ADV) 100 mL  1 g IntraVENous ONCE Current Outpatient Medications Medication Sig  
 insulin glargine (LANTUS) 100 unit/mL injection 20 Units by SubCUTAneous route nightly.  NIFEdipine ER (PROCARDIA XL) 30 mg ER tablet Take 1 Tab by mouth daily.  sevelamer carbonate (RENVELA) 800 mg tab tab Take 2 Tabs by mouth three (3) times daily (with meals).  pregabalin (Lyrica) 150 mg capsule Take 150 mg by mouth two (2) times a day.  insulin lispro (HUMALOG) 100 unit/mL injection INITIATE INSULIN CORRECTIVE PROTOCOL: 
INITIATE INSULIN CORRECTIVE PROTOCOL: 
Normal Insulin Sensitivity For Blood Sugar (mg/dL) of:    
Less than 150 =   0 units 150 -199 =   2 units 200 -249 =   4 units 250 -299 =   6 units 300 -349 =   8 units 350 and above = 10 units and Call Physician Initiate Hypoglycemia protocol if blood glucose is <70 mg/dL Fast Acting - Administer Immediately - or within 15 minutes of start of meal, if mealtime coverage.  ferrous sulfate 325 mg (65 mg iron) tablet Take 1 Tab by mouth two (2) times daily (with meals).  multivit-minerals/folic acid (SPECTRAVITE ADULT PO) Take 1 Tab by mouth daily.  cloNIDine (Catapres-TTS-3) 0.3 mg/24 hr 1 Patch by TransDERmal route every seven (7) days. Change every thursday  glucose 4 gram chewable tablet Take 15 g by mouth as needed for Other (low blood sugar).  oxymetazoline (AFRIN) 0.05 % nasal spray 2 Sprays by Both Nostrils route once as needed for Congestion.  Lactoperoxi/Gluc Oxid/Pot Thio (BIOTENE DRY MOUTH MM) Take 2 Sprays by mouth as needed for Other (dry mouth).  lipase-protease-amylase (CREON) 36,000-114,000- 180,000 unit cpDR capsule Take 3,600 Units by mouth See Admin Instructions. Take 2 capsules by mouth with each meal, and 1 capsule by mouth with each snack.  dicyclomine (BENTYL) 10 mg capsule Take 10 mg by mouth two (2) times a day.  acetaminophen (TYLENOL) 325 mg tablet Take 2 Tabs by mouth every four (4) hours as needed for Pain.  ondansetron (ZOFRAN ODT) 4 mg disintegrating tablet Take 1 Tab by mouth every eight (8) hours as needed for Nausea.  pantothenic ac-min oil-pet,hyd (AQUAPHOR) 41 % ointment Apply  to affected area daily. Apply over legs  colestipol (COLESTID) 1 gram tablet Take 1 g by mouth two (2) times a day.  sodium bicarbonate 650 mg tablet Take 650 mg by mouth three (3) times daily.  lidocaine 4 % patch Cut to appropriate size. Apply to intact skin for 12 hours, remove for 12 hours. (Patient taking differently: 1 Patch by TransDERmal route as needed. Cut to appropriate size. Apply to intact skin for 12 hours, remove for 12 hours. States uses only occasionally and not on today 9/11/2019 Do not take morning of procedure.)  hydrALAZINE (APRESOLINE) 100 mg tablet Take 1 Tab by mouth three (3) times daily.  pravastatin (PRAVACHOL) 40 mg tablet Take 1 Tab by mouth nightly.  calcifediol (RAYALDEE) 30 mcg Cs24 Take 30 mcg by mouth nightly.  metoprolol succinate (TOPROL-XL) 50 mg XL tablet Take 50 mg by mouth every morning.  omeprazole (PRILOSEC) 20 mg capsule Take 20 mg by mouth every morning. Objective:  
 
Vitals:  
 10/18/20 1930 10/18/20 1932 10/18/20 1936 10/18/20 1943 BP: (!) 90/52 (!) 98/55 (!) 92/50 Pulse: 84 (!) 107 80 90 Resp: 14 15 16 24 SpO2: 100% 100% 100% 100% Weight:      
Height: PHYSICAL EXAM  
 
Constitutional:  the patient is well developed and in no acute distress EENMT:  Sclera clear, pupils equal, oral mucosa moist 
Respiratory: coarse bilaterally Cardiovascular:  RRR without M,G,R 
Gastrointestinal: soft and non-tender; with positive bowel sounds. Musculoskeletal: warm without cyanosis. There is trace lower extremity edema. Skin:  no jaundice or rashes, venous leg wounds Neurologic: no gross neuro deficits Psychiatric:  Unresponsive, eyes rolled back CXR: bilateral infiltrates, pulmonary edema, contuction/CPR vs pneumonia CXR Results  (Last 48 hours) 10/18/20 1856  XR CHEST PORT Final result Impression:  IMPRESSION:  Bilateral lobar pneumonia. Narrative:  CHEST X-RAY, one view. HISTORY:  Respiratory failure, status post intubation. TECHNIQUE:  AP supine portable view. COMPARISON: 6 days prior FINDINGS:   
Lungs: Diffuse bilateral infiltrates with air bronchograms, left more than  
right. Costophrenic angles: are sharp. Heart size: is normal.   
Pulmonary vasculature: is unremarkable. Aorta: Unremarkable. Included portion of the upper abdomen: is unremarkable. Bones: No gross bony lesions. Other: ET tube tip 2 to 3 cm above the zurdo Recent Labs 10/18/20 
1847 WBC 3.2* HGB 6.1*  
HCT 21.3*  
* Recent Labs 10/18/20 
1847   
K 5.6*  
* GLU 65  
CO2 8* * CREA 12.30* MG 2.2  
CA 6.4* ALB 1.4* TBILI 0.3 ALT 19 Recent Labs 10/18/20 
1850 PHI 6.71* PCO2I 52.5*  
PO2I 106* HCO3I 6.7* Recent Labs 10/18/20 
1847 LAC 9.7* Assessment:  (Medical Decision Making) Hospital Problems  Date Reviewed: 7/23/2020 Codes Class Noted POA Cardiac arrest [facility]) ICD-10-CM: I46.9 ICD-9-CM: 427.5  10/18/2020 Unknown Pulmonary infiltrates ICD-10-CM: R91.8 ICD-9-CM: 793.19  10/18/2020 Unknown Long Q-T syndrome ICD-10-CM: I45.81 ICD-9-CM: 426.82  10/13/2020 Yes Metabolic acidosis, increased anion gap ICD-10-CM: E87.2 ICD-9-CM: 276.2  8/27/2020 Yes Hypocalcemia ICD-10-CM: E83.51 
ICD-9-CM: 275.41  7/26/2020 Yes ESRD (end stage renal disease) ([facility] [address] ICD-10-CM: N18.6 ICD-9-CM: 585.6  11/3/2019 Yes Volume overload ICD-10-CM: E87.70 ICD-9-CM: 276.69  4/4/2018 Yes 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 
ICD-9-CM: 250.40, 583.81  9/22/2016 Yes Anemia of chronic disease ICD-10-CM: D63.8 ICD-9-CM: 285.29  9/22/2016 Yes 80 y/o male DM, ESRD not on dialysis due to refusal s/p cardiac arrest with severe acidosis, hypocalcemia, hyperkalemia, uremia, numerous other lab abnormalities all the result of natural history of untreated ESRD. Have discussed this with son and awaiting for him to arrive. I have recommended comfort measures, extubation as the patients medical issues are not fixable given the patients reasonable choices on limitations to medical care. Plan:  (Medical Decision Making) --Will admit for further medical management 
--vent for now 
--abx given in ED 
--cultures 
--bicarb gtt 
--recommend transitioning to comfort measures when family arrives pending their decision making More than 50% of the time documented was spent in face-to-face contact with the patient and in the care of the patient on the floor/unit where the patient is located.  
 
Jose Escalante MD

## 2020-10-18 NOTE — PROGRESS NOTES
Patient was intubated with a number 7.0 ET Tube. Tube placement verified by CXR. ET Tube is secured at the 25 cm shahid at the lip and on the right side. Patient was intubated by EMS. Breath sounds are diminished. Patient is Negative for subcutaneous air and chest excursion is symmetric. Trachea is midline. Patient is also Negative for cyanosis and is Negative for pitting edema. Patient placed on ventilator on documented settings. All alarms are set and audible. Resuscitation bag is at the head of the bed. Ventilator Settings Mode FIO2 Rate Tidal Volume Pressure PEEP I:E Ratio VC+  100 %   18 450 ml     8 cm H20  1:2.96 Peak airway pressure: 20 cm H2O Minute ventilation: 10.3 l/min ABG: No results for input(s): PH, PCO2, PO2, HCO3 in the last 72 hours.

## 2020-10-18 NOTE — PROGRESS NOTES
Ventilator check complete; patient has a #7. 0 ET tube secured at the 24 at the lip. Patient is sedated. Patient is not able to follow commands. Breath sounds are clear and diminished. Trachea is midline, Negative for subcutaneous air, and chest excursion is symmetric. Patient is also Negative for cyanosis and is Positive for pitting edema. All alarms are set and audible. Resuscitation bag is at the head of the bed. Ventilator Settings Mode FIO2 Rate Tidal Volume Pressure PEEP I:E Ratio VC+  100 %    450 ml     8 cm H20  1:2.96 Peak airway pressure: 28 cm H2O Minute ventilation: 12.1 l/min ABG: No results for input(s): PH, PCO2, PO2, HCO3 in the last 72 hours.  
 
 
Timothy Foy, RT

## 2020-10-18 NOTE — ED TRIAGE NOTES
Ems called to home unresponsive diabetic issues. Pt went into afib / pea after being intubated by EMS. Given a total of 4 epi by EMS. 18g Left EJ and right humeral IO.  1 calcium and 1 bicarb. 7.0 ETT 25cm at the lip. Pt still in afib varying from .  bp manual 102/60, bgl 127. Tube placement confirmed by this nurse after moving to bed from EMS stretcher.

## 2020-10-18 NOTE — ED PROVIDER NOTES
78-year-old male with history of CHF, diabetes, hypertension, ESRD, anemia presents status post cardiac arrest.  Patient reportedly was not feeling well when his son was present at the house earlier. Patient reportedly became unresponsive. Upon EMS arrival he was noted to be altered in the bed. Decision was made to intubate. At that time patient became bradycardic after ET tube was placed. Patient w/ cardiac arrest; ACLS guidelines followed per EMS. Patient reportedly received total of 4 epi, 1 bicarb, 1 calcium gluconate. ROSC after 4th epi. 7.0 ET tube 25 cm at the lip on arrival.  
 
The history is provided by the EMS personnel. The history is limited by the condition of the patient. No  was used. Past Medical History:  
Diagnosis Date  Acute renal failure superimposed on stage 3 chronic kidney disease ([address] 9/21/2016  Anemia   
 pt states he receives iron infusions  Arthritis OA generalized  Chronic kidney disease   
 not on HD- surgery done for access and fistula being removed. Per patient, no plans to proceed with dialysis  Chronic pancreatitis ([address] 9/22/2016  Dermatophytosis of nail 12/6/2016  Diabetic neuropathy ([address] 9/22/2016  
 insulin sliding scale only- no oral meds- avg fastings avg fasting \"low 200's;  hypo @ 80 A1C 7.1 9/2019 per patient  Essential hypertension 9/22/2016  
 medication  GERD (gastroesophageal reflux disease)   
 controlled with med  Hypercholesterolemia  Iron (Fe) deficiency anemia 9/22/2016  Septicemia due to Klebsiella pneumoniae ([address] 9/22/2016  Systolic CHF, chronic ([address] 9/23/2016 ECHO 4/2018 EF- 60-65%  Type II diabetes mellitus with nephropathy ([address] 09/22/2016  Venous insufficiency 12/6/2016  Xerosis cutis 12/6/2016 Past Surgical History:  
Procedure Laterality Date  HX COLONOSCOPY    
 HX HERNIA REPAIR Left 2010  HX PROSTATECTOMY  2012  HX TURP  2012  FOR BPH  
  VASCULAR SURGERY PROCEDURE UNLIST Left 10/26/2017 AVF  VASCULAR SURGERY PROCEDURE UNLIST Left 2019 Ligation of left brachiobasilic fistula Family History:  
Problem Relation Age of Onset  Diabetes Mother  Stroke Mother  Hypertension Mother  No Known Problems Father  Diabetes Sister  Diabetes Brother  Diabetes Sister  Diabetes Sister  Other Sister   
     fibromyalgia Social History Socioeconomic History  Marital status:  Spouse name: Not on file  Number of children: Not on file  Years of education: Not on file  Highest education level: Not on file Occupational History  Not on file Social Needs  Financial resource strain: Not on file  Food insecurity Worry: Not on file Inability: Not on file  Transportation needs Medical: Not on file Non-medical: Not on file Tobacco Use  Smoking status: Former Smoker Packs/day: 2.00 Years: 20.00 Pack years: 40.00 Last attempt to quit:  Years since quittin.8  Smokeless tobacco: Current User Substance and Sexual Activity  Alcohol use: No  
 Drug use: Never  Sexual activity: Not on file Lifestyle  Physical activity Days per week: Not on file Minutes per session: Not on file  Stress: Not on file Relationships  Social connections Talks on phone: Not on file Gets together: Not on file Attends [employer]ism service: Not on file Active member of club or organization: Not on file Attends meetings of clubs or organizations: Not on file Relationship status: Not on file  Intimate partner violence Fear of current or ex partner: Not on file Emotionally abused: Not on file Physically abused: Not on file Forced sexual activity: Not on file Other Topics Concern  Not on file Social History Narrative  Not on file ALLERGIES: Patient has no known allergies. Review of Systems Unable to perform ROS: Intubated Constitutional: Negative for chills and fever. Gastrointestinal: Negative for nausea and vomiting. Skin: Negative for rash. Vitals:  
 10/18/20 1842 BP: (!) 117/57 Pulse: 91 SpO2: 94% Weight: 72.1 kg (159 lb) Height: 6' (1.829 m) Physical Exam 
Vitals signs and nursing note reviewed. Constitutional:   
   Comments: Unresponsive. Intubated. HENT:  
   Head: Normocephalic and atraumatic. Mouth/Throat:  
   Comments: 7.0 ETT 25 cm at lip. Eyes:  
   Comments: Pupils 4 mm bilaterally. Unreactive. Cardiovascular:  
   Rate and Rhythm: Normal rate. Pulses: Normal pulses. Heart sounds: Normal heart sounds. Pulmonary:  
   Comments: ETT in place. Coarse breath sounds w/ bagging. Abdominal:  
   General: There is no distension. Palpations: Abdomen is soft. Tenderness: There is no abdominal tenderness. Skin: 
   Findings: No rash. Comments: IO present to R humeral head. Neurological:  
   Comments: Unresponsive. Intubated. MDM Number of Diagnoses or Management Options Acute hyperkalemia: new and requires workup Cardiac arrest [facility]): new and requires workup ESRD (end stage renal disease) [facility]): new and requires workup Pneumonia of both lungs due to infectious organism, unspecified part of lung: new and requires workup Diagnosis management comments: IVFs started. Patient noted to be hypoglycemic. Amp D50 given. Additionally patient noted to be acidotic. She has bicarb ordered. ABG with findings as below. Consistent with bilateral lobar pneumonia. Orders placed for Vanco and Zosyn. Pt noted to be hypotensive. Levophed started. ICU consulted for admission. Awaiting family. Amount and/or Complexity of Data Reviewed Clinical lab tests: ordered and reviewed Tests in the radiology section of CPT®: ordered and reviewed Tests in the medicine section of CPT®: ordered and reviewed Review and summarize past medical records: yes Independent visualization of images, tracings, or specimens: yes Risk of Complications, Morbidity, and/or Mortality Presenting problems: high Diagnostic procedures: high Management options: high Patient Progress Patient progress: other (comment) ED Course as of Oct 18 1924 Sun Oct 18, 2020  
1909 POC glucose 60. Amp D50 given. [DF] 1917 CXR IMPRESSION:  Bilateral lobar pneumonia. [DF] ED Course User Index 
[DF] Sebastien Zapata MD  
 
 
EKG Date/Time: 10/18/2020 7:25 PM 
Performed by: Sebastien Zapata MD 
Authorized by: Sebastien Zapata MD  
 
ECG reviewed by ED Physician in the absence of a cardiologist: yes Rate:  
  ECG rate:  110 ECG rate assessment: tachycardic Rhythm:  
  Rhythm: sinus tachycardia QRS:  
  QRS intervals: Wide Conduction:  
  Conduction: abnormal   
  Abnormal conduction: complete RBBB   
ST segments: ST segments:  Normal 
T waves:  
  T waves: normal   
Critical Care Performed by: Sebastien Zapata MD 
Authorized by: Sebastien Zapata MD  
 
Critical care provider statement:  
  Critical care time (minutes):  45 
  Critical care time was exclusive of:  Separately billable procedures and treating other patients Critical care was necessary to treat or prevent imminent or life-threatening deterioration of the following conditions:  Cardiac failure, circulatory failure, CNS failure or compromise, dehydration, endocrine crisis, metabolic crisis, respiratory failure, renal failure, sepsis and shock   Critical care was time spent personally by me on the following activities:  Blood draw for specimens, development of treatment plan with patient or surrogate, discussions with consultants, evaluation of patient's response to treatment, examination of patient, interpretation of cardiac output measurements, review of old charts, re-evaluation of patient's condition, pulse oximetry, ordering and review of radiographic studies, ordering and review of laboratory studies and ordering and performing treatments and interventions I assumed direction of critical care for this patient from another provider in my specialty: yes Results Include: 
 
Recent Results (from the past 24 hour(s)) CBC WITH AUTOMATED DIFF Collection Time: 10/18/20  6:47 PM  
Result Value Ref Range WBC 3.2 (L) 4.3 - 11.1 K/uL  
 RBC 2.46 (L) 4.23 - 5.6 M/uL HGB 6.1 (LL) 13.6 - 17.2 g/dL HCT 21.3 (L) 41.1 - 50.3 % MCV 86.6 79.6 - 97.8 FL  
 MCH 24.8 (L) 26.1 - 32.9 PG  
 MCHC 28.6 (L) 31.4 - 35.0 g/dL  
 RDW 17.4 (H) 11.9 - 14.6 % PLATELET 975 (L) 941 - 450 K/uL MPV 12.8 (H) 9.4 - 12.3 FL ABSOLUTE NRBC 0.02 0.0 - 0.2 K/uL DF PENDING   
POC CG8I Collection Time: 10/18/20  6:50 PM  
Result Value Ref Range Device: VENT    
 FIO2 (POC) 100 % pH (POC) 6.71 (LL) 7.35 - 7.45    
 pCO2 (POC) 52.5 (H) 35 - 45 MMHG  
 pO2 (POC) 106 (H) 75 - 100 MMHG  
 HCO3 (POC) 6.7 (L) 22 - 26 MMOL/L  
 sO2 (POC) 87 (L) 95 - 98 % Base deficit (POC) 27 mmol/L Mode ASSIST CONTROL Tidal volume 450 ml Set Rate 18 bpm  
 Allens test (POC) YES Site LEFT RADIAL Specimen type (POC) ARTERIAL Sodium,  136 - 145 MMOL/L Potassium, POC 5.4 (H) 3.5 - 5.1 MMOL/L Glucose, POC 60 (L) 65 - 100 MG/DL Calcium, ionized (POC) 0.96 (L) 1.12 - 1.32 mmol/L Performed by [employer] Critical value read back 18:50 Respiratory comment: PhysicianNotified Exhaled minute volume 7.60 L/min COLLECT TIME 3,784 GLUCOSE, POC Collection Time: 10/18/20  6:58 PM  
Result Value Ref Range Glucose (POC) 377 (H) 65 - 100 mg/dL Carter Dobbs MD; 10/18/2020 @7:02 PM Voice dictation software was used during the making of this note. This software is not perfect and grammatical and other typographical errors may be present.   This note has not been proofread for errors. 
===================================================================

## 2020-10-19 LAB
ATRIAL RATE: 81 BPM
CALCULATED R AXIS, ECG10: -77 DEGREES
CALCULATED T AXIS, ECG11: 92 DEGREES
DIAGNOSIS, 93000: NORMAL
Q-T INTERVAL, ECG07: 424 MS
QRS DURATION, ECG06: 162 MS
QTC CALCULATION (BEZET), ECG08: 573 MS
VENTRICULAR RATE, ECG03: 110 BPM

## 2020-10-19 NOTE — ED NOTES
Family decided to make patient DNR. Decision was made for patient to be extubated and for all treatment to be stopped. Was contacted by nurse to present to bedside. Patient not breathing at this time. Patient no pulses present. Asystole on the monitor. Time of death called at 9:16 PM.

## 2020-10-19 NOTE — ED NOTES
Patient extubated at this time, per family decision, by RT. All medications stopped by this RN. Family at bedside, needs addressed.

## 2020-10-19 NOTE — PROGRESS NOTES
Met with patient's son and daughter in law at bedside as well as ER RN. Patient had confirmed wishes with son for no dialysis, no intubation and actually had said he didn't want to go back to the hospital, but son couldn't let him die in his trailer by himself and called EMS. We discussed the inevitability of the patient's condition and options for ongoing ICU care which is futile for any recovery vs comfort measures including stopping pressors, abx, IVF, ventilation and continuing only medications planned for comfort including morphine and ativan. Discussed if he was stable overnight we could consult hospice for inpatient hospice. Discussed it is not certain how long he will live when removed from life support but estimate anywhere from minutes to a couple days. They understood situation well as well as the patient's wishes and have agreed to comfort measures. RT has been requested for extubations. Comfort care orders have been placed. Asked if they had any more questions or unanswered concerns and they confirmed they felt situation/questions were answered adequately.  
 
Anselmo Whipple MD

## 2020-10-19 NOTE — DISCHARGE SUMMARY
Death Summary [employer] Admission date:  10/18/2020 Discharge date:   10/18/2020 Admitting Diagnosis:  Cardiac arrest ([facility] [I46.9] Discharge Diagnosis:   
Problem List as of 10/18/2020 Date Reviewed: 7/23/2020 Codes Class Noted - Resolved Cardiac arrest [facility]) ICD-10-CM: I46.9 ICD-9-CM: 427.5  10/18/2020 - Present Pulmonary infiltrates ICD-10-CM: R91.8 ICD-9-CM: 793.19  10/18/2020 - Present Long Q-T syndrome ICD-10-CM: I45.81 ICD-9-CM: 426.82  10/13/2020 - Present Hypomagnesemia ICD-10-CM: K00.03 
ICD-9-CM: 275.2  10/13/2020 - Present Non compliance w medication regimen ICD-10-CM: Z91.14 
ICD-9-CM: V15.81  10/13/2020 - Present DKA (diabetic ketoacidoses) ([facility] ICD-10-CM: E11.10 ICD-9-CM: 250.12  8/27/2020 - Present Hyperphosphatemia ICD-10-CM: E83.39 
ICD-9-CM: 275.3  8/27/2020 - Present Metabolic acidosis, increased anion gap ICD-10-CM: E87.2 ICD-9-CM: 276.2  8/27/2020 - Present UTI (urinary tract infection) ICD-10-CM: N39.0 ICD-9-CM: 599.0  8/16/2020 - Present Wound infection ICD-10-CM: T14. 8XXA, L08.9 ICD-9-CM: 958.3  8/16/2020 - Present Overview Signed 8/16/2020  8:07 PM by Artur Peralta MD  
  Rt leg ALICJA (acute kidney injury) ([facility] ICD-10-CM: N17.9 ICD-9-CM: 584.9  7/26/2020 - Present Functional quadriplegia (HCC) ICD-10-CM: R53.2 ICD-9-CM: 780.72  7/26/2020 - Present Anemia ICD-10-CM: D64.9 ICD-9-CM: 285.9  7/26/2020 - Present Hypocalcemia ICD-10-CM: E83.51 
ICD-9-CM: 275.41  7/26/2020 - Present Acute pain ICD-10-CM: W54 ICD-9-CM: 338.19  7/26/2020 - Present Left shoulder pain ICD-10-CM: M25.512 ICD-9-CM: 719.41  7/26/2020 - Present Hyperkalemia ICD-10-CM: E87.5 ICD-9-CM: 276.7  7/26/2020 - Present Neck pain ICD-10-CM: M54.2 ICD-9-CM: 723.1  7/23/2020 - Present Cervical strain ICD-10-CM: S16. Evaristo Carson ICD-9-CM: 847.0  7/23/2020 - Present Degenerative disc disease, cervical ICD-10-CM: M50.30 ICD-9-CM: 722.4  7/23/2020 - Present Cervical radiculopathy ICD-10-CM: M54.12 
ICD-9-CM: 723.4  7/2/2020 - Present Neuropathic pain ICD-10-CM: M79.2 ICD-9-CM: 729.2  7/2/2020 - Present Encounter for medication management ICD-10-CM: Y61.353 ICD-9-CM: V58.69  7/2/2020 - Present Postural imbalance ICD-10-CM: R29.3 ICD-9-CM: 729.90  7/2/2020 - Present ESRD (end stage renal disease) ([facility] ICD-10-CM: N18.6 ICD-9-CM: 585.6  11/3/2019 - Present Open wound of skin ICD-10-CM: T14. Maya Villela ICD-9-CM: 879.8  11/3/2019 - Present DVT (deep venous thrombosis) (HCC) ICD-10-CM: I82.409 ICD-9-CM: 453.40  11/3/2019 - Present Rhabdomyolysis ICD-10-CM: H43.96 ICD-9-CM: 728.88  4/30/2018 - Present Metabolic acidosis QHS-65-SI: E87.2 ICD-9-CM: 276.2  4/30/2018 - Present Renal failure (ARF), acute on chronic (HCC) ICD-10-CM: N17.9, N18.9 ICD-9-CM: 584.9, 585.9  4/29/2018 - Present Dementia without behavioral disturbance (HCC) (Chronic) ICD-10-CM: F03.90 ICD-9-CM: 294.20  4/19/2018 - Present Hypernatremia ICD-10-CM: E87.0 ICD-9-CM: 276.0  4/18/2018 - Present Seizure ([facility] ICD-10-CM: R56.9 ICD-9-CM: 780.39  4/18/2018 - Present Acute metabolic encephalopathy LIT-15-IU: G93.41 
ICD-9-CM: 348.31  4/13/2018 - Present Volume overload ICD-10-CM: E87.70 ICD-9-CM: 276.69  4/4/2018 - Present Chest pain ICD-10-CM: R07.9 ICD-9-CM: 786.50  4/4/2018 - Present Cauda equina compression (HCC) ICD-10-CM: G83.4 ICD-9-CM: 344.60  4/2/2018 - Present Hypercholesterolemia ICD-10-CM: E78.00 ICD-9-CM: 272.0  Unknown - Present Uncontrolled diabetes mellitus, with long-term current use of insulin (HCC) (Chronic) ICD-10-CM: E11.65, Z79.4 ICD-9-CM: 250.02, V58.67  2/28/2018 - Present  Carotid bruit ICD-10-CM: R09.89 
 ICD-9-CM: 785.9  2/28/2018 - Present Arteriovenous fistula (HCC) ICD-10-CM: I77.0 ICD-9-CM: 447.0  10/31/2017 - Present Overview Signed 10/31/2017  3:13 PM by Mike Will NP  
  10/26/17 (Dr. Darryle [place]) Creation of left brachiobasilic fistula. Onychomycosis ICD-10-CM: B35.1 ICD-9-CM: 110.1  9/13/2017 - Present Controlled type 2 diabetes mellitus with complication, with long-term current use of insulin (HCC) (Chronic) ICD-10-CM: E11.8, Z79.4 ICD-9-CM: 250.90, V58.67  9/13/2017 - Present Stasis edema of both lower extremities (Chronic) ICD-10-CM: G01.829 ICD-9-CM: 459.30  4/4/2017 - Present Cellulitis of left lower leg ICD-10-CM: L03.116 ICD-9-CM: 682.6  4/4/2017 - Present Venous stasis ulcer of left lower extremity ([facility] ICD-10-CM: P71.221, W75.864 ICD-9-CM: 454.0  4/1/2017 - Present Venous insufficiency (Chronic) ICD-10-CM: T31.1 ICD-9-CM: 459.81  12/6/2016 - Present (HFpEF) heart failure with preserved ejection fraction (HCC) ICD-10-CM: I50.30 ICD-9-CM: 428.9  9/23/2016 - Present Septicemia due to Klebsiella pneumoniae [facility]) ICD-10-CM: A41.4 ICD-9-CM: 038.3  9/22/2016 - Present 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 
ICD-9-CM: 250.40, 583.81  9/22/2016 - Present HTN (hypertension) ICD-10-CM: I10 
ICD-9-CM: 401.9  9/22/2016 - Present GERD (gastroesophageal reflux disease) ICD-10-CM: K21.9 ICD-9-CM: 530.81  9/22/2016 - Present Diabetic neuropathy (HCC) ICD-10-CM: E11.40 ICD-9-CM: 250.60, 357.2  9/22/2016 - Present Chronic pancreatitis (HCC) (Chronic) ICD-10-CM: K86.1 ICD-9-CM: 998.9  9/22/2016 - Present Anemia of chronic disease ICD-10-CM: D63.8 ICD-9-CM: 285.29  9/22/2016 - Present CKD (chronic kidney disease) stage 5, GFR less than 15 ml/min (HCC) ICD-10-CM: N18.5 ICD-9-CM: 585.5  9/21/2016 - Present RESOLVED: Community acquired pneumonia of left lower lobe of lung ICD-10-CM: J18.9 ICD-9-CM: 028  4/18/2018 - 4/18/2018 RESOLVED: Acute renal failure superimposed on stage 4 chronic kidney disease ([facility] ICD-10-CM: N17.9, N18.4 ICD-9-CM: 584.9, 585.4  4/14/2018 - 4/18/2018 RESOLVED: Altered mental status ICD-10-CM: R41.82 
ICD-9-CM: 780.97  4/13/2018 - 4/18/2018 RESOLVED: TIA (transient ischemic attack) ICD-10-CM: G45.9 ICD-9-CM: 435.9  4/2/2018 - 4/4/2018 RESOLVED: CVA (cerebral vascular accident) ([facility] ICD-10-CM: I63.9 ICD-9-CM: 434.91  4/2/2018 - 4/4/2018 RESOLVED: Acute exacerbation of CHF (congestive heart failure) (HCC) ICD-10-CM: I50.9 ICD-9-CM: 428.0  4/2/2018 - 4/4/2018 Consultants: none Studies/Procedures: 
CXR, labs Hospital course: 
Patient is a 79 y.o.  male presents with post arrest with ROSC. He has h/o PMH of CKD5 refusing dialysis, DM2 admitted 10/13-10/15 to ICU with DKA. During that visit he again was seen by nephrology and again refused dialysis. Today he called his son as he was feeling poorly and son called EMS and once they arrived he was altered and short of breath and decision was made to intubate.  At that time patient became bradycardic after ET tube was placed.  Patient w/ cardiac arrest; ACLS guidelines followed per EMS.  Patient reportedly received total of 4 epi, 1 bicarb, 1 calcium gluconate. ROSC after 4th epi.  7.0 ET tube 25 cm at the lip on arrival. He was brought to ED found to have profound acidosis, mild hypoglycemia, anemia, hypocalcemia, bilateral infiltrates on CXR, low WBC at 3.2, albumin 1.4, lactic acid of 10. The patient is unresponsive currently, not on ongoing sedation, on levophed of 7 and cannot participate in history or decision making. I called son and he is en route with wife to hospital and we briefly discussed goals of care and decision making by phone. Met with patient's son and daughter in law at bedside as well as ER RN. Patient had confirmed wishes with son for no dialysis, no intubation and actually had said he didn't want to go back to the hospital, but son couldn't let him die in his trailer by himself and called EMS. We discussed the inevitability of the patient's condition and options for ongoing ICU care which is futile for any recovery vs comfort measures including stopping pressors, abx, IVF, ventilation and continuing only medications planned for comfort including morphine and ativan. Discussed if he was stable overnight we could consult hospice for inpatient hospice. Discussed it is not certain how long he will live when removed from life support but estimate anywhere from minutes to a couple days. They understood situation well as well as the patient's wishes and have agreed to comfort measures. RT has been requested for extubations. Comfort care orders have been placed. Asked if they had any more questions or unanswered concerns and they confirmed they felt situation/questions were answered adequately. Patient passed away with family at bedside about 20 minutes after removal from life support. Patient  before he could be admitted and death certificate will completed by  per house supervisor.  
  
Final: 
--Pronounced dead at 2116 on 18 Oct 2020. --Total discharge greater than 30 minutes in duration.  
 
Desmond Singh MD

## 2021-08-17 NOTE — CONSULTS
[address] [facility], [address] [facility] 
[phone] History and Physical  
A SHERRY Mantilla    Admit date: 11/3/2019 MRN: [id number]     : 1940     Age: 67 y.o.       
 
2019 10:44 AM 
 
Subjective/HPI:  
This patient is a 67 y.o. black male seen at the request of Lio Vergara DO and evaluated for right lower extremity wound. Patient is s/p ligation of left brachiobasilic fistula (1755, Gris Rosen MD) after worsening L UE pain and edema prompted patient to refuse further hemodialysis. Patient was admitted last night after presenting to the ED with foul-smelling drainage from right lateral leg wound. Wound has been present 10-14d, though patient reports wounds of this sort to B LE ongoing x 6-7y. He admits to burning, achey pain at wound. He notes long-standing B LE neuropathy. He denies known preceding trauma / fall / injury. Afebrile in ED, WBC 5.0 (3.7 today). He denies fever or chills, N/V, dyspnea, chest pain. Has known non-occlusive popliteal DVT (10/24/2019 imaging) previously treated with Eliquis, on IV heparin now. Previous smoker, he quit >20y ago. PMH with HTN, CHF, DM2, CKD4-5, remote TIA / CVA, GERD, DVT. Patient's past surgical, family and social histories were reviewed as noted here and below. Review of Systems Pertinent items are noted in HPI. Past Medical History:  
Diagnosis Date  Acute renal failure superimposed on stage 3 chronic kidney disease ([address] 2016  Anemia   
 pt states he receives iron infusions  Arthritis OA generalized  Chronic kidney disease   
 not on HD- surgery done for access and fistula being removed. Per patient, no plans to proceed with dialysis  Chronic pancreatitis ([address] 2016  Dermatophytosis of nail 2016  Diabetic neuropathy ([address] 2016  
 insulin sliding scale only- no oral meds- avg fastings avg fasting \"low Impression: Anatomical narrow angle, bilateral: H40.033. Plan: Performed gonioscopy and able to dilate without any complications today. S/p LPI. Dilated with Tropicamide in the office without IOP elevation. No longer occludable. Advised patient of regular follow-up. 200's;  hypo @ 80 A1C 7.1 2019 per patient  Essential hypertension 2016  
 medication  GERD (gastroesophageal reflux disease)   
 controlled with med  Hypercholesterolemia  Iron (Fe) deficiency anemia 2016  Septicemia due to Klebsiella pneumoniae ([facility] 2016  Systolic CHF, chronic ([facility] 2016 ECHO 2018 EF- 60-65%  Type II diabetes mellitus with nephropathy ([facility] 2016  Venous insufficiency 2016  Xerosis cutis 2016 Past Surgical History:  
Procedure Laterality Date  HX COLONOSCOPY    
 HX HERNIA REPAIR Left 2010  HX PROSTATECTOMY  2012  HX TURP   FOR BPH  VASCULAR SURGERY PROCEDURE UNLIST Left 10/26/2017 AVF  VASCULAR SURGERY PROCEDURE UNLIST Left 2019 Ligation of left brachiobasilic fistula No Known Allergies Social History Tobacco Use  Smoking status: Former Smoker Packs/day: 2.00 Years: 20.00 Pack years: 40.00 Last attempt to quit: 1995 Years since quittin.8  Smokeless tobacco: Current User Substance Use Topics  Alcohol use: No  
 
Family History Problem Relation Age of Onset  Diabetes Mother  Stroke Mother  Hypertension Mother  No Known Problems Father  Diabetes Sister  Diabetes Brother  Diabetes Sister  Diabetes Sister  Other Sister   
     fibromyalgia Prior to Admission Medications Prescriptions Last Dose Informant Patient Reported? Taking? CALCITRIOL, BULK, 2019 at Unknown time  Yes Yes Sig: by Does Not Apply route. HUMALOG KWIKPEN INSULIN 100 unit/mL kwikpen 10/27/2019 at Unknown time  No Yes Si units three times a day with meals plus correction scale, 2 units/50 > 150, max daily dose 25 units Patient taking differently: by SubCUTAneous route. Per patient sliding scale only Does not take a standard dose before meals. amLODIPine (NORVASC) 10 mg tablet 2019 at Unknown time  Yes Yes Sig: Take 10 mg by mouth every morning. apixaban (ELIQUIS) 5 mg (74 tabs) starter pack 2019 at Unknown time  No Yes Sig: Take 10 mg (two 5 mg tablets) by mouth twice a day for 7 days Followed by 5 mg (one 5 mg tablet) by mouth twice a day  Indications: blood clot in a deep vein of the extremities  
calcifediol (RAYALDEE) 30 mcg Cs24 2019 at Unknown time  Yes Yes Sig: Take  by mouth nightly. cloNIDine (CATAPRES) 0.1 mg/24 hr ptwk 2019 at Unknown time  Yes Yes Si Patch by TransDERmal route every seven (7) days. Changes on - on ELVIRA chest 2019 Instructed to inform pre-op nurse location of patch on arrival  
colestipol (COLESTID) 1 gram tablet Not Taking at Unknown time  Yes No  
Sig: Take 1 g by mouth two (2) times daily as needed. hydrALAZINE (APRESOLINE) 100 mg tablet 11/3/2019 at Unknown time  No Yes Sig: Take 1 Tab by mouth three (3) times daily. Patient taking differently: Take 100 mg by mouth three (3) times daily. Take morning of procedure. insulin glargine (LANTUS) 100 unit/mL injection 10/27/2019 at Unknown time  No Yes Sig: 15 Units by SubCUTAneous route daily. Patient taking differently: 15 Units by SubCUTAneous route daily. Rarely uses - states only uses occasionally- Instructed to take no more than 80% if he takes evening before procedure. Normal dose 15 units. Adjusted dose = maximum dose of 12 units Takes in the morning. lidocaine 4 % patch Unknown at Unknown time  No No  
Sig: Cut to appropriate size. Apply to intact skin for 12 hours, remove for 12 hours. Patient taking differently: 1 Patch by TransDERmal route as needed. Cut to appropriate size. Apply to intact skin for 12 hours, remove for 12 hours. States uses only occasionally and not on today 2019 Do not take morning of procedure. metoprolol succinate (TOPROL-XL) 50 mg XL tablet 11/3/2019 at Unknown time  Yes Yes Sig: Take 50 mg by mouth every morning. Take morning of procedure. omeprazole (PRILOSEC) 20 mg capsule 11/3/2019 at Unknown time  Yes Yes Sig: Take 20 mg by mouth every morning. Take morning of procedure. pravastatin (PRAVACHOL) 40 mg tablet 11/2/2019 at Unknown time  No Yes Sig: Take 1 Tab by mouth nightly. pregabalin (LYRICA) 50 mg capsule 11/2/2019 at Unknown time  No Yes Sig: Take 1 Cap by mouth daily. Max Daily Amount: 50 mg. Patient taking differently: Take 150 mg by mouth two (2) times a day. Take morning of procedure. sodium bicarbonate 650 mg tablet 11/2/2019 at Unknown time  Yes Yes Sig: Take  by mouth three (3) times daily. torsemide (DEMADEX) 20 mg tablet 11/2/2019 at Unknown time  Yes Yes Sig: Take 40 mg by mouth daily. Do not take morning of procedure. traMADol (ULTRAM) 50 mg tablet 11/3/2019 at Unknown time  Yes Yes Sig: Take 50 mg by mouth every six (6) hours as needed for Pain. May take morning of procedure if needed Facility-Administered Medications: None Current Facility-Administered Medications Medication Dose Route Frequency  alcohol 62% (NOZIN) nasal  1 Ampule  1 Ampule Topical Q12H  
 acetaminophen (TYLENOL) tablet 650 mg  650 mg Oral Q4H PRN  
 HYDROcodone-acetaminophen (NORCO) 5-325 mg per tablet 1 Tab  1 Tab Oral Q4H PRN  
 naloxone (NARCAN) injection 0.4 mg  0.4 mg IntraVENous PRN  
 ondansetron (ZOFRAN) injection 4 mg  4 mg IntraVENous Q4H PRN  
 tuberculin injection 5 Units  5 Units IntraDERMal ONCE  
 amLODIPine (NORVASC) tablet 10 mg  10 mg Oral DAILY  cloNIDine (CATAPRES) 0.1 mg/24 hr patch 1 Patch  1 Patch TransDERmal Q7D  
 hydrALAZINE (APRESOLINE) tablet 100 mg  100 mg Oral TID  pantoprazole (PROTONIX) tablet 40 mg  40 mg Oral DAILY  metoprolol succinate (TOPROL-XL) XL tablet 50 mg  50 mg Oral DAILY  pravastatin (PRAVACHOL) tablet 40 mg  40 mg Oral QHS  sodium bicarbonate tablet 650 mg  650 mg Oral TID  torsemide (DEMADEX) tablet 40 mg  40 mg Oral DAILY  traMADol (ULTRAM) tablet 50 mg  50 mg Oral Q6H PRN  
 heparin 25,000 units in dextrose 500 mL infusion  12-25 Units/kg/hr IntraVENous CONTINUOUS  
 clindamycin (CLEOCIN) capsule 300 mg  300 mg Oral Q8H  
 insulin lispro (HUMALOG) injection   SubCUTAneous AC&HS  insulin glargine (LANTUS) injection 15 Units  15 Units SubCUTAneous QHS Objective:  
 
Vitals:  
 11/03/19 2047 11/04/19 0000 11/04/19 0236 11/04/19 7880 BP: 196/88 148/78 151/84 129/61 Pulse: 70 80 (!) 58 (!) 58 Resp: 18 18 16 16 Temp: 97.5 °F (36.4 °C) 98.1 °F (36.7 °C) 97.9 °F (36.6 °C) 98.3 °F (36.8 °C) SpO2: 99% 99% 100% 100% Weight:   164 lb 14.4 oz (74.8 kg) Height:      
 
 
Physical Exam:  
General well-developed elderly male in no acute distress Skin  warm, moist with texture appropriate for age, no jaundice or rashes HEENT normocephalic, no lesions to the scalp; extraocular muscles intact, nares patent, oral mucosa moist 
Neck  supple without JVD or bruits; trachea midline Lungs  respirations unlabored, lungs clear to auscultation bilaterally Heart  regular rate and rhythm, no appreciable murmurs Abdomen soft, non-tender, protuberant but non-distended, bowel sounds normoactive Extremities warm without cyanosis, feet sensorimotor grossly intact, legs dry and woody-appearing with chronic changes of PAD, right leg with mild edema and underlying erythema c/w cellulitis; lateral aspect of right leg with large (~20 x 5cm) wound with dog-ear tears superiorly and inferiorly, stripe of pink granulation tissue; left UE mildly edematous and painful to exam / manipulation, thrill at distal arm Pulses  non-palpable pedal pulses but bilateral DP and PT signals available with Doppler Neurological alert and oriented; no gross sensorimotor deficits Data Review Recent Labs 11/04/19 
0322 11/03/19 
1725 WBC 3.7* 5.0 HGB 10.8* 13.3* HCT 33.4* 41.3 * 173 Recent Labs 11/04/19 
0322 11/03/19 
2125 11/03/19 
1725 *  --  145  
K 3.0*  --  3.7 *  --  110* CO2 25  --  23  
*  --  429* BUN 60*  --  61* CREA 4.70*  --  4.72* INR  --  1.0  --   
 
 
Imaging Hospitalist-ordered ABIs pending. Assessment / Plan / Recommendations / Medical Decision Making:  
 
Hospital Problems  Date Reviewed: 2/1/2019 Codes Class Noted POA  
 ESRD (end stage renal disease) ([facility] ICD-10-CM: N18.6 ICD-9-CM: 585.6  11/3/2019 Unknown * (Principal) Open wound of skin ICD-10-CM: T14. Vel Lay ICD-9-CM: 879.8  11/3/2019 Unknown DVT (deep venous thrombosis) (HCC) ICD-10-CM: I82.409 ICD-9-CM: 453.40  11/3/2019 Unknown Chronic systolic congestive heart failure (HCC) (Chronic) ICD-10-CM: J42.55 ICD-9-CM: 428.22, 428.0  9/23/2016 Yes 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 
ICD-9-CM: 250.40, 583.81  9/22/2016 Yes Patient Active Problem List  
 Diagnosis Date Noted  ESRD (end stage renal disease) ([facility] 11/03/2019  Open wound of skin 11/03/2019  DVT (deep venous thrombosis) ([facility] 11/03/2019  Rhabdomyolysis 04/30/2018  Metabolic acidosis 41/11/8655  Renal failure (ARF), acute on chronic (HCC) 04/29/2018  Dementia without behavioral disturbance ([address] 04/19/2018  Hypernatremia 04/18/2018  Seizure ([address] 04/18/2018  Volume overload 04/04/2018  Chest pain 04/04/2018  Cauda equina compression ([address] 04/02/2018  Uncontrolled diabetes mellitus, with long-term current use of insulin ([address] 02/28/2018  Carotid bruit 02/28/2018  Hypercholesterolemia  Arteriovenous fistula ([facility] 10/31/2017  Onychomycosis 09/13/2017  Controlled type 2 diabetes mellitus with complication, with long-term current use of insulin ([facility] 09/13/2017  Stage 4 chronic kidney disease ([facility] 04/04/2017  Stasis edema of both lower extremities 04/04/2017  Cellulitis of left lower leg 04/04/2017  Venous stasis ulcer of left lower extremity ([facility] 04/01/2017  Venous insufficiency 12/06/2016  Chronic systolic congestive heart failure ([facility] 09/23/2016  [facility] Klebsiella [facility] 09/22/2016  Type II diabetes mellitus with nephropathy ([facility] 09/22/2016  Essential hypertension 09/22/2016  GERD (gastroesophageal reflux disease) 09/22/2016  Diabetic neuropathy ([facility] 09/22/2016  Chronic pancreatitis ([facility] 09/22/2016  Iron (Fe) deficiency anemia 09/22/2016  Acute renal failure superimposed on stage 3 chronic kidney disease ([facility] 09/21/2016 Sharee Max is a 67 y.o. male with right lateral leg wound and cellulitis on background of multiple medical issues, including CKD4-5 with patient's adamant refusal of HD. Patient likely has chronic peripheral arterial disease contributing to poor wound healing, but any catheter-based intervention and contrast exposure risks further nephropathy. Patient was discussed at length with Dr. Vinita Gonzalez, who recommended conservative treatment with wound care. May order bilateral lower extremity arterial duplex as baseline. Thank you very much for this referral. We appreciate the opportunity to participate in this patient's care. We will follow along with above stated plan. Hunter Rodriguez PA-C Physician Assistant with [facility] Trista Miller.  Vinita Gonzalez MD / Caleb Cunningham MD

## 2021-09-09 NOTE — ED TRIAGE NOTES
Airway  Performed by: Speedy Lee MD  Authorized by: Speedy Lee MD     Final Airway Type:  Endotracheal airway  Final Endotracheal Airway*:  ETT  ETT Size (mm)*:  7.5  Cuff*:  Regular  Technique Used for Successful ETT Placement:  Direct laryngoscopy  Devices/Methods Used in Placement*:  Mask  Intubation Procedure*:  Preoxygenation, ETCO2, Atraumatic, Dentition Unchanged, Pharynx Clear and Rapid Sequence Intubation  Insertion Site:  Oral  Blade Type*:  MAC  Blade Size*:  3  Measured from*:  Gums  Secured at (cm)*:  20  Placement Verified by: auscultation and capnometry    Glottic View*:  2 - partial view of glottis  Attempts*:  1  Ventilation Between Attempts:  None   Patient Identified, Procedure confirmed, Emergency equipment available and Safety protocols followed  Location:  OR  Urgency:  Elective  Difficult Airway: No    Indications for Airway Management:  Anesthesia  Spontaneous Ventilation: absent    Sedation Level:  Anesthetized  Mask Difficulty Assessment:  0 - not attempted  Performed By:  Anesthesiologist  Anesthesiologist:  Speedy Lee MD  Start Time: 9/9/2021 7:42 AM         Patient arrives via EMS from [facility]. Patient was sent to ED related to elevated BUN and Calcium. Patient has history of chronic kidney disease. Patient has no complaints. Vitals stable.

## 2021-11-16 NOTE — PROGRESS NOTES
Arrived to the [facility]. Assessment completed. Patient tolerated procrit injection well. Hemoglobin 9.8 today. Any issues or concerns during appointment: noted that left arm is swollen and he states that it is painful, but that he does have an ultrasound this Thursday. Sagar Ordonez Patient aware of next infusion appointment on 8/26/19 (date) at 1600 (time) with IV infusion center. Discharged ambulatory, with cane. Patient instructed to call his doctor's office immediately for any problems or concerns. He verbalizes understanding. Detail Level: Detailed

## 2022-06-10 NOTE — DISCHARGE SUMMARY
Hospitalist Discharge Summary Admit Date:  2020 12:52 PM  
Name:  Skye Class Age:  79 y.o. 
:  1940 MRN:  [id number] PCP:  Angel Choudhary MD 
Treatment Team: Attending Provider: Justin Ramirez MD; Charge Nurse: Wagner Alvarado; Consulting Provider: Ace Cameron MD; Care Manager: Claire Padilla, RN; Consulting Provider: Carmella Lyon MD; Consulting Provider: Justin Ramirez MD; Utilization Review: Jag Nation RN 
 
Problem List for this Hospitalization: 
Hospital Problems as of 2020 Date Reviewed: 2020 Codes Class Noted - Resolved POA  
 UTI (urinary tract infection) ICD-10-CM: N39.0 ICD-9-CM: 599.0  2020 - Present Unknown Wound infection ICD-10-CM: T14. 8XXA, L08.9 ICD-9-CM: 958.3  2020 - Present Unknown Overview Signed 2020  8:07 PM by Karon Mtz MD  
  Rt leg Functional quadriplegia (HCC) ICD-10-CM: R53.2 ICD-9-CM: 780.72  2020 - Present ESRD (end stage renal disease) ([facility] ICD-10-CM: N18.6 ICD-9-CM: 585.6  11/3/2019 - Present Yes Hypernatremia ICD-10-CM: E87.0 ICD-9-CM: 276.0  2018 - Present Yes * (Principal) Acute metabolic encephalopathy [id number]: G93.41 
ICD-9-CM: 348.31  2018 - Present Unknown Uncontrolled diabetes mellitus, with long-term current use of insulin (HCC) (Chronic) ICD-10-CM: E11.65, Z79.4 ICD-9-CM: 250.02, V58.67  2018 - Present Yes Venous stasis ulcer of left lower extremity ([facility] ICD-10-CM: O87.453, Q57.341 ICD-9-CM: 454.0  2017 - Present Yes Chronic systolic congestive heart failure (HCC) (Chronic) ICD-10-CM: L15.76 ICD-9-CM: 428.22, 428.0  2016 - Present HTN (hypertension) ICD-10-CM: I10 
ICD-9-CM: 401.9  2016 - Present Yes Chronic pancreatitis (HCC) (Chronic) ICD-10-CM: K86.1 ICD-9-CM: 516.9  2016 - Present Yes Admission HPI from 8/16/2020:   
\"F/U metabolic encephalopathy, UTI, ALICJA 
  
\"79year-old  can male patient who lives alone at home chronic debility -says walks with the help of a cane, chronic right lower extremity wounds, end-stage renal disorder does not want to be on dialysis, hypertension, hyperlipidemia, acid reflux, diabetic neuropathy, osteoarthritis, anemia of chronic disease and type 2 diabetes mellitus on insulin. 
  
When home health nurse went to see the patient found to have sugars of 600, fire department and EMS was called, home health nurse also noticed that patient was likely having altered mental state, also noticed large skin tear from left knee and left ankle which is new as per triage note. \"  \" Hospital Course: He was under the general medical floor for urinary tract infection, metabolic encephalopathy likely due to urinary tract infection and uremia, acute kidney injury. IV fluids were started. Interlocks were started. Nephrology was consulted and recommended dialysis but he refused multiple times on multiple days. His hypernatremia was slowly improving at the time of discharge. He was on D5 water. Wound care was consulted for his bilateral lower extremity wounds. He was started on ACE wraps. He was on antibiotics for his lower extremity skin tears however he never had a fever or leukocytosis here. Will not be sent home on antibiotics. Physical therapy evaluated him and recommended that he go to short-term rehab as he is high risk for falls. His tramadol was discontinued due to his kidney function and Dilaudid and Gabaptenin was started. He will be sent home with Dilaudid. Palliative care was consulted for goals of care. Patient wishes to be DO NOT INTUBATE but wishes to have chest compressions performed. Explained that this is not an ideal scenario however this is how he wishes to proceed. Long discussion with the patient and his son Tima Adams at bedside with case management. Patient explicitly states he never wants to go on dialysis. He explicitly states he does not to return to the hospital if he should become unresponsive at home. His son, Tima Adams, understands the patient's wishes. They will inform other family members and visitors of the patient's house of these wishes as well. He is hemodynamically stable for discharge home. Recommendations Patient requests not to return to the hospital if he should become unresponsive. Follow-up with your primary care physician in a week for evaluation of the lower extremities. CTLC will continue at home. Follow up instructions below. Plan was discussed with patient and son. All questions answered. Patient was stable at time of discharge and was instructed to call or return if there are any concerns or recurrence of symptoms. Diagnostic Imaging/Tests: No results found. Echocardiogram results: No results found for this visit on 08/16/20. All Micro Results Procedure Component Value Units Date/Time CULTURE, URINE [[id number]]  (Abnormal)  (Susceptibility) Collected:  08/16/20 1417 Order Status:  Completed Specimen:  Cath Urine Updated:  08/19/20 7546 Special Requests: NO SPECIAL REQUESTS Culture result:    
  >100,000 COLONIES/mL ESCHERICHIA COLI  
     
      
  >100,000 COLONIES/mL ESCHERICHIA COLI (2ND COLONY TYPE/STRAIN) Labs: Results:  
   
BMP, Mg, Phos Recent Labs 08/19/20 
8094 08/18/20 
6838 08/17/20 
1547 08/17/20 
0309 * 156*  --  158* K 4.1 4.1  --  4.1 * 126*  --  128* CO2 17* 18*  --  14* AGAP 13 12  --  16 * 114*  --  117* CREA 9.69* 9.89*  --  10.20* CA 6.1* 6.5* 6.3* 6.4*  
* 74  --  155* MG 1.5*  --   --   --   
PHOS  --   --  6.2*  --   
  
CBC Recent Labs 08/19/20 
6983 08/18/20 
4691 08/17/20 
0309 WBC 7.4 8.1 7.3 RBC 2.92* 2.72* 2.99* HGB 7.5* 7.0* 7.6* HCT 24.7* 23.0* 26.0*  
 143* 144* GRANS 66  --  66  
LYMPH 21  --  23 EOS 2  --  1 MONOS 9  --  9  
BASOS 0  --  0 IG 1  --  1 ANEU 4.9  --  4.8 ABL 1.6  --  1.7 SERAFIN 0.2  --  0.1 ABM 0.7  --  0.6 ABB 0.0  --  0.0 AIG 0.1  --  0.0 LFT Recent Labs 08/16/20 
1450 ALT 9* AP 78  
TP 6.5 ALB 2.3*  
GLOB 4.2* AGRAT 0.5* Cardiac Testing Lab Results Component Value Date/Time  (H) 10/24/2019 11:39 AM  
  04/13/2018 06:38 PM  
  04/02/2018 08:07 AM  
  (H) 07/30/2020 06:47 AM  
  (H) 07/29/2020 04:14 AM  
 CK 1,039 (H) 07/28/2020 06:42 AM  
 CK - MB 15.2 (H) 04/29/2018 06:26 PM  
 CK - MB 10.1 (H) 09/23/2016 03:05 AM  
 CK - MB 11.8 (H) 09/22/2016 09:37 PM  
 CK-MB Index 1.0 04/29/2018 06:26 PM  
 CK-MB Index 1.0 09/23/2016 03:05 AM  
 CK-MB Index CANNOT BE CALCULATED 09/22/2016 09:37 PM  
 Troponin-I, Qt. <0.02 (L) 04/03/2018 06:16 AM  
 Troponin-I, Qt. <0.02 (L) 04/02/2018 10:53 PM  
 Troponin-I, Qt. <0.02 (L) 04/02/2018 08:25 PM  
  
Coagulation Tests Lab Results Component Value Date/Time Prothrombin time 43.9 (H) 11/20/2019 12:00 PM  
 Prothrombin time 18.7 (H) 11/09/2019 03:52 AM  
 Prothrombin time 16.2 (H) 11/08/2019 05:23 AM  
 INR 4.5 (HH) 11/20/2019 12:00 PM  
 INR 1.5 11/09/2019 03:52 AM  
 INR 1.3 11/08/2019 05:23 AM  
 INR POC 16.7 (A) 11/25/2019 12:26 PM  
 INR POC 3.4 (A) 11/11/2019 01:15 PM  
 aPTT 112.8 (H) 11/09/2019 03:52 AM  
 aPTT 119.0 (H) 11/08/2019 08:56 PM  
 aPTT 64.6 (H) 11/08/2019 01:51 PM  
  
A1c Lab Results Component Value Date/Time Hemoglobin A1c 9.4 (H) 08/16/2020 01:22 PM  
 Hemoglobin A1c 8.4 (H) 11/03/2019 09:25 PM  
 Hemoglobin A1c 7.8 (H) 05/02/2018 06:06 AM  
  
Lipid Panel Lab Results Component Value Date/Time  Cholesterol, total 99 04/03/2018 06:16 AM  
 HDL Cholesterol 65 (H) 04/03/2018 06:16 AM  
 LDL, calculated 23 04/03/2018 06:16 AM  
 VLDL, calculated 11 04/03/2018 06:16 AM  
 Triglyceride 55 04/03/2018 06:16 AM  
 CHOL/HDL Ratio 1.5 04/03/2018 06:16 AM  
  
Thyroid Panel Lab Results Component Value Date/Time TSH 2.500 04/13/2018 06:37 PM  
 TSH 4.140 03/14/2018 11:49 AM  
    
Most Recent UA Lab Results Component Value Date/Time Color YELLOW 08/16/2020 02:17 PM  
 Appearance TURBID 08/16/2020 02:17 PM  
 Specific gravity 1.011 08/16/2020 02:17 PM  
 pH (UA) 5.0 08/16/2020 02:17 PM  
 Protein 100 (A) 08/16/2020 02:17 PM  
 Glucose 100 08/16/2020 02:17 PM  
 Ketone Negative 08/16/2020 02:17 PM  
 Bilirubin Negative 08/16/2020 02:17 PM  
 Blood MODERATE (A) 08/16/2020 02:17 PM  
 Urobilinogen 0.2 08/16/2020 02:17 PM  
 Nitrites Negative 08/16/2020 02:17 PM  
 Leukocyte Esterase LARGE (A) 08/16/2020 02:17 PM  
  
 
No Known Allergies Immunization History Administered Date(s) Administered  Pneumococcal Polysaccharide (PPSV-23) 02/07/2012  TB Skin Test (PPD) Intradermal 04/02/2018, 04/16/2018, 04/30/2018, 11/03/2019, 07/27/2020 All Labs from Last 24 Hrs: 
Recent Results (from the past 24 hour(s)) GLUCOSE, POC Collection Time: 08/18/20  4:56 PM  
Result Value Ref Range Glucose (POC) 286 (H) 65 - 100 mg/dL GLUCOSE, POC Collection Time: 08/18/20  9:15 PM  
Result Value Ref Range Glucose (POC) 402 (H) 65 - 100 mg/dL CBC WITH AUTOMATED DIFF Collection Time: 08/19/20  5:24 AM  
Result Value Ref Range WBC 7.4 4.3 - 11.1 K/uL  
 RBC 2.92 (L) 4.23 - 5.6 M/uL HGB 7.5 (L) 13.6 - 17.2 g/dL HCT 24.7 (L) 41.1 - 50.3 % MCV 84.6 79.6 - 97.8 FL  
 MCH 25.7 (L) 26.1 - 32.9 PG  
 MCHC 30.4 (L) 31.4 - 35.0 g/dL  
 RDW 16.8 (H) 11.9 - 14.6 % PLATELET 691 320 - 926 K/uL MPV 12.2 9.4 - 12.3 FL ABSOLUTE NRBC 0.03 0.0 - 0.2 K/uL  
 DF AUTOMATED NEUTROPHILS 66 43 - 78 % LYMPHOCYTES 21 13 - 44 % MONOCYTES 9 4.0 - 12.0 % EOSINOPHILS 2 0.5 - 7.8 %  BASOPHILS 0 0.0 - 2.0 %  
 IMMATURE GRANULOCYTES 1 0.0 - 5.0 %  
 ABS. NEUTROPHILS 4.9 1.7 - 8.2 K/UL  
 ABS. LYMPHOCYTES 1.6 0.5 - 4.6 K/UL  
 ABS. MONOCYTES 0.7 0.1 - 1.3 K/UL  
 ABS. EOSINOPHILS 0.2 0.0 - 0.8 K/UL  
 ABS. BASOPHILS 0.0 0.0 - 0.2 K/UL  
 ABS. IMM. GRANS. 0.1 0.0 - 0.5 K/UL METABOLIC PANEL, BASIC Collection Time: 08/19/20  5:24 AM  
Result Value Ref Range Sodium 155 (H) 136 - 145 mmol/L Potassium 4.1 3.5 - 5.1 mmol/L Chloride 125 (H) 98 - 107 mmol/L  
 CO2 17 (L) 21 - 32 mmol/L Anion gap 13 7 - 16 mmol/L Glucose 151 (H) 65 - 100 mg/dL  (H) 8 - 23 MG/DL Creatinine 9.69 (H) 0.8 - 1.5 MG/DL  
 GFR est AA 7 (L) >60 ml/min/1.73m2 GFR est non-AA 6 (L) >60 ml/min/1.73m2 Calcium 6.1 (L) 8.3 - 10.4 MG/DL MAGNESIUM Collection Time: 08/19/20  5:24 AM  
Result Value Ref Range Magnesium 1.5 (L) 1.8 - 2.4 mg/dL GLUCOSE, POC Collection Time: 08/19/20  7:16 AM  
Result Value Ref Range Glucose (POC) 167 (H) 65 - 100 mg/dL GLUCOSE, POC Collection Time: 08/19/20 11:07 AM  
Result Value Ref Range Glucose (POC) 288 (H) 65 - 100 mg/dL Discharge Exam: 
Patient Vitals for the past 24 hrs: 
 Temp Pulse Resp BP SpO2  
08/19/20 1100 97.8 °F (36.6 °C) 77 16 140/67 99 % 08/19/20 0710 97.8 °F (36.6 °C) 75 18 134/65 99 % 08/19/20 0543 98.2 °F (36.8 °C) 77 18 147/76 99 % 08/19/20 0035 98 °F (36.7 °C) 64 18 156/73 100 % 08/18/20 2025 97.2 °F (36.2 °C) 66 18 146/70 97 % 08/18/20 1657 98 °F (36.7 °C) 80 18 134/68 98 % Oxygen Therapy O2 Sat (%): 99 % (08/19/20 1100) Pulse via Oximetry: 91 beats per minute (08/16/20 2001) O2 Device: Room air (08/16/20 2001) Intake/Output Summary (Last 24 hours) at 8/19/2020 1326 Last data filed at 8/19/2020 3605 Gross per 24 hour Intake 699 ml Output 400 ml Net 299 ml General:    frail. Alert. No distress. Speaking in full and complete sentences Eyes:   Normal sclera. Extraocular movements intact. ENT:  Normocephalic, atraumatic. Moist mucous membranes CV:   Regular rate and rhythm. No murmur, rub, or gallop. Lungs:  Clear to auscultation bilaterally. No wheezing, rhonchi, or rales. Abdomen: Soft, nontender, nondistended. Bowel sounds normal.  
Extremities: Bilateral lower extremity wounds are wrapped in ace bandages. Edematous. Tender. Neurologic: sensation intact. Skin:     No rashes or jaundice. Psych:  Normal mood and affect. No evidence of confusion Discharge Info:  
Current Discharge Medication List  
  
START taking these medications Details HYDROmorphone (DILAUDID) 2 mg tablet Take 0.25 Tabs by mouth every four (4) hours as needed for Pain for up to 30 days. Max Daily Amount: 3 mg. Qty: 45 Tab, Refills: 0 Associated Diagnoses: Chronic right shoulder pain; Neuropathy; Venous stasis ulcer of left lower extremity ([address] Cellulitis of left lower leg  
  
gabapentin (NEURONTIN) 300 mg capsule Take 1 Cap by mouth daily for 30 days. Qty: 30 Cap, Refills: 0 CONTINUE these medications which have NOT CHANGED Details  
pregabalin (Lyrica) 150 mg capsule Take 150 mg by mouth two (2) times a day. insulin lispro (HUMALOG) 100 unit/mL injection INITIATE INSULIN CORRECTIVE PROTOCOL: 
INITIATE INSULIN CORRECTIVE PROTOCOL: 
Normal Insulin Sensitivity For Blood Sugar (mg/dL) of:    
Less than 150 =   0 units 150 -199 =   2 units 200 -249 =   4 units 250 -299 =   6 units 300 -349 =   8 units 350 and above = 10 units and Call Physician Initiate Hypoglycemia protocol if blood glucose is <70 mg/dL Fast Acting - Administer Immediately - or within 15 minutes of start of meal, if mealtime coverage. Qty: 1 Vial, Refills: 0  
  
insulin glargine (LANTUS) 100 unit/mL injection 15 Units by SubCUTAneous route nightly. Qty: 1 Vial, Refills: 0  
  
calcium acetate, phosphate binder, (PHOSLO) 667 mg tab tablet Take 2 Tabs by mouth three (3) times daily (with meals). Qty: 1 Tab, Refills: 0  
  
ferrous sulfate 325 mg (65 mg iron) tablet Take 1 Tab by mouth two (2) times daily (with meals). Qty: 1 Tab, Refills: 0  
  
multivit-minerals/folic acid (SPECTRAVITE ADULT PO) Take 1 Tab by mouth daily. cloNIDine (Catapres-TTS-3) 0.3 mg/24 hr 1 Patch by TransDERmal route every seven (7) days. Change every thursday  
  
glucose 4 gram chewable tablet Take 15 g by mouth as needed for Other (low blood sugar). Lactoperoxi/Gluc Oxid/Pot Thio (BIOTENE DRY MOUTH MM) Take 2 Sprays by mouth as needed for Other (dry mouth). dicyclomine (BENTYL) 10 mg capsule Take 10 mg by mouth two (2) times a day. ondansetron (ZOFRAN ODT) 4 mg disintegrating tablet Take 1 Tab by mouth every eight (8) hours as needed for Nausea. Qty: 20 Tab, Refills: 0  
  
colestipol (COLESTID) 1 gram tablet Take 1 g by mouth two (2) times a day. sodium bicarbonate 650 mg tablet Take 650 mg by mouth three (3) times daily. lidocaine 4 % patch Cut to appropriate size. Apply to intact skin for 12 hours, remove for 12 hours. Qty: 14 Patch, Refills: 0  
  
hydrALAZINE (APRESOLINE) 100 mg tablet Take 1 Tab by mouth three (3) times daily. Qty: 90 Tab, Refills: 2  
  
pravastatin (PRAVACHOL) 40 mg tablet Take 1 Tab by mouth nightly. Qty: 30 Tab, Refills: 0  
  
calcifediol (RAYALDEE) 30 mcg Cs24 Take 30 mcg by mouth nightly. amLODIPine (NORVASC) 10 mg tablet Take 10 mg by mouth every morning. metoprolol succinate (TOPROL-XL) 50 mg XL tablet Take 50 mg by mouth every morning. omeprazole (PRILOSEC) 20 mg capsule Take 20 mg by mouth every morning. oxymetazoline (AFRIN) 0.05 % nasal spray 2 Sprays by Both Nostrils route once as needed for Congestion. lipase-protease-amylase (CREON) 36,000-114,000- 180,000 unit cpDR capsule Take 3,600 Units by mouth See Admin Instructions. Take 2 capsules by mouth with each meal, and 1 capsule by mouth with each snack. acetaminophen (TYLENOL) 325 mg tablet Take 2 Tabs by mouth every four (4) hours as needed for Pain. Qty: 20 Tab, Refills: 0  
  
pantothenic ac-min oil-pet,hyd (AQUAPHOR) 41 % ointment Apply  to affected area daily. Apply over legs Qty: 53 g, Refills: 0 Disposition: home Activity: Activity as tolerated Diet: DIET DIABETIC CONSISTENT CARB Mechanical Soft DIET NUTRITIONAL SUPPLEMENTS Breakfast; Nepro Follow-up Appointments Procedures  FOLLOW UP VISIT Appointment in: Other (Specify) Follow up with your primary care physician within 2 weeks for hospital follow up for UTI, bilateral lower extremity wounds and CKD5 not wanting dialysis. Follow up with your primary care physician within 2 weeks for hospital follow up for UTI, bilateral lower extremity wounds and CKD5 not wanting dialysis. Standing Status:   Standing Number of Occurrences:   1 Order Specific Question:   Appointment in Answer: Other (Specify) Follow-up Information Follow up With Specialties Details Why Contact Info Ildefonso Salomon MD Internal Medicine Call in 1 week hospital follow up for CKD5 not wanting dialysis and bilaterarl lower extremity wounds [address] [facility] [place] [id number] [phone] Time spent in patient discharge planning and coordination 35 minutes.  
 
Signed: 
Angel Og MD 
 Normal for race

## 2024-02-14 NOTE — PROGRESS NOTES
01/29/20 3128 Wound Leg lower Right;Lateral  
Date First Assessed/Time First Assessed: 12/03/19 1404   Primary Wound Type: Venous  Location: Leg lower  Wound Location Orientation: Right;Lateral  
Dressing Status Clean, dry, and intact Dressing Type (Coflex TLC) Wound Length (cm) 0 cm Wound Width (cm) 0 cm Wound Depth (cm) 0 cm Wound Surface Area (cm^2) 0 cm^2 Wound Volume (cm^3) 0 cm^3 Condition of Base Epithelializing Epithelialization (%) 100 Drainage Amount None Wound Odor None Cleansing and Cleansing Agents  Soap and water Patient is currently taking Coumadin fair balance

## 2024-06-02 NOTE — PROGRESS NOTES
Interdisciplinary Rounds completed. Nursing, Case Management, and Physician  present. Plan of care reviewed and updated. Rehab at discharge. Negative Screen

## 2024-08-30 NOTE — PROGRESS NOTES
TRANSFER - OUT REPORT:    Verbal report given to Kelly Alejandro on A SHERRY Glenroy Adas.  being transferred to [facility] for routine progression of care       Report consisted of patients Situation, Background, Assessment and   Recommendations(SBAR). Information from the following report(s) Kardex was reviewed with the receiving nurse. Lines:   Peripheral IV 04/18/18 Right Hand (Active)   Site Assessment Clean, dry, & intact 4/19/2018  7:55 AM   Phlebitis Assessment 0 4/19/2018  7:55 AM   Infiltration Assessment 0 4/19/2018  7:55 AM   Dressing Status Clean, dry, & intact 4/19/2018  7:55 AM   Dressing Type Transparent 4/19/2018  7:55 AM   Hub Color/Line Status Infusing 4/19/2018  7:55 AM        Opportunity for questions and clarification was provided.       Patient transported with:   Maria E Thapa transport Normal

## 2025-02-13 NOTE — DISCHARGE INSTRUCTIONS
Comprehensive Nutrition Assessment    Type and Reason for Visit: Reassess    Nutrition Recommendations/Plan:   Continue diet as ordered.   Continue Ensure Plus TID.   Encouraged meal intakes and discussed ONS usage at discharge.   If poor intakes continue, recommend appetite stimulant.      Malnutrition Assessment:  Malnutrition Status: At risk for malnutrition  Context: Acute Illness       Findings of the 6 clinical characteristics of malnutrition:  Energy Intake:  Mild decrease in energy intake (decrease in appetite last 2-3 days per pt and spouse)  Weight Loss:  No weight loss     Body Fat Loss:  No body fat loss     Muscle Mass Loss:  No muscle mass loss    Fluid Accumulation:  Mild Extremities   Strength:  Not Performed    Nutrition Assessment:    Pt. nutritionally compromised AEB decreased appetite related to acute illness. At risk for further nutrition compromise r/t admitted d/t SOB- acute hypoxemic respiratory failure, bacterial pneumonia on HFNC. ID now following, managing Abx. Noted underlying medical condition (PMHx MILLY, Afib, CHF, COPD, CAD, T2DM, HLD, HTN).     Nutrition Related Findings:    Pt. Report/Treatments/Miscellaneous: Patient seen, laying in bed. He reports that his appetite has been good. Asked him why he was not eating very much of his meals per EMR and he reports that he is not hungry for the food. He reports that he likes the Ensure that has been ordered.   GI Status: Last BM 2/10  Wound: None   Edema:  +1 b/l LE edema, per flowsheet   Pertinent Labs:   Lab Results   Component Value Date    LABA1C 7.0 (H) 12/17/2024    LABA1C 7.0 (H) 11/05/2024    LABA1C 7.9 (H) 03/05/2024     Recent Labs     02/11/25  1031 02/12/25  0423 02/13/25  0519   * 140 143   K 4.5 4.9 4.3   CL 91* 95* 98   GLUCOSE 304* 149* 95   BUN 92* 87* 86*   CREATININE 3.4* 3.4* 3.7*   PHOS 3.9  --   --      Pertinent Medications: statin, pulmicort, bumex, insulin, cozaar, metoprolol, midodrine, flomax (held),  Wound Check: Care Instructions  Your Care Instructions  People have wounds that need care for many reasons. You may have a cut that needs care after surgery. You may have a cut or puncture wound from an accident. Or you may have a wound because of a condition like diabetes. Whatever the cause of your wound, there are things you can do to care for it at home. Your doctor may also want you to come back for a wound check. The wound check lets the doctor know how your wound is healing and if you need more treatment. Follow-up care is a key part of your treatment and safety. Be sure to make and go to all appointments, and call your doctor if you are having problems. It's also a good idea to know your test results and keep a list of the medicines you take. How can you care for yourself at home? · If your doctor told you how to care for your wound, follow your doctor's instructions. If you did not get instructions, follow this general advice:  ¨ You may cover the wound with a thin layer of petroleum jelly, such as Vaseline, and a nonstick bandage. ¨ Apply more petroleum jelly and replace the bandage as needed. · Keep the wound dry for the first 24 to 48 hours. After this, you can shower if your doctor okays it. Pat the wound dry. · Be safe with medicines. Read and follow all instructions on the label. ¨ If the doctor gave you a prescription medicine for pain, take it as prescribed. ¨ If you are not taking a prescription pain medicine, ask your doctor if you can take an over-the-counter medicine. · If your doctor prescribed antibiotics, take them as directed. Do not stop taking them just because you feel better. You need to take the full course of antibiotics. · If you have stitches, do not remove them on your own. Your doctor will tell you when to come back to have them removed. · If you have Steri-Strips, leave them on until they fall off.   · If possible, prop up the injured area on a pillow anytime you sit or lie down during the next 3 days. Try to keep it above the level of your heart. This will help reduce swelling. When should you call for help? Call your doctor now or seek immediate medical care if:    · You have new pain, or the pain gets worse.     · The skin near the wound is cold or pale or changes color.     · You have tingling, weakness, or numbness near the wound.     · The wound starts to bleed, and blood soaks through the bandage. Oozing small amounts of blood is normal.     · You have symptoms of infection, such as:  ¨ Increased pain, swelling, warmth, or redness. ¨ Red streaks leading from the wound. ¨ Pus draining from the wound. ¨ A fever.    Watch closely for changes in your health, and be sure to contact your doctor if:    · You do not get better as expected. Where can you learn more? Go to http://[employer] Systemglenis.info/. Enter P342 in the search box to learn more about \"Wound Check: Care Instructions. \"  Current as of: November 20, 2017  Content Version: 11.7  © 5676-5187 [employer]. Care instructions adapted under license by [employer] (which disclaims liability or warranty for this information). If you have questions about a medical condition or this instruction, always ask your healthcare professional. [address] any warranty or liability for your use of this information. Venous Skin Ulcer: Care Instructions  Your Care Instructions  A venous skin ulcer is a shallow wound that develops when the leg veins do not move blood back to the heart normally. Your veins have one-way valves that keep blood flowing toward the heart. When the valves are damaged, the blood can back up and pool in the vein. The blood may leak out of the vein into tissue around the vein. The tissue can break down and form an ulcer.   The first sign of a venous skin ulcer is skin that turns dark red or purple over the area where the blood is leaking out of the vein. The skin also may become thick, dry, and itchy. Without treatment, an ulcer may form. The ulcer may be painful. Your leg also may swell and ache. If the ulcer becomes infected, the infection may cause an odor, and pus may drain from the ulcer. The area around the ulcer also may be more tender and red. Follow-up care is a key part of your treatment and safety. Be sure to make and go to all appointments, and call your doctor if you are having problems. It's also a good idea to know your test results and keep a list of the medicines you take. How can you care for yourself at home? · Follow your doctor's instructions on how to clean the ulcer and change the bandage. · If your doctor prescribed antibiotics, take them as directed. Do not stop taking them just because you feel better. You need to take the full course of antibiotics. · Lift your legs above the level of your heart as often as possible. For example, lie down and then prop up your legs with pillows. · Wear compression stockings or bandages. They help the blood circulate in your legs. And they help prevent blood from pooling in your legs. But there are different types of stockings, and they need to fit right. So your doctor will recommend what you need. · After your ulcer has healed, continue to wear compression stockings. Take them off only when you bathe and sleep. Compression helps your blood circulate and helps prevent other ulcers from forming. · Walk daily. Walking helps your blood circulation. When should you call for help? Call your doctor now or seek immediate medical care if:     · You have symptoms of infection, such as:  ¨ Increased pain, swelling, warmth, or redness. ¨ Red streaks leading from the ulcer. ¨ Pus draining from the ulcer.   ¨ A fever.    Watch closely for changes in your health, and be sure to contact your doctor if:     · Your ulcer is not healing.      · You have new ulcers.      · The ulcer starts to bleed, and blood soaks through the bandage. Oozing small amounts of a mix of blood and fluid is normal.      · You have new bleeding.      · You do not get better as expected. Where can you learn more? Go to http://svetlana-glenis.info/. Enter E021 in the search box to learn more about \"Venous Skin Ulcer: Care Instructions. \"  Current as of: November 20, 2017  Content Version: 11.7  © 5957-6120 [employer]. Care instructions adapted under license by [employer] (which disclaims liability or warranty for this information).  If you have questions about a medical condition or this instruction, always ask your healthcare professional. [address] any warranty or liability for your use of this information.

## 2025-05-09 NOTE — PROGRESS NOTES
- Your Lasix has been changed to 60 mg twice daily  -Recommend obtaining a BMP in roughly 1 week  - Recommend following up with your primary care provider, as well as your cardiologist Dr. Ferrara within 1 week  - Please continue your prior steroid taper, as previously prescribed at time of discharge   Rested well. Awoke confused saying he was attacked by my dog. Hard to reorient. Hourly rounds completed. Resting in bed. Bed in low locked position. Call light and personal items within reach. Will continue to monitor and give report to oncoming day shift nurse.

## (undated) DEVICE — WIPE INSTR HIGH ABSORBENT FAST WICKING LINT FREE COUNT 20

## (undated) DEVICE — INTENDED FOR TISSUE SEPARATION, AND OTHER PROCEDURES THAT REQUIRE A SHARP SURGICAL BLADE TO PUNCTURE OR CUT.: Brand: BARD-PARKER SAFETY BLADES SIZE 11, STERILE

## (undated) DEVICE — (D)PREP SKN CHLRAPRP APPL 26ML -- CONVERT TO ITEM 371833

## (undated) DEVICE — SUTURE PERMAHAND SZ 2-0 L12X18IN NONABSORBABLE BLK SILK A185H

## (undated) DEVICE — SUTURE PERMAHAND SZ 2-0 L18IN NONABSORBABLE BLK L26MM SH C012D

## (undated) DEVICE — Device

## (undated) DEVICE — SUTURE MCRYL SZ 4-0 L27IN ABSRB UD L19MM PS-2 1/2 CIR PRIM Y426H

## (undated) DEVICE — 2000CC GUARDIAN II: Brand: GUARDIAN

## (undated) DEVICE — SUTURE PROL SZ 6-0 L24IN NONABSORBABLE BLU BV-1 L9.3MM 3/8 M8805

## (undated) DEVICE — SUTURE VCRL SZ 3-0 L27IN ABSRB UD L26MM SH 1/2 CIR J416H

## (undated) DEVICE — DISH PTRI STRL --

## (undated) DEVICE — DRAPE TWL SURG 16X26IN BLU ORB04] ALLCARE INC]

## (undated) DEVICE — SUTURE PERMAHAND SZ 3-0 L18IN NONABSORBABLE BLK SILK BRAID A184H

## (undated) DEVICE — UNIVERSAL DRAPES: Brand: MEDLINE INDUSTRIES, INC.

## (undated) DEVICE — SUT PROL 3-0 36IN SH DA BLU --

## (undated) DEVICE — REM POLYHESIVE ADULT PATIENT RETURN ELECTRODE: Brand: VALLEYLAB

## (undated) DEVICE — GOWN,REINFORCED,POLY,AURORA,XXLARGE,STR: Brand: MEDLINE

## (undated) DEVICE — INTENDED TO BE USED TO OCCLUDE, RETRACT AND IDENTIFY ARTERIES, VEINS, TENDONS AND NERVES IN SURGICAL PROCEDURES: Brand: STERION®  VESSEL LOOP

## (undated) DEVICE — DRAPE SHT 3 QTR PROXIMA 53X77 --

## (undated) DEVICE — SUT PROL 6-0 24IN BV1 DA BLU --

## (undated) DEVICE — BLADE ASSEMB CLP HAIR FINE --

## (undated) DEVICE — ELECTRODE NDL 2.8IN COAT VALLEYLAB

## (undated) DEVICE — AGENT HEMSTAT W4XL4IN OXIDIZED REGENERATED CELOS ABSRB SFT

## (undated) DEVICE — INTENDED FOR TISSUE SEPARATION, AND OTHER PROCEDURES THAT REQUIRE A SHARP SURGICAL BLADE TO PUNCTURE OR CUT.: Brand: BARD-PARKER SAFETY BLADES SIZE 15, STERILE

## (undated) DEVICE — GEL US 20GM NONIRRITATING OVERWRAPPED FILE PCH TRNSMIT

## (undated) DEVICE — DERMABOND SKIN ADH 0.7ML -- DERMABOND ADVANCED 12/BX

## (undated) DEVICE — APPLIER CLP L9.375IN APER 2.1MM CLS L3.8MM 20 SM TI CLP

## (undated) DEVICE — CARDINAL HEALTH FLEXIBLE LIGHT HANDLE COVER: Brand: CARDINAL HEALTH

## (undated) DEVICE — APPLIER CLP SM BLK TI AUTO HNDL DISP W/ 20 CLP PREM SURGICLP

## (undated) DEVICE — VASCUCLAMP RADIOPAQUE VASCULAR CLAMP SMALL STRAIGHT: Brand: VASCUCLAMP